# Patient Record
Sex: FEMALE | Race: WHITE | NOT HISPANIC OR LATINO | Employment: OTHER | URBAN - METROPOLITAN AREA
[De-identification: names, ages, dates, MRNs, and addresses within clinical notes are randomized per-mention and may not be internally consistent; named-entity substitution may affect disease eponyms.]

---

## 2017-01-10 ENCOUNTER — TELEPHONE (OUTPATIENT)
Dept: FAMILY MEDICINE | Facility: CLINIC | Age: 72
End: 2017-01-10

## 2017-01-10 NOTE — TELEPHONE ENCOUNTER
Reason for Call:  Other call back and prescription    Detailed comments: She was on the medical marijuana program but this was not helping her.  She is wondering if she can get a Rx for Vicodin?  She uses [facility].  Please advise.     Phone Number Patient can be reached at: Home number on file [phone] (home)    Best Time: any    Can we leave a detailed message on this number? YES    Call taken on 1/10/2017 at 3:32 PM by Rosemary Villarreal

## 2017-01-10 NOTE — TELEPHONE ENCOUNTER
She found her prescription for December so she will fill that. She will see Dr. Solis in 2 weeks to discuss plan going forward

## 2017-01-23 ENCOUNTER — OFFICE VISIT (OUTPATIENT)
Dept: FAMILY MEDICINE | Facility: CLINIC | Age: 72
End: 2017-01-23
Payer: COMMERCIAL

## 2017-01-23 VITALS
HEIGHT: 61 IN | TEMPERATURE: 97 F | DIASTOLIC BLOOD PRESSURE: 60 MMHG | BODY MASS INDEX: 24.02 KG/M2 | HEART RATE: 56 BPM | WEIGHT: 127.2 LBS | SYSTOLIC BLOOD PRESSURE: 136 MMHG

## 2017-01-23 DIAGNOSIS — F41.1 ANXIETY STATE: ICD-10-CM

## 2017-01-23 DIAGNOSIS — F11.20 NARCOTIC DEPENDENCE (H): Chronic | ICD-10-CM

## 2017-01-23 DIAGNOSIS — G89.4 CHRONIC PAIN SYNDROME: Primary | ICD-10-CM

## 2017-01-23 PROBLEM — Z53.20 MAMMOGRAM DECLINED: Status: ACTIVE | Noted: 2017-01-23

## 2017-01-23 PROCEDURE — 99214 OFFICE O/P EST MOD 30 MIN: CPT | Performed by: FAMILY MEDICINE

## 2017-01-23 RX ORDER — CITALOPRAM HYDROBROMIDE 40 MG/1
40 TABLET ORAL DAILY
Qty: 90 TABLET | Refills: 3 | Status: SHIPPED | OUTPATIENT
Start: 2017-01-23 | End: 2018-04-28

## 2017-01-23 RX ORDER — HYDROCODONE BITARTRATE AND ACETAMINOPHEN 5; 325 MG/1; MG/1
1 TABLET ORAL EVERY 6 HOURS PRN
Qty: 60 TABLET | Refills: 0 | Status: SHIPPED | OUTPATIENT
Start: 2017-02-23 | End: 2017-01-23

## 2017-01-23 RX ORDER — BACLOFEN 10 MG/1
10 TABLET ORAL 2 TIMES DAILY
Qty: 90 TABLET | Refills: 3 | COMMUNITY
Start: 2017-01-23 | End: 2017-08-22

## 2017-01-23 RX ORDER — HYDROCODONE BITARTRATE AND ACETAMINOPHEN 5; 325 MG/1; MG/1
1 TABLET ORAL EVERY 6 HOURS PRN
Qty: 60 TABLET | Refills: 0 | Status: SHIPPED | OUTPATIENT
Start: 2017-01-23 | End: 2017-01-23

## 2017-01-23 RX ORDER — HYDROCODONE BITARTRATE AND ACETAMINOPHEN 5; 325 MG/1; MG/1
1 TABLET ORAL EVERY 6 HOURS PRN
Qty: 60 TABLET | Refills: 0 | Status: SHIPPED | OUTPATIENT
Start: 2017-03-23 | End: 2017-04-17 | Stop reason: DRUGHIGH

## 2017-01-23 NOTE — PROGRESS NOTES
SUBJECTIVE:                                                    Tracy Galloway is a 71 year old female who presents to clinic today for the following health issues:    Chronic Pain Follow-Up       Type/Location of Pain: Right Jaw  Analgesia/pain control:       Recent changes: None. Tracy takes 1 tablet of Vicodin when she wakes up and 1 at noon.       Overall control: For most of the day, Tracy's pain is tolerable. By evening, she feels the   Vicodin has worn off.   Activity level/function:      Daily activities: Tracy is able to do all daily activities. In fact, working out improves her pain.     Work: N/A  Adverse effects: She needs daily stool softeners.   Adherance    Taking medication as directed? Yes    Participating in other treatments: No, Tracy stopped participating in the medical marijuana program 2 weeks ago because of the prohibitive cost for minimal pain control. She also noted some confusion as they increased her dose of medical cannibis. Tracy resumed taking her Vicodin a week later. Tracy takes Baclofen nightly. She uses Advil for breakthrough pain.    Risk Factors:    Sleep: Good    Mood/anxiety: Controlled    Recent family or social stressors: None    Other aggravating factors: none  PHQ-9 SCORE 5/16/2014 7/7/2015 6/17/2016   Total Score 8 2 -   Total Score - - 2     ROMAIN-7 SCORE 5/16/2014 7/7/2015 6/17/2016   Total Score 3 0 -   Total Score - - 1     Encounter-Level CSA - 7/7/15:               Controlled Substance Agreement - Scan on 7/10/2015  1:04 PM : CONTROLLED SUBSTANCE AGREEMENT (below)                 Amount of exercise or physical activity: Tracy exercises 4-5 days/week for an average of 45-60 minutes.    Problems taking medications regularly: No    Medication side effects: None    Diet: Regular     HTN  Patient presents for evaluation of hypertension.  She indicates that she is feeling well and denies any symptoms referable to her elevated blood pressure. Specifically denies chest pain,  "palpitations, dyspnea, orthopnea, PND or peripheral edema. Current medication regimen is as listed below. Patient denies any side effects of medication.      Problem List, Medications, Allergies, and Medical/Social/Surgical histories reviewed in EPIC and updated as appropriate.    ROS:  Constitutional, HEENT, Cardiovascular, Pulmonary, GI and  systems are negative, except as otherwise noted.    OBJECTIVE:                                                    /60 mmHg  Pulse 56  Temp(Src) 97  F (36.1  C) (Tympanic)  Ht 5' 0.5\" (1.537 m)  Wt 127 lb 3.2 oz (57.698 kg)  BMI 24.42 kg/m2  Body mass index is 24.42 kg/(m^2).  GENERAL: Healthy, alert, and no distress.  RESP: CTAB  CV: RRR without murmur, gallop, or rub.   ABDOMEN: Soft and non-tender without hepatosplenomegaly or masses. Bowel sounds normal.  PSYCH: Affect and mentation appear normal.     ASSESSMENT/PLAN:                                                    (G89.4) Chronic pain syndrome (primary encounter diagnosis).  Comment: Moderate control.   Plan: Refilled Baclofen and Vicodin.    (F41.1) Anxiety   Plan: Refilled Citalopram.    Hypertension  Plan: Ordered Lipid Panel.          (F11.20) Narcotic dependence (H)  Comment:   Plan: see above              FUTURE APPOINTMENTS:       - Follow-up visit in a month for chronic pain management. Patient is to journal pain between now and then. May consider starting a Statin at this appointment pending the results of the Lipid Panel.     Radha Lamar, MS3      I have seen this patient and examined this patient with Medical student and have been involved in all of the medical decision making  Gwendolyn Solis MD       [facility]    "

## 2017-01-23 NOTE — NURSING NOTE
"Chief Complaint   Patient presents with     Pain       Initial /60 mmHg  Pulse 56  Temp(Src) 97  F (36.1  C) (Tympanic)  Ht 5' 0.5\" (1.537 m)  Wt 127 lb 3.2 oz (57.698 kg)  BMI 24.42 kg/m2 Estimated body mass index is 24.42 kg/(m^2) as calculated from the following:    Height as of this encounter: 5' 0.5\" (1.537 m).    Weight as of this encounter: 127 lb 3.2 oz (57.698 kg).  BP completed using cuff size: regular    "

## 2017-01-23 NOTE — MR AVS SNAPSHOT
After Visit Summary   1/23/2017    Tracy Galloway    MRN: [id number]           Patient Information     Date Of Birth          1945        Visit Information        Provider Department      1/23/2017 11:00 AM Gwendolyn Solis MD [facility]        Today's Diagnoses     Chronic pain syndrome    -  1     Anxiety state           Care Instructions    Follow up with Dr. Solis in 1 month. Keep a pain journal between now and then.    Make a fasting lab appointment to check your cholesterol.    Dispose of your medications.             Follow-ups after your visit        Who to contact     If you have questions or need follow up information about today's clinic visit or your schedule please contact [facility] directly at [phone].  Normal or non-critical lab and imaging results will be communicated to you by [employer], letter or phone within 4 business days after the clinic has received the results. If you do not hear from us within 7 days, please contact the clinic through [employer] or phone. If you have a critical or abnormal lab result, we will notify you by phone as soon as possible.  Submit refill requests through [employer] or call your pharmacy and they will forward the refill request to us. Please allow 3 business days for your refill to be completed.          Additional Information About Your Visit        MyChart Information     [employer] gives you secure access to your electronic health record. If you see a primary care provider, you can also send messages to your care team and make appointments. If you have questions, please call your primary care clinic.  If you do not have a primary care provider, please call [phone] and they will assist you.        Care EveryWhere ID     This is your Care EveryWhere ID. This could be used by other organizations to access your [place] medical records  [id number]        Your Vitals Were     Pulse Temperature Height  "BMI (Body Mass Index)          56 97  F (36.1  C) (Tympanic) 5' 0.5\" (1.537 m) 24.42 kg/m2         Blood Pressure from Last 3 Encounters:   01/23/17 136/60   11/17/16 134/66   11/14/16 128/62    Weight from Last 3 Encounters:   01/23/17 127 lb 3.2 oz (57.698 kg)   11/17/16 124 lb 6.4 oz (56.427 kg)   11/14/16 124 lb 3.2 oz (56.337 kg)              Today, you had the following     No orders found for display         Today's Medication Changes          These changes are accurate as of: 1/23/17 11:38 AM.  If you have any questions, ask your nurse or doctor.               Start taking these medicines.        Dose/Directions    HYDROcodone-acetaminophen 5-325 MG per tablet   Commonly known as:  NORCO   Used for:  Chronic pain syndrome   Started by:  Gwendolyn Solis MD        Dose:  1 tablet   Start taking on:  3/23/2017   Take 1 tablet by mouth every 6 hours as needed   Quantity:  60 tablet   Refills:  0            Where to get your medicines      These medications were sent to [id number] [address] [facility]  [address] [address]    Hours:  Closed 10-16-08 business to [facility] Phone:  [phone]    - citalopram 40 MG tablet      Some of these will need a paper prescription and others can be bought over the counter.  Ask your nurse if you have questions.     Bring a paper prescription for each of these medications    - HYDROcodone-acetaminophen 5-325 MG per tablet             Primary Care Provider Office Phone # Fax #    Gwendolyn Solis [phone]       [facility] [address] [facility] [address]        Thank you!     Thank you for choosing [facility]  for your care. Our goal is always to provide you with excellent care. Hearing back from our patients is one way we can continue to improve our services. Please take a few minutes to complete the written survey that you may receive in the mail after " your visit with us. Thank you!             Your Updated Medication List - Protect others around you: Learn how to safely use, store and throw away your medicines at www.disposemymeds.org.          This list is accurate as of: 1/23/17 11:38 AM.  Always use your most recent med list.                   Brand Name Dispense Instructions for use    aspirin 81 MG EC tablet     90 tablet    Take 1 tablet (81 mg) by mouth daily       baclofen 10 MG tablet    LIORESAL    90 tablet    Take 1 tablet (10 mg) by mouth 2 times daily       citalopram 40 MG tablet    celeXA    90 tablet    Take 1 tablet (40 mg) by mouth daily       diltiazem 180 MG 24 hr capsule    DILT-XR    90 capsule    Take 1 capsule (180 mg) by mouth daily       DOCUSATE SODIUM PO      Take 100 mg by mouth daily as needed       HYDROcodone-acetaminophen 5-325 MG per tablet   Start taking on:  3/23/2017    NORCO    60 tablet    Take 1 tablet by mouth every 6 hours as needed       ibuprofen 600 MG tablet    ADVIL/MOTRIN    90 tablet    Take 1 tablet (600 mg) by mouth every 6 hours as needed for moderate pain       lisinopril 20 MG tablet    PRINIVIL/ZESTRIL    90 tablet    Take 1 tablet (20 mg) by mouth daily       loratadine 10 MG tablet    CLARITIN     Take 10 mg by mouth daily as needed for allergies       ranitidine 150 MG tablet    ZANTAC    180 tablet    Take 1 tablet (150 mg) by mouth 2 times daily       valACYclovir 1000 mg tablet    VALTREX    21 tablet    As needed fo rbreak outs       zolpidem 10 MG tablet    AMBIEN    30 tablet    Take  by mouth nightly as needed for sleep. 1/2 -1 TABLET

## 2017-01-23 NOTE — PATIENT INSTRUCTIONS
Follow up with Dr. Solis in 1 month. Keep a pain journal between now and then.    Make a fasting lab appointment to check your cholesterol.    Dispose of your medications.

## 2017-01-24 DIAGNOSIS — I10 ESSENTIAL HYPERTENSION WITH GOAL BLOOD PRESSURE LESS THAN 140/90: ICD-10-CM

## 2017-01-24 LAB
ALBUMIN SERPL-MCNC: 3.3 G/DL (ref 3.4–5)
ALP SERPL-CCNC: 64 U/L (ref 40–150)
ALT SERPL W P-5'-P-CCNC: 64 U/L (ref 0–50)
ANION GAP SERPL CALCULATED.3IONS-SCNC: 7 MMOL/L (ref 3–14)
AST SERPL W P-5'-P-CCNC: 32 U/L (ref 0–45)
BASOPHILS # BLD AUTO: 0 10E9/L (ref 0–0.2)
BASOPHILS NFR BLD AUTO: 0.6 %
BILIRUB DIRECT SERPL-MCNC: <0.1 MG/DL (ref 0–0.2)
BILIRUB SERPL-MCNC: 0.2 MG/DL (ref 0.2–1.3)
BUN SERPL-MCNC: 22 MG/DL (ref 7–30)
CALCIUM SERPL-MCNC: 8.5 MG/DL (ref 8.5–10.1)
CHLORIDE SERPL-SCNC: 104 MMOL/L (ref 94–109)
CHOLEST SERPL-MCNC: 192 MG/DL
CO2 SERPL-SCNC: 27 MMOL/L (ref 20–32)
CREAT SERPL-MCNC: 0.86 MG/DL (ref 0.52–1.04)
DIFFERENTIAL METHOD BLD: NORMAL
EOSINOPHIL # BLD AUTO: 0.6 10E9/L (ref 0–0.7)
EOSINOPHIL NFR BLD AUTO: 10.9 %
ERYTHROCYTE [DISTWIDTH] IN BLOOD BY AUTOMATED COUNT: 13 % (ref 10–15)
GFR SERPL CREATININE-BSD FRML MDRD: 65 ML/MIN/1.7M2
GLUCOSE SERPL-MCNC: 71 MG/DL (ref 70–99)
HCT VFR BLD AUTO: 36.4 % (ref 35–47)
HDLC SERPL-MCNC: 60 MG/DL
HGB BLD-MCNC: 11.7 G/DL (ref 11.7–15.7)
LDLC SERPL CALC-MCNC: 116 MG/DL
LYMPHOCYTES # BLD AUTO: 1.7 10E9/L (ref 0.8–5.3)
LYMPHOCYTES NFR BLD AUTO: 32.6 %
MCH RBC QN AUTO: 30 PG (ref 26.5–33)
MCHC RBC AUTO-ENTMCNC: 32.1 G/DL (ref 31.5–36.5)
MCV RBC AUTO: 93 FL (ref 78–100)
MONOCYTES # BLD AUTO: 0.4 10E9/L (ref 0–1.3)
MONOCYTES NFR BLD AUTO: 6.8 %
NEUTROPHILS # BLD AUTO: 2.5 10E9/L (ref 1.6–8.3)
NEUTROPHILS NFR BLD AUTO: 49.1 %
NONHDLC SERPL-MCNC: 132 MG/DL
PLATELET # BLD AUTO: 322 10E9/L (ref 150–450)
POTASSIUM SERPL-SCNC: 4.5 MMOL/L (ref 3.4–5.3)
PROT SERPL-MCNC: 7.2 G/DL (ref 6.8–8.8)
RBC # BLD AUTO: 3.9 10E12/L (ref 3.8–5.2)
SODIUM SERPL-SCNC: 138 MMOL/L (ref 133–144)
TRIGL SERPL-MCNC: 80 MG/DL
TSH SERPL DL<=0.005 MIU/L-ACNC: 0.89 MU/L (ref 0.4–4)
WBC # BLD AUTO: 5.2 10E9/L (ref 4–11)

## 2017-01-24 PROCEDURE — 85025 COMPLETE CBC W/AUTO DIFF WBC: CPT | Performed by: FAMILY MEDICINE

## 2017-01-24 PROCEDURE — 84443 ASSAY THYROID STIM HORMONE: CPT | Performed by: FAMILY MEDICINE

## 2017-01-24 PROCEDURE — 80061 LIPID PANEL: CPT | Performed by: FAMILY MEDICINE

## 2017-01-24 PROCEDURE — 80048 BASIC METABOLIC PNL TOTAL CA: CPT | Performed by: FAMILY MEDICINE

## 2017-01-24 PROCEDURE — 80076 HEPATIC FUNCTION PANEL: CPT | Performed by: FAMILY MEDICINE

## 2017-01-24 PROCEDURE — 36415 COLL VENOUS BLD VENIPUNCTURE: CPT | Performed by: FAMILY MEDICINE

## 2017-02-21 ENCOUNTER — OFFICE VISIT (OUTPATIENT)
Dept: FAMILY MEDICINE | Facility: CLINIC | Age: 72
End: 2017-02-21
Payer: COMMERCIAL

## 2017-02-21 VITALS
TEMPERATURE: 96.6 F | BODY MASS INDEX: 23.82 KG/M2 | SYSTOLIC BLOOD PRESSURE: 134 MMHG | WEIGHT: 124 LBS | HEART RATE: 72 BPM | DIASTOLIC BLOOD PRESSURE: 60 MMHG

## 2017-02-21 DIAGNOSIS — G89.4 CHRONIC PAIN SYNDROME: Primary | Chronic | ICD-10-CM

## 2017-02-21 PROCEDURE — 99214 OFFICE O/P EST MOD 30 MIN: CPT | Performed by: FAMILY MEDICINE

## 2017-02-21 RX ORDER — HYDROCODONE BITARTRATE AND ACETAMINOPHEN 7.5; 325 MG/1; MG/1
1 TABLET ORAL 2 TIMES DAILY
Qty: 60 TABLET | Refills: 0 | Status: SHIPPED | OUTPATIENT
Start: 2017-02-21 | End: 2017-03-27

## 2017-02-21 NOTE — MR AVS SNAPSHOT
After Visit Summary   2/21/2017    Tracy Galloway    MRN: [id number]           Patient Information     Date Of Birth          1945        Visit Information        Provider Department      2/21/2017 2:00 PM Gwendolyn Solis MD [facility]        Today's Diagnoses     Chronic pain syndrome    -  1      Care Instructions    Change dose of narcotic     See me back in on month         Follow-ups after your visit        Who to contact     If you have questions or need follow up information about today's clinic visit or your schedule please contact [facility] directly at [phone].  Normal or non-critical lab and imaging results will be communicated to you by [employer], letter or phone within 4 business days after the clinic has received the results. If you do not hear from us within 7 days, please contact the clinic through [employer] or phone. If you have a critical or abnormal lab result, we will notify you by phone as soon as possible.  Submit refill requests through [employer] or call your pharmacy and they will forward the refill request to us. Please allow 3 business days for your refill to be completed.          Additional Information About Your Visit        MyChart Information     [employer] gives you secure access to your electronic health record. If you see a primary care provider, you can also send messages to your care team and make appointments. If you have questions, please call your primary care clinic.  If you do not have a primary care provider, please call [phone] and they will assist you.        Care EveryWhere ID     This is your Care EveryWhere ID. This could be used by other organizations to access your [place] medical records  [id number]        Your Vitals Were     Pulse Temperature Breastfeeding? BMI (Body Mass Index)          72 96.6  F (35.9  C) (Tympanic) No 23.82 kg/m2         Blood Pressure from Last 3 Encounters:   02/21/17 134/60    01/23/17 136/60   11/17/16 134/66    Weight from Last 3 Encounters:   02/21/17 124 lb (56.2 kg)   01/23/17 127 lb 3.2 oz (57.7 kg)   11/17/16 124 lb 6.4 oz (56.4 kg)              Today, you had the following     No orders found for display         Today's Medication Changes          These changes are accurate as of: 2/21/17  2:41 PM.  If you have any questions, ask your nurse or doctor.               These medicines have changed or have updated prescriptions.        Dose/Directions    * HYDROcodone-acetaminophen 7.5-325 MG per tablet   Commonly known as:  NORCO   This may have changed:  You were already taking a medication with the same name, and this prescription was added. Make sure you understand how and when to take each.   Used for:  Chronic pain syndrome   Changed by:  Gwendolyn Solis MD        Dose:  1 tablet   Take 1 tablet by mouth 2 times daily maximum 2 tablet(s) per day   Quantity:  60 tablet   Refills:  0       * HYDROcodone-acetaminophen 5-325 MG per tablet   Commonly known as:  NORCO   This may have changed:  Another medication with the same name was added. Make sure you understand how and when to take each.   Used for:  Chronic pain syndrome   Changed by:  Gwendolyn Solis MD        Dose:  1 tablet   Start taking on:  3/23/2017   Take 1 tablet by mouth every 6 hours as needed   Quantity:  60 tablet   Refills:  0       * Notice:  This list has 2 medication(s) that are the same as other medications prescribed for you. Read the directions carefully, and ask your doctor or other care provider to review them with you.         Where to get your medicines      Some of these will need a paper prescription and others can be bought over the counter.  Ask your nurse if you have questions.     Bring a paper prescription for each of these medications     HYDROcodone-acetaminophen 7.5-325 MG per tablet                Primary Care Provider Office Phone # Fax #    Gwendolyn Solis [phone]  [phone]       [facility] [place] [address] [facility] [address]        Thank you!     Thank you for choosing [facility]  for your care. Our goal is always to provide you with excellent care. Hearing back from our patients is one way we can continue to improve our services. Please take a few minutes to complete the written survey that you may receive in the mail after your visit with us. Thank you!             Your Updated Medication List - Protect others around you: Learn how to safely use, store and throw away your medicines at www.disposemymeds.org.          This list is accurate as of: 2/21/17  2:41 PM.  Always use your most recent med list.                   Brand Name Dispense Instructions for use    aspirin 81 MG EC tablet     90 tablet    Take 1 tablet (81 mg) by mouth daily       baclofen 10 MG tablet    LIORESAL    90 tablet    Take 1 tablet (10 mg) by mouth 2 times daily       citalopram 40 MG tablet    celeXA    90 tablet    Take 1 tablet (40 mg) by mouth daily       diltiazem 180 MG 24 hr capsule    DILT-XR    90 capsule    Take 1 capsule (180 mg) by mouth daily       DOCUSATE SODIUM PO      Take 100 mg by mouth daily as needed       * HYDROcodone-acetaminophen 7.5-325 MG per tablet    NORCO    60 tablet    Take 1 tablet by mouth 2 times daily maximum 2 tablet(s) per day       * HYDROcodone-acetaminophen 5-325 MG per tablet   Start taking on:  3/23/2017    NORCO    60 tablet    Take 1 tablet by mouth every 6 hours as needed       ibuprofen 600 MG tablet    ADVIL/MOTRIN    90 tablet    Take 1 tablet (600 mg) by mouth every 6 hours as needed for moderate pain       lisinopril 20 MG tablet    PRINIVIL/ZESTRIL    90 tablet    Take 1 tablet (20 mg) by mouth daily       loratadine 10 MG tablet    CLARITIN     Take 10 mg by mouth daily as needed for allergies       ranitidine 150 MG tablet    ZANTAC    180 tablet    Take 1 tablet (150 mg) by mouth 2 times daily        valACYclovir 1000 mg tablet    VALTREX    21 tablet    As needed fo rbreak outs       zolpidem 10 MG tablet    AMBIEN    30 tablet    Take  by mouth nightly as needed for sleep. 1/2 -1 TABLET       * Notice:  This list has 2 medication(s) that are the same as other medications prescribed for you. Read the directions carefully, and ask your doctor or other care provider to review them with you.

## 2017-02-21 NOTE — PROGRESS NOTES
SUBJECTIVE:                                                    Tracy Galloway is a 71 year old female who presents to clinic today for the following health issues:        Chronic Pain Follow-Up       Type / Location of Pain: right jaw  Analgesia/pain control:       Recent changes:  same      Overall control: Tolerable with discomfort  Activity level/function:      Daily activities:  Can do most things most days, with some rest    Work:  not applicable  Adverse effects:  No  Adherance    Taking medication as directed?  Yes    Participating in other treatments: no -   Risk Factors:    Sleep:  Good    Mood/anxiety:  controlled    Recent family or social stressors:  none noted    Other aggravating factors: none  PHQ-9 SCORE 5/16/2014 7/7/2015 6/17/2016   Total Score 8 2 -   Total Score - - 2     ROMAIN-7 SCORE 5/16/2014 7/7/2015 6/17/2016   Total Score 3 0 -   Total Score - - 1     Encounter-Level CSA - 07/07/2015:                 Controlled Substance Agreement - Scan on 7/10/2015  1:04 PM : CONTROLLED SUBSTANCE AGREEMENT (below)                 Amount of exercise or physical activity: 6-7 days/week for an average of 45-60 minutes    Problems taking medications regularly: No    Medication side effects: none  Diet: regular (no restrictions)        Problem list and histories reviewed & adjusted, as indicated.  Additional history: as documented    Problem list, Medication list, Allergies, and Medical/Social/Surgical histories reviewed in EPIC and updated as appropriate.    ROS:  Constitutional, HEENT, cardiovascular, pulmonary, gi and gu systems are negative, except as otherwise noted.    OBJECTIVE:                                                    /60 (BP Location: Right arm, Patient Position: Chair, Cuff Size: Adult Regular)  Pulse 72  Temp 96.6  F (35.9  C) (Tympanic)  Wt 124 lb (56.2 kg)  Breastfeeding? No  BMI 23.82 kg/m2  Body mass index is 23.82 kg/(m^2).  GENERAL APPEARANCE: healthy, alert and no  distress  RESP: lungs clear to auscultation - no rales, rhonchi or wheezes  CV: regular rates and rhythm, normal S1 S2, no S3 or S4 and no murmur, click or rub  PSYCH: mentation appears normal and affect normal/bright         ASSESSMENT/PLAN:                                                    1. Chronic pain syndrome  Not well controlled - dose of norco adjusted today   - HYDROcodone-acetaminophen (NORCO) 7.5-325 MG per tablet; Take 1 tablet by mouth 2 times daily maximum 2 tablet(s) per day  Dispense: 60 tablet; Refill: 0      Patient Instructions   Change dose of narcotic     See me back in on month     Risks, benefits, side effects and rationale for treatment plan fully discussed with the patient and understanding expressed.       Gwendolyn Solis MD  [facility]

## 2017-02-21 NOTE — NURSING NOTE
"Chief Complaint   Patient presents with     Pain       Initial /60 (BP Location: Right arm, Patient Position: Chair, Cuff Size: Adult Regular)  Pulse 72  Temp 96.6  F (35.9  C) (Tympanic)  Wt 124 lb (56.2 kg)  Breastfeeding? No  BMI 23.82 kg/m2 Estimated body mass index is 23.82 kg/(m^2) as calculated from the following:    Height as of 1/23/17: 5' 0.5\" (1.537 m).    Weight as of this encounter: 124 lb (56.2 kg).  Medication Reconciliation: complete    "

## 2017-03-11 ENCOUNTER — MYC MEDICAL ADVICE (OUTPATIENT)
Dept: FAMILY MEDICINE | Facility: CLINIC | Age: 72
End: 2017-03-11

## 2017-03-27 ENCOUNTER — OFFICE VISIT (OUTPATIENT)
Dept: FAMILY MEDICINE | Facility: CLINIC | Age: 72
End: 2017-03-27
Payer: COMMERCIAL

## 2017-03-27 VITALS
HEART RATE: 80 BPM | DIASTOLIC BLOOD PRESSURE: 68 MMHG | TEMPERATURE: 97.6 F | WEIGHT: 128 LBS | SYSTOLIC BLOOD PRESSURE: 142 MMHG | BODY MASS INDEX: 24.17 KG/M2 | HEIGHT: 61 IN

## 2017-03-27 DIAGNOSIS — G89.4 CHRONIC PAIN SYNDROME: Chronic | ICD-10-CM

## 2017-03-27 DIAGNOSIS — M54.50 ACUTE LEFT-SIDED LOW BACK PAIN WITHOUT SCIATICA: Primary | ICD-10-CM

## 2017-03-27 PROCEDURE — 99213 OFFICE O/P EST LOW 20 MIN: CPT | Performed by: NURSE PRACTITIONER

## 2017-03-27 RX ORDER — PREDNISONE 20 MG/1
20 TABLET ORAL 2 TIMES DAILY
Qty: 10 TABLET | Refills: 0 | Status: SHIPPED | OUTPATIENT
Start: 2017-03-27 | End: 2017-04-17

## 2017-03-27 RX ORDER — HYDROCODONE BITARTRATE AND ACETAMINOPHEN 7.5; 325 MG/1; MG/1
1 TABLET ORAL 2 TIMES DAILY
Qty: 60 TABLET | Refills: 0 | Status: SHIPPED | OUTPATIENT
Start: 2017-03-27 | End: 2017-05-26 | Stop reason: ALTCHOICE

## 2017-03-27 NOTE — PROGRESS NOTES
SUBJECTIVE:                                                    Tracy Galloway is a 71 year old female who presents to clinic today for the following health issues:    Back Pain      Duration: 4 months - getting worse         Specific cause: lifting    Description:   Location of pain: low back left  Character of pain: sharp - burning   Pain radiation:radiates into the left buttocks - worse with walking up stairs   New numbness or weakness in legs, not attributed to pain:  no     Intensity: Currently 8/10, moderate    History:   Pain interferes with job: Not applicable  History of back problems: no prior back problems  Any previous MRI or X-rays: None  Sees a specialist for back pain:  No  Therapies tried without relief: chiropractor    Alleviating factors:   Improved by: heat/cold rub helps a little     Precipitating factors:  Worsened by: walking up stairs     Functional and Psychosocial Screen (Christiano STarT Back):      Not performed today       Accompanying Signs & Symptoms:  Risk of Fracture:  None  Risk of Cauda Equina:  None  Risk of Infection:  None  Risk of Cancer:  None  Risk of Ankylosing Spondylitis:  Onset at age <35, male, AND morning back stiffness. no        Was lifting a heavy tote 4 months ago when symptoms started.  Went to the chiropractor 3 times but then left on vacation for 3 weeks and unable to follow up.  Came home following vacation and then left shortly after for a 2 week cruise.  Has been unable to take care of her back since busy traveling.    Problem list and histories reviewed & adjusted, as indicated.  Additional history: as documented    Patient Active Problem List   Diagnosis     Anxiety state     Insomnia     Plantar fasciitis     Shingles     CARDIOVASCULAR SCREENING; LDL GOAL LESS THAN 130     Atrophic vaginitis     Advanced directives, counseling/discussion     Chronic pain     Health Care Home     Essential hypertension with goal blood pressure less than 140/90     Narcotic  dependence (H)     Prinzmetal angina (H)     Visual impairment     TIA (transient ischemic attack)     Confusion     Mammogram declined     Past Surgical History:   Procedure Laterality Date     CHOLECYSTECTOMY, LAPOROSCOPIC  1990's    s/p Cholecystectomy, Laparoscopic     COLONOSCOPY  12/6/2012    Procedure: COLONOSCOPY;  Colonoscopy;  Surgeon: Tyrell Brown MD;  Location: [facility]     EYE SURGERY       HC COLONOSCOPY THRU STOMA, DIAGNOSTIC  3-    at outside clinic.  Normal.     HYSTERECTOMY, SUNDAR  1970's    severe endometriosis     SURGICAL HISTORY OF -   1998    breast reduction     SURGICAL HISTORY OF -   5/30/03    coronary angiogram     SURGICAL HISTORY OF -       Refractive surgery right     TONSILLECTOMY         Social History   Substance Use Topics     Smoking status: Former Smoker     Types: Cigarettes     Quit date: 10/30/1962     Smokeless tobacco: Never Used     Alcohol use 2.4 oz/week     4 Standard drinks or equivalent per week      Comment: occasional     Family History   Problem Relation Age of Onset     Hypertension Mother      HEART DISEASE Mother      Hypertension Father      Circulatory Father      PAD     Alzheimer Disease Maternal Grandmother      dementia     CEREBROVASCULAR DISEASE Maternal Grandmother      HEART DISEASE Maternal Grandfather      DIABETES Maternal Grandfather          Current Outpatient Prescriptions   Medication Sig Dispense Refill     HYDROcodone-acetaminophen (NORCO) 7.5-325 MG per tablet Take 1 tablet by mouth 2 times daily maximum 2 tablet(s) per day 60 tablet 0     predniSONE (DELTASONE) 20 MG tablet Take 1 tablet (20 mg) by mouth 2 times daily 10 tablet 0     baclofen (LIORESAL) 10 MG tablet Take 1 tablet (10 mg) by mouth 2 times daily 90 tablet 3     citalopram (CELEXA) 40 MG tablet Take 1 tablet (40 mg) by mouth daily 90 tablet 3     HYDROcodone-acetaminophen (NORCO) 5-325 MG per tablet Take 1 tablet by mouth every 6 hours as needed 60 tablet 0     zolpidem  "(AMBIEN) 10 MG tablet Take  by mouth nightly as needed for sleep. 1/2 -1 TABLET 30 tablet 5     ranitidine (ZANTAC) 150 MG tablet Take 1 tablet (150 mg) by mouth 2 times daily 180 tablet 1     aspirin 81 MG EC tablet Take 1 tablet (81 mg) by mouth daily 90 tablet 3     ibuprofen (ADVIL,MOTRIN) 600 MG tablet Take 1 tablet (600 mg) by mouth every 6 hours as needed for moderate pain 90 tablet 1     diltiazem (DILT-XR) 180 MG 24 hr ER capsule Take 1 capsule (180 mg) by mouth daily 90 capsule 3     lisinopril (PRINIVIL,ZESTRIL) 20 MG tablet Take 1 tablet (20 mg) by mouth daily 90 tablet 3     valACYclovir (VALTREX) 1000 mg tablet As needed fo rbreak outs 21 tablet 3     loratadine (CLARITIN) 10 MG tablet Take 10 mg by mouth daily as needed for allergies        DOCUSATE SODIUM PO Take 100 mg by mouth daily as needed        No Known Allergies  Labs reviewed in EPIC    Reviewed and updated as needed this visit by clinical staff       Reviewed and updated as needed this visit by Provider         ROS:  Constitutional, HEENT, cardiovascular, pulmonary, gi and gu systems are negative, except as otherwise noted.    OBJECTIVE:                                                    /68  Pulse 80  Temp 97.6  F (36.4  C) (Tympanic)  Ht 5' 0.5\" (1.537 m)  Wt 128 lb (58.1 kg)  BMI 24.59 kg/m2  Body mass index is 24.59 kg/(m^2).  GENERAL: healthy, alert and no distress  MS: left para lumbar spinal and left SI with tenderness to palpation, range of motion limited by discomfort  NEURO: Normal strength and tone, mentation intact and speech normal  PSYCH: mentation appears normal, affect normal/bright    Diagnostic Test Results:  none      ASSESSMENT/PLAN:                                                      1. Acute left-sided low back pain without sciatica  With ongoing symptoms recommend physical therapy.  Already on Norco and baclofen for chronic pain will try prednisone for current symptoms.  - PHYSICAL THERAPY REFERRAL  - " predniSONE (DELTASONE) 20 MG tablet; Take 1 tablet (20 mg) by mouth 2 times daily  Dispense: 10 tablet; Refill: 0    Home care instructions were reviewed with the patient. The risks, benefits and treatment options of prescribed medications or other treatments have been discussed with the patient. The patient verbalized their understanding and should call or follow up if no improvement or if they develop further problems.      Patient Instructions     Physical therapy referral made  Prednisone sent to the pharmacy  Follow up if symptoms do not improve or worsen.    Relieving Back Pain  Back pain is a common problem. You can strain back muscles by lifting too much weight or just by moving the wrong way. Back strain can be uncomfortable, even painful. And it can take weeks or months to improve. To help yourself feel better and prevent future back strains, try these tips.  Important Note: Do not give aspirin to children or teens without first discussing it with your healthcare provider.     Ice    Ice reduces muscle pain and swelling. It helps most during the first 24 to 48 hours after an injury.    Wrap an ice pack or a bag of frozen peas in a thin towel. (Never place ice directly on your skin.)    Place the ice where your back hurts the most.    Don t ice for more than 20 minutes at a time.    You can use ice several times a day.     Medicines  Over-the-counter pain relievers can include acetaminophen and anti-inflammatory medicines, which includes aspirin or ibuprofen. They can help ease discomfort. Some also reduce swelling.    Tell your healthcare provider about any medicines you are already taking.    Take medicines only as directed.    Heat  After the first 48 hours, heat can relax sore muscles and improve blood flow.    Try a warm bath or shower. Or use a heating pad set on low. To prevent a burn, keep a cloth between you and the heating pad.    Don t use a heating pad for more than 15 minutes at a time. Never  sleep on a heating pad.    8154-9246 [employer]. [address] [place] [address], [place] [address]. All rights reserved. This information is not intended as a substitute for professional medical care. Always follow your healthcare professional's instructions.            YANY De Dios [facility]

## 2017-03-27 NOTE — TELEPHONE ENCOUNTER
Orly      Last Written Prescription Date:  2/21/2017  Last Fill Quantity: 60,   # refills: 0  Last Office Visit with [facility], [facility] or [facility] prescribing provider: 2/21/2017  Future Office visit:    Next 5 appointments (look out 90 days)     Mar 27, 2017  4:00 PM CDT   [facility] Fox CNP   [facility]    [address] [place] [address]   [phone]            Apr 11, 2017 10:40 AM CDT   SHORT with Gwendolyn Solis MD   [facility]    [address] [place] [address]   [phone]            Apr 28, 2017  1:00 PM CDT   SHORT with Gwendolyn Solis MD   [facility]    [address] [place] [address]   [phone]                   Routing refill request to provider for review/approval because:  Drug not on the [facility], [facility] or [facility] refill protocol or controlled substance

## 2017-03-27 NOTE — PATIENT INSTRUCTIONS
Physical therapy referral made  Prednisone sent to the pharmacy  Follow up if symptoms do not improve or worsen.    Relieving Back Pain  Back pain is a common problem. You can strain back muscles by lifting too much weight or just by moving the wrong way. Back strain can be uncomfortable, even painful. And it can take weeks or months to improve. To help yourself feel better and prevent future back strains, try these tips.  Important Note: Do not give aspirin to children or teens without first discussing it with your healthcare provider.     Ice    Ice reduces muscle pain and swelling. It helps most during the first 24 to 48 hours after an injury.    Wrap an ice pack or a bag of frozen peas in a thin towel. (Never place ice directly on your skin.)    Place the ice where your back hurts the most.    Don t ice for more than 20 minutes at a time.    You can use ice several times a day.     Medicines  Over-the-counter pain relievers can include acetaminophen and anti-inflammatory medicines, which includes aspirin or ibuprofen. They can help ease discomfort. Some also reduce swelling.    Tell your healthcare provider about any medicines you are already taking.    Take medicines only as directed.    Heat  After the first 48 hours, heat can relax sore muscles and improve blood flow.    Try a warm bath or shower. Or use a heating pad set on low. To prevent a burn, keep a cloth between you and the heating pad.    Don t use a heating pad for more than 15 minutes at a time. Never sleep on a heating pad.    4819-2694 [employer] [address] [place] [address], [place] [address]. All rights reserved. This information is not intended as a substitute for professional medical care. Always follow your healthcare professional's instructions.

## 2017-03-28 ENCOUNTER — HOSPITAL ENCOUNTER (OUTPATIENT)
Dept: PHYSICAL THERAPY | Facility: CLINIC | Age: 72
Setting detail: THERAPIES SERIES
End: 2017-03-28
Attending: NURSE PRACTITIONER
Payer: COMMERCIAL

## 2017-03-28 PROCEDURE — 40000718 ZZHC STATISTIC PT DEPARTMENT ORTHO VISIT: Performed by: PHYSICAL THERAPIST

## 2017-03-28 PROCEDURE — 97161 PT EVAL LOW COMPLEX 20 MIN: CPT | Mod: GP | Performed by: PHYSICAL THERAPIST

## 2017-03-28 PROCEDURE — 97110 THERAPEUTIC EXERCISES: CPT | Mod: GP | Performed by: PHYSICAL THERAPIST

## 2017-03-28 NOTE — PROGRESS NOTES
03/28/17 1300   General Information   Type of Visit Initial OP Ortho PT Evaluation   Start of Care Date 03/28/17   Referring Physician YANY De Dios CNP   Patient/Family Goals Statement To get back to pain free yoga and working out at [employer];stair climbing   Orders Evaluate and Treat   Date of Order 03/27/17   Insurance Type [id number]   Insurance Comments/Visits Authorized Auth required   Medical Diagnosis Acute left-sided low back pain without sciatica   Surgical/Medical history reviewed Yes       Present Yes   Body Part(s)   Body Part(s) Lumbar Spine/SI   Presentation and Etiology   Pertinent history of current problem (include personal factors and/or comorbidities that impact the POC) Patient reports: at Benigno time I was helping put away [employer] things and was lifting a heavy box behind her back.  Ignored the low back pain, figured it would go away.  Low back pain continued to worsen with stair climbing.  Having difficulty sleeping because I sleep on the left side.  Denies radiating symptoms or bowel and bladder changes.  Has been doing Yoga for 35 years.  Does take Vicadin for chronic pain and recently recieved 5 day dose pack of Prednisone.     Impairments A. Pain;B. Decreased WB tolerance;E. Decreased flexibility;J. Burning   Functional Limitations perform activities of daily living;perform required work activities;perform desired leisure / sports activities   Symptom Location Left L4-S1; now starting to feel it on right   How/Where did it occur While lifting   Onset date of current episode/exacerbation 01/11/17  (Mid Jan)   Chronicity Chronic   Pain rating (0-10 point scale) Best (/10);Worst (/10)   Best (/10) 5   Worst (/10) 9   Pain quality A. Sharp;C. Aching;D. Burning   Frequency of pain/symptoms B. Intermittent   Pain/symptoms are: The same all the time   Pain/symptoms exacerbated by A. Sitting;M. Other   Pain exacerbation comment Steps   Pain/symptoms eased by D. Nothing  "  Progression of symptoms since onset: Worsened   Current / Previous Interventions   Diagnostic Tests: CT scan   CT Results Results   CT results 10/30/16 CT head angio: IMPRESSION: 1. Mild to moderate atherosclerotic disease involving the carotid bifurcations and the left subclavian artery without stenosis. 2. No evidence for significant stenosis, dissection, thromboembolism, or aneurysm.   Prior Level of Function   Prior Level of Function-Mobility Independent   Prior Level of Function-ADLs Independent   Current Level of Function   Current Community Support Family/friend caregiver   Patient role/employment history F. Retired   Living environment [facility]   Fall Risk Screen   Fall screen completed by PT   Per patient - Fall 2 or more times in past year? No   Per patient - Fall with injury in past year? No   Is patient a fall risk? No   System Outcome Measures   Outcome Measures Low Back Pain (see Oswestry and Christiano)   Lumbar Spine/SI Objective Findings   Posture With forward head; increased thoracic kyphosis; dowager's hump; hyperlordosis   Flexion ROM 75% fingertips mid patella - increased L5-S1 left pain   Extension ROM 50% \"That's good\"   Right Side Bending ROM Froylan=superior patella   Hip Flexion (L2) Strength Froylan=5/5   Hip Abduction Strength Froylan=4/5   Knee Flexion Strength Froylan=5/5   Knee Extension (L3) Strength Froylan=5/5   Ankle Dorsiflexion (L4) Strength Froylan=5/5   Ankle Plantar Flexion (S1) Strength Froylan=5/5   SLR Froylan=90 deg   Palpation Pain with palpation of glut med origin   Hip Extension Strength Froylan=4/5   Great Toe Extension (L5) Strength Froylan=5/5   Planned Therapy Interventions   Planned Therapy Interventions ADL retraining;balance training;gait training;joint mobilization;manual therapy;motor coordination training;neuromuscular re-education;ROM;strengthening;stretching   Planned Modality Interventions   Planned Modality Interventions Cryotherapy   Clinical Impression   Criteria for Skilled Therapeutic " Interventions Met yes, treatment indicated   PT Diagnosis Lumbopelvic hypermobility and glute weakness   Influenced by the following impairments Pain; weakness; impaired ROM; impaired gait   Functional limitations due to impairments Pain and difficulty sitting, walking community distances, standing for >10 min   Clinical Presentation Stable/Uncomplicated   Clinical Presentation Rationale (+) motivation; overall health  (-) chronicity   Clinical Decision Making (Complexity) Low complexity   Therapy Frequency other (see comments)  (1x every other week)   Predicted Duration of Therapy Intervention (days/wks) 8 weeks   Risk & Benefits of therapy have been explained Yes   Patient, Family & other staff in agreement with plan of care Yes   Clinical Impression Comments Tracy is a pleasant 70 yo female who presents today with chronic-recurrent low back pain.   She will benefit from skilled physical therapy to address impairments and functional limitations.   Education Assessment   Preferred Learning Style Listening;Demonstration;Pictures/video   Barriers to Learning No barriers   ORTHO GOALS   PT Ortho Eval Goals 1;2;3   Ortho Goal 1   Goal Description Following PT interventions, patient will be able to climb 12 stairs with <3/10 low back pain.   Target Date 05/23/17   Ortho Goal 2   Goal Description Following PT interventions, patient will have >=4+/5 hip ABD strength for improved lumbar frontal plane stabillity with ADL's.   Target Date 05/23/17   Ortho Goal 3   Goal Description Following PT interventions, patient will be independent with her HEP to reduce future ocurrence of pain and disability.   Target Date 04/11/17   Total Evaluation Time   Total Evaluation Time 15 min       Christina Green, PT, DPT  Doctor of Physical Therapy #[id number]  [facility]  [phone]  [email or web address]

## 2017-04-11 ENCOUNTER — HOSPITAL ENCOUNTER (OUTPATIENT)
Dept: PHYSICAL THERAPY | Facility: CLINIC | Age: 72
Setting detail: THERAPIES SERIES
End: 2017-04-11
Attending: NURSE PRACTITIONER
Payer: COMMERCIAL

## 2017-04-11 PROCEDURE — 97140 MANUAL THERAPY 1/> REGIONS: CPT | Mod: GP | Performed by: PHYSICAL THERAPIST

## 2017-04-11 PROCEDURE — 97110 THERAPEUTIC EXERCISES: CPT | Mod: GP | Performed by: PHYSICAL THERAPIST

## 2017-04-11 PROCEDURE — 40000718 ZZHC STATISTIC PT DEPARTMENT ORTHO VISIT: Performed by: PHYSICAL THERAPIST

## 2017-04-12 ENCOUNTER — OFFICE VISIT (OUTPATIENT)
Dept: FAMILY MEDICINE | Facility: CLINIC | Age: 72
End: 2017-04-12
Payer: COMMERCIAL

## 2017-04-12 VITALS
OXYGEN SATURATION: 97 % | DIASTOLIC BLOOD PRESSURE: 53 MMHG | SYSTOLIC BLOOD PRESSURE: 114 MMHG | HEART RATE: 72 BPM | RESPIRATION RATE: 16 BRPM | TEMPERATURE: 97.7 F

## 2017-04-12 DIAGNOSIS — Z23 NEED FOR VACCINATION: ICD-10-CM

## 2017-04-12 DIAGNOSIS — Z71.84 TRAVEL ADVICE ENCOUNTER: Primary | ICD-10-CM

## 2017-04-12 PROCEDURE — 90632 HEPA VACCINE ADULT IM: CPT | Performed by: NURSE PRACTITIONER

## 2017-04-12 PROCEDURE — 90715 TDAP VACCINE 7 YRS/> IM: CPT | Performed by: NURSE PRACTITIONER

## 2017-04-12 PROCEDURE — 90471 IMMUNIZATION ADMIN: CPT | Performed by: NURSE PRACTITIONER

## 2017-04-12 PROCEDURE — 90472 IMMUNIZATION ADMIN EACH ADD: CPT | Performed by: NURSE PRACTITIONER

## 2017-04-12 PROCEDURE — 90717 YELLOW FEVER VACCINE SUBQ: CPT | Performed by: NURSE PRACTITIONER

## 2017-04-12 PROCEDURE — 99402 PREV MED CNSL INDIV APPRX 30: CPT | Mod: 25 | Performed by: NURSE PRACTITIONER

## 2017-04-12 RX ORDER — ACETAZOLAMIDE 125 MG/1
TABLET ORAL
Qty: 16 TABLET | Refills: 0 | Status: SHIPPED | OUTPATIENT
Start: 2017-04-12 | End: 2017-05-26

## 2017-04-12 RX ORDER — ATOVAQUONE AND PROGUANIL HYDROCHLORIDE 250; 100 MG/1; MG/1
1 TABLET, FILM COATED ORAL DAILY
Qty: 17 TABLET | Refills: 0 | Status: SHIPPED | OUTPATIENT
Start: 2017-04-12 | End: 2017-05-26

## 2017-04-12 NOTE — PATIENT INSTRUCTIONS
Today April 12, 2017 you received the    Hepatitis A Vaccine - Please return on 10/9/17 or later for your 2nd and final dose.    Yellow Fever (YF)    Tetanus (Tdap) Vaccine    Typhoid - Oral. This prescription has been faxed to your pharmacy, please take as directed.   .    These appointments can be made as a NURSE ONLY visit.    **It is very important for the vaccinations to be given on the scheduled day(s), this helps ensure you receive the full effectiveness of the vaccine.**    Please call [facility] with any questions [phone]    Thank you for visiting [facility]

## 2017-04-12 NOTE — NURSING NOTE
"Chief Complaint   Patient presents with     [facility]     [place]      /53  Pulse 72  Temp 97.7  F (36.5  C) (Oral)  Resp 16  SpO2 97% Estimated body mass index is 24.59 kg/(m^2) as calculated from the following:    Height as of 3/27/17: 5' 0.5\" (1.537 m).    Weight as of 3/27/17: 128 lb (58.1 kg).  bp completed using cuff size: regular       Health Maintenance addressed:  NONE    n/a    Kaylah Clarke MA     "

## 2017-04-12 NOTE — MR AVS SNAPSHOT
After Visit Summary   4/12/2017    Tracy Galloway    MRN: [id number]           Patient Information     Date Of Birth          1945        Visit Information        Provider Department      4/12/2017 10:45 AM Kassie Alvarez APRN CNP [facility]        Today's Diagnoses     Travel advice encounter    -  1    Need for vaccination          Care Instructions    Today April 12, 2017 you received the    Hepatitis A Vaccine - Please return on 10/9/17 or later for your 2nd and final dose.    Yellow Fever (YF)    Tetanus (Tdap) Vaccine    Typhoid - Oral. This prescription has been faxed to your pharmacy, please take as directed.   .    These appointments can be made as a NURSE ONLY visit.    **It is very important for the vaccinations to be given on the scheduled day(s), this helps ensure you receive the full effectiveness of the vaccine.**    Please call [facility] with any questions [phone]    Thank you for visiting [facility]            Follow-ups after your visit        Your next 10 appointments already scheduled     Apr 17, 2017 11:20 AM CDT   SHORT with Gwendolyn Solis MD   [facility] ([facility])    [address] [place] [id number] [phone]              Who to contact     If you have questions or need follow up information about today's clinic visit or your schedule please contact [facility] directly at [phone].  Normal or non-critical lab and imaging results will be communicated to you by MyChart, letter or phone within 4 business days after the clinic has received the results. If you do not hear from us within 7 days, please contact the clinic through MyChart or phone. If you have a critical or abnormal lab result, we will notify you by phone as soon as possible.  Submit refill requests through [email or web address] or call your pharmacy and they will forward the refill  request to us. Please allow 3 business days for your refill to be completed.          Additional Information About Your Visit        [employer] Information     "[employer]" gives you secure access to your electronic health record. If you see a primary care provider, you can also send messages to your care team and make appointments. If you have questions, please call your primary care clinic.  If you do not have a primary care provider, please call [phone] and they will assist you.        Care EveryWhere ID     This is your Care EveryWhere ID. This could be used by other organizations to access your [place] medical records  [id number]        Your Vitals Were     Pulse Temperature Respirations Pulse Oximetry          72 97.7  F (36.5  C) (Oral) 16 97%         Blood Pressure from Last 3 Encounters:   04/12/17 114/53   03/27/17 142/68   02/21/17 134/60    Weight from Last 3 Encounters:   03/27/17 128 lb (58.1 kg)   02/21/17 124 lb (56.2 kg)   01/23/17 127 lb 3.2 oz (57.7 kg)              We Performed the Following     HEPA VACCINE ADULT IM     TDAP VACCINE (ADACEL)     YELLOW FEVER IMMUNIZATN,LIVE,SUBCUT          Today's Medication Changes          These changes are accurate as of: 4/12/17 11:58 AM.  If you have any questions, ask your nurse or doctor.               Start taking these medicines.        Dose/Directions    acetaZOLAMIDE 125 MG tablet   Commonly known as:  DIAMOX   Used for:  Travel advice encounter   Started by:  Kassie Alvarez APRN CNP        To prevent altitude illness: Take one tab every 12 hours starting 24 hours prior to ascent and continue for 48 hours while at the highest   Quantity:  16 tablet   Refills:  0       atovaquone-proguanil 250-100 MG per tablet   Commonly known as:  MALARONE   Used for:  Travel advice encounter, Need for vaccination   Started by:  Kassie Alvarez APRN CNP        Dose:  1 tablet   Take 1 tablet by mouth daily Start 2 days before exposure to Malaria and continue  daily till  7 days after exposure.   Quantity:  17 tablet   Refills:  0       typhoid CR capsule   Commonly known as:  VIVOTIF   Used for:  Need for vaccination, Travel advice encounter   Started by:  Kassie Alvarez APRN CNP        Dose:  1 capsule   Take 1 capsule by mouth every other day   Quantity:  4 capsule   Refills:  0            Where to get your medicines      These medications were sent to [id number] - [facility] [address]  [address] [facility] [address]    Hours:  Closed 10-16-08 business to [facility] Phone:  [phone]     acetaZOLAMIDE 125 MG tablet    atovaquone-proguanil 250-100 MG per tablet    typhoid CR capsule                Primary Care Provider Office Phone # Fax #    Gwendolyn Solis [phone]       [facility] [id number] [facility] [address]        Thank you!     Thank you for choosing [facility]  for your care. Our goal is always to provide you with excellent care. Hearing back from our patients is one way we can continue to improve our services. Please take a few minutes to complete the written survey that you may receive in the mail after your visit with us. Thank you!             Your Updated Medication List - Protect others around you: Learn how to safely use, store and throw away your medicines at www.disposemymeds.org.          This list is accurate as of: 4/12/17 11:58 AM.  Always use your most recent med list.                   Brand Name Dispense Instructions for use    acetaZOLAMIDE 125 MG tablet    DIAMOX    16 tablet    To prevent altitude illness: Take one tab every 12 hours starting 24 hours prior to ascent and continue for 48 hours while at the highest       aspirin 81 MG EC tablet     90 tablet    Take 1 tablet (81 mg) by mouth daily       atovaquone-proguanil 250-100 MG per tablet    MALARONE    17 tablet    Take 1 tablet by mouth daily Start 2 days before exposure to  Malaria and continue daily till  7 days after exposure.       baclofen 10 MG tablet    LIORESAL    90 tablet    Take 1 tablet (10 mg) by mouth 2 times daily       citalopram 40 MG tablet    celeXA    90 tablet    Take 1 tablet (40 mg) by mouth daily       diltiazem 180 MG 24 hr capsule    DILT-XR    90 capsule    Take 1 capsule (180 mg) by mouth daily       DOCUSATE SODIUM PO      Take 100 mg by mouth daily as needed       * HYDROcodone-acetaminophen 5-325 MG per tablet    NORCO    60 tablet    Take 1 tablet by mouth every 6 hours as needed       * HYDROcodone-acetaminophen 7.5-325 MG per tablet    NORCO    60 tablet    Take 1 tablet by mouth 2 times daily maximum 2 tablet(s) per day       ibuprofen 600 MG tablet    ADVIL/MOTRIN    90 tablet    Take 1 tablet (600 mg) by mouth every 6 hours as needed for moderate pain       lisinopril 20 MG tablet    PRINIVIL/ZESTRIL    90 tablet    Take 1 tablet (20 mg) by mouth daily       loratadine 10 MG tablet    CLARITIN     Take 10 mg by mouth daily as needed for allergies       predniSONE 20 MG tablet    DELTASONE    10 tablet    Take 1 tablet (20 mg) by mouth 2 times daily       ranitidine 150 MG tablet    ZANTAC    180 tablet    Take 1 tablet (150 mg) by mouth 2 times daily       typhoid CR capsule    VIVOTIF    4 capsule    Take 1 capsule by mouth every other day       valACYclovir 1000 mg tablet    VALTREX    21 tablet    As needed fo rbreak outs       zolpidem 10 MG tablet    AMBIEN    30 tablet    Take  by mouth nightly as needed for sleep. 1/2 -1 TABLET       * Notice:  This list has 2 medication(s) that are the same as other medications prescribed for you. Read the directions carefully, and ask your doctor or other care provider to review them with you.

## 2017-04-12 NOTE — PROGRESS NOTES
Nurse Note      Itinerary:  [place]       Departure Date: 05/08/2017      Return Date: 05/20/2017      Length of Trip 12 days       Reason for Travel: Tourism           Urban or rural: both      Accommodations: Hotel        IMMUNIZATION HISTORY  Have you received any immunizations within the past 4 weeks?  No  Have you ever fainted from having your blood drawn or from an injection?  No  Have you ever had a fever reaction to vaccination?  No  Have you ever had any bad reaction or side effect from any vaccination?  No  Have you ever had hepatitis A or B vaccine?  Yes  Do you live (or work closely) with anyone who has AIDS, an AIDS-like condition, any other immune disorder or who is on chemotherapy for cancer?  No  Do you have a family history of immunodeficiency?  No  Have you received any injection of immune globulin or any blood products during the past 12 months?  No    Patient roomed by FACUNDO Harrison  Tracy Galloway is a 72 year old female seen today alone for counsultation for international travel to [place] for Tourism.  Patient will be departing in  1 month(s) and staying for   2 week(s) and  traveling with organized tour group.      Patient itinerary :  will be starting in [place] then bus to [facility] > [facility] > [facility] [place] which presents risk for Malaria, Yellow Fever, Dengue Fever, Chikungungya, Zika, Schistosomiasis, Rabies, Ebola, food borne illnesses, motor vehicle accidents, Typhoid and Chagas disease. exposure.      Patient's activities will include sightseeing, jungle and high altitude exposure.    Patient's country of birth is [place]    Special medical concerns: chronic pain   Pre-travel questionnaire was completed by patient and reviewed by provider.     Vitals: /53  Pulse 72  Temp 97.7  F (36.5  C) (Oral)  Resp 16  SpO2 97%  BMI= There is no height or weight on file to calculate BMI.    EXAM:  General:  Well-nourished, well-developed in no acute  distress.  Appears to be stated age, interacts appropriately and expresses understanding of information given to patient.    Current Outpatient Prescriptions   Medication Sig Dispense Refill     typhoid (VIVOTIF) CR capsule Take 1 capsule by mouth every other day 4 capsule 0     atovaquone-proguanil (MALARONE) 250-100 MG per tablet Take 1 tablet by mouth daily Start 2 days before exposure to Malaria and continue daily till  7 days after exposure. 17 tablet 0     acetaZOLAMIDE (DIAMOX) 125 MG tablet To prevent altitude illness: Take one tab every 12 hours starting 24 hours prior to ascent and continue for 48 hours while at the highest 16 tablet 0     HYDROcodone-acetaminophen (NORCO) 7.5-325 MG per tablet Take 1 tablet by mouth 2 times daily maximum 2 tablet(s) per day 60 tablet 0     predniSONE (DELTASONE) 20 MG tablet Take 1 tablet (20 mg) by mouth 2 times daily 10 tablet 0     baclofen (LIORESAL) 10 MG tablet Take 1 tablet (10 mg) by mouth 2 times daily 90 tablet 3     citalopram (CELEXA) 40 MG tablet Take 1 tablet (40 mg) by mouth daily 90 tablet 3     HYDROcodone-acetaminophen (NORCO) 5-325 MG per tablet Take 1 tablet by mouth every 6 hours as needed 60 tablet 0     zolpidem (AMBIEN) 10 MG tablet Take  by mouth nightly as needed for sleep. 1/2 -1 TABLET 30 tablet 5     ranitidine (ZANTAC) 150 MG tablet Take 1 tablet (150 mg) by mouth 2 times daily 180 tablet 1     aspirin 81 MG EC tablet Take 1 tablet (81 mg) by mouth daily 90 tablet 3     ibuprofen (ADVIL,MOTRIN) 600 MG tablet Take 1 tablet (600 mg) by mouth every 6 hours as needed for moderate pain 90 tablet 1     diltiazem (DILT-XR) 180 MG 24 hr ER capsule Take 1 capsule (180 mg) by mouth daily 90 capsule 3     lisinopril (PRINIVIL,ZESTRIL) 20 MG tablet Take 1 tablet (20 mg) by mouth daily 90 tablet 3     valACYclovir (VALTREX) 1000 mg tablet As needed fo rbreak outs 21 tablet 3     loratadine (CLARITIN) 10 MG tablet Take 10 mg by mouth daily as needed for  allergies        DOCUSATE SODIUM PO Take 100 mg by mouth daily as needed        Patient Active Problem List   Diagnosis     Anxiety state     Insomnia     Plantar fasciitis     Shingles     CARDIOVASCULAR SCREENING; LDL GOAL LESS THAN 130     Atrophic vaginitis     Advanced directives, counseling/discussion     Chronic pain     Health Care Home     Essential hypertension with goal blood pressure less than 140/90     Narcotic dependence (H)     Prinzmetal angina (H)     Visual impairment     TIA (transient ischemic attack)     Confusion     Mammogram declined     No Known Allergies      Immunizations discussed include:   Hepatitis A:  Ordered/given today, risks, benefits and side effects reviewed  Hepatitis B: Declined  Not concerned about risk of disease  Influenza: Up to date  Typhoid: Oral Rx given  Rabies: Not indicated  Yellow Fever: Ordered/given today - side effects, precautions, allergies, risks discussed. Patient expressed understanding.  Thai Encephalitis: Not indicated  Meningococcus: Not indicated  Tetanus/Diphtheria: Ordered/given today, risks, benefits and side effects reviewed  Measles/Mumps/Rubella: Immune by disease history per patient report  Cholera: Not needed  Polio: Up to date  Pneumococcal: Up to date  Varicella: Immune by disease history per patient report  Zostavax:  Up to date  HPV:  Not indicated  TB:  Low risk     Altitude Exposure on this trip: YES    ASSESSMENT/PLAN:    ICD-10-CM    1. Travel advice encounter Z71.89 HEPA VACCINE ADULT IM     TDAP VACCINE (ADACEL)     YELLOW FEVER IMMUNIZATN,LIVE,SUBCUT     typhoid (VIVOTIF) CR capsule     atovaquone-proguanil (MALARONE) 250-100 MG per tablet     acetaZOLAMIDE (DIAMOX) 125 MG tablet   2. Need for vaccination Z23 HEPA VACCINE ADULT IM     TDAP VACCINE (ADACEL)     YELLOW FEVER IMMUNIZATN,LIVE,SUBCUT     typhoid (VIVOTIF) CR capsule     atovaquone-proguanil (MALARONE) 250-100 MG per tablet     I have reviewed general recommendations for  safe travel   including: food/water precautions, insect precautions, safer sex   practices given high prevalence of Zika, HIV and other STDs,   roadway safety. Educational materials and [employer] report provided.    Malaraia prophylaxis recommended: Malarone  Symptomatic treatment for traveler's diarrhea: loperamide/diphenoxylate  Altitude illness prevention and treatment: diamox with instructions      Evacuation insurance advised and resources were provided to patient.    Total visit time 30 minutes  with over 50% of time spent counseling patient as detailed above.    Kassie Alvarez CNP

## 2017-04-13 ENCOUNTER — TELEPHONE (OUTPATIENT)
Dept: FAMILY MEDICINE | Facility: CLINIC | Age: 72
End: 2017-04-13

## 2017-04-17 ENCOUNTER — OFFICE VISIT (OUTPATIENT)
Dept: FAMILY MEDICINE | Facility: CLINIC | Age: 72
End: 2017-04-17
Payer: COMMERCIAL

## 2017-04-17 VITALS
HEIGHT: 61 IN | TEMPERATURE: 97.6 F | WEIGHT: 125 LBS | HEART RATE: 68 BPM | SYSTOLIC BLOOD PRESSURE: 110 MMHG | DIASTOLIC BLOOD PRESSURE: 58 MMHG | BODY MASS INDEX: 23.6 KG/M2

## 2017-04-17 DIAGNOSIS — G89.4 CHRONIC PAIN SYNDROME: Primary | Chronic | ICD-10-CM

## 2017-04-17 DIAGNOSIS — J06.9 VIRAL UPPER RESPIRATORY TRACT INFECTION: ICD-10-CM

## 2017-04-17 DIAGNOSIS — F11.20 NARCOTIC DEPENDENCE (H): Chronic | ICD-10-CM

## 2017-04-17 PROCEDURE — 99214 OFFICE O/P EST MOD 30 MIN: CPT | Performed by: FAMILY MEDICINE

## 2017-04-17 RX ORDER — FLUTICASONE PROPIONATE 50 MCG
1-2 SPRAY, SUSPENSION (ML) NASAL DAILY
Qty: 1 BOTTLE | Refills: 11 | Status: SHIPPED | OUTPATIENT
Start: 2017-04-17 | End: 2021-02-08

## 2017-04-17 RX ORDER — HYDROCODONE BITARTRATE AND ACETAMINOPHEN 10; 325 MG/1; MG/1
1 TABLET ORAL 2 TIMES DAILY
Qty: 60 TABLET | Refills: 0 | Status: SHIPPED | OUTPATIENT
Start: 2017-04-17 | End: 2017-05-26

## 2017-04-17 NOTE — NURSING NOTE
"Chief Complaint   Patient presents with     Pain       Initial /58  Pulse 68  Temp 97.6  F (36.4  C) (Tympanic)  Ht 5' 0.5\" (1.537 m)  Wt 125 lb (56.7 kg)  BMI 24.01 kg/m2 Estimated body mass index is 24.01 kg/(m^2) as calculated from the following:    Height as of this encounter: 5' 0.5\" (1.537 m).    Weight as of this encounter: 125 lb (56.7 kg).  Medication Reconciliation: complete    Health Maintenance that is potentially due pending provider review:  Mammogram    Patient is aware.    "

## 2017-04-17 NOTE — PROGRESS NOTES
SUBJECTIVE:                                                    Tracy Galloway is a 72 year old female who presents to clinic today for the following health issues:        Chronic Pain Follow-Up       Type / Location of Pain: rt jaw  Analgesia/pain control:       Recent changes:  worse      Overall control: Inadequate pain control  Activity level/function:      Daily activities:  Able to do moderate activities    Work:  not applicable  Adverse effects:  No  Adherance    Taking medication as directed?  Yes    Participating in other treatments: no -   Risk Factors:    Sleep:  Good    Mood/anxiety:  controlled    Recent family or social stressors:  none noted    Other aggravating factors: clenching of jaw  PHQ-9 SCORE 5/16/2014 7/7/2015 6/17/2016   Total Score 8 2 -   Total Score - - 2     ROMAIN-7 SCORE 5/16/2014 7/7/2015 6/17/2016   Total Score 3 0 -   Total Score - - 1     Encounter-Level CSA - 07/07/2015:                 Controlled Substance Agreement - Scan on 7/10/2015  1:04 PM : CONTROLLED SUBSTANCE AGREEMENT (below)                 Amount of exercise or physical activity: not at this time due to injury is seeing PT    Problems taking medications regularly: No    Medication side effects: none          RESPIRATORY SYMPTOMS      Duration: 1 week     Description  nasal congestion, rhinorrhea and sore throat    Severity: mild    Accompanying signs and symptoms: None    History (predisposing factors):  none    Precipitating or alleviating factors: None    Therapies tried and outcome:  none       Problem list and histories reviewed & adjusted, as indicated.  Additional history: as documented    Labs reviewed in EPIC    Reviewed and updated as needed this visit by clinical staff  Allergies  Meds  Problems       Reviewed and updated as needed this visit by Provider  Allergies  Meds  Problems         ROS:  Constitutional, HEENT, cardiovascular, pulmonary, gi and gu systems are negative, except as otherwise  "noted.    OBJECTIVE:                                                    /58  Pulse 68  Temp 97.6  F (36.4  C) (Tympanic)  Ht 5' 0.5\" (1.537 m)  Wt 125 lb (56.7 kg)  BMI 24.01 kg/m2  Body mass index is 24.01 kg/(m^2).  GENERAL APPEARANCE: healthy, alert and no distress  EYES: Eyes grossly normal to inspection, PERRL and conjunctivae and sclerae normal  HENT: ear canals and TM's normal and nose and mouth without ulcers or lesions  NECK: no adenopathy, no asymmetry, masses, or scars and thyroid normal to palpation  PSYCH: mentation appears normal and affect normal/bright         ASSESSMENT/PLAN:                                                    1. Chronic pain syndrome  Not well controlled will make a med switch   - HYDROcodone-acetaminophen (NORCO)  MG per tablet; Take 1 tablet by mouth 2 times daily maximum 2 tablet(s) per day  Dispense: 60 tablet; Refill: 0    2. Narcotic dependence (H)    - HYDROcodone-acetaminophen (NORCO)  MG per tablet; Take 1 tablet by mouth 2 times daily maximum 2 tablet(s) per day  Dispense: 60 tablet; Refill: 0    3. Viral upper respiratory tract infection    - fluticasone (FLONASE) 50 MCG/ACT spray; Spray 1-2 sprays into both nostrils daily  Dispense: 1 Bottle; Refill: 11      Patient Instructions   Try this increased dose 10 mg twice daily     See me back in one month     Risks, benefits, side effects and rationale for treatment plan fully discussed with the patient and understanding expressed.   Gwendolyn Solis MD  [facility]  "

## 2017-04-17 NOTE — MR AVS SNAPSHOT
After Visit Summary   4/17/2017    Tracy Galloway    MRN: [id number]           Patient Information     Date Of Birth          1945        Visit Information        Provider Department      4/17/2017 11:20 AM Gwendolyn Solis MD [facility]        Today's Diagnoses     Viral upper respiratory tract infection    -  1    Narcotic dependence (H)        Chronic pain syndrome          Care Instructions    Try this increased dose 10 mg twice daily     See me back in one month         Follow-ups after your visit        Who to contact     If you have questions or need follow up information about today's clinic visit or your schedule please contact [facility] directly at [phone].  Normal or non-critical lab and imaging results will be communicated to you by MyChart, letter or phone within 4 business days after the clinic has received the results. If you do not hear from us within 7 days, please contact the clinic through [employer] or phone. If you have a critical or abnormal lab result, we will notify you by phone as soon as possible.  Submit refill requests through [employer] or call your pharmacy and they will forward the refill request to us. Please allow 3 business days for your refill to be completed.          Additional Information About Your Visit        MyChart Information     [employer] gives you secure access to your electronic health record. If you see a primary care provider, you can also send messages to your care team and make appointments. If you have questions, please call your primary care clinic.  If you do not have a primary care provider, please call [phone] and they will assist you.        Care EveryWhere ID     This is your Care EveryWhere ID. This could be used by other organizations to access your [place] medical records  [id number]        Your Vitals Were     Pulse Temperature Height BMI (Body Mass Index)          68 97.6  F (36.4  C)  "(Tympanic) 5' 0.5\" (1.537 m) 24.01 kg/m2         Blood Pressure from Last 3 Encounters:   04/17/17 110/58   04/12/17 114/53   03/27/17 142/68    Weight from Last 3 Encounters:   04/17/17 125 lb (56.7 kg)   03/27/17 128 lb (58.1 kg)   02/21/17 124 lb (56.2 kg)              Today, you had the following     No orders found for display         Today's Medication Changes          These changes are accurate as of: 4/17/17 11:58 AM.  If you have any questions, ask your nurse or doctor.               Start taking these medicines.        Dose/Directions    fluticasone 50 MCG/ACT spray   Commonly known as:  FLONASE   Used for:  Viral upper respiratory tract infection        Dose:  1-2 spray   Spray 1-2 sprays into both nostrils daily   Quantity:  1 Bottle   Refills:  11         These medicines have changed or have updated prescriptions.        Dose/Directions    * HYDROcodone-acetaminophen 7.5-325 MG per tablet   Commonly known as:  NORCO   This may have changed:    - Another medication with the same name was added. Make sure you understand how and when to take each.  - Another medication with the same name was removed. Continue taking this medication, and follow the directions you see here.   Used for:  Chronic pain syndrome        Dose:  1 tablet   Take 1 tablet by mouth 2 times daily maximum 2 tablet(s) per day   Quantity:  60 tablet   Refills:  0       * HYDROcodone-acetaminophen  MG per tablet   Commonly known as:  NORCO   This may have changed:  You were already taking a medication with the same name, and this prescription was added. Make sure you understand how and when to take each.   Used for:  Narcotic dependence (H), Chronic pain syndrome   Replaces:  HYDROcodone-acetaminophen 5-325 MG per tablet        Dose:  1 tablet   Take 1 tablet by mouth 2 times daily maximum 2 tablet(s) per day   Quantity:  60 tablet   Refills:  0       * Notice:  This list has 2 medication(s) that are the same as other medications " prescribed for you. Read the directions carefully, and ask your doctor or other care provider to review them with you.      Stop taking these medicines if you haven't already. Please contact your care team if you have questions.     HYDROcodone-acetaminophen 5-325 MG per tablet   Commonly known as:  NORCO   Replaced by:  HYDROcodone-acetaminophen  MG per tablet   You also have another medication with the same name that you need to continue taking as instructed.                Where to get your medicines      These medications were sent to [id number] - [place], [place] - [address]  [address] [address]    Hours:  Closed 10-16-08 business to [facility] Phone:  [phone]     fluticasone 50 MCG/ACT spray         Some of these will need a paper prescription and others can be bought over the counter.  Ask your nurse if you have questions.     Bring a paper prescription for each of these medications     HYDROcodone-acetaminophen  MG per tablet                Primary Care Provider Office Phone # Fax #    Gwendolyn Solis [phone]       [facility] [id number] [facility] [address]        Thank you!     Thank you for choosing [facility]  for your care. Our goal is always to provide you with excellent care. Hearing back from our patients is one way we can continue to improve our services. Please take a few minutes to complete the written survey that you may receive in the mail after your visit with us. Thank you!             Your Updated Medication List - Protect others around you: Learn how to safely use, store and throw away your medicines at www.disposemymeds.org.          This list is accurate as of: 4/17/17 11:58 AM.  Always use your most recent med list.                   Brand Name Dispense Instructions for use    acetaZOLAMIDE 125 MG tablet    DIAMOX    16 tablet    To prevent altitude illness: Take  one tab every 12 hours starting 24 hours prior to ascent and continue for 48 hours while at the highest       aspirin 81 MG EC tablet     90 tablet    Take 1 tablet (81 mg) by mouth daily       atovaquone-proguanil 250-100 MG per tablet    MALARONE    17 tablet    Take 1 tablet by mouth daily Start 2 days before exposure to Malaria and continue daily till  7 days after exposure.       baclofen 10 MG tablet    LIORESAL    90 tablet    Take 1 tablet (10 mg) by mouth 2 times daily       citalopram 40 MG tablet    celeXA    90 tablet    Take 1 tablet (40 mg) by mouth daily       diltiazem 180 MG 24 hr capsule    DILT-XR    90 capsule    Take 1 capsule (180 mg) by mouth daily       DOCUSATE SODIUM PO      Take 100 mg by mouth daily as needed       fluticasone 50 MCG/ACT spray    FLONASE    1 Bottle    Spray 1-2 sprays into both nostrils daily       * HYDROcodone-acetaminophen 7.5-325 MG per tablet    NORCO    60 tablet    Take 1 tablet by mouth 2 times daily maximum 2 tablet(s) per day       * HYDROcodone-acetaminophen  MG per tablet    NORCO    60 tablet    Take 1 tablet by mouth 2 times daily maximum 2 tablet(s) per day       ibuprofen 600 MG tablet    ADVIL/MOTRIN    90 tablet    Take 1 tablet (600 mg) by mouth every 6 hours as needed for moderate pain       lisinopril 20 MG tablet    PRINIVIL/ZESTRIL    90 tablet    Take 1 tablet (20 mg) by mouth daily       loratadine 10 MG tablet    CLARITIN     Take 10 mg by mouth daily as needed for allergies       ranitidine 150 MG tablet    ZANTAC    180 tablet    Take 1 tablet (150 mg) by mouth 2 times daily       typhoid CR capsule    VIVOTIF    4 capsule    Take 1 capsule by mouth every other day       valACYclovir 1000 mg tablet    VALTREX    21 tablet    As needed fo rbreak outs       zolpidem 10 MG tablet    AMBIEN    30 tablet    Take  by mouth nightly as needed for sleep. 1/2 -1 TABLET       * Notice:  This list has 2 medication(s) that are the same as other  medications prescribed for you. Read the directions carefully, and ask your doctor or other care provider to review them with you.

## 2017-04-21 ENCOUNTER — MYC MEDICAL ADVICE (OUTPATIENT)
Dept: FAMILY MEDICINE | Facility: CLINIC | Age: 72
End: 2017-04-21

## 2017-04-21 DIAGNOSIS — J01.01 ACUTE RECURRENT MAXILLARY SINUSITIS: Primary | ICD-10-CM

## 2017-04-21 RX ORDER — AMOXICILLIN 500 MG/1
500 CAPSULE ORAL 3 TIMES DAILY
Qty: 30 CAPSULE | Refills: 0 | Status: SHIPPED | OUTPATIENT
Start: 2017-04-21 | End: 2017-05-26

## 2017-04-21 RX ORDER — AMOXICILLIN 875 MG
875 TABLET ORAL 2 TIMES DAILY
Qty: 20 TABLET | Refills: 0 | Status: SHIPPED | OUTPATIENT
Start: 2017-04-21 | End: 2017-05-26

## 2017-05-26 ENCOUNTER — OFFICE VISIT (OUTPATIENT)
Dept: FAMILY MEDICINE | Facility: CLINIC | Age: 72
End: 2017-05-26
Payer: COMMERCIAL

## 2017-05-26 VITALS
HEIGHT: 61 IN | RESPIRATION RATE: 14 BRPM | WEIGHT: 120 LBS | DIASTOLIC BLOOD PRESSURE: 60 MMHG | TEMPERATURE: 97.6 F | SYSTOLIC BLOOD PRESSURE: 130 MMHG | BODY MASS INDEX: 22.66 KG/M2 | HEART RATE: 62 BPM

## 2017-05-26 DIAGNOSIS — F11.20 NARCOTIC DEPENDENCE (H): Chronic | ICD-10-CM

## 2017-05-26 DIAGNOSIS — G89.4 CHRONIC PAIN SYNDROME: Chronic | ICD-10-CM

## 2017-05-26 PROCEDURE — 99213 OFFICE O/P EST LOW 20 MIN: CPT | Performed by: FAMILY MEDICINE

## 2017-05-26 RX ORDER — HYDROCODONE BITARTRATE AND ACETAMINOPHEN 10; 325 MG/1; MG/1
1 TABLET ORAL 2 TIMES DAILY
Qty: 60 TABLET | Refills: 0 | Status: SHIPPED | OUTPATIENT
Start: 2017-06-26 | End: 2017-05-26

## 2017-05-26 RX ORDER — HYDROCODONE BITARTRATE AND ACETAMINOPHEN 10; 325 MG/1; MG/1
1 TABLET ORAL 2 TIMES DAILY
Qty: 60 TABLET | Refills: 0 | Status: SHIPPED | OUTPATIENT
Start: 2017-05-26 | End: 2017-05-26

## 2017-05-26 RX ORDER — HYDROCODONE BITARTRATE AND ACETAMINOPHEN 10; 325 MG/1; MG/1
1 TABLET ORAL 2 TIMES DAILY
Qty: 60 TABLET | Refills: 0 | Status: SHIPPED | OUTPATIENT
Start: 2017-07-26 | End: 2017-08-22

## 2017-05-26 ASSESSMENT — ANXIETY QUESTIONNAIRES
6. BECOMING EASILY ANNOYED OR IRRITABLE: NOT AT ALL
5. BEING SO RESTLESS THAT IT IS HARD TO SIT STILL: NOT AT ALL
2. NOT BEING ABLE TO STOP OR CONTROL WORRYING: NOT AT ALL
GAD7 TOTAL SCORE: 0
7. FEELING AFRAID AS IF SOMETHING AWFUL MIGHT HAPPEN: NOT AT ALL
1. FEELING NERVOUS, ANXIOUS, OR ON EDGE: NOT AT ALL
3. WORRYING TOO MUCH ABOUT DIFFERENT THINGS: NOT AT ALL

## 2017-05-26 ASSESSMENT — PATIENT HEALTH QUESTIONNAIRE - PHQ9: 5. POOR APPETITE OR OVEREATING: NOT AT ALL

## 2017-05-26 NOTE — NURSING NOTE
"Chief Complaint   Patient presents with     Pain     Medication Reconciliation     Decreased Baclofen to at HS only.       Initial /60  Pulse 62  Temp 97.6  F (36.4  C) (Tympanic)  Resp 14  Ht 5' 0.5\" (1.537 m)  Wt 120 lb (54.4 kg)  BMI 23.05 kg/m2 Estimated body mass index is 23.05 kg/(m^2) as calculated from the following:    Height as of this encounter: 5' 0.5\" (1.537 m).    Weight as of this encounter: 120 lb (54.4 kg).  Medication Reconciliation: complete    Health Maintenance that is potentially due pending provider review:  Mammogram    Pt declines to have Mammo.    "

## 2017-05-26 NOTE — MR AVS SNAPSHOT
"              After Visit Summary   5/26/2017    Tracy Galloway    MRN: [id number]           Patient Information     Date Of Birth          1945        Visit Information        Provider Department      5/26/2017 1:00 PM Gwendolyn Solis MD [facility]        Today's Diagnoses     Narcotic dependence (H)        Chronic pain syndrome          Care Instructions    Stay on this current dose of pain meds    Recheck in 3 months           Follow-ups after your visit        Who to contact     If you have questions or need follow up information about today's clinic visit or your schedule please contact [facility] directly at [phone].  Normal or non-critical lab and imaging results will be communicated to you by [employer], letter or phone within 4 business days after the clinic has received the results. If you do not hear from us within 7 days, please contact the clinic through [employer] or phone. If you have a critical or abnormal lab result, we will notify you by phone as soon as possible.  Submit refill requests through [employer] or call your pharmacy and they will forward the refill request to us. Please allow 3 business days for your refill to be completed.          Additional Information About Your Visit        MyChart Information     [employer] gives you secure access to your electronic health record. If you see a primary care provider, you can also send messages to your care team and make appointments. If you have questions, please call your primary care clinic.  If you do not have a primary care provider, please call [phone] and they will assist you.        Care EveryWhere ID     This is your Care EveryWhere ID. This could be used by other organizations to access your [place] medical records  [id number]        Your Vitals Were     Pulse Temperature Respirations Height BMI (Body Mass Index)       62 97.6  F (36.4  C) (Tympanic) 14 5' 0.5\" (1.537 m) 23.05 kg/m2        " Blood Pressure from Last 3 Encounters:   05/26/17 130/60   04/17/17 110/58   04/12/17 114/53    Weight from Last 3 Encounters:   05/26/17 120 lb (54.4 kg)   04/17/17 125 lb (56.7 kg)   03/27/17 128 lb (58.1 kg)              Today, you had the following     No orders found for display         Today's Medication Changes          These changes are accurate as of: 5/26/17  1:28 PM.  If you have any questions, ask your nurse or doctor.               Start taking these medicines.        Dose/Directions    HYDROcodone-acetaminophen  MG per tablet   Commonly known as:  NORCO   Used for:  Narcotic dependence (H), Chronic pain syndrome   Replaces:  HYDROcodone-acetaminophen 7.5-325 MG per tablet   Started by:  Gwendolyn Solis MD        Dose:  1 tablet   Start taking on:  7/26/2017   Take 1 tablet by mouth 2 times daily maximum 2 tablet(s) per day   Quantity:  60 tablet   Refills:  0         Stop taking these medicines if you haven't already. Please contact your care team if you have questions.     HYDROcodone-acetaminophen 7.5-325 MG per tablet   Commonly known as:  NORCO   Replaced by:  HYDROcodone-acetaminophen  MG per tablet   Stopped by:  Gwendolyn Solis MD                Where to get your medicines      Some of these will need a paper prescription and others can be bought over the counter.  Ask your nurse if you have questions.     Bring a paper prescription for each of these medications     HYDROcodone-acetaminophen  MG per tablet                Primary Care Provider Office Phone # Fax #    Gwendolyn Solis [phone]       [facility] [address] [facility] [address]        Thank you!     Thank you for choosing [facility]  for your care. Our goal is always to provide you with excellent care. Hearing back from our patients is one way we can continue to improve our services. Please take a few minutes to complete the written survey  that you may receive in the mail after your visit with us. Thank you!             Your Updated Medication List - Protect others around you: Learn how to safely use, store and throw away your medicines at www.disposemymeds.org.          This list is accurate as of: 5/26/17  1:28 PM.  Always use your most recent med list.                   Brand Name Dispense Instructions for use    aspirin 81 MG EC tablet     90 tablet    Take 1 tablet (81 mg) by mouth daily       baclofen 10 MG tablet    LIORESAL    90 tablet    Take 1 tablet (10 mg) by mouth 2 times daily       citalopram 40 MG tablet    celeXA    90 tablet    Take 1 tablet (40 mg) by mouth daily       diltiazem 180 MG 24 hr capsule    DILT-XR    90 capsule    Take 1 capsule (180 mg) by mouth daily       DOCUSATE SODIUM PO      Take 100 mg by mouth daily as needed       fluticasone 50 MCG/ACT spray    FLONASE    1 Bottle    Spray 1-2 sprays into both nostrils daily       HYDROcodone-acetaminophen  MG per tablet   Start taking on:  7/26/2017    NORCO    60 tablet    Take 1 tablet by mouth 2 times daily maximum 2 tablet(s) per day       ibuprofen 600 MG tablet    ADVIL/MOTRIN    90 tablet    Take 1 tablet (600 mg) by mouth every 6 hours as needed for moderate pain       lisinopril 20 MG tablet    PRINIVIL/ZESTRIL    90 tablet    Take 1 tablet (20 mg) by mouth daily       loratadine 10 MG tablet    CLARITIN     Take 10 mg by mouth daily as needed for allergies       ranitidine 150 MG tablet    ZANTAC    180 tablet    Take 1 tablet (150 mg) by mouth 2 times daily       valACYclovir 1000 mg tablet    VALTREX    21 tablet    As needed fo rbreak outs       zolpidem 10 MG tablet    AMBIEN    30 tablet    Take  by mouth nightly as needed for sleep. 1/2 -1 TABLET

## 2017-05-26 NOTE — PROGRESS NOTES
"  SUBJECTIVE:                                                    Tracy Galloway is a 72 year old female who presents to clinic today for the following health issues:        Chronic Pain Follow-Up       Type / Location of Pain: Jaw  Analgesia/pain control:       Recent changes:  improved      Overall control: Comfortably manageable  Activity level/function:      Daily activities:  Can do most things most days, with some rest    Work:  not applicable  Adverse effects:  No  Adherance    Taking medication as directed?  Yes    Participating in other treatments: no -   Risk Factors:    Sleep:  Good    Mood/anxiety:  controlled    Recent family or social stressors:  none noted    Other aggravating factors: stress  PHQ-9 SCORE 5/16/2014 7/7/2015 6/17/2016   Total Score 8 2 -   Total Score - - 2     ROMAIN-7 SCORE 5/16/2014 7/7/2015 6/17/2016   Total Score 3 0 -   Total Score - - 1     Encounter-Level CSA - 07/07/2015:                 Controlled Substance Agreement - Scan on 7/10/2015  1:04 PM : CONTROLLED SUBSTANCE AGREEMENT (below)                     Problems taking medications regularly: No    Medication side effects: none              Problem list and histories reviewed & adjusted, as indicated.  Additional history: as documented    Labs reviewed in EPIC    Reviewed and updated as needed this visit by clinical staff  Tobacco  Allergies  Meds  Problems       Reviewed and updated as needed this visit by Provider  Allergies  Meds  Problems         ROS:  Constitutional, HEENT, cardiovascular, pulmonary, gi and gu systems are negative, except as otherwise noted.    OBJECTIVE:                                                    /60  Pulse 62  Temp 97.6  F (36.4  C) (Tympanic)  Resp 14  Ht 5' 0.5\" (1.537 m)  Wt 120 lb (54.4 kg)  BMI 23.05 kg/m2  Body mass index is 23.05 kg/(m^2).  GENERAL APPEARANCE: healthy, alert and no distress  PSYCH: mentation appears normal and affect normal/bright         ASSESSMENT/PLAN: "                                                    1. Narcotic dependence (H)    - HYDROcodone-acetaminophen (NORCO)  MG per tablet; Take 1 tablet by mouth 2 times daily maximum 2 tablet(s) per day  Dispense: 60 tablet; Refill: 0    2. Chronic pain syndrome  Much better pain control   - HYDROcodone-acetaminophen (NORCO)  MG per tablet; Take 1 tablet by mouth 2 times daily maximum 2 tablet(s) per day  Dispense: 60 tablet; Refill: 0      Patient Instructions   Stay on this current dose of pain meds    Recheck in 3 months     Risks, benefits, side effects and rationale for treatment plan fully discussed with the patient and understanding expressed.     Gwendolyn Solis MD  [facility]

## 2017-05-27 ASSESSMENT — ANXIETY QUESTIONNAIRES: GAD7 TOTAL SCORE: 0

## 2017-07-15 ENCOUNTER — HOSPITAL ENCOUNTER (EMERGENCY)
Facility: CLINIC | Age: 72
Discharge: HOME | End: 2017-07-15
Attending: EMERGENCY MEDICINE | Admitting: EMERGENCY MEDICINE
Payer: COMMERCIAL

## 2017-07-15 VITALS
DIASTOLIC BLOOD PRESSURE: 70 MMHG | SYSTOLIC BLOOD PRESSURE: 159 MMHG | RESPIRATION RATE: 16 BRPM | TEMPERATURE: 97.9 F | OXYGEN SATURATION: 98 %

## 2017-07-15 DIAGNOSIS — N30.00 ACUTE CYSTITIS WITHOUT HEMATURIA: ICD-10-CM

## 2017-07-15 LAB
ALBUMIN UR-MCNC: 30 MG/DL
APPEARANCE UR: ABNORMAL
BILIRUB UR QL STRIP: NEGATIVE
COLOR UR AUTO: YELLOW
GLUCOSE UR STRIP-MCNC: NEGATIVE MG/DL
HGB UR QL STRIP: ABNORMAL
KETONES UR STRIP-MCNC: NEGATIVE MG/DL
LEUKOCYTE ESTERASE UR QL STRIP: ABNORMAL
MUCOUS THREADS #/AREA URNS LPF: PRESENT /LPF
NITRATE UR QL: NEGATIVE
PH UR STRIP: 6 PH (ref 5–7)
RBC #/AREA URNS AUTO: 21 /HPF (ref 0–2)
SP GR UR STRIP: 1.01 (ref 1–1.03)
URN SPEC COLLECT METH UR: ABNORMAL
UROBILINOGEN UR STRIP-MCNC: NORMAL MG/DL (ref 0–2)
WBC #/AREA URNS AUTO: >182 /HPF (ref 0–2)

## 2017-07-15 PROCEDURE — 99283 EMERGENCY DEPT VISIT LOW MDM: CPT

## 2017-07-15 PROCEDURE — 25000132 ZZH RX MED GY IP 250 OP 250 PS 637: Performed by: EMERGENCY MEDICINE

## 2017-07-15 PROCEDURE — 81001 URINALYSIS AUTO W/SCOPE: CPT | Performed by: EMERGENCY MEDICINE

## 2017-07-15 PROCEDURE — 87086 URINE CULTURE/COLONY COUNT: CPT | Performed by: EMERGENCY MEDICINE

## 2017-07-15 PROCEDURE — 87186 SC STD MICRODIL/AGAR DIL: CPT | Performed by: EMERGENCY MEDICINE

## 2017-07-15 PROCEDURE — 99284 EMERGENCY DEPT VISIT MOD MDM: CPT | Performed by: EMERGENCY MEDICINE

## 2017-07-15 RX ORDER — CEPHALEXIN 500 MG/1
500 CAPSULE ORAL ONCE
Status: COMPLETED | OUTPATIENT
Start: 2017-07-15 | End: 2017-07-15

## 2017-07-15 RX ORDER — CEPHALEXIN 500 MG/1
500 CAPSULE ORAL 3 TIMES DAILY
Qty: 14 CAPSULE | Refills: 0 | Status: SHIPPED | OUTPATIENT
Start: 2017-07-15 | End: 2017-07-18 | Stop reason: ALTCHOICE

## 2017-07-15 RX ADMIN — CEPHALEXIN 500 MG: 500 CAPSULE ORAL at 05:38

## 2017-07-15 NOTE — ED PROVIDER NOTES
History     Chief Complaint   Patient presents with     Rule out Urinary Tract Infection     HPI  Tracy Galloway is a 72 year old female who presents for painful urination.  Symptoms started about 7 hours prior to arrival.  She reports being up all night with painful urinary frequency.  She denies fever, body aches, chills, nausea, vomiting, diarrhea, abdominal pain.  No vaginal bleeding or discharge.  No rash.    Past medical history includes depression  Daily medications include hydrocodone, baclofen, citalopram, zolpidem, ranitidine, aspirin, diltiazem, lisinopril, valacyclovir, loratadine  No known drug allergies  Former smoker, quit 1962; drink alcohol occasionally, denies illicit drug use    I have reviewed the Medications, Allergies, Past Medical and Surgical History, and Social History in the Epic system.         Review of Systems  A 4 point review of systems was performed. All pertinent positives and negatives were listed in the HPI and rest of ROS were otherwise negative.    Physical Exam   BP: 159/70  Heart Rate: 72  Temp: 97.9  F (36.6  C)  Resp: 16  SpO2: 98 %  Physical Exam   Constitutional: She is oriented to person, place, and time. She appears well-developed and well-nourished. No distress.   HENT:   Head: Normocephalic and atraumatic.   Eyes: No scleral icterus.   Neck: Normal range of motion. Neck supple.   Abdominal: Soft. She exhibits no distension. There is no tenderness. There is no rigidity, no rebound, no guarding and no CVA tenderness.   Neurological: She is alert and oriented to person, place, and time.   Skin: Skin is warm and dry. No rash noted. She is not diaphoretic. No erythema. No pallor.       ED Course     ED Course     Procedures             Critical Care time:  none               Labs Ordered and Resulted from Time of ED Arrival Up to the Time of Departure from the ED   UA MACROSCOPIC WITH REFLEX TO MICRO AND CULTURE       Assessments & Plan (with Medical Decision Making)    72-year-old female presents for dysuria.  Differential includes pyelonephritis, cystitis, vaginal candidiasis.  Given history, likely cystitis.  Pressure 159/70, heart rate 72, temperature 97.9 F, SPO2 98% on room air.  No signs of pyelonephritis on examination.  No indication for IV antibiotics at this time.  She is given dose of oral cephalexin here and is discharged with a course of cephalexin and instructions to return if worse, otherwise follow up in primary care for a recheck.  The patient is in agreement with this plan.    I have reviewed the nursing notes.    I have reviewed the findings, diagnosis, plan and need for follow up with the patient.       New Prescriptions    CEPHALEXIN (KEFLEX) 500 MG CAPSULE    Take 1 capsule (500 mg) by mouth 3 times daily for 5 days       Final diagnoses:   Acute cystitis without hematuria       7/15/2017   [facility] EMERGENCY DEPARTMENT     Raymundo Cummins MD  07/15/17 [id number]

## 2017-07-15 NOTE — ED AVS SNAPSHOT
[facility] Emergency Department    [address] [address]    Phone:  [phone]    Fax:  [phone]                                       Tracy Galloway   MRN: [id number]    Department:  [facility] Emergency Department   Date of Visit:  7/15/2017           Patient Information     Date Of Birth          1945        Your diagnoses for this visit were:     Acute cystitis without hematuria        You were seen by Raymundo Cummins MD.        Discharge Instructions       Return to the emergency department if you have worsening symptoms, repeated vomiting, fever, or other concerns.  Otherwise follow up in primary care for recheck in 5-7 days.    Future Appointments        Provider Department Dept Phone Center    7/18/2017 3:00 PM Gwendolyn Solis MD [facility] [phone] [facility]    8/25/2017 1:00 PM Gwendolyn Solis MD [facility] [phone] [facility]      24 Hour Appointment Hotline       To make an appointment at any [facility], call [phone] ([phone]). If you don't have a family doctor or clinic, we will help you find one. [facility] are conveniently located to serve the needs of you and your family.             Review of your medicines      START taking        Dose / Directions Last dose taken    cephALEXin 500 MG capsule   Commonly known as:  KEFLEX   Dose:  500 mg   Quantity:  14 capsule        Take 1 capsule (500 mg) by mouth 3 times daily for 5 days   Refills:  0          Our records show that you are taking the medicines listed below. If these are incorrect, please call your family doctor or clinic.        Dose / Directions Last dose taken    aspirin 81 MG EC tablet   Dose:  81 mg   Quantity:  90 tablet        Take 1 tablet (81 mg) by mouth daily   Refills:  3        baclofen 10 MG tablet   Commonly known as:  LIORESAL   Dose:  10 mg   Quantity:  90 tablet        Take 1 tablet (10 mg) by mouth 2 times  daily   Refills:  3        citalopram 40 MG tablet   Commonly known as:  celeXA   Dose:  40 mg   Quantity:  90 tablet        Take 1 tablet (40 mg) by mouth daily   Refills:  3        diltiazem 180 MG 24 hr capsule   Commonly known as:  DILT-XR   Dose:  180 mg   Quantity:  90 capsule        Take 1 capsule (180 mg) by mouth daily   Refills:  3        DOCUSATE SODIUM PO   Dose:  100 mg        Take 100 mg by mouth daily as needed   Refills:  0        fluticasone 50 MCG/ACT spray   Commonly known as:  FLONASE   Dose:  1-2 spray   Quantity:  1 Bottle        Spray 1-2 sprays into both nostrils daily   Refills:  11        HYDROcodone-acetaminophen  MG per tablet   Commonly known as:  NORCO   Dose:  1 tablet   Quantity:  60 tablet   Start taking on:  7/26/2017        Take 1 tablet by mouth 2 times daily maximum 2 tablet(s) per day   Refills:  0        ibuprofen 600 MG tablet   Commonly known as:  ADVIL/MOTRIN   Dose:  600 mg   Quantity:  90 tablet        Take 1 tablet (600 mg) by mouth every 6 hours as needed for moderate pain   Refills:  1        lisinopril 20 MG tablet   Commonly known as:  PRINIVIL/ZESTRIL   Dose:  20 mg   Quantity:  90 tablet        Take 1 tablet (20 mg) by mouth daily   Refills:  3        loratadine 10 MG tablet   Commonly known as:  CLARITIN   Dose:  10 mg        Take 10 mg by mouth daily as needed for allergies   Refills:  0        ranitidine 150 MG tablet   Commonly known as:  ZANTAC   Dose:  150 mg   Quantity:  180 tablet        Take 1 tablet (150 mg) by mouth 2 times daily   Refills:  1        valACYclovir 1000 mg tablet   Commonly known as:  VALTREX   Quantity:  21 tablet        As needed fo rbreak outs   Refills:  3        zolpidem 10 MG tablet   Commonly known as:  AMBIEN   Quantity:  30 tablet        Take  by mouth nightly as needed for sleep. 1/2 -1 TABLET   Refills:  5                Prescriptions were sent or printed at these locations (1 Prescription)                   [facility]  [address] [place], [place] - [address]   [address] [facility] [address]    Telephone:  [phone]   Fax:  [phone]   Hours:  Closed 10-16-08 business to [facility]                E-Prescribed (1 of 1)         cephALEXin (KEFLEX) 500 MG capsule                Procedures and tests performed during your visit     UA reflex to Microscopic and Culture      Orders Needing Specimen Collection     None      Pending Results     Date and Time Order Name Status Description    7/15/2017 0508 UA reflex to Microscopic and Culture In process             Pending Culture Results     Date and Time Order Name Status Description    7/15/2017 0508 UA reflex to Microscopic and Culture In process             Pending Results Instructions     If you had any lab results that were not finalized at the time of your Discharge, you can call the ED Lab Result RN at [phone]. You will be contacted by this team for any positive Lab results or changes in treatment. The nurses are available 7 days a week from 10A to 6:30P.  You can leave a message 24 hours per day and they will return your call.        Test Results From Your Hospital Stay        7/15/2017  5:11 AM                Thank you for choosing [facility]       Thank you for choosing [facility] for your care. Our goal is always to provide you with excellent care. Hearing back from our patients is one way we can continue to improve our services. Please take a few minutes to complete the written survey that you may receive in the mail after you visit with us. Thank you!        [employer] Information     [email or web address] gives you secure access to your electronic health record. If you see a primary care provider, you can also send messages to your care team and make appointments. If you have questions, please call your primary care clinic.  If you do not have a primary care provider, please call [phone] and they will assist you.        Care EveryWhere ID     This is your Care  EveryWhere ID. This could be used by other organizations to access your [place] medical records  [id number]        Equal Access to Services     MARY SUNG : Germaine De, tim verma, sergei matthews, elian cote. So [facility] [phone].    ATENCIÓN: Si habla español, tiene a garcia disposición servicios gratuitos de asistencia lingüística. Llame al [phone].    We comply with applicable federal civil rights laws and [place] laws. We do not discriminate on the basis of race, color, national origin, age, disability sex, sexual orientation or gender identity.            After Visit Summary       This is your record. Keep this with you and show to your community pharmacist(s) and doctor(s) at your next visit.

## 2017-07-15 NOTE — DISCHARGE INSTRUCTIONS
Return to the emergency department if you have worsening symptoms, repeated vomiting, fever, or other concerns.  Otherwise follow up in primary care for recheck in 5-7 days.

## 2017-07-15 NOTE — ED AVS SNAPSHOT
[facility] Emergency Department    [address] [address]    Phone:  [phone]    Fax:  [phone]                                       Tracy Galloway   MRN: [id number]    Department:  [facility] Emergency Department   Date of Visit:  7/15/2017           After Visit Summary Signature Page     I have received my discharge instructions, and my questions have been answered. I have discussed any challenges I see with this plan with the nurse or doctor.    ..........................................................................................................................................  Patient/Patient Representative Signature      ..........................................................................................................................................  Patient Representative Print Name and Relationship to Patient    ..................................................               ................................................  Date                                            Time    ..........................................................................................................................................  Reviewed by Signature/Title    ...................................................              ..............................................  Date                                                            Time

## 2017-07-18 ENCOUNTER — RADIANT APPOINTMENT (OUTPATIENT)
Dept: GENERAL RADIOLOGY | Facility: CLINIC | Age: 72
End: 2017-07-18
Attending: FAMILY MEDICINE
Payer: COMMERCIAL

## 2017-07-18 ENCOUNTER — TELEPHONE (OUTPATIENT)
Dept: EMERGENCY MEDICINE | Facility: CLINIC | Age: 72
End: 2017-07-18

## 2017-07-18 ENCOUNTER — OFFICE VISIT (OUTPATIENT)
Dept: FAMILY MEDICINE | Facility: CLINIC | Age: 72
End: 2017-07-18
Payer: COMMERCIAL

## 2017-07-18 VITALS
HEART RATE: 72 BPM | TEMPERATURE: 97 F | BODY MASS INDEX: 22.9 KG/M2 | SYSTOLIC BLOOD PRESSURE: 126 MMHG | WEIGHT: 119.2 LBS | DIASTOLIC BLOOD PRESSURE: 58 MMHG

## 2017-07-18 DIAGNOSIS — M54.42 CHRONIC LEFT-SIDED LOW BACK PAIN WITH LEFT-SIDED SCIATICA: Primary | ICD-10-CM

## 2017-07-18 DIAGNOSIS — G89.29 CHRONIC LEFT-SIDED LOW BACK PAIN WITH LEFT-SIDED SCIATICA: Primary | ICD-10-CM

## 2017-07-18 DIAGNOSIS — M54.42 CHRONIC LEFT-SIDED LOW BACK PAIN WITH LEFT-SIDED SCIATICA: ICD-10-CM

## 2017-07-18 DIAGNOSIS — G89.29 CHRONIC LEFT-SIDED LOW BACK PAIN WITH LEFT-SIDED SCIATICA: ICD-10-CM

## 2017-07-18 PROBLEM — Z71.89 ADVANCED DIRECTIVES, COUNSELING/DISCUSSION: Status: ACTIVE | Noted: 2017-07-18

## 2017-07-18 LAB
BACTERIA SPEC CULT: ABNORMAL
MICRO REPORT STATUS: ABNORMAL
MICROORGANISM SPEC CULT: ABNORMAL
SPECIMEN SOURCE: ABNORMAL

## 2017-07-18 PROCEDURE — 99214 OFFICE O/P EST MOD 30 MIN: CPT | Performed by: FAMILY MEDICINE

## 2017-07-18 PROCEDURE — 72200 X-RAY EXAM SI JOINTS: CPT

## 2017-07-18 RX ORDER — CIPROFLOXACIN 250 MG/1
250 TABLET, FILM COATED ORAL 2 TIMES DAILY
Qty: 6 TABLET | Refills: 0 | Status: SHIPPED | OUTPATIENT
Start: 2017-07-18 | End: 2017-07-21

## 2017-07-18 RX ORDER — METHYLPREDNISOLONE 4 MG
TABLET, DOSE PACK ORAL
Qty: 21 TABLET | Refills: 0 | Status: SHIPPED | OUTPATIENT
Start: 2017-07-18 | End: 2017-08-22

## 2017-07-18 ASSESSMENT — ANXIETY QUESTIONNAIRES
2. NOT BEING ABLE TO STOP OR CONTROL WORRYING: NOT AT ALL
6. BECOMING EASILY ANNOYED OR IRRITABLE: SEVERAL DAYS
5. BEING SO RESTLESS THAT IT IS HARD TO SIT STILL: NOT AT ALL
1. FEELING NERVOUS, ANXIOUS, OR ON EDGE: NOT AT ALL
7. FEELING AFRAID AS IF SOMETHING AWFUL MIGHT HAPPEN: NOT AT ALL
3. WORRYING TOO MUCH ABOUT DIFFERENT THINGS: NOT AT ALL
GAD7 TOTAL SCORE: 1

## 2017-07-18 ASSESSMENT — PATIENT HEALTH QUESTIONNAIRE - PHQ9: 5. POOR APPETITE OR OVEREATING: NOT AT ALL

## 2017-07-18 NOTE — PATIENT INSTRUCTIONS
Start new antibiotic for UTI     When that is completed wait a few days and then start the medrol dos nikolai for the back pain    Ice alternating with heat for muscle spasm      20 minutes ice, 20 minutes heat, 20 minutes ice 3 times daily     Let me know in two weeks how you are doing

## 2017-07-18 NOTE — MR AVS SNAPSHOT
After Visit Summary   7/18/2017    Tracy Galloway    MRN: [id number]           Patient Information     Date Of Birth          1945        Visit Information        Provider Department      7/18/2017 3:00 PM Gwendolyn Solis MD [facility]        Today's Diagnoses     Chronic left-sided low back pain with left-sided sciatica    -  1      Care Instructions      Start new antibiotic for UTI     When that is completed wait a few days and then start the medrol dos nikolai for the back pain    Ice alternating with heat for muscle spasm      20 minutes ice, 20 minutes heat, 20 minutes ice 3 times daily     Let me know in two weeks how you are doing            Follow-ups after your visit        Your next 10 appointments already scheduled     Aug 25, 2017  1:00 PM CDT   SHORT with Gwendolyn Solis MD   [facility]    [address] [place] [address] [phone]              Who to contact     If you have questions or need follow up information about today's clinic visit or your schedule please contact [facility] directly at [phone].  Normal or non-critical lab and imaging results will be communicated to you by "[employer]hart, letter or phone within 4 business days after the clinic has received the results. If you do not hear from us within 7 days, please contact the clinic through "[employer]hart or phone. If you have a critical or abnormal lab result, we will notify you by phone as soon as possible.  Submit refill requests through [employer] or call your pharmacy and they will forward the refill request to us. Please allow 3 business days for your refill to be completed.          Additional Information About Your Visit        MyChart Information     [employer] gives you secure access to your electronic health record. If you see a primary care provider, you can also send messages to your care team and make  appointments. If you have questions, please call your primary care clinic.  If you do not have a primary care provider, please call [phone] and they will assist you.        Care EveryWhere ID     This is your Care EveryWhere ID. This could be used by other organizations to access your [place] medical records  [id number]        Your Vitals Were     Pulse Temperature BMI (Body Mass Index)             72 97  F (36.1  C) (Tympanic) 22.9 kg/m2          Blood Pressure from Last 3 Encounters:   07/18/17 126/58   07/15/17 159/70   05/26/17 130/60    Weight from Last 3 Encounters:   07/18/17 119 lb 3.2 oz (54.1 kg)   05/26/17 120 lb (54.4 kg)   04/17/17 125 lb (56.7 kg)                 Today's Medication Changes          These changes are accurate as of: 7/18/17  3:36 PM.  If you have any questions, ask your nurse or doctor.               Start taking these medicines.        Dose/Directions    ciprofloxacin 250 MG tablet   Commonly known as:  CIPRO   Started by:  Es Young RN        Dose:  250 mg   Take 1 tablet (250 mg) by mouth 2 times daily for 3 days   Quantity:  6 tablet   Refills:  0       methylPREDNISolone 4 MG tablet   Commonly known as:  MEDROL DOSEPAK   Used for:  Chronic left-sided low back pain with left-sided sciatica   Started by:  Gwendolyn Solis MD        Follow package instructions   Quantity:  21 tablet   Refills:  0         Stop taking these medicines if you haven't already. Please contact your care team if you have questions.     cephALEXin 500 MG capsule   Commonly known as:  KEFLEX   Stopped by:  Es Young RN                Where to get your medicines      These medications were sent to [facility] [id number] [facility] [address] [facility]  [address] [address]    Hours:  Closed 10-16-08 business to [facility] Phone:  [phone]     ciprofloxacin 250 MG tablet    methylPREDNISolone 4 MG tablet                Primary Care Provider Office  Phone # Fax #    Gwendolyn Solis [phone]       [facility] [address] [facility] [address]        Equal Access to Services     MARY SUNG : Haderik zay hebert erendirao Sorochelleali, waaxda luqadaha, qaybta kaalmada adedick, elian nava laJoannarmand cote. So [facility] [place] [phone].    ATENCIÓN: Si habla español, tiene a garcia disposición servicios gratuitos de asistencia lingüística. Llame al [phone].    We comply with applicable federal civil rights laws and [place] laws. We do not discriminate on the basis of race, color, national origin, age, disability sex, sexual orientation or gender identity.            Thank you!     Thank you for choosing [facility]  for your care. Our goal is always to provide you with excellent care. Hearing back from our patients is one way we can continue to improve our services. Please take a few minutes to complete the written survey that you may receive in the mail after your visit with us. Thank you!             Your Updated Medication List - Protect others around you: Learn how to safely use, store and throw away your medicines at www.disposemymeds.org.          This list is accurate as of: 7/18/17  3:36 PM.  Always use your most recent med list.                   Brand Name Dispense Instructions for use Diagnosis    aspirin 81 MG EC tablet     90 tablet    Take 1 tablet (81 mg) by mouth daily        baclofen 10 MG tablet    LIORESAL    90 tablet    Take 1 tablet (10 mg) by mouth 2 times daily    Chronic pain syndrome       ciprofloxacin 250 MG tablet    CIPRO    6 tablet    Take 1 tablet (250 mg) by mouth 2 times daily for 3 days        citalopram 40 MG tablet    celeXA    90 tablet    Take 1 tablet (40 mg) by mouth daily    Anxiety state       diltiazem 180 MG 24 hr capsule    DILT-XR    90 capsule    Take 1 capsule (180 mg) by mouth daily    Essential hypertension with goal blood pressure less than 140/90        DOCUSATE SODIUM PO      Take 100 mg by mouth daily as needed        fluticasone 50 MCG/ACT spray    FLONASE    1 Bottle    Spray 1-2 sprays into both nostrils daily    Viral upper respiratory tract infection       HYDROcodone-acetaminophen  MG per tablet   Start taking on:  7/26/2017    NORCO    60 tablet    Take 1 tablet by mouth 2 times daily maximum 2 tablet(s) per day    Narcotic dependence (H), Chronic pain syndrome       ibuprofen 600 MG tablet    ADVIL/MOTRIN    90 tablet    Take 1 tablet (600 mg) by mouth every 6 hours as needed for moderate pain    Facial pain       lisinopril 20 MG tablet    PRINIVIL/ZESTRIL    90 tablet    Take 1 tablet (20 mg) by mouth daily    Essential hypertension with goal blood pressure less than 140/90       loratadine 10 MG tablet    CLARITIN     Take 10 mg by mouth daily as needed for allergies        methylPREDNISolone 4 MG tablet    MEDROL DOSEPAK    21 tablet    Follow package instructions    Chronic left-sided low back pain with left-sided sciatica       ranitidine 150 MG tablet    ZANTAC    180 tablet    Take 1 tablet (150 mg) by mouth 2 times daily    Gastroesophageal reflux disease without esophagitis       valACYclovir 1000 mg tablet    VALTREX    21 tablet    As needed fo rbreak outs    Herpes zoster without complication       zolpidem 10 MG tablet    AMBIEN    30 tablet    Take  by mouth nightly as needed for sleep. 1/2 -1 TABLET    Insomnia, unspecified

## 2017-07-18 NOTE — NURSING NOTE
"Chief Complaint   Patient presents with     Er F/u     Back Pain       Initial /58 (BP Location: Right arm, Patient Position: Chair, Cuff Size: Adult Regular)  Pulse 72  Temp 97  F (36.1  C) (Tympanic)  Wt 119 lb 3.2 oz (54.1 kg)  BMI 22.9 kg/m2 Estimated body mass index is 22.9 kg/(m^2) as calculated from the following:    Height as of 5/26/17: 5' 0.5\" (1.537 m).    Weight as of this encounter: 119 lb 3.2 oz (54.1 kg).  Medication Reconciliation: complete    Health Maintenance that is potentially due pending provider review:  Mammogram    Pt declines to have mammogram.    "

## 2017-07-18 NOTE — TELEPHONE ENCOUNTER
[facility] Emergency Department Lab result notification [Adult-Female]    [facility] ED lab result protocol used  Urine Culture Protcol    Reason for call  Notify of lab results, assess symptoms,  review ED providers recommendations/discharge instructions (if necessary) and advise per ED lab result f/u protocol    Lab Result (including Rx patient on, if applicable)  Final Urine Culture Report on 07/18/2017  [place] ED discharge antibiotic: Cephalexin (Keflex) 500 mg capsule, Take 1 capsule (500 mg) by mouth 3 times daily for 5 days  #1. Bacteria, >100,000 colonies/mL Escherichia coli ESBL,  is RESISTANT to ED discharge antibiotic.   Change in treatment as per [facility] ED Lab result protocol.  Information table from ED Provider visit on 07/15/2017  ED diagnosis Acute cystitis without hematuria   ED provider Raymundo Cummins MD   Symptoms reported at ED visit (Chief complaint, HPI)  a 72 year old female who presents for painful urination.  Symptoms started about 7 hours prior to arrival.  She reports being up all night with painful urinary frequency.  She denies fever, body aches, chills, nausea, vomiting, diarrhea, abdominal pain.  No vaginal bleeding or discharge.  No rash.   ED providers Impression and Plan (applicable information) 72-year-old female presents for dysuria.  Differential includes pyelonephritis, cystitis, vaginal candidiasis.  Given history, likely cystitis.  Pressure 159/70, heart rate 72, temperature 97.9 F, SPO2 98% on room air.  No signs of pyelonephritis on examination.  No indication for IV antibiotics at this time.  She is given dose of oral cephalexin here and is discharged with a course of cephalexin and instructions to return if worse, otherwise follow up in primary care for a recheck.  The patient is in agreement with this plan   Significant Medical hx, if applicable Reviewed   Coumadin/Warfarin [Yes /No] no   Creatinine Level (mg/dl) unknown   Creatinine clearance (ml/min), if  applicable unknown   Pregnant (Yes/No/NA) no   Breastfeeding (Yes/No/NA) no   Allergies NKA   Weight, if applicable 54.4 kg      RN Assessment (Patient s current Symptoms), include time called.  [Insert Left message here if message left]  At 1147 pt states she has still painful urination and frequency.  She is afebrile denies vomiting and denies any worsening of symptoms.     RN Recommendations/Instructions per [facility] ED lab result protocol  Patient notified of lab result and treatment recommendations.  Rx for Cipro sent to [Pharmacy [facility]].  RN reviewed information about Stopping the Keflex and starting the Cipro.         Please Contact your PCP clinic or return to the Emergency department if your:    Symptoms return.    Symptoms do not improve after 3 days on antibiotic.    Symptoms do not resolve after completing antibiotic.    Symptoms worsen or other concerning symptom's.    PCP follow-up Questions asked: YES       Es Young RN  [facility] RN  Lung Nodule and ED Lab Result F/u RN  Epic pool (ED late result f/u RN): [id number]  FV INCIDENTAL RADIOLOGY F/U NURSES: P [id number]  Ph# [phone]    Copy of Lab result   Exam Information   Exam Date Exam Time Accession # Results    7/15/17  5:05 AM [id number]    Component Results   Component Collected Lab   Specimen Description 07/15/2017  5:05 AM 59   Midstream Urine   Culture Micro (Abnormal) 07/15/2017  5:05    >100,000 colonies/mL Escherichia coli ESBL ESBL (extended beta lactamase)    producing organisms require contact precautions.   Referred to [facility] for suceptibility testing.      Micro Report Status 07/15/2017  5:05    FINAL 07/18/2017   Organism: 07/15/2017  5:05    >100,000 colonies/mL Escherichia coli ESBL ESBL (extended beta lactamase)    producing organisms require contact precautions.      Culture & Susceptibility   >100,000 COLONIES/ML ESCHERICHIA COLI ESBL ESBL  (EXTENDED BETA LACTAMASE)  PRODUCING ORGANISMS REQUIRE CONTACT PRECAUTIONS. (RUDOLPH)   Antibiotic Sensitivity Unit Status   AMIKACIN <=2.0 Susceptible ug/mL Final   AMPICILLIN >32.0 Resistant ug/mL Final   AMPICILLIN/SULBACTAM 16.0 Intermediate ug/mL Final   CEFAZOLIN >64.0 Resistant  Cefazolin RUDOLPH breakpoints are for the treatment of uncomplicated urinary tract   infections.  For the treatment of systemic infections, please contact the   laboratory for additional testing. ug/mL Final   CEFEPIME >64.0 Resistant ug/mL Final   CEFOXITIN <=4.0 Susceptible ug/mL Final   CEFTAZIDIME >64.0 Resistant ug/mL Final   CEFTRIAXONE >64.0 Resistant ug/mL Final   CIPROFLOXACIN <=0.25 Susceptible ug/mL Final   ERTAPENEM 0.004 Susceptible ug/mL Final   GENTAMICIN <=1.0 Susceptible ug/mL Final   LEVOFLOXACIN 1.0 Susceptible ug/mL Final   MEROPENEM <=0.25 Susceptible ug/mL Final   NITROFURANTOIN <=16.0 Susceptible ug/mL Final   Piperacillin/Tazo <=4.0 Susceptible ug/mL Final   TOBRAMYCIN <=1.0 Susceptible ug/mL Final   Trimethoprim/Sulfa 16.0/304.0 Resistant ug/mL Final

## 2017-07-18 NOTE — PROGRESS NOTES
SUBJECTIVE:                                                    Tracy Galloway is a 72 year old female who presents to clinic today for the following health issues:          Back Pain -       Duration: over 6 months        Specific cause: lifting    Description:   Location of pain: low back left  Character of pain: sharp  Pain radiation:radiates into the left buttocks and radiates into the left leg  New numbness or weakness in legs, not attributed to pain:  no     Intensity: Currently 7/10, At its worst 8/10    History:   Pain interferes with job: Not applicable  History of back problems: recurrent self limited episodes of low back pain in the past  Any previous MRI or X-rays: None  Sees a specialist for back pain:  No  Therapies tried without relief: Physical Therapy    Alleviating factors:   Improved by: nothing      Precipitating factors:  Worsened by: going up and down stairs, crossing legs    Functional and Psychosocial Screen (Christiano STarT Back):      Not performed today          Accompanying Signs & Symptoms:  Risk of Fracture:  None  Risk of Cauda Equina:  None  Risk of Infection:  None  Risk of Cancer:  None  Risk of Ankylosing Spondylitis:  Onset at age <35, male, AND morning back stiffness. no                 Problem list and histories reviewed & adjusted, as indicated.  Additional history: as documented    Labs reviewed in EPIC    Reviewed and updated as needed this visit by clinical staff  Tobacco  Allergies  Med Hx  Surg Hx  Fam Hx  Soc Hx      Reviewed and updated as needed this visit by Provider         ROS:  Constitutional, HEENT, cardiovascular, pulmonary, gi and gu systems are negative, except as otherwise noted.      OBJECTIVE:                                                    /58 (BP Location: Right arm, Patient Position: Chair, Cuff Size: Adult Regular)  Pulse 72  Temp 97  F (36.1  C) (Tympanic)  Wt 119 lb 3.2 oz (54.1 kg)  BMI 22.9 kg/m2  Body mass index is 22.9  kg/(m^2).  GENERAL APPEARANCE: healthy, alert and no distress  ORTHO: Lumber/Thoracic Spine Exam: Tender:  left para lumbar muscles, left SI joint, left sciatic notch  Non-tender:    Range of Motion:  Intact   Strength:  able to heel walk, able to toe walk  Special tests:  negative straight leg raises    Hip Exam: Hip ROM full      PSYCH: mentation appears normal and affect normal/bright    Xray is reviewed independently by Gwendolyn Solis MD and negative.        ASSESSMENT/PLAN:                                                    1. Chronic left-sided low back pain with left-sided sciatica  Not better with PT   - XR Sacroiliac Joint 1/2 Views; Future  - methylPREDNISolone (MEDROL DOSEPAK) 4 MG tablet; Follow package instructions  Dispense: 21 tablet; Refill: 0      Patient Instructions     Start new antibiotic for UTI     When that is completed wait a few days and then start the medrol dos nikolai for the back pain    Ice alternating with heat for muscle spasm      20 minutes ice, 20 minutes heat, 20 minutes ice 3 times daily     Let me know in two weeks how you are doing      Risks, benefits, side effects and rationale for treatment plan fully discussed with the patient and understanding expressed.   Gwendolyn Solis MD  [facility]

## 2017-07-19 DIAGNOSIS — I10 ESSENTIAL HYPERTENSION WITH GOAL BLOOD PRESSURE LESS THAN 140/90: ICD-10-CM

## 2017-07-19 RX ORDER — DILTIAZEM HYDROCHLORIDE 180 MG/1
CAPSULE, EXTENDED RELEASE ORAL
Qty: 90 CAPSULE | Refills: 0 | Status: SHIPPED | OUTPATIENT
Start: 2017-07-19 | End: 2018-01-11

## 2017-07-19 ASSESSMENT — ANXIETY QUESTIONNAIRES: GAD7 TOTAL SCORE: 1

## 2017-07-19 ASSESSMENT — PATIENT HEALTH QUESTIONNAIRE - PHQ9: SUM OF ALL RESPONSES TO PHQ QUESTIONS 1-9: 2

## 2017-07-19 NOTE — TELEPHONE ENCOUNTER
diltiazem (DILT-XR) 180 MG 24 hr ER capsule         Last Written Prescription Date: 08/22/2016  Last Fill Quantity: 90 capsule, # refills: 3    Last Office Visit with [facility] or [facility] prescribing provider:  07/18/2017   Future Office Visit:    Next 5 appointments (look out 90 days)     Aug 22, 2017  2:20 PM CDT   SHORT with Gwendolyn Solis MD   [facility]    [address] [place] [id number] [phone]                  BP Readings from Last 3 Encounters:   07/18/17 126/58   07/15/17 159/70   05/26/17 130/60     Lab Results   Component Value Date    ALT 64 01/24/2017     Lab Results   Component Value Date    CHOL 192 01/24/2017     Lab Results   Component Value Date    HDL 60 01/24/2017     Lab Results   Component Value Date     01/24/2017     Lab Results   Component Value Date    TRIG 80 01/24/2017     Lab Results   Component Value Date    CHOLHDLRATIO 2.0 01/19/2012

## 2017-07-22 ENCOUNTER — OFFICE VISIT (OUTPATIENT)
Dept: URGENT CARE | Facility: URGENT CARE | Age: 72
End: 2017-07-22
Payer: COMMERCIAL

## 2017-07-22 VITALS
HEART RATE: 59 BPM | BODY MASS INDEX: 22.86 KG/M2 | SYSTOLIC BLOOD PRESSURE: 128 MMHG | WEIGHT: 119 LBS | DIASTOLIC BLOOD PRESSURE: 53 MMHG | TEMPERATURE: 96.4 F | RESPIRATION RATE: 18 BRPM

## 2017-07-22 DIAGNOSIS — N39.0 UTI DUE TO EXTENDED-SPECTRUM BETA LACTAMASE (ESBL) PRODUCING ESCHERICHIA COLI: Primary | ICD-10-CM

## 2017-07-22 DIAGNOSIS — Z16.12 UTI DUE TO EXTENDED-SPECTRUM BETA LACTAMASE (ESBL) PRODUCING ESCHERICHIA COLI: Primary | ICD-10-CM

## 2017-07-22 DIAGNOSIS — R39.9 UTI SYMPTOMS: ICD-10-CM

## 2017-07-22 DIAGNOSIS — B96.29 UTI DUE TO EXTENDED-SPECTRUM BETA LACTAMASE (ESBL) PRODUCING ESCHERICHIA COLI: Primary | ICD-10-CM

## 2017-07-22 LAB
ALBUMIN UR-MCNC: NEGATIVE MG/DL
APPEARANCE UR: CLEAR
BACTERIA #/AREA URNS HPF: ABNORMAL /HPF
BILIRUB UR QL STRIP: NEGATIVE
COLOR UR AUTO: YELLOW
GLUCOSE UR STRIP-MCNC: NEGATIVE MG/DL
HGB UR QL STRIP: ABNORMAL
KETONES UR STRIP-MCNC: NEGATIVE MG/DL
LEUKOCYTE ESTERASE UR QL STRIP: ABNORMAL
NITRATE UR QL: NEGATIVE
NON-SQ EPI CELLS #/AREA URNS LPF: ABNORMAL /LPF
PH UR STRIP: 5.5 PH (ref 5–7)
RBC #/AREA URNS AUTO: ABNORMAL /HPF (ref 0–2)
SP GR UR STRIP: 1.02 (ref 1–1.03)
URN SPEC COLLECT METH UR: ABNORMAL
UROBILINOGEN UR STRIP-ACNC: 0.2 EU/DL (ref 0.2–1)
WBC #/AREA URNS AUTO: ABNORMAL /HPF (ref 0–2)

## 2017-07-22 PROCEDURE — 81001 URINALYSIS AUTO W/SCOPE: CPT | Performed by: FAMILY MEDICINE

## 2017-07-22 PROCEDURE — 99213 OFFICE O/P EST LOW 20 MIN: CPT | Performed by: FAMILY MEDICINE

## 2017-07-22 RX ORDER — CIPROFLOXACIN 500 MG/1
500 TABLET, FILM COATED ORAL 2 TIMES DAILY
Qty: 14 TABLET | Refills: 0 | Status: SHIPPED | OUTPATIENT
Start: 2017-07-22 | End: 2017-08-22

## 2017-07-22 NOTE — PATIENT INSTRUCTIONS
"   * BLADDER INFECTION,Female (Adult)    A bladder infection (\"cystitis\" or \"UTI\") usually causes a constant urge to urinate and a burning when passing urine. Urine may be cloudy, smelly or dark. There may be pain in the lower abdomen. A bladder infection occurs when bacteria from the vaginal area enter the bladder opening (urethra). This can occur from sexual intercourse, wearing tight clothing, dehydration and other factors.  HOME CARE:  1. Drink lots of fluids (at least 6-8 glasses a day, unless you must restrict fluids for other medical reasons). This will force the medicine into your urinary system and flush the bacteria out of your body. Cranberry juice has been shown to help clear out the bacteria.  2. Avoid sexual intercourse until your symptoms are gone.  3. A bladder infection is treated with antibiotics. You may also be given Pyridium (generic = phenazopyridine) to reduce the burning sensation. This medicine will cause your urine to become a bright orange color. The orange urine may stain clothing. You may wear a pad or panty-liner to protect clothing.  PREVENTING FUTURE INFECTIONS:  1. Always wipe from front to back after a bowel movement.  2. Keep the genital area clean and dry.  3. Drink plenty of fluids each day to avoid dehydration.  4. Urinate right after intercourse to flush out the bladder.  5. Wear cotton underwear and cotton-lined panty hose; avoid tight-fitting pants.  6. If you are on birth control pills and are having frequent bladder infections, discuss with your doctor.  FOLLOW UP: Return to this facility or see your doctor if ALL symptoms are not gone after three days of treatment.  GET PROMPT MEDICAL ATTENTION if any of the following occur:    Fever over 101 F (38.3 C)    No improvement by the third day of treatment    Increasing back or abdominal pain    Repeated vomiting; unable to keep medicine down    Weakness, dizziness or fainting    Vaginal discharge    Pain, redness or swelling in " the labia (outer vaginal area)    0769-2060 [employer], [address] [place] [address], [place] [address]. All rights reserved. This information is not intended as a substitute for professional medical care. Always follow your healthcare professional's instructions.

## 2017-07-22 NOTE — NURSING NOTE
"Chief Complaint   Patient presents with     UTI       Initial /53 (BP Location: Right arm, Cuff Size: Adult Regular)  Pulse 59  Temp 96.4  F (35.8  C) (Tympanic)  Resp 18  Wt 119 lb (54 kg)  BMI 22.86 kg/m2 Estimated body mass index is 22.86 kg/(m^2) as calculated from the following:    Height as of 5/26/17: 5' 0.5\" (1.537 m).    Weight as of this encounter: 119 lb (54 kg).  Medication Reconciliation: complete    Health Maintenance that is potentially due pending provider review:  NONE    n/a    Is there anyone who you would like to be able to receive your results? No  If yes have patient fill out JENA  Francia Dinero M.A.        "

## 2017-07-22 NOTE — PROGRESS NOTES
SUBJECTIVE:                                                    Tracy Galloway is a 72 year old female who presents to clinic today for the following health issues:    URINARY TRACT SYMPTOMS  Onset: Saturday     Description:   Painful urination (Dysuria): YES  Blood in urine (Hematuria): no   Delay in urine (Hesitency): YES    Intensity: mild    Progression of Symptoms:  same    Accompanying Signs & Symptoms:  Fever/chills: no   Flank pain YES  Nausea and vomiting: no   Any vaginal symptoms: none  Abdominal/Pelvic Pain: YES- pressure     History:   History of frequent UTI's: no   History of kidney stones: no   Sexually Active: no   Possibility of pregnancy: No    Precipitating factors:   Was treated 7 days ago at ER for UTI with Cipro and symptoms are still persistant.     Therapies Tried and outcome: Increase fluid intake      Patient comes in with urinary symptoms. She was diagnosed with a UTI last week Saturday and started on antibiotics, her culture grew ESBL and this was resistant to the first antibiotic that she was using at first. She then was asked to take ciprofloxacin , she reports that she is still having burning and frequency. She denies any fevers or chills . No nausea or vomiting. No flank pain. No blood in the urine . No abdominal pain. She reports that she does not get UTIs a lot and this is only the second UTI she has had in her lifetime.     Problem list and histories reviewed & adjusted, as indicated.  Additional history: as documented    Patient Active Problem List   Diagnosis     Anxiety state     Insomnia     Plantar fasciitis     Shingles     CARDIOVASCULAR SCREENING; LDL GOAL LESS THAN 130     Atrophic vaginitis     Chronic pain     Health Care Home     Essential hypertension with goal blood pressure less than 140/90     Narcotic dependence (H)     Prinzmetal angina (H)     Visual impairment     TIA (transient ischemic attack)     Confusion     Mammogram declined     Advanced directives,  counseling/discussion     Past Surgical History:   Procedure Laterality Date     CHOLECYSTECTOMY, LAPOROSCOPIC  1990's    s/p Cholecystectomy, Laparoscopic     COLONOSCOPY  12/6/2012    Procedure: COLONOSCOPY;  Colonoscopy;  Surgeon: Tyrell Brown MD;  Location: [facility]     EYE SURGERY       HC COLONOSCOPY THRU STOMA, DIAGNOSTIC  3-    at outside clinic.  Normal.     HYSTERECTOMY, SUNDAR  1970's    severe endometriosis     SURGICAL HISTORY OF -   1998    breast reduction     SURGICAL HISTORY OF -   5/30/03    coronary angiogram     SURGICAL HISTORY OF -       Refractive surgery right     TONSILLECTOMY         Social History   Substance Use Topics     Smoking status: Former Smoker     Types: Cigarettes     Quit date: 10/30/1962     Smokeless tobacco: Never Used     Alcohol use 2.4 oz/week     4 Standard drinks or equivalent per week      Comment: occasional     Family History   Problem Relation Age of Onset     Hypertension Mother      HEART DISEASE Mother      Hypertension Father      Circulatory Father      PAD     Alzheimer Disease Maternal Grandmother      dementia     CEREBROVASCULAR DISEASE Maternal Grandmother      HEART DISEASE Maternal Grandfather      DIABETES Maternal Grandfather          Current Outpatient Prescriptions   Medication Sig Dispense Refill     ciprofloxacin (CIPRO) 500 MG tablet Take 1 tablet (500 mg) by mouth 2 times daily 14 tablet 0     DILT- MG 24 hr capsule TAKE ONE CAPSULE BY MOUTH ONE TIME DAILY 90 capsule 0     methylPREDNISolone (MEDROL DOSEPAK) 4 MG tablet Follow package instructions 21 tablet 0     [START ON 7/26/2017] HYDROcodone-acetaminophen (NORCO)  MG per tablet Take 1 tablet by mouth 2 times daily maximum 2 tablet(s) per day 60 tablet 0     fluticasone (FLONASE) 50 MCG/ACT spray Spray 1-2 sprays into both nostrils daily 1 Bottle 11     baclofen (LIORESAL) 10 MG tablet Take 1 tablet (10 mg) by mouth 2 times daily 90 tablet 3     citalopram (CELEXA) 40 MG tablet  Take 1 tablet (40 mg) by mouth daily 90 tablet 3     zolpidem (AMBIEN) 10 MG tablet Take  by mouth nightly as needed for sleep. 1/2 -1 TABLET 30 tablet 5     ranitidine (ZANTAC) 150 MG tablet Take 1 tablet (150 mg) by mouth 2 times daily 180 tablet 1     aspirin 81 MG EC tablet Take 1 tablet (81 mg) by mouth daily 90 tablet 3     ibuprofen (ADVIL,MOTRIN) 600 MG tablet Take 1 tablet (600 mg) by mouth every 6 hours as needed for moderate pain 90 tablet 1     lisinopril (PRINIVIL,ZESTRIL) 20 MG tablet Take 1 tablet (20 mg) by mouth daily 90 tablet 3     valACYclovir (VALTREX) 1000 mg tablet As needed fo rbreak outs 21 tablet 3     loratadine (CLARITIN) 10 MG tablet Take 10 mg by mouth daily as needed for allergies        DOCUSATE SODIUM PO Take 100 mg by mouth daily as needed        No Known Allergies      Reviewed and updated as needed this visit by clinical staffTobacco  Allergies  Med Hx  Surg Hx  Fam Hx  Soc Hx      Reviewed and updated as needed this visit by Provider         ROS:  Constitutional, HEENT, cardiovascular, pulmonary, gi and gu systems are negative, except as otherwise noted.      OBJECTIVE:   /53 (BP Location: Right arm, Cuff Size: Adult Regular)  Pulse 59  Temp 96.4  F (35.8  C) (Tympanic)  Resp 18  Wt 119 lb (54 kg)  BMI 22.86 kg/m2  Body mass index is 22.86 kg/(m^2).  GENERAL: healthy, alert and no distress  NECK: no adenopathy, no asymmetry, masses, or scars and thyroid normal to palpation  RESP: lungs clear to auscultation - no rales, rhonchi or wheezes  CV: regular rate and rhythm, normal S1 S2, no S3 or S4, no murmur, click or rub, no peripheral edema and peripheral pulses strong  ABDOMEN: soft, nontender, no hepatosplenomegaly, no masses and bowel sounds normal  MS: no gross musculoskeletal defects noted, no edema  BACK: no CVA tenderness, no paralumbar tenderness    Diagnostic Test Results:  Results for orders placed or performed in visit on 07/22/17 (from the past 24  hour(s))   *UA reflex to Microscopic and Culture ([facility] and [facility] (except [place] and [place])   Result Value Ref Range    Color Urine Yellow     Appearance Urine Clear     Glucose Urine Negative NEG mg/dL    Bilirubin Urine Negative NEG    Ketones Urine Negative NEG mg/dL    Specific Gravity Urine 1.020 1.003 - 1.035    Blood Urine Trace (A) NEG    pH Urine 5.5 5.0 - 7.0 pH    Protein Albumin Urine Negative NEG mg/dL    Urobilinogen Urine 0.2 0.2 - 1.0 EU/dL    Nitrite Urine Negative NEG    Leukocyte Esterase Urine Trace (A) NEG    Source Midstream Urine    Urine Microscopic   Result Value Ref Range    WBC Urine 2-5 (A) 0 - 2 /HPF    RBC Urine 2-5 (A) 0 - 2 /HPF    Squamous Epithelial /LPF Urine Many (A) FEW /LPF    Bacteria Urine Few (A) NEG /HPF       ASSESSMENT/PLAN:         (N39.0,  A49.8,  Z16.12) UTI due to extended-spectrum beta lactamase (ESBL) producing Escherichia coli  (primary encounter diagnosis)  Comment: reviewed culture report with her. Since she is still having symptoms I extended the number of days that she has had the cipro   Plan: Prescribed more cipro, encouraged increase fluids.     (R39.9) UTI symptoms  Comment: UA still suggestive of infection   Plan: *UA reflex to Microscopic and Culture ([facility]         and [facility] (except [place] and         [place]), Urine Microscopic, ciprofloxacin         (CIPRO) 500 MG tablet              FUTURE APPOINTMENTS:       - Follow-up visit as needed    Anoop Olvera MD  [facility]

## 2017-08-02 ENCOUNTER — MYC MEDICAL ADVICE (OUTPATIENT)
Dept: FAMILY MEDICINE | Facility: CLINIC | Age: 72
End: 2017-08-02

## 2017-08-06 DIAGNOSIS — G47.00 INSOMNIA, UNSPECIFIED: ICD-10-CM

## 2017-08-06 NOTE — TELEPHONE ENCOUNTER
zolpidem (AMBIEN) 10 MG tablet      Last Written Prescription Date:  1/6/17  Last Fill Quantity: 30,   # refills: 5  Last Office Visit with [facility] provider: 7/18/17  Future Office visit:    Next 5 appointments (look out 90 days)     Aug 22, 2017  3:20 PM CDT   [facility]is MD   [facility]    [address] [place] [id number]   [phone]                   Routing refill request to provider for review/approval because:  Drug not on the [facility] refill protocol or controlled substance

## 2017-08-07 RX ORDER — ZOLPIDEM TARTRATE 10 MG/1
TABLET ORAL
Qty: 30 TABLET | Refills: 5 | Status: SHIPPED | OUTPATIENT
Start: 2017-08-07 | End: 2018-02-23

## 2017-08-22 ENCOUNTER — OFFICE VISIT (OUTPATIENT)
Dept: FAMILY MEDICINE | Facility: CLINIC | Age: 72
End: 2017-08-22
Payer: COMMERCIAL

## 2017-08-22 VITALS
BODY MASS INDEX: 23.82 KG/M2 | SYSTOLIC BLOOD PRESSURE: 100 MMHG | DIASTOLIC BLOOD PRESSURE: 50 MMHG | WEIGHT: 124 LBS | HEART RATE: 64 BPM | TEMPERATURE: 97 F

## 2017-08-22 DIAGNOSIS — H10.31 ACUTE CONJUNCTIVITIS OF RIGHT EYE, UNSPECIFIED ACUTE CONJUNCTIVITIS TYPE: Primary | ICD-10-CM

## 2017-08-22 DIAGNOSIS — F11.20 NARCOTIC DEPENDENCE (H): Chronic | ICD-10-CM

## 2017-08-22 DIAGNOSIS — I10 ESSENTIAL HYPERTENSION WITH GOAL BLOOD PRESSURE LESS THAN 140/90: ICD-10-CM

## 2017-08-22 DIAGNOSIS — G89.4 CHRONIC PAIN SYNDROME: ICD-10-CM

## 2017-08-22 PROCEDURE — 99214 OFFICE O/P EST MOD 30 MIN: CPT | Performed by: FAMILY MEDICINE

## 2017-08-22 RX ORDER — LISINOPRIL 20 MG/1
TABLET ORAL
Qty: 90 TABLET | Refills: 2 | Status: SHIPPED | OUTPATIENT
Start: 2017-08-22 | End: 2018-05-17

## 2017-08-22 RX ORDER — BACLOFEN 10 MG/1
10 TABLET ORAL DAILY
Qty: 90 TABLET | Refills: 3 | COMMUNITY
Start: 2017-08-22 | End: 2018-03-08

## 2017-08-22 RX ORDER — HYDROCODONE BITARTRATE AND ACETAMINOPHEN 10; 325 MG/1; MG/1
1 TABLET ORAL 2 TIMES DAILY
Qty: 60 TABLET | Refills: 0 | Status: SHIPPED | OUTPATIENT
Start: 2017-08-22 | End: 2017-08-22

## 2017-08-22 RX ORDER — HYDROCODONE BITARTRATE AND ACETAMINOPHEN 10; 325 MG/1; MG/1
1 TABLET ORAL 2 TIMES DAILY
Qty: 60 TABLET | Refills: 0 | Status: SHIPPED | OUTPATIENT
Start: 2017-09-22 | End: 2017-08-22

## 2017-08-22 RX ORDER — HYDROCODONE BITARTRATE AND ACETAMINOPHEN 10; 325 MG/1; MG/1
1 TABLET ORAL 2 TIMES DAILY
Qty: 60 TABLET | Refills: 0 | Status: SHIPPED | OUTPATIENT
Start: 2017-10-22 | End: 2017-11-20

## 2017-08-22 NOTE — PROGRESS NOTES
SUBJECTIVE:   Tracy Galloway is a 72 year old female who presents to clinic today for the following health issues:      Chronic Pain Follow-Up       Type / Location of Pain: jaw  Analgesia/pain control:       Recent changes:  same      Overall control: Tolerable with discomfort  Activity level/function:      Daily activities:  Able to do all daily activities    Work:  not applicable  Adverse effects:  No  Adherance    Taking medication as directed?  Yes    Participating in other treatments: no -   Risk Factors:    Sleep:  Fair    Mood/anxiety:  controlled    Recent family or social stressors:  none noted    Other aggravating factors: eye discomfort  PHQ-9 SCORE 7/7/2015 6/17/2016 7/18/2017   Total Score 2 - -   Total Score - 2 2     ROMAIN-7 SCORE 6/17/2016 5/26/2017 7/18/2017   Total Score - - -   Total Score 1 0 1     Encounter-Level CSA - 07/07/2015:          Controlled Substance Agreement - Scan on 7/10/2015  1:04 PM : CONTROLLED SUBSTANCE AGREEMENT (below)                  Amount of exercise or physical activity: None    Problems taking medications regularly: No    Medication side effects: none  Diet: regular (no restrictions)      Eye(s) Problem      Duration: 2 weeks    Description:  Location: right  Pain: YES  Redness: no   Discharge: YES    Accompanying signs and symptoms: none    History (Trauma, foreign body exposure,): None    Precipitating or alleviating factors (contact use): None    Therapies tried and outcome: Optometrist feels it's dry eye, multiple eye drops tried.        Problem list and histories reviewed & adjusted, as indicated.  Additional history: as documented    Labs reviewed in EPIC    Reviewed and updated as needed this visit by clinical staffTobacco  Allergies  Meds  Problems  Med Hx  Surg Hx  Fam Hx  Soc Hx        Reviewed and updated as needed this visit by Provider  Allergies  Meds  Problems         ROS:  Constitutional, HEENT, cardiovascular, pulmonary, gi and gu systems are  negative, except as otherwise noted.      OBJECTIVE:                                                    /50 (BP Location: Right arm, Patient Position: Chair, Cuff Size: Adult Regular)  Pulse 64  Temp 97  F (36.1  C) (Tympanic)  Wt 124 lb (56.2 kg)  BMI 23.82 kg/m2  Body mass index is 23.82 kg/(m^2).  GENERAL APPEARANCE: healthy, alert and no distress  EYES: PERRL, lids and lashes normal and conjunctiva/corneas- conjunctival injection right clear d/c   HENT: ear canals and TM's normal and nose and mouth without ulcers or lesions  PSYCH: mentation appears normal and affect normal/bright         ASSESSMENT/PLAN:                                                    1. Chronic pain syndrome  Stable no change in treatment plan.   - baclofen (LIORESAL) 10 MG tablet; Take 1 tablet (10 mg) by mouth daily  Dispense: 90 tablet; Refill: 3  - HYDROcodone-acetaminophen (NORCO)  MG per tablet; Take 1 tablet by mouth 2 times daily maximum 2 tablet(s) per day  Dispense: 60 tablet; Refill: 0    2. Narcotic dependence (H)  Stable no change in treatment plan.   - HYDROcodone-acetaminophen (NORCO)  MG per tablet; Take 1 tablet by mouth 2 times daily maximum 2 tablet(s) per day  Dispense: 60 tablet; Refill: 0    3. RED eye ?dry vs iritis - needs optho      Patient Instructions   Pain meds refilled    Dr Sheehan 2:15    See me back in 3 months       Gwendolyn Solis MD  [facility]

## 2017-08-22 NOTE — NURSING NOTE
"Chief Complaint   Patient presents with     Pain     Eye Problem       Initial /50 (BP Location: Right arm, Patient Position: Chair, Cuff Size: Adult Regular)  Pulse 64  Temp 97  F (36.1  C) (Tympanic)  Wt 124 lb (56.2 kg)  BMI 23.82 kg/m2 Estimated body mass index is 23.82 kg/(m^2) as calculated from the following:    Height as of 5/26/17: 5' 0.5\" (1.537 m).    Weight as of this encounter: 124 lb (56.2 kg).  Medication Reconciliation: complete    Health Maintenance that is potentially due pending provider review:  NONE    n/a    Is there anyone who you would like to be able to receive your results? No  If yes have patient fill out JENA    "

## 2017-08-22 NOTE — MR AVS SNAPSHOT
After Visit Summary   8/22/2017    Tracy Galloway    MRN: [id number]           Patient Information     Date Of Birth          1945        Visit Information        Provider Department      8/22/2017 3:20 PM Gwendolyn Solis MD [facility]        Today's Diagnoses     Chronic pain syndrome        Narcotic dependence (H)          Care Instructions    Pain meds refilled    Dr Sheehan 2:15    See me back in 3 months           Follow-ups after your visit        Who to contact     If you have questions or need follow up information about today's clinic visit or your schedule please contact [facility] directly at [phone].  Normal or non-critical lab and imaging results will be communicated to you by MyChart, letter or phone within 4 business days after the clinic has received the results. If you do not hear from us within 7 days, please contact the clinic through [email or web address] or phone. If you have a critical or abnormal lab result, we will notify you by phone as soon as possible.  Submit refill requests through [employer] or call your pharmacy and they will forward the refill request to us. Please allow 3 business days for your refill to be completed.          Additional Information About Your Visit        MyChart Information     [employer] gives you secure access to your electronic health record. If you see a primary care provider, you can also send messages to your care team and make appointments. If you have questions, please call your primary care clinic.  If you do not have a primary care provider, please call [phone] and they will assist you.        Care EveryWhere ID     This is your Care EveryWhere ID. This could be used by other organizations to access your [place] medical records  [id number]        Your Vitals Were     Pulse Temperature BMI (Body Mass Index)             64 97  F (36.1  C) (Tympanic) 23.82 kg/m2          Blood Pressure from Last 3  Encounters:   08/22/17 100/50   07/22/17 128/53   07/18/17 126/58    Weight from Last 3 Encounters:   08/22/17 124 lb (56.2 kg)   07/22/17 119 lb (54 kg)   07/18/17 119 lb 3.2 oz (54.1 kg)              We Performed the Following     PAF COMPLETED          Today's Medication Changes          These changes are accurate as of: 8/22/17  3:40 PM.  If you have any questions, ask your nurse or doctor.               Start taking these medicines.        Dose/Directions    HYDROcodone-acetaminophen  MG per tablet   Commonly known as:  NORCO   Used for:  Narcotic dependence (H), Chronic pain syndrome   Started by:  Gwendolyn Solis MD        Dose:  1 tablet   Start taking on:  10/22/2017   Take 1 tablet by mouth 2 times daily maximum 2 tablet(s) per day   Quantity:  60 tablet   Refills:  0         These medicines have changed or have updated prescriptions.        Dose/Directions    baclofen 10 MG tablet   Commonly known as:  LIORESAL   This may have changed:  when to take this   Used for:  Chronic pain syndrome   Changed by:  Gwendolyn Solis MD        Dose:  10 mg   Take 1 tablet (10 mg) by mouth daily   Quantity:  90 tablet   Refills:  3       lisinopril 20 MG tablet   Commonly known as:  PRINIVIL/ZESTRIL   This may have changed:  See the new instructions.   Used for:  Essential hypertension with goal blood pressure less than 140/90   Changed by:  Gwendolyn Solis MD        TAKE ONE TABLET BY MOUTH ONE TIME DAILY   Quantity:  90 tablet   Refills:  2            Where to get your medicines      These medications were sent to [id number] - [facility] [address]  [address] [address]    Hours:  Closed 10-16-08 business to [facility] Phone:  [phone]     lisinopril 20 MG tablet         Some of these will need a paper prescription and others can be bought over the counter.  Ask your nurse if you have questions.     Bring a paper prescription for each of  these medications     HYDROcodone-acetaminophen  MG per tablet                Primary Care Provider Office Phone # Fax #    Gwendolyn Solis -[phone] [address] [place] [address]        Equal Access to Services     MARY SUNG : Germaine zay hebert erendirao Sorochelleali, waaxda luqadaha, qaybta kaalmada adedick, elian nava laJoannarmand cote. So [facility] [phone].    ATENCIÓN: Si habla español, tiene a garcia disposición servicios gratuitos de asistencia lingüística. Llame al [phone].    We comply with applicable federal civil rights laws and [place] laws. We do not discriminate on the basis of race, color, national origin, age, disability sex, sexual orientation or gender identity.            Thank you!     Thank you for choosing [facility]  for your care. Our goal is always to provide you with excellent care. Hearing back from our patients is one way we can continue to improve our services. Please take a few minutes to complete the written survey that you may receive in the mail after your visit with us. Thank you!             Your Updated Medication List - Protect others around you: Learn how to safely use, store and throw away your medicines at www.disposemymeds.org.          This list is accurate as of: 8/22/17  3:40 PM.  Always use your most recent med list.                   Brand Name Dispense Instructions for use Diagnosis    aspirin 81 MG EC tablet     90 tablet    Take 1 tablet (81 mg) by mouth daily        baclofen 10 MG tablet    LIORESAL    90 tablet    Take 1 tablet (10 mg) by mouth daily    Chronic pain syndrome       citalopram 40 MG tablet    celeXA    90 tablet    Take 1 tablet (40 mg) by mouth daily    Anxiety state       DILT- MG 24 hr capsule   Generic drug:  diltiazem     90 capsule    TAKE ONE CAPSULE BY MOUTH ONE TIME DAILY    Essential hypertension with goal blood pressure less than 140/90       DOCUSATE SODIUM PO       Take 100 mg by mouth daily as needed        fluticasone 50 MCG/ACT spray    FLONASE    1 Bottle    Spray 1-2 sprays into both nostrils daily    Viral upper respiratory tract infection       HYDROcodone-acetaminophen  MG per tablet   Start taking on:  10/22/2017    NORCO    60 tablet    Take 1 tablet by mouth 2 times daily maximum 2 tablet(s) per day    Narcotic dependence (H), Chronic pain syndrome       ibuprofen 600 MG tablet    ADVIL/MOTRIN    90 tablet    Take 1 tablet (600 mg) by mouth every 6 hours as needed for moderate pain    Facial pain       lisinopril 20 MG tablet    PRINIVIL/ZESTRIL    90 tablet    TAKE ONE TABLET BY MOUTH ONE TIME DAILY    Essential hypertension with goal blood pressure less than 140/90       loratadine 10 MG tablet    CLARITIN     Take 10 mg by mouth daily as needed for allergies        ranitidine 150 MG tablet    ZANTAC    180 tablet    Take 1 tablet (150 mg) by mouth 2 times daily    Gastroesophageal reflux disease without esophagitis       valACYclovir 1000 mg tablet    VALTREX    21 tablet    As needed fo rbreak outs    Herpes zoster without complication       zolpidem 10 MG tablet    AMBIEN    30 tablet    Take  by mouth nightly as needed for sleep. 1/2 -1 TABLET    Insomnia, unspecified

## 2017-08-22 NOTE — TELEPHONE ENCOUNTER
lisinopril (PRINIVIL,ZESTRIL) 20 MG tablet     Last Written Prescription Date: 8/22/16  Last Fill Quantity: 90, # refills: 3  Last Office Visit with [facility] primary care provider:  7/18/17   Next 5 appointments (look out 90 days)     Aug 22, 2017  3:20 PM CDT   SHORT with Gwendolyn [facility]is MD   [facility]    [address] [place] [address] [phone]                   Last PHQ-9 score on record=   PHQ-9 SCORE 7/18/2017   Total Score -   Total Score 2

## 2017-08-29 DIAGNOSIS — K21.9 GASTROESOPHAGEAL REFLUX DISEASE WITHOUT ESOPHAGITIS: ICD-10-CM

## 2017-08-29 NOTE — TELEPHONE ENCOUNTER
ranitidine (ZANTAC) 150 MG tablet      Last Written Prescription Date: 12/8/16  Last Fill Quantity: 180,  # refills: 1   Last Office Visit with [facility], [facility] or [facility] prescribing provider: 8/22/16                                         Next 5 appointments (look out 90 days)     Nov 20, 2017 10:00 AM CST   Office Visit with Gwendolyn Solis MD   [facility]    [address] [place] [address] [phone]

## 2017-10-02 ENCOUNTER — ALLIED HEALTH/NURSE VISIT (OUTPATIENT)
Dept: FAMILY MEDICINE | Facility: CLINIC | Age: 72
End: 2017-10-02
Payer: COMMERCIAL

## 2017-10-02 DIAGNOSIS — Z23 NEED FOR PROPHYLACTIC VACCINATION AND INOCULATION AGAINST INFLUENZA: Primary | ICD-10-CM

## 2017-10-02 PROCEDURE — 90662 IIV NO PRSV INCREASED AG IM: CPT

## 2017-10-02 PROCEDURE — 99207 ZZC NO CHARGE NURSE ONLY: CPT

## 2017-10-02 PROCEDURE — G0008 ADMIN INFLUENZA VIRUS VAC: HCPCS

## 2017-10-02 NOTE — MR AVS SNAPSHOT
After Visit Summary   10/2/2017    Tracy Galloway    MRN: [id number]           Patient Information     Date Of Birth          1945        Visit Information        Provider Department      10/2/2017 11:15 AM [facility]        Today's Diagnoses     Need for prophylactic vaccination and inoculation against influenza    -  1       Follow-ups after your visit        Your next 10 appointments already scheduled     Nov 20, 2017 10:00 AM CST   Office Visit with Gwendolyn Solis MD   [facility]    [address] [place] [id number] [phone]           Bring a current list of meds and any records pertaining to this visit. For Physicals, please bring immunization records and any forms needing to be filled out. Please arrive 10 minutes early to complete paperwork.              Who to contact     If you have questions or need follow up information about today's clinic visit or your schedule please contact [facility] directly at [phone].  Normal or non-critical lab and imaging results will be communicated to you by Wysthart, letter or phone within 4 business days after the clinic has received the results. If you do not hear from us within 7 days, please contact the clinic through [employer] or phone. If you have a critical or abnormal lab result, we will notify you by phone as soon as possible.  Submit refill requests through [employer] or call your pharmacy and they will forward the refill request to us. Please allow 3 business days for your refill to be completed.          Additional Information About Your Visit        Wysthart Information     [employer] gives you secure access to your electronic health record. If you see a primary care provider, you can also send messages to your care team and make appointments. If you have questions, please call your primary care clinic.  If you do not have  a primary care provider, please call [phone] and they will assist you.        Care EveryWhere ID     This is your Care EveryWhere ID. This could be used by other organizations to access your [place] medical records  [id number]         Blood Pressure from Last 3 Encounters:   08/22/17 100/50   07/22/17 128/53   07/18/17 126/58    Weight from Last 3 Encounters:   08/22/17 124 lb (56.2 kg)   07/22/17 119 lb (54 kg)   07/18/17 119 lb 3.2 oz (54.1 kg)              We Performed the Following     ADMIN INFLUENZA (For MEDICARE Patients ONLY) []     FLU VACCINE, INCREASED ANTIGEN, PRESV FREE, AGE 65+ [01576]        Primary Care Provider Office Phone # Fax #    Gwendolyn Solis [phone] [address] [address]        Equal Access to Services     MARY SUNG : Hadii aad ku hadasho Soomaali, waaxda luqadaha, qaybta kaalmada adeegyada, waxay emilianain hayrasheedn cody chowdhury . So [facility] [phone].    ATENCIÓN: Si habla español, tiene a garcia disposición servicios gratuitos de asistencia lingüística. Nydia al [phone].    We comply with applicable federal civil rights laws and [place] laws. We do not discriminate on the basis of race, color, national origin, age, disability, sex, sexual orientation, or gender identity.            Thank you!     Thank you for choosing [facility]  for your care. Our goal is always to provide you with excellent care. Hearing back from our patients is one way we can continue to improve our services. Please take a few minutes to complete the written survey that you may receive in the mail after your visit with us. Thank you!             Your Updated Medication List - Protect others around you: Learn how to safely use, store and throw away your medicines at www.disposemymeds.org.          This list is accurate as of: 10/2/17 12:19 PM.  Always use your most recent med list.                   Brand Name Dispense  Instructions for use Diagnosis    aspirin 81 MG EC tablet     90 tablet    Take 1 tablet (81 mg) by mouth daily        baclofen 10 MG tablet    LIORESAL    90 tablet    Take 1 tablet (10 mg) by mouth daily    Chronic pain syndrome       citalopram 40 MG tablet    celeXA    90 tablet    Take 1 tablet (40 mg) by mouth daily    Anxiety state       DILT- MG 24 hr capsule   Generic drug:  diltiazem     90 capsule    TAKE ONE CAPSULE BY MOUTH ONE TIME DAILY    Essential hypertension with goal blood pressure less than 140/90       DOCUSATE SODIUM PO      Take 100 mg by mouth daily as needed        fluticasone 50 MCG/ACT spray    FLONASE    1 Bottle    Spray 1-2 sprays into both nostrils daily    Viral upper respiratory tract infection       HYDROcodone-acetaminophen  MG per tablet   Start taking on:  10/22/2017    NORCO    60 tablet    Take 1 tablet by mouth 2 times daily maximum 2 tablet(s) per day    Narcotic dependence (H), Chronic pain syndrome       ibuprofen 600 MG tablet    ADVIL/MOTRIN    90 tablet    Take 1 tablet (600 mg) by mouth every 6 hours as needed for moderate pain    Facial pain       lisinopril 20 MG tablet    PRINIVIL/ZESTRIL    90 tablet    TAKE ONE TABLET BY MOUTH ONE TIME DAILY    Essential hypertension with goal blood pressure less than 140/90       loratadine 10 MG tablet    CLARITIN     Take 10 mg by mouth daily as needed for allergies        ranitidine 150 MG tablet    ZANTAC    180 tablet    TAKE ONE TABLET BY MOUTH TWICE DAILY    Gastroesophageal reflux disease without esophagitis       valACYclovir 1000 mg tablet    VALTREX    21 tablet    As needed fo rbreak outs    Herpes zoster without complication       zolpidem 10 MG tablet    AMBIEN    30 tablet    Take  by mouth nightly as needed for sleep. 1/2 -1 TABLET    Insomnia, unspecified

## 2017-10-02 NOTE — PROGRESS NOTES
Injectable Influenza Immunization Documentation    1.  Is the person to be vaccinated sick today?   No    2. Does the person to be vaccinated have an allergy to a component   of the vaccine?   No    3. Has the person to be vaccinated ever had a serious reaction   to influenza vaccine in the past?   No    4. Has the person to be vaccinated ever had Guillain-Barré syndrome?   No    Form completed by Delia Garvey CMA  Prior to injection verified patient identity using patient's name and date of birth.  Per orders of  , injection of flu given by Delia Garvey. Patient instructed to remain in clinic for 15 minutes afterwards, and to report any adverse reaction to me immediately.

## 2017-10-11 NOTE — PROGRESS NOTES
Outpatient Physical Therapy Discharge Note     Patient: Tracy Galloway  : 1945    Beginning/End Dates of Reporting Period:  3/28/17 to 10/11/2017    Referring Provider: YANY Hodge CNP    Therapy Diagnosis: Lumbopelvic hypermobility and glute weakness     Client Self Report: No longer feels sharp pain in left hip.  After HEP, able to climb stairs without pain for 4-5 hours.  Then the pain returns      Goals:  Goal Identifier     Goal Description Following PT interventions, patient will be able to climb 12 stairs with <3/10 low back pain.   Target Date 17   Date Met      Progress:     Goal Identifier     Goal Description Following PT interventions, patient will have >=4+/5 hip ABD strength for improved lumbar frontal plane stabillity with ADL's.   Target Date 17   Date Met      Progress:     Goal Identifier     Goal Description Following PT interventions, patient will be independent with her HEP to reduce future ocurrence of pain and disability.   Target Date 17   Date Met  17   Progress:       Progress Toward Goals:   Progress limited due to patient attending only two PT visits.  Unable to assess progress.        Plan:  Discharge from therapy.    Discharge:    Reason for Discharge: Patient has failed to schedule further appointments.    Equipment Issued: Theraband    Discharge Plan: Patient to continue home program.    Christina Green, PT, DPT  Doctor of Physical Therapy #[id number]  [facility]  [phone]  [email or web address]

## 2017-11-02 ENCOUNTER — TELEPHONE (OUTPATIENT)
Dept: PHARMACY | Facility: CLINIC | Age: 72
End: 2017-11-02

## 2017-11-20 ENCOUNTER — OFFICE VISIT (OUTPATIENT)
Dept: FAMILY MEDICINE | Facility: CLINIC | Age: 72
End: 2017-11-20
Payer: COMMERCIAL

## 2017-11-20 VITALS
HEART RATE: 76 BPM | TEMPERATURE: 96.5 F | WEIGHT: 124 LBS | DIASTOLIC BLOOD PRESSURE: 60 MMHG | SYSTOLIC BLOOD PRESSURE: 100 MMHG | RESPIRATION RATE: 18 BRPM | BODY MASS INDEX: 23.82 KG/M2

## 2017-11-20 DIAGNOSIS — F11.20 NARCOTIC DEPENDENCE (H): Chronic | ICD-10-CM

## 2017-11-20 DIAGNOSIS — G89.4 CHRONIC PAIN SYNDROME: ICD-10-CM

## 2017-11-20 PROCEDURE — 99213 OFFICE O/P EST LOW 20 MIN: CPT | Performed by: FAMILY MEDICINE

## 2017-11-20 RX ORDER — HYDROCODONE BITARTRATE AND ACETAMINOPHEN 10; 325 MG/1; MG/1
1 TABLET ORAL 2 TIMES DAILY
Qty: 60 TABLET | Refills: 0 | Status: SHIPPED | OUTPATIENT
Start: 2017-11-20 | End: 2017-11-20

## 2017-11-20 RX ORDER — HYDROCODONE BITARTRATE AND ACETAMINOPHEN 10; 325 MG/1; MG/1
1 TABLET ORAL 2 TIMES DAILY
Qty: 60 TABLET | Refills: 0 | Status: SHIPPED | OUTPATIENT
Start: 2018-01-20 | End: 2018-02-20

## 2017-11-20 RX ORDER — HYDROCODONE BITARTRATE AND ACETAMINOPHEN 10; 325 MG/1; MG/1
1 TABLET ORAL 2 TIMES DAILY
Qty: 60 TABLET | Refills: 0 | Status: SHIPPED | OUTPATIENT
Start: 2017-12-20 | End: 2017-11-20

## 2017-11-20 ASSESSMENT — PAIN SCALES - GENERAL: PAINLEVEL: MODERATE PAIN (5)

## 2017-11-20 NOTE — MR AVS SNAPSHOT
After Visit Summary   11/20/2017    Tracy Galloway    MRN: [id number]           Patient Information     Date Of Birth          1945        Visit Information        Provider Department      11/20/2017 10:00 AM Gwendolyn Solis MD [facility]        Today's Diagnoses     Narcotic dependence (H)        Chronic pain syndrome          Care Instructions    See me back in 3 months           Follow-ups after your visit        Who to contact     If you have questions or need follow up information about today's clinic visit or your schedule please contact [facility] directly at [phone].  Normal or non-critical lab and imaging results will be communicated to you by BitRockhart, letter or phone within 4 business days after the clinic has received the results. If you do not hear from us within 7 days, please contact the clinic through [employer]: online travellers clubt or phone. If you have a critical or abnormal lab result, we will notify you by phone as soon as possible.  Submit refill requests through [employer] or call your pharmacy and they will forward the refill request to us. Please allow 3 business days for your refill to be completed.          Additional Information About Your Visit        MyChart Information     [employer] gives you secure access to your electronic health record. If you see a primary care provider, you can also send messages to your care team and make appointments. If you have questions, please call your primary care clinic.  If you do not have a primary care provider, please call [phone] and they will assist you.        Care EveryWhere ID     This is your Care EveryWhere ID. This could be used by other organizations to access your [place] medical records  [id number]        Your Vitals Were     Pulse Temperature Respirations Breastfeeding? BMI (Body Mass Index)       76 96.5  F (35.8  C) (Tympanic) 18 No 23.82 kg/m2        Blood Pressure from Last 3 Encounters:    11/20/17 100/60   08/22/17 100/50   07/22/17 128/53    Weight from Last 3 Encounters:   11/20/17 124 lb (56.2 kg)   08/22/17 124 lb (56.2 kg)   07/22/17 119 lb (54 kg)              Today, you had the following     No orders found for display         Today's Medication Changes          These changes are accurate as of: 11/20/17 10:24 AM.  If you have any questions, ask your nurse or doctor.               Start taking these medicines.        Dose/Directions    HYDROcodone-acetaminophen  MG per tablet   Commonly known as:  NORCO   Used for:  Narcotic dependence (H), Chronic pain syndrome   Started by:  Gwendolyn Solis MD        Dose:  1 tablet   Start taking on:  1/20/2018   Take 1 tablet by mouth 2 times daily maximum 2 tablet(s) per day   Quantity:  60 tablet   Refills:  0            Where to get your medicines      Some of these will need a paper prescription and others can be bought over the counter.  Ask your nurse if you have questions.     Bring a paper prescription for each of these medications     HYDROcodone-acetaminophen  MG per tablet                Primary Care Provider Office Phone # Fax #    Gwendolyn Solis -[phone] [address] [place], [place] [address]        Equal Access to Services     MARY SUNG AH: Germaine krishnao Sorochelleali, waaxda luqadaha, qaybta kaalmada adeegyada, elian cote. So [facility] [phone].    ATENCIÓN: Si habla español, tiene a garcia disposición servicios gratuitos de asistencia lingüística. Llame al [phone].    We comply with applicable federal civil rights laws and [place] laws. We do not discriminate on the basis of race, color, national origin, age, disability, sex, sexual orientation, or gender identity.            Thank you!     Thank you for choosing [facility]  for your care. Our goal is always to provide you with excellent care. Hearing back from our patients is one way  we can continue to improve our services. Please take a few minutes to complete the written survey that you may receive in the mail after your visit with us. Thank you!             Your Updated Medication List - Protect others around you: Learn how to safely use, store and throw away your medicines at www.disposemymeds.org.          This list is accurate as of: 11/20/17 10:24 AM.  Always use your most recent med list.                   Brand Name Dispense Instructions for use Diagnosis    aspirin 81 MG EC tablet     90 tablet    Take 1 tablet (81 mg) by mouth daily        baclofen 10 MG tablet    LIORESAL    90 tablet    Take 1 tablet (10 mg) by mouth daily    Chronic pain syndrome       citalopram 40 MG tablet    celeXA    90 tablet    Take 1 tablet (40 mg) by mouth daily    Anxiety state       DILT- MG 24 hr capsule   Generic drug:  diltiazem     90 capsule    TAKE ONE CAPSULE BY MOUTH ONE TIME DAILY    Essential hypertension with goal blood pressure less than 140/90       DOCUSATE SODIUM PO      Take 100 mg by mouth daily as needed        fluticasone 50 MCG/ACT spray    FLONASE    1 Bottle    Spray 1-2 sprays into both nostrils daily    Viral upper respiratory tract infection       HYDROcodone-acetaminophen  MG per tablet   Start taking on:  1/20/2018    NORCO    60 tablet    Take 1 tablet by mouth 2 times daily maximum 2 tablet(s) per day    Narcotic dependence (H), Chronic pain syndrome       ibuprofen 600 MG tablet    ADVIL/MOTRIN    90 tablet    Take 1 tablet (600 mg) by mouth every 6 hours as needed for moderate pain    Facial pain       lisinopril 20 MG tablet    PRINIVIL/ZESTRIL    90 tablet    TAKE ONE TABLET BY MOUTH ONE TIME DAILY    Essential hypertension with goal blood pressure less than 140/90       loratadine 10 MG tablet    CLARITIN     Take 10 mg by mouth daily as needed for allergies        ranitidine 150 MG tablet    ZANTAC    180 tablet    TAKE ONE TABLET BY MOUTH TWICE DAILY     Gastroesophageal reflux disease without esophagitis       valACYclovir 1000 mg tablet    VALTREX    21 tablet    As needed fo rbreak outs    Herpes zoster without complication       zolpidem 10 MG tablet    AMBIEN    30 tablet    Take  by mouth nightly as needed for sleep. 1/2 -1 TABLET    Insomnia, unspecified

## 2017-11-20 NOTE — NURSING NOTE
"No chief complaint on file.      Initial /60 (BP Location: Right arm, Patient Position: Chair, Cuff Size: Adult Regular)  Pulse 76  Temp 96.5  F (35.8  C) (Tympanic)  Resp 18  Wt 124 lb (56.2 kg)  Breastfeeding? No  BMI 23.82 kg/m2 Estimated body mass index is 23.82 kg/(m^2) as calculated from the following:    Height as of 5/26/17: 5' 0.5\" (1.537 m).    Weight as of this encounter: 124 lb (56.2 kg).  Medication Reconciliation: complete    Health Maintenance that is potentially due pending provider review:  NONE    n/a    Is there anyone who you would like to be able to receive your results? No  If yes have patient fill out JENA    Delia Garvey CMA  "

## 2017-11-20 NOTE — PROGRESS NOTES
SUBJECTIVE:   Tracy Galloway is a 72 year old female who presents to clinic today for the following health issues:      Chronic Pain Follow-Up       Type / Location of Pain: r side of mouth  Analgesia/pain control:       Recent changes:  same      Overall control: Tolerable with discomfort  Activity level/function:      Daily activities:  Able to do all daily activities    Work:  not applicable  Adverse effects:  Yes constipation  Adherance    Taking medication as directed?  Yes    Participating in other treatments: no -   Risk Factors:    Sleep:  Good- Ambien    Mood/anxiety:  controlled    Recent family or social stressors:  none noted    Other aggravating factors: none  PHQ-9 SCORE 7/7/2015 6/17/2016 7/18/2017   Total Score 2 - -   Total Score - 2 2     ROMAIN-7 SCORE 6/17/2016 5/26/2017 7/18/2017   Total Score - - -   Total Score 1 0 1     Encounter-Level CSA - 07/07/2015:          Controlled Substance Agreement - Scan on 7/10/2015  1:04 PM : CONTROLLED SUBSTANCE AGREEMENT (below)                  Amount of exercise or physical activity: 4-5 days/week for an average of 30-45 minutes- yoga    Problems taking medications regularly: No    Medication side effects: constipation     Diet: regular (no restrictions)      Problem list and histories reviewed & adjusted, as indicated.  Additional history: as documented      Reviewed and updated as needed this visit by clinical staffTobacco  Allergies  Meds  Problems  Med Hx  Surg Hx  Fam Hx  Soc Hx        Reviewed and updated as needed this visit by Provider  Allergies  Meds  Problems         ROS:  Constitutional, HEENT, cardiovascular, pulmonary, gi and gu systems are negative, except as otherwise noted.      OBJECTIVE:                                                    /60 (BP Location: Right arm, Patient Position: Chair, Cuff Size: Adult Regular)  Pulse 76  Temp 96.5  F (35.8  C) (Tympanic)  Resp 18  Wt 124 lb (56.2 kg)  Breastfeeding? No  BMI 23.82  kg/m2  Body mass index is 23.82 kg/(m^2).  GENERAL APPEARANCE: healthy, alert and no distress  PSYCH: mentation appears normal and affect normal/bright         ASSESSMENT/PLAN:                                                    1. Narcotic dependence (H)  Stable no change in treatment plan.   - HYDROcodone-acetaminophen (NORCO)  MG per tablet; Take 1 tablet by mouth 2 times daily maximum 2 tablet(s) per day  Dispense: 60 tablet; Refill: 0    2. Chronic pain syndrome  Stable no change in treatment plan.   - HYDROcodone-acetaminophen (NORCO)  MG per tablet; Take 1 tablet by mouth 2 times daily maximum 2 tablet(s) per day  Dispense: 60 tablet; Refill: 0      Patient Instructions   See me back in 3 months     Risks, benefits, side effects and rationale for treatment plan fully discussed with the patient and understanding expressed.   Gwendolyn Solis MD  [facility]

## 2018-01-11 DIAGNOSIS — I10 ESSENTIAL HYPERTENSION WITH GOAL BLOOD PRESSURE LESS THAN 140/90: ICD-10-CM

## 2018-01-11 NOTE — TELEPHONE ENCOUNTER
"Requested Prescriptions   Pending Prescriptions Disp Refills     DILT- MG 24 hr capsule [Pharmacy Med Name: DILT-XR  MG   CAP APOT] 90 capsule 0     Sig: TAKE ONE CAPSULE BY MOUTH ONE TIME DAILY    Calcium Channel Blockers Protocol  Failed    1/11/2018 12:23 PM       Failed - Normal ALT in past 12 months    Recent Labs   Lab Test  01/24/17   0811   ALT  64*            Passed - Blood pressure under 140/90    BP Readings from Last 3 Encounters:   11/20/17 100/60   08/22/17 100/50   07/22/17 128/53                Passed - Recent or future visit with authorizing provider    Patient had office visit in the last year or has a visit in the next 30 days with authorizing provider.  See \"Patient Info\" tab in inbasket, or \"Choose Columns\" in Meds & Orders section of the refill encounter.       Last Written Prescription Date:  7/19/17  Last Fill Quantity: 90,  # refills: 0   Last Office Visit with [facility], [facility] or [facility] prescribing provider:  11/20/17   Future Office Visit:    Next 5 appointments (look out 90 days)     Feb 20, 2018  2:00 PM CST   [facility]    [address] [place] [address] [phone]                            Passed - Patient is age 18 or older       Passed - No active pregnancy on record       Passed - Normal serum creatinine on file in past 12 months    Recent Labs   Lab Test  01/24/17   0811   CR  0.86            Passed - No positive pregnancy test in past 12 months          "

## 2018-01-12 RX ORDER — DILTIAZEM HYDROCHLORIDE 180 MG/1
CAPSULE, EXTENDED RELEASE ORAL
Qty: 90 CAPSULE | Refills: 0 | Status: SHIPPED | OUTPATIENT
Start: 2018-01-12 | End: 2018-04-24

## 2018-02-06 ENCOUNTER — TELEPHONE (OUTPATIENT)
Dept: FAMILY MEDICINE | Facility: CLINIC | Age: 73
End: 2018-02-06

## 2018-02-06 NOTE — TELEPHONE ENCOUNTER
Prior Authorization Retail Medication Request  Medication/Dose: zolpidem  Diagnosis and ICD code: INSOMNIA NEC [G47.00]  New/Renewal/Insurance Change PA: new  Previously Tried and Failed Therapies: Insomnia: Currently taking zolpidem 5 to 10 mg daily at bedtime (takes second half tablet after 12 AM if she has not fallen asleep). Mainly issue with falling asleep. Pt has tried over the counter medications - including Unisom and melatonin - did not find to be effective.  Not interested in trying alternative sleep medication today.        Insurance ID (if provided): [employer]--  Insurance Phone (if provided): n/a--CoverMyMeds Key: [id number]    Any additional info from fax request:     If you received a fax notification from an outside Pharmacy:  Pharmacy Name:[facility]  Pharmacy #:[phone]  Pharmacy Fax:[phone]

## 2018-02-20 ENCOUNTER — OFFICE VISIT (OUTPATIENT)
Dept: FAMILY MEDICINE | Facility: CLINIC | Age: 73
End: 2018-02-20
Payer: COMMERCIAL

## 2018-02-20 VITALS
HEIGHT: 61 IN | TEMPERATURE: 97 F | DIASTOLIC BLOOD PRESSURE: 66 MMHG | HEART RATE: 72 BPM | SYSTOLIC BLOOD PRESSURE: 138 MMHG

## 2018-02-20 DIAGNOSIS — G89.4 CHRONIC PAIN SYNDROME: ICD-10-CM

## 2018-02-20 DIAGNOSIS — F11.20 NARCOTIC DEPENDENCE (H): Chronic | ICD-10-CM

## 2018-02-20 PROCEDURE — 99213 OFFICE O/P EST LOW 20 MIN: CPT | Performed by: FAMILY MEDICINE

## 2018-02-20 RX ORDER — HYDROCODONE BITARTRATE AND ACETAMINOPHEN 10; 325 MG/1; MG/1
1 TABLET ORAL 2 TIMES DAILY
Qty: 60 TABLET | Refills: 0 | Status: SHIPPED | OUTPATIENT
Start: 2018-03-20 | End: 2018-02-20

## 2018-02-20 RX ORDER — HYDROCODONE BITARTRATE AND ACETAMINOPHEN 10; 325 MG/1; MG/1
1 TABLET ORAL 2 TIMES DAILY
Qty: 60 TABLET | Refills: 0 | Status: SHIPPED | OUTPATIENT
Start: 2018-02-20 | End: 2018-02-20

## 2018-02-20 RX ORDER — HYDROCODONE BITARTRATE AND ACETAMINOPHEN 10; 325 MG/1; MG/1
1 TABLET ORAL 2 TIMES DAILY
Qty: 60 TABLET | Refills: 0 | Status: SHIPPED | OUTPATIENT
Start: 2018-04-20 | End: 2018-05-22

## 2018-02-20 NOTE — MR AVS SNAPSHOT
"              After Visit Summary   2/20/2018    Tracy Galloway    MRN: [id number]           Patient Information     Date Of Birth          1945        Visit Information        Provider Department      2/20/2018 2:00 PM Gewndolyn Solis MD [facility]        Today's Diagnoses     Narcotic dependence (H)        Chronic pain syndrome          Care Instructions    Recheck with me in 6 months           Follow-ups after your visit        Who to contact     If you have questions or need follow up information about today's clinic visit or your schedule please contact [facility] directly at [phone].  Normal or non-critical lab and imaging results will be communicated to you by EdSurgehart, letter or phone within 4 business days after the clinic has received the results. If you do not hear from us within 7 days, please contact the clinic through [email or web address] or phone. If you have a critical or abnormal lab result, we will notify you by phone as soon as possible.  Submit refill requests through XYZE or call your pharmacy and they will forward the refill request to us. Please allow 3 business days for your refill to be completed.          Additional Information About Your Visit        MyChart Information     XYZE gives you secure access to your electronic health record. If you see a primary care provider, you can also send messages to your care team and make appointments. If you have questions, please call your primary care clinic.  If you do not have a primary care provider, please call [phone] and they will assist you.        Care EveryWhere ID     This is your Care EveryWhere ID. This could be used by other organizations to access your [place] medical records  [id number]        Your Vitals Were     Pulse Temperature Height             72 97  F (36.1  C) (Tympanic) 5' 0.5\" (1.537 m)          Blood Pressure from Last 3 Encounters:   02/20/18 138/66   11/20/17 100/60 "   08/22/17 100/50    Weight from Last 3 Encounters:   11/20/17 124 lb (56.2 kg)   08/22/17 124 lb (56.2 kg)   07/22/17 119 lb (54 kg)              Today, you had the following     No orders found for display         Today's Medication Changes          These changes are accurate as of 2/20/18  2:23 PM.  If you have any questions, ask your nurse or doctor.               Start taking these medicines.        Dose/Directions    HYDROcodone-acetaminophen  MG per tablet   Commonly known as:  NORCO   Used for:  Narcotic dependence (H), Chronic pain syndrome   Started by:  Gwendolyn Solis MD        Dose:  1 tablet   Start taking on:  4/20/2018   Take 1 tablet by mouth 2 times daily maximum 2 tablet(s) per day   Quantity:  60 tablet   Refills:  0            Where to get your medicines      Some of these will need a paper prescription and others can be bought over the counter.  Ask your nurse if you have questions.     Bring a paper prescription for each of these medications     HYDROcodone-acetaminophen  MG per tablet                Primary Care Provider Office Phone # Fax #    Gwendolyn Solis [phone] [address] [place], [place] [address]        Equal Access to Services     SHASHI SUNG AH: Hadii zay ruth Soidalmis, waaxda luqadaha, qaybta kaalmada adefredyada, elian cote. So [facility] [phone].    ATENCIÓN: Si habla español, tiene a garcia disposición servicios gratuitos de asistencia lingüística. Ll[facility] [phone].    We comply with applicable federal civil rights laws and [place] laws. We do not discriminate on the basis of race, color, national origin, age, disability, sex, sexual orientation, or gender identity.            Thank you!     Thank you for choosing [facility]  for your care. Our goal is always to provide you with excellent care. Hearing back from our patients is one way we can continue to improve our  services. Please take a few minutes to complete the written survey that you may receive in the mail after your visit with us. Thank you!             Your Updated Medication List - Protect others around you: Learn how to safely use, store and throw away your medicines at www.disposemymeds.org.          This list is accurate as of 2/20/18  2:23 PM.  Always use your most recent med list.                   Brand Name Dispense Instructions for use Diagnosis    aspirin 81 MG EC tablet     90 tablet    Take 1 tablet (81 mg) by mouth daily        baclofen 10 MG tablet    LIORESAL    90 tablet    Take 1 tablet (10 mg) by mouth daily    Chronic pain syndrome       citalopram 40 MG tablet    celeXA    90 tablet    Take 1 tablet (40 mg) by mouth daily    Anxiety state       DILT- MG 24 hr capsule   Generic drug:  diltiazem     90 capsule    TAKE ONE CAPSULE BY MOUTH ONE TIME DAILY    Essential hypertension with goal blood pressure less than 140/90       DOCUSATE SODIUM PO      Take 100 mg by mouth daily as needed        fluticasone 50 MCG/ACT spray    FLONASE    1 Bottle    Spray 1-2 sprays into both nostrils daily    Viral upper respiratory tract infection       HYDROcodone-acetaminophen  MG per tablet   Start taking on:  4/20/2018    NORCO    60 tablet    Take 1 tablet by mouth 2 times daily maximum 2 tablet(s) per day    Narcotic dependence (H), Chronic pain syndrome       ibuprofen 600 MG tablet    ADVIL/MOTRIN    90 tablet    Take 1 tablet (600 mg) by mouth every 6 hours as needed for moderate pain    Facial pain       lisinopril 20 MG tablet    PRINIVIL/ZESTRIL    90 tablet    TAKE ONE TABLET BY MOUTH ONE TIME DAILY    Essential hypertension with goal blood pressure less than 140/90       loratadine 10 MG tablet    CLARITIN     Take 10 mg by mouth daily as needed for allergies        ranitidine 150 MG tablet    ZANTAC    180 tablet    TAKE ONE TABLET BY MOUTH TWICE DAILY    Gastroesophageal reflux disease  without esophagitis       valACYclovir 1000 mg tablet    VALTREX    21 tablet    As needed fo rbreak outs    Herpes zoster without complication       zolpidem 10 MG tablet    AMBIEN    30 tablet    Take  by mouth nightly as needed for sleep. 1/2 -1 TABLET    Insomnia, unspecified

## 2018-02-20 NOTE — NURSING NOTE
"Chief Complaint   Patient presents with     Pain       Initial /66 (BP Location: Right arm, Patient Position: Chair, Cuff Size: Adult Regular)  Pulse 72  Temp 97  F (36.1  C) (Tympanic)  Ht 5' 0.5\" (1.537 m) Estimated body mass index is 23.82 kg/(m^2) as calculated from the following:    Height as of 5/26/17: 5' 0.5\" (1.537 m).    Weight as of 11/20/17: 124 lb (56.2 kg).      Health Maintenance that is potentially due pending provider review:  NONE    n/a    Is there anyone who you would like to be able to receive your results? No  If yes have patient fill out JENA    "

## 2018-02-20 NOTE — PROGRESS NOTES
"  SUBJECTIVE:   Tracy Galloway is a 72 year old female who presents to clinic today for the following health issues:      Chronic Pain Follow-Up       Type / Location of Pain: right side of face  Analgesia/pain control:       Recent changes:  same      Overall control: Tolerable with discomfort  Activity level/function:      Daily activities:  Able to do all daily activities    Work:  not applicable  Adverse effects:  No  Adherance    Taking medication as directed?  Yes    Participating in other treatments: no -   Risk Factors:    Sleep:  Good    Mood/anxiety:  worsened    Recent family or social stressors:  death in family:      Other aggravating factors: none  PHQ-9 SCORE 7/7/2015 6/17/2016 7/18/2017   Total Score 2 - -   Total Score - 2 2     ROMAIN-7 SCORE 6/17/2016 5/26/2017 7/18/2017   Total Score - - -   Total Score 1 0 1     Encounter-Level CSA - 07/07/2015:          Controlled Substance Agreement - Scan on 7/10/2015  1:04 PM : CONTROLLED SUBSTANCE AGREEMENT (below)                Amount of exercise or physical activity: 4-5 days/week for an average of 45-60 minutes    Problems taking medications regularly: No    Medication side effects: none    Diet: regular (no restrictions)            Problem list and histories reviewed & adjusted, as indicated.  Additional history: as documented    Labs reviewed in EPIC    Reviewed and updated as needed this visit by clinical staff  Tobacco  Allergies  Meds  Problems  Med Hx  Surg Hx  Fam Hx  Soc Hx        Reviewed and updated as needed this visit by Provider  Allergies  Meds  Problems         ROS:  Constitutional, HEENT, cardiovascular, pulmonary, gi and gu systems are negative, except as otherwise noted.    OBJECTIVE:                                                    /66 (BP Location: Right arm, Patient Position: Chair, Cuff Size: Adult Regular)  Pulse 72  Temp 97  F (36.1  C) (Tympanic)  Ht 5' 0.5\" (1.537 m)  There is no height or weight on file to " calculate BMI.  GENERAL APPEARANCE: healthy, alert and no distress  PSYCH: mentation appears normal and affect normal/bright         ASSESSMENT/PLAN:                                                    1. Narcotic dependence (H)  Stable no change in treatment plan.   - HYDROcodone-acetaminophen (NORCO)  MG per tablet; Take 1 tablet by mouth 2 times daily maximum 2 tablet(s) per day  Dispense: 60 tablet; Refill: 0    2. Chronic pain syndrome  Stable no change in treatment plan.   - HYDROcodone-acetaminophen (NORCO)  MG per tablet; Take 1 tablet by mouth 2 times daily maximum 2 tablet(s) per day  Dispense: 60 tablet; Refill: 0      Patient Instructions   Recheck with me in 6 months       Risks, benefits, side effects and rationale for treatment plan fully discussed with the patient and understanding expressed.     Gwendolyn Solis MD  [facility]

## 2018-02-23 DIAGNOSIS — F51.01 PRIMARY INSOMNIA: Primary | Chronic | ICD-10-CM

## 2018-02-23 RX ORDER — ZOLPIDEM TARTRATE 10 MG/1
TABLET ORAL
Qty: 30 TABLET | Refills: 4 | Status: SHIPPED | OUTPATIENT
Start: 2018-02-23 | End: 2018-09-04

## 2018-02-23 NOTE — TELEPHONE ENCOUNTER
Requested Prescriptions   Pending Prescriptions Disp Refills     zolpidem (AMBIEN) 10 MG tablet [Pharmacy Med Name: ZOLPIDEM 10 MG      TAB NORT] 30 tablet 4     Sig: TAKE 1/2 TO 1 TABLET BY MOUTH ONCE NIGHTLY AS NEEDED FOR SLEEP    There is no refill protocol information for this order        Controlled Substance Refill Request for 8/7/2017  Problem List Complete:  Yes   checked in past 6 months?  No, route to RN

## 2018-02-26 ENCOUNTER — TELEPHONE (OUTPATIENT)
Dept: PHARMACY | Facility: CLINIC | Age: 73
End: 2018-02-26

## 2018-02-26 NOTE — TELEPHONE ENCOUNTER
We have been unable to reach this patient for MTM follow-up after several attempts. We will stop reaching out to the patient at this time. Please let us know if we can assist in this patient's care in the future.    Routing to PCP as MADY.    Karl Galo, SandyD, BCACP  Medication Therapy Management Pharmacist  Pager: [phone]

## 2018-03-08 DIAGNOSIS — G89.4 CHRONIC PAIN SYNDROME: ICD-10-CM

## 2018-03-08 RX ORDER — BACLOFEN 10 MG/1
TABLET ORAL
Qty: 90 TABLET | Refills: 2 | Status: SHIPPED | OUTPATIENT
Start: 2018-03-08 | End: 2018-12-04

## 2018-03-08 NOTE — TELEPHONE ENCOUNTER
Requested Prescriptions   Pending Prescriptions Disp Refills     baclofen (LIORESAL) 10 MG tablet [Pharmacy Med Name: BACLOFEN 10 MG      TAB NORT] 90 tablet 2     Sig: TAKE ONE TABLET BY MOUTH THREE TIMES DAILY    There is no refill protocol information for this order        Last Written Prescription Date:  8/22/2017  Last Fill Quantity: 90,  # refills: 3   Last office visit: 2/20/2018 with prescribing provider:  Will   Future Office Visit:   Next 5 appointments (look out 90 days)     May 22, 2018 11:20 AM CDT   SHORT with Gwendolyn Solis MD   [facility] ([facility])    [address] [place] [address] [phone]

## 2018-03-14 ENCOUNTER — OFFICE VISIT (OUTPATIENT)
Dept: FAMILY MEDICINE | Facility: CLINIC | Age: 73
End: 2018-03-14
Payer: COMMERCIAL

## 2018-03-14 VITALS
RESPIRATION RATE: 12 BRPM | HEART RATE: 52 BPM | DIASTOLIC BLOOD PRESSURE: 60 MMHG | OXYGEN SATURATION: 98 % | SYSTOLIC BLOOD PRESSURE: 109 MMHG | TEMPERATURE: 97 F

## 2018-03-14 DIAGNOSIS — R53.83 FATIGUE, UNSPECIFIED TYPE: Primary | ICD-10-CM

## 2018-03-14 PROCEDURE — 99213 OFFICE O/P EST LOW 20 MIN: CPT | Performed by: FAMILY MEDICINE

## 2018-03-14 PROCEDURE — 80053 COMPREHEN METABOLIC PANEL: CPT | Performed by: FAMILY MEDICINE

## 2018-03-14 PROCEDURE — 36415 COLL VENOUS BLD VENIPUNCTURE: CPT | Performed by: FAMILY MEDICINE

## 2018-03-14 ASSESSMENT — PAIN SCALES - GENERAL: PAINLEVEL: NO PAIN (0)

## 2018-03-14 NOTE — PATIENT INSTRUCTIONS
1. This looks like after effects from the gastroenteritis    2 lets check lab    3. I will call.  No treatment for now.

## 2018-03-14 NOTE — MR AVS SNAPSHOT
After Visit Summary   3/14/2018    Tracy Galloway    MRN: [id number]           Patient Information     Date Of Birth          1945        Visit Information        Provider Department      3/14/2018 1:20 PM Angel Grurola MD [facility]        Today's Diagnoses     Fatigue, unspecified type    -  1      Care Instructions    1. This looks like after effects from the gastroenteritis    2 lets check lab    3. I will call.  No treatment for now.          Follow-ups after your visit        Your next 10 appointments already scheduled     May 22, 2018 11:20 AM CDT   SHORT with Gwendolyn Solis MD   [facility] ([facility])    [address] [place] [address]   [phone]              Who to contact     If you have questions or need follow up information about today's clinic visit or your schedule please contact [facility] directly at [phone].  Normal or non-critical lab and imaging results will be communicated to you by MyChart, letter or phone within 4 business days after the clinic has received the results. If you do not hear from us within 7 days, please contact the clinic through Storelli Sportshart or phone. If you have a critical or abnormal lab result, we will notify you by phone as soon as possible.  Submit refill requests through BiOM or call your pharmacy and they will forward the refill request to us. Please allow 3 business days for your refill to be completed.          Additional Information About Your Visit        Storelli Sportshart Information     BiOM gives you secure access to your electronic health record. If you see a primary care provider, you can also send messages to your care team and make appointments. If you have questions, please call your primary care clinic.  If you do not have a primary care provider, please call [phone] and they will assist you.        Care EveryWhere ID      This is your Care EveryWhere ID. This could be used by other organizations to access your [place] medical records  [id number]        Your Vitals Were     Pulse Temperature Respirations Pulse Oximetry          52 97  F (36.1  C) (Tympanic) 12 98%         Blood Pressure from Last 3 Encounters:   03/14/18 109/60   02/20/18 138/66   11/20/17 100/60    Weight from Last 3 Encounters:   11/20/17 124 lb (56.2 kg)   08/22/17 124 lb (56.2 kg)   07/22/17 119 lb (54 kg)              We Performed the Following     Comprehensive metabolic panel        Primary Care Provider Office Phone # Fax #    Gwendolyn Solis [phone] [address] [place] [address]        Equal Access to Services     MARY SUNG : Hadii aad ku hadasho Soomaali, waaxda luqadaha, qaybta kaalmada adeegyada, elian chowdhury . So [facility] [phone].    ATENCIÓN: Si habla español, tiene a garcia disposición servicios gratuitos de asistencia lingüística. Llame al [phone].    We comply with applicable federal civil rights laws and [place] laws. We do not discriminate on the basis of race, color, national origin, age, disability, sex, sexual orientation, or gender identity.            Thank you!     Thank you for choosing [facility]  for your care. Our goal is always to provide you with excellent care. Hearing back from our patients is one way we can continue to improve our services. Please take a few minutes to complete the written survey that you may receive in the mail after your visit with us. Thank you!             Your Updated Medication List - Protect others around you: Learn how to safely use, store and throw away your medicines at www.disposemymeds.org.          This list is accurate as of 3/14/18  1:38 PM.  Always use your most recent med list.                   Brand Name Dispense Instructions for use Diagnosis    aspirin 81 MG EC tablet     90 tablet    Take 1 tablet (81  mg) by mouth daily        baclofen 10 MG tablet    LIORESAL    90 tablet    TAKE ONE TABLET BY MOUTH THREE TIMES DAILY    Chronic pain syndrome       citalopram 40 MG tablet    celeXA    90 tablet    Take 1 tablet (40 mg) by mouth daily    Anxiety state       DILT- MG 24 hr capsule   Generic drug:  diltiazem     90 capsule    TAKE ONE CAPSULE BY MOUTH ONE TIME DAILY    Essential hypertension with goal blood pressure less than 140/90       DOCUSATE SODIUM PO      Take 100 mg by mouth daily as needed        fluticasone 50 MCG/ACT spray    FLONASE    1 Bottle    Spray 1-2 sprays into both nostrils daily    Viral upper respiratory tract infection       HYDROcodone-acetaminophen  MG per tablet   Start taking on:  4/20/2018    NORCO    60 tablet    Take 1 tablet by mouth 2 times daily maximum 2 tablet(s) per day    Narcotic dependence (H), Chronic pain syndrome       ibuprofen 600 MG tablet    ADVIL/MOTRIN    90 tablet    Take 1 tablet (600 mg) by mouth every 6 hours as needed for moderate pain    Facial pain       lisinopril 20 MG tablet    PRINIVIL/ZESTRIL    90 tablet    TAKE ONE TABLET BY MOUTH ONE TIME DAILY    Essential hypertension with goal blood pressure less than 140/90       loratadine 10 MG tablet    CLARITIN     Take 10 mg by mouth daily as needed for allergies        ranitidine 150 MG tablet    ZANTAC    180 tablet    TAKE ONE TABLET BY MOUTH TWICE DAILY    Gastroesophageal reflux disease without esophagitis       valACYclovir 1000 mg tablet    VALTREX    21 tablet    As needed fo rbreak outs    Herpes zoster without complication       zolpidem 10 MG tablet    AMBIEN    30 tablet    TAKE 1/2 TO 1 TABLET BY MOUTH ONCE NIGHTLY AS NEEDED FOR SLEEP    Primary insomnia

## 2018-03-14 NOTE — PROGRESS NOTES
SUBJECTIVE:   Tracy Galloway is a 72 year old female who presents to clinic today for the following health issues:      Fatigue  Very tired after a bout of gastroenteritis       Duration: ongoing    Description (location/character/radiation): sleeps all the time    Intensity:  severe    Accompanying signs and symptoms: 8hr of sleep a night, 2 hour naps during the day    History (similar episodes/previous evaluation): None    Precipitating or alleviating factors: None    Therapies tried and outcome: None           Problem list and histories reviewed & adjusted, as indicated.  Additional history: as documented    Patient Active Problem List   Diagnosis     Anxiety state     Plantar fasciitis     Shingles     CARDIOVASCULAR SCREENING; LDL GOAL LESS THAN 130     Atrophic vaginitis     Chronic pain     Health Care Home     Essential hypertension with goal blood pressure less than 140/90     Narcotic dependence (H)     Prinzmetal angina (H)     Visual impairment     TIA (transient ischemic attack)     Confusion     Mammogram declined     Advanced directives, counseling/discussion     Primary insomnia     Past Surgical History:   Procedure Laterality Date     CHOLECYSTECTOMY, LAPOROSCOPIC  1990's    s/p Cholecystectomy, Laparoscopic     COLONOSCOPY  12/6/2012    Procedure: COLONOSCOPY;  Colonoscopy;  Surgeon: Tyrell Brown MD;  Location: [facility]     EYE SURGERY        COLONOSCOPY THRU STOMA, DIAGNOSTIC  3-    at outside clinic.  Normal.     HYSTERECTOMY, SUNDAR  1970's    severe endometriosis     SURGICAL HISTORY OF -   1998    breast reduction     SURGICAL HISTORY OF -   5/30/03    coronary angiogram     SURGICAL HISTORY OF -       Refractive surgery right     TONSILLECTOMY         Social History   Substance Use Topics     Smoking status: Former Smoker     Types: Cigarettes     Quit date: 10/30/1962     Smokeless tobacco: Never Used     Alcohol use 2.4 oz/week     4 Standard drinks or equivalent per week       Comment: occasional     Family History   Problem Relation Age of Onset     Hypertension Mother      HEART DISEASE Mother      Hypertension Father      Circulatory Father      PAD     Alzheimer Disease Maternal Grandmother      dementia     CEREBROVASCULAR DISEASE Maternal Grandmother      HEART DISEASE Maternal Grandfather      DIABETES Maternal Grandfather          Current Outpatient Prescriptions   Medication Sig Dispense Refill     baclofen (LIORESAL) 10 MG tablet TAKE ONE TABLET BY MOUTH THREE TIMES DAILY 90 tablet 2     zolpidem (AMBIEN) 10 MG tablet TAKE 1/2 TO 1 TABLET BY MOUTH ONCE NIGHTLY AS NEEDED FOR SLEEP 30 tablet 4     [START ON 4/20/2018] HYDROcodone-acetaminophen (NORCO)  MG per tablet Take 1 tablet by mouth 2 times daily maximum 2 tablet(s) per day 60 tablet 0     DILT- MG 24 hr capsule TAKE ONE CAPSULE BY MOUTH ONE TIME DAILY 90 capsule 0     ranitidine (ZANTAC) 150 MG tablet TAKE ONE TABLET BY MOUTH TWICE DAILY 180 tablet 1     lisinopril (PRINIVIL/ZESTRIL) 20 MG tablet TAKE ONE TABLET BY MOUTH ONE TIME DAILY 90 tablet 2     fluticasone (FLONASE) 50 MCG/ACT spray Spray 1-2 sprays into both nostrils daily 1 Bottle 11     citalopram (CELEXA) 40 MG tablet Take 1 tablet (40 mg) by mouth daily 90 tablet 3     aspirin 81 MG EC tablet Take 1 tablet (81 mg) by mouth daily 90 tablet 3     ibuprofen (ADVIL,MOTRIN) 600 MG tablet Take 1 tablet (600 mg) by mouth every 6 hours as needed for moderate pain 90 tablet 1     valACYclovir (VALTREX) 1000 mg tablet As needed fo rbreak outs 21 tablet 3     loratadine (CLARITIN) 10 MG tablet Take 10 mg by mouth daily as needed for allergies        DOCUSATE SODIUM PO Take 100 mg by mouth daily as needed        No Known Allergies    Reviewed and updated as needed this visit by clinical staff  Tobacco  Allergies  Med Hx  Surg Hx  Fam Hx  Soc Hx      Reviewed and updated as needed this visit by Provider         ROS:  CONSTITUTIONAL: NEGATIVE for fever,  chills, change in weight  ENT/MOUTH: NEGATIVE for ear, mouth and throat problems  RESP: NEGATIVE for significant cough or SOB  CV: NEGATIVE for chest pain, palpitations or peripheral edema    OBJECTIVE:     /60  Pulse 52  Temp 97  F (36.1  C) (Tympanic)  Resp 12  SpO2 98%  There is no height or weight on file to calculate BMI.  GENERAL: healthy, alert and no distress  NECK: no adenopathy, no asymmetry, masses, or scars and thyroid normal to palpation  RESP: lungs clear to auscultation - no rales, rhonchi or wheezes  CV: regular rate and rhythm, normal S1 S2, no S3 or S4, no murmur, click or rub, no peripheral edema and peripheral pulses strong  ABDOMEN: soft, nontender, no hepatosplenomegaly, no masses and bowel sounds normal  MS: no gross musculoskeletal defects noted, no edema        ASSESSMENT/PLAN:             1. Fatigue, unspecified type    - Comprehensive metabolic panel    ASSESSMENT/PLAN:      ICD-10-CM    1. Fatigue, unspecified type R53.83 Comprehensive metabolic panel       Patient Instructions   1. This looks like after effects from the gastroenteritis    2 lets check lab    3. I will call.  No treatment for now.          Angel Gurrola MD  [facility]

## 2018-03-14 NOTE — NURSING NOTE
"Chief Complaint   Patient presents with     Fatigue     all the time        Initial /60  Pulse 52  Temp 97  F (36.1  C) (Tympanic)  Resp 12  SpO2 98% Estimated body mass index is 23.82 kg/(m^2) as calculated from the following:    Height as of 5/26/17: 5' 0.5\" (1.537 m).    Weight as of 11/20/17: 124 lb (56.2 kg).      Health Maintenance that is potentially due pending provider review:  Drug screen    Possibly completing today per provider review.    Is there anyone who you would like to be able to receive your results? No  If yes have patient fill out JENA      "

## 2018-03-15 LAB
ALBUMIN SERPL-MCNC: 3.1 G/DL (ref 3.4–5)
ALP SERPL-CCNC: 50 U/L (ref 40–150)
ALT SERPL W P-5'-P-CCNC: 15 U/L (ref 0–50)
ANION GAP SERPL CALCULATED.3IONS-SCNC: 6 MMOL/L (ref 3–14)
AST SERPL W P-5'-P-CCNC: 16 U/L (ref 0–45)
BILIRUB SERPL-MCNC: 0.5 MG/DL (ref 0.2–1.3)
BUN SERPL-MCNC: 22 MG/DL (ref 7–30)
CALCIUM SERPL-MCNC: 8.3 MG/DL (ref 8.5–10.1)
CHLORIDE SERPL-SCNC: 105 MMOL/L (ref 94–109)
CO2 SERPL-SCNC: 27 MMOL/L (ref 20–32)
CREAT SERPL-MCNC: 0.85 MG/DL (ref 0.52–1.04)
GFR SERPL CREATININE-BSD FRML MDRD: 65 ML/MIN/1.7M2
GLUCOSE SERPL-MCNC: 153 MG/DL (ref 70–99)
POTASSIUM SERPL-SCNC: 5 MMOL/L (ref 3.4–5.3)
PROT SERPL-MCNC: 6.4 G/DL (ref 6.8–8.8)
SODIUM SERPL-SCNC: 138 MMOL/L (ref 133–144)

## 2018-03-17 DIAGNOSIS — K21.9 GASTROESOPHAGEAL REFLUX DISEASE WITHOUT ESOPHAGITIS: ICD-10-CM

## 2018-03-19 ENCOUNTER — ALLIED HEALTH/NURSE VISIT (OUTPATIENT)
Dept: FAMILY MEDICINE | Facility: CLINIC | Age: 73
End: 2018-03-19
Payer: COMMERCIAL

## 2018-03-19 DIAGNOSIS — R53.83 FATIGUE, UNSPECIFIED TYPE: Primary | ICD-10-CM

## 2018-03-19 PROCEDURE — 99207 ZZC NO CHARGE NURSE ONLY: CPT

## 2018-03-19 NOTE — PROGRESS NOTES
Pt was a walk in to clinic today, wanting to discuss CMP lab result from 3/14/18. Reassurance provided labs are not alarming.    States she is still fatigued. Taking 1/2 tab of Ambien and 1/2 -1 tab Baclofen every HS (10-10:30pm). She wakes self up in the morning (8am) to watch news with , then is tired an hour later.  Discussed trying to go to bed 1/2 hour earlier and not making self get up in the AM, see if this helps the fatigue.  Discussed she is still likely rebounding from recent illness.   In regards to lab states she eats enough protein. Does not have too much dairy because of her acid reflux and cannot do Calcium tablets due to the constipation s/e. Her BG was 153 (lab was drawn after she had lunch).       Dr. Gurrola,    Pt is requesting you correspond your recommendations, etc, via [employer].    Thank you,    Katarina BARRETT RN

## 2018-03-19 NOTE — MR AVS SNAPSHOT
After Visit Summary   3/19/2018    Tracy Galloway    MRN: [id number]           Patient Information     Date Of Birth          1945        Visit Information        Provider Department      3/19/2018 11:30 AM FL NB RN [facility]        Today's Diagnoses     Fatigue, unspecified type    -  1       Follow-ups after your visit        Your next 10 appointments already scheduled     May 22, 2018 11:20 AM CDT   SHORT with Gwendolyn Solis MD   [facility]    [address] [place] [address] [phone]              Who to contact     If you have questions or need follow up information about today's clinic visit or your schedule please contact [facility] directly at [phone].  Normal or non-critical lab and imaging results will be communicated to you by MyChart, letter or phone within 4 business days after the clinic has received the results. If you do not hear from us within 7 days, please contact the clinic through MyChart or phone. If you have a critical or abnormal lab result, we will notify you by phone as soon as possible.  Submit refill requests through [employer] or call your pharmacy and they will forward the refill request to us. Please allow 3 business days for your refill to be completed.          Additional Information About Your Visit        MyChart Information     [employer] gives you secure access to your electronic health record. If you see a primary care provider, you can also send messages to your care team and make appointments. If you have questions, please call your primary care clinic.  If you do not have a primary care provider, please call [phone] and they will assist you.        Care EveryWhere ID     This is your Care EveryWhere ID. This could be used by other organizations to access your [facility] medical records  [id number]         Blood Pressure from Last 3  Encounters:   03/14/18 109/60   02/20/18 138/66   11/20/17 100/60    Weight from Last 3 Encounters:   11/20/17 124 lb (56.2 kg)   08/22/17 124 lb (56.2 kg)   07/22/17 119 lb (54 kg)              Today, you had the following     No orders found for display       Primary Care Provider Office Phone # Fax #    Gwendolyn Solis [phone] [address] [place] [id number]        Equal Access to Services     [facility] : Hadii aad ku hadasho Soomaali, waaxda luqadaha, qaybta kaalmada adeegyada, elian chowdhury . So [facility] [phone].    ATENCIÓN: Si habla español, tiene a garcia disposición servicios gratuitos de asistencia lingüística. Llame al [phone].    We comply with applicable federal civil rights laws and [place] laws. We do not discriminate on the basis of race, color, national origin, age, disability, sex, sexual orientation, or gender identity.            Thank you!     Thank you for choosing [facility]  for your care. Our goal is always to provide you with excellent care. Hearing back from our patients is one way we can continue to improve our services. Please take a few minutes to complete the written survey that you may receive in the mail after your visit with us. Thank you!             Your Updated Medication List - Protect others around you: Learn how to safely use, store and throw away your medicines at www.disposemymeds.org.          This list is accurate as of 3/19/18 11:41 AM.  Always use your most recent med list.                   Brand Name Dispense Instructions for use Diagnosis    aspirin 81 MG EC tablet     90 tablet    Take 1 tablet (81 mg) by mouth daily        baclofen 10 MG tablet    LIORESAL    90 tablet    TAKE ONE TABLET BY MOUTH THREE TIMES DAILY    Chronic pain syndrome       citalopram 40 MG tablet    celeXA    90 tablet    Take 1 tablet (40 mg) by mouth daily    Anxiety state       DILT- MG 24 hr  capsule   Generic drug:  diltiazem     90 capsule    TAKE ONE CAPSULE BY MOUTH ONE TIME DAILY    Essential hypertension with goal blood pressure less than 140/90       DOCUSATE SODIUM PO      Take 100 mg by mouth daily as needed        fluticasone 50 MCG/ACT spray    FLONASE    1 Bottle    Spray 1-2 sprays into both nostrils daily    Viral upper respiratory tract infection       HYDROcodone-acetaminophen  MG per tablet   Start taking on:  4/20/2018    NORCO    60 tablet    Take 1 tablet by mouth 2 times daily maximum 2 tablet(s) per day    Narcotic dependence (H), Chronic pain syndrome       ibuprofen 600 MG tablet    ADVIL/MOTRIN    90 tablet    Take 1 tablet (600 mg) by mouth every 6 hours as needed for moderate pain    Facial pain       lisinopril 20 MG tablet    PRINIVIL/ZESTRIL    90 tablet    TAKE ONE TABLET BY MOUTH ONE TIME DAILY    Essential hypertension with goal blood pressure less than 140/90       loratadine 10 MG tablet    CLARITIN     Take 10 mg by mouth daily as needed for allergies        ranitidine 150 MG tablet    ZANTAC    180 tablet    TAKE ONE TABLET BY MOUTH TWICE DAILY    Gastroesophageal reflux disease without esophagitis       valACYclovir 1000 mg tablet    VALTREX    21 tablet    As needed fo rbreak outs    Herpes zoster without complication       zolpidem 10 MG tablet    AMBIEN    30 tablet    TAKE 1/2 TO 1 TABLET BY MOUTH ONCE NIGHTLY AS NEEDED FOR SLEEP    Primary insomnia

## 2018-04-24 DIAGNOSIS — I10 ESSENTIAL HYPERTENSION WITH GOAL BLOOD PRESSURE LESS THAN 140/90: ICD-10-CM

## 2018-04-24 RX ORDER — DILTIAZEM HYDROCHLORIDE 180 MG/1
CAPSULE, EXTENDED RELEASE ORAL
Qty: 90 CAPSULE | Refills: 1 | Status: SHIPPED | OUTPATIENT
Start: 2018-04-24 | End: 2018-10-17

## 2018-04-24 NOTE — TELEPHONE ENCOUNTER
"Requested Prescriptions   Pending Prescriptions Disp Refills     DILT- MG 24 hr capsule [Pharmacy Med Name: DILT-XR  MG   CAP APOT] 90 capsule 0     Sig: TAKE ONE CAPSULE BY MOUTH DAILY    Calcium Channel Blockers Protocol  Passed    4/24/2018  2:29 PM       Passed - Blood pressure under 140/90 in past 12 months    BP Readings from Last 3 Encounters:   03/14/18 109/60   02/20/18 138/66   11/20/17 100/60                Passed - Normal ALT in past 12 months    Recent Labs   Lab Test  03/14/18   1344   ALT  15            Passed - Recent (12 mo) or future (30 days) visit within the authorizing provider's specialty    Patient had office visit in the last 12 months or has a visit in the next 30 days with authorizing provider or within the authorizing provider's specialty.  See \"Patient Info\" tab in inbasket, or \"Choose Columns\" in Meds & Orders section of the refill encounter.           Passed - Patient is age 18 or older       Passed - No active pregnancy on record       Passed - Normal serum creatinine on file in past 12 months    Recent Labs   Lab Test  03/14/18   1344   CR  0.85            Passed - No positive pregnancy test in past 12 months        Last Written Prescription Date:  1/12/18  Last Fill Quantity: 90,  # refills: 0   Last office visit: 3/19/2018 with prescribing provider:     Future Office Visit:   Next 5 appointments (look out 90 days)     May 22, 2018 11:20 AM CDT   [facility]    [address] [place] [address] [phone]                   "

## 2018-04-26 ENCOUNTER — DOCUMENTATION ONLY (OUTPATIENT)
Dept: LAB | Facility: CLINIC | Age: 73
End: 2018-04-26

## 2018-04-26 DIAGNOSIS — R73.09 OTHER ABNORMAL GLUCOSE: Primary | ICD-10-CM

## 2018-04-27 DIAGNOSIS — R73.09 OTHER ABNORMAL GLUCOSE: ICD-10-CM

## 2018-04-27 LAB
GLUCOSE SERPL-MCNC: 83 MG/DL (ref 70–99)
HBA1C MFR BLD: 5.5 % (ref 0–5.6)

## 2018-04-27 PROCEDURE — 82947 ASSAY GLUCOSE BLOOD QUANT: CPT | Performed by: FAMILY MEDICINE

## 2018-04-27 PROCEDURE — 83036 HEMOGLOBIN GLYCOSYLATED A1C: CPT | Performed by: FAMILY MEDICINE

## 2018-04-27 PROCEDURE — 36415 COLL VENOUS BLD VENIPUNCTURE: CPT | Performed by: FAMILY MEDICINE

## 2018-04-28 DIAGNOSIS — F41.1 ANXIETY STATE: ICD-10-CM

## 2018-04-30 RX ORDER — CITALOPRAM HYDROBROMIDE 40 MG/1
TABLET ORAL
Qty: 90 TABLET | Refills: 2 | Status: SHIPPED | OUTPATIENT
Start: 2018-04-30 | End: 2018-11-16 | Stop reason: ALTCHOICE

## 2018-05-22 ENCOUNTER — OFFICE VISIT (OUTPATIENT)
Dept: FAMILY MEDICINE | Facility: CLINIC | Age: 73
End: 2018-05-22
Payer: COMMERCIAL

## 2018-05-22 VITALS
TEMPERATURE: 97.2 F | DIASTOLIC BLOOD PRESSURE: 50 MMHG | BODY MASS INDEX: 24.05 KG/M2 | WEIGHT: 125.2 LBS | SYSTOLIC BLOOD PRESSURE: 104 MMHG | HEART RATE: 80 BPM

## 2018-05-22 DIAGNOSIS — G89.4 CHRONIC PAIN SYNDROME: ICD-10-CM

## 2018-05-22 DIAGNOSIS — F11.20 NARCOTIC DEPENDENCE (H): Chronic | ICD-10-CM

## 2018-05-22 PROCEDURE — 99213 OFFICE O/P EST LOW 20 MIN: CPT | Performed by: FAMILY MEDICINE

## 2018-05-22 RX ORDER — HYDROCODONE BITARTRATE AND ACETAMINOPHEN 10; 325 MG/1; MG/1
1 TABLET ORAL 2 TIMES DAILY
Qty: 60 TABLET | Refills: 0 | Status: SHIPPED | OUTPATIENT
Start: 2018-06-22 | End: 2018-05-22

## 2018-05-22 RX ORDER — HYDROCODONE BITARTRATE AND ACETAMINOPHEN 10; 325 MG/1; MG/1
1 TABLET ORAL 2 TIMES DAILY
Qty: 60 TABLET | Refills: 0 | Status: SHIPPED | OUTPATIENT
Start: 2018-07-22 | End: 2018-08-20

## 2018-05-22 RX ORDER — HYDROCODONE BITARTRATE AND ACETAMINOPHEN 10; 325 MG/1; MG/1
1 TABLET ORAL 2 TIMES DAILY
Qty: 60 TABLET | Refills: 0 | Status: SHIPPED | OUTPATIENT
Start: 2018-05-22 | End: 2018-05-22

## 2018-05-22 NOTE — MR AVS SNAPSHOT
After Visit Summary   5/22/2018    Tracy Galloway    MRN: [id number]           Patient Information     Date Of Birth          1945        Visit Information        Provider Department      5/22/2018 11:20 AM Gwendolyn Solis MD [facility]        Today's Diagnoses     Chronic pain syndrome        Narcotic dependence (H)          Care Instructions    See me back in 6 months     Call in 3 months or email for refills           Follow-ups after your visit        Who to contact     If you have questions or need follow up information about today's clinic visit or your schedule please contact [facility] directly at [phone].  Normal or non-critical lab and imaging results will be communicated to you by travaylhart, letter or phone within 4 business days after the clinic has received the results. If you do not hear from us within 7 days, please contact the clinic through travaylhart or phone. If you have a critical or abnormal lab result, we will notify you by phone as soon as possible.  Submit refill requests through [employer] or call your pharmacy and they will forward the refill request to us. Please allow 3 business days for your refill to be completed.          Additional Information About Your Visit        MyChart Information     [employer] gives you secure access to your electronic health record. If you see a primary care provider, you can also send messages to your care team and make appointments. If you have questions, please call your primary care clinic.  If you do not have a primary care provider, please call [phone] and they will assist you.        Care EveryWhere ID     This is your Care EveryWhere ID. This could be used by other organizations to access your [facility] medical records  [id number]        Your Vitals Were     Pulse Temperature BMI (Body Mass Index)             80 97.2  F (36.2  C) (Tympanic) 24.05 kg/m2          Blood Pressure from Last 3  Encounters:   05/22/18 104/50   03/14/18 109/60   02/20/18 138/66    Weight from Last 3 Encounters:   05/22/18 125 lb 3.2 oz (56.8 kg)   11/20/17 124 lb (56.2 kg)   08/22/17 124 lb (56.2 kg)              Today, you had the following     No orders found for display         Today's Medication Changes          These changes are accurate as of 5/22/18 11:38 AM.  If you have any questions, ask your nurse or doctor.               Start taking these medicines.        Dose/Directions    HYDROcodone-acetaminophen  MG per tablet   Commonly known as:  NORCO   Used for:  Narcotic dependence (H), Chronic pain syndrome   Started by:  Gwendolyn Solis MD        Dose:  1 tablet   Start taking on:  7/22/2018   Take 1 tablet by mouth 2 times daily maximum 2 tablet(s) per day   Quantity:  60 tablet   Refills:  0            Where to get your medicines      Some of these will need a paper prescription and others can be bought over the counter.  Ask your nurse if you have questions.     Bring a paper prescription for each of these medications     HYDROcodone-acetaminophen  MG per tablet               Information about OPIOIDS     PRESCRIPTION OPIOIDS: WHAT YOU NEED TO KNOW   You have a prescription for an opioid (narcotic) pain medicine. Opioids can cause addiction. If you have a history of chemical dependency of any type, you are at a higher risk of becoming addicted to opioids. Only take this medicine after all other options have been tried. Take it for as short a time and as few doses as possible.     Do not:    Drive. If you drive while taking these medicines, you could be arrested for driving under the influence (DUI).    Operate heavy machinery    Do any other dangerous activities while taking these medicines.     Drink any alcohol while taking these medicines.      Take with any other medicines that contain acetaminophen. Read all labels carefully. Look for the word  acetaminophen  or  Tylenol.  Ask your  pharmacist if you have questions or are unsure.    Store your pills in a secure place, locked if possible. We will not replace any lost or stolen medicine. If you don t finish your medicine, please throw away (dispose) as directed by your pharmacist. The [place] [employer]ution [employer] has more information about safe disposal: https://www.pca.[facility].[place].us/living-green/managing-unwanted-medications    All opioids tend to cause constipation. Drink plenty of water and eat foods that have a lot of fiber, such as fruits, vegetables, prune juice, apple juice and high-fiber cereal. Take a laxative (Miralax, milk of magnesia, Colace, Senna) if you don t move your bowels at least every other day.          Primary Care Provider Office Phone # Fax #    Gwendolyn Solis [phone] [address] [address]        Equal Access to Services     MARY SUNG : Hadii aad ku hadasho Sorochelleali, waaxda luqadaha, qaybta kaalmada adeegyada, elian chowdhury . So [facility] [phone].    ATENCIÓN: Si habla español, tiene a garcia disposición servicios gratuitos de asistencia lingüística. Guyame al [phone].    We comply with applicable federal civil rights laws and [place] laws. We do not discriminate on the basis of race, color, national origin, age, disability, sex, sexual orientation, or gender identity.            Thank you!     Thank you for choosing [facility]  for your care. Our goal is always to provide you with excellent care. Hearing back from our patients is one way we can continue to improve our services. Please take a few minutes to complete the written survey that you may receive in the mail after your visit with us. Thank you!             Your Updated Medication List - Protect others around you: Learn how to safely use, store and throw away your medicines at www.disposemymeds.org.          This list is accurate as of 5/22/18 11:38 AM.  Always  use your most recent med list.                   Brand Name Dispense Instructions for use Diagnosis    aspirin 81 MG EC tablet     90 tablet    Take 1 tablet (81 mg) by mouth daily        baclofen 10 MG tablet    LIORESAL    90 tablet    TAKE ONE TABLET BY MOUTH THREE TIMES DAILY    Chronic pain syndrome       citalopram 40 MG tablet    celeXA    90 tablet    TAKE ONE TABLET BY MOUTH ONE TIME DAILY    Anxiety state       DILT- MG 24 hr capsule   Generic drug:  diltiazem     90 capsule    TAKE ONE CAPSULE BY MOUTH DAILY    Essential hypertension with goal blood pressure less than 140/90       DOCUSATE SODIUM PO      Take 100 mg by mouth daily as needed        fluticasone 50 MCG/ACT spray    FLONASE    1 Bottle    Spray 1-2 sprays into both nostrils daily    Viral upper respiratory tract infection       HYDROcodone-acetaminophen  MG per tablet   Start taking on:  7/22/2018    NORCO    60 tablet    Take 1 tablet by mouth 2 times daily maximum 2 tablet(s) per day    Narcotic dependence (H), Chronic pain syndrome       ibuprofen 600 MG tablet    ADVIL/MOTRIN    90 tablet    Take 1 tablet (600 mg) by mouth every 6 hours as needed for moderate pain    Facial pain       lisinopril 20 MG tablet    PRINIVIL/ZESTRIL    90 tablet    TAKE ONE TABLET BY MOUTH ONE TIME DAILY    Essential hypertension with goal blood pressure less than 140/90       loratadine 10 MG tablet    CLARITIN     Take 10 mg by mouth daily as needed for allergies        ranitidine 150 MG tablet    ZANTAC    180 tablet    TAKE ONE TABLET BY MOUTH TWICE DAILY    Gastroesophageal reflux disease without esophagitis       valACYclovir 1000 mg tablet    VALTREX    21 tablet    As needed fo rbreak outs    Herpes zoster without complication       zolpidem 10 MG tablet    AMBIEN    30 tablet    TAKE 1/2 TO 1 TABLET BY MOUTH ONCE NIGHTLY AS NEEDED FOR SLEEP    Primary insomnia

## 2018-05-22 NOTE — PROGRESS NOTES
SUBJECTIVE:   Tracy Galloway is a 73 year old female who presents to clinic today for the following health issues:      Chronic Pain Follow-Up       Type / Location of Pain: right side of face  Analgesia/pain control:       Recent changes:  same      Overall control: Comfortably manageable  Activity level/function:      Daily activities:  Able to do all daily activities    Work:  not applicable  Adverse effects:  No  Adherance    Taking medication as directed?  Yes    Participating in other treatments: no -   Risk Factors:    Sleep:  Good    Mood/anxiety:  controlled    Recent family or social stressors:  none noted    Other aggravating factors: none  PHQ-9 SCORE 7/7/2015 6/17/2016 7/18/2017   Total Score 2 - -   Total Score - 2 2     ROMAIN-7 SCORE 6/17/2016 5/26/2017 7/18/2017   Total Score - - -   Total Score 1 0 1     Encounter-Level CSA - 07/07/2015:          Controlled Substance Agreement - Scan on 7/10/2015  1:04 PM : CONTROLLED SUBSTANCE AGREEMENT (below)                Amount of exercise or physical activity: 4-5 days/week for an average of 45-60 minutes    Problems taking medications regularly: No    Medication side effects: none    Diet: regular (no restrictions)            Problem list and histories reviewed & adjusted, as indicated.  Additional history: as documented    Labs reviewed in EPIC    Reviewed and updated as needed this visit by clinical staff  Tobacco  Allergies  Meds  Problems  Med Hx  Surg Hx  Fam Hx  Soc Hx        Reviewed and updated as needed this visit by Provider  Allergies  Meds  Problems         ROS:  Constitutional, HEENT, cardiovascular, pulmonary, gi and gu systems are negative, except as otherwise noted.    OBJECTIVE:                                                    /50 (BP Location: Right arm, Patient Position: Chair, Cuff Size: Adult Large)  Pulse 80  Temp 97.2  F (36.2  C) (Tympanic)  Wt 125 lb 3.2 oz (56.8 kg)  BMI 24.05 kg/m2  Body mass index is 24.05  kg/(m^2).  GENERAL APPEARANCE: healthy, alert and no distress  PSYCH: mentation appears normal and affect normal/bright         ASSESSMENT/PLAN:                                                    1. Chronic pain syndrome  Stable no change in treatment plan.   - HYDROcodone-acetaminophen (NORCO)  MG per tablet; Take 1 tablet by mouth 2 times daily maximum 2 tablet(s) per day  Dispense: 60 tablet; Refill: 0    2. Narcotic dependence (H)  Stable no change in treatment plan.   - HYDROcodone-acetaminophen (NORCO)  MG per tablet; Take 1 tablet by mouth 2 times daily maximum 2 tablet(s) per day  Dispense: 60 tablet; Refill: 0      Patient Instructions   See me back in 6 months     Call in 3 months or email for refills     Risks, benefits, side effects and rationale for treatment plan fully discussed with the patient and understanding expressed.   Gwendolyn Solis MD  [facility]

## 2018-05-22 NOTE — NURSING NOTE
"Chief Complaint   Patient presents with     Pain       Initial /50 (BP Location: Right arm, Patient Position: Chair, Cuff Size: Adult Large)  Pulse 80  Temp 97.2  F (36.2  C) (Tympanic)  Wt 125 lb 3.2 oz (56.8 kg)  BMI 24.05 kg/m2 Estimated body mass index is 24.05 kg/(m^2) as calculated from the following:    Height as of 2/20/18: 5' 0.5\" (1.537 m).    Weight as of this encounter: 125 lb 3.2 oz (56.8 kg).      Health Maintenance that is potentially due pending provider review:  NONE    n/a    Is there anyone who you would like to be able to receive your results? No  If yes have patient fill out JENA    "

## 2018-08-20 ENCOUNTER — OFFICE VISIT (OUTPATIENT)
Dept: FAMILY MEDICINE | Facility: CLINIC | Age: 73
End: 2018-08-20
Payer: COMMERCIAL

## 2018-08-20 VITALS
BODY MASS INDEX: 23.68 KG/M2 | DIASTOLIC BLOOD PRESSURE: 76 MMHG | HEIGHT: 61 IN | SYSTOLIC BLOOD PRESSURE: 138 MMHG | OXYGEN SATURATION: 99 % | HEART RATE: 72 BPM | TEMPERATURE: 97.7 F | WEIGHT: 125.4 LBS

## 2018-08-20 DIAGNOSIS — F32.0 MILD SINGLE CURRENT EPISODE OF MAJOR DEPRESSIVE DISORDER (H): ICD-10-CM

## 2018-08-20 DIAGNOSIS — G89.4 CHRONIC PAIN SYNDROME: Primary | ICD-10-CM

## 2018-08-20 DIAGNOSIS — F11.20 NARCOTIC DEPENDENCE (H): Chronic | ICD-10-CM

## 2018-08-20 LAB

## 2018-08-20 PROCEDURE — 99214 OFFICE O/P EST MOD 30 MIN: CPT | Performed by: FAMILY MEDICINE

## 2018-08-20 PROCEDURE — 80306 DRUG TEST PRSMV INSTRMNT: CPT | Performed by: FAMILY MEDICINE

## 2018-08-20 RX ORDER — HYDROCODONE BITARTRATE AND ACETAMINOPHEN 10; 325 MG/1; MG/1
1 TABLET ORAL 2 TIMES DAILY
Qty: 60 TABLET | Refills: 0 | Status: SHIPPED | OUTPATIENT
Start: 2018-08-20 | End: 2018-08-20

## 2018-08-20 RX ORDER — BUPROPION HYDROCHLORIDE 150 MG/1
150 TABLET ORAL EVERY MORNING
Qty: 90 TABLET | Refills: 3 | Status: SHIPPED | OUTPATIENT
Start: 2018-08-20 | End: 2018-11-16 | Stop reason: ALTCHOICE

## 2018-08-20 RX ORDER — HYDROCODONE BITARTRATE AND ACETAMINOPHEN 10; 325 MG/1; MG/1
1 TABLET ORAL 2 TIMES DAILY
Qty: 60 TABLET | Refills: 0 | Status: SHIPPED | OUTPATIENT
Start: 2018-09-20 | End: 2018-08-20

## 2018-08-20 RX ORDER — HYDROCODONE BITARTRATE AND ACETAMINOPHEN 10; 325 MG/1; MG/1
1 TABLET ORAL 2 TIMES DAILY
Qty: 60 TABLET | Refills: 0 | Status: SHIPPED | OUTPATIENT
Start: 2018-10-20 | End: 2018-11-16

## 2018-08-20 NOTE — PATIENT INSTRUCTIONS
Medication refilled     I will send you information on a pain psychologist to see     wellbutrin is a new medication to start one in the am     Follow up with me in 1 month via [employer] or in the office is fine

## 2018-08-20 NOTE — NURSING NOTE
"Chief Complaint   Patient presents with     Pain       Initial /76 (BP Location: Right arm, Patient Position: Chair, Cuff Size: Adult Regular)  Pulse 72  Temp 97.7  F (36.5  C) (Tympanic)  Ht 5' 0.5\" (1.537 m)  Wt 125 lb 6.4 oz (56.9 kg)  SpO2 99%  Breastfeeding? No  BMI 24.09 kg/m2 Estimated body mass index is 24.09 kg/(m^2) as calculated from the following:    Height as of this encounter: 5' 0.5\" (1.537 m).    Weight as of this encounter: 125 lb 6.4 oz (56.9 kg).      Health Maintenance that is potentially due pending provider review:  NONE    n/a    Is there anyone who you would like to be able to receive your results? No  If yes have patient fill out JENA    "

## 2018-08-20 NOTE — MR AVS SNAPSHOT
After Visit Summary   8/20/2018    Tracy Galloway    MRN: [id number]           Patient Information     Date Of Birth          1945        Visit Information        Provider Department      8/20/2018 11:00 AM Gwendolyn Solis MD [facility]        Today's Diagnoses     Chronic pain syndrome    -  1    Narcotic dependence (H)        Mild single current episode of major depressive disorder (H)          Care Instructions    Medication refilled     I will send you information on a pain psychologist to see     wellbutrin is a new medication to start one in the am     Follow up with me in 1 month via [employer] or in the office is fine           Follow-ups after your visit        Who to contact     If you have questions or need follow up information about today's clinic visit or your schedule please contact [facility] directly at [phone].  Normal or non-critical lab and imaging results will be communicated to you by [employer], letter or phone within 4 business days after the clinic has received the results. If you do not hear from us within 7 days, please contact the clinic through [employer] or phone. If you have a critical or abnormal lab result, we will notify you by phone as soon as possible.  Submit refill requests through [employer] or call your pharmacy and they will forward the refill request to us. Please allow 3 business days for your refill to be completed.          Additional Information About Your Visit        Localist[employer]RoomReveal Information     [employer] gives you secure access to your electronic health record. If you see a primary care provider, you can also send messages to your care team and make appointments. If you have questions, please call your primary care clinic.  If you do not have a primary care provider, please call [phone] and they will assist you.        Care EveryWhere ID     This is your Care EveryWhere ID. This could be used by other  "organizations to access your [place] medical records  [id number]        Your Vitals Were     Pulse Temperature Height Pulse Oximetry Breastfeeding? BMI (Body Mass Index)    72 97.7  F (36.5  C) (Tympanic) 5' 0.5\" (1.537 m) 99% No 24.09 kg/m2       Blood Pressure from Last 3 Encounters:   08/20/18 138/76   05/22/18 104/50   03/14/18 109/60    Weight from Last 3 Encounters:   08/20/18 125 lb 6.4 oz (56.9 kg)   05/22/18 125 lb 3.2 oz (56.8 kg)   11/20/17 124 lb (56.2 kg)              We Performed the Following     Drug Abuse Screen Panel 13, Urine (Pain Care Package)          Today's Medication Changes          These changes are accurate as of 8/20/18 11:30 AM.  If you have any questions, ask your nurse or doctor.               Start taking these medicines.        Dose/Directions    buPROPion 150 MG 24 hr tablet   Commonly known as:  WELLBUTRIN XL   Used for:  Mild single current episode of major depressive disorder (H)   Started by:  Gwendolyn Solis MD        Dose:  150 mg   Take 1 tablet (150 mg) by mouth every morning   Quantity:  90 tablet   Refills:  3       HYDROcodone-acetaminophen  MG per tablet   Commonly known as:  NORCO   Used for:  Narcotic dependence (H), Chronic pain syndrome   Started by:  Gwendolyn Solis MD        Dose:  1 tablet   Start taking on:  10/20/2018   Take 1 tablet by mouth 2 times daily maximum 2 tablet(s) per day   Quantity:  60 tablet   Refills:  0            Where to get your medicines      These medications were sent to [id number] [address]  [address] [address]    Hours:  Closed 10-16-08 business to [facility] Phone:  [phone]     buPROPion 150 MG 24 hr tablet         Some of these will need a paper prescription and others can be bought over the counter.  Ask your nurse if you have questions.     Bring a paper prescription for each of these medications     HYDROcodone-acetaminophen  MG per " tablet               Information about OPIOIDS     PRESCRIPTION OPIOIDS: WHAT YOU NEED TO KNOW   We gave you an opioid (narcotic) pain medicine. It is important to manage your pain, but opioids are not always the best choice. You should first try all the other options your care team gave you. Take this medicine for as short a time (and as few doses) as possible.    Some activities can increase your pain, such as bandage changes or therapy sessions. It may help to take your pain medicine 30 to 60 minutes before these activities. Reduce your stress by getting enough sleep, working on hobbies you enjoy and practicing relaxation or meditation. Talk to your care team about ways to manage your pain beyond prescription opioids.    These medicines have risks:    DO NOT drive when on new or higher doses of pain medicine. These medicines can affect your alertness and reaction times, and you could be arrested for driving under the influence (DUI). If you need to use opioids long-term, talk to your care team about driving.    DO NOT operate heavy machinery    DO NOT do any other dangerous activities while taking these medicines.    DO NOT drink any alcohol while taking these medicines.     If the opioid prescribed includes acetaminophen, DO NOT take with any other medicines that contain acetaminophen. Read all labels carefully. Look for the word  acetaminophen  or  Tylenol.  Ask your pharmacist if you have questions or are unsure.    You can get addicted to pain medicines, especially if you have a history of addiction (chemical, alcohol or substance dependence). Talk to your care team about ways to reduce this risk.    All opioids tend to cause constipation. Drink plenty of water and eat foods that have a lot of fiber, such as fruits, vegetables, prune juice, apple juice and high-fiber cereal. Take a laxative (Miralax, milk of magnesia, Colace, Senna) if you don t move your bowels at least every other day. Other side effects  include upset stomach, sleepiness, dizziness, throwing up, tolerance (needing more of the medicine to have the same effect), physical dependence and slowed breathing.    Store your pills in a secure place, locked if possible. We will not replace any lost or stolen medicine. If you don t finish your medicine, please throw away (dispose) as directed by your pharmacist. The [place] Pollution Control Agency has more information about safe disposal: [email or web address]://[email or web address]/[email or web address]-medications         Primary Care Provider Office Phone # Fax #    Gwendolyn Solis [phone] [address] [address]        Equal Access to Services     MARY SUNG : Germaine De, tim verma, sergei matthews, elian cote. So [facility] [phone].    ATENCIÓN: Si habla español, tiene a garcia disposición servicios gratuitos de asistencia lingüística. Llame al [phone].    We comply with applicable federal civil rights laws and [place] laws. We do not discriminate on the basis of race, color, national origin, age, disability, sex, sexual orientation, or gender identity.            Thank you!     Thank you for choosing [facility]  for your care. Our goal is always to provide you with excellent care. Hearing back from our patients is one way we can continue to improve our services. Please take a few minutes to complete the written survey that you may receive in the mail after your visit with us. Thank you!             Your Updated Medication List - Protect others around you: Learn how to safely use, store and throw away your medicines at www.disposemymeds.org.          This list is accurate as of 8/20/18 11:30 AM.  Always use your most recent med list.                   Brand Name Dispense Instructions for use Diagnosis    aspirin 81 MG EC tablet     90 tablet    Take 1 tablet (81 mg) by mouth  daily        baclofen 10 MG tablet    LIORESAL    90 tablet    TAKE ONE TABLET BY MOUTH THREE TIMES DAILY    Chronic pain syndrome       buPROPion 150 MG 24 hr tablet    WELLBUTRIN XL    90 tablet    Take 1 tablet (150 mg) by mouth every morning    Mild single current episode of major depressive disorder (H)       citalopram 40 MG tablet    celeXA    90 tablet    TAKE ONE TABLET BY MOUTH ONE TIME DAILY    Anxiety state       DILT- MG 24 hr capsule   Generic drug:  diltiazem     90 capsule    TAKE ONE CAPSULE BY MOUTH DAILY    Essential hypertension with goal blood pressure less than 140/90       DOCUSATE SODIUM PO      Take 100 mg by mouth daily as needed        fluticasone 50 MCG/ACT spray    FLONASE    1 Bottle    Spray 1-2 sprays into both nostrils daily    Viral upper respiratory tract infection       HYDROcodone-acetaminophen  MG per tablet   Start taking on:  10/20/2018    NORCO    60 tablet    Take 1 tablet by mouth 2 times daily maximum 2 tablet(s) per day    Narcotic dependence (H), Chronic pain syndrome       ibuprofen 600 MG tablet    ADVIL/MOTRIN    90 tablet    Take 1 tablet (600 mg) by mouth every 6 hours as needed for moderate pain    Facial pain       lisinopril 20 MG tablet    PRINIVIL/ZESTRIL    90 tablet    TAKE ONE TABLET BY MOUTH ONE TIME DAILY    Essential hypertension with goal blood pressure less than 140/90       loratadine 10 MG tablet    CLARITIN     Take 10 mg by mouth daily as needed for allergies        ranitidine 150 MG tablet    ZANTAC    180 tablet    TAKE ONE TABLET BY MOUTH TWICE DAILY    Gastroesophageal reflux disease without esophagitis       valACYclovir 1000 mg tablet    VALTREX    21 tablet    As needed fo rbreak outs    Herpes zoster without complication       zolpidem 10 MG tablet    AMBIEN    30 tablet    TAKE 1/2 TO 1 TABLET BY MOUTH ONCE NIGHTLY AS NEEDED FOR SLEEP    Primary insomnia

## 2018-08-20 NOTE — LETTER
[facility]    08/20/18    Patient: Tracy Galloway  YOB: 1945  Medical Record Number: [id number]                                                                  Controlled Substance Agreement  I understand that my care provider has prescribed controlled substances (narcotics, tranquilizers, and/or stimulants) to help manage my condition(s).  I am taking this medicine to help me function or work.  I know that this is strong medicine.  It could have serious side effects and even cause a dependency on the drug.  If I stop these medicines suddenly, I could have severe withdrawal symptoms.    The risks, benefits, and side effects of these medication(s) were explained to me.  I agree that:  1. I will take part in other treatments as advised by my provider.  This may be psychiatry or counseling, physical therapy, behavioral therapy, group treatment, or a referral to a pain clinic.  I will reduce or stop my medicine when my provider tells me to do so.   2. I will take my medicines as prescribed.  I will not change the dose or schedule unless my provider tells me to.  There will be no refills if I  run out early.   I may be contacted at any time without warning and asked to complete a drug test or pill count.   3. I will keep all my appointments at the clinic.  If I miss appointments or fail to follow instructions, my provider may stop my medicine.  4. I will not ask other providers to prescribe controlled substances. And I will not accept controlled substances from other people. If I need another prescribed controlled substance for a new reason, I will notify my provider within one business day.  5. If I enroll in the [place] Medical Marijuana program, I will tell my provider.  I will also sign an agreement to share my medical records with my provider.  6. I will use one pharmacy to fill all of my controlled substance prescriptions.  If my prescription is mailed to my pharmacy, it may take  5 to 7 days for my medicine to be ready.  7. I understand that my provider, clinic care team, and pharmacy can track controlled substance prescriptions from other providers through a central database (prescription monitoring program).  8. I will bring in my list of medications (or my medicine bottles) each time I come to the clinic.  [id number] REV-  07/2018                                                                                                                                   Page 1 of 2      [facility]    08/20/18    Patient: Tracy Galloway  YOB: 1945  Medical Record Number: [id number]    9. Refills of controlled substances will be made only during office hours.  It is up to me to make sure that I do not run out of my medicines on weekends or holidays.    10. I am responsible for my prescriptions.  If the medicine/prescription is lost or stolen, it will not be replaced.   I also agree not to share these medicines with anyone.  11. I agree to not use ANY illegal or recreational drugs.  This includes marijuana, cocaine, bath salts or other drugs.  I agree not to use alcohol unless my provider says I may.  I agree to give urine samples whenever asked.  If I fail to give a urine sample, the provider may stop my medicine.     12. I will tell my nurse or provider right away if I become pregnant or have a new medical problem treated outside of [facility].  13. I understand that this medicine can affect my thinking and judgment.  It may be unsafe for me to drive, use machinery and do dangerous tasks.  I will not do any of these things until I know how the medicine affects me.  If my dose changes, I will wait to see how it affects me.  I will contact my provider if I have concerns about medicine side effects.  I understand that if I do not follow any of the conditions above, my prescriptions or treatment may be stopped.    I agree that my provider, clinic care team, and pharmacy  may work with any city, state or federal law enforcement agency that investigates the misuse, sale, or other diversion of my controlled medicine. I will allow my provider to discuss my care with or share a copy of this agreement with any other treating provider, pharmacy or emergency room where I receive care.  I agree to give up (waive) any right of privacy or confidentiality with respect to these authorizations.   I have read this agreement and have asked questions about anything I did not understand.   ___________________________________    ___________________________  Patient Signature                                                           Date and Time  ___________________________________     ____________________________  Witness                                                                            Date and Time  ___________________________________  Gwendolyn Solis MD  [id number] REV-  07/2018                                                                                                                                                   Page 2 of 2

## 2018-08-20 NOTE — PROGRESS NOTES
SUBJECTIVE:   Tracy Galloway is a 73 year old female who presents to clinic today for the following health issues:      Chronic Pain Follow-Up       Type / Location of Pain: facial  Analgesia/pain control:       Recent changes:  worse      Overall control: Inadequate pain control  Activity level/function:      Daily activities:  Able to do all daily activities    Work:  not applicable  Adverse effects:  No  Adherance    Taking medication as directed?  Yes    Participating in other treatments: no -   Risk Factors:    Sleep:  Good    Mood/anxiety:  worsened    Recent family or social stressors:  none noted    Other aggravating factors: none  PHQ-9 SCORE 6/17/2016 7/18/2017 8/20/2018   Total Score - - -   Total Score 2 2 5     ROMAIN-7 SCORE 6/17/2016 5/26/2017 7/18/2017   Total Score - - -   Total Score 1 0 1     Encounter-Level CSA - 07/07/2015:          Controlled Substance Agreement - Scan on 7/10/2015  1:04 PM : CONTROLLED SUBSTANCE AGREEMENT (below)                Amount of exercise or physical activity: 4-5 days/week for an average of 45-60 minutes    Problems taking medications regularly: No    Medication side effects: none    Diet: regular (no restrictions)        Depression Followup    Status since last visit: Worsened seems to be more depressed lately     No motivation     No energy     Not wanting to get out of bed     She is tearful all the time    She puts up a front and does not talk to her  about this        She suffers with chronic pain and is so frustrated that she never feels good     She has tried everything and nothing has helped      See PHQ-9 for current symptoms.  Other associated symptoms: None    Complicating factors:   Significant life event:  No   Current substance abuse:  None  Anxiety or Panic symptoms:  No    PHQ-9 5/26/2017 7/18/2017 8/20/2018   Total Score - 2 5   Q9: Suicide Ideation Not at all Not at all Not at all     In the past two weeks have you had thoughts of suicide or  "self-harm?  No.    Do you have concerns about your personal safety or the safety of others?   No  PHQ-9  English  PHQ-9   Any Language  Suicide Assessment Five-step Evaluation and Treatment (SAFE-T)    Problem list and histories reviewed & adjusted, as indicated.  Additional history: as documented    Labs reviewed in EPIC    Reviewed and updated as needed this visit by clinical staff  Tobacco  Allergies  Meds  Problems  Med Hx  Surg Hx  Fam Hx  Soc Hx        Reviewed and updated as needed this visit by Provider  Allergies  Meds  Problems         ROS:  Constitutional, HEENT, cardiovascular, pulmonary, gi and gu systems are negative, except as otherwise noted.    OBJECTIVE:                                                    /76 (BP Location: Right arm, Patient Position: Chair, Cuff Size: Adult Regular)  Pulse 72  Temp 97.7  F (36.5  C) (Tympanic)  Ht 5' 0.5\" (1.537 m)  Wt 125 lb 6.4 oz (56.9 kg)  SpO2 99%  Breastfeeding? No  BMI 24.09 kg/m2  Body mass index is 24.09 kg/(m^2).  GENERAL APPEARANCE: healthy, alert and no distress  RESP: lungs clear to auscultation - no rales, rhonchi or wheezes  CV: regular rates and rhythm, normal S1 S2, no S3 or S4 and no murmur, click or rub  PSYCH: mentation appears normal and crying         ASSESSMENT/PLAN:                                                    1. Chronic pain syndrome  Fair control   - Drug Abuse Screen Panel 13, Urine (Pain Care Package)  - HYDROcodone-acetaminophen (NORCO)  MG per tablet; Take 1 tablet by mouth 2 times daily maximum 2 tablet(s) per day  Dispense: 60 tablet; Refill: 0  - MENTAL HEALTH REFERRAL  - Adult; Outpatient Treatment; Individual/Couples/Family/Group Therapy/Health Psychology[place] [facility] [place] [facility] [phone]; We will contact you to schedule the appointment or please call with any questions    2. Narcotic dependence (H)    - HYDROcodone-acetaminophen (NORCO)  MG per tablet; Take 1 tablet by mouth " 2 times daily maximum 2 tablet(s) per day  Dispense: 60 tablet; Refill: 0  - MENTAL HEALTH REFERRAL  - Adult; Outpatient Treatment; Individual/Couples/Family/Group Therapy/Health Psychology; [facility] [place]: [facility] [phone]; We will contact you to schedule the appointment or please call with any questions    3. Mild single current episode of major depressive disorder (H)  Not well controlled at all   - buPROPion (WELLBUTRIN XL) 150 MG 24 hr tablet; Take 1 tablet (150 mg) by mouth every morning  Dispense: 90 tablet; Refill: 3  - MENTAL HEALTH REFERRAL  - Adult; Outpatient Treatment; Individual/Couples/Family/Group Therapy/Health Psychology; [facility]: [facility] [phone]; We will contact you to schedule the appointment or please call with any questions      Patient Instructions   Medication refilled     I will send you information on a pain psychologist to see     wellbutrin is a new medication to start one in the am     [facility] or in the office is fine     Risks, benefits, side effects and rationale for treatment plan fully discussed with the patient and understanding expressed.   Gwendolyn Solis MD  [facility]

## 2018-08-21 ASSESSMENT — PATIENT HEALTH QUESTIONNAIRE - PHQ9: SUM OF ALL RESPONSES TO PHQ QUESTIONS 1-9: 5

## 2018-09-04 DIAGNOSIS — F51.01 PRIMARY INSOMNIA: Chronic | ICD-10-CM

## 2018-09-04 NOTE — TELEPHONE ENCOUNTER
zolpidem (AMBIEN) 10 MG tablet      Last Written Prescription Date:  2/23/18  Last Fill Quantity: 30,   # refills: 4  Last Office Visit: 8/20/18  Future Office visit:    Next 5 appointments (look out 90 days)     Nov 16, 2018 12:40 PM CST   SHORT with Gwendolyn Solis MD   [facility] ([facility])    [address] [place] [id number]   [phone]                   Routing refill request to provider for review/approval because:  Drug not on the [facility], [facility] or [facility] refill protocol or controlled substance

## 2018-09-05 RX ORDER — ZOLPIDEM TARTRATE 10 MG/1
TABLET ORAL
Qty: 30 TABLET | Refills: 3 | Status: SHIPPED | OUTPATIENT
Start: 2018-09-05 | End: 2019-01-17

## 2018-10-03 ENCOUNTER — ALLIED HEALTH/NURSE VISIT (OUTPATIENT)
Dept: FAMILY MEDICINE | Facility: CLINIC | Age: 73
End: 2018-10-03
Payer: COMMERCIAL

## 2018-10-03 DIAGNOSIS — Z23 NEED FOR PROPHYLACTIC VACCINATION AND INOCULATION AGAINST INFLUENZA: Primary | ICD-10-CM

## 2018-10-03 PROCEDURE — 90662 IIV NO PRSV INCREASED AG IM: CPT

## 2018-10-03 PROCEDURE — G0008 ADMIN INFLUENZA VIRUS VAC: HCPCS

## 2018-10-03 NOTE — PROGRESS NOTES
Injectable Influenza Immunization Documentation    1.  Is the person to be vaccinated sick today?   No    2. Does the person to be vaccinated have an allergy to a component   of the vaccine?   No  Egg Allergy Algorithm Link    3. Has the person to be vaccinated ever had a serious reaction   to influenza vaccine in the past?   No    4. Has the person to be vaccinated ever had Guillain-Barré syndrome?   No    Form completed by Delia Garvey CMA  Prior to injection verified patient identity using patient's name and date of birth.  Due to injection administration, patient instructed to remain in clinic for 15 minutes  afterwards, and to report any adverse reaction to me immediately.

## 2018-10-03 NOTE — MR AVS SNAPSHOT
After Visit Summary   10/3/2018    Tracy Galloway    MRN: [id number]           Patient Information     Date Of Birth          1945        Visit Information        Provider Department      10/3/2018 11:30 AM FL [facility]        Today's Diagnoses     Need for prophylactic vaccination and inoculation against influenza    -  1       Follow-ups after your visit        Your next 10 appointments already scheduled     Nov 16, 2018 12:40 PM CST   SHORT with Gwendolyn Solis MD   [facility]    [address] [place] [address] [phone]              Who to contact     If you have questions or need follow up information about today's clinic visit or your schedule please contact [facility] directly at [phone].  Normal or non-critical lab and imaging results will be communicated to you by MyChart, letter or phone within 4 business days after the clinic has received the results. If you do not hear from us within 7 days, please contact the clinic through MyChart or phone. If you have a critical or abnormal lab result, we will notify you by phone as soon as possible.  Submit refill requests through [employer] or call your pharmacy and they will forward the refill request to us. Please allow 3 business days for your refill to be completed.          Additional Information About Your Visit        MyChart Information     [employer] gives you secure access to your electronic health record. If you see a primary care provider, you can also send messages to your care team and make appointments. If you have questions, please call your primary care clinic.  If you do not have a primary care provider, please call [phone] and they will assist you.        Care EveryWhere ID     This is your Care EveryWhere ID. This could be used by other organizations to access your [facility]  records  [id number]         Blood Pressure from Last 3 Encounters:   08/20/18 138/76   05/22/18 104/50   03/14/18 109/60    Weight from Last 3 Encounters:   08/20/18 125 lb 6.4 oz (56.9 kg)   05/22/18 125 lb 3.2 oz (56.8 kg)   11/20/17 124 lb (56.2 kg)              We Performed the Following     ADMIN INFLUENZA (For MEDICARE Patients ONLY) []     FLU VACCINE, INCREASED ANTIGEN, PRESV FREE, AGE 65+ [80851]        Primary Care Provider Office Phone # Fax #    Gwendolyn Solis [phone] [address] [place] [address]        Equal Access to Services     MARY SUNG : Hadii zay krishnao Soidalmis, waaxda luqadaha, qaybta kaalmada adeegyabrianne, elian cote. So [facility] [phone].    ATENCIÓN: Si habla español, tiene a garcia disposición servicios gratuitos de asistencia lingüística. [facility] [phone].    We comply with applicable federal civil rights laws and [place] laws. We do not discriminate on the basis of race, color, national origin, age, disability, sex, sexual orientation, or gender identity.            Thank you!     Thank you for choosing [facility]  for your care. Our goal is always to provide you with excellent care. Hearing back from our patients is one way we can continue to improve our services. Please take a few minutes to complete the written survey that you may receive in the mail after your visit with us. Thank you!             Your Updated Medication List - Protect others around you: Learn how to safely use, store and throw away your medicines at www.disposemymeds.org.          This list is accurate as of 10/3/18 12:10 PM.  Always use your most recent med list.                   Brand Name Dispense Instructions for use Diagnosis    aspirin 81 MG EC tablet     90 tablet    Take 1 tablet (81 mg) by mouth daily        baclofen 10 MG tablet    LIORESAL    90 tablet    TAKE ONE TABLET BY MOUTH THREE TIMES DAILY     Chronic pain syndrome       buPROPion 150 MG 24 hr tablet    WELLBUTRIN XL    90 tablet    Take 1 tablet (150 mg) by mouth every morning    Mild single current episode of major depressive disorder (H)       citalopram 40 MG tablet    celeXA    90 tablet    TAKE ONE TABLET BY MOUTH ONE TIME DAILY    Anxiety state       DILT- MG 24 hr capsule   Generic drug:  diltiazem     90 capsule    TAKE ONE CAPSULE BY MOUTH DAILY    Essential hypertension with goal blood pressure less than 140/90       DOCUSATE SODIUM PO      Take 100 mg by mouth daily as needed        fluticasone 50 MCG/ACT spray    FLONASE    1 Bottle    Spray 1-2 sprays into both nostrils daily    Viral upper respiratory tract infection       HYDROcodone-acetaminophen  MG per tablet   Start taking on:  10/20/2018    NORCO    60 tablet    Take 1 tablet by mouth 2 times daily maximum 2 tablet(s) per day    Narcotic dependence (H), Chronic pain syndrome       ibuprofen 600 MG tablet    ADVIL/MOTRIN    90 tablet    Take 1 tablet (600 mg) by mouth every 6 hours as needed for moderate pain    Facial pain       lisinopril 20 MG tablet    PRINIVIL/ZESTRIL    90 tablet    TAKE ONE TABLET BY MOUTH ONE TIME DAILY    Essential hypertension with goal blood pressure less than 140/90       loratadine 10 MG tablet    CLARITIN     Take 10 mg by mouth daily as needed for allergies        ranitidine 150 MG tablet    ZANTAC    180 tablet    TAKE ONE TABLET BY MOUTH TWICE DAILY    Gastroesophageal reflux disease without esophagitis       valACYclovir 1000 mg tablet    VALTREX    21 tablet    As needed fo rbreak outs    Herpes zoster without complication       zolpidem 10 MG tablet    AMBIEN    30 tablet    TAKE 1/2 TO 1 TABLET BY MOUTH ONCE NIGHTLY AS NEEDED FOR SLEEP    Primary insomnia

## 2018-10-05 DIAGNOSIS — K21.9 GASTROESOPHAGEAL REFLUX DISEASE WITHOUT ESOPHAGITIS: ICD-10-CM

## 2018-10-17 DIAGNOSIS — I10 ESSENTIAL HYPERTENSION WITH GOAL BLOOD PRESSURE LESS THAN 140/90: ICD-10-CM

## 2018-10-17 RX ORDER — DILTIAZEM HYDROCHLORIDE 180 MG/1
CAPSULE, EXTENDED RELEASE ORAL
Qty: 90 CAPSULE | Refills: 1 | Status: SHIPPED | OUTPATIENT
Start: 2018-10-17 | End: 2019-05-12

## 2018-10-17 NOTE — TELEPHONE ENCOUNTER
"Requested Prescriptions   Pending Prescriptions Disp Refills     DILT- MG 24 hr capsule [Pharmacy Med Name: DILT-XR  MG   CAP APOT] 90 capsule 0     Sig: TAKE ONE CAPSULE BY MOUTH ONE TIME DAILY    Calcium Channel Blockers Protocol  Passed    10/17/2018  1:15 PM       Passed - Blood pressure under 140/90 in past 12 months    BP Readings from Last 3 Encounters:   08/20/18 138/76   05/22/18 104/50   03/14/18 109/60                Passed - Normal ALT in past 12 months    Recent Labs   Lab Test  03/14/18   1344   ALT  15            Passed - Recent (12 mo) or future (30 days) visit within the authorizing provider's specialty    Patient had office visit in the last 12 months or has a visit in the next 30 days with authorizing provider or within the authorizing provider's specialty.  See \"Patient Info\" tab in inbasket, or \"Choose Columns\" in Meds & Orders section of the refill encounter.             Passed - Patient is age 18 or older       Passed - No active pregnancy on record       Passed - Normal serum creatinine on file in past 12 months    Recent Labs   Lab Test  03/14/18   1344   CR  0.85            Passed - No positive pregnancy test in past 12 months        Last Written Prescription Date:  4/24/18  Last Fill Quantity: 90,  # refills: 1   Last office visit: 10/3/2018 with prescribing provider:  LORETA   Future Office Visit:   Next 5 appointments (look out 90 days)     Nov 16, 2018 12:40 PM CST   SHORT with Gwendolyn Solis MD   [facility]    [address] [place] [address] [phone]                   "

## 2018-11-16 ENCOUNTER — OFFICE VISIT (OUTPATIENT)
Dept: FAMILY MEDICINE | Facility: CLINIC | Age: 73
End: 2018-11-16
Payer: COMMERCIAL

## 2018-11-16 VITALS
TEMPERATURE: 97 F | OXYGEN SATURATION: 99 % | SYSTOLIC BLOOD PRESSURE: 148 MMHG | HEART RATE: 58 BPM | DIASTOLIC BLOOD PRESSURE: 60 MMHG

## 2018-11-16 DIAGNOSIS — F11.20 NARCOTIC DEPENDENCE (H): Chronic | ICD-10-CM

## 2018-11-16 DIAGNOSIS — F41.1 ANXIETY STATE: Chronic | ICD-10-CM

## 2018-11-16 DIAGNOSIS — M26.609 TMJ (TEMPOROMANDIBULAR JOINT SYNDROME): Primary | ICD-10-CM

## 2018-11-16 DIAGNOSIS — G89.4 CHRONIC PAIN SYNDROME: Chronic | ICD-10-CM

## 2018-11-16 PROCEDURE — 99214 OFFICE O/P EST MOD 30 MIN: CPT | Performed by: FAMILY MEDICINE

## 2018-11-16 RX ORDER — PAROXETINE 20 MG/1
20 TABLET, FILM COATED ORAL AT BEDTIME
Qty: 90 TABLET | Refills: 1 | Status: SHIPPED | OUTPATIENT
Start: 2018-11-16 | End: 2019-02-15

## 2018-11-16 RX ORDER — HYDROCODONE BITARTRATE AND ACETAMINOPHEN 10; 325 MG/1; MG/1
1 TABLET ORAL 2 TIMES DAILY
Qty: 60 TABLET | Refills: 0 | Status: SHIPPED | OUTPATIENT
Start: 2018-12-16 | End: 2018-11-16

## 2018-11-16 RX ORDER — HYDROCODONE BITARTRATE AND ACETAMINOPHEN 10; 325 MG/1; MG/1
1 TABLET ORAL 2 TIMES DAILY
Qty: 60 TABLET | Refills: 0 | Status: SHIPPED | OUTPATIENT
Start: 2019-01-16 | End: 2019-02-15

## 2018-11-16 RX ORDER — HYDROCODONE BITARTRATE AND ACETAMINOPHEN 10; 325 MG/1; MG/1
1 TABLET ORAL 2 TIMES DAILY
Qty: 60 TABLET | Refills: 0 | Status: SHIPPED | OUTPATIENT
Start: 2018-11-16 | End: 2018-11-16

## 2018-11-16 ASSESSMENT — ANXIETY QUESTIONNAIRES
7. FEELING AFRAID AS IF SOMETHING AWFUL MIGHT HAPPEN: NOT AT ALL
1. FEELING NERVOUS, ANXIOUS, OR ON EDGE: NEARLY EVERY DAY
6. BECOMING EASILY ANNOYED OR IRRITABLE: NEARLY EVERY DAY
2. NOT BEING ABLE TO STOP OR CONTROL WORRYING: NEARLY EVERY DAY
3. WORRYING TOO MUCH ABOUT DIFFERENT THINGS: NEARLY EVERY DAY
5. BEING SO RESTLESS THAT IT IS HARD TO SIT STILL: NOT AT ALL
GAD7 TOTAL SCORE: 13

## 2018-11-16 ASSESSMENT — PATIENT HEALTH QUESTIONNAIRE - PHQ9
5. POOR APPETITE OR OVEREATING: SEVERAL DAYS
SUM OF ALL RESPONSES TO PHQ QUESTIONS 1-9: 24

## 2018-11-16 NOTE — NURSING NOTE
"Chief Complaint   Patient presents with     Pain       Initial /60 (BP Location: Right arm, Patient Position: Chair, Cuff Size: Adult Large)  Pulse 58  Temp 97  F (36.1  C) (Tympanic)  SpO2 99% Estimated body mass index is 24.09 kg/(m^2) as calculated from the following:    Height as of 8/20/18: 5' 0.5\" (1.537 m).    Weight as of 8/20/18: 125 lb 6.4 oz (56.9 kg).    Patient presents to the clinic using No DME    Health Maintenance that is potentially due pending provider review:  Mammogram    Pt declines to have mammogram.    Is there anyone who you would like to be able to receive your results? No  If yes have patient fill out JENA    "

## 2018-11-16 NOTE — PATIENT INSTRUCTIONS
Increase baclofen to 1 in the am and 1 at bedtime     Stop celexa     Start paxil at bedtime 20 mg, new Rx sent to the pharmacy for you     Set up TMJ appt     Take 800 mg of ibuprofen three times a day for 14 days     Continue on narcotics for now at the same dosing     Re look at the medical marijauna option   [email or web address]

## 2018-11-16 NOTE — PROGRESS NOTES
SUBJECTIVE:   Tracy Galloway is a 73 year old female who presents to clinic today for the following health issues:      Chronic Pain Follow-Up       Type / Location of Pain: facial  Analgesia/pain control:       Recent changes:  worse      Overall control: Inadequate pain control  Activity level/function:      Daily activities:  Unable to perform most daily activities - chores, hobbies, social activities, driving    Work:  not applicable  Adverse effects:  No  Adherance    Taking medication as directed?  Yes    Participating in other treatments: no -   Risk Factors:    Sleep:  Poor    Mood/anxiety:  Worsened anxiety    Recent family or social stressors:  none noted    Other aggravating factors: none  PHQ-9 SCORE 7/18/2017 8/20/2018 11/16/2018   Total Score - - -   Total Score 2 5 24     ROMAIN-7 SCORE 5/26/2017 7/18/2017 11/16/2018   Total Score - - -   Total Score 0 1 13     Encounter-Level CSA - 08/20/2018:          Controlled Substance Agreement - Scan on 8/29/2018  3:15 PM : CONTROLLED SUBSTANCE AGREEMENT (below)            Encounter-Level CSA - 08/20/2018:          Controlled Substance Agreement - Scan on 7/10/2015  1:04 PM : CONTROLLED SUBSTANCE AGREEMENT (below)                Amount of exercise or physical activity: None    Problems taking medications regularly: No    Medication side effects: none    Diet: regular (no restrictions)    Pt very anxious today regarding pain   She is having a sig flare  She has had more anxiety the last several months and has been grinding her teeth at night.  She has ground through her plate  She is miserable.   She is weepy and sad   She has had thoughts of not living but has not plan and would not do anything she is just tired of living with this pain     She says the norco does not really work any more   Wants to use [facility] but could not afford this         Problem list and histories reviewed & adjusted, as indicated.  Additional history: as documented    Labs reviewed in  EPIC    Reviewed and updated as needed this visit by clinical staff  Tobacco  Allergies  Meds  Problems  Med Hx  Surg Hx  Fam Hx  Soc Hx        Reviewed and updated as needed this visit by Provider  Allergies  Meds  Problems         ROS:  Constitutional, HEENT, cardiovascular, pulmonary, gi and gu systems are negative, except as otherwise noted.    OBJECTIVE:                                                    /60 (BP Location: Right arm, Patient Position: Chair, Cuff Size: Adult Large)  Pulse 58  Temp 97  F (36.1  C) (Tympanic)  SpO2 99%  There is no height or weight on file to calculate BMI.  GENERAL APPEARANCE: healthy, alert and no distress  HENT: ear canals and TM's normal, nose and mouth without ulcers or lesions and TMJ tender   NECK: no adenopathy, no asymmetry, masses, or scars and thyroid normal to palpation  RESP: lungs clear to auscultation - no rales, rhonchi or wheezes  CV: regular rates and rhythm, normal S1 S2, no S3 or S4 and no murmur, click or rub  PSYCH: mentation appears normal and affect anxious and crying          ASSESSMENT/PLAN:                                                    1. TMJ (temporomandibular joint syndrome)    - OTOLARYNGOLOGY REFERRAL    2. Anxiety state  Worse   - PARoxetine (PAXIL) 20 MG tablet; Take 1 tablet (20 mg) by mouth At Bedtime  Dispense: 90 tablet; Refill: 1    3. Chronic pain syndrome  Not well controlled   - HYDROcodone-acetaminophen (NORCO)  MG per tablet; Take 1 tablet by mouth 2 times daily maximum 2 tablet(s) per day  Dispense: 60 tablet; Refill: 0    4. Narcotic dependence (H)    - HYDROcodone-acetaminophen (NORCO)  MG per tablet; Take 1 tablet by mouth 2 times daily maximum 2 tablet(s) per day  Dispense: 60 tablet; Refill: 0      Patient Instructions   Increase baclofen to 1 in the am and 1 at bedtime     Stop celexa     Start paxil at bedtime 20 mg, new Rx sent to the pharmacy for you     Set up TMJ appt     Take 800 mg of  ibuprofen three times a day for 14 days     Continue on narcotics for now at the same dosing     Re look at the medical marijauna option   [email or web address]          Risks, benefits, side effects and rationale for treatment plan fully discussed with the patient and understanding expressed.   Gwendolyn Solis MD  [facility]

## 2018-11-16 NOTE — MR AVS SNAPSHOT
After Visit Summary   11/16/2018    Tracy Galloway    MRN: [id number]           Patient Information     Date Of Birth          1945        Visit Information        Provider Department      11/16/2018 12:40 PM Gwendolyn Solis MD [facility]        Today's Diagnoses     TMJ (temporomandibular joint syndrome)    -  1    Anxiety state        Chronic pain syndrome        Narcotic dependence (H)          Care Instructions    Increase baclofen to 1 in the am and 1 at bedtime     Stop celexa     Start paxil at bedtime 20 mg, new Rx sent to the pharmacy for you     Set up TMJ appt     Take 800 mg of ibuprofen three times a day for 14 days     Continue on narcotics for now at the same dosing     Re look at the medical marijaPokitDok option   [email or web address]                Follow-ups after your visit        Additional Services     OTOLARYNGOLOGY REFERRAL       Your provider has referred you to: [facility]rystle [facility], [facility] [place] [phone] - [email or web address]    Please be aware that coverage of these services is subject to the terms and limitations of your health insurance plan.  Call member services at your health plan with any benefit or coverage questions.      Please bring the following with you to your appointment:    (1) Any X-Rays, CTs or MRIs which have been performed.  Contact the facility where they were done to arrange for  prior to your scheduled appointment.   (2) List of current medications  (3) This referral request   (4) Any documents/labs given to you for this referral                  Who to contact     If you have questions or need follow up information about today's clinic visit or your schedule please contact [facility] directly at [phone].  Normal or non-critical lab and imaging results will be communicated to you by Andreia  letter or phone within 4 business days after the clinic has received the results. If you do not hear from us within 7 days, please contact the clinic through Speakto[employer] or phone. If you have a critical or abnormal lab result, we will notify you by phone as soon as possible.  Submit refill requests through [employer] or call your pharmacy and they will forward the refill request to us. Please allow 3 business days for your refill to be completed.          Additional Information About Your Visit        [employer] Information     [employer] gives you secure access to your electronic health record. If you see a primary care provider, you can also send messages to your care team and make appointments. If you have questions, please call your primary care clinic.  If you do not have a primary care provider, please call [phone] and they will assist you.        Care EveryWhere ID     This is your Care EveryWhere ID. This could be used by other organizations to access your [facility] medical records  [id number]        Your Vitals Were     Pulse Temperature Pulse Oximetry             58 97  F (36.1  C) (Tympanic) 99%          Blood Pressure from Last 3 Encounters:   11/16/18 148/60   08/20/18 138/76   05/22/18 104/50    Weight from Last 3 Encounters:   08/20/18 125 lb 6.4 oz (56.9 kg)   05/22/18 125 lb 3.2 oz (56.8 kg)   11/20/17 124 lb (56.2 kg)              We Performed the Following     OTOLARYNGOLOGY REFERRAL          Today's Medication Changes          These changes are accurate as of 11/16/18  1:18 PM.  If you have any questions, ask your nurse or doctor.               Start taking these medicines.        Dose/Directions    HYDROcodone-acetaminophen  MG per tablet   Commonly known as:  NORCO   Used for:  Narcotic dependence (H), Chronic pain syndrome   Started by:  Gwendolyn Solis MD        Dose:  1 tablet   Start taking on:  1/16/2019   Take 1 tablet by mouth 2 times daily maximum 2 tablet(s) per day   Quantity:   60 tablet   Refills:  0       PARoxetine 20 MG tablet   Commonly known as:  PAXIL   Used for:  Anxiety state   Started by:  Gwendolyn Solis MD        Dose:  20 mg   Take 1 tablet (20 mg) by mouth At Bedtime   Quantity:  90 tablet   Refills:  1         Stop taking these medicines if you haven't already. Please contact your care team if you have questions.     buPROPion 150 MG 24 hr tablet   Commonly known as:  WELLBUTRIN XL   Stopped by:  Gwendolyn Solis MD           citalopram 40 MG tablet   Commonly known as:  celeXA   Stopped by:  Gwendolyn Solis MD                Where to get your medicines      These medications were sent to [id number] - [place], [place] - [address]  [address] ([place], [place]) [address]    Hours:  Closed 10-16-08 business to [facility] Phone:  [phone]     PARoxetine 20 MG tablet         Some of these will need a paper prescription and others can be bought over the counter.  Ask your nurse if you have questions.     Bring a paper prescription for each of these medications     HYDROcodone-acetaminophen  MG per tablet               Information about OPIOIDS     PRESCRIPTION OPIOIDS: WHAT YOU NEED TO KNOW   We gave you an opioid (narcotic) pain medicine. It is important to manage your pain, but opioids are not always the best choice. You should first try all the other options your care team gave you. Take this medicine for as short a time (and as few doses) as possible.    Some activities can increase your pain, such as bandage changes or therapy sessions. It may help to take your pain medicine 30 to 60 minutes before these activities. Reduce your stress by getting enough sleep, working on hobbies you enjoy and practicing relaxation or meditation. Talk to your care team about ways to manage your pain beyond prescription opioids.    These medicines have risks:    DO NOT drive when on new or higher doses of pain medicine. These medicines can  affect your alertness and reaction times, and you could be arrested for driving under the influence (DUI). If you need to use opioids long-term, talk to your care team about driving.    DO NOT operate heavy machinery    DO NOT do any other dangerous activities while taking these medicines.    DO NOT drink any alcohol while taking these medicines.     If the opioid prescribed includes acetaminophen, DO NOT take with any other medicines that contain acetaminophen. Read all labels carefully. Look for the word  acetaminophen  or  Tylenol.  Ask your pharmacist if you have questions or are unsure.    You can get addicted to pain medicines, especially if you have a history of addiction (chemical, alcohol or substance dependence). Talk to your care team about ways to reduce this risk.    All opioids tend to cause constipation. Drink plenty of water and eat foods that have a lot of fiber, such as fruits, vegetables, prune juice, apple juice and high-fiber cereal. Take a laxative (Miralax, milk of magnesia, Colace, Senna) if you don t move your bowels at least every other day. Other side effects include upset stomach, sleepiness, dizziness, throwing up, tolerance (needing more of the medicine to have the same effect), physical dependence and slowed breathing.    Store your pills in a secure place, locked if possible. We will not replace any lost or stolen medicine. If you don t finish your medicine, please throw away (dispose) as directed by your pharmacist. The [place] Pollution Control Agency has more information about safe disposal: [email or web address]-medications         Primary Care Provider Office Phone # Fax #    Gwendolyn Solis -[phone] [address] [place] [address]        Equal Access to Services     MARY SUNG AH: tim Brooks, elian cueto. So [facility]  [phone].    ATENCIÓN: Si ashkan juarez, tiene a garcia disposición servicios gratuitos de asistencia lingüística. Nydia matias [phone].    We comply with applicable federal civil rights laws and [place] laws. We do not discriminate on the basis of race, color, national origin, age, disability, sex, sexual orientation, or gender identity.            Thank you!     Thank you for choosing [facility]  for your care. Our goal is always to provide you with excellent care. Hearing back from our patients is one way we can continue to improve our services. Please take a few minutes to complete the written survey that you may receive in the mail after your visit with us. Thank you!             Your Updated Medication List - Protect others around you: Learn how to safely use, store and throw away your medicines at www.disposemymeds.org.          This list is accurate as of 11/16/18  1:18 PM.  Always use your most recent med list.                   Brand Name Dispense Instructions for use Diagnosis    aspirin 81 MG EC tablet     90 tablet    Take 1 tablet (81 mg) by mouth daily        baclofen 10 MG tablet    LIORESAL    90 tablet    TAKE ONE TABLET BY MOUTH THREE TIMES DAILY    Chronic pain syndrome       DILT- MG 24 hr capsule   Generic drug:  diltiazem     90 capsule    TAKE ONE CAPSULE BY MOUTH ONE TIME DAILY    Essential hypertension with goal blood pressure less than 140/90       DOCUSATE SODIUM PO      Take 100 mg by mouth daily as needed        fluticasone 50 MCG/ACT spray    FLONASE    1 Bottle    Spray 1-2 sprays into both nostrils daily    Viral upper respiratory tract infection       HYDROcodone-acetaminophen  MG per tablet   Start taking on:  1/16/2019    NORCO    60 tablet    Take 1 tablet by mouth 2 times daily maximum 2 tablet(s) per day    Narcotic dependence (H), Chronic pain syndrome       ibuprofen 600 MG tablet    ADVIL/MOTRIN    90 tablet    Take 1 tablet (600 mg) by  mouth every 6 hours as needed for moderate pain    Facial pain       lisinopril 20 MG tablet    PRINIVIL/ZESTRIL    90 tablet    TAKE ONE TABLET BY MOUTH ONE TIME DAILY    Essential hypertension with goal blood pressure less than 140/90       loratadine 10 MG tablet    CLARITIN     Take 10 mg by mouth daily as needed for allergies        PARoxetine 20 MG tablet    PAXIL    90 tablet    Take 1 tablet (20 mg) by mouth At Bedtime    Anxiety state       ranitidine 150 MG tablet    ZANTAC    180 tablet    TAKE ONE TABLET BY MOUTH TWICE DAILY    Gastroesophageal reflux disease without esophagitis       valACYclovir 1000 mg tablet    VALTREX    21 tablet    As needed fo rbreak outs    Herpes zoster without complication       zolpidem 10 MG tablet    AMBIEN    30 tablet    TAKE 1/2 TO 1 TABLET BY MOUTH ONCE NIGHTLY AS NEEDED FOR SLEEP    Primary insomnia

## 2018-11-17 ASSESSMENT — ANXIETY QUESTIONNAIRES: GAD7 TOTAL SCORE: 13

## 2018-12-03 ENCOUNTER — MYC MEDICAL ADVICE (OUTPATIENT)
Dept: FAMILY MEDICINE | Facility: CLINIC | Age: 73
End: 2018-12-03

## 2018-12-04 DIAGNOSIS — G89.4 CHRONIC PAIN SYNDROME: ICD-10-CM

## 2018-12-05 RX ORDER — BACLOFEN 10 MG/1
TABLET ORAL
Qty: 90 TABLET | Refills: 1 | Status: SHIPPED | OUTPATIENT
Start: 2018-12-05 | End: 2019-05-10

## 2018-12-05 NOTE — TELEPHONE ENCOUNTER
baclofen (LIORESAL) 10 MG tablet      Last Written Prescription Date:  3/8/18  Last Fill Quantity: 90,   # refills: 2  Last Office Visit: 11/16/18  Future Office visit:       Routing refill request to provider for review/approval because:  Drug not on the [facility], [facility] or [facility] refill protocol or controlled substance

## 2018-12-18 DIAGNOSIS — I10 ESSENTIAL HYPERTENSION WITH GOAL BLOOD PRESSURE LESS THAN 140/90: ICD-10-CM

## 2018-12-18 RX ORDER — LISINOPRIL 20 MG/1
TABLET ORAL
Qty: 90 TABLET | Refills: 0 | Status: SHIPPED | OUTPATIENT
Start: 2018-12-18 | End: 2019-02-04

## 2018-12-18 NOTE — TELEPHONE ENCOUNTER
"Requested Prescriptions   Pending Prescriptions Disp Refills     lisinopril (PRINIVIL/ZESTRIL) 20 MG tablet [Pharmacy Med Name: LISINOPRIL 20 MG    TAB SOL] 90 tablet 0     Sig: TAKE ONE TABLET BY MOUTH ONE TIME DAILY    ACE Inhibitors (Including Combos) Protocol Failed - 12/18/2018 11:54 AM       Failed - Blood pressure under 140/90 in past 12 months    BP Readings from Last 3 Encounters:   11/16/18 148/60   08/20/18 138/76   05/22/18 104/50                Passed - Recent (12 mo) or future (30 days) visit within the authorizing provider's specialty    Patient had office visit in the last 12 months or has a visit in the next 30 days with authorizing provider or within the authorizing provider's specialty.  See \"Patient Info\" tab in inbasket, or \"Choose Columns\" in Meds & Orders section of the refill encounter.             Passed - Patient is age 18 or older       Passed - No active pregnancy on record       Passed - Normal serum creatinine on file in past 12 months    Recent Labs   Lab Test 03/14/18  1344   CR 0.85            Passed - Normal serum potassium on file in past 12 months    Recent Labs   Lab Test 03/14/18  1344   POTASSIUM 5.0            Passed - No positive pregnancy test in past 12 months          "

## 2019-01-17 DIAGNOSIS — F51.01 PRIMARY INSOMNIA: Chronic | ICD-10-CM

## 2019-01-17 RX ORDER — ZOLPIDEM TARTRATE 10 MG/1
TABLET ORAL
Qty: 30 TABLET | Refills: 2 | Status: SHIPPED | OUTPATIENT
Start: 2019-01-17 | End: 2019-05-03

## 2019-01-17 NOTE — TELEPHONE ENCOUNTER
zolpidem (AMBIEN) 10 MG tablet      Last Written Prescription Date:  9/5/18  Last Fill Quantity: 30,   # refills: 3  Last Office Visit: 11/16/18  Future Office visit:    Next 5 appointments (look out 90 days)    Feb 15, 2019 11:20 AM CST  Office Visit with Gwendolyn Solis MD  [facility] ([facility]) [address] [place] [id number]  [phone]           Routing refill request to provider for review/approval because:  Drug not on the [facility], [facility] or [facility] refill protocol or controlled substance

## 2019-02-04 DIAGNOSIS — I10 ESSENTIAL HYPERTENSION WITH GOAL BLOOD PRESSURE LESS THAN 140/90: ICD-10-CM

## 2019-02-05 RX ORDER — LISINOPRIL 20 MG/1
20 TABLET ORAL DAILY
Qty: 90 TABLET | Refills: 0 | Status: SHIPPED | OUTPATIENT
Start: 2019-02-05 | End: 2019-04-22

## 2019-02-05 NOTE — TELEPHONE ENCOUNTER
"Requested Prescriptions   Pending Prescriptions Disp Refills     lisinopril (PRINIVIL/ZESTRIL) 20 MG tablet 90 tablet 0     Sig: Take 1 tablet (20 mg) by mouth daily    ACE Inhibitors (Including Combos) Protocol Failed - 2/4/2019  8:51 PM       Failed - Blood pressure under 140/90 in past 12 months    BP Readings from Last 3 Encounters:   11/16/18 148/60   08/20/18 138/76   05/22/18 104/50                Passed - Recent (12 mo) or future (30 days) visit within the authorizing provider's specialty    Patient had office visit in the last 12 months or has a visit in the next 30 days with authorizing provider or within the authorizing provider's specialty.  See \"Patient Info\" tab in inbasket, or \"Choose Columns\" in Meds & Orders section of the refill encounter.      Last Written Prescription Date:  12/18/18  Last Fill Quantity: 90,  # refills: 0   Last office visit: 11/16/2018 with prescribing provider:     Future Office Visit:   Next 5 appointments (look out 90 days)    Feb 15, 2019 11:20 AM CST  Office Visit with Gwendolyn Solis MD  [facility] ([facility]) [address] [place] [id number] [phone]                    Passed - Medication is active on med list       Passed - Patient is age 18 or older       Passed - No active pregnancy on record       Passed - Normal serum creatinine on file in past 12 months    Recent Labs   Lab Test 03/14/18  1344   CR 0.85            Passed - Normal serum potassium on file in past 12 months    Recent Labs   Lab Test 03/14/18  1344   POTASSIUM 5.0            Passed - No positive pregnancy test within past 12 months          "

## 2019-02-05 NOTE — TELEPHONE ENCOUNTER
Prescription approved per [facility] Refill Protocol.  11/16/18 OV note with Dr. Solis: 'Return in about 3 months (around 2/16/19) for Routine Visit. Appt is scheduled for 2/15/19.    Katarina BARRETT RN

## 2019-02-15 ENCOUNTER — TELEPHONE (OUTPATIENT)
Dept: FAMILY MEDICINE | Facility: CLINIC | Age: 74
End: 2019-02-15

## 2019-02-15 ENCOUNTER — OFFICE VISIT (OUTPATIENT)
Dept: FAMILY MEDICINE | Facility: CLINIC | Age: 74
End: 2019-02-15
Payer: COMMERCIAL

## 2019-02-15 VITALS
HEIGHT: 61 IN | SYSTOLIC BLOOD PRESSURE: 136 MMHG | HEART RATE: 65 BPM | OXYGEN SATURATION: 98 % | TEMPERATURE: 97.4 F | BODY MASS INDEX: 24.09 KG/M2 | DIASTOLIC BLOOD PRESSURE: 60 MMHG

## 2019-02-15 DIAGNOSIS — I10 ESSENTIAL HYPERTENSION WITH GOAL BLOOD PRESSURE LESS THAN 140/90: Chronic | ICD-10-CM

## 2019-02-15 DIAGNOSIS — B02.9 HERPES ZOSTER WITHOUT COMPLICATION: ICD-10-CM

## 2019-02-15 DIAGNOSIS — F11.20 NARCOTIC DEPENDENCE (H): Chronic | ICD-10-CM

## 2019-02-15 DIAGNOSIS — G89.4 CHRONIC PAIN SYNDROME: Primary | Chronic | ICD-10-CM

## 2019-02-15 DIAGNOSIS — F32.0 MILD SINGLE CURRENT EPISODE OF MAJOR DEPRESSIVE DISORDER (H): ICD-10-CM

## 2019-02-15 DIAGNOSIS — F41.1 ANXIETY STATE: Chronic | ICD-10-CM

## 2019-02-15 LAB
ALBUMIN SERPL-MCNC: 3.6 G/DL (ref 3.4–5)
ALP SERPL-CCNC: 60 U/L (ref 40–150)
ALT SERPL W P-5'-P-CCNC: 21 U/L (ref 0–50)
ANION GAP SERPL CALCULATED.3IONS-SCNC: 4 MMOL/L (ref 3–14)
AST SERPL W P-5'-P-CCNC: 17 U/L (ref 0–45)
BASOPHILS # BLD AUTO: 0 10E9/L (ref 0–0.2)
BASOPHILS NFR BLD AUTO: 0.5 %
BILIRUB DIRECT SERPL-MCNC: <0.1 MG/DL (ref 0–0.2)
BILIRUB SERPL-MCNC: 0.2 MG/DL (ref 0.2–1.3)
BUN SERPL-MCNC: 25 MG/DL (ref 7–30)
CALCIUM SERPL-MCNC: 8.8 MG/DL (ref 8.5–10.1)
CHLORIDE SERPL-SCNC: 105 MMOL/L (ref 94–109)
CHOLEST SERPL-MCNC: 231 MG/DL
CO2 SERPL-SCNC: 28 MMOL/L (ref 20–32)
CREAT SERPL-MCNC: 1 MG/DL (ref 0.52–1.04)
DIFFERENTIAL METHOD BLD: NORMAL
EOSINOPHIL # BLD AUTO: 0.3 10E9/L (ref 0–0.7)
EOSINOPHIL NFR BLD AUTO: 4.4 %
ERYTHROCYTE [DISTWIDTH] IN BLOOD BY AUTOMATED COUNT: 13.6 % (ref 10–15)
GFR SERPL CREATININE-BSD FRML MDRD: 55 ML/MIN/{1.73_M2}
GLUCOSE SERPL-MCNC: 90 MG/DL (ref 70–99)
HCT VFR BLD AUTO: 38 % (ref 35–47)
HDLC SERPL-MCNC: 80 MG/DL
HGB BLD-MCNC: 12.2 G/DL (ref 11.7–15.7)
LDLC SERPL CALC-MCNC: 132 MG/DL
LYMPHOCYTES # BLD AUTO: 0.9 10E9/L (ref 0.8–5.3)
LYMPHOCYTES NFR BLD AUTO: 14 %
MCH RBC QN AUTO: 30.1 PG (ref 26.5–33)
MCHC RBC AUTO-ENTMCNC: 32.1 G/DL (ref 31.5–36.5)
MCV RBC AUTO: 94 FL (ref 78–100)
MONOCYTES # BLD AUTO: 0.5 10E9/L (ref 0–1.3)
MONOCYTES NFR BLD AUTO: 7.6 %
NEUTROPHILS # BLD AUTO: 4.5 10E9/L (ref 1.6–8.3)
NEUTROPHILS NFR BLD AUTO: 73.5 %
NONHDLC SERPL-MCNC: 151 MG/DL
PLATELET # BLD AUTO: 321 10E9/L (ref 150–450)
POTASSIUM SERPL-SCNC: 4.3 MMOL/L (ref 3.4–5.3)
PROT SERPL-MCNC: 7.4 G/DL (ref 6.8–8.8)
RBC # BLD AUTO: 4.05 10E12/L (ref 3.8–5.2)
SODIUM SERPL-SCNC: 137 MMOL/L (ref 133–144)
TRIGL SERPL-MCNC: 97 MG/DL
TSH SERPL DL<=0.005 MIU/L-ACNC: 0.94 MU/L (ref 0.4–4)
WBC # BLD AUTO: 6.2 10E9/L (ref 4–11)

## 2019-02-15 PROCEDURE — 80061 LIPID PANEL: CPT | Performed by: FAMILY MEDICINE

## 2019-02-15 PROCEDURE — 36415 COLL VENOUS BLD VENIPUNCTURE: CPT | Performed by: FAMILY MEDICINE

## 2019-02-15 PROCEDURE — 80076 HEPATIC FUNCTION PANEL: CPT | Performed by: FAMILY MEDICINE

## 2019-02-15 PROCEDURE — 80048 BASIC METABOLIC PNL TOTAL CA: CPT | Performed by: FAMILY MEDICINE

## 2019-02-15 PROCEDURE — 84443 ASSAY THYROID STIM HORMONE: CPT | Performed by: FAMILY MEDICINE

## 2019-02-15 PROCEDURE — 85025 COMPLETE CBC W/AUTO DIFF WBC: CPT | Performed by: FAMILY MEDICINE

## 2019-02-15 PROCEDURE — 99214 OFFICE O/P EST MOD 30 MIN: CPT | Performed by: FAMILY MEDICINE

## 2019-02-15 RX ORDER — VALACYCLOVIR HYDROCHLORIDE 1 G/1
TABLET, FILM COATED ORAL
Qty: 21 TABLET | Refills: 3 | Status: SHIPPED | OUTPATIENT
Start: 2019-02-15 | End: 2019-02-15

## 2019-02-15 RX ORDER — HYDROCODONE BITARTRATE AND ACETAMINOPHEN 10; 325 MG/1; MG/1
1 TABLET ORAL 2 TIMES DAILY
Qty: 75 TABLET | Refills: 0 | Status: SHIPPED | OUTPATIENT
Start: 2019-03-15 | End: 2019-04-12

## 2019-02-15 RX ORDER — PAROXETINE 20 MG/1
40 TABLET, FILM COATED ORAL AT BEDTIME
Qty: 180 TABLET | Refills: 1 | Status: ON HOLD | OUTPATIENT
Start: 2019-02-15 | End: 2019-05-23

## 2019-02-15 RX ORDER — VALACYCLOVIR HYDROCHLORIDE 1 G/1
1000 TABLET, FILM COATED ORAL 2 TIMES DAILY
Qty: 4 TABLET | Refills: 11 | Status: SHIPPED | OUTPATIENT
Start: 2019-02-15 | End: 2020-07-30

## 2019-02-15 RX ORDER — HYDROCODONE BITARTRATE AND ACETAMINOPHEN 10; 325 MG/1; MG/1
1 TABLET ORAL 2 TIMES DAILY
Qty: 75 TABLET | Refills: 0 | Status: SHIPPED | OUTPATIENT
Start: 2019-02-15 | End: 2019-02-15

## 2019-02-15 NOTE — PROGRESS NOTES
SUBJECTIVE:   Tracy Galloway is a 73 year old female who presents to clinic today for the following health issues:      Chronic Pain Follow-Up       Type / Location of Pain: facial  Analgesia/pain control:       Recent changes:  worse      Overall control: Inadequate pain control  Activity level/function:      Daily activities:  Able to do all daily activities    Work:  not applicable  Adverse effects:  No  Adherance    Taking medication as directed?  Yes    Participating in other treatments: yes, medicinal marihuana  Risk Factors:    Sleep:  Good    Mood/anxiety:  worsened    Recent family or social stressors:  none noted    Other aggravating factors: none  PHQ-9 SCORE 7/18/2017 8/20/2018 11/16/2018   PHQ-9 Total Score - - -   PHQ-9 Total Score 2 5 24     ROMAIN-7 SCORE 5/26/2017 7/18/2017 11/16/2018   Total Score - - -   Total Score 0 1 13     Encounter-Level CSA - 08/20/2018:    Controlled Substance Agreement - Scan on 8/29/2018  3:15 PM: CONTROLLED SUBSTANCE AGREEMENT (below)       Encounter-Level CSA - 07/07/2015:    Controlled Substance Agreement - Scan on 7/10/2015  1:04 PM: CONTROLLED SUBSTANCE AGREEMENT (below)       Patient-Level CSA:    There are no patient-level csa.         Amount of exercise or physical activity: 4-5 days/week for an average of 45-60 minutes    Problems taking medications regularly: No    Medication side effects: none    Diet: regular (no restrictions)        Hypertension Follow-up      Outpatient blood pressures are not being checked.    Low Salt Diet: no added salt    Depression and Anxiety Follow-Up    Status since last visit: Worsened  Depression due to pain     Other associated symptoms:None    Complicating factors:     Significant life event: No     Current substance abuse: None    PHQ 7/18/2017 8/20/2018 11/16/2018   PHQ-9 Total Score 2 5 24   Q9: Suicide Ideation Not at all Not at all Nearly every day     ROMAIN-7 SCORE 5/26/2017 7/18/2017 11/16/2018   Total Score - - -   Total  "Score 0 1 13     In the past two weeks have you had thoughts of suicide or self-harm?  Yes she feels like life would be easier if she was not here sometimes due to pain   She would never do anything and has no plan just feels low   In the past two weeks have you thought of a plan or intent to harm yourself? No.  Do you have concerns about your personal safety or the safety of others?   No  PHQ-9  English  PHQ-9   Any Language  ROMAIN-7  Suicide Assessment Five-step Evaluation and Treatment (SAFE-T)    Problem list and histories reviewed & adjusted, as indicated.  Additional history: as documented    Labs reviewed in EPIC    Reviewed and updated as needed this visit by clinical staff  Tobacco  Allergies  Meds  Problems  Med Hx  Surg Hx  Fam Hx  Soc Hx        Reviewed and updated as needed this visit by Provider  Tobacco  Allergies  Meds  Problems  Med Hx  Surg Hx  Fam Hx         ROS:  Constitutional, HEENT, cardiovascular, pulmonary, gi and gu systems are negative, except as otherwise noted.    OBJECTIVE:                                                    /60 (BP Location: Right arm, Patient Position: Chair, Cuff Size: Adult Regular)   Pulse 65   Temp 97.4  F (36.3  C) (Tympanic)   Ht 1.537 m (5' 0.5\")   SpO2 98%   BMI 24.09 kg/m     Body mass index is 24.09 kg/m .  GENERAL APPEARANCE: healthy, alert and no distress  NECK: no adenopathy, no asymmetry, masses, or scars and thyroid normal to palpation  RESP: lungs clear to auscultation - no rales, rhonchi or wheezes  CV: regular rates and rhythm, normal S1 S2, no S3 or S4 and no murmur, click or rub  MS: extremities normal- no gross deformities noted  PSYCH: mentation appears normal and affect normal/bright         ASSESSMENT/PLAN:                                                    1. Chronic pain syndrome  Not well controlled   - HYDROcodone-acetaminophen (NORCO)  MG per tablet; Take 1 tablet by mouth 2 times daily maximum 2 tablet(s) per " day  Dispense: 75 tablet; Refill: 0    2. Narcotic dependence (H)    - HYDROcodone-acetaminophen (NORCO)  MG per tablet; Take 1 tablet by mouth 2 times daily maximum 2 tablet(s) per day  Dispense: 75 tablet; Refill: 0    3. Essential hypertension with goal blood pressure less than 140/90  Stable no change in treatment plan.   - Hepatic panel  - Basic metabolic panel  - CBC with platelets differential  - TSH with free T4 reflex  - Lipid panel reflex to direct LDL Non-fasting    4. Anxiety state    - PARoxetine (PAXIL) 20 MG tablet; Take 2 tablets (40 mg) by mouth At Bedtime  Dispense: 180 tablet; Refill: 1    5. Herpes zoster without complication    - valACYclovir (VALTREX) 1000 mg tablet; As needed fo rbreak outs  Dispense: 21 tablet; Refill: 3    6. Mild single current episode of major depressive disorder (H)  Worsened  Increase paxil       Patient Instructions   Recheck in 2 months     Labs today     Increase dose of norco to 1 at bedtime 15 days of the month for the next tow months and see how that goes    Increase paxil to 40 mg at beditme (2 pills)     Risks, benefits, side effects and rationale for treatment plan fully discussed with the patient and understanding expressed.     Gwendolyn Solis MD  [facility]

## 2019-02-15 NOTE — NURSING NOTE
"Chief Complaint   Patient presents with     Pain       Initial /60 (BP Location: Right arm, Patient Position: Chair, Cuff Size: Adult Regular)   Pulse 65   Temp 97.4  F (36.3  C) (Tympanic)   Ht 1.537 m (5' 0.5\")   SpO2 98%   BMI 24.09 kg/m   Estimated body mass index is 24.09 kg/m  as calculated from the following:    Height as of this encounter: 1.537 m (5' 0.5\").    Weight as of 8/20/18: 56.9 kg (125 lb 6.4 oz).    Patient presents to the clinic using No DME    Health Maintenance that is potentially due pending provider review:  NONE    n/a    Is there anyone who you would like to be able to receive your results? No  If yes have patient fill out JENA    "

## 2019-02-15 NOTE — TELEPHONE ENCOUNTER
"pharmacy can not process sig on the Valtrex \"as directed for outbreak\".  Discussed this with Dr Park and sent new RX to the pharmacy  Delia Roldan RN      "

## 2019-02-15 NOTE — PATIENT INSTRUCTIONS
Recheck in 2 months     Labs today     Increase dose of norco to 1 at bedtime 15 days of the month for the next tow months and see how that goes    Increase paxil to 40 mg at beditme (2 pills)

## 2019-02-18 DIAGNOSIS — Z13.6 CARDIOVASCULAR SCREENING; LDL GOAL LESS THAN 130: Primary | ICD-10-CM

## 2019-02-25 ENCOUNTER — TELEPHONE (OUTPATIENT)
Dept: FAMILY MEDICINE | Facility: CLINIC | Age: 74
End: 2019-02-25

## 2019-02-25 NOTE — TELEPHONE ENCOUNTER
Patient dropped off form she received from [facility]. It says they have given her a temporary supply of her Zolpidem Tartrate 5 mg tablets. This drug is  not included on their list of covered drugs.   The letter said to contact her doctor to consider changing to another drug or requesting approval for the drug by demonstrating that you meet the criteria for coverage or requesting an exception from the criteria for coverage.

## 2019-02-25 NOTE — TELEPHONE ENCOUNTER
Prior Authorization Retail Medication Request    Medication/Dose: Zolpidem 5 mg tablet  ICD code (if different than what is on RX): Primary insomnia [F51.01]     Previously Tried and Failed:  Amitriptyline, Vistaril, Nortriptyline  Rationale:  Insomnia.  Zolpidem has been working for her.     Insurance Name:  UCARE - UCARE MEDICARE (Managed Care)   Insurance ID:  [id number]      Pharmacy Information (if different than what is on RX)  Name:  [facility]  Phone:  [phone]    She was previously getting this from [facility] in [place] but this pharmacy is now closed. She will use the [facility] for future refills.

## 2019-03-02 NOTE — TELEPHONE ENCOUNTER
PA Initiation    Medication: Zolpidem 5 mg tablet  Insurance Company: [employer] - Phone [phone] Fax [phone]  Pharmacy Filling the Rx:    Filling Pharmacy Phone: [phone]  Filling Pharmacy Fax:    Start Date: 3/2/2019    Manually faxed request to insurance.

## 2019-03-04 NOTE — TELEPHONE ENCOUNTER
Prior Authorization Not Needed per Insurance    Medication: Zolpidem 5 mg tablet  Insurance Company: [employer] - Phone [phone] Fax [phone]  Pharmacy Filling the Rx:  [facility]  Pharmacy Notified: Yes  Patient Notified: Yes

## 2019-03-23 ENCOUNTER — NURSE TRIAGE (OUTPATIENT)
Dept: NURSING | Facility: CLINIC | Age: 74
End: 2019-03-23

## 2019-03-23 ENCOUNTER — TELEPHONE (OUTPATIENT)
Dept: FAMILY MEDICINE | Facility: CLINIC | Age: 74
End: 2019-03-23

## 2019-03-23 NOTE — TELEPHONE ENCOUNTER
Tracy came to the pharmacy on Saturday very upset that we had not filled her norco that was dated 3/15/19, we had filled #75 on 2/16 for a 38 day supply.  The directions were max of 2/day. She had been instructed that she could increase to 3/day while on vacation but the prescription did not reflect that.  [facility]'s policy is to fill C2's only 2 days early which would have been 3/24.  We did fill the med today for her since we are closed 3/24.    I am not sure what was communicated to her  on 3/22, but she believes we should have contacted your office when he was told we could not fill it on 3/22.     She will be following up with Dr Solis on Monday.    Bakari Rachel, Pharm D  [facility]  [phone]

## 2019-03-23 NOTE — TELEPHONE ENCOUNTER
"Clinic Action Needed:  No.  FYI only  FNA Triage Call  Presenting Problem:    Patient would like to leave a message for Dr. Will Solis wrote a prescription for Vicodin patinet on 3-15-19 and it \"should have been available to me at that time.\"  Patient was out of town, returned on 3-19-19, went to get the prescription, but the pharmacy told her that she couldn't pick it up until 3-23-19, as it was too early. They told her she should have plenty left over.    Patient was able to  the prescription today, but she states she has been in pain for the past 2 days.    She states that \"A pharmacy should not be making judgments like this.\"  She thinks Dr. oSlis should speak with the pharmacy staff so this doesn't happen again.    She states she does not require a response from Dr. Solis.        Routed to:  Dr. Solis / Nurse carole Flowers RN / [facility]              "

## 2019-03-23 NOTE — TELEPHONE ENCOUNTER
"Patient would like to leave a message for Dr. Will Solis wrote a prescription for Vicodin patinet on 3-15-19 and it \"should have been available to me at that time.\"  Patient was out of town, returned on 3-19-19, went to get the prescription, but the pharmacy told her that she couldn't pick it up until 3-23-19, as it was too early. They told her she should have plenty left over.    Patient was able to  the prescription today, but she states she has been in pain for the past 2 days.    She states that \"A pharmacy should not be making judgments like this.\"  She thinks Dr. Solis should speak with the pharmacy staff so this doesn't happen again.    She states she does not require a response from Dr. Solis.    Will route this message.    Mary Flowers, RN / [facility]    Additional Information    [1] Other NON-URGENT information for PCP AND [2] does not require PCP response    Protocols used: PCP CALL - NO TRIAGE-ADULT-      "

## 2019-03-29 DIAGNOSIS — Z13.6 CARDIOVASCULAR SCREENING; LDL GOAL LESS THAN 130: ICD-10-CM

## 2019-03-29 LAB
CHOLEST SERPL-MCNC: 220 MG/DL
HDLC SERPL-MCNC: 80 MG/DL
LDLC SERPL CALC-MCNC: 123 MG/DL
NONHDLC SERPL-MCNC: 140 MG/DL
TRIGL SERPL-MCNC: 83 MG/DL

## 2019-03-29 PROCEDURE — 36415 COLL VENOUS BLD VENIPUNCTURE: CPT | Performed by: FAMILY MEDICINE

## 2019-03-29 PROCEDURE — 80061 LIPID PANEL: CPT | Performed by: FAMILY MEDICINE

## 2019-04-05 ENCOUNTER — ALLIED HEALTH/NURSE VISIT (OUTPATIENT)
Dept: FAMILY MEDICINE | Facility: CLINIC | Age: 74
End: 2019-04-05
Payer: COMMERCIAL

## 2019-04-05 VITALS — HEART RATE: 64 BPM | DIASTOLIC BLOOD PRESSURE: 60 MMHG | SYSTOLIC BLOOD PRESSURE: 132 MMHG

## 2019-04-05 DIAGNOSIS — I10 ESSENTIAL HYPERTENSION WITH GOAL BLOOD PRESSURE LESS THAN 140/90: Primary | ICD-10-CM

## 2019-04-05 PROCEDURE — 99207 ZZC NO CHARGE NURSE ONLY: CPT

## 2019-04-12 ENCOUNTER — OFFICE VISIT (OUTPATIENT)
Dept: FAMILY MEDICINE | Facility: CLINIC | Age: 74
End: 2019-04-12
Payer: COMMERCIAL

## 2019-04-12 VITALS
BODY MASS INDEX: 24.09 KG/M2 | RESPIRATION RATE: 20 BRPM | SYSTOLIC BLOOD PRESSURE: 138 MMHG | HEART RATE: 80 BPM | HEIGHT: 61 IN | DIASTOLIC BLOOD PRESSURE: 70 MMHG | TEMPERATURE: 96.8 F

## 2019-04-12 DIAGNOSIS — F11.20 NARCOTIC DEPENDENCE (H): Chronic | ICD-10-CM

## 2019-04-12 DIAGNOSIS — G89.4 CHRONIC PAIN SYNDROME: Chronic | ICD-10-CM

## 2019-04-12 PROCEDURE — 99214 OFFICE O/P EST MOD 30 MIN: CPT | Performed by: FAMILY MEDICINE

## 2019-04-12 RX ORDER — HYDROCODONE BITARTRATE AND ACETAMINOPHEN 10; 325 MG/1; MG/1
1 TABLET ORAL 2 TIMES DAILY
Qty: 75 TABLET | Refills: 0 | Status: SHIPPED | OUTPATIENT
Start: 2019-04-12 | End: 2019-04-12

## 2019-04-12 RX ORDER — HYDROCODONE BITARTRATE AND ACETAMINOPHEN 10; 325 MG/1; MG/1
1 TABLET ORAL 2 TIMES DAILY
Qty: 75 TABLET | Refills: 0 | Status: SHIPPED | OUTPATIENT
Start: 2019-06-12 | End: 2019-07-12

## 2019-04-12 RX ORDER — HYDROCODONE BITARTRATE AND ACETAMINOPHEN 10; 325 MG/1; MG/1
1 TABLET ORAL 2 TIMES DAILY
Qty: 75 TABLET | Refills: 0 | Status: SHIPPED | OUTPATIENT
Start: 2019-05-12 | End: 2019-04-12

## 2019-04-12 ASSESSMENT — PAIN SCALES - GENERAL: PAINLEVEL: EXTREME PAIN (8)

## 2019-04-12 NOTE — PATIENT INSTRUCTIONS
Recheck in 3 months     Try decreasing paxil to 1 tab at bedtime to see if this changes anything

## 2019-04-12 NOTE — PROGRESS NOTES
"  SUBJECTIVE:   Tracy Galloway is a 74 year old female who presents to clinic today for the following   health issues:             Chronic Pain Follow-Up       Type / Location of Pain: facial  Analgesia/pain control:       Recent changes:  worse      Overall control: Inadequate pain control  Activity level/function:      Daily activities:  Able to do all daily activities    Work:  not applicable  Adverse effects:  No  Adherance    Taking medication as directed?  Yes    Participating in other treatments: yes, medicinal marihuana  Risk Factors:    Sleep:  Good    Mood/anxiety:  worsened    Recent family or social stressors:  none noted    Other aggravating factors: none    PHQ-9 SCORE 7/18/2017 8/20/2018 11/16/2018   PHQ-9 Total Score - - -   PHQ-9 Total Score 2 5 24     ROMAIN-7 SCORE 5/26/2017 7/18/2017 11/16/2018   Total Score - - -   Total Score 0 1 13     Encounter-Level CSA - 08/20/2018:    Controlled Substance Agreement - Scan on 8/29/2018  3:15 PM: CONTROLLED SUBSTANCE AGREEMENT (below)       Encounter-Level CSA - 07/07/2015:    Controlled Substance Agreement - Scan on 7/10/2015  1:04 PM: CONTROLLED SUBSTANCE AGREEMENT (below)       Patient-Level CSA:    There are no patient-level csa.                     Additional history: as documented    Reviewed  and updated as needed this visit by clinical staff         Reviewed and updated as needed this visit by Provider  Tobacco  Allergies  Meds  Problems  Med Hx  Surg Hx  Fam Hx         Labs reviewed in EPIC    ROS:  Constitutional, HEENT, cardiovascular, pulmonary, gi and gu systems are negative, except as otherwise noted.    OBJECTIVE:                                                    /70   Pulse 80   Temp 96.8  F (36  C) (Tympanic)   Resp 20   Ht 1.537 m (5' 0.5\")   BMI 24.09 kg/m    Body mass index is 24.09 kg/m .  GENERAL APPEARANCE: healthy, alert and no distress  RESP: lungs clear to auscultation - no rales, rhonchi or wheezes  CV: regular " rates and rhythm, normal S1 S2, no S3 or S4 and no murmur, click or rub  PSYCH: mentation appears normal and affect normal/bright         ASSESSMENT/PLAN:                                                    1. Narcotic dependence (H)    - HYDROcodone-acetaminophen (NORCO)  MG per tablet; Take 1 tablet by mouth 2 times daily And 10 days of the month may take 3 daily if needed (30 day supply)  Dispense: 75 tablet; Refill: 0    2. Chronic pain syndrome  Well controlled if she has the ability with a flare to take 3 daily   So we did increase quantity to #75 per month   - HYDROcodone-acetaminophen (NORCO)  MG per tablet; Take 1 tablet by mouth 2 times daily And 10 days of the month may take 3 daily if needed (30 day supply)  Dispense: 75 tablet; Refill: 0      Patient Instructions   Recheck in 3 months     Try decreasing paxil to 1 tab at bedtime to see if this changes anything         Risks, benefits, side effects and rationale for treatment plan fully discussed with the patient and understanding expressed.   Gwendolyn Solis MD  [facility]

## 2019-04-12 NOTE — NURSING NOTE
"Chief Complaint   Patient presents with     Pain       Initial /70 (BP Location: Right arm, Patient Position: Sitting, Cuff Size: Adult Regular)   Pulse 80   Temp 96.8  F (36  C) (Tympanic)   Resp 20   Ht 1.537 m (5' 0.5\")   BMI 24.09 kg/m   Estimated body mass index is 24.09 kg/m  as calculated from the following:    Height as of this encounter: 1.537 m (5' 0.5\").    Weight as of 8/20/18: 56.9 kg (125 lb 6.4 oz).    Patient presents to the clinic using No DME    Health Maintenance that is potentially due pending provider review:  Mammogram    Pt declines to have mammogram.    Is there anyone who you would like to be able to receive your results? No  If yes have patient fill out JENA    Jahaira Mosher CMA    "

## 2019-04-19 DIAGNOSIS — I10 ESSENTIAL HYPERTENSION WITH GOAL BLOOD PRESSURE LESS THAN 140/90: ICD-10-CM

## 2019-04-20 NOTE — TELEPHONE ENCOUNTER
"Requested Prescriptions   Pending Prescriptions Disp Refills     lisinopril (PRINIVIL/ZESTRIL) 20 MG tablet  Last Written Prescription Date:  2/5/19  Last Fill Quantity: 90,  # refills: 0   Last office visit: 4/12/2019 with prescribing provider:  SULMA Solis   Future Office Visit:   Next 5 appointments (look out 90 days)    Jul 12, 2019  1:00 PM CDT  [facility] with [facility] [address] [place] [id number] [phone]          90 tablet 0     Sig: Take 1 tablet (20 mg) by mouth daily       ACE Inhibitors (Including Combos) Protocol Passed - 4/19/2019  5:04 PM        Passed - Blood pressure under 140/90 in past 12 months     BP Readings from Last 3 Encounters:   04/12/19 138/70   04/05/19 132/60   02/15/19 136/60                 Passed - Recent (12 mo) or future (30 days) visit within the authorizing provider's specialty     Patient had office visit in the last 12 months or has a visit in the next 30 days with authorizing provider or within the authorizing provider's specialty.  See \"Patient Info\" tab in inbasket, or \"Choose Columns\" in Meds & Orders section of the refill encounter.              Passed - Medication is active on med list        Passed - Patient is age 18 or older        Passed - No active pregnancy on record        Passed - Normal serum creatinine on file in past 12 months     Recent Labs   Lab Test 02/15/19  1154   CR 1.00             Passed - Normal serum potassium on file in past 12 months     Recent Labs   Lab Test 02/15/19  1154   POTASSIUM 4.3             Passed - No positive pregnancy test within past 12 months          "

## 2019-04-22 RX ORDER — LISINOPRIL 20 MG/1
20 TABLET ORAL DAILY
Qty: 90 TABLET | Refills: 1 | Status: SHIPPED | OUTPATIENT
Start: 2019-04-22 | End: 2020-01-13

## 2019-05-03 DIAGNOSIS — F51.01 PRIMARY INSOMNIA: Chronic | ICD-10-CM

## 2019-05-03 NOTE — TELEPHONE ENCOUNTER
Requested Prescriptions   Pending Prescriptions Disp Refills     zolpidem (AMBIEN) 10 MG tablet 30 tablet 2       There is no refill protocol information for this order        zolpidem (AMBIEN) 10 MG tablet  Last Written Prescription Date:  01/17/2019  Last Fill Quantity: 30 tablet,  # refills: 2  Last office visit: 4/12/2019 with prescribing provider:  BOYD Solis   Future Office Visit:   Next 5 appointments (look out 90 days)    Jul 12, 2019  1:00 PM CDT  SHORT with Gwendolyn [facility] MD  [facility] [address] [place] [id number]  [phone]         Irina RAMIREZ (R) (M)

## 2019-05-06 RX ORDER — ZOLPIDEM TARTRATE 10 MG/1
TABLET ORAL
Qty: 30 TABLET | Refills: 2 | Status: SHIPPED | OUTPATIENT
Start: 2019-05-06 | End: 2019-07-12 | Stop reason: DRUGHIGH

## 2019-05-06 NOTE — TELEPHONE ENCOUNTER
Prescription filled, pt called, and walked to pharmacy, NB.    Melyssa Garcia  [facility]

## 2019-05-09 DIAGNOSIS — G89.4 CHRONIC PAIN SYNDROME: ICD-10-CM

## 2019-05-09 NOTE — TELEPHONE ENCOUNTER
Thank you  Laura You CPht    [facility]  Phone: [phone]  Fax: [phone]

## 2019-05-10 RX ORDER — BACLOFEN 10 MG/1
10 TABLET ORAL 3 TIMES DAILY
Qty: 90 TABLET | Refills: 1 | Status: SHIPPED | OUTPATIENT
Start: 2019-05-10 | End: 2019-11-09

## 2019-05-12 DIAGNOSIS — I10 ESSENTIAL HYPERTENSION WITH GOAL BLOOD PRESSURE LESS THAN 140/90: ICD-10-CM

## 2019-05-13 RX ORDER — DILTIAZEM HYDROCHLORIDE 180 MG/1
180 CAPSULE, EXTENDED RELEASE ORAL DAILY
Qty: 90 CAPSULE | Refills: 3 | Status: SHIPPED | OUTPATIENT
Start: 2019-05-13 | End: 2020-06-04

## 2019-05-17 ENCOUNTER — OFFICE VISIT (OUTPATIENT)
Dept: FAMILY MEDICINE | Facility: CLINIC | Age: 74
End: 2019-05-17
Payer: COMMERCIAL

## 2019-05-17 VITALS
SYSTOLIC BLOOD PRESSURE: 118 MMHG | TEMPERATURE: 97.9 F | WEIGHT: 125 LBS | BODY MASS INDEX: 23.6 KG/M2 | HEIGHT: 61 IN | HEART RATE: 60 BPM | DIASTOLIC BLOOD PRESSURE: 70 MMHG | RESPIRATION RATE: 18 BRPM

## 2019-05-17 DIAGNOSIS — L30.9 ECZEMA, UNSPECIFIED TYPE: ICD-10-CM

## 2019-05-17 DIAGNOSIS — L98.9 SKIN LESION: Primary | ICD-10-CM

## 2019-05-17 PROCEDURE — 99213 OFFICE O/P EST LOW 20 MIN: CPT | Performed by: NURSE PRACTITIONER

## 2019-05-17 PROCEDURE — 88305 TISSUE EXAM BY PATHOLOGIST: CPT | Performed by: NURSE PRACTITIONER

## 2019-05-17 RX ORDER — TRIAMCINOLONE ACETONIDE 1 MG/G
CREAM TOPICAL 2 TIMES DAILY
Qty: 15 G | Refills: 0 | Status: SHIPPED | OUTPATIENT
Start: 2019-05-17 | End: 2020-09-08

## 2019-05-17 ASSESSMENT — MIFFLIN-ST. JEOR: SCORE: 991.44

## 2019-05-17 NOTE — PROGRESS NOTES
SUBJECTIVE:   Tracy Galloway is a 74 year old female who presents to clinic today for the following   health issues:    Patient has a mole on the back of her neck she would like checked. She first noticed it about one month ago. It has been itching and burning.        Additional history: as documented    Reviewed  and updated as needed this visit by clinical staff         Reviewed and updated as needed this visit by Provider         Patient Active Problem List   Diagnosis     Anxiety state     Plantar fasciitis     Shingles     CARDIOVASCULAR SCREENING; LDL GOAL LESS THAN 130     Atrophic vaginitis     Chronic pain     Health Care Home     Essential hypertension with goal blood pressure less than 140/90     Narcotic dependence (H)     Prinzmetal angina (H)     Visual impairment     TIA (transient ischemic attack)     Confusion     Mammogram declined     Advanced directives, counseling/discussion     Primary insomnia     Mild single current episode of major depressive disorder (H)     Past Surgical History:   Procedure Laterality Date     CHOLECYSTECTOMY, LAPOROSCOPIC      s/p Cholecystectomy, Laparoscopic     COLONOSCOPY  2012    Procedure: COLONOSCOPY;  Colonoscopy;  Surgeon: Tyrell Brown MD;  Location: [facility]     EYE SURGERY       HC COLONOSCOPY THRU STOMA, DIAGNOSTIC  3-    at outside clinic.  Normal.     HYSTERECTOMY, SUNDAR      severe endometriosis     SURGICAL HISTORY OF -       breast reduction     SURGICAL HISTORY OF -   03    coronary angiogram     SURGICAL HISTORY OF -       Refractive surgery right     TONSILLECTOMY         Social History     Tobacco Use     Smoking status: Former Smoker     Types: Cigarettes     Last attempt to quit: 10/30/1962     Years since quittin.5     Smokeless tobacco: Never Used   Substance Use Topics     Alcohol use: Yes     Alcohol/week: 2.4 oz     Types: 4 Standard drinks or equivalent per week     Comment: occasional     Family History    Problem Relation Age of Onset     Hypertension Mother      Heart Disease Mother      Hypertension Father      Circulatory Father         PAD     Alzheimer Disease Maternal Grandmother         dementia     Cerebrovascular Disease Maternal Grandmother      Heart Disease Maternal Grandfather      Diabetes Maternal Grandfather          Current Outpatient Medications   Medication Sig Dispense Refill     aspirin 81 MG EC tablet Take 1 tablet (81 mg) by mouth daily 90 tablet 3     baclofen (LIORESAL) 10 MG tablet Take 1 tablet (10 mg) by mouth 3 times daily 90 tablet 1     diltiazem ER (DILT-XR) 180 MG 24 hr capsule Take 1 capsule (180 mg) by mouth daily 90 capsule 3     DOCUSATE SODIUM PO Take 100 mg by mouth daily as needed        fluticasone (FLONASE) 50 MCG/ACT spray Spray 1-2 sprays into both nostrils daily 1 Bottle 11     [START ON 6/12/2019] HYDROcodone-acetaminophen (NORCO)  MG per tablet Take 1 tablet by mouth 2 times daily And 10 days of the month may take 3 daily if needed (30 day supply) 75 tablet 0     ibuprofen (ADVIL,MOTRIN) 600 MG tablet Take 1 tablet (600 mg) by mouth every 6 hours as needed for moderate pain 90 tablet 1     lisinopril (PRINIVIL/ZESTRIL) 20 MG tablet Take 1 tablet (20 mg) by mouth daily 90 tablet 1     loratadine (CLARITIN) 10 MG tablet Take 10 mg by mouth daily as needed for allergies        PARoxetine (PAXIL) 20 MG tablet Take 2 tablets (40 mg) by mouth At Bedtime 180 tablet 1     ranitidine (ZANTAC) 150 MG tablet TAKE ONE TABLET BY MOUTH TWICE DAILY 180 tablet 3     zolpidem (AMBIEN) 10 MG tablet TAKE 1/2-1 TABLET BY MOUTH NIGHTLY AS NEEDED FOR SLEEP 30 tablet 2     valACYclovir (VALTREX) 1000 mg tablet Take 1 tablet (1,000 mg) by mouth 2 times daily for 1 day As needed fo rbreak outs 4 tablet 11     No Known Allergies  Recent Labs   Lab Test 03/29/19  1007 02/15/19  1154 04/27/18  0812 03/14/18  1344 01/24/17  0811   A1C  --   --  5.5  --   --    * 132*  --   --  116*  "  HDL 80 80  --   --  60   TRIG 83 97  --   --  80   ALT  --  21  --  15 64*   CR  --  1.00  --  0.85 0.86   GFRESTIMATED  --  55*  --  65 65   GFRESTBLACK  --  64  --  79 78   POTASSIUM  --  4.3  --  5.0 4.5   TSH  --  0.94  --   --  0.89      BP Readings from Last 3 Encounters:   05/17/19 118/70   04/12/19 138/70   04/05/19 132/60    Wt Readings from Last 3 Encounters:   05/17/19 56.7 kg (125 lb)   08/20/18 56.9 kg (125 lb 6.4 oz)   05/22/18 56.8 kg (125 lb 3.2 oz)                    ROS:  Constitutional, HEENT, cardiovascular, pulmonary, gi and gu systems are negative, except as otherwise noted.    OBJECTIVE:     /70 (BP Location: Right arm, Cuff Size: Adult Regular)   Pulse 60   Temp 97.9  F (36.6  C) (Tympanic)   Resp 18   Ht 1.537 m (5' 0.5\")   Wt 56.7 kg (125 lb)   BMI 24.01 kg/m    Body mass index is 24.01 kg/m .  GENERAL: healthy, alert and no distress  EYES: Eyes grossly normal to inspection, PERRL and conjunctivae and sclerae normal  HENT: ear canals and TM's normal, nose and mouth without ulcers or lesions  NECK: no adenopathy, no asymmetry, masses, or scars and thyroid normal to palpation  RESP: lungs clear to auscultation - no rales, rhonchi or wheezes  CV: regular rate and rhythm, normal S1 S2, no S3 or S4, no murmur, click or rub, no peripheral edema and peripheral pulses strong  MS: no gross musculoskeletal defects noted, no edema  SKIN: 1 mm irregular mole to the back of the neck.Examination of the rash to the hands  reveals: Eczema: dry, slightly raised, red patches with mild flaking.  NEURO: Normal strength and tone, mentation intact and speech normal  PSYCH: mentation appears normal, affect normal/bright        ASSESSMENT/PLAN:   (L98.9) Skin lesion  (primary encounter diagnosis)  Comment: Multiple back of the neck did do biopsy  Plan: Surgical pathology exam      (L30.9) Eczema, unspecified type  Comment: Eczema noted to hands  Plan: triamcinolone (KENALOG) 0.1 % external cream      "     YANY Villaseñor CNP  [facility]

## 2019-05-17 NOTE — PATIENT INSTRUCTIONS
Patient Education     Atopic Dermatitis (Adult)  Atopic dermatitis is a dry, itchy, red rash. It s also called eczema. The rash is chronic, or ongoing. It can come and go over time. The disease is often passed down in families. It causes a problem with the skin barrier that makes the skin more sensitive to the environment and other factors. The increased skin sensitivity causes an itch, which causes scratching. Scratching can worsen the itching or also break the skin. This can put the skin at risk of infection.  The condition is most common in people with asthma, hay fever, hives, or dry or sensitive skin. The rash may be caused by extreme heat or heavy sweating. Skin irritants can cause the rash to flare up. These can include wool or silk clothing, grease, oils, some medicines, and harsh soaps and detergents. Emotional stress can also be a trigger.  Treatment is done to relieve the itching and inflammation of the skin.  Home care  Follow these tips to care for your condition:    Keep the areas of rash clean by bathing at least every other day. Use lukewarm water to bathe. Don t use hot water, which can dry out the skin.    Don t use soaps with strong detergents. Use mild soaps made for sensitive skin.    Apply a cream or ointment to damp skin right after bathing.    Avoid things that irritate your skin. Wear absorbent, soft fabrics next to the skin rather than rough or scratchy materials.    Use mild laundry soap free of scents and perfumes. Make sure to rinse all the soap out of your clothes.    Treat any skin infection as directed.    Use oral diphenhydramine to help reduce itching. This is an antihistamine you can buy at drug and grocery stores. It can make you sleepy, so use lower doses during the daytime. Or you can use loratadine. This is an antihistamine that will not make you sleepy. Do not use diphenhydramine if you have glaucoma or have trouble urinating due to an enlarged prostate.  Follow-up care  See  your healthcare provider, or as advised. If your symptoms don t get better or if they get worse in the next 7 days, make an appointment with your healthcare provider.  When to seek medical advice  Call your healthcare provider right away  if any of these occur:    Increasing area of redness or pain in the skin    Yellow crusts or wet drainage from the rash    Fever of 100.4 F (38 C) or higher, or as directed by your healthcare provider  Date Last Reviewed: 9/1/2016 2000-2018 [employer]. [address] [place], [place] [id number]. All rights reserved. This information is not intended as a substitute for professional medical care. Always follow your healthcare professional's instructions.

## 2019-05-22 ENCOUNTER — APPOINTMENT (OUTPATIENT)
Dept: CT IMAGING | Facility: CLINIC | Age: 74
End: 2019-05-22
Attending: EMERGENCY MEDICINE
Payer: COMMERCIAL

## 2019-05-22 ENCOUNTER — HOSPITAL ENCOUNTER (EMERGENCY)
Facility: CLINIC | Age: 74
Discharge: OTHER INSTITUTION | End: 2019-05-23
Attending: EMERGENCY MEDICINE | Admitting: EMERGENCY MEDICINE
Payer: COMMERCIAL

## 2019-05-22 DIAGNOSIS — Z87.891 PERSONAL HISTORY OF TOBACCO USE, PRESENTING HAZARDS TO HEALTH: ICD-10-CM

## 2019-05-22 DIAGNOSIS — R79.89 ELEVATED LFTS: ICD-10-CM

## 2019-05-22 DIAGNOSIS — R11.2 NON-INTRACTABLE VOMITING WITH NAUSEA, UNSPECIFIED VOMITING TYPE: ICD-10-CM

## 2019-05-22 DIAGNOSIS — K83.8 COMMON BILE DUCT DILATATION: ICD-10-CM

## 2019-05-22 LAB
ALBUMIN SERPL-MCNC: 4.5 G/DL (ref 3.4–5)
ALP SERPL-CCNC: 87 U/L (ref 40–150)
ALT SERPL W P-5'-P-CCNC: 241 U/L (ref 0–50)
ANION GAP SERPL CALCULATED.3IONS-SCNC: 8 MMOL/L (ref 3–14)
AST SERPL W P-5'-P-CCNC: 135 U/L (ref 0–45)
BASOPHILS # BLD AUTO: 0 10E9/L (ref 0–0.2)
BASOPHILS NFR BLD AUTO: 0.5 %
BILIRUB SERPL-MCNC: 0.6 MG/DL (ref 0.2–1.3)
BUN SERPL-MCNC: 17 MG/DL (ref 7–30)
CALCIUM SERPL-MCNC: 10 MG/DL (ref 8.5–10.1)
CHLORIDE SERPL-SCNC: 103 MMOL/L (ref 94–109)
CO2 SERPL-SCNC: 26 MMOL/L (ref 20–32)
COPATH REPORT: NORMAL
CREAT SERPL-MCNC: 0.76 MG/DL (ref 0.52–1.04)
DIFFERENTIAL METHOD BLD: NORMAL
EOSINOPHIL # BLD AUTO: 0.1 10E9/L (ref 0–0.7)
EOSINOPHIL NFR BLD AUTO: 1.4 %
ERYTHROCYTE [DISTWIDTH] IN BLOOD BY AUTOMATED COUNT: 12.8 % (ref 10–15)
GFR SERPL CREATININE-BSD FRML MDRD: 76 ML/MIN/{1.73_M2}
GLUCOSE SERPL-MCNC: 108 MG/DL (ref 70–99)
HCT VFR BLD AUTO: 45.1 % (ref 35–47)
HGB BLD-MCNC: 14.5 G/DL (ref 11.7–15.7)
IMM GRANULOCYTES # BLD: 0 10E9/L (ref 0–0.4)
IMM GRANULOCYTES NFR BLD: 0.4 %
LACTATE BLD-SCNC: 2.1 MMOL/L (ref 0.7–2)
LIPASE SERPL-CCNC: 198 U/L (ref 73–393)
LYMPHOCYTES # BLD AUTO: 2.4 10E9/L (ref 0.8–5.3)
LYMPHOCYTES NFR BLD AUTO: 28.2 %
MCH RBC QN AUTO: 29.2 PG (ref 26.5–33)
MCHC RBC AUTO-ENTMCNC: 32.2 G/DL (ref 31.5–36.5)
MCV RBC AUTO: 91 FL (ref 78–100)
MONOCYTES # BLD AUTO: 0.6 10E9/L (ref 0–1.3)
MONOCYTES NFR BLD AUTO: 6.8 %
NEUTROPHILS # BLD AUTO: 5.2 10E9/L (ref 1.6–8.3)
NEUTROPHILS NFR BLD AUTO: 62.7 %
NRBC # BLD AUTO: 0 10*3/UL
NRBC BLD AUTO-RTO: 0 /100
PLATELET # BLD AUTO: 346 10E9/L (ref 150–450)
POTASSIUM SERPL-SCNC: 4.1 MMOL/L (ref 3.4–5.3)
PROT SERPL-MCNC: 8.6 G/DL (ref 6.8–8.8)
RBC # BLD AUTO: 4.96 10E12/L (ref 3.8–5.2)
SODIUM SERPL-SCNC: 137 MMOL/L (ref 133–144)
TROPONIN I SERPL-MCNC: <0.015 UG/L (ref 0–0.04)
WBC # BLD AUTO: 8.3 10E9/L (ref 4–11)

## 2019-05-22 PROCEDURE — 96375 TX/PRO/DX INJ NEW DRUG ADDON: CPT

## 2019-05-22 PROCEDURE — 25000125 ZZHC RX 250: Performed by: EMERGENCY MEDICINE

## 2019-05-22 PROCEDURE — 80053 COMPREHEN METABOLIC PANEL: CPT | Performed by: EMERGENCY MEDICINE

## 2019-05-22 PROCEDURE — 93005 ELECTROCARDIOGRAM TRACING: CPT

## 2019-05-22 PROCEDURE — 93010 ELECTROCARDIOGRAM REPORT: CPT | Mod: Z6 | Performed by: EMERGENCY MEDICINE

## 2019-05-22 PROCEDURE — 84484 ASSAY OF TROPONIN QUANT: CPT | Performed by: EMERGENCY MEDICINE

## 2019-05-22 PROCEDURE — 83690 ASSAY OF LIPASE: CPT | Performed by: EMERGENCY MEDICINE

## 2019-05-22 PROCEDURE — 99285 EMERGENCY DEPT VISIT HI MDM: CPT | Mod: 25

## 2019-05-22 PROCEDURE — 25000128 H RX IP 250 OP 636: Performed by: EMERGENCY MEDICINE

## 2019-05-22 PROCEDURE — 96374 THER/PROPH/DIAG INJ IV PUSH: CPT | Mod: 59

## 2019-05-22 PROCEDURE — 83605 ASSAY OF LACTIC ACID: CPT | Performed by: EMERGENCY MEDICINE

## 2019-05-22 PROCEDURE — 85025 COMPLETE CBC W/AUTO DIFF WBC: CPT | Performed by: EMERGENCY MEDICINE

## 2019-05-22 PROCEDURE — 99285 EMERGENCY DEPT VISIT HI MDM: CPT | Mod: 25 | Performed by: EMERGENCY MEDICINE

## 2019-05-22 PROCEDURE — 25800030 ZZH RX IP 258 OP 636: Performed by: EMERGENCY MEDICINE

## 2019-05-22 PROCEDURE — 96361 HYDRATE IV INFUSION ADD-ON: CPT

## 2019-05-22 PROCEDURE — 74177 CT ABD & PELVIS W/CONTRAST: CPT

## 2019-05-22 RX ORDER — IOPAMIDOL 755 MG/ML
61 INJECTION, SOLUTION INTRAVASCULAR ONCE
Status: COMPLETED | OUTPATIENT
Start: 2019-05-22 | End: 2019-05-22

## 2019-05-22 RX ORDER — MORPHINE SULFATE 2 MG/ML
4 INJECTION, SOLUTION INTRAMUSCULAR; INTRAVENOUS EVERY 30 MIN PRN
Status: COMPLETED | OUTPATIENT
Start: 2019-05-22 | End: 2019-05-23

## 2019-05-22 RX ORDER — ONDANSETRON 2 MG/ML
4 INJECTION INTRAMUSCULAR; INTRAVENOUS ONCE
Status: COMPLETED | OUTPATIENT
Start: 2019-05-22 | End: 2019-05-22

## 2019-05-22 RX ADMIN — IOPAMIDOL 61 ML: 755 INJECTION, SOLUTION INTRAVENOUS at 23:28

## 2019-05-22 RX ADMIN — SODIUM CHLORIDE 55 ML: 9 INJECTION, SOLUTION INTRAVENOUS at 23:28

## 2019-05-22 RX ADMIN — SODIUM CHLORIDE, POTASSIUM CHLORIDE, SODIUM LACTATE AND CALCIUM CHLORIDE 1000 ML: 600; 310; 30; 20 INJECTION, SOLUTION INTRAVENOUS at 22:50

## 2019-05-22 RX ADMIN — MORPHINE SULFATE 4 MG: 2 INJECTION, SOLUTION INTRAMUSCULAR; INTRAVENOUS at 23:11

## 2019-05-22 RX ADMIN — ONDANSETRON 4 MG: 2 INJECTION INTRAMUSCULAR; INTRAVENOUS at 22:50

## 2019-05-23 ENCOUNTER — HOSPITAL ENCOUNTER (INPATIENT)
Facility: CLINIC | Age: 74
LOS: 2 days | Discharge: HOME | DRG: 446 | End: 2019-05-25
Attending: STUDENT IN AN ORGANIZED HEALTH CARE EDUCATION/TRAINING PROGRAM | Admitting: HOSPITALIST
Payer: COMMERCIAL

## 2019-05-23 ENCOUNTER — APPOINTMENT (OUTPATIENT)
Dept: MRI IMAGING | Facility: CLINIC | Age: 74
DRG: 446 | End: 2019-05-23
Attending: PHYSICIAN ASSISTANT
Payer: COMMERCIAL

## 2019-05-23 VITALS
SYSTOLIC BLOOD PRESSURE: 174 MMHG | TEMPERATURE: 98.4 F | DIASTOLIC BLOOD PRESSURE: 84 MMHG | HEART RATE: 103 BPM | RESPIRATION RATE: 9 BRPM | OXYGEN SATURATION: 95 %

## 2019-05-23 DIAGNOSIS — R11.2 NAUSEA AND VOMITING, INTRACTABILITY OF VOMITING NOT SPECIFIED, UNSPECIFIED VOMITING TYPE: ICD-10-CM

## 2019-05-23 DIAGNOSIS — G89.4 CHRONIC PAIN SYNDROME: Primary | Chronic | ICD-10-CM

## 2019-05-23 PROBLEM — R79.89 ELEVATED LFTS: Status: ACTIVE | Noted: 2019-05-23

## 2019-05-23 PROBLEM — Q44.5 BILE DUCT, COMMON, CYSTIC DILATATION: Status: ACTIVE | Noted: 2019-05-23

## 2019-05-23 PROCEDURE — 25000128 H RX IP 250 OP 636: Performed by: HOSPITALIST

## 2019-05-23 PROCEDURE — 25000128 H RX IP 250 OP 636: Performed by: EMERGENCY MEDICINE

## 2019-05-23 PROCEDURE — 12000000 ZZH R&B MED SURG/OB

## 2019-05-23 PROCEDURE — 25800025 ZZH RX 258: Performed by: HOSPITALIST

## 2019-05-23 PROCEDURE — 25800030 ZZH RX IP 258 OP 636: Performed by: HOSPITALIST

## 2019-05-23 PROCEDURE — 96376 TX/PRO/DX INJ SAME DRUG ADON: CPT

## 2019-05-23 PROCEDURE — 25500064 ZZH RX 255 OP 636: Performed by: HOSPITALIST

## 2019-05-23 PROCEDURE — 74183 MRI ABD W/O CNTR FLWD CNTR: CPT

## 2019-05-23 PROCEDURE — A9585 GADOBUTROL INJECTION: HCPCS | Performed by: HOSPITALIST

## 2019-05-23 PROCEDURE — 99223 1ST HOSP IP/OBS HIGH 75: CPT | Mod: AI | Performed by: HOSPITALIST

## 2019-05-23 PROCEDURE — 25000132 ZZH RX MED GY IP 250 OP 250 PS 637: Performed by: HOSPITALIST

## 2019-05-23 RX ORDER — FLUTICASONE PROPIONATE 50 MCG
1-2 SPRAY, SUSPENSION (ML) NASAL DAILY PRN
Status: DISCONTINUED | OUTPATIENT
Start: 2019-05-23 | End: 2019-05-25 | Stop reason: HOSPADM

## 2019-05-23 RX ORDER — TRIAMCINOLONE ACETONIDE 1 MG/G
CREAM TOPICAL 2 TIMES DAILY PRN
Status: DISCONTINUED | OUTPATIENT
Start: 2019-05-23 | End: 2019-05-25 | Stop reason: HOSPADM

## 2019-05-23 RX ORDER — PAROXETINE 20 MG/1
20 TABLET, FILM COATED ORAL AT BEDTIME
COMMUNITY
End: 2020-01-09

## 2019-05-23 RX ORDER — ACYCLOVIR 200 MG/1
30 CAPSULE ORAL ONCE
Status: COMPLETED | OUTPATIENT
Start: 2019-05-23 | End: 2019-05-23

## 2019-05-23 RX ORDER — BACLOFEN 10 MG/1
10 TABLET ORAL 3 TIMES DAILY PRN
Status: DISCONTINUED | OUTPATIENT
Start: 2019-05-23 | End: 2019-05-25 | Stop reason: HOSPADM

## 2019-05-23 RX ORDER — POTASSIUM CHLORIDE 1.5 G/1.58G
20-40 POWDER, FOR SOLUTION ORAL
Status: DISCONTINUED | OUTPATIENT
Start: 2019-05-23 | End: 2019-05-25 | Stop reason: HOSPADM

## 2019-05-23 RX ORDER — ONDANSETRON 4 MG/1
4 TABLET, ORALLY DISINTEGRATING ORAL EVERY 6 HOURS PRN
Status: DISCONTINUED | OUTPATIENT
Start: 2019-05-23 | End: 2019-05-25 | Stop reason: HOSPADM

## 2019-05-23 RX ORDER — PAROXETINE 20 MG/1
20 TABLET, FILM COATED ORAL AT BEDTIME
Status: DISCONTINUED | OUTPATIENT
Start: 2019-05-23 | End: 2019-05-25 | Stop reason: HOSPADM

## 2019-05-23 RX ORDER — DILTIAZEM HYDROCHLORIDE 180 MG/1
180 CAPSULE, EXTENDED RELEASE ORAL DAILY
Status: DISCONTINUED | OUTPATIENT
Start: 2019-05-23 | End: 2019-05-25 | Stop reason: HOSPADM

## 2019-05-23 RX ORDER — PAROXETINE 40 MG/1
20 TABLET, FILM COATED ORAL AT BEDTIME
Status: ON HOLD | COMMUNITY
End: 2019-05-23

## 2019-05-23 RX ORDER — GADOBUTROL 604.72 MG/ML
6 INJECTION INTRAVENOUS ONCE
Status: COMPLETED | OUTPATIENT
Start: 2019-05-23 | End: 2019-05-23

## 2019-05-23 RX ORDER — ACETAMINOPHEN 650 MG/1
650 SUPPOSITORY RECTAL EVERY 4 HOURS PRN
Status: DISCONTINUED | OUTPATIENT
Start: 2019-05-23 | End: 2019-05-25 | Stop reason: HOSPADM

## 2019-05-23 RX ORDER — POTASSIUM CHLORIDE 29.8 MG/ML
20 INJECTION INTRAVENOUS
Status: DISCONTINUED | OUTPATIENT
Start: 2019-05-23 | End: 2019-05-25 | Stop reason: HOSPADM

## 2019-05-23 RX ORDER — IBUPROFEN 400 MG/1
400 TABLET, FILM COATED ORAL EVERY 6 HOURS PRN
Status: DISCONTINUED | OUTPATIENT
Start: 2019-05-23 | End: 2019-05-25 | Stop reason: HOSPADM

## 2019-05-23 RX ORDER — LISINOPRIL 20 MG/1
20 TABLET ORAL DAILY
Status: DISCONTINUED | OUTPATIENT
Start: 2019-05-23 | End: 2019-05-25 | Stop reason: HOSPADM

## 2019-05-23 RX ORDER — HYDROCODONE BITARTRATE AND ACETAMINOPHEN 5; 325 MG/1; MG/1
1-2 TABLET ORAL EVERY 4 HOURS PRN
Status: DISCONTINUED | OUTPATIENT
Start: 2019-05-23 | End: 2019-05-25 | Stop reason: HOSPADM

## 2019-05-23 RX ORDER — LORATADINE 10 MG/1
10 TABLET ORAL DAILY PRN
Status: DISCONTINUED | OUTPATIENT
Start: 2019-05-23 | End: 2019-05-25 | Stop reason: HOSPADM

## 2019-05-23 RX ORDER — POLYETHYLENE GLYCOL 3350 17 G/17G
17 POWDER, FOR SOLUTION ORAL DAILY PRN
Status: DISCONTINUED | OUTPATIENT
Start: 2019-05-23 | End: 2019-05-25 | Stop reason: HOSPADM

## 2019-05-23 RX ORDER — ONDANSETRON 2 MG/ML
4 INJECTION INTRAMUSCULAR; INTRAVENOUS EVERY 6 HOURS PRN
Status: DISCONTINUED | OUTPATIENT
Start: 2019-05-23 | End: 2019-05-25 | Stop reason: HOSPADM

## 2019-05-23 RX ORDER — NALOXONE HYDROCHLORIDE 0.4 MG/ML
.1-.4 INJECTION, SOLUTION INTRAMUSCULAR; INTRAVENOUS; SUBCUTANEOUS
Status: DISCONTINUED | OUTPATIENT
Start: 2019-05-23 | End: 2019-05-25 | Stop reason: HOSPADM

## 2019-05-23 RX ORDER — IBUPROFEN 200 MG
400-600 TABLET ORAL EVERY 8 HOURS PRN
COMMUNITY

## 2019-05-23 RX ORDER — MORPHINE SULFATE 2 MG/ML
2-4 INJECTION, SOLUTION INTRAMUSCULAR; INTRAVENOUS
Status: DISCONTINUED | OUTPATIENT
Start: 2019-05-23 | End: 2019-05-25 | Stop reason: HOSPADM

## 2019-05-23 RX ORDER — ACETAMINOPHEN 325 MG/1
650 TABLET ORAL EVERY 4 HOURS PRN
Status: DISCONTINUED | OUTPATIENT
Start: 2019-05-23 | End: 2019-05-25 | Stop reason: HOSPADM

## 2019-05-23 RX ORDER — POTASSIUM CHLORIDE 7.45 MG/ML
10 INJECTION INTRAVENOUS
Status: DISCONTINUED | OUTPATIENT
Start: 2019-05-23 | End: 2019-05-25 | Stop reason: HOSPADM

## 2019-05-23 RX ORDER — MORPHINE SULFATE 4 MG/ML
4 INJECTION, SOLUTION INTRAMUSCULAR; INTRAVENOUS
Status: DISCONTINUED | OUTPATIENT
Start: 2019-05-23 | End: 2019-05-23 | Stop reason: HOSPADM

## 2019-05-23 RX ORDER — SODIUM CHLORIDE 9 MG/ML
INJECTION, SOLUTION INTRAVENOUS CONTINUOUS
Status: DISCONTINUED | OUTPATIENT
Start: 2019-05-23 | End: 2019-05-25

## 2019-05-23 RX ORDER — ZOLPIDEM TARTRATE 5 MG/1
5 TABLET ORAL
Status: DISCONTINUED | OUTPATIENT
Start: 2019-05-23 | End: 2019-05-24 | Stop reason: DRUGHIGH

## 2019-05-23 RX ORDER — POTASSIUM CL/LIDO/0.9 % NACL 10MEQ/0.1L
10 INTRAVENOUS SOLUTION, PIGGYBACK (ML) INTRAVENOUS
Status: DISCONTINUED | OUTPATIENT
Start: 2019-05-23 | End: 2019-05-25 | Stop reason: HOSPADM

## 2019-05-23 RX ORDER — POTASSIUM CHLORIDE 1500 MG/1
20-40 TABLET, EXTENDED RELEASE ORAL
Status: DISCONTINUED | OUTPATIENT
Start: 2019-05-23 | End: 2019-05-25 | Stop reason: HOSPADM

## 2019-05-23 RX ADMIN — LISINOPRIL 20 MG: 20 TABLET ORAL at 11:13

## 2019-05-23 RX ADMIN — SODIUM CHLORIDE: 9 INJECTION, SOLUTION INTRAVENOUS at 22:12

## 2019-05-23 RX ADMIN — POLYETHYLENE GLYCOL 3350 17 G: 17 POWDER, FOR SOLUTION ORAL at 22:21

## 2019-05-23 RX ADMIN — MORPHINE SULFATE 4 MG: 2 INJECTION, SOLUTION INTRAMUSCULAR; INTRAVENOUS at 02:03

## 2019-05-23 RX ADMIN — ONDANSETRON 4 MG: 2 INJECTION INTRAMUSCULAR; INTRAVENOUS at 11:16

## 2019-05-23 RX ADMIN — GADOBUTROL 6 ML: 604.72 INJECTION INTRAVENOUS at 13:29

## 2019-05-23 RX ADMIN — HYDROCODONE BITARTRATE AND ACETAMINOPHEN 1 TABLET: 5; 325 TABLET ORAL at 20:45

## 2019-05-23 RX ADMIN — DILTIAZEM HYDROCHLORIDE 180 MG: 180 CAPSULE, EXTENDED RELEASE ORAL at 11:13

## 2019-05-23 RX ADMIN — RANITIDINE 150 MG: 150 TABLET ORAL at 11:13

## 2019-05-23 RX ADMIN — MORPHINE SULFATE 4 MG: 2 INJECTION, SOLUTION INTRAMUSCULAR; INTRAVENOUS at 02:54

## 2019-05-23 RX ADMIN — MORPHINE SULFATE 4 MG: 2 INJECTION, SOLUTION INTRAMUSCULAR; INTRAVENOUS at 00:41

## 2019-05-23 RX ADMIN — MORPHINE SULFATE 4 MG: 4 INJECTION INTRAVENOUS at 07:42

## 2019-05-23 RX ADMIN — SODIUM CHLORIDE 30 ML: 9 INJECTION, SOLUTION INTRAMUSCULAR; INTRAVENOUS; SUBCUTANEOUS at 13:30

## 2019-05-23 RX ADMIN — SODIUM CHLORIDE: 9 INJECTION, SOLUTION INTRAVENOUS at 12:10

## 2019-05-23 RX ADMIN — RANITIDINE 150 MG: 150 TABLET ORAL at 22:11

## 2019-05-23 RX ADMIN — BACLOFEN 10 MG: 10 TABLET ORAL at 12:08

## 2019-05-23 RX ADMIN — MORPHINE SULFATE 2 MG: 2 INJECTION, SOLUTION INTRAMUSCULAR; INTRAVENOUS at 11:16

## 2019-05-23 RX ADMIN — BACLOFEN 10 MG: 10 TABLET ORAL at 22:11

## 2019-05-23 RX ADMIN — PAROXETINE HYDROCHLORIDE 20 MG: 20 TABLET, FILM COATED ORAL at 22:11

## 2019-05-23 ASSESSMENT — ACTIVITIES OF DAILY LIVING (ADL)
ADLS_ACUITY_SCORE: 10
ADLS_ACUITY_SCORE: 10
ADLS_ACUITY_SCORE: 15

## 2019-05-23 ASSESSMENT — ENCOUNTER SYMPTOMS
FLANK PAIN: 0
ABDOMINAL DISTENTION: 1
NAUSEA: 1
DIAPHORESIS: 1
LIGHT-HEADEDNESS: 0
COUGH: 0
ABDOMINAL PAIN: 0
VOMITING: 1
CONSTIPATION: 1
WEAKNESS: 0
ACTIVITY CHANGE: 1
BACK PAIN: 0
FEVER: 1
FATIGUE: 1
DIARRHEA: 0
DYSURIA: 0
CHEST TIGHTNESS: 0
SHORTNESS OF BREATH: 0
APPETITE CHANGE: 1
CHILLS: 1
HEADACHES: 0
PALPITATIONS: 0
NECK PAIN: 0

## 2019-05-23 NOTE — ED NOTES
Back from CT states feeling 90% better than on arrival, c/o mild aches/pain, much better, no nausea. Lights dimmed for comfort, spouse at bedside, IVF cont.

## 2019-05-23 NOTE — CONSULTS
[facility]  Gastroenterology Consultation    Tracy Galloway  [id number]   [place] [address]  74 year old female    Admission Date/Time: 5/23/2019  Primary Care Provider: Gwendolyn Solis    We were asked to see the patient in consultation by Dr. Martinez for evaluation of possible obstructed biliary tract.        HPI:  Tracy Galloway is a 74 year old female who has a past medical history of hypertension, narcotic dependence, depression, and TIA who presents with nausea and vomiting x 24 hours.  The symptoms began 2 nights ago approximately one hour after eating salad.  She developed severe nausea with persistent vomiting.  She denies abdominal pain.  She had a single normal bowel movement and denies diarrhea and constipation.  No fever but significant diaphoresis and chills.  Her nausea is now improved, she has had no further vomiting.    History of previous cholecystectomy for gallstones.  CT A/P with contrast showed moderate dilatation of the common bile duct, mild intrahepatic dilatation, and mild dilatation of the main pancreatic duct.  No stones or stenosis visualized.  A moderate to large amount of stool noted within the colon.    Liver function tests showed total bilirubin of 0.6, , , AP 87.  WBC 8.3.    ROS: A comprehensive ten point review of systems was negative aside from those in mentioned in the HPI.      MEDICATIONS:   Current Facility-Administered Medications on File Prior to Encounter:  [COMPLETED] iopamidol (ISOVUE-370) solution 61 mL   [COMPLETED] lactated ringers BOLUS 1,000 mL   [COMPLETED] morphine (PF) injection 4 mg   [COMPLETED] ondansetron (ZOFRAN) injection 4 mg   [COMPLETED] Saline Flush     Current Outpatient Medications on File Prior to Encounter:  baclofen (LIORESAL) 10 MG tablet Take 1 tablet (10 mg) by mouth 3 times daily (Patient taking differently: Take 10 mg by mouth At Bedtime )   diltiazem ER (DILT-XR) 180 MG 24 hr capsule  Take 1 capsule (180 mg) by mouth daily   DOCUSATE SODIUM PO Take 100 mg by mouth daily as needed for constipation    fluticasone (FLONASE) 50 MCG/ACT spray Spray 1-2 sprays into both nostrils daily (Patient taking differently: Spray 1-2 sprays into both nostrils daily as needed for rhinitis )   [START ON 6/12/2019] HYDROcodone-acetaminophen (NORCO)  MG per tablet Take 1 tablet by mouth 2 times daily And 10 days of the month may take 3 daily if needed (30 day supply)   ibuprofen (ADVIL/MOTRIN) 200 MG tablet Take 400-600 mg by mouth every 8 hours as needed for mild pain   lisinopril (PRINIVIL/ZESTRIL) 20 MG tablet Take 1 tablet (20 mg) by mouth daily   loratadine (CLARITIN) 10 MG tablet Take 10 mg by mouth daily as needed for allergies    medical cannabis (Patient's own supply) Take 1 Dose by mouth every evening (The purpose of this order is to document that the patient reports taking medical cannabis.  This is not a prescription, and is not used to certify that the patient has a qualifying medical condition.)1 ml oral qpm   PARoxetine (PAXIL) 20 MG tablet Take 20 mg by mouth At Bedtime Prescribed as 40mg (20mg x2 tabs), but Tracy is only taking 20mg (1 tablet)   polyethylene glycol-propylene glycol (SYSTANE ULTRA) 0.4-0.3 % SOLN ophthalmic solution Place 1 drop into both eyes 4 times daily as needed for dry eyes   ranitidine (ZANTAC) 150 MG tablet TAKE ONE TABLET BY MOUTH TWICE DAILY   triamcinolone (KENALOG) 0.1 % external cream Apply topically 2 times daily (Patient taking differently: Apply topically 2 times daily as needed (irritation on face) )   valACYclovir (VALTREX) 1000 mg tablet Take 1 tablet (1,000 mg) by mouth 2 times daily for 1 day As needed fo rbreak outs   zolpidem (AMBIEN) 10 MG tablet TAKE 1/2-1 TABLET BY MOUTH NIGHTLY AS NEEDED FOR SLEEP       ALLERGIES: No Known Allergies    Past Medical History:   Diagnosis Date     Anxiety w/panic attacks      Chest pain     6/2016 Nuclear study - mild  ischemia of apex and anteroapical wall     Chronic LBP      Depressive disorder, not elsewhere classified      Facial pain 2009     HTN      Insomnia, unspecified      Migraine, unspecified, without mention of intractable migraine without mention of status migrainosus      Osteopenia      Plantar fasciitis      Prinzmetal angina (H) 2007     Vision loss     monovision in right eye       Past Surgical History:   Procedure Laterality Date     CHOLECYSTECTOMY, LAPOROSCOPIC      s/p Cholecystectomy, Laparoscopic     COLONOSCOPY  2012    Procedure: COLONOSCOPY;  Colonoscopy;  Surgeon: Tyrell Brown MD;  Location: [facility]     EYE SURGERY       HC COLONOSCOPY THRU STOMA, DIAGNOSTIC  3-    at outside clinic.  Normal.     HYSTERECTOMY, SUNDAR      severe endometriosis     SURGICAL HISTORY OF -       breast reduction     SURGICAL HISTORY OF -   03    coronary angiogram     SURGICAL HISTORY OF -       Refractive surgery right     TONSILLECTOMY           SOCIAL HISTORY:  Social History     Tobacco Use     Smoking status: Former Smoker     Types: Cigarettes     Last attempt to quit: 10/30/1962     Years since quittin.6     Smokeless tobacco: Never Used   Substance Use Topics     Alcohol use: Yes     Alcohol/week: 2.4 oz     Types: 4 Standard drinks or equivalent per week     Comment: occasional     Drug use: No     Comment: medical marijuana in the past       FAMILY HISTORY:  Family History   Problem Relation Age of Onset     Hypertension Mother      Heart Disease Mother      Hypertension Father      Circulatory Father         PAD     Alzheimer Disease Maternal Grandmother         dementia     Cerebrovascular Disease Maternal Grandmother      Heart Disease Maternal Grandfather      Diabetes Maternal Grandfather        PHYSICAL EXAM:   /73   Temp 99  F (37.2  C) (Oral)   Resp 16   Wt 59.2 kg (130 lb 9.6 oz)   SpO2 96%   BMI 25.09 kg/m      Constitutional: NAD,  comfortable  Cardiovascular: RRR, normal S1 and S2, no r/c/g/m  Respiratory: CTAB  Psychiatric: mentation appears normal and affect normal  Head: Normocephalic. Atraumatic.    Neck: Neck supple. No adenopathy. Thyroid symmetric, normal size, trachea midline  Eyes:  PERRL, no icterus  ENT: Hearing adequate, pharynx normal without erythema or exudate  Abdomen:   Auscultation: + BS  Appearance: non-distended  Palpation: non-tender  NEURO: grossly negative  SKIN: no suspicious lesions or rashes  LYMPH:   anterior cervical: no adenopathy  posterior cervical: no adenopathy  supraclavicular: no adenopathy          ADDITIONAL COMMENTS:   I reviewed the patient's new clinical lab test results.   Recent Labs   Lab Test 05/22/19  2240 02/15/19  1154 01/24/17  0811   WBC 8.3 6.2 5.2   HGB 14.5 12.2 11.7   MCV 91 94 93    321 322     Recent Labs   Lab Test 05/22/19  2240 02/15/19  1154 04/27/18  0812 03/14/18  1344    137  --  138   POTASSIUM 4.1 4.3  --  5.0   CHLORIDE 103 105  --  105   CO2 26 28  --  27   BUN 17 25  --  22   CR 0.76 1.00  --  0.85   ANIONGAP 8 4  --  6   SUE 10.0 8.8  --  8.3*   * 90 83 153*     Recent Labs   Lab Test 05/22/19 2240 02/15/19  1154 03/14/18  1344 07/22/17  0913 07/15/17  0505 01/24/17  0811 10/30/16  2105  02/17/16  1405 01/07/16  1119  12/15/15  1144 12/01/15  1340   ALBUMIN 4.5 3.6 3.1*  --   --  3.3*  --    < > 3.5 3.2*  --  3.2* 3.4   BILITOTAL 0.6 0.2 0.5  --   --  0.2  --    < > 0.1* 0.3  --  0.2 0.2   DBIL  --  <0.1  --   --   --  <0.1  --   --   --  <0.1  --   --  <0.1   * 21 15  --   --  64*  --    < > 26 22  --  64* 14   * 17 16  --   --  32  --    < > 17 17  --  41 16   ALKPHOS 87 60 50  --   --  64  --    < > 66 61  --  75 59   PROTEIN  --   --   --  Negative 30*  --  Negative  --   --   --    < >  --   --    LIPASE 198  --   --   --   --   --   --   --  187  --   --   --  167   AMYLASE  --   --   --   --   --   --   --   --  37  --   --  36  --      < > = values in this interval not displayed.             .    CONSULTATION ASSESSMENT AND PLAN:      75 yo female with history of cholecystectomy who presents with sudden onset nausea/vomiting after eating a salad 2 nights ago.  CT A/P shows dilated bile ducts but no stone or definite stenosis noted.  Transaminases elevated but otherwise normal total bilirubin, AP.    -  Trend liver function tests.  -  Due to unusual presentation, recommend MRCP.  If needed, EUS and/or ERCP tomorrow.      I discussed the patient's findings and plan with Dr. Brown.          Nahomy Corral, PAC  [facility]  Office:  [phone] call if needed after 5PM  Cell:  [phone], not available after 5PM at this number

## 2019-05-23 NOTE — H&P
Admitted:     05/23/2019      PRIMARY CARE PROVIDER:  Gwendolyn Solis MD      CHIEF COMPLAINT:  Nausea and vomiting.      HISTORY OF PRESENT ILLNESS:  The patient is a 74-year-old female with a history of hypertension, narcotic dependence for chronic pain, depression and apparently prior TIAs, who presented to an outside emergency department with nausea and vomiting for 1-3 days.  This has been persistent, unable to keep p.o. down.  She reports hourly vomiting.  No hematemesis.  Cramping, but no abdominal pain.  No diarrhea.  Denies fevers but has had sweats and chills.  No known sick contacts.  She does have a history of cholecystectomy but no other abdominal surgeries.  In the Emergency Department, a CT of the abdomen demonstrated dilatation of the common bile duct and biliary system including the pancreatic duct without additional a visualized cause so transfer was recommended to higher level of care.  [facility] did not have bed availability so transferred to [facility] for further evaluation and care.      PAST MEDICAL HISTORY:   1.  Depression.   2.  Insomnia.   3.  TIA.   4.  Narcotic dependence.   5.  Chronic pain medical on medical THC.   6.  Hypertension.      PAST SURGICAL HISTORY:   1.  Laparoscopic cholecystectomy in the 1990s.   2.  Colonoscopy.   3.  Hysterectomy.   4.  Breast reduction.   5.  Tonsillectomy.      PRIOR TO ADMIT MEDICATIONS:   1.  Baclofen 10 mg t.i.d. p.r.n. with her mostly taking it at night prior to the most recent acute illness.   2.  Diltiazem  mg daily.   3.  Docusate 100 mg daily p.r.n. constipation.   4.  Flonase.   5.  Norco 1 tablet b.i.d.   6.  Ibuprofen 400-600 mg q.8. hours.   7.  Lisinopril 20 mg daily.   8.  Loratadine 10 mg daily p.r.n. allergies.   9.  Medical cannabis with her own supply.  The patient follows with Dr. Gwendolyn Solis for this.   10.  Paxil 20 mg at bedtime.   11.  Zantac 150 mg b.i.d.   12.  Valacyclovir p.r.n.   13.   Ambien 5 mg at bedtime p.r.n. insomnia.      ALLERGIES:  NO KNOWN DRUG ALLERGIES.      FAMILY HISTORY:  Positive for coronary artery disease and hypertension.      SOCIAL HISTORY:  The patient is a former smoker, having quit in 1962.  She has about 4 alcohol drinks per week.  Uses medical marijuana as noted above.      REVIEW OF SYSTEMS:  Ten-point review of systems with pertinent positives and negatives per HPI, otherwise negative.      PHYSICAL EXAMINATION:   VITAL SIGNS:  Blood pressure 179/73, heart rate of 106, temperature 99.   CONSTITUTIONAL:  Patient is alert and oriented and well-appearing, talkative.   PULMONARY:  Clear to auscultation bilaterally.  No wheeze, rhonchi or rales.   CARDIAC:  Normal S1, S2, regular rate and rhythm.   ABDOMEN:  Soft, mostly nontender unless really deep palpation.  There is no guarding or rebound.   MUSCULOSKELETAL:  Range of motion intact in all extremities.  No edema.   NEUROLOGIC:  Cranial nerves II-XII grossly intact.  She is alert and oriented x3.   SKIN:  No lesions appreciated.   PSYCHIATRIC:  Appropriate affect.      LABORATORY DATA:  EKG shows sinus tachycardia at 106 at the outside hospital.  White count 8.3, hemoglobin 14.5, platelets 346.  Sodium 137, potassium 4.1, creatinine 0.76.  Lipase 198, alkaline phosphatase 87, , .  Lactic acid 2.1.  Troponin is negative.      IMAGING:  CT of the abdomen and pelvis shows moderate dilatation of the common bile duct, mild intrahepatic biliary dilatation and mild dilatation of the main pancreatic duct without visualized cause.  Stenosis or obstructing lesion at the ampulla of Vater is a consideration.  Recommend clinical correlation.      ASSESSMENT AND PLAN:  The patient is a 74-year-old female with a past medical history of hypertension, TIA, chronic pain on chronic pain contract, does use medical marijuana, who is transferred from an outside facility with concern of biliary dilatation of unknown etiology or  significance.   1.  Biliary dilatation:  Unclear etiology.  CT with notation as above.  Elevated liver function tests but, interestingly, alkaline phosphatase and bilirubin are within normal limits.  Soft abdomen.  Unclear if the next step will be MRCP versus ERCP versus other.  Given this, we will go ahead in that it is early in the day and consult [facility] for their recommendations.  This is greatly appreciated.  In the meantime, we will keep her n.p.o. and on IV fluids.   2.  Nausea, vomiting:  Could be due to #1 versus gastroenteritis.  Again, will await records from [facility].   3.  Hypertension:  Hypertensive at this point.  No other end-organ findings, suspect secondary to her presentation.  We will get her back on her prior to admit diltiazem and lisinopril and will have PRNs available.   4.  Chronic pain:  She will have Norco and a minimal amount of IV breakthrough pain.  Also, will restart her medical cannabis; if she has this, she use for her chronic pain in the meantime, again unless otherwise directed by [facility].   5.  Depression and anxiety:  Continue prior to admit Paxil.   6.  Fluids, electrolytes and nutrition:  Surgically stable.  N.p.o., IV fluids at 100 an hour.  Recheck a CMP and CBC in the morning.   7.  Deep venous thrombosis prophylaxis:  Patient is usually ambulatory.   8.  Gastrointestinal prophylaxis:  Continue prior to admit Zantac.      CODE STATUS:  Full.      DISPOSITION:  Inpatient.         JOSE ORELLANA MD             D: 2019   T: 2019   MT: PK      Name:     SAMUEL PENG   MRN:      [id number]        Account:      [id number]   :      1945        Admitted:     2019                   Document: [id number]       cc: Gwendolyn Solis MD

## 2019-05-23 NOTE — ED NOTES
Bed assignment given by Ruben on Unit [address] Bed 628. Patient report can be called after 0700. Report # [id number].

## 2019-05-23 NOTE — ED PROVIDER NOTES
History     Chief Complaint   Patient presents with     Vomiting     for 24 hours, thinks food poisoning     HPI  Tracy Galloway is a 74 year old female with a history of hypertension, narcotic dependence, depression, and previous TIA presenting for evaluation of nausea and vomiting for the past roughly 24 hours.  Patient reports symptoms began yesterday evening around 5 PM about an hour after eating a salad.  No others ate the same food.  Patient reports abrupt onset of severe nausea with near hourly vomiting.  Reports some mild associated cramping before throat but denies pain outside of this.  Reports she did have a single normal bowel movement today without diarrhea.  Denies fever but overnight had multiple episodes of intense sweating intermixed with episodes of chills.  No known sick contacts.  Does report some abdominal bloating recently.  Has a history of previous cholecystectomy but no other abdominal surgeries.  Is on chronic opiates (10 mg of Vicodin twice daily) for pain but has been unable to take them due to the nausea and vomiting.  Denies any blood in the emesis.    Allergies:  No Known Allergies    Problem List:    Patient Active Problem List    Diagnosis Date Noted     Mild single current episode of major depressive disorder (H) 02/15/2019     Priority: Medium     Primary insomnia 02/23/2018     Priority: Medium     Advanced directives, counseling/discussion 07/18/2017     Priority: Medium     Advance Care Planning 8/1/2015: ACP Review of Chart / Resources Provided:  Reviewed chart for advance care plan.  Tracy Galloway has an up to date advance care plan on file.  Added by Nazia Jang       Mammogram declined 01/23/2017     Priority: Medium     Confusion 10/31/2016     Priority: Medium     TIA (transient ischemic attack) 10/30/2016     Priority: Medium     Narcotic dependence (H) 10/09/2016     Priority: Medium     Essential hypertension with goal blood pressure less than 140/90 08/22/2016      Priority: Medium     Health Care Home 12/27/2012     Priority: Medium     Kate GLENDY Braun-PHN  FPAFTAB / LIZ[facility]   [phone]    DX V65.8 REPLACED WITH [id number] [facility]ALTH CARE HOME (04/08/2013)       Chronic pain 11/02/2012     Priority: Medium     Patient is followed by Gwendolyn Solis MD for ongoing prescription of pain medication.  All refills should be approved by this provider only at face-to-face appointments - not by phone request.    Medication(s): norco .   Maximum quantity per month: 60  Clinic visit frequency required: Q 3 months     Controlled substance agreement:  Encounter-Level CSA - 7/7/15:               Controlled Substance Agreement - Scan on 7/10/2015  1:04 PM : CONTROLLED SUBSTANCE AGREEMENT (below)            Pain Clinic evaluation in the past: Yes       Date/Location:   2009 multiple facial pain evaluations neurology and pain clinic     DIRE Total Score(s):  No flowsheet data found.    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 Provider to review       Plantar fasciitis 05/03/2005     Priority: Medium        Past Medical History:    Past Medical History:   Diagnosis Date     Anxiety w/panic attacks      Chest pain      Chronic LBP      Depressive disorder, not elsewhere classified      Facial pain 12/17/2009     HTN      Insomnia, unspecified      Migraine, unspecified, without mention of intractable migraine without mention of status migrainosus      Osteopenia      Plantar fasciitis       Prinzmetal angina (H) 2007     Vision loss        Past Surgical History:    Past Surgical History:   Procedure Laterality Date     CHOLECYSTECTOMY, LAPOROSCOPIC      s/p Cholecystectomy, Laparoscopic     COLONOSCOPY  2012    Procedure: COLONOSCOPY;  Colonoscopy;  Surgeon: Tyrell Brown MD;  Location: [facility]     EYE SURGERY       HC COLONOSCOPY THRU STOMA, DIAGNOSTIC  3-    at outside clinic.  Normal.     HYSTERECTOMY, SUNDAR      severe endometriosis     SURGICAL HISTORY OF -       breast reduction     SURGICAL HISTORY OF -   03    coronary angiogram     SURGICAL HISTORY OF -       Refractive surgery right     TONSILLECTOMY         Family History:    Family History   Problem Relation Age of Onset     Hypertension Mother      Heart Disease Mother      Hypertension Father      Circulatory Father         PAD     Alzheimer Disease Maternal Grandmother         dementia     Cerebrovascular Disease Maternal Grandmother      Heart Disease Maternal Grandfather      Diabetes Maternal Grandfather        Social History:  Marital Status:   [2]  Social History     Tobacco Use     Smoking status: Former Smoker     Types: Cigarettes     Last attempt to quit: 10/30/1962     Years since quittin.6     Smokeless tobacco: Never Used   Substance Use Topics     Alcohol use: Yes     Alcohol/week: 2.4 oz     Types: 4 Standard drinks or equivalent per week     Comment: occasional     Drug use: No     Comment: medical marijuana in the past        Medications:      aspirin 81 MG EC tablet   baclofen (LIORESAL) 10 MG tablet   diltiazem ER (DILT-XR) 180 MG 24 hr capsule   DOCUSATE SODIUM PO   fluticasone (FLONASE) 50 MCG/ACT spray   [START ON 2019] HYDROcodone-acetaminophen (NORCO)  MG per tablet   ibuprofen (ADVIL,MOTRIN) 600 MG tablet   lisinopril (PRINIVIL/ZESTRIL) 20 MG tablet   loratadine (CLARITIN) 10 MG tablet   PARoxetine (PAXIL) 20 MG tablet   ranitidine (ZANTAC) 150 MG tablet    triamcinolone (KENALOG) 0.1 % external cream   valACYclovir (VALTREX) 1000 mg tablet   zolpidem (AMBIEN) 10 MG tablet         Review of Systems   Constitutional: Positive for activity change (decreased), appetite change (decreased), chills, diaphoresis (overnight last night), fatigue and fever (subjective).   HENT: Negative for congestion.    Respiratory: Negative for cough, chest tightness and shortness of breath.    Cardiovascular: Negative for chest pain, palpitations and leg swelling.   Gastrointestinal: Positive for abdominal distention, constipation (chronic ), nausea and vomiting. Negative for abdominal pain and diarrhea.   Genitourinary: Positive for decreased urine volume. Negative for dysuria and flank pain.   Musculoskeletal: Negative for back pain and neck pain.   Skin: Negative for rash.   Neurological: Negative for weakness, light-headedness and headaches.   All other systems reviewed and are negative.      Physical Exam   BP: (!) 194/91  Pulse: 110  Heart Rate: 128  Temp: 98.4  F (36.9  C)  Resp: 20  SpO2: 99 %      Physical Exam   Constitutional: She is oriented to person, place, and time. She appears well-developed and well-nourished.   Laying on her left side in the bed with eyes closed, appears uncomfortable.   Cardiovascular: Regular rhythm. Tachycardia present.   Tachycardic   Pulmonary/Chest: Effort normal.   Abdominal: Soft. Bowel sounds are normal. She exhibits distension. There is no tenderness. There is no guarding.   Musculoskeletal: Normal range of motion. She exhibits no edema.   Neurological: She is alert and oriented to person, place, and time.   Skin: Skin is warm. Capillary refill takes less than 2 seconds. She is diaphoretic (slight).   Psychiatric: She has a normal mood and affect.   Nursing note and vitals reviewed.      ED Course        Procedures               EKG Interpretation:      Interpreted by Ronni Sánchez  Time reviewed: 2222  Symptoms at time of EKG: nausea    Rhythm: sinus tachycardia  Rate: 106  Axis: Normal  Ectopy: none  Conduction: normal  ST Segments/ T Waves: No acute ischemic changes  Q Waves: none  Comparison to prior: New sinus tachycardia, likely otherwise similar to previous EKG dated 10/30/2016    Clinical Impression: Sinus tachycardia without acute abnormality            The Lactic acid level is elevated due to persistent vomiting, at this time there is no sign of severe sepsis or septic shock.         Results for orders placed or performed during the hospital encounter of 05/22/19 (from the past 24 hour(s))   CBC with platelets differential   Result Value Ref Range    WBC 8.3 4.0 - 11.0 10e9/L    RBC Count 4.96 3.8 - 5.2 10e12/L    Hemoglobin 14.5 11.7 - 15.7 g/dL    Hematocrit 45.1 35.0 - 47.0 %    MCV 91 78 - 100 fl    MCH 29.2 26.5 - 33.0 pg    MCHC 32.2 31.5 - 36.5 g/dL    RDW 12.8 10.0 - 15.0 %    Platelet Count 346 150 - 450 10e9/L    Diff Method Automated Method     % Neutrophils 62.7 %    % Lymphocytes 28.2 %    % Monocytes 6.8 %    % Eosinophils 1.4 %    % Basophils 0.5 %    % Immature Granulocytes 0.4 %    Nucleated RBCs 0 0 /100    Absolute Neutrophil 5.2 1.6 - 8.3 10e9/L    Absolute Lymphocytes 2.4 0.8 - 5.3 10e9/L    Absolute Monocytes 0.6 0.0 - 1.3 10e9/L    Absolute Eosinophils 0.1 0.0 - 0.7 10e9/L    Absolute Basophils 0.0 0.0 - 0.2 10e9/L    Abs Immature Granulocytes 0.0 0 - 0.4 10e9/L    Absolute Nucleated RBC 0.0    Comprehensive metabolic panel   Result Value Ref Range    Sodium 137 133 - 144 mmol/L    Potassium 4.1 3.4 - 5.3 mmol/L    Chloride 103 94 - 109 mmol/L    Carbon Dioxide 26 20 - 32 mmol/L    Anion Gap 8 3 - 14 mmol/L    Glucose 108 (H) 70 - 99 mg/dL    Urea Nitrogen 17 7 - 30 mg/dL    Creatinine 0.76 0.52 - 1.04 mg/dL    GFR Estimate 76 >60 mL/min/[1.73_m2]    GFR Estimate If Black 88 >60 mL/min/[1.73_m2]    Calcium 10.0 8.5 - 10.1 mg/dL    Bilirubin Total 0.6 0.2 - 1.3 mg/dL    Albumin 4.5 3.4 - 5.0 g/dL    Protein Total  8.6 6.8 - 8.8 g/dL    Alkaline Phosphatase 87 40 - 150 U/L     (H) 0 - 50 U/L     (H) 0 - 45 U/L   Lipase   Result Value Ref Range    Lipase 198 73 - 393 U/L   Lactic acid whole blood   Result Value Ref Range    Lactic Acid 2.1 (H) 0.7 - 2.0 mmol/L   Troponin I   Result Value Ref Range    Troponin I ES <0.015 0.000 - 0.045 ug/L   CT Abdomen Pelvis w Contrast    Narrative    CT ABDOMEN AND PELVIS WITH CONTRAST  5/22/2019 11:37 PM     HISTORY: Severe vomiting.    COMPARISON: None.    TECHNIQUE: Following the uneventful administration of 61 mL Isovue-370  intravenous contrast, helical sections were acquired from the top of  the diaphragm through the pubic symphysis. Coronal reconstructions  were generated. Radiation dose for this scan was reduced using  automated exposure control, adjustment of the mA and/or kV according  to the patient's size, or iterative reconstruction technique.    FINDINGS:  Abdomen: Prior cholecystectomy. Moderate dilatation of the entire  common bile duct, which measures up to 2.1 cm in diameter. Mild  intrahepatic biliary dilatation. Mild dilatation of the main  pancreatic duct. The liver, spleen, pancreas and adrenal glands are  otherwise unremarkable to the limits of a noncontrast CT scan. 0.4 cm  nonobstructing calculus in the inferior pole of the left kidney. A few  small cysts within both kidneys. No enlarged lymph nodes or free fluid  in the upper abdomen. Atherosclerotic calcification in the abdominal  aorta.    Scan through the lower chest is unremarkable.    Pelvis: The small and large bowel are normal in caliber. A moderate to  large amount of stool is present within the colon. The appendix is  unremarkable. No bowel wall thickening, pneumatosis or free  intraperitoneal gas. The uterus is not visualized. No enlarged lymph  nodes or free fluid in the pelvis.      Impression    IMPRESSION:  1. Moderate dilatation of the common bile duct, mild intrahepatic  biliary  dilatation, and mild dilatation of the main pancreatic duct,  without visualized cause. A stenosis or obstructing lesion at the  ampulla of Vater is a consideration. Recommend clinical correlation.  If clinically relevant, endoscopic imaging could be considered for  further evaluation.  2. No other cause of acute pain identified in the abdomen or pelvis.  3. A moderate to large amount of stool within the colon. No evidence  of bowel obstruction.       Medications   morphine (PF) injection 4 mg (4 mg Intravenous Given 5/23/19 0203)   lactated ringers BOLUS 1,000 mL (0 mLs Intravenous Stopped 5/23/19 0003)   ondansetron (ZOFRAN) injection 4 mg (4 mg Intravenous Given 5/22/19 2250)   iopamidol (ISOVUE-370) solution 61 mL (61 mLs Intravenous Given 5/22/19 2328)   Saline Flush (55 mLs Intravenous Given 5/22/19 2328)     Patient Vitals for the past 24 hrs:   BP Temp Temp src Pulse Heart Rate Resp SpO2   05/23/19 0215 183/82 -- -- -- -- -- 96 %   05/23/19 0200 195/82 -- -- 117 -- -- 95 %   05/23/19 0145 197/84 -- -- -- -- -- 94 %   05/23/19 0100 -- -- -- -- -- -- 94 %   05/23/19 0045 -- -- -- -- -- -- 98 %   05/23/19 0020 -- -- -- 103 -- -- 96 %   05/23/19 0000 184/84 -- -- 105 -- -- 93 %   05/22/19 2320 174/79 -- -- 99 98 9 --   05/22/19 2300 (!) 176/124 -- -- 110 112 27 --   05/22/19 2227 (!) 194/91 98.4  F (36.9  C) Oral -- 128 20 99 %       1:30 AM: Patient reassessed.  Overall feeling much better.  Nausea controlled.  No abdominal pain currently.  Abdominal exam soft and nontender.  Discussed lab and CT findings concerning for biliary obstruction.  Paging GI at the .  Patient currently complaining of her sciatica pain and chronic facial pain.    2:06 AM: Discussed with [facility] at [facility] - wrong provider contacted. Other GI provider being called.     2:10 AM:  Discussed with Dr Karlos Ruiz, GI. He reviewed the imaging and labs. Recommends MRCP and probable ERCP. Recommends transfer.     2:31 AM: Discussed with hospitalist  at the . No beds available currently.    2:38 AM: Discussed with patient.  She has no specific preference for alternative hospitals.  Paging [facility] for bed availability.    2:50 AM: Discussed with Dr Laird, hospitalist at [facility]. Accepts for transfer.     Assessments & Plan (with Medical Decision Making)  74-year-old female with history of previous cholecystectomy presenting for evaluation of persistent nausea and vomiting over the past roughly 30 hours.  Patient initially thought she may have food poisoning but so when symptoms did not improve, she came in for evaluation.  No associated diarrhea.  Mildly tachycardic and hypertensive upon arrival with no significant abdominal tenderness.  Blood work and CT imaging obtained while treating with IV fluids, Zofran, and morphine.  She does have a history of opiate dependence so opiates were given due to concern for contributing opiate withdrawal symptoms.  Overall patient's symptoms did dramatically improve however patient is requiring repeating doses of medications about every hour to maintain symptom control.  Blood work showed elevation of the LFTs including ALT and AST but normal lipase, bilirubin, and alk phos.  CT imaging showed evidence of dilation of the common bile duct and biliary system including the pancreatic duct without a visualized cause.  Discussed case with gastroenterology at [facility] who recommended transfer for further work-up and treatment.  No beds available at the  but she was able to be admitted at hospital where this treatment plan can proceed.     I have reviewed the nursing notes.    I have reviewed the findings, diagnosis, plan and need for follow up with the patient.          Medication List      There are no discharge medications for this visit.         Final diagnoses:   Non-intractable vomiting with nausea, unspecified vomiting type   Elevated LFTs   Common bile duct dilatation       5/22/2019   [facility]  EMERGENCY DEPARTMENT     Sánchez, Ronni Santillan MD  05/23/19 [id number]

## 2019-05-23 NOTE — PLAN OF CARE
Oriented x4.  VSS but hypertensive.  Tachycardic.  IV fluids infusing.  CO muscle spasms, baclofen given.  Nausea x1, relieved with zofran.  CO pain in abdomen and face, morphine given.  NPO for MRCP today.  Ambulating independently within room.  Nursing will continue to monitor.

## 2019-05-23 NOTE — PHARMACY-ADMISSION MEDICATION HISTORY
Admission medication history interview status for the 5/23/2019  admission is complete. See EPIC admission navigator for prior to admission medications     Medication history source reliability:Good, pt interview. fvw pharmacy outpatient pharmacy records    Actions taken by pharmacist (provider contacted, etc):removed asa (has not been on for yrs per pt), changed paxil to 20mg (only takes 1 tab not 2). Added marijuana, systane     Additional medication history information not noted on PTA med list :has not had meds for 2 days due to n/v    Medication reconciliation/reorder completed by provider prior to medication history? No    Time spent in this activity: 30 minutes    Prior to Admission medications    Medication Sig Last Dose Taking? Auth Provider   baclofen (LIORESAL) 10 MG tablet Take 1 tablet (10 mg) by mouth 3 times daily  Patient taking differently: Take 10 mg by mouth At Bedtime  Past Week at Unknown time Yes Gwendolyn Solis MD   diltiazem ER (DILT-XR) 180 MG 24 hr capsule Take 1 capsule (180 mg) by mouth daily Past Week at Unknown time Yes Gwendolyn Solis MD   DOCUSATE SODIUM PO Take 100 mg by mouth daily as needed for constipation  prn Yes Reported, Patient   fluticasone (FLONASE) 50 MCG/ACT spray Spray 1-2 sprays into both nostrils daily  Patient taking differently: Spray 1-2 sprays into both nostrils daily as needed for rhinitis  prn Yes Gwendolyn Solis MD   HYDROcodone-acetaminophen (NORCO)  MG per tablet Take 1 tablet by mouth 2 times daily And 10 days of the month may take 3 daily if needed (30 day supply) Past Week at Unknown time Yes Gwendolyn Solis MD   ibuprofen (ADVIL/MOTRIN) 200 MG tablet Take 400-600 mg by mouth every 8 hours as needed for mild pain prn Yes Unknown, Entered By History   lisinopril (PRINIVIL/ZESTRIL) 20 MG tablet Take 1 tablet (20 mg) by mouth daily Past Week at Unknown time Yes Gwendolyn Solis MD   loratadine (CLARITIN) 10 MG tablet  Take 10 mg by mouth daily as needed for allergies  prn Yes Reported, Patient   medical cannabis (Patient's own supply) Take 1 Dose by mouth every evening (The purpose of this order is to document that the patient reports taking medical cannabis.  This is not a prescription, and is not used to certify that the patient has a qualifying medical condition.)  1 ml oral qpm Past Week at Unknown time Yes Unknown, Entered By History   PARoxetine (PAXIL) 20 MG tablet Take 20 mg by mouth At Bedtime Prescribed as 40mg (20mg x2 tabs), but Tracy is only taking 20mg (1 tablet) Past Week at Unknown time Yes Unknown, Entered By History   polyethylene glycol-propylene glycol (SYSTANE ULTRA) 0.4-0.3 % SOLN ophthalmic solution Place 1 drop into both eyes 4 times daily as needed for dry eyes prn Yes Unknown, Entered By History   ranitidine (ZANTAC) 150 MG tablet TAKE ONE TABLET BY MOUTH TWICE DAILY Past Week at Unknown time Yes Gwendolyn Solis MD   triamcinolone (KENALOG) 0.1 % external cream Apply topically 2 times daily  Patient taking differently: Apply topically 2 times daily as needed (irritation on face)  prn Yes Shara Marcos APRN CNP   valACYclovir (VALTREX) 1000 mg tablet Take 1 tablet (1,000 mg) by mouth 2 times daily for 1 day As needed fo rbreak outs prn Yes Gwendolyn Solis MD   zolpidem (AMBIEN) 10 MG tablet TAKE 1/2-1 TABLET BY MOUTH NIGHTLY AS NEEDED FOR SLEEP Past Week at Unknown time Yes Gwendolyn Solis MD

## 2019-05-23 NOTE — PLAN OF CARE
A&Ox4. VSS on room air. IVF infusing. Regular diet ordered following procedure - tolerating well. MRCP today, see results. Denies pain this shift. Up independently in room. Contact precautions maintained for ESBL. Will continue to monitor.

## 2019-05-24 ENCOUNTER — APPOINTMENT (OUTPATIENT)
Dept: GENERAL RADIOLOGY | Facility: CLINIC | Age: 74
DRG: 446 | End: 2019-05-24
Attending: PHYSICIAN ASSISTANT
Payer: COMMERCIAL

## 2019-05-24 ENCOUNTER — ANESTHESIA EVENT (OUTPATIENT)
Dept: SURGERY | Facility: CLINIC | Age: 74
DRG: 446 | End: 2019-05-24
Payer: COMMERCIAL

## 2019-05-24 ENCOUNTER — ANESTHESIA (OUTPATIENT)
Dept: SURGERY | Facility: CLINIC | Age: 74
DRG: 446 | End: 2019-05-24
Payer: COMMERCIAL

## 2019-05-24 LAB
ALBUMIN SERPL-MCNC: 3.1 G/DL (ref 3.4–5)
ALP SERPL-CCNC: 93 U/L (ref 40–150)
ALT SERPL W P-5'-P-CCNC: 470 U/L (ref 0–50)
ANION GAP SERPL CALCULATED.3IONS-SCNC: 1 MMOL/L (ref 3–14)
AST SERPL W P-5'-P-CCNC: 278 U/L (ref 0–45)
BASOPHILS # BLD AUTO: 0 10E9/L (ref 0–0.2)
BASOPHILS NFR BLD AUTO: 1 %
BILIRUB DIRECT SERPL-MCNC: 0.1 MG/DL (ref 0–0.2)
BILIRUB SERPL-MCNC: 0.5 MG/DL (ref 0.2–1.3)
BUN SERPL-MCNC: 12 MG/DL (ref 7–30)
CALCIUM SERPL-MCNC: 8.5 MG/DL (ref 8.5–10.1)
CHLORIDE SERPL-SCNC: 112 MMOL/L (ref 94–109)
CO2 SERPL-SCNC: 30 MMOL/L (ref 20–32)
CREAT SERPL-MCNC: 0.72 MG/DL (ref 0.52–1.04)
DIFFERENTIAL METHOD BLD: ABNORMAL
EOSINOPHIL # BLD AUTO: 0.2 10E9/L (ref 0–0.7)
EOSINOPHIL NFR BLD AUTO: 5.6 %
ERYTHROCYTE [DISTWIDTH] IN BLOOD BY AUTOMATED COUNT: 13.5 % (ref 10–15)
GFR SERPL CREATININE-BSD FRML MDRD: 83 ML/MIN/{1.73_M2}
GLUCOSE SERPL-MCNC: 84 MG/DL (ref 70–99)
HCT VFR BLD AUTO: 34.2 % (ref 35–47)
HGB BLD-MCNC: 11.3 G/DL (ref 11.7–15.7)
IMM GRANULOCYTES # BLD: 0 10E9/L (ref 0–0.4)
IMM GRANULOCYTES NFR BLD: 0.2 %
LYMPHOCYTES # BLD AUTO: 1.3 10E9/L (ref 0.8–5.3)
LYMPHOCYTES NFR BLD AUTO: 31.7 %
MCH RBC QN AUTO: 30 PG (ref 26.5–33)
MCHC RBC AUTO-ENTMCNC: 33 G/DL (ref 31.5–36.5)
MCV RBC AUTO: 91 FL (ref 78–100)
MONOCYTES # BLD AUTO: 0.5 10E9/L (ref 0–1.3)
MONOCYTES NFR BLD AUTO: 12.6 %
NEUTROPHILS # BLD AUTO: 2 10E9/L (ref 1.6–8.3)
NEUTROPHILS NFR BLD AUTO: 48.9 %
NRBC # BLD AUTO: 0 10*3/UL
NRBC BLD AUTO-RTO: 0 /100
PLATELET # BLD AUTO: 224 10E9/L (ref 150–450)
POTASSIUM SERPL-SCNC: 4.3 MMOL/L (ref 3.4–5.3)
PROT SERPL-MCNC: 6.1 G/DL (ref 6.8–8.8)
RBC # BLD AUTO: 3.77 10E12/L (ref 3.8–5.2)
SODIUM SERPL-SCNC: 143 MMOL/L (ref 133–144)
WBC # BLD AUTO: 4.1 10E9/L (ref 4–11)

## 2019-05-24 PROCEDURE — 0FBC8ZX EXCISION OF AMPULLA OF VATER, VIA NATURAL OR ARTIFICIAL OPENING ENDOSCOPIC, DIAGNOSTIC: ICD-10-PCS | Performed by: INTERNAL MEDICINE

## 2019-05-24 PROCEDURE — 36415 COLL VENOUS BLD VENIPUNCTURE: CPT | Performed by: HOSPITALIST

## 2019-05-24 PROCEDURE — 80053 COMPREHEN METABOLIC PANEL: CPT | Performed by: HOSPITALIST

## 2019-05-24 PROCEDURE — 27210286 ZZH BALLOON ADDITIONAL: Performed by: INTERNAL MEDICINE

## 2019-05-24 PROCEDURE — 25000566 ZZH SEVOFLURANE, EA 15 MIN: Performed by: INTERNAL MEDICINE

## 2019-05-24 PROCEDURE — 25000125 ZZHC RX 250: Performed by: NURSE ANESTHETIST, CERTIFIED REGISTERED

## 2019-05-24 PROCEDURE — 36000058 ZZH SURGERY LEVEL 3 EA 15 ADDTL MIN: Performed by: INTERNAL MEDICINE

## 2019-05-24 PROCEDURE — 12000000 ZZH R&B MED SURG/OB

## 2019-05-24 PROCEDURE — 37000009 ZZH ANESTHESIA TECHNICAL FEE, EACH ADDTL 15 MIN: Performed by: INTERNAL MEDICINE

## 2019-05-24 PROCEDURE — 82248 BILIRUBIN DIRECT: CPT | Performed by: HOSPITALIST

## 2019-05-24 PROCEDURE — 99232 SBSQ HOSP IP/OBS MODERATE 35: CPT | Performed by: INTERNAL MEDICINE

## 2019-05-24 PROCEDURE — 25000128 H RX IP 250 OP 636: Performed by: NURSE ANESTHETIST, CERTIFIED REGISTERED

## 2019-05-24 PROCEDURE — 27210291 ZZH BASKET ADDITIONAL: Performed by: INTERNAL MEDICINE

## 2019-05-24 PROCEDURE — 88305 TISSUE EXAM BY PATHOLOGIST: CPT | Mod: 26 | Performed by: INTERNAL MEDICINE

## 2019-05-24 PROCEDURE — 85025 COMPLETE CBC W/AUTO DIFF WBC: CPT | Performed by: HOSPITALIST

## 2019-05-24 PROCEDURE — 25000132 ZZH RX MED GY IP 250 OP 250 PS 637: Performed by: HOSPITALIST

## 2019-05-24 PROCEDURE — C1887 CATHETER, GUIDING: HCPCS | Performed by: INTERNAL MEDICINE

## 2019-05-24 PROCEDURE — 71000012 ZZH RECOVERY PHASE 1 LEVEL 1 FIRST HR: Performed by: INTERNAL MEDICINE

## 2019-05-24 PROCEDURE — 25800030 ZZH RX IP 258 OP 636: Performed by: NURSE ANESTHETIST, CERTIFIED REGISTERED

## 2019-05-24 PROCEDURE — 0F798ZZ DILATION OF COMMON BILE DUCT, VIA NATURAL OR ARTIFICIAL OPENING ENDOSCOPIC: ICD-10-PCS | Performed by: INTERNAL MEDICINE

## 2019-05-24 PROCEDURE — 88305 TISSUE EXAM BY PATHOLOGIST: CPT | Performed by: INTERNAL MEDICINE

## 2019-05-24 PROCEDURE — 37000008 ZZH ANESTHESIA TECHNICAL FEE, 1ST 30 MIN: Performed by: INTERNAL MEDICINE

## 2019-05-24 PROCEDURE — 40000170 ZZH STATISTIC PRE-PROCEDURE ASSESSMENT II: Performed by: INTERNAL MEDICINE

## 2019-05-24 PROCEDURE — 74330 X-RAY BILE/PANC ENDOSCOPY: CPT

## 2019-05-24 PROCEDURE — 36000056 ZZH SURGERY LEVEL 3 1ST 30 MIN: Performed by: INTERNAL MEDICINE

## 2019-05-24 PROCEDURE — C1769 GUIDE WIRE: HCPCS | Performed by: INTERNAL MEDICINE

## 2019-05-24 PROCEDURE — 25800030 ZZH RX IP 258 OP 636: Performed by: HOSPITALIST

## 2019-05-24 RX ORDER — HYDROMORPHONE HYDROCHLORIDE 1 MG/ML
.3-.5 INJECTION, SOLUTION INTRAMUSCULAR; INTRAVENOUS; SUBCUTANEOUS EVERY 5 MIN PRN
Status: DISCONTINUED | OUTPATIENT
Start: 2019-05-24 | End: 2019-05-24 | Stop reason: HOSPADM

## 2019-05-24 RX ORDER — PROPOFOL 10 MG/ML
INJECTION, EMULSION INTRAVENOUS PRN
Status: DISCONTINUED | OUTPATIENT
Start: 2019-05-24 | End: 2019-05-24

## 2019-05-24 RX ORDER — FENTANYL CITRATE 50 UG/ML
25-50 INJECTION, SOLUTION INTRAMUSCULAR; INTRAVENOUS
Status: DISCONTINUED | OUTPATIENT
Start: 2019-05-24 | End: 2019-05-24 | Stop reason: HOSPADM

## 2019-05-24 RX ORDER — EPHEDRINE SULFATE 50 MG/ML
INJECTION, SOLUTION INTRAMUSCULAR; INTRAVENOUS; SUBCUTANEOUS PRN
Status: DISCONTINUED | OUTPATIENT
Start: 2019-05-24 | End: 2019-05-24

## 2019-05-24 RX ORDER — NALOXONE HYDROCHLORIDE 0.4 MG/ML
.1-.4 INJECTION, SOLUTION INTRAMUSCULAR; INTRAVENOUS; SUBCUTANEOUS
Status: DISCONTINUED | OUTPATIENT
Start: 2019-05-24 | End: 2019-05-25 | Stop reason: HOSPADM

## 2019-05-24 RX ORDER — LABETALOL HYDROCHLORIDE 5 MG/ML
10 INJECTION, SOLUTION INTRAVENOUS
Status: DISCONTINUED | OUTPATIENT
Start: 2019-05-24 | End: 2019-05-24 | Stop reason: HOSPADM

## 2019-05-24 RX ORDER — LIDOCAINE HYDROCHLORIDE 20 MG/ML
INJECTION, SOLUTION INFILTRATION; PERINEURAL PRN
Status: DISCONTINUED | OUTPATIENT
Start: 2019-05-24 | End: 2019-05-24

## 2019-05-24 RX ORDER — NALOXONE HYDROCHLORIDE 0.4 MG/ML
.1-.4 INJECTION, SOLUTION INTRAMUSCULAR; INTRAVENOUS; SUBCUTANEOUS
Status: DISCONTINUED | OUTPATIENT
Start: 2019-05-24 | End: 2019-05-24

## 2019-05-24 RX ORDER — ONDANSETRON 4 MG/1
4 TABLET, ORALLY DISINTEGRATING ORAL EVERY 30 MIN PRN
Status: DISCONTINUED | OUTPATIENT
Start: 2019-05-24 | End: 2019-05-24 | Stop reason: HOSPADM

## 2019-05-24 RX ORDER — SODIUM CHLORIDE, SODIUM LACTATE, POTASSIUM CHLORIDE, CALCIUM CHLORIDE 600; 310; 30; 20 MG/100ML; MG/100ML; MG/100ML; MG/100ML
INJECTION, SOLUTION INTRAVENOUS CONTINUOUS
Status: DISCONTINUED | OUTPATIENT
Start: 2019-05-24 | End: 2019-05-24 | Stop reason: HOSPADM

## 2019-05-24 RX ORDER — FENTANYL CITRATE 50 UG/ML
25-100 INJECTION, SOLUTION INTRAMUSCULAR; INTRAVENOUS
Status: DISCONTINUED | OUTPATIENT
Start: 2019-05-24 | End: 2019-05-24 | Stop reason: HOSPADM

## 2019-05-24 RX ORDER — ONDANSETRON 2 MG/ML
4 INJECTION INTRAMUSCULAR; INTRAVENOUS EVERY 30 MIN PRN
Status: DISCONTINUED | OUTPATIENT
Start: 2019-05-24 | End: 2019-05-24 | Stop reason: HOSPADM

## 2019-05-24 RX ORDER — INDOMETHACIN 50 MG/1
100 SUPPOSITORY RECTAL
Status: DISCONTINUED | OUTPATIENT
Start: 2019-05-24 | End: 2019-05-24 | Stop reason: HOSPADM

## 2019-05-24 RX ORDER — HYDRALAZINE HYDROCHLORIDE 20 MG/ML
2.5-5 INJECTION INTRAMUSCULAR; INTRAVENOUS EVERY 10 MIN PRN
Status: DISCONTINUED | OUTPATIENT
Start: 2019-05-24 | End: 2019-05-24 | Stop reason: HOSPADM

## 2019-05-24 RX ORDER — DEXAMETHASONE SODIUM PHOSPHATE 4 MG/ML
4 INJECTION, SOLUTION INTRA-ARTICULAR; INTRALESIONAL; INTRAMUSCULAR; INTRAVENOUS; SOFT TISSUE
Status: DISCONTINUED | OUTPATIENT
Start: 2019-05-24 | End: 2019-05-24 | Stop reason: HOSPADM

## 2019-05-24 RX ORDER — FENTANYL CITRATE 50 UG/ML
INJECTION, SOLUTION INTRAMUSCULAR; INTRAVENOUS PRN
Status: DISCONTINUED | OUTPATIENT
Start: 2019-05-24 | End: 2019-05-24

## 2019-05-24 RX ORDER — SODIUM CHLORIDE, SODIUM LACTATE, POTASSIUM CHLORIDE, CALCIUM CHLORIDE 600; 310; 30; 20 MG/100ML; MG/100ML; MG/100ML; MG/100ML
INJECTION, SOLUTION INTRAVENOUS CONTINUOUS PRN
Status: DISCONTINUED | OUTPATIENT
Start: 2019-05-24 | End: 2019-05-24

## 2019-05-24 RX ORDER — DEXAMETHASONE SODIUM PHOSPHATE 4 MG/ML
INJECTION, SOLUTION INTRA-ARTICULAR; INTRALESIONAL; INTRAMUSCULAR; INTRAVENOUS; SOFT TISSUE PRN
Status: DISCONTINUED | OUTPATIENT
Start: 2019-05-24 | End: 2019-05-24

## 2019-05-24 RX ORDER — LIDOCAINE 40 MG/G
CREAM TOPICAL
Status: DISCONTINUED | OUTPATIENT
Start: 2019-05-24 | End: 2019-05-24 | Stop reason: HOSPADM

## 2019-05-24 RX ORDER — ONDANSETRON 2 MG/ML
INJECTION INTRAMUSCULAR; INTRAVENOUS PRN
Status: DISCONTINUED | OUTPATIENT
Start: 2019-05-24 | End: 2019-05-24

## 2019-05-24 RX ADMIN — BACLOFEN 10 MG: 10 TABLET ORAL at 08:32

## 2019-05-24 RX ADMIN — ONDANSETRON 4 MG: 2 INJECTION INTRAMUSCULAR; INTRAVENOUS at 13:29

## 2019-05-24 RX ADMIN — PAROXETINE HYDROCHLORIDE 20 MG: 20 TABLET, FILM COATED ORAL at 21:59

## 2019-05-24 RX ADMIN — LIDOCAINE HYDROCHLORIDE 100 MG: 20 INJECTION, SOLUTION INFILTRATION; PERINEURAL at 13:09

## 2019-05-24 RX ADMIN — MIDAZOLAM 1 MG: 1 INJECTION INTRAMUSCULAR; INTRAVENOUS at 13:29

## 2019-05-24 RX ADMIN — BACLOFEN 10 MG: 10 TABLET ORAL at 22:02

## 2019-05-24 RX ADMIN — MIDAZOLAM 1 MG: 1 INJECTION INTRAMUSCULAR; INTRAVENOUS at 13:09

## 2019-05-24 RX ADMIN — DILTIAZEM HYDROCHLORIDE 180 MG: 180 CAPSULE, EXTENDED RELEASE ORAL at 08:32

## 2019-05-24 RX ADMIN — RANITIDINE 150 MG: 150 TABLET ORAL at 21:59

## 2019-05-24 RX ADMIN — Medication 1 MG: at 21:59

## 2019-05-24 RX ADMIN — DEXAMETHASONE SODIUM PHOSPHATE 4 MG: 4 INJECTION, SOLUTION INTRA-ARTICULAR; INTRALESIONAL; INTRAMUSCULAR; INTRAVENOUS; SOFT TISSUE at 13:29

## 2019-05-24 RX ADMIN — SODIUM CHLORIDE: 9 INJECTION, SOLUTION INTRAVENOUS at 08:32

## 2019-05-24 RX ADMIN — Medication 10 MG: at 13:37

## 2019-05-24 RX ADMIN — LISINOPRIL 20 MG: 20 TABLET ORAL at 08:32

## 2019-05-24 RX ADMIN — SODIUM CHLORIDE: 9 INJECTION, SOLUTION INTRAVENOUS at 21:11

## 2019-05-24 RX ADMIN — ZOLPIDEM TARTRATE 2.5 MG: 5 TABLET, FILM COATED ORAL at 23:59

## 2019-05-24 RX ADMIN — ROCURONIUM BROMIDE 30 MG: 10 INJECTION INTRAVENOUS at 13:09

## 2019-05-24 RX ADMIN — PROPOFOL 150 MG: 10 INJECTION, EMULSION INTRAVENOUS at 13:09

## 2019-05-24 RX ADMIN — FENTANYL CITRATE 100 MCG: 50 INJECTION, SOLUTION INTRAMUSCULAR; INTRAVENOUS at 13:09

## 2019-05-24 RX ADMIN — SODIUM CHLORIDE, POTASSIUM CHLORIDE, SODIUM LACTATE AND CALCIUM CHLORIDE: 600; 310; 30; 20 INJECTION, SOLUTION INTRAVENOUS at 13:06

## 2019-05-24 RX ADMIN — RANITIDINE 150 MG: 150 TABLET ORAL at 08:32

## 2019-05-24 ASSESSMENT — ACTIVITIES OF DAILY LIVING (ADL)
ADLS_ACUITY_SCORE: 10
ADLS_ACUITY_SCORE: 10
ADLS_ACUITY_SCORE: 12
ADLS_ACUITY_SCORE: 10
ADLS_ACUITY_SCORE: 12
ADLS_ACUITY_SCORE: 13

## 2019-05-24 NOTE — PROGRESS NOTES
"[facility]    Hospitalist Progress Note    Assessment & Plan   74-year-old female with a history of hypertension, narcotic dependence for chronic pain, depression and apparently prior TIAs, who presented to an outside emergency department with nausea and vomiting for 1-3 days, S/P cholectomy many years ago, and now with dilated CBD down to sphincter of Oddi, and elevated LFT's:      Impression:   Principal Problem:    Bile duct, common, cystic dilatation   -- MRCP suggests narrowing at the Sphincter of Oddi   -- [facility] to do EUS with possible ERCP this afternoon      Nausea with vomiting -- better, suspect related to CBD obstruction      Elevated LFTs -- still rising, but no associated RUQ pain    Active Problems:    Chronic pain      Essential hypertension with goal blood pressure less than 140/90      Plan:  EUS with possible ERCP today.     DVT Prophylaxis: Pneumatic Compression Devices  Code Status: Full Code    Disposition: Expected discharge home tomorrow.     Piotr Medina MD  Pager [phone]  Cell Phone [phone]  Text Page (7am to 6pm)    Interval History   Frustrated by being here -- \"I just want to go home\".  Explained MRCP findings and she is on board with EUS w possible ERCP today.      Physical Exam   Temp: 97.5  F (36.4  C) Temp src: Oral BP: 186/74 Pulse: 82 Heart Rate: 89 Resp: 18 SpO2: 98 % O2 Device: None (Room air)    Vitals:    05/23/19 0915 05/24/19 0644   Weight: 59.2 kg (130 lb 9.6 oz) 57.7 kg (127 lb 3.2 oz)     Vital Signs with Ranges  Temp:  [97.5  F (36.4  C)-99.4  F (37.4  C)] 97.5  F (36.4  C)  Pulse:  [82] 82  Heart Rate:  [87-89] 89  Resp:  [16-18] 18  BP: (155-186)/(55-74) 186/74  SpO2:  [96 %-98 %] 98 %  No intake/output data recorded.    # Pain Assessment:  Current Pain Score 5/24/2019   Patient currently in pain? yes   Pain score (0-10) 6   Pain location -   - Tracy is experiencing pain due to chronic pain. Pain management was discussed and the plan " was created in a collaborative fashion.  Tracy's response to the current recommendations: engaged  - see orders    Constitutional: Awake, alert, cooperative, no apparent distress  Respiratory: Clear to auscultation bilaterally, no crackles or wheezing  Cardiovascular: Regular rate and rhythm, normal S1 and S2, and no murmur noted  GI: Normal bowel sounds, no epigastric or RUQ tenderness, negative Payton's sign  Extrem: No calf tenderness, no ankle edema  Neuro: Ox3, no focal motor or sensory deficits    Medications     sodium chloride 100 mL/hr at 05/24/19 0832       diltiazem ER  180 mg Oral Daily     lisinopril  20 mg Oral Daily     medical cannabis  1 Dose Other See Admin Instructions     PARoxetine  20 mg Oral At Bedtime     ranitidine  150 mg Oral BID       Data   Recent Labs   Lab 05/24/19  0715 05/22/19  2240   WBC 4.1 8.3   HGB 11.3* 14.5   MCV 91 91    346    137   POTASSIUM 4.3 4.1   CHLORIDE 112* 103   CO2 30 26   BUN 12 17   CR 0.72 0.76   ANIONGAP 1* 8   SUE 8.5 10.0   GLC 84 108*   ALBUMIN 3.1* 4.5   PROTTOTAL 6.1* 8.6   BILITOTAL 0.5 0.6   ALKPHOS 93 87   * 241*   * 135*   LIPASE  --  198   TROPI  --  <0.015       Imaging:   Recent Results (from the past 24 hour(s))   MR Abdomen MRCP w/o & w Contrast    Narrative    MAGNETIC RESONANCE CHOLANGIOPANCREATOGRAPHY  5/23/2019 2:30 PM     HISTORY: Abnormal liver function tests (LFTs). Jaundice, abdominal  pain, fever or biliary surgery or gallstones.    COMPARISON: None.    TECHNIQUE: Multiplanar, multisequence images of the abdomen acquired  before and after administration of 6 mL Gadavist intravenous contrast.  MRCP images and coronal 3-D thin section T2-weighted MRCP images  through the biliary tree. 3D image reformatting was performed on the  acquisition scanner.    FINDINGS: The gallbladder is absent. There is extensive extra hepatic  biliary dilatation throughout measuring up to 1.9 cm. There is  moderate to severe  intrahepatic biliary dilatation particularly in the  left lobe with more milder changes in the right lobe. There is no  choledocholithiasis. No biliary strictures. The pancreatic duct is not  dilated. No pancreatic duct strictures. No significant sidebranch  visualization. No cystic lesions within the pancreas. The signal  intensity of the liver is within normal limits without evidence for  hepatic steatosis. The signal intensity of the pancreas within normal  limits without evidence for pancreatitis. Visualized kidneys  demonstrate unremarkable signal intensity without hydronephrosis.  Adrenal glands and spleen are unremarkable. Visualized osseous  structures unremarkable. After administration of intravenous contrast,  solid organs demonstrate no enhancing focal lesions.      Impression    IMPRESSION:   1. Extensive biliary dilatation extending to the sphincter of Oddi.  This is etiology indeterminate. Consider ERCP for further evaluation.  2. No evidence for choledocholithiasis.    HOLLAND VELÁSQUEZ MD

## 2019-05-24 NOTE — PROCEDURES
"PROCEDURE:    The patient and  had the risks, benefits and alternatives carefully explained and questions were answered.  The patient gave informed consent.    ANESTHESIA:  General anesthesia -- see anesthesia notes    Procedure:  After induction and positioning, a standard time-out was performed.  Thereafter the side-view therapeutic scope was passed into the upper esophagus without difficulty.  The upper tract was normal as seen on passing to the second portion of the duodenum.  Initial cannulation was accomplished with a [place] with wire.  The PD was not cannulated or injected.  The common duct was cannulated and initial cholangiographic films showed a dramatically dilated duct.  A 7 mm sphincterotomy was cut w/o heme production.  Thereafter the duct was swept repeatedly with a balloon cannula and soft 4-wire basket.  Only a small amount of \"sludge\" was removed.  No significant stone or other obstruction was seen.  The intra-hepatic ducts were normal as seen and only mildly dilated.  We elected to extend the sphincterotomy to an estimated total of 10 mm to assure good drainage.  Again this additional 3 to 4 mm extension was not met with any bleeding.  We elected to withdraw and follow conservatively after ampullary biopsies were obtained.  The ampulla post cut appeared normal as seen.    The patient tolerated the procedure without apparent complication.    Plan:  Follow LFTs and clinical course    Mio Blunt MD  [phone]  "

## 2019-05-24 NOTE — ANESTHESIA POSTPROCEDURE EVALUATION
Patient: Tracy Galloway    Procedure(s):  ENDOSCOPIC RETROGRADE CHOLANGIOPANCREATOGRAPHY    Diagnosis:DILATED COMMON BILE DUCT.  Diagnosis Additional Information: No value filed.    Anesthesia Type:  General, ETT    Note:  Anesthesia Post Evaluation    Patient location during evaluation: PACU  Patient participation: Able to fully participate in evaluation  Level of consciousness: awake  Pain management: adequate  Airway patency: patent  Cardiovascular status: acceptable  Respiratory status: acceptable  Hydration status: acceptable  PONV: controlled     Anesthetic complications: None          Last vitals:  Vitals:    05/24/19 0027 05/24/19 0032 05/24/19 0835   BP: 156/55 161/66 186/74   Pulse:   82   Resp: 16  18   Temp: 36.4  C (97.5  F)     SpO2: 98%  98%         Electronically Signed By: Darion Veliz MD  May 24, 2019  2:07 PM

## 2019-05-24 NOTE — PLAN OF CARE
[id number]:    Reports pain in her face (points to left jaw); received Norco. Denies nausea. Miralax given for chronic constipation. Requested ambien and baclofen at bedtime; this writer was hesitant to give both and encouraged pt to take them with a break between them. She received baclofen and may request ambien later. NPO overnight for possible to EUS/ERCP.

## 2019-05-24 NOTE — PLAN OF CARE
Pt A/Ox4. VSS on RA. Denies nausea. PIV infusing NS @ 100 ml/hr. Up independently. NPO since midnight for possible EUS/ERCP 5/24.

## 2019-05-24 NOTE — PLAN OF CARE
Oriented x4.   VSS but hypertensive.  IV fluids infusing.  Ambulated to bathroom independently.  Showered today.  Co muscle spasms, baclofen given x1.  ERCP today.  Now on clear liquid diet.  Plan to discharge to home tomorrow.  Nursing will continue to monitor.

## 2019-05-24 NOTE — ANESTHESIA CARE TRANSFER NOTE
Patient: Tracy Galloway    Procedure(s):  ENDOSCOPIC RETROGRADE CHOLANGIOPANCREATOGRAPHY    Diagnosis: DILATED COMMON BILE DUCT.  Diagnosis Additional Information: No value filed.    Anesthesia Type:   General, ETT     Note:    Patient transferred to:PACU  Handoff Report: Identifed the Patient, Identified the Reponsible Provider, Reviewed the pertinent medical history, Discussed the surgical course, Reviewed Intra-OP anesthesia mangement and issues during anesthesia, Set expectations for post-procedure period and Allowed opportunity for questions and acknowledgement of understanding      Vitals: (Last set prior to Anesthesia Care Transfer)    CRNA VITALS  5/24/2019 1332 - 5/24/2019 1408      5/24/2019             Pulse:  68    SpO2:  94 %    Resp Rate (observed):  2  (Abnormal)     Resp Rate (set):  10                Electronically Signed By: YANY Irwin CRNA  May 24, 2019  2:08 PM

## 2019-05-24 NOTE — PROGRESS NOTES
[facility]  [facility]  Gastroenterology Progress note    Interval History:    Denies nausea.  MRCP shows extensive biliary dilatation.    Vital Signs:      /74 (BP Location: Left arm)   Pulse 82   Temp 97.5  F (36.4  C) (Oral)   Resp 18   Wt 57.7 kg (127 lb 3.2 oz)   SpO2 98%   BMI 24.43 kg/m    Temp (24hrs), Av.6  F (37  C), Min:97.5  F (36.4  C), Max:99.4  F (37.4  C)    Patient Vitals for the past 72 hrs:   Weight   19 0644 57.7 kg (127 lb 3.2 oz)   19 0915 59.2 kg (130 lb 9.6 oz)     No intake or output data in the 24 hours ending 19 09      Constitutional: NAD, comfortable  Cardiovascular: RRR, normal S1, S2   Respiratory: CTAB  Abdomen: soft, non-tender, nondistended.    Additional Comments:  ROS, FH, SH: See initial GI consult for details.    Laboratory Data:  Recent Labs   Lab Test 19  0715 19  2240 02/15/19  1154   WBC 4.1 8.3 6.2   HGB 11.3* 14.5 12.2   MCV 91 91 94    346 321     Recent Labs   Lab Test 19  0715 19  2240 02/15/19  1154    137 137   POTASSIUM 4.3 4.1 4.3   CHLORIDE 112* 103 105   CO2 30 26 28   BUN 12 17 25   CR 0.72 0.76 1.00   ANIONGAP 1* 8 4   SUE 8.5 10.0 8.8     Recent Labs   Lab Test 19  0715 19  2240 02/15/19  1154  17  0913 07/15/17  0505 17  0811 10/30/16  2105  16  1405  12/15/15  1144 12/01/15  1340   ALBUMIN 3.1* 4.5 3.6   < >  --   --  3.3*  --    < > 3.5   < > 3.2* 3.4   BILITOTAL 0.5 0.6 0.2   < >  --   --  0.2  --    < > 0.1*   < > 0.2 0.2   DBIL 0.1  --  <0.1  --   --   --  <0.1  --   --   --    < >  --  <0.1   * 241* 21   < >  --   --  64*  --    < > 26   < > 64* 14   * 135* 17   < >  --   --  32  --    < > 17   < > 41 16   ALKPHOS 93 87 60   < >  --   --  64  --    < > 66   < > 75 59   PROTEIN  --   --   --   --  Negative 30*  --  Negative  --   --    < >  --   --    LIPASE  --  198  --   --   --   --   --   --   --   187  --   --  167   AMYLASE  --   --   --   --   --   --   --   --   --  37  --  36  --     < > = values in this interval not displayed.         Assessment:  75 yo female with history of cholecystectomy who presents with sudden onset nausea/vomiting after eating a salad 2 nights ago.  CT A/P shows dilated bile ducts.  Transaminases elevated but otherwise normal total bilirubin, AP.  MRCP completed yesterday shows extensive biliary dilatation extending to the sphincter of Oddi.  Etiology indeterminate.  No evidence for choledocholithiasis.  Plan:  -  ERCP this afternoon.  -  NPO.        LEVAR Ruiz  [facility]  Office:  [phone] call if needed after 5PM  Cell:  [phone], not available after 5PM at this number

## 2019-05-25 VITALS
OXYGEN SATURATION: 98 % | TEMPERATURE: 98.5 F | SYSTOLIC BLOOD PRESSURE: 151 MMHG | WEIGHT: 133.6 LBS | BODY MASS INDEX: 25.66 KG/M2 | HEART RATE: 89 BPM | RESPIRATION RATE: 16 BRPM | DIASTOLIC BLOOD PRESSURE: 49 MMHG

## 2019-05-25 LAB
ALBUMIN SERPL-MCNC: 3.2 G/DL (ref 3.4–5)
ALP SERPL-CCNC: 82 U/L (ref 40–150)
ALT SERPL W P-5'-P-CCNC: 318 U/L (ref 0–50)
AST SERPL W P-5'-P-CCNC: 110 U/L (ref 0–45)
BILIRUB DIRECT SERPL-MCNC: 0.1 MG/DL (ref 0–0.2)
BILIRUB SERPL-MCNC: 0.5 MG/DL (ref 0.2–1.3)
PROT SERPL-MCNC: 6.2 G/DL (ref 6.8–8.8)

## 2019-05-25 PROCEDURE — 36415 COLL VENOUS BLD VENIPUNCTURE: CPT | Performed by: INTERNAL MEDICINE

## 2019-05-25 PROCEDURE — 80076 HEPATIC FUNCTION PANEL: CPT | Performed by: INTERNAL MEDICINE

## 2019-05-25 PROCEDURE — 99207 ZZC CDG-CODE INCORRECT PER BILLING BASED ON TIME: CPT | Performed by: INTERNAL MEDICINE

## 2019-05-25 PROCEDURE — 99239 HOSP IP/OBS DSCHRG MGMT >30: CPT | Performed by: INTERNAL MEDICINE

## 2019-05-25 PROCEDURE — 25000132 ZZH RX MED GY IP 250 OP 250 PS 637: Performed by: HOSPITALIST

## 2019-05-25 RX ORDER — ACETAMINOPHEN 650 MG/1
650 SUPPOSITORY RECTAL EVERY 4 HOURS PRN
Start: 2019-05-25 | End: 2020-01-28

## 2019-05-25 RX ORDER — ONDANSETRON 4 MG/1
4 TABLET, ORALLY DISINTEGRATING ORAL EVERY 6 HOURS PRN
Qty: 10 TABLET | Refills: 1 | Status: SHIPPED | OUTPATIENT
Start: 2019-05-25 | End: 2021-11-11

## 2019-05-25 RX ADMIN — RANITIDINE 150 MG: 150 TABLET ORAL at 08:57

## 2019-05-25 RX ADMIN — DILTIAZEM HYDROCHLORIDE 180 MG: 180 CAPSULE, EXTENDED RELEASE ORAL at 08:57

## 2019-05-25 RX ADMIN — POLYETHYLENE GLYCOL 3350 17 G: 17 POWDER, FOR SOLUTION ORAL at 07:03

## 2019-05-25 RX ADMIN — LISINOPRIL 20 MG: 20 TABLET ORAL at 08:57

## 2019-05-25 RX ADMIN — BACLOFEN 10 MG: 10 TABLET ORAL at 08:59

## 2019-05-25 ASSESSMENT — ACTIVITIES OF DAILY LIVING (ADL)
ADLS_ACUITY_SCORE: 12

## 2019-05-25 NOTE — PLAN OF CARE
Patient is A/O x4. HTN (160s/80s) otherwise VSS on RA. Pt c/o mild pain, declines intervention. PRN melatonin and ambien at HS, pt slept well overnight. BS are audible active in all quadrants. Denies nausea, passing gas. Voiding in BR.  Up independently. Full liquid diet, tolerating well. Plan on continuing to monitor and possible discharge home today.

## 2019-05-25 NOTE — PLAN OF CARE
A/O x4. Up independently in room. Tolerating clears. Denies pain. NS at 100 into PIV. Voiding adequately. Likely discharge home tomorrow w/ .

## 2019-05-25 NOTE — DISCHARGE SUMMARY
"[facility]    Discharge Summary  Hospitalist    Date of Admission:  5/23/2019  Date of Discharge:  5/25/2019  Discharging Provider: Piotr Medina MD    Discharge Diagnoses   Principal Problem:    Bile duct, common, cystic dilatation  Active Problems:    Chronic pain    Essential hypertension with goal blood pressure less than 140/90    Nausea with vomiting    Elevated LFTs      History of Present Illness   74-year-old female with a history of hypertension, narcotic dependence for chronic pain, depression and apparently prior TIAs, who presented to an outside emergency department with nausea and vomiting for 3 days, currently reports hourly vomiting.  No hematemesis.  Cramping, but no abdominal pain.  No diarrhea.  Denies fevers but has had sweats and chills.  No known sick contacts.  She does have a history of cholecystectomy but no other abdominal surgeries.  In the Emergency Department, a CT of the abdomen demonstrated dilatation of the common bile duct and biliary system including the pancreatic duct without additional a visualized cause so transfer was recommended to higher level of care.      , , with normal Alk Phos and bilirubin. WBC 8.3 and afebrile.  Lactate 2.1.      Hospital Course   Admitted to medicine floor, seen in consult by Dr. Reyna, GI, and lft's up to  and  the next day, so patient underwent ERCP which showed the common duct showed a dramatically dilated duct.  A 7 mm sphincterotomy was cut w/o heme production.  Thereafter the duct was swept repeatedly with a balloon cannula and soft 4-wire basket.  Only a small amount of \"sludge\" was removed.  No significant stone or other obstruction was seen.  The intra-hepatic ducts were normal as seen and only mildly dilated.  We elected to extend the sphincterotomy to an estimated total of 10 mm to assure good drainage.  Again this additional 3 to 4 mm extension was not met with any bleeding.  We " elected to withdraw and follow conservatively after ampullary biopsies were obtained.  The ampulla post cut appeared normal as seen.    No further vomiting while here, and tolerating regular diet at discharge. LFT's improving at discharge with ALT down to 318 and , and no abdominal pain. She will follow-up with Gwendolyn Solis in 1 week for repeat LFT's, and follow-up with GI in 2-3 weeks.      Piotr Medina MD  Pager: [phone]  Cell Phone:  [phone]       Significant Results and Procedures   As above    Pending Results   These results will be followed up by Dr. Medina  Unresulted Labs Ordered in the Past 30 Days of this Admission     Date and Time Order Name Status Description    5/24/2019 1345 Surgical pathology exam In process           Code Status   Full Code       Primary Care Physician   Gwendolyn Solis    Physical Exam   Temp: 98.5  F (36.9  C) Temp src: Oral BP: 151/49 Pulse: 89 Heart Rate: 75 Resp: 16 SpO2: 98 % O2 Device: None (Room air) Oxygen Delivery: 6 LPM  Vitals:    05/23/19 0915 05/24/19 0644 05/25/19 0500   Weight: 59.2 kg (130 lb 9.6 oz) 57.7 kg (127 lb 3.2 oz) 60.6 kg (133 lb 9.6 oz)     Vital Signs with Ranges  Temp:  [96.4  F (35.8  C)-98.5  F (36.9  C)] 98.5  F (36.9  C)  Pulse:  [] 89  Heart Rate:  [] 75  Resp:  [12-20] 16  BP: (135-168)/(49-72) 151/49  SpO2:  [94 %-100 %] 98 %  I/O last 3 completed shifts:  In: 5442 [P.O.:360; I.V.:5082]  Out: -     Exam on discharge:   Abdomin soft and non-tender.     # Discharge Pain Plan:   - Patient currently has NO PAIN and is not being prescribed pain medications on discharge.      Discharge Disposition   Discharged to home  Condition at discharge: Good    Consultations This Hospital Stay   GASTROENTEROLOGY IP CONSULT    Time Spent on this Encounter   I spent a total of 35 minutes discharging this patient.     Discharge Orders      Reason for your hospital stay    Nausea and vomiting, and elevated liver  tests     Follow-up and recommended labs and tests     Follow up with primary care provider, Gwendolyn Solis, in 7 to 10 days, and check LFT's then, and follow up with [facility] in 2-3 weeks.     Activity    Your activity upon discharge: activity as tolerated     Discharge Instructions    Call Dr. Zhao at Pager [phone] if questions, or Cell Phone [phone].     Full Code     Diet    Follow this diet upon discharge: Orders Placed This Encounter      Regular Diet Adult     Discharge Medications   Current Discharge Medication List      START taking these medications    Details   acetaminophen (TYLENOL) 650 MG suppository Place 1 suppository (650 mg) rectally every 4 hours as needed for mild pain    Associated Diagnoses: Chronic pain syndrome      ondansetron (ZOFRAN-ODT) 4 MG ODT tab Take 1 tablet (4 mg) by mouth every 6 hours as needed for nausea or vomiting  Qty: 10 tablet, Refills: 1    Comments: Future refills by PCP Dr. Gwendolyn Solis with phone number [phone].  Associated Diagnoses: Nausea and vomiting, intractability of vomiting not specified, unspecified vomiting type         CONTINUE these medications which have NOT CHANGED    Details   baclofen (LIORESAL) 10 MG tablet Take 1 tablet (10 mg) by mouth 3 times daily  Qty: 90 tablet, Refills: 1    Associated Diagnoses: Chronic pain syndrome      diltiazem ER (DILT-XR) 180 MG 24 hr capsule Take 1 capsule (180 mg) by mouth daily  Qty: 90 capsule, Refills: 3    Associated Diagnoses: Essential hypertension with goal blood pressure less than 140/90      DOCUSATE SODIUM PO Take 100 mg by mouth daily as needed for constipation       fluticasone (FLONASE) 50 MCG/ACT spray Spray 1-2 sprays into both nostrils daily  Qty: 1 Bottle, Refills: 11    Associated Diagnoses: Viral upper respiratory tract infection      HYDROcodone-acetaminophen (NORCO)  MG per tablet Take 1 tablet by mouth 2 times daily And 10 days of the month may  take 3 daily if needed (30 day supply)  Qty: 75 tablet, Refills: 0    Associated Diagnoses: Narcotic dependence (H); Chronic pain syndrome      ibuprofen (ADVIL/MOTRIN) 200 MG tablet Take 400-600 mg by mouth every 8 hours as needed for mild pain      lisinopril (PRINIVIL/ZESTRIL) 20 MG tablet Take 1 tablet (20 mg) by mouth daily  Qty: 90 tablet, Refills: 1    Associated Diagnoses: Essential hypertension with goal blood pressure less than 140/90      loratadine (CLARITIN) 10 MG tablet Take 10 mg by mouth daily as needed for allergies       medical cannabis (Patient's own supply) Take 1 Dose by mouth every evening (The purpose of this order is to document that the patient reports taking medical cannabis.  This is not a prescription, and is not used to certify that the patient has a qualifying medical condition.)  1 ml oral qpm      PARoxetine (PAXIL) 20 MG tablet Take 20 mg by mouth At Bedtime Prescribed as 40mg (20mg x2 tabs), but Tracy is only taking 20mg (1 tablet)      polyethylene glycol-propylene glycol (SYSTANE ULTRA) 0.4-0.3 % SOLN ophthalmic solution Place 1 drop into both eyes 4 times daily as needed for dry eyes      ranitidine (ZANTAC) 150 MG tablet TAKE ONE TABLET BY MOUTH TWICE DAILY  Qty: 180 tablet, Refills: 3    Associated Diagnoses: Gastroesophageal reflux disease without esophagitis      triamcinolone (KENALOG) 0.1 % external cream Apply topically 2 times daily  Qty: 15 g, Refills: 0    Associated Diagnoses: Eczema, unspecified type      valACYclovir (VALTREX) 1000 mg tablet Take 1 tablet (1,000 mg) by mouth 2 times daily for 1 day As needed fo rbreak outs  Qty: 4 tablet, Refills: 11    Associated Diagnoses: Herpes zoster without complication      zolpidem (AMBIEN) 10 MG tablet TAKE 1/2-1 TABLET BY MOUTH NIGHTLY AS NEEDED FOR SLEEP  Qty: 30 tablet, Refills: 2    Associated Diagnoses: Primary insomnia           Allergies   No Known Allergies  Data   Most Recent 3 CBC's:  Recent Labs   Lab Test  05/24/19  0715 05/22/19  2240 02/15/19  1154   WBC 4.1 8.3 6.2   HGB 11.3* 14.5 12.2   MCV 91 91 94    346 321      Most Recent 3 BMP's:  Recent Labs   Lab Test 05/24/19  0715 05/22/19  2240 02/15/19  1154    137 137   POTASSIUM 4.3 4.1 4.3   CHLORIDE 112* 103 105   CO2 30 26 28   BUN 12 17 25   CR 0.72 0.76 1.00   ANIONGAP 1* 8 4   SUE 8.5 10.0 8.8   GLC 84 108* 90     Most Recent 2 LFT's:  Recent Labs   Lab Test 05/25/19  0737 05/24/19  0715   * 278*   * 470*   ALKPHOS 82 93   BILITOTAL 0.5 0.5     Most Recent INR's and Anticoagulation Dosing History:  Anticoagulation Dose History     There is no flowsheet data to display.        Most Recent 3 Troponin's:  Recent Labs   Lab Test 05/22/19  2240 12/01/15  1605 12/01/15  1340   TROPI <0.015 <0.015  The 99th percentile for upper reference range is 0.045 ug/L.  Troponin values in   the range of 0.045 - 0.120 ug/L may be associated with risks of adverse   clinical events.   <0.015  The 99th percentile for upper reference range is 0.045 ug/L.  Troponin values in   the range of 0.045 - 0.120 ug/L may be associated with risks of adverse   clinical events.       Most Recent Cholesterol Panel:  Recent Labs   Lab Test 03/29/19  1007   CHOL 220*   *   HDL 80   TRIG 83     Most Recent 6 Bacteria Isolates From Any Culture (See EPIC Reports for Culture Details):  Recent Labs   Lab Test 07/15/17  0505 09/02/14  1852 05/30/12  1138 05/07/12  0917 04/24/12  1345 04/12/12  1439   CULT >100,000 colonies/mL Escherichia coli ESBL ESBL (extended beta lactamase)   producing organisms require contact precautions.  Referred to [facility] for suceptibility testing.  * No Beta Streptococcus isolated >100,000 colonies/mL Mixed gram positive leti >100,000 colonies/mL Mixed gram positive leti >100,000 colonies/mL Coagulase negative Staphylococcus 10 to 50,000 colonies/mL Mixed gram positive ltei >100,000 colonies/mL Escherichia coli      Most Recent TSH, T4 and A1c Labs:  Recent Labs   Lab Test 02/15/19  1154 04/27/18  0812   TSH 0.94  --    A1C  --  5.5

## 2019-05-25 NOTE — CONSULTS
GI    Delightful 73y/o F we were asked to see when she presented with n/v and was found to have biliary dilation and elevated LFTs.  MRCP failed to demonstrate mass or stone, but again biliary dilation was noted.  Thus ERCP was performed, duct swept, sludge return, ampulla biopsied.     Is now feeling very well, eating breakfast without complaints.     Abd is soft, NTTP.     Imp/Recs:  Biliary sludge/dilation  -LFTs improving, symptoms resolved.   -Okay to plan dispo today.    -Path (ampullary biopsy) will be reviewed by Dr Blunt when resulted next week  -Recommend repeat LFTs with PCP in 2-3 wks.      Please call with questions.   Will sign off.     [facility]  Cell [phone]

## 2019-05-25 NOTE — PLAN OF CARE
Discharge to home.Will follow up with PCP as instructed. Discharge education/medications complete. Transport arranged with .

## 2019-05-25 NOTE — PLAN OF CARE
3546-0949: A&Ox4. VSS on RA. Denies pain/N/V. Up in room independently. Clear liquid diet, tolerating. IVF running.  at bedside. Contact precautions maintained. Voiding without difficulty. GI following. Nursing will continue to monitor.

## 2019-05-27 ENCOUNTER — TELEPHONE (OUTPATIENT)
Dept: FAMILY MEDICINE | Facility: CLINIC | Age: 74
End: 2019-05-27

## 2019-05-27 NOTE — TELEPHONE ENCOUNTER
Patient calling to get sooner appointment with PCP for hospital discharge follow up.[phone] ok to leave detailed voicemail.

## 2019-05-28 LAB — COPATH REPORT: NORMAL

## 2019-06-10 ENCOUNTER — OFFICE VISIT (OUTPATIENT)
Dept: FAMILY MEDICINE | Facility: CLINIC | Age: 74
End: 2019-06-10
Payer: COMMERCIAL

## 2019-06-10 VITALS
WEIGHT: 121.8 LBS | HEIGHT: 61 IN | BODY MASS INDEX: 23 KG/M2 | DIASTOLIC BLOOD PRESSURE: 62 MMHG | RESPIRATION RATE: 16 BRPM | SYSTOLIC BLOOD PRESSURE: 128 MMHG | HEART RATE: 60 BPM

## 2019-06-10 DIAGNOSIS — R79.89 ELEVATED LFTS: ICD-10-CM

## 2019-06-10 DIAGNOSIS — K83.8 DILATION OF BILIARY TRACT: Primary | ICD-10-CM

## 2019-06-10 PROBLEM — R11.2 NAUSEA WITH VOMITING: Status: RESOLVED | Noted: 2019-05-23 | Resolved: 2019-06-10

## 2019-06-10 PROBLEM — Q44.5 BILE DUCT, COMMON, CYSTIC DILATATION: Status: RESOLVED | Noted: 2019-05-23 | Resolved: 2019-06-10

## 2019-06-10 LAB
ALBUMIN SERPL-MCNC: 3.6 G/DL (ref 3.4–5)
ALP SERPL-CCNC: 66 U/L (ref 40–150)
ALT SERPL W P-5'-P-CCNC: 24 U/L (ref 0–50)
ANION GAP SERPL CALCULATED.3IONS-SCNC: 1 MMOL/L (ref 3–14)
AST SERPL W P-5'-P-CCNC: 16 U/L (ref 0–45)
BILIRUB SERPL-MCNC: 0.3 MG/DL (ref 0.2–1.3)
BUN SERPL-MCNC: 18 MG/DL (ref 7–30)
CALCIUM SERPL-MCNC: 9.1 MG/DL (ref 8.5–10.1)
CHLORIDE SERPL-SCNC: 107 MMOL/L (ref 94–109)
CO2 SERPL-SCNC: 30 MMOL/L (ref 20–32)
CREAT SERPL-MCNC: 0.79 MG/DL (ref 0.52–1.04)
GFR SERPL CREATININE-BSD FRML MDRD: 73 ML/MIN/{1.73_M2}
GLUCOSE SERPL-MCNC: 89 MG/DL (ref 70–99)
POTASSIUM SERPL-SCNC: 4.3 MMOL/L (ref 3.4–5.3)
PROT SERPL-MCNC: 7 G/DL (ref 6.8–8.8)
SODIUM SERPL-SCNC: 138 MMOL/L (ref 133–144)

## 2019-06-10 PROCEDURE — 80053 COMPREHEN METABOLIC PANEL: CPT | Performed by: FAMILY MEDICINE

## 2019-06-10 PROCEDURE — 36415 COLL VENOUS BLD VENIPUNCTURE: CPT | Performed by: FAMILY MEDICINE

## 2019-06-10 PROCEDURE — 99214 OFFICE O/P EST MOD 30 MIN: CPT | Performed by: FAMILY MEDICINE

## 2019-06-10 ASSESSMENT — MIFFLIN-ST. JEOR: SCORE: 976.92

## 2019-06-10 NOTE — NURSING NOTE
"Chief Complaint   Patient presents with     Hospital F/U       Initial /62 (BP Location: Right arm, Patient Position: Chair, Cuff Size: Adult Regular)   Pulse 60   Resp 16   Ht 1.537 m (5' 0.5\")   Wt 55.2 kg (121 lb 12.8 oz)   BMI 23.40 kg/m   Estimated body mass index is 23.4 kg/m  as calculated from the following:    Height as of this encounter: 1.537 m (5' 0.5\").    Weight as of this encounter: 55.2 kg (121 lb 12.8 oz).    Patient presents to the clinic using No DME    Health Maintenance that is potentially due pending provider review:  Mammogram - declined    Mariam Parekh MA  2:13 PM 6/10/2019  .        "

## 2019-06-10 NOTE — PROGRESS NOTES
"Subjective     Tracy Galloway is a 74 year old female who presents to clinic today for the following health issues:    HPI       Hospital Follow-up Visit:    Hospital/Nursing Home/IP Rehab Facility: [facility]  Date of Admission: 5/23/2019  Date of Discharge: 5/25/2019  Reason(s) for Admission:   Principal Problem:    Bile duct, common, cystic dilatation  Active Problems:    Chronic pain    Essential hypertension with goal blood pressure less than 140/90    Nausea with vomiting    Elevated LFTs                Problems taking medications regularly:  None       Medication changes since discharge: START: Tylenol PRN and Zofran       Problems adhering to non-medication therapy:  None    Summary of hospitalization:  [facility] discharge summary reviewed  Diagnostic Tests/Treatments reviewed.  Follow up needed: none  Other Healthcare Providers Involved in Patient s Care:         None  Update since discharge: improved. More below    Post Discharge Medication Reconciliation: discharge medications reconciled, continue medications without change.  Plan of care communicated with patient     Coding guidelines for this visit:  Type of Medical   Decision Making Face-to-Face Visit       within 7 Days of discharge Face-to-Face Visit        within 14 days of discharge   Moderate Complexity [id number]   High Complexity [id number]            s :Tracy Galloway is a 74 year old female with nausea, pain.  CT showed dilated bile ducts.  MRCP didn't show anything else other than dilated ducts.  ERCP removed sludge and sphincterotomy done.    Of note, no gall bladder anymore    Feels better., wants to recheck labs    Problem list, med list, additional histories reviewed and updated, as indicated.      O:/62 (BP Location: Right arm, Patient Position: Chair, Cuff Size: Adult Regular)   Pulse 60   Resp 16   Ht 1.537 m (5' 0.5\")   Wt 55.2 kg (121 lb 12.8 oz)   BMI 23.40 kg/m    GEN: Alert and oriented, in no " acute distress  ABD: nontender.  No distention or mass appreciated.    A: dilated biliary tree          Nausea, resolved    P: see how this goes.  Seems to be fixed.  She's happy.  Check LFTs.

## 2019-06-10 NOTE — LETTER
June 12, 2019      Tracy L Laverne  [address]   [place] [address]        Dear ,    We are writing to inform you of your test results.    Your Liver tests are back to normal.  That's good news.       I hope things are going well.     Resulted Orders   Comprehensive metabolic panel (BMP + Alb, Alk Phos, ALT, AST, Total. Bili, TP)   Result Value Ref Range    Sodium 138 133 - 144 mmol/L    Potassium 4.3 3.4 - 5.3 mmol/L    Chloride 107 94 - 109 mmol/L    Carbon Dioxide 30 20 - 32 mmol/L    Anion Gap 1 (L) 3 - 14 mmol/L    Glucose 89 70 - 99 mg/dL    Urea Nitrogen 18 7 - 30 mg/dL    Creatinine 0.79 0.52 - 1.04 mg/dL    GFR Estimate 73 >60 mL/min/[1.73_m2]      Comment:      Non  GFR Calc  Starting 12/18/2018, serum creatinine based estimated GFR (eGFR) will be   calculated using the Chronic Kidney Disease Epidemiology Collaboration   (CKD-EPI) equation.      GFR Estimate If Black 85 >60 mL/min/[1.73_m2]      Comment:       GFR Calc  Starting 12/18/2018, serum creatinine based estimated GFR (eGFR) will be   calculated using the Chronic Kidney Disease Epidemiology Collaboration   (CKD-EPI) equation.      Calcium 9.1 8.5 - 10.1 mg/dL    Bilirubin Total 0.3 0.2 - 1.3 mg/dL    Albumin 3.6 3.4 - 5.0 g/dL    Protein Total 7.0 6.8 - 8.8 g/dL    Alkaline Phosphatase 66 40 - 150 U/L    ALT 24 0 - 50 U/L    AST 16 0 - 45 U/L       If you have any questions or concerns, please call the clinic at the number listed above.       Sincerely,        Eric Wyman MD

## 2019-07-12 ENCOUNTER — TELEPHONE (OUTPATIENT)
Dept: FAMILY MEDICINE | Facility: CLINIC | Age: 74
End: 2019-07-12

## 2019-07-12 ENCOUNTER — OFFICE VISIT (OUTPATIENT)
Dept: FAMILY MEDICINE | Facility: CLINIC | Age: 74
End: 2019-07-12
Payer: COMMERCIAL

## 2019-07-12 VITALS
HEART RATE: 72 BPM | WEIGHT: 122.2 LBS | TEMPERATURE: 97.6 F | BODY MASS INDEX: 23.47 KG/M2 | SYSTOLIC BLOOD PRESSURE: 126 MMHG | RESPIRATION RATE: 16 BRPM | DIASTOLIC BLOOD PRESSURE: 62 MMHG

## 2019-07-12 DIAGNOSIS — F32.0 MILD SINGLE CURRENT EPISODE OF MAJOR DEPRESSIVE DISORDER (H): Primary | ICD-10-CM

## 2019-07-12 DIAGNOSIS — G89.4 CHRONIC PAIN SYNDROME: Chronic | ICD-10-CM

## 2019-07-12 DIAGNOSIS — K83.8 DILATION OF BILIARY TRACT: ICD-10-CM

## 2019-07-12 DIAGNOSIS — F11.20 NARCOTIC DEPENDENCE (H): Chronic | ICD-10-CM

## 2019-07-12 DIAGNOSIS — F51.01 PRIMARY INSOMNIA: Chronic | ICD-10-CM

## 2019-07-12 PROCEDURE — 99214 OFFICE O/P EST MOD 30 MIN: CPT | Mod: 25 | Performed by: FAMILY MEDICINE

## 2019-07-12 PROCEDURE — G0439 PPPS, SUBSEQ VISIT: HCPCS | Performed by: FAMILY MEDICINE

## 2019-07-12 RX ORDER — HYDROCODONE BITARTRATE AND ACETAMINOPHEN 10; 325 MG/1; MG/1
1 TABLET ORAL 2 TIMES DAILY
Qty: 75 TABLET | Refills: 0 | Status: SHIPPED | OUTPATIENT
Start: 2019-08-07 | End: 2019-08-23

## 2019-07-12 RX ORDER — ZOLPIDEM TARTRATE 5 MG/1
TABLET ORAL
Qty: 15 TABLET | Refills: 5 | Status: SHIPPED | OUTPATIENT
Start: 2019-07-12 | End: 2020-01-28

## 2019-07-12 RX ORDER — HYDROCODONE BITARTRATE AND ACETAMINOPHEN 10; 325 MG/1; MG/1
1 TABLET ORAL 2 TIMES DAILY
Qty: 70 TABLET | Refills: 0 | Status: SHIPPED | OUTPATIENT
Start: 2019-07-12 | End: 2019-07-12

## 2019-07-12 ASSESSMENT — ANXIETY QUESTIONNAIRES
7. FEELING AFRAID AS IF SOMETHING AWFUL MIGHT HAPPEN: NOT AT ALL
1. FEELING NERVOUS, ANXIOUS, OR ON EDGE: NOT AT ALL
4. TROUBLE RELAXING: NOT AT ALL
6. BECOMING EASILY ANNOYED OR IRRITABLE: SEVERAL DAYS
3. WORRYING TOO MUCH ABOUT DIFFERENT THINGS: NOT AT ALL
5. BEING SO RESTLESS THAT IT IS HARD TO SIT STILL: NOT AT ALL
GAD7 TOTAL SCORE: 1
7. FEELING AFRAID AS IF SOMETHING AWFUL MIGHT HAPPEN: NOT AT ALL
GAD7 TOTAL SCORE: 1
2. NOT BEING ABLE TO STOP OR CONTROL WORRYING: NOT AT ALL
GAD7 TOTAL SCORE: 1

## 2019-07-12 ASSESSMENT — PATIENT HEALTH QUESTIONNAIRE - PHQ9
10. IF YOU CHECKED OFF ANY PROBLEMS, HOW DIFFICULT HAVE THESE PROBLEMS MADE IT FOR YOU TO DO YOUR WORK, TAKE CARE OF THINGS AT HOME, OR GET ALONG WITH OTHER PEOPLE: NOT DIFFICULT AT ALL
SUM OF ALL RESPONSES TO PHQ QUESTIONS 1-9: 5
SUM OF ALL RESPONSES TO PHQ QUESTIONS 1-9: 5

## 2019-07-12 NOTE — LETTER
My Depression Action Plan  Name: Tracy Galloway   Date of Birth 1945  Date: 7/12/2019    My doctor: Gwendolyn Solis   My clinic: [address] [address] [phone]          GREEN    ZONE   Good Control    What it looks like:     Things are going generally well. You have normal up s and down s. You may even feel depressed from time to time, but bad moods usually last less than a day.   What you need to do:  1. Continue to care for yourself (see self care plan)  2. Check your depression survival kit and update it as needed  3. Follow your physician s recommendations including any medication.  4. Do not stop taking medication unless you consult with your physician first.           YELLOW         ZONE Getting Worse    What it looks like:     Depression is starting to interfere with your life.     It may be hard to get out of bed; you may be starting to isolate yourself from others.    Symptoms of depression are starting to last most all day and this has happened for several days.     You may have suicidal thoughts but they are not constant.   What you need to do:     1. Call your care team, your response to treatment will improve if you keep your care team informed of your progress. Yellow periods are signs an adjustment may need to be made.     2. Continue your self-care, even if you have to fake it!    3. Talk to someone in your support network    4. Open up your depression survival kit           RED    ZONE Medical Alert - Get Help    What it looks like:     Depression is seriously interfering with your life.     You may experience these or other symptoms: You can t get out of bed most days, can t work or engage in other necessary activities, you have trouble taking care of basic hygiene, or basic responsibilities, thoughts of suicide or death that will not go away, self-injurious behavior.     What you need to do:  1. Call your care team  and request a same-day appointment. If they are not available (weekends or after hours) call your local crisis line, emergency room or 911.            Depression Self Care Plan / Survival Kit    Self-Care for Depression  Here s the deal. Your body and mind are really not as separate as most people think.  What you do and think affects how you feel and how you feel influences what you do and think. This means if you do things that people who feel good do, it will help you feel better.  Sometimes this is all it takes.  There is also a place for medication and therapy depending on how severe your depression is, so be sure to consult with your medical provider and/ or Behavioral Health Consultant if your symptoms are worsening or not improving.     In order to better manage my stress, I will:    Exercise  Get some form of exercise, every day. This will help reduce pain and release endorphins, the  feel good  chemicals in your brain. This is almost as good as taking antidepressants!  This is not the same as joining a gym and then never going! (they count on that by the way ) It can be as simple as just going for a walk or doing some gardening, anything that will get you moving.      Hygiene   Maintain good hygiene (Get out of bed in the morning, Make your bed, Brush your teeth, Take a shower, and Get dressed like you were going to work, even if you are unemployed).  If your clothes don't fit try to get ones that do.    Diet  I will strive to eat foods that are good for me, drink plenty of water, and avoid excessive sugar, caffeine, alcohol, and other mood-altering substances.  Some foods that are helpful in depression are: complex carbohydrates, B vitamins, flaxseed, fish or fish oil, fresh fruits and vegetables.    Psychotherapy  I agree to participate in Individual Therapy (if recommended).    Medication  If prescribed medications, I agree to take them.  Missing doses can result in serious side effects.  I understand  that drinking alcohol, or other illicit drug use, may cause potential side effects.  I will not stop my medication abruptly without first discussing it with my provider.    Staying Connected With Others  I will stay in touch with my friends, family members, and my primary care provider/team.    Use your imagination  Be creative.  We all have a creative side; it doesn t matter if it s oil painting, sand castles, or mud pies! This will also kick up the endorphins.    Witness Beauty  (AKA stop and smell the roses) Take a look outside, even in mid-winter. Notice colors, textures. Watch the squirrels and birds.     Service to others  Be of service to others.  There is always someone else in need.  By helping others we can  get out of ourselves  and remember the really important things.  This also provides opportunities for practicing all the other parts of the program.    Humor  Laugh and be silly!  Adjust your TV habits for less news and crime-drama and more comedy.    Control your stress  Try breathing deep, massage therapy, biofeedback, and meditation. Find time to relax each day.     My support system    Clinic Contact:  Phone number:    Contact 1:  Phone number:    Contact 2:  Phone number:    [employer]:  Phone number:    Therapist:  Phone number:    Local crisis center:    Phone number:    Other community support:  Phone number:

## 2019-07-12 NOTE — TELEPHONE ENCOUNTER
Panel Management Review      Patient has the following on her problem list:   Depression / Dysthymia review    Measure:  Needs PHQ-9 score of 4 or less during index window.  Administer PHQ-9 and if score is 5 or more, send encounter to provider for next steps.    5 - 7 month window range: yes    PHQ-9 SCORE 7/18/2017 8/20/2018 11/16/2018   PHQ-9 Total Score - - -   PHQ-9 Total Score 2 5 24       If PHQ-9 recheck is 5 or more, route to provider for next steps.    Patient is due for:  PHQ9      Composite cancer screening  Chart review shows that this patient is due/due soon for the following None  Summary:    Patient is due/failing the following:   PHQ9    Action needed:   Patient needs to do PHQ9.    Type of outreach:    Has appointment today in clinic    Questions for provider review:    None                                                                                                                                    Mariama Major RN     Chart routed to Care Team .

## 2019-07-12 NOTE — PATIENT INSTRUCTIONS
Preventive Health Recommendations    See your health care provider every year to    Review health changes.     Discuss preventive care.      Review your medicines if your doctor has prescribed any.    You no longer need a yearly Pap test unless you've had an abnormal Pap test in the past 10 years. If you have vaginal symptoms, such as bleeding or discharge, be sure to talk with your provider about a Pap test.    Every 1 to 2 years, have a mammogram.  If you are over 69, talk with your health care provider about whether or not you want to continue having screening mammograms.    Every 10 years, have a colonoscopy. Or, have a yearly FIT test (stool test). These exams will check for colon cancer.     Have a cholesterol test every 5 years, or more often if your doctor advises it.     Have a diabetes test (fasting glucose) every three years. If you are at risk for diabetes, you should have this test more often.     At age 65, have a bone density scan (DEXA) to check for osteoporosis (brittle bone disease).    Shots:    Get a flu shot each year.    Get a tetanus shot every 10 years.    Talk to your doctor about your pneumonia vaccines. There are now two you should receive - Pneumovax (PPSV 23) and Prevnar (PCV 13).    Talk to your pharmacist about the shingles vaccine.    Talk to your doctor about the hepatitis B vaccine.    Nutrition:     Eat at least 5 servings of fruits and vegetables each day.    Eat whole-grain bread, whole-wheat pasta and brown rice instead of white grains and rice.    Get adequate Calcium and Vitamin D.     Lifestyle    Exercise at least 150 minutes a week (30 minutes a day, 5 days a week). This will help you control your weight and prevent disease.    Limit alcohol to one drink per day.    No smoking.     Wear sunscreen to prevent skin cancer.     See your dentist twice a year for an exam and cleaning.    See your eye doctor every 1 to 2 years to screen for conditions such as glaucoma, macular  degeneration and cataracts.    Personalized Prevention Plan  You are due for the preventive services outlined below.  Your care team is available to assist you in scheduling these services.  If you have already completed any of these items, please share that information with your care team to update in your medical record.  Health Maintenance Due       Stop paxil     Cut dose of ambien in half and try melatonin with this   Try the above for 2 weeks   mychart message in 2 weeks with an update     Stay on current doses of all other meds

## 2019-07-12 NOTE — NURSING NOTE
"Chief Complaint   Patient presents with     Pain       Initial /62 (BP Location: Right arm, Patient Position: Chair, Cuff Size: Adult Regular)   Pulse 72   Temp 97.6  F (36.4  C) (Tympanic)   Resp 16   Wt 55.4 kg (122 lb 3.2 oz)   BMI 23.47 kg/m   Estimated body mass index is 23.47 kg/m  as calculated from the following:    Height as of 6/10/19: 1.537 m (5' 0.5\").    Weight as of this encounter: 55.4 kg (122 lb 3.2 oz).    Patient presents to the clinic using No DME    Health Maintenance that is potentially due pending provider review:  Mammogram    Pt declines to have mammograms and colonoscopies.    Is there anyone who you would like to be able to receive your results? No  If yes have patient fill out JENA    "

## 2019-07-12 NOTE — PROGRESS NOTES
Subjective     Tracy Galloway is a 74 year old female who presents to clinic today for the following health issues:  PHQ-9 ([employer]) 7/12/2019   1.  Little interest or pleasure in doing things 1   2.  Feeling down, depressed, or hopeless 1   3.  Trouble falling or staying asleep, or sleeping too much 0   4.  Feeling tired or having little energy 1   5.  Poor appetite or overeating 1   6.  Feeling bad about yourself 0   7.  Trouble concentrating 0   8.  Moving slowly or restless 0   9.  Suicidal or self-harm thoughts 0   PHQ-9 Total Score 4     ROMAIN-7   [employer], 2002; Used with Permission) 7/12/2019   1. Feeling nervous, anxious, or on edge 0   2. Not being able to stop or control worrying 0   3. Worrying too much about different things 0   4. Trouble relaxing 0   5. Being so restless that it is hard to sit still 0   6. Becoming easily annoyed or irritable 1   7. Feeling afraid, as if something awful might happen 0   ROMAIN-7 Total Score 1     HPI   Chronic Pain Follow-Up       Type / Location of Pain: facial  Analgesia/pain control:       Recent changes:  same      Overall control: Tolerable with discomfort  Activity level/function:      Daily activities:  Able to do all daily activities    Work:  not applicable  Adverse effects:  No  Adherance    Taking medication as directed?  Yes    Participating in other treatments: yes, cannibus treatment  Risk Factors:    Sleep:  Fair, night sweats    Mood/anxiety:  controlled    Recent family or social stressors:  none noted    Other aggravating factors: none  PHQ-9 SCORE 11/16/2018 7/12/2019 7/12/2019   PHQ-9 Total Score - - -   PHQ-9 Total Score MyChart - - 5 (Mild depression)   PHQ-9 Total Score 24 5 4     ROMAIN-7 SCORE 7/18/2017 11/16/2018 7/12/2019   Total Score - - -   Total Score - - 1 (minimal anxiety)   Total Score 1 13 1     Encounter-Level CSA - 08/20/2018:    Controlled Substance Agreement - Scan on 8/29/2018  3:15 PM: CONTROLLED SUBSTANCE AGREEMENT (below)      "  Encounter-Level CSA - 2015:    Controlled Substance Agreement - Scan on 7/10/2015  1:04 PM: CONTROLLED SUBSTANCE AGREEMENT (below)       Patient-Level CSA:    There are no patient-level csa.         Amount of exercise or physical activity: None    Problems taking medications regularly: No    Medication side effects: none    Diet: regular (no restrictions)      Annual Wellness Visit    Are you in the first 12 months of your Medicare Part B coverage?  No    Physical Health:    In general, how would you rate your overall physical health? good  If you drink alcohol do you typically have >3 drinks per day or >7 drinks per week? Yes - AUDIT SCORE:   occasional  No flowsheet data found.    Do you usually eat at least 4 servings of fruit and vegetables a day, include whole grains & fiber and avoid regularly eating high fat or \"junk\" foods? NO    Needs assistance for the following daily activities: no assistance needed    Which of the following safety concerns are present in your home?  lack of grab bars in the bathroom     Hearing impairment: No    In the past 6 months, have you been bothered by leaking of urine? no    Mental Health:    In general, how would you rate your overall mental or emotional health? fair  PHQ-2 Score:      Do you feel safe in your environment? Yes    Do you have a Health Care Directive? Yes: Advance Directive has been received and scanned.    Fall risk:  Fallen 2 or more times in the past year?: No  Any fall with injury in the past year?: No    Cognitive Screenin) Repeat 3 items (Leader, Season, Table)    2) Clock draw: NORMAL  3) 3 item recall: Recalls 1 object   Results: NORMAL clock, 1-2 items recalled: COGNITIVE IMPAIRMENT LESS LIKELY    Mini-CogTM Copyright BRIDGETTE Mclaughlin. Licensed by the author for use in [facility]; reprinted with permission (molly@[facility]). All rights reserved.      Current providers sharing in care for this patient include:   Patient Care Team:  Will " Gwendolyn Jang MD as PCP - General  Rehder, Gwendolyn Jang MD as Assigned PCP  João Thomas MD as MD (Gastroenterology)        Do you have sleep apnea, excessive snoring or daytime drowsiness?: yes, snoring, daytime drowsiness        Depression Followup    How are you doing with your depression since your last visit? No change    Are you having other symptoms that might be associated with depression? No    Have you had a significant life event?  No     Are you feeling anxious or having panic attacks?   No    Do you have any concerns with your use of alcohol or other drugs? No    Social History     Tobacco Use     Smoking status: Former Smoker     Types: Cigarettes     Last attempt to quit: 10/30/1962     Years since quittin.7     Smokeless tobacco: Never Used   Substance Use Topics     Alcohol use: Yes     Alcohol/week: 2.4 oz     Types: 4 Standard drinks or equivalent per week     Comment: occasional     Drug use: No     Comment: medical marijuana in the past     PHQ 2018   PHQ-9 Total Score 24 5 4   Q9: Thoughts of better off dead/self-harm past 2 weeks Nearly every day Several days Not at all   F/U: Thoughts of suicide or self-harm - No -   F/U: Safety concerns - No -     ROMAIN-7 SCORE 2018   Total Score - - -   Total Score - - 1 (minimal anxiety)   Total Score 1 13 1       In the past two weeks have you had thoughts of suicide or self-harm?  No.    Do you have concerns about your personal safety or the safety of others?   No    Suicide Assessment Five-step Evaluation and Treatment (SAFE-T)  Reviewed and updated as needed this visit by Provider  Tobacco  Allergies  Meds  Problems  Med Hx  Surg Hx  Fam Hx         Review of Systems   ROS COMP: Constitutional, HEENT, cardiovascular, pulmonary, gi and gu systems are negative, except as otherwise noted.      Objective    /62 (BP Location: Right arm, Patient Position: Chair, Cuff Size: Adult  Regular)   Pulse 72   Temp 97.6  F (36.4  C) (Tympanic)   Resp 16   Wt 55.4 kg (122 lb 3.2 oz)   BMI 23.47 kg/m    Body mass index is 23.47 kg/m .  Physical Exam   GENERAL APPEARANCE: healthy, alert and no distress  RESP: lungs clear to auscultation - no rales, rhonchi or wheezes  CV: regular rates and rhythm, normal S1 S2, no S3 or S4 and no murmur, click or rub  PSYCH: mentation appears normal and affect normal/bright    Diagnostic Test Results:  Labs reviewed in Epic        Assessment & Plan     1. Mild single current episode of major depressive disorder (H)  Stable no change in treatment plan.   - DEPRESSION ACTION PLAN (DAP)    2. Narcotic dependence (H)  Fair pain control but feel really groggy   Over medicated at this point and taking cannabis irregularly   - HYDROcodone-acetaminophen (NORCO)  MG per tablet; Take 1 tablet by mouth 2 times daily And 10 days of the month may take 3 daily if needed (30 day supply)  Dispense: 75 tablet; Refill: 0    3. Chronic pain syndrome  See above   - HYDROcodone-acetaminophen (NORCO)  MG per tablet; Take 1 tablet by mouth 2 times daily And 10 days of the month may take 3 daily if needed (30 day supply)  Dispense: 75 tablet; Refill: 0    4. Dilation of biliary tract    - GASTROENTEROLOGY ADULT REF CONSULT ONLY    5. Primary insomnia  Cut down on meds   - zolpidem (AMBIEN) 5 MG tablet; 1/2 tab at bedtime  Dispense: 15 tablet; Refill: 5       Patient Instructions        Preventive Health Recommendations    See your health care provider every year to    Review health changes.     Discuss preventive care.      Review your medicines if your doctor has prescribed any.    You no longer need a yearly Pap test unless you've had an abnormal Pap test in the past 10 years. If you have vaginal symptoms, such as bleeding or discharge, be sure to talk with your provider about a Pap test.    Every 1 to 2 years, have a mammogram.  If you are over 69, talk with your health  care provider about whether or not you want to continue having screening mammograms.    Every 10 years, have a colonoscopy. Or, have a yearly FIT test (stool test). These exams will check for colon cancer.     Have a cholesterol test every 5 years, or more often if your doctor advises it.     Have a diabetes test (fasting glucose) every three years. If you are at risk for diabetes, you should have this test more often.     At age 65, have a bone density scan (DEXA) to check for osteoporosis (brittle bone disease).    Shots:    Get a flu shot each year.    Get a tetanus shot every 10 years.    Talk to your doctor about your pneumonia vaccines. There are now two you should receive - Pneumovax (PPSV 23) and Prevnar (PCV 13).    Talk to your pharmacist about the shingles vaccine.    Talk to your doctor about the hepatitis B vaccine.    Nutrition:     Eat at least 5 servings of fruits and vegetables each day.    Eat whole-grain bread, whole-wheat pasta and brown rice instead of white grains and rice.    Get adequate Calcium and Vitamin D.     Lifestyle    Exercise at least 150 minutes a week (30 minutes a day, 5 days a week). This will help you control your weight and prevent disease.    Limit alcohol to one drink per day.    No smoking.     Wear sunscreen to prevent skin cancer.     See your dentist twice a year for an exam and cleaning.    See your eye doctor every 1 to 2 years to screen for conditions such as glaucoma, macular degeneration and cataracts.    Personalized Prevention Plan  You are due for the preventive services outlined below.  Your care team is available to assist you in scheduling these services.  If you have already completed any of these items, please share that information with your care team to update in your medical record.  Health Maintenance Due       Stop paxil     Cut dose of ambien in half and try melatonin with this   Try the above for 2 weeks   mychart message in 2 weeks with an update      Stay on current doses of all other meds        Return in about 2 weeks (around 7/26/2019) for may chart message .      Risks, benefits, side effects and rationale for treatment plan fully discussed with the patient and understanding expressed.   Gwendolyn Solis MD  [facility]

## 2019-07-13 ASSESSMENT — ANXIETY QUESTIONNAIRES: GAD7 TOTAL SCORE: 1

## 2019-07-15 ENCOUNTER — TELEPHONE (OUTPATIENT)
Dept: FAMILY MEDICINE | Facility: CLINIC | Age: 74
End: 2019-07-15

## 2019-07-15 NOTE — TELEPHONE ENCOUNTER
Pt called. States she is not sure if she needs her liver rechecked after surgery in May. Advised her CMP was normal in June. Would you want further testing. Mariama PIKE

## 2019-07-15 NOTE — TELEPHONE ENCOUNTER
Reason for Call:  Other     Detailed comments: Patient was tld that she should have some liver test done to see if her liver is now function right - please call pt    Phone Number Patient can be reached at: Home number on file [phone] (home)    Best Time:     Can we leave a detailed message on this number? YES    Call taken on 7/15/2019 at 2:35 PM by Ximena Quiroz

## 2019-08-23 DIAGNOSIS — G89.4 CHRONIC PAIN SYNDROME: Chronic | ICD-10-CM

## 2019-08-23 DIAGNOSIS — F11.20 NARCOTIC DEPENDENCE (H): Chronic | ICD-10-CM

## 2019-08-23 RX ORDER — HYDROCODONE BITARTRATE AND ACETAMINOPHEN 10; 325 MG/1; MG/1
1 TABLET ORAL 2 TIMES DAILY
Qty: 75 TABLET | Refills: 0 | Status: SHIPPED | OUTPATIENT
Start: 2019-08-23 | End: 2019-09-20

## 2019-08-23 RX ORDER — HYDROCODONE BITARTRATE AND ACETAMINOPHEN 10; 325 MG/1; MG/1
TABLET ORAL
Qty: 70 TABLET | Refills: 0 | OUTPATIENT
Start: 2019-08-23

## 2019-08-23 NOTE — TELEPHONE ENCOUNTER
Pt called. Advised she should have another script available. She will check with pharmacy. Mariama Major RN

## 2019-08-23 NOTE — TELEPHONE ENCOUNTER
The script we have on file was written 7/12 and is no longer able to be filled- last picked up 7/24  70 tablets for 28 days    Thank You!  Jeny Wan  [facility]  P: [phone] F:[phone]

## 2019-08-23 NOTE — TELEPHONE ENCOUNTER
[place] reviewed:  reviewed- no concerns    [place]  [email or web address]

## 2019-08-23 NOTE — TELEPHONE ENCOUNTER
[place] reviewed:  reviewed- no concerns    [place]:  [email or web address]

## 2019-09-20 ENCOUNTER — VIRTUAL VISIT (OUTPATIENT)
Dept: FAMILY MEDICINE | Facility: CLINIC | Age: 74
End: 2019-09-20
Payer: COMMERCIAL

## 2019-09-20 DIAGNOSIS — G89.4 CHRONIC PAIN SYNDROME: Chronic | ICD-10-CM

## 2019-09-20 DIAGNOSIS — F11.20 NARCOTIC DEPENDENCE (H): Chronic | ICD-10-CM

## 2019-09-20 PROCEDURE — 99442 ZZC PHYSICIAN TELEPHONE EVALUATION 11-20 MIN: CPT | Performed by: FAMILY MEDICINE

## 2019-09-20 RX ORDER — HYDROCODONE BITARTRATE AND ACETAMINOPHEN 10; 325 MG/1; MG/1
1 TABLET ORAL 2 TIMES DAILY
Qty: 75 TABLET | Refills: 0 | Status: SHIPPED | OUTPATIENT
Start: 2019-09-22 | End: 2019-10-21

## 2019-09-20 NOTE — PROGRESS NOTES
"  Tracy Galloway is a 74 year old female who is being evaluated via a billable telephone visit.      The patient has been notified of following:     \"This telephone visit will be conducted via a call between you and your physician/provider. We have found that certain health care needs can be provided without the need for a physical exam.  This service lets us provide the care you need with a short phone conversation.  If a prescription is necessary we can send it directly to your pharmacy.  If lab work is needed we can place an order for that and you can then stop by our lab to have the test done at a later time.    If during the course of the call the physician/provider feels a telephone visit is not appropriate, you will not be charged for this service.\"     Consent has been obtained for this service by 1 care team member: yes. See the scanned image in the medical record.    Tracy Galloway complains of    Chief Complaint   Patient presents with     Pain       I have reviewed and updated the patient's Past Medical History, Social History, Family History and Medication List.    ALLERGIES  Patient has no known allergies.    Nazia Jang CMA   (MA signature)    Additional provider notes:   Chronic Pain Follow-Up       Type / Location of Pain: facial  Analgesia/pain control:       Recent changes:  same      Overall control: Tolerable with discomfort  Activity level/function:      Daily activities:  Able to do all daily activities    Work:  not applicable  Adverse effects:  No  Adherance    Taking medication as directed?  Yes    Participating in other treatments: no -   Risk Factors:    Sleep:  Good    Mood/anxiety:  controlled    Recent family or social stressors:  none noted    Other aggravating factors: none  PHQ-9 SCORE 11/16/2018 7/12/2019 7/12/2019   PHQ-9 Total Score - - -   PHQ-9 Total Score MyChart - - 5 (Mild depression)   PHQ-9 Total Score 24 5 4     ROMAIN-7 SCORE 7/18/2017 11/16/2018 7/12/2019   Total Score - " - -   Total Score - - 1 (minimal anxiety)   Total Score 1 13 1     Encounter-Level CSA - 08/20/2018:    Controlled Substance Agreement - Scan on 8/29/2018  3:15 PM: CONTROLLED SUBSTANCE AGREEMENT (below)       Encounter-Level CSA - 07/07/2015:    Controlled Substance Agreement - Scan on 7/10/2015  1:04 PM: CONTROLLED SUBSTANCE AGREEMENT (below)       Patient-Level CSA:    There are no patient-level csa.         Assessment/Plan:  (F11.20) Narcotic dependence (H)  Comment:   Plan: HYDROcodone-acetaminophen (NORCO)  MG per        tablet        Stable no change in treatment plan.     (G89.4) Chronic pain syndrome  Comment:   Plan: HYDROcodone-acetaminophen (NORCO)  MG per        tablet        Stable no change in treatment plan.       I have reviewed the note as documented above.  This accurately captures the substance of my conversation with the patient.      Total time of call between patient and provider was 15 minutes     Gwendolyn Solis MD

## 2019-09-25 ENCOUNTER — OFFICE VISIT (OUTPATIENT)
Dept: DERMATOLOGY | Facility: CLINIC | Age: 74
End: 2019-09-25
Payer: COMMERCIAL

## 2019-09-25 VITALS — OXYGEN SATURATION: 98 % | DIASTOLIC BLOOD PRESSURE: 59 MMHG | SYSTOLIC BLOOD PRESSURE: 124 MMHG | HEART RATE: 69 BPM

## 2019-09-25 DIAGNOSIS — D18.01 ANGIOMA OF SKIN: ICD-10-CM

## 2019-09-25 DIAGNOSIS — L81.4 LENTIGO: ICD-10-CM

## 2019-09-25 DIAGNOSIS — L82.1 SEBORRHEIC KERATOSIS: Primary | ICD-10-CM

## 2019-09-25 DIAGNOSIS — L82.0 INFLAMED SEBORRHEIC KERATOSIS: ICD-10-CM

## 2019-09-25 PROCEDURE — 17110 DESTRUCTION B9 LES UP TO 14: CPT | Performed by: DERMATOLOGY

## 2019-09-25 PROCEDURE — 99203 OFFICE O/P NEW LOW 30 MIN: CPT | Mod: 25 | Performed by: DERMATOLOGY

## 2019-09-25 NOTE — LETTER
9/25/2019         RE: Tracy Galloway  [address]  [place] [address]        Dear Colleague,    Thank you for referring your patient, Tracy Galloway, to the [facility]. Please see a copy of my visit note below.    Tracy Galloway is a 74 year old year old female patient here today for itching spot on right cheek.   .  Patient states this has been present for a while.  Patient reports the following symptoms:  itching.  Patient reports the following previous treatments none.  Patient reports the following modifying factors none.  Associated symptoms: none.  Patient has no other skin complaints today.  Remainder of the HPI, Meds, PMH, Allergies, FH, and SH was reviewed in chart.      Past Medical History:   Diagnosis Date     Anxiety w/panic attacks      Chest pain     6/2016 Nuclear study - mild ischemia of apex and anteroapical wall     Chronic LBP      Depressive disorder, not elsewhere classified      Facial pain 12/17/2009     HTN      Insomnia, unspecified      Migraine, unspecified, without mention of intractable migraine without mention of status migrainosus      Osteopenia      Plantar fasciitis      Prinzmetal angina (H) 9/17/2007     Vision loss     monovision in right eye       Past Surgical History:   Procedure Laterality Date     CHOLECYSTECTOMY, LAPOROSCOPIC  1990's    s/p Cholecystectomy, Laparoscopic     COLONOSCOPY  12/6/2012    Procedure: COLONOSCOPY;  Colonoscopy;  Surgeon: Tyrell Brown MD;  Location: [facility]     ENDOSCOPIC RETROGRADE CHOLANGIOPANCREATOGRAM N/A 5/24/2019    Procedure: ENDOSCOPIC RETROGRADE CHOLANGIOPANCREATOGRAPHY;  Surgeon: Mio Blunt MD;  Location:  OR     EYE SURGERY       HC COLONOSCOPY THRU STOMA, DIAGNOSTIC  3-    at outside clinic.  Normal.     HYSTERECTOMY, SUNDAR  1970's    severe endometriosis     SURGICAL HISTORY OF -   1998    breast reduction     SURGICAL HISTORY OF -   5/30/03    coronary angiogram     SURGICAL HISTORY OF  -       Refractive surgery right     TONSILLECTOMY          Family History   Problem Relation Age of Onset     Hypertension Mother      Heart Disease Mother      Hypertension Father      Circulatory Father         PAD     Alzheimer Disease Maternal Grandmother         dementia     Cerebrovascular Disease Maternal Grandmother      Heart Disease Maternal Grandfather      Diabetes Maternal Grandfather        Social History     Socioeconomic History     Marital status:      Spouse name: Not on file     Number of children: Not on file     Years of education: Not on file     Highest education level: Not on file   Occupational History     Not on file   Social Needs     Financial resource strain: Not on file     Food insecurity:     Worry: Not on file     Inability: Not on file     Transportation needs:     Medical: Not on file     Non-medical: Not on file   Tobacco Use     Smoking status: Former Smoker     Types: Cigarettes     Last attempt to quit: 10/30/1962     Years since quittin.9     Smokeless tobacco: Never Used   Substance and Sexual Activity     Alcohol use: Yes     Alcohol/week: 4.0 standard drinks     Types: 4 Standard drinks or equivalent per week     Comment: occasional     Drug use: No     Comment: medical marijuana in the past     Sexual activity: Never     Partners: Male     Birth control/protection: Surgical   Lifestyle     Physical activity:     Days per week: Not on file     Minutes per session: Not on file     Stress: Not on file   Relationships     Social connections:     Talks on phone: Not on file     Gets together: Not on file     Attends Faith service: Not on file     Active member of club or organization: Not on file     Attends meetings of clubs or organizations: Not on file     Relationship status: Not on file     Intimate partner violence:     Fear of current or ex partner: Not on file     Emotionally abused: Not on file     Physically abused: Not on file     Forced sexual  activity: Not on file   Other Topics Concern     Parent/sibling w/ CABG, MI or angioplasty before 65F 55M? No   Social History Narrative     Not on file       Outpatient Encounter Medications as of 9/25/2019   Medication Sig Dispense Refill     acetaminophen (TYLENOL) 650 MG suppository Place 1 suppository (650 mg) rectally every 4 hours as needed for mild pain       baclofen (LIORESAL) 10 MG tablet Take 1 tablet (10 mg) by mouth 3 times daily (Patient taking differently: Take 10 mg by mouth At Bedtime ) 90 tablet 1     diltiazem ER (DILT-XR) 180 MG 24 hr capsule Take 1 capsule (180 mg) by mouth daily 90 capsule 3     DOCUSATE SODIUM PO Take 100 mg by mouth daily as needed for constipation        fluticasone (FLONASE) 50 MCG/ACT spray Spray 1-2 sprays into both nostrils daily (Patient taking differently: Spray 1-2 sprays into both nostrils daily as needed for rhinitis ) 1 Bottle 11     HYDROcodone-acetaminophen (NORCO)  MG per tablet Take 1 tablet by mouth 2 times daily And 10 days of the month may take 3 daily if needed (30 day supply) 75 tablet 0     ibuprofen (ADVIL/MOTRIN) 200 MG tablet Take 400-600 mg by mouth every 8 hours as needed for mild pain       lisinopril (PRINIVIL/ZESTRIL) 20 MG tablet Take 1 tablet (20 mg) by mouth daily 90 tablet 1     loratadine (CLARITIN) 10 MG tablet Take 10 mg by mouth daily as needed for allergies        medical cannabis (Patient's own supply) Take 1 Dose by mouth every evening (The purpose of this order is to document that the patient reports taking medical cannabis.  This is not a prescription, and is not used to certify that the patient has a qualifying medical condition.)  1 ml oral qpm       ondansetron (ZOFRAN-ODT) 4 MG ODT tab Take 1 tablet (4 mg) by mouth every 6 hours as needed for nausea or vomiting 10 tablet 1     PARoxetine (PAXIL) 20 MG tablet Take 20 mg by mouth At Bedtime Prescribed as 40mg (20mg x2 tabs), but Tracy is only taking 20mg (1 tablet)        polyethylene glycol-propylene glycol (SYSTANE ULTRA) 0.4-0.3 % SOLN ophthalmic solution Place 1 drop into both eyes 4 times daily as needed for dry eyes       ranitidine (ZANTAC) 150 MG tablet TAKE ONE TABLET BY MOUTH TWICE DAILY 180 tablet 3     triamcinolone (KENALOG) 0.1 % external cream Apply topically 2 times daily (Patient taking differently: Apply topically 2 times daily as needed (irritation on face) ) 15 g 0     zolpidem (AMBIEN) 5 MG tablet 1/2 tab at bedtime 15 tablet 5     valACYclovir (VALTREX) 1000 mg tablet Take 1 tablet (1,000 mg) by mouth 2 times daily for 1 day As needed fo rbreak outs 4 tablet 11     No facility-administered encounter medications on file as of 9/25/2019.              Review Of Systems  Skin: As above  Eyes: negative  Ears/Nose/Throat: negative  Respiratory: No shortness of breath, dyspnea on exertion, cough, or hemoptysis  Cardiovascular: negative  Gastrointestinal: negative  Genitourinary: negative  Musculoskeletal: negative  Neurologic: negative  Psychiatric: negative  Hematologic/Lymphatic/Immunologic: negative  Endocrine: negative      O:   NAD, WDWN, Alert & Oriented, Mood & Affect wnl, Vitals stable   Here today alone   /59   Pulse 69   SpO2 98%    General appearance normal   Vitals stable   Alert, oriented and in no acute distress      Following lymph nodes palpated: Occipital, Cervical, Supraclavicular no lad   R cheek inflamed seborrheic keratosis      r eyelid inflamed seborrheic keratosis   Stuck on papules and brown macules on trunk and ext   Red papules on trunk     The remainder of expanded problem focused exam was unremarkable; the following areas were examined:  scalp/hair, conjunctiva/lids, face, neck, lips, chest, digits/nails, RUE, LUE.      Eyes: Conjunctivae/lids:Normal     ENT: Lips, buccal mucosa, tongue: normal    MSK:Normal    Cardiovascular: peripheral edema none    Pulm: Breathing Normal    Lymph Nodes: No Head and Neck Lymphadenopathy      Neuro/Psych: Orientation:Normal; Mood/Affect:Normal      A/P:  1. Seborrheic keratosis, lentigo, angioma,   2. R cheek inflamed seborrheic keratosis  And r lid inflamed seborrheic keratosis   LN2:  Treated with LN2 for 5s for 1-2 cycles. Warned risks of blistering, pain, pigment change, scarring, and incomplete resolution.  Advised patient to return if lesions do not completely resolve.  Wound care sheet given.    BENIGN LESIONS DISCUSSED WITH PATIENT:  I discussed the specifics of tumor, prognosis, and genetics of benign lesions.  I explained that treatment of these lesions would be purely cosmetic and not medically neccessary.  I discussed with patient different removal options including excision, cautery and /or laser.      Nature and genetics of benign skin lesions dicussed with patient.  Signs and Symptoms of skin cancer discussed with patient.  Patient encouraged to perform monthly skin exams.  UV precautions reviewed with patient.  Skin care regimen reviewed with patient: Eliminate harsh soaps, i.e. Dial, zest, irsih spring; Mild soaps such as Cetaphil or Dove sensitive skin, avoid hot or cold showers, aggressive use of emollients including vanicream, cetaphil or cerave discussed with patient.    Risks of non-melanoma skin cancer discussed with patient   Return to clinic 12 months      Again, thank you for allowing me to participate in the care of your patient.        Sincerely,        Leonard Stafford MD

## 2019-09-25 NOTE — PROGRESS NOTES
Tracy Galloway is a 74 year old year old female patient here today for itching spot on right cheek.   .  Patient states this has been present for a while.  Patient reports the following symptoms:  itching.  Patient reports the following previous treatments none.  Patient reports the following modifying factors none.  Associated symptoms: none.  Patient has no other skin complaints today.  Remainder of the HPI, Meds, PMH, Allergies, FH, and SH was reviewed in chart.      Past Medical History:   Diagnosis Date     Anxiety w/panic attacks      Chest pain     6/2016 Nuclear study - mild ischemia of apex and anteroapical wall     Chronic LBP      Depressive disorder, not elsewhere classified      Facial pain 12/17/2009     HTN      Insomnia, unspecified      Migraine, unspecified, without mention of intractable migraine without mention of status migrainosus      Osteopenia      Plantar fasciitis      Prinzmetal angina (H) 9/17/2007     Vision loss     monovision in right eye       Past Surgical History:   Procedure Laterality Date     CHOLECYSTECTOMY, LAPOROSCOPIC  1990's    s/p Cholecystectomy, Laparoscopic     COLONOSCOPY  12/6/2012    Procedure: COLONOSCOPY;  Colonoscopy;  Surgeon: Tyrell Brown MD;  Location: [facility]     ENDOSCOPIC RETROGRADE CHOLANGIOPANCREATOGRAM N/A 5/24/2019    Procedure: ENDOSCOPIC RETROGRADE CHOLANGIOPANCREATOGRAPHY;  Surgeon: Mio Blunt MD;  Location:  OR     EYE SURGERY       HC COLONOSCOPY THRU STOMA, DIAGNOSTIC  3-    at outside clinic.  Normal.     HYSTERECTOMY, SUNDAR  1970's    severe endometriosis     SURGICAL HISTORY OF -   1998    breast reduction     SURGICAL HISTORY OF -   5/30/03    coronary angiogram     SURGICAL HISTORY OF -       Refractive surgery right     TONSILLECTOMY          Family History   Problem Relation Age of Onset     Hypertension Mother      Heart Disease Mother      Hypertension Father      Circulatory Father         PAD     Alzheimer Disease  Maternal Grandmother         dementia     Cerebrovascular Disease Maternal Grandmother      Heart Disease Maternal Grandfather      Diabetes Maternal Grandfather        Social History     Socioeconomic History     Marital status:      Spouse name: Not on file     Number of children: Not on file     Years of education: Not on file     Highest education level: Not on file   Occupational History     Not on file   Social Needs     Financial resource strain: Not on file     Food insecurity:     Worry: Not on file     Inability: Not on file     Transportation needs:     Medical: Not on file     Non-medical: Not on file   Tobacco Use     Smoking status: Former Smoker     Types: Cigarettes     Last attempt to quit: 10/30/1962     Years since quittin.9     Smokeless tobacco: Never Used   Substance and Sexual Activity     Alcohol use: Yes     Alcohol/week: 4.0 standard drinks     Types: 4 Standard drinks or equivalent per week     Comment: occasional     Drug use: No     Comment: medical marijuana in the past     Sexual activity: Never     Partners: Male     Birth control/protection: Surgical   Lifestyle     Physical activity:     Days per week: Not on file     Minutes per session: Not on file     Stress: Not on file   Relationships     Social connections:     Talks on phone: Not on file     Gets together: Not on file     Attends Roman [employer] service: Not on file     Active member of club or organization: Not on file     Attends meetings of clubs or organizations: Not on file     Relationship status: Not on file     Intimate partner violence:     Fear of current or ex partner: Not on file     Emotionally abused: Not on file     Physically abused: Not on file     Forced sexual activity: Not on file   Other Topics Concern     Parent/sibling w/ CABG, MI or angioplasty before 65F 55M? No   Social History Narrative     Not on file       Outpatient Encounter Medications as of 2019   Medication Sig Dispense Refill      acetaminophen (TYLENOL) 650 MG suppository Place 1 suppository (650 mg) rectally every 4 hours as needed for mild pain       baclofen (LIORESAL) 10 MG tablet Take 1 tablet (10 mg) by mouth 3 times daily (Patient taking differently: Take 10 mg by mouth At Bedtime ) 90 tablet 1     diltiazem ER (DILT-XR) 180 MG 24 hr capsule Take 1 capsule (180 mg) by mouth daily 90 capsule 3     DOCUSATE SODIUM PO Take 100 mg by mouth daily as needed for constipation        fluticasone (FLONASE) 50 MCG/ACT spray Spray 1-2 sprays into both nostrils daily (Patient taking differently: Spray 1-2 sprays into both nostrils daily as needed for rhinitis ) 1 Bottle 11     HYDROcodone-acetaminophen (NORCO)  MG per tablet Take 1 tablet by mouth 2 times daily And 10 days of the month may take 3 daily if needed (30 day supply) 75 tablet 0     ibuprofen (ADVIL/MOTRIN) 200 MG tablet Take 400-600 mg by mouth every 8 hours as needed for mild pain       lisinopril (PRINIVIL/ZESTRIL) 20 MG tablet Take 1 tablet (20 mg) by mouth daily 90 tablet 1     loratadine (CLARITIN) 10 MG tablet Take 10 mg by mouth daily as needed for allergies        medical cannabis (Patient's own supply) Take 1 Dose by mouth every evening (The purpose of this order is to document that the patient reports taking medical cannabis.  This is not a prescription, and is not used to certify that the patient has a qualifying medical condition.)  1 ml oral qpm       ondansetron (ZOFRAN-ODT) 4 MG ODT tab Take 1 tablet (4 mg) by mouth every 6 hours as needed for nausea or vomiting 10 tablet 1     PARoxetine (PAXIL) 20 MG tablet Take 20 mg by mouth At Bedtime Prescribed as 40mg (20mg x2 tabs), but Tracy is only taking 20mg (1 tablet)       polyethylene glycol-propylene glycol (SYSTANE ULTRA) 0.4-0.3 % SOLN ophthalmic solution Place 1 drop into both eyes 4 times daily as needed for dry eyes       ranitidine (ZANTAC) 150 MG tablet TAKE ONE TABLET BY MOUTH TWICE DAILY 180 tablet 3      triamcinolone (KENALOG) 0.1 % external cream Apply topically 2 times daily (Patient taking differently: Apply topically 2 times daily as needed (irritation on face) ) 15 g 0     zolpidem (AMBIEN) 5 MG tablet 1/2 tab at bedtime 15 tablet 5     valACYclovir (VALTREX) 1000 mg tablet Take 1 tablet (1,000 mg) by mouth 2 times daily for 1 day As needed fo rbreak outs 4 tablet 11     No facility-administered encounter medications on file as of 9/25/2019.              Review Of Systems  Skin: As above  Eyes: negative  Ears/Nose/Throat: negative  Respiratory: No shortness of breath, dyspnea on exertion, cough, or hemoptysis  Cardiovascular: negative  Gastrointestinal: negative  Genitourinary: negative  Musculoskeletal: negative  Neurologic: negative  Psychiatric: negative  Hematologic/Lymphatic/Immunologic: negative  Endocrine: negative      O:   NAD, WDWN, Alert & Oriented, Mood & Affect wnl, Vitals stable   Here today alone   /59   Pulse 69   SpO2 98%    General appearance normal   Vitals stable   Alert, oriented and in no acute distress      Following lymph nodes palpated: Occipital, Cervical, Supraclavicular no lad   R cheek inflamed seborrheic keratosis      r eyelid inflamed seborrheic keratosis   Stuck on papules and brown macules on trunk and ext   Red papules on trunk     The remainder of expanded problem focused exam was unremarkable; the following areas were examined:  scalp/hair, conjunctiva/lids, face, neck, lips, chest, digits/nails, RUE, LUE.      Eyes: Conjunctivae/lids:Normal     ENT: Lips, buccal mucosa, tongue: normal    MSK:Normal    Cardiovascular: peripheral edema none    Pulm: Breathing Normal    Lymph Nodes: No Head and Neck Lymphadenopathy     Neuro/Psych: Orientation:Normal; Mood/Affect:Normal      A/P:  1. Seborrheic keratosis, lentigo, angioma,   2. R cheek inflamed seborrheic keratosis  And r lid inflamed seborrheic keratosis   LN2:  Treated with LN2 for 5s for 1-2 cycles. Warned risks of  blistering, pain, pigment change, scarring, and incomplete resolution.  Advised patient to return if lesions do not completely resolve.  Wound care sheet given.    BENIGN LESIONS DISCUSSED WITH PATIENT:  I discussed the specifics of tumor, prognosis, and genetics of benign lesions.  I explained that treatment of these lesions would be purely cosmetic and not medically neccessary.  I discussed with patient different removal options including excision, cautery and /or laser.      Nature and genetics of benign skin lesions dicussed with patient.  Signs and Symptoms of skin cancer discussed with patient.  Patient encouraged to perform monthly skin exams.  UV precautions reviewed with patient.  Skin care regimen reviewed with patient: Eliminate harsh soaps, i.e. Dial, zest, irsih spring; Mild soaps such as Cetaphil or Dove sensitive skin, avoid hot or cold showers, aggressive use of emollients including vanicream, cetaphil or cerave discussed with patient.    Risks of non-melanoma skin cancer discussed with patient   Return to clinic 12 months

## 2019-10-08 ENCOUNTER — MYC MEDICAL ADVICE (OUTPATIENT)
Dept: FAMILY MEDICINE | Facility: CLINIC | Age: 74
End: 2019-10-08

## 2019-10-08 ENCOUNTER — IMMUNIZATION (OUTPATIENT)
Dept: FAMILY MEDICINE | Facility: CLINIC | Age: 74
End: 2019-10-08
Payer: COMMERCIAL

## 2019-10-08 DIAGNOSIS — K21.9 GASTROESOPHAGEAL REFLUX DISEASE WITHOUT ESOPHAGITIS: ICD-10-CM

## 2019-10-08 DIAGNOSIS — Z23 NEED FOR PROPHYLACTIC VACCINATION AND INOCULATION AGAINST INFLUENZA: Primary | ICD-10-CM

## 2019-10-08 PROCEDURE — G0008 ADMIN INFLUENZA VIRUS VAC: HCPCS

## 2019-10-08 PROCEDURE — 90662 IIV NO PRSV INCREASED AG IM: CPT

## 2019-10-08 PROCEDURE — 99207 ZZC NO CHARGE NURSE ONLY: CPT

## 2019-10-14 ENCOUNTER — HOSPITAL ENCOUNTER (OUTPATIENT)
Dept: PHYSICAL THERAPY | Facility: CLINIC | Age: 74
Setting detail: THERAPIES SERIES
End: 2019-10-14
Attending: FAMILY MEDICINE
Payer: COMMERCIAL

## 2019-10-14 DIAGNOSIS — M54.30 SCIATICA, UNSPECIFIED LATERALITY: ICD-10-CM

## 2019-10-14 PROCEDURE — 97161 PT EVAL LOW COMPLEX 20 MIN: CPT | Mod: GP | Performed by: PHYSICAL THERAPIST

## 2019-10-14 PROCEDURE — 97110 THERAPEUTIC EXERCISES: CPT | Mod: GP | Performed by: PHYSICAL THERAPIST

## 2019-10-14 PROCEDURE — 97012 MECHANICAL TRACTION THERAPY: CPT | Mod: GP | Performed by: PHYSICAL THERAPIST

## 2019-10-14 NOTE — PROGRESS NOTES
10/14/19 0900   General Information   Type of Visit Initial OP Ortho PT Evaluation   Start of Care Date 10/14/19   Referring Physician Gwendolyn Solis MD    Patient/Family Goals Statement reduce pain    Orders Evaluate and Treat   Date of Order 10/11/19   Certification Required? Yes   Medical Diagnosis Sciatica, unspecified laterality M54.30  - Primary   Surgical/Medical history reviewed Yes   Precautions/Limitations no known precautions/limitations   Body Part(s)   Body Part(s) Lumbar Spine/SI   Presentation and Etiology   Pertinent history of current problem (include personal factors and/or comorbidities that impact the POC) Pt relates she has had sciatica for 3 weeks. She has tried chiropractic, massage and doing yoga w/o relief. Pt relates goes to R foot and is constant. Pt relates no change in strength. PMH depression, high BP, stomach surgery    Impairments A. Pain;B. Decreased WB tolerance;E. Decreased flexibility;F. Decreased strength and endurance;H. Impaired gait;L. Tingling;J. Burning;K. Numbness   Functional Limitations perform activities of daily living   Symptom Location R leg   How/Where did it occur From insidious onset   Onset date of current episode/exacerbation 09/23/19   Chronicity Recurrent   Pain rating (0-10 point scale) Best (/10);Worst (/10)   Best (/10) 6   Worst (/10) 10   Pain quality A. Sharp;D. Burning;F. Stabbing;E. Shooting   Frequency of pain/symptoms A. Constant   Pain/symptoms exacerbated by A. Sitting;B. Walking   Pain/symptoms eased by D. Nothing;K. Other   Pain eased by comment vicodin   Progression of symptoms since onset: Unchanged   Current Level of Function   Current Community Support Family/friend caregiver   Patient role/employment history F. Retired   Living environment [place]   Fall Risk Screen   Fall screen completed by PT   Have you fallen 2 or more times in the past year? No   Have you fallen and had an injury in the past year? No   Is patient a fall  risk? No   Abuse Screen (yes response referral indicated)   Feels Unsafe at Home or Work/School no   Feels Threatened by Someone no   Does Anyone Try to Keep You From Having Contact with Others or Doing Things Outside Your Home? no   Physical Signs of Abuse Present no   System Outcome Measures   Outcome Measures Low Back Pain (see Oswestry and Christiano)   Lumbar Spine/SI Objective Findings   Observation L lateral lean   Gait/Locomotion slight limp, w L lateral lean    Flexion ROM to knees   Extension ROM neutral   Right Side Bending % w pain   Left Side Bending %    Pelvic Screen R posterior innominate rotation    Hip Flexion (L2) Strength R: 4/5 L: 5/5    Knee Flexion Strength R: 3/5 L: 4/5    Knee Extension (L3) Strength R: 4/5 L: 5/5    Ankle Dorsiflexion (L4) Strength 5/5 B   Great Toe Extension (L5) Strength 5/5 B    SLR R: 70 L: 90    Slump Test - B   Patellar Tendon Reflexes  normal   Achilles Tendon Reflexes absent B    Palpation TTP B greateer trochanter, SI/pirfomris- pt relate pain down back leg    Planned Therapy Interventions   Planned Therapy Interventions gait training;balance training;joint mobilization;manual therapy;neuromuscular re-education;ROM;stretching;strengthening   Planned Modality Interventions   Planned Modality Interventions Cryotherapy;Electrical stimulation;Hot packs;TENS;Ultrasound;Traction   Clinical Impression   Criteria for Skilled Therapeutic Interventions Met yes, treatment indicated   PT Diagnosis LBP w R radicular symptoms   Influenced by the following impairments limited ROM, weakness, pain   Functional limitations due to impairments walking, standing, sitting    Clinical Presentation Stable/Uncomplicated   Clinical Presentation Rationale Pt is pleasant 74 yr old female who presents w LBP and R radicualr symptoms. Pt is very active and motived to get better but has had chronic LBP   Clinical Decision Making (Complexity) Low complexity   Therapy Frequency 2  times/Week   Predicted Duration of Therapy Intervention (days/wks) 6 weeks   Risk & Benefits of therapy have been explained Yes   Patient, Family & other staff in agreement with plan of care Yes   Education Assessment   Preferred Learning Style Listening;Reading;Demonstration;Pictures/video   Barriers to Learning No barriers   ORTHO GOALS   PT Ortho Eval Goals 1;2;3;4   Ortho Goal 1   Goal Identifier sitting   Goal Description Pt will be able to sit for 60 min to be able to watch favorite tv show   Target Date 11/04/19   Ortho Goal 2   Goal Identifier walking   Goal Description Pt will be able to walk 3 miles w/o radiucalr symptoms to be able to return to PLOF    Target Date 11/04/19   Ortho Goal 3   Goal Identifier yoga   Goal Description Pt will be able to return to yoga and complete w less than 2/10 pain   Target Date 11/25/19   Ortho Goal 4   Goal Identifier FROY   Goal Description Pt will report <10% disability on FROY to demonstrate improved ability to complete daily activities and demonstrate significant clinical improvement.    Target Date 11/25/19   Total Evaluation Time   PT Eval, Low Complexity Minutes (39139) 20   Therapy Certification   Certification date from 10/14/19   Certification date to 11/25/19   Medical Diagnosis Sciatica, unspecified laterality M54.30  - Primary     Allie Uriarte  Physical Therapist  [address] [address]  [email or web address]   [email or web address]   Office: [phone] Fax: [phone]

## 2019-10-14 NOTE — PROGRESS NOTES
[facility]          OUTPATIENT PHYSICAL THERAPY ORTHOPEDIC EVALUATION  PLAN OF TREATMENT FOR OUTPATIENT REHABILITATION  (COMPLETE FOR INITIAL CLAIMS ONLY)  Patient's Last Name, First Name, M.I.  YOB: 1945  Tracy Galloway    Provider s Name:  [facility]   Medical Record No.  [id number]   Start of Care Date:  10/14/19   Onset Date:  09/23/19   Type:     _X__PT   ___OT   ___SLP Medical Diagnosis:  Sciatica, unspecified laterality M54.30  - Primary     PT Diagnosis:  LBP w R radicular symptoms   Visits from SOC:  1      _________________________________________________________________________________  Plan of Treatment/Functional Goals:  gait training, balance training, joint mobilization, manual therapy, neuromuscular re-education, ROM, stretching, strengthening     Cryotherapy, Electrical stimulation, Hot packs, TENS, Ultrasound, Traction     Goals  Goal Identifier: sitting  Goal Description: Pt will be able to sit for 60 min to be able to watch favorite tv show  Target Date: 11/04/19    Goal Identifier: walking  Goal Description: Pt will be able to walk 3 miles w/o radiucalr symptoms to be able to return to PLOF   Target Date: 11/04/19    Goal Identifier: yoga  Goal Description: Pt will be able to return to yoga and complete w less than 2/10 pain  Target Date: 11/25/19    Goal Identifier: FROY  Goal Description: Pt will report <10% disability on FROY to demonstrate improved ability to complete daily activities and demonstrate significant clinical improvement.   Target Date: 11/25/19           Therapy Frequency:  2 times/Week  Predicted Duration of Therapy Intervention:  6 weeks    Allie Uriarte, PT                 I CERTIFY THE NEED FOR THESE SERVICES FURNISHED UNDER        THIS PLAN OF TREATMENT AND WHILE UNDER MY CARE     (Physician co-signature of this document indicates review and certification of the therapy plan).                       Certification  Date From:  10/14/19   Certification Date To:  11/25/19    Referring Provider:  Gwendolyn Solis MD     Initial Assessment        See Epic Evaluation Start of Care Date: 10/14/19

## 2019-10-16 ENCOUNTER — HOSPITAL ENCOUNTER (OUTPATIENT)
Dept: PHYSICAL THERAPY | Facility: CLINIC | Age: 74
Setting detail: THERAPIES SERIES
End: 2019-10-16
Attending: FAMILY MEDICINE
Payer: COMMERCIAL

## 2019-10-16 PROCEDURE — 97012 MECHANICAL TRACTION THERAPY: CPT | Mod: GP | Performed by: PHYSICAL THERAPIST

## 2019-10-21 ENCOUNTER — VIRTUAL VISIT (OUTPATIENT)
Dept: FAMILY MEDICINE | Facility: CLINIC | Age: 74
End: 2019-10-21
Payer: COMMERCIAL

## 2019-10-21 DIAGNOSIS — F11.20 NARCOTIC DEPENDENCE (H): Chronic | ICD-10-CM

## 2019-10-21 DIAGNOSIS — G89.4 CHRONIC PAIN SYNDROME: Chronic | ICD-10-CM

## 2019-10-21 PROCEDURE — 99442 ZZC PHYSICIAN TELEPHONE EVALUATION 11-20 MIN: CPT | Performed by: FAMILY MEDICINE

## 2019-10-21 RX ORDER — HYDROCODONE BITARTRATE AND ACETAMINOPHEN 10; 325 MG/1; MG/1
1 TABLET ORAL 2 TIMES DAILY
Qty: 75 TABLET | Refills: 0 | Status: SHIPPED | OUTPATIENT
Start: 2019-10-21 | End: 2019-11-15

## 2019-10-21 NOTE — PROGRESS NOTES
"Tracy Galloway is a 74 year old female who is being evaluated via a billable telephone visit.      The patient has been notified of following:     \"This telephone visit will be conducted via a call between you and your physician/provider. We have found that certain health care needs can be provided without the need for a physical exam.  This service lets us provide the care you need with a short phone conversation.  If a prescription is necessary we can send it directly to your pharmacy.  If lab work is needed we can place an order for that and you can then stop by our lab to have the test done at a later time.    If during the course of the call the physician/provider feels a telephone visit is not appropriate, you will not be charged for this service.\"     Consent has been obtained for this service by 1 care team member: yes. See the scanned image in the medical record.    Tracy Galloway complains of    Chief Complaint   Patient presents with     Pain       I have reviewed and updated the patient's Past Medical History, Social History, Family History and Medication List.    ALLERGIES  Patient has no known allergies.    Nazia Jang CMA   (MA signature)      Chronic Pain Follow-Up       Type / Location of Pain: facial  Analgesia/pain control:       Recent changes:  same      Overall control: Comfortably manageable  Activity level/function:      Daily activities:  Able to do all daily activities    Work:  not applicable  Adverse effects:  No  Adherance    Taking medication as directed?  Yes    Participating in other treatments: no -   Risk Factors:    Sleep:  Good    Mood/anxiety:  worsened    Recent family or social stressors:  conflict between family members    Other aggravating factors: had an episode of extreme abdominal pain over the weekend,requesting a referral to gastro  PHQ-9 SCORE 11/16/2018 7/12/2019 7/12/2019   PHQ-9 Total Score - - -   PHQ-9 Total Score MyChart - - 5 (Mild depression)   PHQ-9 Total " Score 24 5 4     ROMAIN-7 SCORE 7/18/2017 11/16/2018 7/12/2019   Total Score - - -   Total Score - - 1 (minimal anxiety)   Total Score 1 13 1     Encounter-Level CSA - 08/20/2018:    Controlled Substance Agreement - Scan on 8/29/2018  3:15 PM: CONTROLLED SUBSTANCE AGREEMENT     Encounter-Level CSA - 07/07/2015:    Controlled Substance Agreement - Scan on 7/10/2015  1:04 PM: CONTROLLED SUBSTANCE AGREEMENT     Patient-Level CSA:    There are no patient-level csa.         Assessment/Plan:  (F11.20) Narcotic dependence (H)  Comment:   Plan: HYDROcodone-acetaminophen (NORCO)  MG per        tablet            (G89.4) Chronic pain syndrome  Comment:   Plan: HYDROcodone-acetaminophen (NORCO)  MG per        tablet          Recheck with me in the office in one month     I have reviewed the note as documented above.  This accurately captures the substance of my conversation with the patient.      Total time of call between patient and provider was 12 minutes     Gwendolyn Solis MD

## 2019-10-23 ENCOUNTER — HOSPITAL ENCOUNTER (OUTPATIENT)
Dept: PHYSICAL THERAPY | Facility: CLINIC | Age: 74
Setting detail: THERAPIES SERIES
End: 2019-10-23
Attending: FAMILY MEDICINE
Payer: COMMERCIAL

## 2019-10-23 PROCEDURE — 97140 MANUAL THERAPY 1/> REGIONS: CPT | Mod: GP | Performed by: PHYSICAL THERAPIST

## 2019-10-23 PROCEDURE — 97012 MECHANICAL TRACTION THERAPY: CPT | Mod: GP | Performed by: PHYSICAL THERAPIST

## 2019-11-04 ENCOUNTER — HEALTH MAINTENANCE LETTER (OUTPATIENT)
Age: 74
End: 2019-11-04

## 2019-11-04 ENCOUNTER — HOSPITAL ENCOUNTER (OUTPATIENT)
Dept: PHYSICAL THERAPY | Facility: CLINIC | Age: 74
Setting detail: THERAPIES SERIES
End: 2019-11-04
Attending: FAMILY MEDICINE
Payer: COMMERCIAL

## 2019-11-04 PROCEDURE — 97110 THERAPEUTIC EXERCISES: CPT | Mod: GP | Performed by: PHYSICAL THERAPIST

## 2019-11-04 PROCEDURE — 97012 MECHANICAL TRACTION THERAPY: CPT | Mod: GP | Performed by: PHYSICAL THERAPIST

## 2019-11-06 ENCOUNTER — TRANSFERRED RECORDS (OUTPATIENT)
Dept: HEALTH INFORMATION MANAGEMENT | Facility: CLINIC | Age: 74
End: 2019-11-06

## 2019-11-09 DIAGNOSIS — K21.9 GASTROESOPHAGEAL REFLUX DISEASE WITHOUT ESOPHAGITIS: ICD-10-CM

## 2019-11-09 DIAGNOSIS — G89.4 CHRONIC PAIN SYNDROME: ICD-10-CM

## 2019-11-11 RX ORDER — ZOLPIDEM TARTRATE 10 MG/1
TABLET ORAL
Qty: 30 TABLET | Refills: 2 | OUTPATIENT
Start: 2019-11-11

## 2019-11-11 RX ORDER — BACLOFEN 10 MG/1
10 TABLET ORAL AT BEDTIME
Qty: 90 TABLET | Refills: 3 | Status: SHIPPED | OUTPATIENT
Start: 2019-11-11 | End: 2020-01-09

## 2019-11-11 NOTE — TELEPHONE ENCOUNTER
10/24/2019  1   05/06/2019  Zolpidem Tartrate 10 Mg Tablet  30.00 30 Pa Lakia  [id number]  Rashad ([id number])  2/2 0.50 LME Worker's Comp  [place]     Pt called states she is out of the Ambien-Advised it was filled on 10/24 and she should not be out. States she will look through her medications and she will call back if she doesn't find it. Mariama Major RN

## 2019-11-13 ENCOUNTER — HOSPITAL ENCOUNTER (OUTPATIENT)
Dept: PHYSICAL THERAPY | Facility: CLINIC | Age: 74
Setting detail: THERAPIES SERIES
End: 2019-11-13
Attending: FAMILY MEDICINE
Payer: COMMERCIAL

## 2019-11-13 PROCEDURE — 97110 THERAPEUTIC EXERCISES: CPT | Mod: GP | Performed by: PHYSICAL THERAPIST

## 2019-11-13 PROCEDURE — 97012 MECHANICAL TRACTION THERAPY: CPT | Mod: GP | Performed by: PHYSICAL THERAPIST

## 2019-11-15 ENCOUNTER — OFFICE VISIT (OUTPATIENT)
Dept: FAMILY MEDICINE | Facility: CLINIC | Age: 74
End: 2019-11-15
Payer: COMMERCIAL

## 2019-11-15 VITALS
SYSTOLIC BLOOD PRESSURE: 126 MMHG | HEART RATE: 72 BPM | DIASTOLIC BLOOD PRESSURE: 50 MMHG | BODY MASS INDEX: 23.93 KG/M2 | WEIGHT: 124.6 LBS | RESPIRATION RATE: 15 BRPM | TEMPERATURE: 97.1 F

## 2019-11-15 DIAGNOSIS — F11.20 NARCOTIC DEPENDENCE (H): Chronic | ICD-10-CM

## 2019-11-15 DIAGNOSIS — G89.4 CHRONIC PAIN SYNDROME: Chronic | ICD-10-CM

## 2019-11-15 PROCEDURE — 80306 DRUG TEST PRSMV INSTRMNT: CPT | Performed by: FAMILY MEDICINE

## 2019-11-15 PROCEDURE — 99214 OFFICE O/P EST MOD 30 MIN: CPT | Performed by: FAMILY MEDICINE

## 2019-11-15 RX ORDER — HYDROCODONE BITARTRATE AND ACETAMINOPHEN 10; 325 MG/1; MG/1
1 TABLET ORAL 2 TIMES DAILY
Qty: 75 TABLET | Refills: 0 | Status: SHIPPED | OUTPATIENT
Start: 2019-11-20 | End: 2019-12-31

## 2019-11-15 NOTE — NURSING NOTE
"Chief Complaint   Patient presents with     Pain       Initial /50 (BP Location: Right arm, Patient Position: Chair, Cuff Size: Adult Regular)   Pulse 72   Temp 97.1  F (36.2  C) (Tympanic)   Resp 15   Wt 56.5 kg (124 lb 9.6 oz)   BMI 23.93 kg/m   Estimated body mass index is 23.93 kg/m  as calculated from the following:    Height as of 6/10/19: 1.537 m (5' 0.5\").    Weight as of this encounter: 56.5 kg (124 lb 9.6 oz).    Patient presents to the clinic using No DME    Health Maintenance that is potentially due pending provider review:  Mammogram: declines    n/a    Is there anyone who you would like to be able to receive your results? No  If yes have patient fill out JENA    "

## 2019-11-15 NOTE — PROGRESS NOTES
Subjective     Tracy Galloway is a 74 year old female who presents to clinic today for the following health issues:    HPI   Chronic Pain Follow-Up       Type / Location of Pain: facial, left side   Analgesia/pain control:       Recent changes:  same      Overall control: Tolerable with discomfort  Activity level/function:      Daily activities:  Able to do light housework, cooking    Work:  not applicable  Adverse effects:  No  Adherance    Taking medication as directed?  Yes    Participating in other treatments: no -   Risk Factors:    Sleep:  Good    Mood/anxiety:  worsened    Recent family or social stressors:  conflict between family members    Other aggravating factors: none  PHQ-9 SCORE 11/16/2018 7/12/2019 7/12/2019   PHQ-9 Total Score - - -   PHQ-9 Total Score MyChart - - 5 (Mild depression)   PHQ-9 Total Score 24 5 4     ROMAIN-7 SCORE 7/18/2017 11/16/2018 7/12/2019   Total Score - - -   Total Score - - 1 (minimal anxiety)   Total Score 1 13 1     Encounter-Level CSA - 08/20/2018:    Controlled Substance Agreement - Scan on 8/29/2018  3:15 PM: CONTROLLED SUBSTANCE AGREEMENT     Encounter-Level CSA - 07/07/2015:    Controlled Substance Agreement - Scan on 7/10/2015  1:04 PM: CONTROLLED SUBSTANCE AGREEMENT     Patient-Level CSA:    There are no patient-level csa.        checked by Gwendolyn Solis MD November 27, 2019 no concerns     Pt is also in medical cannabis program   She really does not feel like her pain is well controlled and her quality of life is not great she is             Reviewed and updated as needed this visit by Provider  Tobacco  Allergies  Meds  Problems  Med Hx  Surg Hx  Fam Hx         Review of Systems   ROS COMP: Constitutional, HEENT, cardiovascular, pulmonary, gi and gu systems are negative, except as otherwise noted.      Objective    /50 (BP Location: Right arm, Patient Position: Chair, Cuff Size: Adult Regular)   Pulse 72   Temp 97.1  F (36.2  C) (Tympanic)   Resp 15    Wt 56.5 kg (124 lb 9.6 oz)   BMI 23.93 kg/m    Body mass index is 23.93 kg/m .  Physical Exam   GENERAL APPEARANCE: healthy, alert and no distress  HENT: ear canals and TM's normal and nose and mouth without ulcers or lesions  PSYCH: mentation appears normal and affect flat    Diagnostic Test Results:  Labs reviewed in [facility]  UA reviewed and expected results         Assessment & Plan     1. Narcotic dependence (H)    - Drug Abuse Screen Panel 13, Urine (Pain Care Package)  - HYDROcodone-acetaminophen (NORCO)  MG per tablet; Take 1 tablet by mouth 2 times daily And 10 days of the month may take 3 daily if needed (30 day supply)  Dispense: 75 tablet; Refill: 0    2. Chronic pain syndrome    - Drug Abuse Screen Panel 13, Urine (Pain Care Package)  - HYDROcodone-acetaminophen (NORCO)  MG per tablet; Take 1 tablet by mouth 2 times daily And 10 days of the month may take 3 daily if needed (30 day supply)  Dispense: 75 tablet; Refill: 0       Patient Instructions   Try senna with the docusate    Stay on current pain meds     Phone visit with me in one month       Return in about 1 month (around 12/15/2019) for phone visit.  Risks, benefits, side effects and rationale for treatment plan fully discussed with the patient and understanding expressed.   Gwendolyn Solis MD  [facility]

## 2019-11-18 LAB
AMPHETAMINES UR QL: NOT DETECTED NG/ML
BARBITURATES UR QL SCN: NOT DETECTED NG/ML
BENZODIAZ UR QL SCN: NOT DETECTED NG/ML
BUPRENORPHINE UR QL: NOT DETECTED NG/ML
CANNABINOIDS UR QL: ABNORMAL NG/ML
COCAINE UR QL SCN: NOT DETECTED NG/ML
D-METHAMPHET UR QL: NOT DETECTED NG/ML
METHADONE UR QL SCN: NOT DETECTED NG/ML
OPIATES UR QL SCN: ABNORMAL NG/ML
OXYCODONE UR QL SCN: NOT DETECTED NG/ML
PCP UR QL SCN: NOT DETECTED NG/ML
PROPOXYPH UR QL: NOT DETECTED NG/ML
TRICYCLICS UR QL SCN: NOT DETECTED NG/ML

## 2019-11-30 ENCOUNTER — MYC MEDICAL ADVICE (OUTPATIENT)
Dept: FAMILY MEDICINE | Facility: CLINIC | Age: 74
End: 2019-11-30

## 2019-12-04 DIAGNOSIS — F51.01 PRIMARY INSOMNIA: Primary | Chronic | ICD-10-CM

## 2019-12-04 NOTE — TELEPHONE ENCOUNTER
Requested Prescriptions   Pending Prescriptions Disp Refills     zolpidem (AMBIEN) 10 MG tablet [Pharmacy Med Name: ZOLPIDEM TARTRATE 10MG TABS] 30 tablet 2     Sig: TAKE 1/2 TO 1 TABLET BY MOUTH NIGHTLY AS NEEDED FOR SLEEP       There is no refill protocol information for this order        Last Written Prescription Date:  7/12/19  Last Fill Quantity: 15,  # refills: 5   Last office visit: 11/15/2019 with prescribing provider:     Future Office Visit:   Next 5 appointments (look out 90 days)    Dec 16, 2019 11:00 AM CST  Telephone Visit with Gwendolyn Solis MD  [facility] ([facility]) [address] [place] [id number] [phone]         Routing refill request to provider for review/approval because:  Drug not on the [facility], [facility] or  Health refill protocol or controlled substance

## 2019-12-05 RX ORDER — ZOLPIDEM TARTRATE 10 MG/1
TABLET ORAL
Qty: 30 TABLET | Refills: 2 | Status: SHIPPED | OUTPATIENT
Start: 2019-12-05 | End: 2020-03-12

## 2019-12-30 ENCOUNTER — MYC MEDICAL ADVICE (OUTPATIENT)
Dept: FAMILY MEDICINE | Facility: CLINIC | Age: 74
End: 2019-12-30

## 2019-12-30 DIAGNOSIS — F11.20 NARCOTIC DEPENDENCE (H): Chronic | ICD-10-CM

## 2019-12-30 DIAGNOSIS — G89.4 CHRONIC PAIN SYNDROME: Chronic | ICD-10-CM

## 2019-12-31 DIAGNOSIS — G89.4 CHRONIC PAIN SYNDROME: Chronic | ICD-10-CM

## 2019-12-31 DIAGNOSIS — F11.20 NARCOTIC DEPENDENCE (H): Chronic | ICD-10-CM

## 2019-12-31 RX ORDER — HYDROCODONE BITARTRATE AND ACETAMINOPHEN 10; 325 MG/1; MG/1
1 TABLET ORAL 2 TIMES DAILY
Qty: 75 TABLET | Refills: 0 | Status: SHIPPED | OUTPATIENT
Start: 2019-12-31 | End: 2020-01-28

## 2019-12-31 NOTE — TELEPHONE ENCOUNTER
Reason for Call:  Medication or medication refill:    Do you use a [facility]?  Name of the pharmacy and phone number for the current request:  [facility] - [phone]    Name of the medication requested:    HYDROcodone-acetaminophen (NORCO)  MG per tablet  Last Written Prescription Date:  11/15/19  Last Fill Quantity: 75,  # refills: 0   Last office visit: 11/15/2019 with prescribing provider:  Dr. Solis   Future Office Visit:        Can we leave a detailed message on this number? YES    Phone number patient can be reached at: Home number on file [phone] (home)    Best Time: Any    Call taken on 12/31/2019 at 9:16 AM by Mis Morse

## 2020-01-07 ENCOUNTER — TELEPHONE (OUTPATIENT)
Dept: FAMILY MEDICINE | Facility: CLINIC | Age: 75
End: 2020-01-07

## 2020-01-07 NOTE — TELEPHONE ENCOUNTER
Reason for Call:  Other appointment    Detailed comments: Pt is calling and wanting to set up a phone visit with Dr. Solis re: her medications  Please advise    Phone Number Patient can be reached at: Home number on file [phone] (home)    Best Time: any    Can we leave a detailed message on this number? YES    Call taken on 1/7/2020 at 2:45 PM by Melyssa Garcia

## 2020-01-09 DIAGNOSIS — F32.0 MILD SINGLE CURRENT EPISODE OF MAJOR DEPRESSIVE DISORDER (H): Primary | ICD-10-CM

## 2020-01-09 DIAGNOSIS — G89.4 CHRONIC PAIN SYNDROME: ICD-10-CM

## 2020-01-09 RX ORDER — PAROXETINE 20 MG/1
TABLET, FILM COATED ORAL
Qty: 180 TABLET | Refills: 1 | Status: SHIPPED | OUTPATIENT
Start: 2020-01-09 | End: 2020-02-26 | Stop reason: ALTCHOICE

## 2020-01-09 RX ORDER — BACLOFEN 10 MG/1
TABLET ORAL
Qty: 90 TABLET | Refills: 1 | Status: SHIPPED | OUTPATIENT
Start: 2020-01-09 | End: 2020-12-02

## 2020-01-09 NOTE — TELEPHONE ENCOUNTER
"Requested Prescriptions   Pending Prescriptions Disp Refills     baclofen (LIORESAL) 10 MG tablet [Pharmacy Med Name: BACLOFEN 10MG TABS] 90 tablet 1     Sig: TAKE ONE TABLET BY MOUTH THREE TIMES A DAY       There is no refill protocol information for this order        PARoxetine (PAXIL) 20 MG tablet [Pharmacy Med Name: PAROXETINE HCL 20MG TABS] 180 tablet 1     Sig: TAKE TWO TABLETS BY MOUTH EVERY NIGHT AT BEDTIME       SSRIs Protocol Passed - 1/9/2020 10:41 AM        Passed - Recent (12 mo) or future (30 days) visit within the authorizing provider's specialty     Patient has had an office visit with the authorizing provider or a provider within the authorizing providers department within the previous 12 mos or has a future within next 30 days. See \"Patient Info\" tab in inbasket, or \"Choose Columns\" in Meds & Orders section of the refill encounter.              Passed - Medication is active on med list        Passed - Patient is age 18 or older        Passed - No active pregnancy on record        Passed - No positive pregnancy test in last 12 months        Last Written Prescription Date:  11/11/19 baclofen  Last Fill Quantity: 90,  # refills: 3   Last office visit: 11/15/2019 with prescribing provider:      Future Office Visit:   Next 5 appointments (look out 90 days)    Jan 28, 2020  9:00 AM CST  Telephone Visit with Gwendolyn Solis MD  [facility] ([facility]) [address] [place] [id number] [phone]       Last Written Prescription Date:  paxil  Last Fill Quantity: n/a,  # refills: n/a   Last office visit: 11/15/2019 with prescribing provider:      Future Office Visit:   Next 5 appointments (look out 90 days)    Jan 28, 2020  9:00 AM CST  Telephone Visit with Gwendolyn Solis MD  [facility] ([facility]) [address] [place] [id number] [phone]             "

## 2020-01-12 DIAGNOSIS — I10 ESSENTIAL HYPERTENSION WITH GOAL BLOOD PRESSURE LESS THAN 140/90: ICD-10-CM

## 2020-01-13 RX ORDER — LISINOPRIL 20 MG/1
TABLET ORAL
Qty: 90 TABLET | Refills: 0 | Status: SHIPPED | OUTPATIENT
Start: 2020-01-13 | End: 2020-04-13

## 2020-01-13 NOTE — TELEPHONE ENCOUNTER
"Requested Prescriptions   Pending Prescriptions Disp Refills     lisinopril (PRINIVIL/ZESTRIL) 20 MG tablet [Pharmacy Med Name: LISINOPRIL 20MG TABS] 90 tablet 1     Sig: TAKE ONE TABLET BY MOUTH ONCE DAILY       ACE Inhibitors (Including Combos) Protocol Passed - 1/12/2020 10:28 PM        Passed - Blood pressure under 140/90 in past 12 months     BP Readings from Last 3 Encounters:   11/15/19 126/50   09/25/19 124/59   07/12/19 126/62                 Passed - Recent (12 mo) or future (30 days) visit within the authorizing provider's specialty     Patient has had an office visit with the authorizing provider or a provider within the authorizing providers department within the previous 12 mos or has a future within next 30 days. See \"Patient Info\" tab in inbasket, or \"Choose Columns\" in Meds & Orders section of the refill encounter.              Passed - Medication is active on med list        Passed - Patient is age 18 or older        Passed - No active pregnancy on record        Passed - Normal serum creatinine on file in past 12 months     Recent Labs   Lab Test 06/10/19  1430   CR 0.79             Passed - Normal serum potassium on file in past 12 months     Recent Labs   Lab Test 06/10/19  1430   POTASSIUM 4.3             Passed - No positive pregnancy test within past 12 months        Last Written Prescription Date:  4/22/19  Last Fill Quantity: 90,  # refills: 1   Last office visit: 11/15/2019 with prescribing provider:     Future Office Visit:   Next 5 appointments (look out 90 days)    Jan 28, 2020  9:00 AM CST  Telephone Visit with Gwendolyn Solis MD  [facility] ([facility]) [address] [place] [id number] [phone]           "

## 2020-01-13 NOTE — TELEPHONE ENCOUNTER
Prescription approved per [facility] Refill Protocol.    Katarina BARRETT RN

## 2020-01-15 NOTE — PROGRESS NOTES
Outpatient Physical Therapy Discharge Note     Patient: Tracy Galloway  : 1945    Beginning/End Dates of Reporting Period:  10/14/19 to 1/15/2020    Referring Provider: Gwendolyn Solis MD     Therapy Diagnosis: LBP     Client Self Report: Pt relates doing better but not yet ready for yoga class.     Objective Measurements:                    Objective Measure: TTP  Details: B priformis              Goals:  Goal Identifier sitting   Goal Description Pt will be able to sit for 60 min to be able to watch favorite tv show   Target Date 19   Date Met      Progress:goal met -      Goal Identifier walking   Goal Description Pt will be able to walk 3 miles w/o radiucalr symptoms to be able to return to PLOF    Target Date 19   Date Met      Progress:not assessed     Goal Identifier yoga   Goal Description Pt will be able to return to yoga and complete w less than 2/10 pain   Target Date 19   Date Met      Progress:just started yoga     Goal Identifier FROY   Goal Description Pt will report <10% disability on FROY to demonstrate improved ability to complete daily activities and demonstrate significant clinical improvement.    Target Date 19   Date Met      Progress:not assessed at last visit        Progress Toward Goals:   Progress this reporting period: Pt was seen for 5 visits in physical therapy over this POC. Objective measures are all taken from last visit. At time of last visit Pt continues to improve but relates traction really helps thus progressed strengthening and continues traction. Plan to complete progress note next week. Pt has failed to schedule f/u visits within 30 days from last visit as instructed thus is being d/c from therapy at this time. Current status is unknown at this time.            Plan:  Discharge from therapy.    Discharge:    Reason for Discharge: Patient has failed to schedule further appointments.    Equipment Issued: NA    Discharge Plan: Patient to  continue home program.

## 2020-01-28 ENCOUNTER — VIRTUAL VISIT (OUTPATIENT)
Dept: FAMILY MEDICINE | Facility: CLINIC | Age: 75
End: 2020-01-28
Payer: COMMERCIAL

## 2020-01-28 DIAGNOSIS — G89.4 CHRONIC PAIN SYNDROME: Chronic | ICD-10-CM

## 2020-01-28 DIAGNOSIS — F11.20 NARCOTIC DEPENDENCE (H): Chronic | ICD-10-CM

## 2020-01-28 PROCEDURE — 99442 ZZC PHYSICIAN TELEPHONE EVALUATION 11-20 MIN: CPT | Performed by: FAMILY MEDICINE

## 2020-01-28 RX ORDER — HYDROCODONE BITARTRATE AND ACETAMINOPHEN 10; 325 MG/1; MG/1
1 TABLET ORAL 2 TIMES DAILY
Qty: 75 TABLET | Refills: 0 | Status: CANCELLED | OUTPATIENT
Start: 2020-01-28

## 2020-01-28 RX ORDER — HYDROCODONE BITARTRATE AND ACETAMINOPHEN 10; 325 MG/1; MG/1
1 TABLET ORAL 2 TIMES DAILY
Qty: 75 TABLET | Refills: 0 | Status: SHIPPED | OUTPATIENT
Start: 2020-01-28 | End: 2020-02-25

## 2020-01-28 NOTE — PROGRESS NOTES
Subjective     Tracy Galloway is a 74 year old female who presents to clinic today for the following health issues:    HPI   Chronic Pain Follow-Up       Type / Location of Pain: facial  Analgesia/pain control:       Recent changes:  same      Overall control: Tolerable with discomfort  Activity level/function:      Daily activities:  Able to do all daily activities    Work:  not applicable  Adverse effects:  No  Adherance    Taking medication as directed?  Yes    Participating in other treatments: no -   Risk Factors:    Sleep:  Fair    Mood/anxiety:  controlled    Recent family or social stressors:  none noted    Other aggravating factors: none  PHQ-9 SCORE 11/16/2018 7/12/2019 7/12/2019   PHQ-9 Total Score - - -   PHQ-9 Total Score MyChart - - 5 (Mild depression)   PHQ-9 Total Score 24 5 4     ROMAIN-7 SCORE 7/18/2017 11/16/2018 7/12/2019   Total Score - - -   Total Score - - 1 (minimal anxiety)   Total Score 1 13 1     Encounter-Level CSA - 08/20/2018:    Controlled Substance Agreement - Scan on 8/29/2018  3:15 PM: CONTROLLED SUBSTANCE AGREEMENT     Encounter-Level CSA - 07/07/2015:    Controlled Substance Agreement - Scan on 7/10/2015  1:04 PM: CONTROLLED SUBSTANCE AGREEMENT     Patient-Level CSA:    There are no patient-level csa.                  check January 28, 2020 by Gwendolyn Solis MD no concerns       Reviewed and updated as needed this visit by Provider  Tobacco  Allergies  Meds  Problems  Med Hx  Surg Hx  Fam Hx         Review of Systems         Objective    There were no vitals taken for this visit.  There is no height or weight on file to calculate BMI.  Physical Exam       Diagnostic Test Results:  Labs reviewed in Epic        Assessment & Plan     1. Narcotic dependence (H)  Stable no change in treatment plan.   - HYDROcodone-acetaminophen (NORCO)  MG per tablet; Take 1 tablet by mouth 2 times daily And 10 days of the month may take 3 daily if needed (30 day supply)  Dispense: 75  tablet; Refill: 0    2. Chronic pain syndrome  Stable no change in treatment plan.   - HYDROcodone-acetaminophen (NORCO)  MG per tablet; Take 1 tablet by mouth 2 times daily And 10 days of the month may take 3 daily if needed (30 day supply)  Dispense: 75 tablet; Refill: 0       Phone visit 12 minutes     Return in about 1 month (around 2/28/2020).    Gwendolyn Solis MD  [facility]

## 2020-01-29 ENCOUNTER — TELEPHONE (OUTPATIENT)
Dept: FAMILY MEDICINE | Facility: CLINIC | Age: 75
End: 2020-01-29

## 2020-01-29 ENCOUNTER — OFFICE VISIT (OUTPATIENT)
Dept: FAMILY MEDICINE | Facility: CLINIC | Age: 75
End: 2020-01-29
Payer: COMMERCIAL

## 2020-01-29 VITALS
OXYGEN SATURATION: 98 % | TEMPERATURE: 98.5 F | RESPIRATION RATE: 16 BRPM | DIASTOLIC BLOOD PRESSURE: 70 MMHG | WEIGHT: 122 LBS | BODY MASS INDEX: 23.03 KG/M2 | SYSTOLIC BLOOD PRESSURE: 126 MMHG | HEART RATE: 75 BPM | HEIGHT: 61 IN

## 2020-01-29 DIAGNOSIS — L30.9 DERMATITIS: ICD-10-CM

## 2020-01-29 DIAGNOSIS — R10.2 VAGINAL PAIN: Primary | ICD-10-CM

## 2020-01-29 PROCEDURE — 99213 OFFICE O/P EST LOW 20 MIN: CPT | Performed by: NURSE PRACTITIONER

## 2020-01-29 RX ORDER — CLOBETASOL PROPIONATE 0.5 MG/G
OINTMENT TOPICAL 2 TIMES DAILY
Qty: 45 G | Refills: 1 | Status: SHIPPED | OUTPATIENT
Start: 2020-01-29 | End: 2020-02-03

## 2020-01-29 ASSESSMENT — MIFFLIN-ST. JEOR: SCORE: 985.77

## 2020-01-29 NOTE — NURSING NOTE
"Chief Complaint   Patient presents with     Vaginal Problem     x1 week        Initial /70   Pulse 75   Temp 98.5  F (36.9  C) (Tympanic)   Resp 16   Ht 1.549 m (5' 1\")   Wt 55.3 kg (122 lb)   SpO2 98%   BMI 23.05 kg/m   Estimated body mass index is 23.05 kg/m  as calculated from the following:    Height as of this encounter: 1.549 m (5' 1\").    Weight as of this encounter: 55.3 kg (122 lb).    Patient presents to the clinic using No DME    Health Maintenance that is potentially due pending provider review:  Mammogram    Pt is not going to do mammograms anymore    Is there anyone who you would like to be able to receive your results? No  If yes have patient fill out JENA    "

## 2020-01-29 NOTE — PATIENT INSTRUCTIONS
No specific rash noted    There is some dryness/plaque noted    Would recommend trialing 10-14 days of steroid cream applied two times daily - if symptoms not improving or worsening return to clinic

## 2020-01-29 NOTE — PROGRESS NOTES
SUBJECTIVE   Tracy Galloway is a  female who presents to clinic today for the following health issue(s):       Vaginal Symptoms  Onset: 1 week     Description:  Vaginal Discharge: none   Itching (Pruritis): no   Burning sensation:  YES, very red and inflammed - notices more irritation   Odor: no     Accompanying Signs & Symptoms:  Pain with Urination: no   Abdominal Pain: no   Fever: no     History:   Sexually active: no   New Partner: no   Possibility of Pregnancy:  No    Precipitating factors:   Recent Antibiotic Use: no     Alleviating factors:  None     Therapies Tried and outcome: none     PCP   Gwendolyn Solis [phone]    Health Maintenance        Health Maintenance Due   Topic Date Due     MAMMO SCREENING  06/18/2015     ZOSTER IMMUNIZATION (3 of 3) 01/11/2020       HPI        Patient Active Problem List   Diagnosis     Anxiety state     Plantar fasciitis     Shingles     CARDIOVASCULAR SCREENING; LDL GOAL LESS THAN 130     Atrophic vaginitis     Chronic pain     Health Care Home     Essential hypertension with goal blood pressure less than 140/90     Narcotic dependence (H)     Prinzmetal angina (H)     Visual impairment     TIA (transient ischemic attack)     Mammogram declined     Advanced directives, counseling/discussion     Primary insomnia     Mild single current episode of major depressive disorder (H)     Dilation of biliary tract     Current Outpatient Medications   Medication     baclofen (LIORESAL) 10 MG tablet     clobetasol (TEMOVATE) 0.05 % external ointment     diltiazem ER (DILT-XR) 180 MG 24 hr capsule     DOCUSATE SODIUM PO     fluticasone (FLONASE) 50 MCG/ACT spray     HYDROcodone-acetaminophen (NORCO)  MG per tablet     lisinopril (PRINIVIL/ZESTRIL) 20 MG tablet     ondansetron (ZOFRAN-ODT) 4 MG ODT tab     PARoxetine (PAXIL) 20 MG tablet     polyethylene glycol-propylene glycol (SYSTANE ULTRA) 0.4-0.3 % SOLN ophthalmic solution     ranitidine (ZANTAC) 150 MG tablet  "    triamcinolone (KENALOG) 0.1 % external cream     zolpidem (AMBIEN) 10 MG tablet     ibuprofen (ADVIL/MOTRIN) 200 MG tablet     loratadine (CLARITIN) 10 MG tablet     valACYclovir (VALTREX) 1000 mg tablet     No current facility-administered medications for this visit.        Reviewed and updated as needed this visit by Provider:  Tobacco  Allergies  Meds  Med Hx  Surg Hx  Fam Hx  Soc Hx     ROS:  Constitutional, HEENT, cardiovascular, pulmonary, gi and gu systems are negative, except as otherwise noted.    PHYSICAL EXAM   /70   Pulse 75   Temp 98.5  F (36.9  C) (Tympanic)   Resp 16   Ht 1.549 m (5' 1\")   Wt 55.3 kg (122 lb)   SpO2 98%   BMI 23.05 kg/m    Body mass index is 23.05 kg/m .  GENERAL APPEARANCE: healthy, alert and no distress  RESP: lungs clear to auscultation - no rales, rhonchi or wheezes  CV: regular rates and rhythm, normal S1 S2, no S3 or S4 and no murmur, click or rub  ABDOMEN: soft, nontender, without hepatosplenomegaly or masses and bowel sounds normal   (female): normal external vaginal area without very scant amount of dryness along the left internal labia majora without erythema, swelling and mild tenderness.       Diagnostic Test Results:  none     ASSESSMENT & PLAN   Risks, benefits, side effects and rationale for treatment plan fully discussed with the patient and understanding expressed.    Assessment & Plan     1. Vaginal pain  X 1 week, acute. Noticed redness and inflammation. Denies any itching or discharge. Did notice getting irritated around the times she was in [place] and bathing suit was rubbing. Mild amount of dryness noted without erythema or swelling. No discharge. Will treat for dermatitis as below.     2. Dermatitis  Acute. See above. Treat with steroid and follow up if not improving or worsening.   - clobetasol (TEMOVATE) 0.05 % external ointment; Apply topically 2 times daily  Dispense: 45 g; Refill: 1       Patient Instructions   No specific rash " noted    There is some dryness/plaque noted    Would recommend trialing 10-14 days of steroid cream applied two times daily - if symptoms not improving or worsening return to clinic       Return in about 2 weeks (around 2/12/2020), or if symptoms worsen or fail to improve.    YANY Stout [facility]

## 2020-01-29 NOTE — TELEPHONE ENCOUNTER
Reason for Call:  Other     Detailed comments: Patient would like to speak to Dr. Keith RN about a rash - redness iritation and pain    Phone Number Patient can be reached at: Cell number on file:    Telephone Information:   Mobile [phone]       Best Time:     Can we leave a detailed message on this number? YES    Call taken on 1/29/2020 at 11:41 AM by Ximena Quiroz

## 2020-01-29 NOTE — TELEPHONE ENCOUNTER
Pt states she has a burning rash on left side of vulva and is red. States she has had this about a week and it is progressively getting worse. Appointment made for today.  Mariama Major RN

## 2020-01-31 ENCOUNTER — TELEPHONE (OUTPATIENT)
Dept: FAMILY MEDICINE | Facility: CLINIC | Age: 75
End: 2020-01-31

## 2020-01-31 DIAGNOSIS — R30.0 DYSURIA: ICD-10-CM

## 2020-01-31 DIAGNOSIS — R30.0 DYSURIA: Primary | ICD-10-CM

## 2020-01-31 LAB
ALBUMIN UR-MCNC: NEGATIVE MG/DL
APPEARANCE UR: CLEAR
BACTERIA #/AREA URNS HPF: ABNORMAL /HPF
BILIRUB UR QL STRIP: NEGATIVE
COLOR UR AUTO: YELLOW
GLUCOSE UR STRIP-MCNC: NEGATIVE MG/DL
HGB UR QL STRIP: ABNORMAL
KETONES UR STRIP-MCNC: NEGATIVE MG/DL
LEUKOCYTE ESTERASE UR QL STRIP: ABNORMAL
MUCOUS THREADS #/AREA URNS LPF: PRESENT /LPF
NITRATE UR QL: NEGATIVE
NON-SQ EPI CELLS #/AREA URNS LPF: ABNORMAL /LPF
PH UR STRIP: 7 PH (ref 5–7)
RBC #/AREA URNS AUTO: ABNORMAL /HPF
SOURCE: ABNORMAL
SP GR UR STRIP: 1.02 (ref 1–1.03)
UROBILINOGEN UR STRIP-ACNC: 0.2 EU/DL (ref 0.2–1)
WBC #/AREA URNS AUTO: ABNORMAL /HPF

## 2020-01-31 PROCEDURE — 81001 URINALYSIS AUTO W/SCOPE: CPT | Performed by: FAMILY MEDICINE

## 2020-01-31 PROCEDURE — 87086 URINE CULTURE/COLONY COUNT: CPT | Performed by: FAMILY MEDICINE

## 2020-01-31 NOTE — RESULT ENCOUNTER NOTE
Adam Molina,  We talked by phone today.    Urine mildly abnormal.  Possible urinary tract infection but could be from irritation and not infection.    A urine culture will give us the answer but takes 2 days.   PLAN: I recommend continuing the cream for now and wait for urine culture report before any antibiotics.  If it shows infection, we can send antibiotic prescription.  [place]

## 2020-02-01 LAB
BACTERIA SPEC CULT: NO GROWTH
Lab: NORMAL
SPECIMEN SOURCE: NORMAL

## 2020-02-03 ENCOUNTER — OFFICE VISIT (OUTPATIENT)
Dept: FAMILY MEDICINE | Facility: CLINIC | Age: 75
End: 2020-02-03
Payer: COMMERCIAL

## 2020-02-03 ENCOUNTER — TELEPHONE (OUTPATIENT)
Dept: FAMILY MEDICINE | Facility: CLINIC | Age: 75
End: 2020-02-03

## 2020-02-03 VITALS
TEMPERATURE: 97.2 F | HEART RATE: 64 BPM | DIASTOLIC BLOOD PRESSURE: 62 MMHG | RESPIRATION RATE: 18 BRPM | SYSTOLIC BLOOD PRESSURE: 128 MMHG | HEIGHT: 61 IN | WEIGHT: 123 LBS | BODY MASS INDEX: 23.22 KG/M2

## 2020-02-03 DIAGNOSIS — H54.7 VISUAL IMPAIRMENT: ICD-10-CM

## 2020-02-03 DIAGNOSIS — I10 ESSENTIAL HYPERTENSION WITH GOAL BLOOD PRESSURE LESS THAN 140/90: Primary | Chronic | ICD-10-CM

## 2020-02-03 DIAGNOSIS — N94.89 VAGINAL BURNING: ICD-10-CM

## 2020-02-03 DIAGNOSIS — R79.89 ELEVATED LFTS: ICD-10-CM

## 2020-02-03 DIAGNOSIS — E78.5 HYPERLIPIDEMIA LDL GOAL <130: ICD-10-CM

## 2020-02-03 DIAGNOSIS — B37.31 VAGINAL YEAST INFECTION: Primary | ICD-10-CM

## 2020-02-03 LAB
SPECIMEN SOURCE: ABNORMAL
WET PREP SPEC: ABNORMAL

## 2020-02-03 PROCEDURE — 99213 OFFICE O/P EST LOW 20 MIN: CPT | Performed by: FAMILY MEDICINE

## 2020-02-03 PROCEDURE — 87210 SMEAR WET MOUNT SALINE/INK: CPT | Performed by: FAMILY MEDICINE

## 2020-02-03 RX ORDER — FLUCONAZOLE 150 MG/1
150 TABLET ORAL DAILY
Qty: 3 TABLET | Refills: 0 | Status: SHIPPED | OUTPATIENT
Start: 2020-02-03 | End: 2020-02-09

## 2020-02-03 ASSESSMENT — MIFFLIN-ST. JEOR: SCORE: 990.3

## 2020-02-03 NOTE — NURSING NOTE
"Chief Complaint   Patient presents with     Vaginal Problem     /62 (Cuff Size: Adult Regular)   Pulse 64   Temp 97.2  F (36.2  C) (Tympanic)   Resp 18   Ht 1.549 m (5' 1\")   Wt 55.8 kg (123 lb)   BMI 23.24 kg/m   Estimated body mass index is 23.24 kg/m  as calculated from the following:    Height as of this encounter: 1.549 m (5' 1\").    Weight as of this encounter: 55.8 kg (123 lb).  Patient presents to the clinic using No DME      Health Maintenance that is potentially due pending provider review:    Health Maintenance Due   Topic Date Due     MAMMO SCREENING  06/18/2015     ZOSTER IMMUNIZATION (3 of 3) 01/11/2020                "

## 2020-02-03 NOTE — PATIENT INSTRUCTIONS
Patient Education     Vaginal Infection: Yeast (Candidiasis)  Yeast infection occurs when yeast in the vagina increase and attacks the vaginal tissues. Yeast is a type of fungus. These infections are often caused by a type of yeast called Candida albicans. Other species of yeast can also cause infections. Factors that may make infection more likely include recent antibiotic use, douching, or increased sex. Yeast infections are more common in women who have diabetes, or are obese or pregnant, or have a weak immune system.  Symptoms of yeast infection    Clumpy or thin, white discharge, which may look like cottage cheese    No odor or minimal odor    Severe vaginal itching or burning    Burning with urination    Swelling, redness of vulva    Pain during sex    Treating yeast infection  Yeast infection is treated with a vaginal antifungal cream. In some cases, antifungal pills are prescribed instead. During treatment:    Finish all of your medicine, even if your symptoms go away.    Apply the cream before going to bed. Lie flat after applying so that it doesn't drip out.    Do not douche or use tampons.    Don't rely on a diaphragm or condoms, since the cream may weaken them.    Avoid intercourse if advised by your healthcare provider.  Should I treat a yeast infection myself?  Discuss with your healthcare provider whether you should use over-the-counter medicines to treat a yeast infection. Self-treatment may depend on whether:    You've had a yeast infection in the past.    You're at risk for STDs.  Call your healthcare provider if symptoms do not go away or come back after treatment.  Date Last Reviewed: 3/1/2017    5345-5558 [employer]. [address] [place] [address], [place] [address]. All rights reserved. This information is not intended as a substitute for professional medical care. Always follow your healthcare professional's instructions.

## 2020-02-03 NOTE — TELEPHONE ENCOUNTER
Panel Management Review      Patient has the following on her problem list:       IVD   ASA: FAILED    Last LDL:    Lab Results   Component Value Date    CHOL 220 03/29/2019     Lab Results   Component Value Date    HDL 80 03/29/2019     Lab Results   Component Value Date     03/29/2019     Lab Results   Component Value Date    TRIG 83 03/29/2019        Lab Results   Component Value Date    CHOLHDLRATIO 2.0 01/19/2012        Is the patient on a Statin? NO   Is the patient on Aspirin? NO                    Last three blood pressure readings:  BP Readings from Last 3 Encounters:   01/29/20 126/70   11/15/19 126/50   09/25/19 124/59        Tobacco History:     History   Smoking Status     Former Smoker     Types: Cigarettes     Quit date: 10/30/1962   Smokeless Tobacco     Never Used         Composite cancer screening  Chart review shows that this patient is due/due soon for the following Mammogram  Summary:    Patient is due/failing the following:   ASA and MAMMOGRAM    Action needed:   ASA, statin  Patient has declined mammogram in the past  This pt came on the CV suite weekly scorecard as needing to be on an aspirin for IVD.  Please review,  Thank You, Terra MADRID CMA  Type of outreach:    None, routed to provider for review.  Routing to pcp for review   Questions for provider review:    ASA, Statin                                                                                                                                    Terra MADRID CMA       Chart routed to Care Team .

## 2020-02-03 NOTE — PROGRESS NOTES
weSUBJECTIVE   Tracy Galloway is a 74 year old female who presents with     Vaginal Symptoms      Duration: 10 days     Description  Burning sensation     Intensity:  moderate    Accompanying signs and symptoms (fever/dysuria/abdominal or back pain): None    History  Sexually active: not at present  Possibility of pregnancy: No  Recent antibiotic use: no     Precipitating or alleviating factors: None    Therapies tried and outcome: Clobetasol ointment    Outcome: Not helping       PCP   Gwendolyn Solis [phone]    Health Maintenance        Health Maintenance Due   Topic Date Due     MAMMO SCREENING  06/18/2015     ZOSTER IMMUNIZATION (3 of 3) 01/11/2020       HPI        Patient Active Problem List   Diagnosis     Anxiety state     Plantar fasciitis     Shingles     CARDIOVASCULAR SCREENING; LDL GOAL LESS THAN 130     Atrophic vaginitis     Chronic pain     Health Care Home     Essential hypertension with goal blood pressure less than 140/90     Narcotic dependence (H)     Prinzmetal angina (H)     Visual impairment     TIA (transient ischemic attack)     Mammogram declined     Advanced directives, counseling/discussion     Primary insomnia     Mild single current episode of major depressive disorder (H)     Dilation of biliary tract     Current Outpatient Medications   Medication     baclofen (LIORESAL) 10 MG tablet     clobetasol (TEMOVATE) 0.05 % external ointment     diltiazem ER (DILT-XR) 180 MG 24 hr capsule     DOCUSATE SODIUM PO     fluticasone (FLONASE) 50 MCG/ACT spray     HYDROcodone-acetaminophen (NORCO)  MG per tablet     ibuprofen (ADVIL/MOTRIN) 200 MG tablet     lisinopril (PRINIVIL/ZESTRIL) 20 MG tablet     loratadine (CLARITIN) 10 MG tablet     ondansetron (ZOFRAN-ODT) 4 MG ODT tab     PARoxetine (PAXIL) 20 MG tablet     polyethylene glycol-propylene glycol (SYSTANE ULTRA) 0.4-0.3 % SOLN ophthalmic solution     ranitidine (ZANTAC) 150 MG tablet     triamcinolone (KENALOG) 0.1 %  external cream     zolpidem (AMBIEN) 10 MG tablet     valACYclovir (VALTREX) 1000 mg tablet     No current facility-administered medications for this visit.        Patient Active Problem List   Diagnosis     Anxiety state     Plantar fasciitis     Shingles     CARDIOVASCULAR SCREENING; LDL GOAL LESS THAN 130     Atrophic vaginitis     Chronic pain     Health Care Home     Essential hypertension with goal blood pressure less than 140/90     Narcotic dependence (H)     Prinzmetal angina (H)     Visual impairment     TIA (transient ischemic attack)     Mammogram declined     Advanced directives, counseling/discussion     Primary insomnia     Mild single current episode of major depressive disorder (H)     Dilation of biliary tract     Past Surgical History:   Procedure Laterality Date     CHOLECYSTECTOMY, LAPOROSCOPIC      s/p Cholecystectomy, Laparoscopic     COLONOSCOPY  2012    Procedure: COLONOSCOPY;  Colonoscopy;  Surgeon: Tyrell Brown MD;  Location: [facility]     ENDOSCOPIC RETROGRADE CHOLANGIOPANCREATOGRAM N/A 2019    Procedure: ENDOSCOPIC RETROGRADE CHOLANGIOPANCREATOGRAPHY;  Surgeon: Mio Blunt MD;  Location:  OR     EYE SURGERY       HC COLONOSCOPY THRU STOMA, DIAGNOSTIC  3-    at outside clinic.  Normal.     HYSTERECTOMY, SUNDAR  's    severe endometriosis     SURGICAL HISTORY OF -       breast reduction     SURGICAL HISTORY OF -   03    coronary angiogram     SURGICAL HISTORY OF -       Refractive surgery right     TONSILLECTOMY         Social History     Tobacco Use     Smoking status: Former Smoker     Types: Cigarettes     Last attempt to quit: 10/30/1962     Years since quittin.3     Smokeless tobacco: Never Used   Substance Use Topics     Alcohol use: Yes     Alcohol/week: 4.0 standard drinks     Types: 4 Standard drinks or equivalent per week     Comment: occasional     Family History   Problem Relation Age of Onset     Hypertension Mother      Heart  Disease Mother      Hypertension Father      Circulatory Father         PAD     Alzheimer Disease Maternal Grandmother         dementia     Cerebrovascular Disease Maternal Grandmother      Heart Disease Maternal Grandfather      Diabetes Maternal Grandfather          Current Outpatient Medications   Medication Sig Dispense Refill     baclofen (LIORESAL) 10 MG tablet TAKE ONE TABLET BY MOUTH THREE TIMES A DAY 90 tablet 1     clobetasol (TEMOVATE) 0.05 % external ointment Apply topically 2 times daily 45 g 1     diltiazem ER (DILT-XR) 180 MG 24 hr capsule Take 1 capsule (180 mg) by mouth daily 90 capsule 3     DOCUSATE SODIUM PO Take 100 mg by mouth daily as needed for constipation        fluticasone (FLONASE) 50 MCG/ACT spray Spray 1-2 sprays into both nostrils daily (Patient taking differently: Spray 1-2 sprays into both nostrils daily as needed for rhinitis ) 1 Bottle 11     HYDROcodone-acetaminophen (NORCO)  MG per tablet Take 1 tablet by mouth 2 times daily And 10 days of the month may take 3 daily if needed (30 day supply) 75 tablet 0     ibuprofen (ADVIL/MOTRIN) 200 MG tablet Take 400-600 mg by mouth every 8 hours as needed for mild pain       lisinopril (PRINIVIL/ZESTRIL) 20 MG tablet TAKE ONE TABLET BY MOUTH ONCE DAILY 90 tablet 0     loratadine (CLARITIN) 10 MG tablet Take 10 mg by mouth daily as needed for allergies        ondansetron (ZOFRAN-ODT) 4 MG ODT tab Take 1 tablet (4 mg) by mouth every 6 hours as needed for nausea or vomiting 10 tablet 1     PARoxetine (PAXIL) 20 MG tablet TAKE TWO TABLETS BY MOUTH EVERY NIGHT AT BEDTIME 180 tablet 1     polyethylene glycol-propylene glycol (SYSTANE ULTRA) 0.4-0.3 % SOLN ophthalmic solution Place 1 drop into both eyes 4 times daily as needed for dry eyes       ranitidine (ZANTAC) 150 MG tablet TAKE ONE TABLET BY MOUTH TWICE A  tablet 1     triamcinolone (KENALOG) 0.1 % external cream Apply topically 2 times daily (Patient taking differently: Apply  "topically 2 times daily as needed (irritation on face) ) 15 g 0     zolpidem (AMBIEN) 10 MG tablet TAKE 1/2 TO 1 TABLET BY MOUTH NIGHTLY AS NEEDED FOR SLEEP 30 tablet 2     valACYclovir (VALTREX) 1000 mg tablet Take 1 tablet (1,000 mg) by mouth 2 times daily for 1 day As needed fo rbreak outs 4 tablet 11     No Known Allergies  Recent Labs   Lab Test 06/10/19  1430 05/25/19  0737 05/24/19  0715  03/29/19  1007 02/15/19  1154 04/27/18  0812  01/24/17  0811   A1C  --   --   --   --   --   --  5.5  --   --    LDL  --   --   --   --  123* 132*  --   --  116*   HDL  --   --   --   --  80 80  --   --  60   TRIG  --   --   --   --  83 97  --   --  80   ALT 24 318* 470*   < >  --  21  --    < > 64*   CR 0.79  --  0.72   < >  --  1.00  --    < > 0.86   GFRESTIMATED 73  --  83   < >  --  55*  --    < > 65   GFRESTBLACK 85  --  >90   < >  --  64  --    < > 78   POTASSIUM 4.3  --  4.3   < >  --  4.3  --    < > 4.5   TSH  --   --   --   --   --  0.94  --   --  0.89    < > = values in this interval not displayed.      BP Readings from Last 3 Encounters:   02/03/20 128/62   01/29/20 126/70   11/15/19 126/50    Wt Readings from Last 3 Encounters:   02/03/20 55.8 kg (123 lb)   01/29/20 55.3 kg (122 lb)   11/15/19 56.5 kg (124 lb 9.6 oz)                    Reviewed and updated:  Tobacco  Allergies  Meds  Med Hx  Surg Hx  Fam Hx  Soc Hx     ROS:  Constitutional, HEENT, cardiovascular, pulmonary, gi and gu systems are negative, except as otherwise noted.    PHYSICAL EXAM   /62 (Cuff Size: Adult Regular)   Pulse 64   Temp 97.2  F (36.2  C) (Tympanic)   Resp 18   Ht 1.549 m (5' 1\")   Wt 55.8 kg (123 lb)   BMI 23.24 kg/m    Body mass index is 23.24 kg/m .  GENERAL: alert and no distress  NECK: no adenopathy, no asymmetry, masses, or scars and thyroid normal to palpation  RESP: lungs clear to auscultation - no rales, rhonchi or wheezes  CV: regular rates and rhythm, normal S1 S2, no S3 or S4 and no murmur, click or " rub  ABDOMEN: soft, nontender  MS: extremities normal- no gross deformities noted  SKIN: no suspicious lesions or rashes    Results for orders placed or performed in visit on 02/03/20   Wet prep     Status: Abnormal   Result Value Ref Range    Specimen Description Vagina     Wet Prep No Trichomonas seen     Wet Prep No clue cells seen     Wet Prep Many  Yeast seen   (A)     Wet Prep Few  WBC'S seen          Assessment & Plan     (B37.3) Vaginal yeast infection  (primary encounter diagnosis)  Comment: Symptoms are likely secondary to vaginal candidiasis.  Diflucan prescribed, common side effectS discussed.  Suggested to stop using clobetasol ointment.  Recommended to keep the area dry and clean.  Follow-up if symptoms persist or worsen.  All questions answered.  Plan: fluconazole (DIFLUCAN) 150 MG tablet         (N94.9) Vaginal burning  Comment:   Plan: Wet prep              Patient Instructions     Patient Education     Vaginal Infection: Yeast (Candidiasis)  Yeast infection occurs when yeast in the vagina increase and attacks the vaginal tissues. Yeast is a type of fungus. These infections are often caused by a type of yeast called Candida albicans. Other species of yeast can also cause infections. Factors that may make infection more likely include recent antibiotic use, douching, or increased sex. Yeast infections are more common in women who have diabetes, or are obese or pregnant, or have a weak immune system.  Symptoms of yeast infection    Clumpy or thin, white discharge, which may look like cottage cheese    No odor or minimal odor    Severe vaginal itching or burning    Burning with urination    Swelling, redness of vulva    Pain during sex    Treating yeast infection  Yeast infection is treated with a vaginal antifungal cream. In some cases, antifungal pills are prescribed instead. During treatment:    Finish all of your medicine, even if your symptoms go away.    Apply the cream before going to bed. Lie  flat after applying so that it doesn't drip out.    Do not douche or use tampons.    Don't rely on a diaphragm or condoms, since the cream may weaken them.    Avoid intercourse if advised by your healthcare provider.  Should I treat a yeast infection myself?  Discuss with your healthcare provider whether you should use over-the-counter medicines to treat a yeast infection. Self-treatment may depend on whether:    You've had a yeast infection in the past.    You're at risk for STDs.  Call your healthcare provider if symptoms do not go away or come back after treatment.  Date Last Reviewed: 3/1/2017    [id number] [employer]. [address] [place] [address], [place] [address]. All rights reserved. This information is not intended as a substitute for professional medical care. Always follow your healthcare professional's instructions.               Loco Johnson MD  [facility]

## 2020-02-05 ENCOUNTER — MYC MEDICAL ADVICE (OUTPATIENT)
Dept: FAMILY MEDICINE | Facility: CLINIC | Age: 75
End: 2020-02-05

## 2020-02-05 ENCOUNTER — TELEPHONE (OUTPATIENT)
Dept: FAMILY MEDICINE | Facility: CLINIC | Age: 75
End: 2020-02-05

## 2020-02-05 RX ORDER — SIMVASTATIN 10 MG
10 TABLET ORAL AT BEDTIME
Qty: 90 TABLET | Refills: 1 | Status: SHIPPED | OUTPATIENT
Start: 2020-02-05 | End: 2020-08-03

## 2020-02-05 NOTE — TELEPHONE ENCOUNTER
Please call Tracy she should be on ASA 81 mg daily unless she has tried this and can not tolerate and she should be on a statin zocor 10 mg daily.   Given her history of BP and heart issues these will both reduce risk for her in the future

## 2020-02-06 ENCOUNTER — MYC MEDICAL ADVICE (OUTPATIENT)
Dept: FAMILY MEDICINE | Facility: CLINIC | Age: 75
End: 2020-02-06

## 2020-02-07 ENCOUNTER — MYC MEDICAL ADVICE (OUTPATIENT)
Dept: FAMILY MEDICINE | Facility: CLINIC | Age: 75
End: 2020-02-07

## 2020-02-07 NOTE — TELEPHONE ENCOUNTER
Spoke with Tracy, she says she has some burning and tenderness of her vaginal tissue. Per Dr Solis, lets just not apply anything externally for the next few days. Her yeast infection should be fully treated after 3 days of Diflucan. Tracy will call back next week if her symptoms are not starting to improve.

## 2020-02-09 ENCOUNTER — OFFICE VISIT (OUTPATIENT)
Dept: URGENT CARE | Facility: URGENT CARE | Age: 75
End: 2020-02-09
Payer: COMMERCIAL

## 2020-02-09 VITALS
OXYGEN SATURATION: 100 % | RESPIRATION RATE: 16 BRPM | HEART RATE: 72 BPM | SYSTOLIC BLOOD PRESSURE: 120 MMHG | WEIGHT: 123 LBS | BODY MASS INDEX: 23.22 KG/M2 | DIASTOLIC BLOOD PRESSURE: 58 MMHG | HEIGHT: 61 IN | TEMPERATURE: 95.7 F

## 2020-02-09 DIAGNOSIS — B37.31 VAGINAL YEAST INFECTION: ICD-10-CM

## 2020-02-09 PROCEDURE — 99213 OFFICE O/P EST LOW 20 MIN: CPT | Performed by: FAMILY MEDICINE

## 2020-02-09 RX ORDER — FLUCONAZOLE 150 MG/1
150 TABLET ORAL DAILY
Qty: 3 TABLET | Refills: 0 | Status: SHIPPED | OUTPATIENT
Start: 2020-02-09 | End: 2020-04-07

## 2020-02-09 ASSESSMENT — MIFFLIN-ST. JEOR: SCORE: 990.3

## 2020-02-09 NOTE — PROGRESS NOTES
"Chief Complaint   Patient presents with     Vaginal Problem     10 days having burning and itching.       10 days of vaginal burning and itching  Was on a vacation cruise with swimsuit wear for 5 days before had  No new soaps, and no new sexual activity    Already got Tx with cream and diflucan    Last antibiotic reported as: none    Current Outpatient Medications   Medication     aspirin (ASA) 81 MG tablet     baclofen (LIORESAL) 10 MG tablet     diltiazem ER (DILT-XR) 180 MG 24 hr capsule     DOCUSATE SODIUM PO     fluconazole (DIFLUCAN) 150 MG tablet     HYDROcodone-acetaminophen (NORCO)  MG per tablet     lisinopril (PRINIVIL/ZESTRIL) 20 MG tablet     PARoxetine (PAXIL) 20 MG tablet     polyethylene glycol-propylene glycol (SYSTANE ULTRA) 0.4-0.3 % SOLN ophthalmic solution     ranitidine (ZANTAC) 150 MG tablet     simvastatin (ZOCOR) 10 MG tablet     zolpidem (AMBIEN) 10 MG tablet     fluticasone (FLONASE) 50 MCG/ACT spray     ibuprofen (ADVIL/MOTRIN) 200 MG tablet     loratadine (CLARITIN) 10 MG tablet     ondansetron (ZOFRAN-ODT) 4 MG ODT tab     triamcinolone (KENALOG) 0.1 % external cream     valACYclovir (VALTREX) 1000 mg tablet     No current facility-administered medications for this visit.      I have reviewed the patient's medical history in detail; there are no changes to the history as noted in EpicCare.    ROS: As per HPI.  Constitutional: no fevers/sweats/chills, no weight changes  GI: no nausea/vomiting/diarrhea, normal stooling  GYN: no rash    EXAM  /58 (BP Location: Right arm, Patient Position: Sitting, Cuff Size: Adult Regular)   Pulse 72   Temp 95.7  F (35.4  C) (Tympanic)   Resp 16   Ht 1.549 m (5' 1\")   Wt 55.8 kg (123 lb)   SpO2 100%   BMI 23.24 kg/m    Gen: Healthy appearing female in no apparent distress  : vaginal exam no sig discharge, mild inflammation and friability    No results found for any visits on 02/09/20.    ASSESSMENT/PLAN:  1. Vaginal yeast " infection    Yeast vaginitis recently , but I think main issue here is friable tissue, simple vaginitis and her recent vacation with lots of swimsuit wear  Baths also not helping  rec vaginal moisturization with Replens daily  '  No baths, no soap, avoid swimming for now    - fluconazole (DIFLUCAN) 150 MG tablet; Take 1 tablet (150 mg) by mouth daily  Dispense: 3 tablet; Refill: 0

## 2020-02-25 ENCOUNTER — OFFICE VISIT (OUTPATIENT)
Dept: FAMILY MEDICINE | Facility: CLINIC | Age: 75
End: 2020-02-25
Payer: COMMERCIAL

## 2020-02-25 VITALS
OXYGEN SATURATION: 99 % | HEART RATE: 67 BPM | TEMPERATURE: 96 F | RESPIRATION RATE: 15 BRPM | DIASTOLIC BLOOD PRESSURE: 80 MMHG | SYSTOLIC BLOOD PRESSURE: 136 MMHG | WEIGHT: 126 LBS | BODY MASS INDEX: 23.81 KG/M2

## 2020-02-25 DIAGNOSIS — F32.0 MILD SINGLE CURRENT EPISODE OF MAJOR DEPRESSIVE DISORDER (H): ICD-10-CM

## 2020-02-25 DIAGNOSIS — F11.20 NARCOTIC DEPENDENCE (H): Chronic | ICD-10-CM

## 2020-02-25 DIAGNOSIS — G50.0 TRIGEMINAL NEURALGIA: ICD-10-CM

## 2020-02-25 DIAGNOSIS — G89.4 CHRONIC PAIN SYNDROME: Primary | Chronic | ICD-10-CM

## 2020-02-25 DIAGNOSIS — F41.1 ANXIETY STATE: Chronic | ICD-10-CM

## 2020-02-25 DIAGNOSIS — N95.2 ATROPHIC VAGINITIS: ICD-10-CM

## 2020-02-25 LAB
SPECIMEN SOURCE: NORMAL
WET PREP SPEC: NORMAL

## 2020-02-25 PROCEDURE — 99214 OFFICE O/P EST MOD 30 MIN: CPT | Performed by: FAMILY MEDICINE

## 2020-02-25 PROCEDURE — 87210 SMEAR WET MOUNT SALINE/INK: CPT | Performed by: FAMILY MEDICINE

## 2020-02-25 RX ORDER — ESCITALOPRAM OXALATE 10 MG/1
10 TABLET ORAL DAILY
Qty: 90 TABLET | Refills: 3 | Status: SHIPPED | OUTPATIENT
Start: 2020-02-25 | End: 2020-04-20 | Stop reason: ALTCHOICE

## 2020-02-25 RX ORDER — ESTRADIOL 0.1 MG/G
2 CREAM VAGINAL
Qty: 42.5 G | Refills: 0 | Status: SHIPPED | OUTPATIENT
Start: 2020-02-27 | End: 2020-04-27

## 2020-02-25 RX ORDER — HYDROCODONE BITARTRATE AND ACETAMINOPHEN 10; 325 MG/1; MG/1
1 TABLET ORAL 2 TIMES DAILY
Qty: 75 TABLET | Refills: 0 | Status: SHIPPED | OUTPATIENT
Start: 2020-02-27 | End: 2020-03-19

## 2020-02-25 RX ORDER — HYDROCODONE BITARTRATE AND ACETAMINOPHEN 10; 325 MG/1; MG/1
1 TABLET ORAL 2 TIMES DAILY
Qty: 75 TABLET | Refills: 0 | Status: CANCELLED | OUTPATIENT
Start: 2020-02-25

## 2020-02-25 NOTE — PATIENT INSTRUCTIONS
Pain clinic will call you     Estrogen for your bottom 3 times per week     Phone visit in 4 weeks

## 2020-02-25 NOTE — NURSING NOTE
"Chief Complaint   Patient presents with     Pain       Initial /80 (BP Location: Right arm, Patient Position: Chair, Cuff Size: Adult Regular)   Pulse 67   Temp 96  F (35.6  C) (Tympanic)   Resp 15   Wt 57.2 kg (126 lb)   SpO2 99%   BMI 23.81 kg/m   Estimated body mass index is 23.81 kg/m  as calculated from the following:    Height as of 2/9/20: 1.549 m (5' 1\").    Weight as of this encounter: 57.2 kg (126 lb).    Patient presents to the clinic using No DME    Health Maintenance that is potentially due pending provider review:  Mammogram    Gave pt phone number/pended order to schedule mammo and/or colonoscopy(or FIT)    Is there anyone who you would like to be able to receive your results? No  If yes have patient fill out JENA    "

## 2020-02-25 NOTE — PROGRESS NOTES
Subjective     Tracy Galloway is a 74 year old female who presents to clinic today for the following health issues:    HPI   Chronic Pain Follow-Up    Where in your body do you have pain? facial  How has your pain affected your ability to work? Not applicable  Which of these pain treatments have you tried since your last clinic visit? Other: none     Pt feels pain is better managed with 3 norco daily   She does however feel like she is more groggy if she takes more norco and she never really has no pain     She has had extensive work up in the past for her pain and has tried many meds with no significant relief   The norco 2 tabs had been good for a couple of years but in the last year things just are not going well     How well are you sleeping? Good  How has your mood been since your last visit? Better  Have you had a significant life event? No  Other aggravating factors: none  Taking medication as directed? Yes    PHQ-9 SCORE 11/16/2018 7/12/2019 7/12/2019   PHQ-9 Total Score - - -   PHQ-9 Total Score MyChart - - 5 (Mild depression)   PHQ-9 Total Score 24 5 4     ROMAIN-7 SCORE 7/18/2017 11/16/2018 7/12/2019   Total Score - - -   Total Score - - 1 (minimal anxiety)   Total Score 1 13 1     No flowsheet data found.  Encounter-Level CSA - 08/20/2018:    Controlled Substance Agreement - Scan on 8/29/2018  3:15 PM: CONTROLLED SUBSTANCE AGREEMENT     Encounter-Level CSA - 07/07/2015:    Controlled Substance Agreement - Scan on 7/10/2015  1:04 PM: CONTROLLED SUBSTANCE AGREEMENT     Patient-Level CSA:    There are no patient-level csa.       Signed new agreement today    checked  February 26, 2020 by Gwendolyn Solis MD no concerns     Vaginal Symptoms      Duration: 6 weeks     Description  itching, burning and has been treated for yeast infection x3 and still has itching and burning sxs     Intensity:  moderate    Accompanying signs and symptoms (fever/dysuria/abdominal or back pain): None    History  Sexually active: not  at present  Possibility of pregnancy: No  Recent antibiotic use: no     Precipitating or alleviating factors: None    Therapies tried and outcome: Diflucan and Monistat   Outcome: not better         Depression and Anxiety Follow-Up    How are you doing with your depression since your last visit? No change    How are you doing with your anxiety since your last visit?  No change    Are you having other symptoms that might be associated with depression or anxiety? No    Have you had a significant life event? No     Do you have any concerns with your use of alcohol or other drugs? No    Social History     Tobacco Use     Smoking status: Former Smoker     Types: Cigarettes     Last attempt to quit: 10/30/1962     Years since quittin.3     Smokeless tobacco: Never Used   Substance Use Topics     Alcohol use: Yes     Alcohol/week: 4.0 standard drinks     Types: 4 Standard drinks or equivalent per week     Comment: occasional     Drug use: No     Comment: medical marijuana in the past     PHQ 2018   PHQ-9 Total Score 24 5 4   Q9: Thoughts of better off dead/self-harm past 2 weeks Nearly every day Several days Not at all   F/U: Thoughts of suicide or self-harm - No -   F/U: Safety concerns - No -     ROMAIN-7 SCORE 2018   Total Score - - -   Total Score - - 1 (minimal anxiety)   Total Score 1 13 1       In the past two weeks have you had thoughts of suicide or self-harm?  No.    Do you have concerns about your personal safety or the safety of others?   No    Suicide Assessment Five-step Evaluation and Treatment (SAFE-T)    Reviewed and updated as needed this visit by Provider  Tobacco  Allergies  Meds  Problems  Med Hx  Surg Hx  Fam Hx         Review of Systems   ROS COMP: Constitutional, HEENT, cardiovascular, pulmonary, gi and gu systems are negative, except as otherwise noted.      Objective    /80 (BP Location: Right arm, Patient Position: Chair, Cuff  Size: Adult Regular)   Pulse 67   Temp 96  F (35.6  C) (Tympanic)   Resp 15   Wt 57.2 kg (126 lb)   SpO2 99%   BMI 23.81 kg/m    Body mass index is 23.81 kg/m .  Physical Exam   GENERAL APPEARANCE: healthy, alert and no distress  RESP: lungs clear to auscultation - no rales, rhonchi or wheezes  CV: regular rates and rhythm, normal S1 S2, no S3 or S4 and no murmur, click or rub   (female): atrophy of vaginal mucosa   No inflammation no discharge   PSYCH: mentation appears normal and affect normal/bright    Diagnostic Test Results:  Labs reviewed in Epic        Assessment & Plan     1. Chronic pain syndrome    - HYDROcodone-acetaminophen (NORCO)  MG per tablet; Take 1 tablet by mouth 2 times daily And 10 days of the month may take 3 daily if needed (30 day supply)  Dispense: 75 tablet; Refill: 0  - PAIN MANAGEMENT REFERRAL    2. Narcotic dependence (H)    - HYDROcodone-acetaminophen (NORCO)  MG per tablet; Take 1 tablet by mouth 2 times daily And 10 days of the month may take 3 daily if needed (30 day supply)  Dispense: 75 tablet; Refill: 0  - PAIN MANAGEMENT REFERRAL    3. Trigeminal neuralgia  Would like an opinion on alternative treatment options for her   - PAIN MANAGEMENT REFERRAL    4. Mild single current episode of major depressive disorder (H)  Stable no change in treatment plan.     5. Atrophic vaginitis  Wet prep neg for yeast   - estradiol (ESTRACE) 0.1 MG/GM vaginal cream; Place 2 g vaginally twice a week  Dispense: 42.5 g; Refill: 0    6. Anxiety state  Stable no change in treatment plan.   - escitalopram (LEXAPRO) 10 MG tablet; Take 1 tablet (10 mg) by mouth daily  Dispense: 90 tablet; Refill: 3       Patient Instructions   Pain clinic will call you     Estrogen for your bottom 3 times per week     Phone visit in 4 weeks       Return in about 1 month (around 3/25/2020).      Risks, benefits, side effects and rationale for treatment plan fully discussed with the patient and  understanding expressed.   Gwendolyn Solis MD  [facility]

## 2020-02-26 ENCOUNTER — MYC MEDICAL ADVICE (OUTPATIENT)
Dept: FAMILY MEDICINE | Facility: CLINIC | Age: 75
End: 2020-02-26

## 2020-03-05 ENCOUNTER — MYC MEDICAL ADVICE (OUTPATIENT)
Dept: FAMILY MEDICINE | Facility: CLINIC | Age: 75
End: 2020-03-05

## 2020-03-06 NOTE — TELEPHONE ENCOUNTER
Please change phone appt to the 340 spot on 3/24 and put in notes pt available after 4   Hold her original appt for same day and let pt know please

## 2020-03-10 DIAGNOSIS — F51.01 PRIMARY INSOMNIA: Chronic | ICD-10-CM

## 2020-03-11 NOTE — TELEPHONE ENCOUNTER
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 Is she out of Ambien, or can she wait until Gwendolyn returns.     Luis Orta PA-C

## 2020-03-11 NOTE — TELEPHONE ENCOUNTER
Requested Prescriptions   Pending Prescriptions Disp Refills     zolpidem (AMBIEN) 10 MG tablet [Pharmacy Med Name: ZOLPIDEM TARTRATE 10MG TABS] 30 tablet 2     Sig: TAKE ONE-HALF TO ONE  TABLET BY MOUTH EVERY NIGHT AT BEDTIME AS NEEDED FOR SLEEP       There is no refill protocol information for this order        Last Written Prescription Date:  12/5/19  Last Fill Quantity: 30,  # refills: 2   Last office visit: 2/25/2020 with prescribing provider:     Future Office Visit:   Next 5 appointments (look out 90 days)    Mar 24, 2020  3:40 PM CDT  Telephone Visit with Gwendolyn Solis MD  [facility] ([facility]) [address] [place] [id number] [phone]         Routing refill request to provider for review/approval because:  Drug not on the [facility], [facility] or [facility] refill protocol or controlled substance

## 2020-03-12 RX ORDER — ZOLPIDEM TARTRATE 10 MG/1
TABLET ORAL
Qty: 30 TABLET | Refills: 0 | Status: SHIPPED | OUTPATIENT
Start: 2020-03-12 | End: 2020-04-13

## 2020-03-12 NOTE — TELEPHONE ENCOUNTER
Luis,  Patient was called, she says she has only 3 left and can not wait till next week, please advise refill.    GLENDY Marroquin

## 2020-03-12 NOTE — TELEPHONE ENCOUNTER
Rx for Ambien for one month in Dr. Keith absence.   Need to contact Dr. Solis for further refills.     Luis Orta PA-C

## 2020-03-19 ENCOUNTER — TELEPHONE (OUTPATIENT)
Dept: FAMILY MEDICINE | Facility: CLINIC | Age: 75
End: 2020-03-19

## 2020-03-19 DIAGNOSIS — G89.4 CHRONIC PAIN SYNDROME: Chronic | ICD-10-CM

## 2020-03-19 DIAGNOSIS — F11.20 NARCOTIC DEPENDENCE (H): Chronic | ICD-10-CM

## 2020-03-19 RX ORDER — HYDROCODONE BITARTRATE AND ACETAMINOPHEN 10; 325 MG/1; MG/1
TABLET ORAL
Qty: 75 TABLET | Refills: 0 | Status: SHIPPED | OUTPATIENT
Start: 2020-03-25 | End: 2020-04-20

## 2020-03-19 NOTE — TELEPHONE ENCOUNTER
Requested Prescriptions   Pending Prescriptions Disp Refills     HYDROcodone-acetaminophen (NORCO)  MG per tablet [Pharmacy Med Name: HYDROCODONE-ACETAMINOPH  TABS] 75 tablet 0     Sig: TAKE 1 TABLET BY MOUTH TWICE DAILY (10 DAYS OUT OF THE MONTH MAY TAKE 3 TABLETS DAILY IF NEEDED) 30 DAY SUPPLY       There is no refill protocol information for this order        Last Written Prescription Date:  2/27/20  Last Fill Quantity: 75,  # refills: 0   Last office visit: 2/25/2020 with prescribing provider:     Future Office Visit:   Next 5 appointments (look out 90 days)    Apr 20, 2020  2:00 PM CDT  Telephone Visit with Gwendolyn Solis MD  [facility] ([facility]) [address] [place] [id number] [phone]         Routing refill request to provider for review/approval because:  Drug not on the [facility], [facility] or  Health refill protocol or controlled substance

## 2020-04-06 ENCOUNTER — MYC MEDICAL ADVICE (OUTPATIENT)
Dept: FAMILY MEDICINE | Facility: CLINIC | Age: 75
End: 2020-04-06

## 2020-04-06 NOTE — TELEPHONE ENCOUNTER
Please call pt and have her see provider regarding this issue. Have been dealing with this over the phone and alext for over one month need to look at this. Unless she wants to do a video visit???

## 2020-04-07 ENCOUNTER — TELEPHONE (OUTPATIENT)
Dept: FAMILY MEDICINE | Facility: CLINIC | Age: 75
End: 2020-04-07

## 2020-04-07 ENCOUNTER — OFFICE VISIT (OUTPATIENT)
Dept: FAMILY MEDICINE | Facility: CLINIC | Age: 75
End: 2020-04-07
Payer: COMMERCIAL

## 2020-04-07 VITALS
BODY MASS INDEX: 23.41 KG/M2 | HEIGHT: 61 IN | HEART RATE: 64 BPM | RESPIRATION RATE: 18 BRPM | WEIGHT: 124 LBS | SYSTOLIC BLOOD PRESSURE: 138 MMHG | TEMPERATURE: 97.2 F | DIASTOLIC BLOOD PRESSURE: 62 MMHG

## 2020-04-07 DIAGNOSIS — N89.8 VAGINAL ITCHING: ICD-10-CM

## 2020-04-07 DIAGNOSIS — K21.9 GASTROESOPHAGEAL REFLUX DISEASE, ESOPHAGITIS PRESENCE NOT SPECIFIED: Primary | ICD-10-CM

## 2020-04-07 DIAGNOSIS — N95.2 VAGINAL ATROPHY: Primary | ICD-10-CM

## 2020-04-07 LAB
SPECIMEN SOURCE: NORMAL
WET PREP SPEC: NORMAL

## 2020-04-07 PROCEDURE — 87210 SMEAR WET MOUNT SALINE/INK: CPT | Performed by: FAMILY MEDICINE

## 2020-04-07 PROCEDURE — 99213 OFFICE O/P EST LOW 20 MIN: CPT | Performed by: FAMILY MEDICINE

## 2020-04-07 ASSESSMENT — MIFFLIN-ST. JEOR: SCORE: 989.84

## 2020-04-07 NOTE — PATIENT INSTRUCTIONS
Patient Education     Atrophic Vaginitis    Atrophic vaginitis means the walls of your vagina have become thin. This happens when your body makes too little of the hormone estrogen. Menopause or surgical removal of the ovaries are the most common causes for a drop in estrogen. Breastfeeding can also cause the hormone level to drop.  Symptoms of atrophic vaginitis include:    Dryness, soreness, burning, or itching in the vagina    Vaginal discharge  Sex can be uncomfortable, even painful. After sex, you may have bleeding from your vagina. You may also have burning or pain when you urinate.  Home care  Your healthcare provider may recommend 1 or more of these as treatment:    Vaginal creams, lotions, and lubricants. These products help relieve vaginal dryness. They don t need a prescription. They can be found in the personal care section of most pharmacies. Creams and lotions are used daily to help keep the vagina moist. Lubricants help reduce dryness and pain during sex. Choose water-based lubricants. Don t use petroleum jelly, mineral oil, or other oils. These increase the chance of infection.     Hormone therapy (HT). HT increases the amount of estrogen in your body. This can help manage or relieve symptoms. HT can be given in pill form. It may be given as a lotion, cream, ring put into the vagina, or a patch on the skin. The risks and benefits of HT vary for each woman. For instance, your risk may be higher if you have had breast cancer. Discuss this treatment with your healthcare provider. Not every woman can use HT.  You don t need to give up (abstain from) sex. In fact, regular sex can help keep vaginal tissues healthy. Take steps to make sex more comfortable by using water-based lubricants.  Preventing infections  Atrophic vaginitis makes an infection of the vagina or the urinary tract more likely. To help reduce your risk:    Keep your genitals clean. When you bathe, wash the outside of your vagina with  mild soap and water. Clean gently between the folds of your vagina.    Wipe from front to back after a bowel movement.    Don t douche unless your healthcare provider tells you to.    Avoid scented toilet paper, scented vaginal sprays, and scented tampons.    Avoid wearing clothes that are tight in the crotch. These include pantyhose, jeans, and leggings. Wear cotton underwear. Change it every day.  Follow-up care  Follow up with your healthcare provider, or as advised.  When to seek medical advice  Call your health care provider right away if any of these occur:    Fever of 100.4 F (38 C) or higher, or as directed by your healthcare provider    Symptoms don t go away or get worse even with treatment    Vaginal area swells or becomes painful    Vaginal area bleeds, but not because of your period    Bad-smelling discharge from the vagina    Pain or burning when you urinate or you have trouble passing urine    Open sores develop around vagina   Date Last Reviewed: 12/1/2016 2000-2019 [employer]. [address] [place] [address], [place] [address]. All rights reserved. This information is not intended as a substitute for professional medical care. Always follow your healthcare professional's instructions.

## 2020-04-07 NOTE — PROGRESS NOTES
SUBJECTIVE   Tracy Galloway is a 75 year old female who presents with     Vaginal Symptoms      Duration: off and on    Description  itching and burning- hurts to sit, hurts to wear her jeans     Intensity:  moderate    Accompanying signs and symptoms (fever/dysuria/abdominal or back pain): None    History  Sexually active: not at present  Possibility of pregnancy: No  Recent antibiotic use: no     Precipitating or alleviating factors: none of the creams have helped.     Therapies tried and outcome: Diflucan   Outcome: helped- but thinks it needed to be longer dose         PCP   Gwendolyn Solis -[phone]    Health Maintenance      There are no preventive care reminders to display for this patient.    HPI        Patient Active Problem List   Diagnosis     Anxiety state     Plantar fasciitis     Shingles     CARDIOVASCULAR SCREENING; LDL GOAL LESS THAN 130     Atrophic vaginitis     Chronic pain     Health Care Home     Essential hypertension with goal blood pressure less than 140/90     Narcotic dependence (H)     Prinzmetal angina (H)     Visual impairment     TIA (transient ischemic attack)     Mammogram declined     Advanced directives, counseling/discussion     Primary insomnia     Mild single current episode of major depressive disorder (H)     Dilation of biliary tract     Trigeminal neuralgia     Current Outpatient Medications   Medication     aspirin (ASA) 81 MG tablet     baclofen (LIORESAL) 10 MG tablet     diltiazem ER (DILT-XR) 180 MG 24 hr capsule     DOCUSATE SODIUM PO     escitalopram (LEXAPRO) 10 MG tablet     estradiol (ESTRACE) 0.1 MG/GM vaginal cream     fluticasone (FLONASE) 50 MCG/ACT spray     HYDROcodone-acetaminophen (NORCO)  MG per tablet     ibuprofen (ADVIL/MOTRIN) 200 MG tablet     lisinopril (PRINIVIL/ZESTRIL) 20 MG tablet     loratadine (CLARITIN) 10 MG tablet     ondansetron (ZOFRAN-ODT) 4 MG ODT tab     polyethylene glycol-propylene glycol (SYSTANE ULTRA) 0.4-0.3 %  SOLN ophthalmic solution     ranitidine (ZANTAC) 150 MG tablet     simvastatin (ZOCOR) 10 MG tablet     zolpidem (AMBIEN) 10 MG tablet     triamcinolone (KENALOG) 0.1 % external cream     valACYclovir (VALTREX) 1000 mg tablet     No current facility-administered medications for this visit.        Patient Active Problem List   Diagnosis     Anxiety state     Plantar fasciitis     Shingles     CARDIOVASCULAR SCREENING; LDL GOAL LESS THAN 130     Atrophic vaginitis     Chronic pain     Health Care Home     Essential hypertension with goal blood pressure less than 140/90     Narcotic dependence (H)     Prinzmetal angina (H)     Visual impairment     TIA (transient ischemic attack)     Mammogram declined     Advanced directives, counseling/discussion     Primary insomnia     Mild single current episode of major depressive disorder (H)     Dilation of biliary tract     Trigeminal neuralgia     Past Surgical History:   Procedure Laterality Date     CHOLECYSTECTOMY, LAPOROSCOPIC      s/p Cholecystectomy, Laparoscopic     COLONOSCOPY  2012    Procedure: COLONOSCOPY;  Colonoscopy;  Surgeon: Tyrell Brown MD;  Location: [facility]     ENDOSCOPIC RETROGRADE CHOLANGIOPANCREATOGRAM N/A 2019    Procedure: ENDOSCOPIC RETROGRADE CHOLANGIOPANCREATOGRAPHY;  Surgeon: Mio Blunt MD;  Location:  OR     EYE SURGERY        COLONOSCOPY THRU STOMA, DIAGNOSTIC  3-    at outside clinic.  Normal.     HYSTERECTOMY, SUNDAR  's    severe endometriosis     SURGICAL HISTORY OF -       breast reduction     SURGICAL HISTORY OF -   03    coronary angiogram     SURGICAL HISTORY OF -       Refractive surgery right     TONSILLECTOMY         Social History     Tobacco Use     Smoking status: Former Smoker     Types: Cigarettes     Last attempt to quit: 10/30/1962     Years since quittin.4     Smokeless tobacco: Never Used   Substance Use Topics     Alcohol use: Yes     Alcohol/week: 4.0 standard drinks      Types: 4 Standard drinks or equivalent per week     Comment: occasional     Family History   Problem Relation Age of Onset     Hypertension Mother      Heart Disease Mother      Hypertension Father      Circulatory Father         PAD     Alzheimer Disease Maternal Grandmother         dementia     Cerebrovascular Disease Maternal Grandmother      Heart Disease Maternal Grandfather      Diabetes Maternal Grandfather          Current Outpatient Medications   Medication Sig Dispense Refill     aspirin (ASA) 81 MG tablet Take 1 tablet (81 mg) by mouth daily 90 tablet 1     baclofen (LIORESAL) 10 MG tablet TAKE ONE TABLET BY MOUTH THREE TIMES A DAY 90 tablet 1     diltiazem ER (DILT-XR) 180 MG 24 hr capsule Take 1 capsule (180 mg) by mouth daily 90 capsule 3     DOCUSATE SODIUM PO Take 100 mg by mouth daily as needed for constipation        escitalopram (LEXAPRO) 10 MG tablet Take 1 tablet (10 mg) by mouth daily 90 tablet 3     estradiol (ESTRACE) 0.1 MG/GM vaginal cream Place 2 g vaginally twice a week 42.5 g 0     fluticasone (FLONASE) 50 MCG/ACT spray Spray 1-2 sprays into both nostrils daily 1 Bottle 11     HYDROcodone-acetaminophen (NORCO)  MG per tablet TAKE 1 TABLET BY MOUTH TWICE DAILY (10 DAYS OUT OF THE MONTH MAY TAKE 3 TABLETS DAILY IF NEEDED) 30 DAY SUPPLY 75 tablet 0     ibuprofen (ADVIL/MOTRIN) 200 MG tablet Take 400-600 mg by mouth every 8 hours as needed for mild pain       lisinopril (PRINIVIL/ZESTRIL) 20 MG tablet TAKE ONE TABLET BY MOUTH ONCE DAILY 90 tablet 0     loratadine (CLARITIN) 10 MG tablet Take 10 mg by mouth daily as needed for allergies        ondansetron (ZOFRAN-ODT) 4 MG ODT tab Take 1 tablet (4 mg) by mouth every 6 hours as needed for nausea or vomiting 10 tablet 1     polyethylene glycol-propylene glycol (SYSTANE ULTRA) 0.4-0.3 % SOLN ophthalmic solution Place 1 drop into both eyes 4 times daily as needed for dry eyes       ranitidine (ZANTAC) 150 MG tablet TAKE ONE TABLET BY MOUTH  "TWICE A  tablet 1     simvastatin (ZOCOR) 10 MG tablet Take 1 tablet (10 mg) by mouth At Bedtime 90 tablet 1     zolpidem (AMBIEN) 10 MG tablet TAKE ONE-HALF TO ONE  TABLET BY MOUTH EVERY NIGHT AT BEDTIME AS NEEDED FOR SLEEP 30 tablet 0     triamcinolone (KENALOG) 0.1 % external cream Apply topically 2 times daily (Patient not taking: Reported on 4/7/2020) 15 g 0     valACYclovir (VALTREX) 1000 mg tablet Take 1 tablet (1,000 mg) by mouth 2 times daily for 1 day As needed fo rbreak outs 4 tablet 11     No Known Allergies  Recent Labs   Lab Test 06/10/19  1430 05/25/19  0737 05/24/19  0715  03/29/19  1007 02/15/19  1154 04/27/18  0812  01/24/17  0811   A1C  --   --   --   --   --   --  5.5  --   --    LDL  --   --   --   --  123* 132*  --   --  116*   HDL  --   --   --   --  80 80  --   --  60   TRIG  --   --   --   --  83 97  --   --  80   ALT 24 318* 470*   < >  --  21  --    < > 64*   CR 0.79  --  0.72   < >  --  1.00  --    < > 0.86   GFRESTIMATED 73  --  83   < >  --  55*  --    < > 65   GFRESTBLACK 85  --  >90   < >  --  64  --    < > 78   POTASSIUM 4.3  --  4.3   < >  --  4.3  --    < > 4.5   TSH  --   --   --   --   --  0.94  --   --  0.89    < > = values in this interval not displayed.      BP Readings from Last 3 Encounters:   04/07/20 138/62   02/25/20 136/80   02/09/20 120/58    Wt Readings from Last 3 Encounters:   04/07/20 56.2 kg (124 lb)   02/25/20 57.2 kg (126 lb)   02/09/20 55.8 kg (123 lb)                    Reviewed and updated:  Tobacco  Allergies  Meds  Med Hx  Surg Hx  Fam Hx  Soc Hx     ROS:  Constitutional, HEENT, cardiovascular, pulmonary, gi and gu systems are negative, except as otherwise noted.    PHYSICAL EXAM   /62 (Cuff Size: Adult Regular)   Pulse 64   Temp 97.2  F (36.2  C) (Tympanic)   Resp 18   Ht 1.549 m (5' 1\")   Wt 56.2 kg (124 lb)   Breastfeeding No   BMI 23.43 kg/m    Body mass index is 23.43 kg/m .  GENERAL: healthy, alert and no distress  NECK: no " adenopathy, no asymmetry, masses, or scars and thyroid normal to palpation  RESP: lungs clear to auscultation - no rales, rhonchi or wheezes  CV: regular rates and rhythm, normal S1 S2, no S3 or S4 and no murmur, click or rub   (female): vaginal introitus quite narrow, vaginal mucosal atrophy, no bleeding, discharge or mass lesion noted   MS: no gross musculoskeletal defects noted, no edema      Results for orders placed or performed in visit on 04/07/20   Wet prep     Status: None    Specimen: Vagina   Result Value Ref Range    Specimen Description Vagina     Wet Prep No yeast seen     Wet Prep No clue cells seen     Wet Prep No Trichomonas seen     Wet Prep Moderate  WBC'S seen          Assessment & Plan     (N95.2) Vaginal atrophy  (primary encounter diagnosis)  Comment: Physical examination remarkable for significant vaginal atrophy.  Wet prep negative.  Suggested to continue Estrace twice weekly.  Gynecology referral placed for further review and recommendations.  All questions answered.  Plan: OB/GYN REFERRAL         (N89.8) Vaginal itching  Comment:   Plan: Wet prep               Patient Instructions     Patient Education     Atrophic Vaginitis    Atrophic vaginitis means the walls of your vagina have become thin. This happens when your body makes too little of the hormone estrogen. Menopause or surgical removal of the ovaries are the most common causes for a drop in estrogen. Breastfeeding can also cause the hormone level to drop.  Symptoms of atrophic vaginitis include:    Dryness, soreness, burning, or itching in the vagina    Vaginal discharge  Sex can be uncomfortable, even painful. After sex, you may have bleeding from your vagina. You may also have burning or pain when you urinate.  Home care  Your healthcare provider may recommend 1 or more of these as treatment:    Vaginal creams, lotions, and lubricants. These products help relieve vaginal dryness. They don t need a prescription. They can be found  in the personal care section of most pharmacies. Creams and lotions are used daily to help keep the vagina moist. Lubricants help reduce dryness and pain during sex. Choose water-based lubricants. Don t use petroleum jelly, mineral oil, or other oils. These increase the chance of infection.     Hormone therapy (HT). HT increases the amount of estrogen in your body. This can help manage or relieve symptoms. HT can be given in pill form. It may be given as a lotion, cream, ring put into the vagina, or a patch on the skin. The risks and benefits of HT vary for each woman. For instance, your risk may be higher if you have had breast cancer. Discuss this treatment with your healthcare provider. Not every woman can use HT.  You don t need to give up (abstain from) sex. In fact, regular sex can help keep vaginal tissues healthy. Take steps to make sex more comfortable by using water-based lubricants.  Preventing infections  Atrophic vaginitis makes an infection of the vagina or the urinary tract more likely. To help reduce your risk:    Keep your genitals clean. When you bathe, wash the outside of your vagina with mild soap and water. Clean gently between the folds of your vagina.    Wipe from front to back after a bowel movement.    Don t douche unless your healthcare provider tells you to.    Avoid scented toilet paper, scented vaginal sprays, and scented tampons.    Avoid wearing clothes that are tight in the crotch. These include pantyhose, jeans, and leggings. Wear cotton underwear. Change it every day.  Follow-up care  Follow up with your healthcare provider, or as advised.  When to seek medical advice  Call your health care provider right away if any of these occur:    Fever of 100.4 F (38 C) or higher, or as directed by your healthcare provider    Symptoms don t go away or get worse even with treatment    Vaginal area swells or becomes painful    Vaginal area bleeds, but not because of your period    Bad-smelling  discharge from the vagina    Pain or burning when you urinate or you have trouble passing urine    Open sores develop around vagina   Date Last Reviewed: 12/1/2016 2000-2019 [employer]. [address] [place] [address], [place] [address]. All rights reserved. This information is not intended as a substitute for professional medical care. Always follow your healthcare professional's instructions.             Loco Johnson MD  [facility]

## 2020-04-07 NOTE — NURSING NOTE
"Chief Complaint   Patient presents with     Vaginal Problem     /62 (Cuff Size: Adult Regular)   Pulse 64   Temp 97.2  F (36.2  C) (Tympanic)   Resp 18   Ht 1.549 m (5' 1\")   Wt 56.2 kg (124 lb)   Breastfeeding No   BMI 23.43 kg/m   Estimated body mass index is 23.43 kg/m  as calculated from the following:    Height as of this encounter: 1.549 m (5' 1\").    Weight as of this encounter: 56.2 kg (124 lb).  Patient presents to the clinic using No DME      Health Maintenance that is potentially due pending provider review:    There are no preventive care reminders to display for this patient.               "

## 2020-04-11 DIAGNOSIS — I10 ESSENTIAL HYPERTENSION WITH GOAL BLOOD PRESSURE LESS THAN 140/90: ICD-10-CM

## 2020-04-11 DIAGNOSIS — F51.01 PRIMARY INSOMNIA: Chronic | ICD-10-CM

## 2020-04-11 NOTE — TELEPHONE ENCOUNTER
Requested Prescriptions   Pending Prescriptions Disp Refills     zolpidem (AMBIEN) 10 MG tablet [Pharmacy Med Name: ZOLPIDEM TARTRATE 10MG TABS] 30 tablet 0     Sig: TAKE ONE-HALF TO ONE TABLET BY MOUTH EVERY NIGHT AT BEDTIME AS NEEDED FOR SLEEP       There is no refill protocol information for this order        Last Written Prescription Date:  3/12/20  Last Fill Quantity: 30,  # refills: 0   Last office visit: 4/7/2020 with prescribing provider:     Future Office Visit:   Next 5 appointments (look out 90 days)    Apr 20, 2020  2:00 PM CDT  Telephone Visit with Gwendolyn Solis MD  [facility] ([facility]) [address] [place] [id number] [phone]         Routing refill request to provider for review/approval because:  Drug not on the [facility], [facility] or [facility] refill protocol or controlled substance

## 2020-04-11 NOTE — TELEPHONE ENCOUNTER
"Requested Prescriptions   Pending Prescriptions Disp Refills     lisinopril (ZESTRIL) 20 MG tablet [Pharmacy Med Name: LISINOPRIL 20MG TABS] 90 tablet 0     Sig: TAKE ONE TABLET BY MOUTH ONCE DAILY       ACE Inhibitors (Including Combos) Protocol Passed - 4/11/2020  8:57 AM        Passed - Blood pressure under 140/90 in past 12 months     BP Readings from Last 3 Encounters:   04/07/20 138/62   02/25/20 136/80   02/09/20 120/58                 Passed - Recent (12 mo) or future (30 days) visit within the authorizing provider's specialty     Patient has had an office visit with the authorizing provider or a provider within the authorizing providers department within the previous 12 mos or has a future within next 30 days. See \"Patient Info\" tab in inbasket, or \"Choose Columns\" in Meds & Orders section of the refill encounter.              Passed - Medication is active on med list        Passed - Patient is age 18 or older        Passed - No active pregnancy on record        Passed - Normal serum creatinine on file in past 12 months     Recent Labs   Lab Test 06/10/19  1430   CR 0.79       Ok to refill medication if creatinine is low          Passed - Normal serum potassium on file in past 12 months     Recent Labs   Lab Test 06/10/19  1430   POTASSIUM 4.3             Passed - No positive pregnancy test within past 12 months         Last Written Prescription Date:  1/13/2020  Last Fill Quantity: 90,  # refills: 0   Last office visit: 4/7/2020 with prescribing provider:     Future Office Visit:   Next 5 appointments (look out 90 days)    Apr 20, 2020  2:00 PM CDT  Telephone Visit with Gwendolyn Solis MD  [facility] [address] [place] [id number] [phone]         "

## 2020-04-13 RX ORDER — ZOLPIDEM TARTRATE 10 MG/1
TABLET ORAL
Qty: 30 TABLET | Refills: 5 | Status: SHIPPED | OUTPATIENT
Start: 2020-04-13 | End: 2020-06-12

## 2020-04-13 RX ORDER — LISINOPRIL 20 MG/1
TABLET ORAL
Qty: 90 TABLET | Refills: 1 | Status: SHIPPED | OUTPATIENT
Start: 2020-04-13 | End: 2020-10-19

## 2020-04-20 ENCOUNTER — VIRTUAL VISIT (OUTPATIENT)
Dept: FAMILY MEDICINE | Facility: CLINIC | Age: 75
End: 2020-04-20
Payer: COMMERCIAL

## 2020-04-20 DIAGNOSIS — F41.1 ANXIETY STATE: Primary | Chronic | ICD-10-CM

## 2020-04-20 DIAGNOSIS — N95.2 ATROPHIC VAGINITIS: ICD-10-CM

## 2020-04-20 DIAGNOSIS — F11.20 NARCOTIC DEPENDENCE (H): Chronic | ICD-10-CM

## 2020-04-20 DIAGNOSIS — G89.4 CHRONIC PAIN SYNDROME: Chronic | ICD-10-CM

## 2020-04-20 PROCEDURE — 99214 OFFICE O/P EST MOD 30 MIN: CPT | Mod: 95 | Performed by: FAMILY MEDICINE

## 2020-04-20 RX ORDER — HYDROCODONE BITARTRATE AND ACETAMINOPHEN 10; 325 MG/1; MG/1
TABLET ORAL
Qty: 75 TABLET | Refills: 0 | Status: SHIPPED | OUTPATIENT
Start: 2020-04-20 | End: 2020-04-27

## 2020-04-20 NOTE — PROGRESS NOTES
"Tracy Galloway is a 75 year old female who is being evaluated via a billable telephone visit.      The patient has been notified of following:     \"This telephone visit will be conducted via a call between you and your physician/provider. We have found that certain health care needs can be provided without the need for a physical exam.  This service lets us provide the care you need with a short phone conversation.  If a prescription is necessary we can send it directly to your pharmacy.  If lab work is needed we can place an order for that and you can then stop by our lab to have the test done at a later time.    Telephone visits are billed at different rates depending on your insurance coverage. During this emergency period, for some insurers they may be billed the same as an in-person visit.  Please reach out to your insurance provider with any questions.    If during the course of the call the physician/provider feels a telephone visit is not appropriate, you will not be charged for this service.\"    Patient has given verbal consent for Telephone visit?  Yes    How would you like to obtain your AVS? Mail a copy    Subjective     Tracy Galloway is a 75 year old female who presents to clinic today for the following health issues:    Chronic Pain Follow-Up    Where in your body do you have pain? facial  How has your pain affected your ability to work? Not applicable  Which of these pain treatments have you tried since your last clinic visit? Other: none  How well are you sleeping? Good  How has your mood been since your last visit? About the same  Have you had a significant life event? No  Other aggravating factors: none  Taking medication as directed? Yes    PHQ-9 SCORE 11/16/2018 7/12/2019 7/12/2019   PHQ-9 Total Score - - -   PHQ-9 Total Score MyChart - - 5 (Mild depression)   PHQ-9 Total Score 24 5 4     ROMAIN-7 SCORE 7/18/2017 11/16/2018 7/12/2019   Total Score - - -   Total Score - - 1 (minimal anxiety)   Total " Score 1 13 1     No flowsheet data found.  Encounter-Level CSA - 2018:    Controlled Substance Agreement - Scan on 2018  3:15 PM: CONTROLLED SUBSTANCE AGREEMENT     Encounter-Level CSA - 2015:    Controlled Substance Agreement - Scan on 7/10/2015  1:04 PM: CONTROLLED SUBSTANCE AGREEMENT     Patient-Level CSA:    Controlled Substance Agreement - Opioid - Scan on 3/4/2020  1:30 PM             Anxiety Follow-Up    How are you doing with your anxiety since your last visit? No change    Are you having other symptoms that might be associated with anxiety? No    Have you had a significant life event? No     Are you feeling depressed? No    Do you have any concerns with your use of alcohol or other drugs? No    Social History     Tobacco Use     Smoking status: Former Smoker     Types: Cigarettes     Last attempt to quit: 10/30/1962     Years since quittin.5     Smokeless tobacco: Never Used   Substance Use Topics     Alcohol use: Yes     Alcohol/week: 4.0 standard drinks     Types: 4 Standard drinks or equivalent per week     Comment: occasional     Drug use: No     Comment: medical marijuana in the past     ROMAIN-7 SCORE 2018   Total Score - - -   Total Score - - 1 (minimal anxiety)   Total Score 1 13 1     PHQ 2018   PHQ-9 Total Score 24 5 4   Q9: Thoughts of better off dead/self-harm past 2 weeks Nearly every day Several days Not at all   F/U: Thoughts of suicide or self-harm - No -   F/U: Safety concerns - No -           Perineal burning   New for the last 8 weeks has been seen for this and the estrogen is not helping   She has no itching   She is using this 2x per week    Reviewed and updated as needed this visit by Provider         Review of Systems   ROS COMP: Constitutional, HEENT, cardiovascular, pulmonary, gi and gu systems are negative, except as otherwise noted.       Objective   Reported vitals:  There were no vitals taken for this visit.    healthy, alert and no distress  PSYCH: Alert and oriented times 3; coherent speech, normal   rate and volume, able to articulate logical thoughts, able   to abstract reason, no tangential thoughts, no hallucinations   or delusions  Her affect is normal  RESP: No cough, no audible wheezing, able to talk in full sentences  Remainder of exam unable to be completed due to telephone visits    Diagnostic Test Results:  Labs reviewed in Epic        Assessment/Plan:  1. Narcotic dependence (H)    - HYDROcodone-acetaminophen (NORCO)  MG per tablet; TAKE 1 TABLET BY MOUTH TWICE DAILY (10 DAYS OUT OF THE MONTH MAY TAKE 3 TABLETS DAILY IF NEEDED) 30 DAY SUPPLY  Dispense: 75 tablet; Refill: 0    2. Chronic pain syndrome  Stable no change in treatment plan.   - HYDROcodone-acetaminophen (NORCO)  MG per tablet; TAKE 1 TABLET BY MOUTH TWICE DAILY (10 DAYS OUT OF THE MONTH MAY TAKE 3 TABLETS DAILY IF NEEDED) 30 DAY SUPPLY  Dispense: 75 tablet; Refill: 0    3. Anxiety state  She will stop the lexapro and restart the paxil this works better for her     4. Atrophic vaginitis  Estrogen cream daily for one week      Return in about 1 week (around 4/27/2020).    Patient Instructions   Use the estrogen cream every day for one week as discussed     Medication refilled     Stop lexapro and stay on the paroxetine 20mg at bedtime     See me on Monday to recheck           Phone call duration:  25 minutes    Gwendolyn Solis MD

## 2020-04-20 NOTE — PATIENT INSTRUCTIONS
Use the estrogen cream every day for one week as discussed     Medication refilled     Stop lexapro and stay on the paroxetine 20mg at bedtime     See me on Monday to recheck

## 2020-04-27 ENCOUNTER — OFFICE VISIT (OUTPATIENT)
Dept: FAMILY MEDICINE | Facility: CLINIC | Age: 75
End: 2020-04-27
Payer: COMMERCIAL

## 2020-04-27 VITALS
BODY MASS INDEX: 24.15 KG/M2 | RESPIRATION RATE: 16 BRPM | HEART RATE: 80 BPM | TEMPERATURE: 97.6 F | DIASTOLIC BLOOD PRESSURE: 50 MMHG | WEIGHT: 127.8 LBS | SYSTOLIC BLOOD PRESSURE: 130 MMHG

## 2020-04-27 DIAGNOSIS — G89.4 CHRONIC PAIN SYNDROME: Chronic | ICD-10-CM

## 2020-04-27 DIAGNOSIS — N95.2 ATROPHIC VAGINITIS: ICD-10-CM

## 2020-04-27 DIAGNOSIS — F41.1 ANXIETY STATE: Primary | Chronic | ICD-10-CM

## 2020-04-27 DIAGNOSIS — F11.20 NARCOTIC DEPENDENCE (H): Chronic | ICD-10-CM

## 2020-04-27 PROCEDURE — 99214 OFFICE O/P EST MOD 30 MIN: CPT | Performed by: FAMILY MEDICINE

## 2020-04-27 RX ORDER — HYDROCODONE BITARTRATE AND ACETAMINOPHEN 10; 325 MG/1; MG/1
TABLET ORAL
Qty: 75 TABLET | Refills: 0 | Status: SHIPPED | OUTPATIENT
Start: 2020-05-20 | End: 2020-06-16

## 2020-04-27 RX ORDER — ESTRADIOL 0.1 MG/G
2 CREAM VAGINAL
Qty: 42.5 G | Refills: 3 | Status: SHIPPED | OUTPATIENT
Start: 2020-04-27 | End: 2020-09-08 | Stop reason: ALTCHOICE

## 2020-04-27 NOTE — PROGRESS NOTES
Subjective     Tracy Galloway is a 75 year old female who presents to clinic today for the following health issues:    HPI   Anxiety Follow-Up    How are you doing with your anxiety since your last visit? Improved     Are you having other symptoms that might be associated with anxiety? No    Have you had a significant life event? No     Are you feeling depressed? No    Do you have any concerns with your use of alcohol or other drugs? No    Social History     Tobacco Use     Smoking status: Former Smoker     Types: Cigarettes     Last attempt to quit: 10/30/1962     Years since quittin.5     Smokeless tobacco: Never Used   Substance Use Topics     Alcohol use: Yes     Alcohol/week: 4.0 standard drinks     Types: 4 Standard drinks or equivalent per week     Comment: occasional     Drug use: No     Comment: medical marijuana in the past     ROMAIN-7 SCORE 2018   Total Score - - -   Total Score - - 1 (minimal anxiety)   Total Score 1 13 1     PHQ 2018   PHQ-9 Total Score 24 5 4   Q9: Thoughts of better off dead/self-harm past 2 weeks Nearly every day Several days Not at all   F/U: Thoughts of suicide or self-harm - No -   F/U: Safety concerns - No -         Chronic Pain Follow-Up    Where in your body do you have pain? facial  How has your pain affected your ability to work? Not applicable  Which of these pain treatments have you tried since your last clinic visit? none  How well are you sleeping? Good  How has your mood been since your last visit? About the same  Have you had a significant life event? No  Other aggravating factors: none  Taking medication as directed? Yes    PHQ-9 SCORE 2018   PHQ-9 Total Score - - -   PHQ-9 Total Score MyChart - - 5 (Mild depression)   PHQ-9 Total Score 24 5 4     ROMAIN-7 SCORE 2018   Total Score - - -   Total Score - - 1 (minimal anxiety)   Total Score 1 13 1     No flowsheet data  found.  Encounter-Level CSA - 08/20/2018:    Controlled Substance Agreement - Scan on 8/29/2018  3:15 PM: CONTROLLED SUBSTANCE AGREEMENT     Encounter-Level CSA - 07/07/2015:    Controlled Substance Agreement - Scan on 7/10/2015  1:04 PM: CONTROLLED SUBSTANCE AGREEMENT     Patient-Level CSA:    Controlled Substance Agreement - Opioid - Scan on 3/4/2020  1:30 PM             Vaginal symptoms   Better with use of estrogen daily   She has no burning and no pain   She feels like it is normal now           Reviewed and updated as needed this visit by Provider  Tobacco  Allergies  Meds  Problems  Med Hx  Surg Hx  Fam Hx         Review of Systems   ROS COMP: Constitutional, HEENT, cardiovascular, pulmonary, gi and gu systems are negative, except as otherwise noted.      Objective    /50 (BP Location: Right arm, Patient Position: Chair, Cuff Size: Adult Regular)   Pulse 80   Temp 97.6  F (36.4  C) (Tympanic)   Resp 16   Wt 58 kg (127 lb 12.8 oz)   BMI 24.15 kg/m    Body mass index is 24.15 kg/m .  Physical Exam   GENERAL APPEARANCE: healthy, alert and no distress  RESP: lungs clear to auscultation - no rales, rhonchi or wheezes  CV: regular rates and rhythm, normal S1 S2, no S3 or S4 and no murmur, click or rub   (female): vaginal atrophy   PSYCH: mentation appears normal and affect normal/bright    Diagnostic Test Results:  Labs reviewed in Epic        Assessment & Plan     1. Narcotic dependence (H)  Stable no change in treatment plan.   - HYDROcodone-acetaminophen (NORCO)  MG per tablet; TAKE 1 TABLET BY MOUTH TWICE DAILY (10 DAYS OUT OF THE MONTH MAY TAKE 3 TABLETS DAILY IF NEEDED) 30 DAY SUPPLY  Dispense: 75 tablet; Refill: 0    2. Chronic pain syndrome  Stable no change in treatment plan.   - HYDROcodone-acetaminophen (NORCO)  MG per tablet; TAKE 1 TABLET BY MOUTH TWICE DAILY (10 DAYS OUT OF THE MONTH MAY TAKE 3 TABLETS DAILY IF NEEDED) 30 DAY SUPPLY  Dispense: 75 tablet; Refill: 0    3.  Atrophic vaginitis  Improved   - estradiol (ESTRACE) 0.1 MG/GM vaginal cream; Place 2 g vaginally twice a week  Dispense: 42.5 g; Refill: 3    4. Anxiety state  Stable no change in treatment with paxil   She did stop the lexapro as she is better on paxil          Patient Instructions   Estrogen cream daily for one month and then every other day     Pain meds refilled for May     Jackie 19 at 1pm next visit       Return in about 2 months (around 6/27/2020).      Risks, benefits, side effects and rationale for treatment plan fully discussed with the patient and understanding expressed.   Gwendolyn Solis MD  [facility]

## 2020-04-27 NOTE — PATIENT INSTRUCTIONS
Estrogen cream daily for one month and then every other day     Pain meds refilled for May     Jackie 19 at 1pm next visit

## 2020-06-02 ENCOUNTER — NURSE TRIAGE (OUTPATIENT)
Dept: NURSING | Facility: CLINIC | Age: 75
End: 2020-06-02

## 2020-06-04 DIAGNOSIS — I10 ESSENTIAL HYPERTENSION WITH GOAL BLOOD PRESSURE LESS THAN 140/90: ICD-10-CM

## 2020-06-04 RX ORDER — DILTIAZEM HYDROCHLORIDE 180 MG/1
CAPSULE, EXTENDED RELEASE ORAL
Qty: 90 CAPSULE | Refills: 3 | Status: SHIPPED | OUTPATIENT
Start: 2020-06-04 | End: 2021-06-28

## 2020-06-12 DIAGNOSIS — F51.01 PRIMARY INSOMNIA: Chronic | ICD-10-CM

## 2020-06-12 RX ORDER — ZOLPIDEM TARTRATE 10 MG/1
TABLET ORAL
Qty: 30 TABLET | Refills: 0 | Status: SHIPPED | OUTPATIENT
Start: 2020-06-12 | End: 2020-10-20

## 2020-06-12 NOTE — TELEPHONE ENCOUNTER
[place] reviewed:  reviewed- recommend provider review    [place]:  [email or web address]    05/21/2020  1   04/27/2020  Hydrocodone-Acetamin  Mg  75.00 30 Pa Lakia  [id number]  Rashad ([id number])  0/[place] 25.[place]   05/13/2020  1   04/13/2020  Zolpidem Tartrate 10 Mg Tablet  30.00 30 Pa Lakia  [id number]  Rashad ([id number])  1/5 0.50 [place]   04/23/2020  1   04/20/2020  Hydrocodone-Acetamin  Mg  75.00 30 Pa Lakia  [id number]  Rashad ([id number])  0/[place] 25.[place]   04/13/2020  1   04/13/2020  Zolpidem Tartrate 10 Mg Tablet  30.00 30 Pa Lakia  [id number]  Rashad ([id number])  0/5 0.50 [place]   03/26/2020  1   03/19/2020  Hydrocodone-Acetamin  Mg  75.00 30 Pa Lakia  [id number]  Rashad ([id number])  0/[place] 25.[place]   03/12/2020  1   03/12/2020  Zolpidem Tartrate 10 Mg Tablet  30.00 30 Ma Nitin  [id number]  Rashad ([id number])  0/[place] 0.50 [place]   02/27/2020  1   02/25/2020  Hydrocodone-Acetamin  Mg  75.00 30 Pa Lakia  [id number]  Rashad ([id number])  0/[place] 25.[place]   02/10/2020  1   12/05/2019  Zolpidem Tartrate 10 Mg Tablet  30.00 30 Pa Lakia  [id number]  Rashad ([id number])  2/2 0.50 [place]   01/30/2020  1   01/28/2020  Hydrocodone-Acetamin  Mg  75.00 30 Pa Lakia  [id number]  Rashad ([id number])  0/[place] 25.[place]   01/09/2020  1   12/05/2019  Zolpidem Tartrate 10 Mg Tablet  30.00 30 Pa Lakia  [id number]  Rashad ([id number])  1/2 0.50 [place]   12/31/2019  1   12/31/2019  Hydrocodone-Acetamin  Mg  75.00 30 Pa Lakia  [id number]  Rashad ([id number])  0/[place] 25.[place]   12/05/2019  1   12/05/2019  Zolpidem Tartrate 10 Mg Tablet  30.00 30 Pa Lakia  [id number]  Rashad ([id number])  0/2 0.[place]   12/02/2019  1   11/15/2019  Hydrocodone-Acetamin  Mg  75.00 30 Pa Lakia  [id number]  Rashad ([id number])  0/[place] 25.[place]   10/24/2019  1   05/06/2019  Zolpidem Tartrate 10 Mg Tablet  30.00 30 Pa Lakia  [id number]  Rashad ([id number])  2/2 0.50  [place]   10/21/2019  1   10/21/2019  Hydrocodone-Acetamin  Mg  75.00 30 Pa Lakia  [id number]  Rashad ([id number])  0/0 25.[place]

## 2020-06-16 ENCOUNTER — MYC MEDICAL ADVICE (OUTPATIENT)
Dept: FAMILY MEDICINE | Facility: CLINIC | Age: 75
End: 2020-06-16

## 2020-06-16 ENCOUNTER — VIRTUAL VISIT (OUTPATIENT)
Dept: FAMILY MEDICINE | Facility: CLINIC | Age: 75
End: 2020-06-16
Payer: COMMERCIAL

## 2020-06-16 DIAGNOSIS — F11.20 NARCOTIC DEPENDENCE (H): Chronic | ICD-10-CM

## 2020-06-16 DIAGNOSIS — G89.4 CHRONIC PAIN SYNDROME: Chronic | ICD-10-CM

## 2020-06-16 PROCEDURE — 99214 OFFICE O/P EST MOD 30 MIN: CPT | Mod: 95 | Performed by: FAMILY MEDICINE

## 2020-06-16 RX ORDER — HYDROCODONE BITARTRATE AND ACETAMINOPHEN 10; 325 MG/1; MG/1
TABLET ORAL
Qty: 75 TABLET | Refills: 0 | Status: SHIPPED | OUTPATIENT
Start: 2020-06-16 | End: 2020-07-20

## 2020-06-16 NOTE — PROGRESS NOTES
"Tracy Galloway is a 75 year old female who is being evaluated via a billable video visit.      The patient has been notified of following:     \"This video visit will be conducted via a call between you and your physician/provider. We have found that certain health care needs can be provided without the need for an in-person physical exam.  This service lets us provide the care you need with a video conversation.  If a prescription is necessary we can send it directly to your pharmacy.  If lab work is needed we can place an order for that and you can then stop by our lab to have the test done at a later time.    Video visits are billed at different rates depending on your insurance coverage.  Please reach out to your insurance provider with any questions.    If during the course of the call the physician/provider feels a video visit is not appropriate, you will not be charged for this service.\"    Patient has given verbal consent for Video visit? Yes    Will anyone else be joining your video visit? No    Subjective     Tracy Galloway is a 75 year old female who presents today via video visit for the following health issues:    HPI  Chronic Pain Follow-Up    Where in your body do you have pain? Mouth pain   How has your pain affected your ability to work? Not applicable  Which of these pain treatments have you tried since your last clinic visit? Other: none  How well are you sleeping? Good  How has your mood been since your last visit? Better  Have you had a significant life event? No  Other aggravating factors: none  Taking medication as directed? Yes    PHQ-9 SCORE 11/16/2018 7/12/2019 7/12/2019   PHQ-9 Total Score - - -   PHQ-9 Total Score MyChart - - 5 (Mild depression)   PHQ-9 Total Score 24 5 4     ROMAIN-7 SCORE 7/18/2017 11/16/2018 7/12/2019   Total Score - - -   Total Score - - 1 (minimal anxiety)   Total Score 1 13 1     No flowsheet data found.  Encounter-Level CSA - 08/20/2018:    Controlled Substance " "Agreement - Scan on 8/29/2018  3:15 PM: CONTROLLED SUBSTANCE AGREEMENT     Encounter-Level CSA - 07/07/2015:    Controlled Substance Agreement - Scan on 7/10/2015  1:04 PM: CONTROLLED SUBSTANCE AGREEMENT     Patient-Level CSA:    Controlled Substance Agreement - Opioid - Scan on 3/4/2020  1:30 PM         How many servings of fruits and vegetables do you eat daily?  0-1    On average, how many sweetened beverages do you drink each day (Examples: soda, juice, sweet tea, etc.  Do NOT count diet or artificially sweetened beverages)?   0    How many days per week do you exercise enough to make your heart beat faster? 3 or less    How many minutes a day do you exercise enough to make your heart beat faster? 30 - 60    How many days per week do you miss taking your medication? 0       Telephone visit not able to connect via [employer] or [employer]             Reviewed and updated as needed this visit by Provider  Tobacco  Allergies  Meds  Problems  Med Hx  Surg Hx  Fam Hx         Review of Systems   Constitutional, HEENT, cardiovascular, pulmonary, gi and gu systems are negative, except as otherwise noted.      Objective    There were no vitals taken for this visit.  Estimated body mass index is 24.15 kg/m  as calculated from the following:    Height as of 4/7/20: 1.549 m (5' 1\").    Weight as of 4/27/20: 58 kg (127 lb 12.8 oz).  Physical Exam     GENERAL: Healthy, alert and no distress    PSYCH: Mentation appears normal, affect normal/bright, judgement and insight intact, normal speech and appearance well-groomed.      Diagnostic Test Results:  Labs reviewed in Epic        Assessment & Plan     1. Narcotic dependence (H)  Stable no change in treatment plan.   - HYDROcodone-acetaminophen (NORCO)  MG per tablet; TAKE 1 TABLET BY MOUTH TWICE DAILY (10 DAYS OUT OF THE MONTH MAY TAKE 3 TABLETS DAILY IF NEEDED) 30 DAY SUPPLY  Dispense: 75 tablet; Refill: 0    2. Chronic pain syndrome  Stable no change in treatment plan. "   - HYDROcodone-acetaminophen (NORCO)  MG per tablet; TAKE 1 TABLET BY MOUTH TWICE DAILY (10 DAYS OUT OF THE MONTH MAY TAKE 3 TABLETS DAILY IF NEEDED) 30 DAY SUPPLY  Dispense: 75 tablet; Refill: 0       Patient Instructions   Message me in one month for refill of norco     Let me know if issues sooner        Return in about 1 month (around 7/16/2020).    Gwendolyn Solis MD  [facility]      Telephone Visit Details    Type of service:  telephone 23 min         Originating Location (pt. Location): Home    Distant Location (provider location):  [facility]     Platform used for Video Visit: Unable to complete video visit    Return in about 1 month (around 7/16/2020).       Gwendolyn Solis MD

## 2020-07-04 ENCOUNTER — MYC MEDICAL ADVICE (OUTPATIENT)
Dept: FAMILY MEDICINE | Facility: CLINIC | Age: 75
End: 2020-07-04

## 2020-07-06 ENCOUNTER — OFFICE VISIT (OUTPATIENT)
Dept: OBGYN | Facility: CLINIC | Age: 75
End: 2020-07-06
Payer: COMMERCIAL

## 2020-07-06 VITALS
WEIGHT: 124 LBS | SYSTOLIC BLOOD PRESSURE: 110 MMHG | TEMPERATURE: 96.8 F | HEIGHT: 64 IN | HEART RATE: 71 BPM | DIASTOLIC BLOOD PRESSURE: 48 MMHG | RESPIRATION RATE: 18 BRPM | BODY MASS INDEX: 21.17 KG/M2

## 2020-07-06 DIAGNOSIS — N94.819 VULVODYNIA: Primary | ICD-10-CM

## 2020-07-06 PROCEDURE — 99203 OFFICE O/P NEW LOW 30 MIN: CPT | Performed by: OBSTETRICS & GYNECOLOGY

## 2020-07-06 RX ORDER — AMITRIPTYLINE HYDROCHLORIDE 10 MG/1
10-50 TABLET ORAL AT BEDTIME
Qty: 100 TABLET | Refills: 3 | Status: SHIPPED | OUTPATIENT
Start: 2020-07-06 | End: 2020-11-30

## 2020-07-06 ASSESSMENT — MIFFLIN-ST. JEOR: SCORE: 1037.46

## 2020-07-06 NOTE — PATIENT INSTRUCTIONS
Start with Amitriptyline 10mg (1 tab) at bedtime, and take for 3-5 days; if no relief of burning, then increase to 20mg for 3-5 days; you may increase incrementally to a max of 50mg (5 tabs) at bedtime.  If you experience too much daytime sedation or if the medication is not helping, please let me know.    Otherwise, you should continue on the dose that works best for you for 6 months.

## 2020-07-06 NOTE — NURSING NOTE
"Initial /48 (BP Location: Right arm, Patient Position: Chair, Cuff Size: Adult Regular)   Pulse 71   Temp 96.8  F (36  C) (Tympanic)   Resp 18   Ht 1.626 m (5' 4\")   Wt 56.2 kg (124 lb)   BMI 21.28 kg/m   Estimated body mass index is 21.28 kg/m  as calculated from the following:    Height as of this encounter: 1.626 m (5' 4\").    Weight as of this encounter: 56.2 kg (124 lb). .      "

## 2020-07-06 NOTE — PROGRESS NOTES
Tracy is a 75 year old   female who presents for advice regarding ongoing vulvar/introital burning; she states it started around February, is not really itching, no discharge, no bleeding; she is not sexually active; does not burn with urine contact  Just burns a lot.  She was given Estradiol 2gm vaginal cream 2-3 times per week for 3-4 months, and reports no improvement in symptoms..    Patient Active Problem List    Diagnosis Date Noted     Trigeminal neuralgia 2020     Priority: Medium     Dilation of biliary tract 06/10/2019     Priority: Medium     ERCP with removal of sludge and sphincterotomy       Mild single current episode of major depressive disorder (H) 02/15/2019     Priority: Medium     Primary insomnia 2018     Priority: Medium     Advanced directives, counseling/discussion 2017     Priority: Medium     Advance Care Planning 2015: ACP Review of Chart / Resources Provided:  Reviewed chart for advance care plan.  Tracy Galloway has an up to date advance care plan on file.  Added by Nazia Jang       Mammogram declined 2017     Priority: Medium     TIA (transient ischemic attack) 10/30/2016     Priority: Medium     Narcotic dependence (H) 10/09/2016     Priority: Medium     Essential hypertension with goal blood pressure less than 140/90 2016     Priority: Medium     Health Care Home 2012     Priority: Medium     Kate Braun RN-PHN  FPA / [facility]   [phone]    DX V65.8 REPLACED WITH 13783 [facility]ALTH CARE HOME (2013)       Chronic pain 2012     Priority: Medium     Patient is followed by Gwendolyn Solis MD for ongoing prescription of pain medication.  All refills should be approved by this provider only at face-to-face appointments - not by phone request.    Medication(s): norco .   Maximum quantity per month: 60  Clinic visit frequency required: Q 3 months     Controlled substance agreement:  Encounter-Level  CSA - 7/7/15:               Controlled Substance Agreement - Scan on 7/10/2015  1:04 PM : CONTROLLED SUBSTANCE AGREEMENT (below)            Pain Clinic evaluation in the past: Yes       Date/Location:   2009 multiple facial pain evaluations neurology and pain clinic     DIRE Total Score(s):  No flowsheet data found.    Last [facility] website verification:  done   [email or web address]               Atrophic vaginitis 02/27/2011     Priority: Medium     CARDIOVASCULAR SCREENING; LDL GOAL LESS THAN 130 10/31/2010     Priority: Medium     Shingles 02/11/2010     Priority: Medium     Prinzmetal angina (H) 09/17/2007     Priority: Medium     Visual impairment 09/17/2007     Priority: Medium     Overview:   monovision in right eye       Anxiety state 05/03/2005     Priority: Medium     Problem list name updated by automated process. Provider to review       Plantar fasciitis 05/03/2005     Priority: Medium       All systems were reviewed and pertinent information in noted in subjective/HPI.    Past Medical History:   Diagnosis Date     Anxiety w/panic attacks      Chest pain     6/2016 Nuclear study - mild ischemia of apex and anteroapical wall     Chronic LBP      Depressive disorder, not elsewhere classified      Facial pain 12/17/2009     HTN      Insomnia, unspecified      Migraine, unspecified, without mention of intractable migraine without mention of status migrainosus      Osteopenia      Plantar fasciitis      Prinzmetal angina (H) 9/17/2007     Vision loss     monovision in right eye       Past Surgical History:   Procedure Laterality Date     CHOLECYSTECTOMY, LAPOROSCOPIC  1990's    s/p Cholecystectomy, Laparoscopic     COLONOSCOPY  12/6/2012    Procedure: COLONOSCOPY;  Colonoscopy;  Surgeon: Tyrell Brown MD;  Location: [facility]     ENDOSCOPIC RETROGRADE CHOLANGIOPANCREATOGRAM N/A 5/24/2019    Procedure: ENDOSCOPIC RETROGRADE CHOLANGIOPANCREATOGRAPHY;  Surgeon: Mio Blunt MD;  Location: [facility]      EYE SURGERY       HC COLONOSCOPY THRU STOMA, DIAGNOSTIC  3-    at outside clinic.  Normal.     HYSTERECTOMY, SUNDAR  1970's    severe endometriosis     SURGICAL HISTORY OF -   1998    breast reduction     SURGICAL HISTORY OF -   5/30/03    coronary angiogram     SURGICAL HISTORY OF -       Refractive surgery right     TONSILLECTOMY           Current Outpatient Medications:      aspirin (ASA) 81 MG tablet, Take 1 tablet (81 mg) by mouth daily, Disp: 90 tablet, Rfl: 1     baclofen (LIORESAL) 10 MG tablet, TAKE ONE TABLET BY MOUTH THREE TIMES A DAY, Disp: 90 tablet, Rfl: 1     DILT- MG 24 hr capsule, TAKE ONE CAPSULE BY MOUTH ONCE DAILY, Disp: 90 capsule, Rfl: 3     DOCUSATE SODIUM PO, Take 100 mg by mouth daily as needed for constipation , Disp: , Rfl:      estradiol (ESTRACE) 0.1 MG/GM vaginal cream, Place 2 g vaginally twice a week, Disp: 42.5 g, Rfl: 3     fluticasone (FLONASE) 50 MCG/ACT spray, Spray 1-2 sprays into both nostrils daily, Disp: 1 Bottle, Rfl: 11     HYDROcodone-acetaminophen (NORCO)  MG per tablet, TAKE 1 TABLET BY MOUTH TWICE DAILY (10 DAYS OUT OF THE MONTH MAY TAKE 3 TABLETS DAILY IF NEEDED) 30 DAY SUPPLY, Disp: 75 tablet, Rfl: 0     ibuprofen (ADVIL/MOTRIN) 200 MG tablet, Take 400-600 mg by mouth every 8 hours as needed for mild pain, Disp: , Rfl:      lisinopril (ZESTRIL) 20 MG tablet, TAKE ONE TABLET BY MOUTH ONCE DAILY, Disp: 90 tablet, Rfl: 1     omeprazole (PRILOSEC) 20 MG DR capsule, Take 1 capsule (20 mg) by mouth daily, Disp: 90 capsule, Rfl: 3     ondansetron (ZOFRAN-ODT) 4 MG ODT tab, Take 1 tablet (4 mg) by mouth every 6 hours as needed for nausea or vomiting, Disp: 10 tablet, Rfl: 1     polyethylene glycol-propylene glycol (SYSTANE ULTRA) 0.4-0.3 % SOLN ophthalmic solution, Place 1 drop into both eyes 4 times daily as needed for dry eyes, Disp: , Rfl:      simvastatin (ZOCOR) 10 MG tablet, Take 1 tablet (10 mg) by mouth At Bedtime, Disp: 90 tablet, Rfl: 1      triamcinolone (KENALOG) 0.1 % external cream, Apply topically 2 times daily, Disp: 15 g, Rfl: 0     zolpidem (AMBIEN) 10 MG tablet, TAKE ONE-HALF TO ONE TABLET BY MOUTH EVERY NIGHT AT BEDTIME AS NEEDED FOR SLEEP, Disp: 30 tablet, Rfl: 0     valACYclovir (VALTREX) 1000 mg tablet, Take 1 tablet (1,000 mg) by mouth 2 times daily for 1 day As needed fo rbreak outs, Disp: 4 tablet, Rfl: 11    ALLERGIES:  Patient has no known allergies.    Social History     Socioeconomic History     Marital status:      Spouse name: None     Number of children: None     Years of education: None     Highest education level: None   Occupational History     None   Social Needs     Financial resource strain: None     Food insecurity     Worry: None     Inability: None     Transportation needs     Medical: None     Non-medical: None   Tobacco Use     Smoking status: Former Smoker     Types: Cigarettes     Last attempt to quit: 10/30/1962     Years since quittin.7     Smokeless tobacco: Never Used   Substance and Sexual Activity     Alcohol use: Yes     Alcohol/week: 4.0 standard drinks     Types: 4 Standard drinks or equivalent per week     Comment: occasional     Drug use: No     Comment: medical marijuana in the past     Sexual activity: Not Currently     Partners: Male     Birth control/protection: Surgical   Lifestyle     Physical activity     Days per week: None     Minutes per session: None     Stress: None   Relationships     Social connections     Talks on phone: None     Gets together: None     Attends [employer] service: None     Active member of club or organization: None     Attends meetings of clubs or organizations: None     Relationship status: None     Intimate partner violence     Fear of current or ex partner: None     Emotionally abused: None     Physically abused: None     Forced sexual activity: None   Other Topics Concern     Parent/sibling w/ CABG, MI or angioplasty before 65F 55M? No   Social History  "Narrative     None       Family History   Problem Relation Age of Onset     Hypertension Mother      Heart Disease Mother      Hypertension Father      Circulatory Father         PAD     Alzheimer Disease Maternal Grandmother         dementia     Cerebrovascular Disease Maternal Grandmother      Heart Disease Maternal Grandfather      Diabetes Maternal Grandfather        OBJECTIVE:  Vitals: /48 (BP Location: Right arm, Patient Position: Chair, Cuff Size: Adult Regular)   Pulse 71   Temp 96.8  F (36  C) (Tympanic)   Resp 18   Ht 1.626 m (5' 4\")   Wt 56.2 kg (124 lb)   BMI 21.28 kg/m   BMI= Body mass index is 21.28 kg/m .   No LMP recorded. Patient has had a hysterectomy.     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    Duration of visit:  30 minutes, >50% in discussion of current issues, treatment options and treatment planning.  SERVANDO Kent MD    "

## 2020-07-09 ENCOUNTER — MYC MEDICAL ADVICE (OUTPATIENT)
Dept: FAMILY MEDICINE | Facility: CLINIC | Age: 75
End: 2020-07-09

## 2020-07-09 DIAGNOSIS — F32.0 MILD SINGLE CURRENT EPISODE OF MAJOR DEPRESSIVE DISORDER (H): Primary | ICD-10-CM

## 2020-07-09 RX ORDER — PAROXETINE 20 MG/1
40 TABLET, FILM COATED ORAL EVERY MORNING
Qty: 180 TABLET | Refills: 3 | COMMUNITY
Start: 2020-07-09 | End: 2020-09-08

## 2020-07-09 NOTE — TELEPHONE ENCOUNTER
Talked to pt she is going to start therapy and she is taking paxil 40mg daily and tis is now on her med list

## 2020-07-09 NOTE — TELEPHONE ENCOUNTER
"Pt sends concerning MyChart message so RN placed call.  I am concerned about your well-being. Are you having thoughts of harming yourself? \"Well I do, but I'm not going to. I can't that to my .\"  Are you thinking about putting yourself in a dangerous situation? \"No. I just wish I had the guts but I won't. I just can't do that to my family.\"  She declined the crisis hotline number when offered.    She says she takes her paroxetine 2 tabs po at [facility].  The bottle she has on hand reads it was filled 1/9/2020.  Asked if she has missed doses: \"Not that I know of.\"    Routing to Dr. Solis. She is concerned with taking the amitriptyline with the paroxetine. She clearly needs a visit however appts are booked up.      Advise,     Katarina BARRETT RN, BSN      "

## 2020-07-15 ENCOUNTER — MYC MEDICAL ADVICE (OUTPATIENT)
Dept: FAMILY MEDICINE | Facility: CLINIC | Age: 75
End: 2020-07-15

## 2020-07-17 DIAGNOSIS — G89.4 CHRONIC PAIN SYNDROME: Chronic | ICD-10-CM

## 2020-07-17 DIAGNOSIS — F11.20 NARCOTIC DEPENDENCE (H): Chronic | ICD-10-CM

## 2020-07-18 ENCOUNTER — MYC MEDICAL ADVICE (OUTPATIENT)
Dept: FAMILY MEDICINE | Facility: CLINIC | Age: 75
End: 2020-07-18

## 2020-07-20 ENCOUNTER — MYC MEDICAL ADVICE (OUTPATIENT)
Dept: FAMILY MEDICINE | Facility: CLINIC | Age: 75
End: 2020-07-20

## 2020-07-20 RX ORDER — HYDROCODONE BITARTRATE AND ACETAMINOPHEN 10; 325 MG/1; MG/1
TABLET ORAL
Qty: 75 TABLET | Refills: 0 | Status: SHIPPED | OUTPATIENT
Start: 2020-07-20 | End: 2020-08-20

## 2020-07-20 NOTE — TELEPHONE ENCOUNTER
Routing refill request to provider for review/approval because:  Drug not on the [facility] refill protocol   6/16/2020 VV note: 'Message me in one month for refill of norco '    Katarina BARRETT RN, BSN

## 2020-07-24 ENCOUNTER — MYC MEDICAL ADVICE (OUTPATIENT)
Dept: FAMILY MEDICINE | Facility: CLINIC | Age: 75
End: 2020-07-24

## 2020-07-26 ENCOUNTER — MYC MEDICAL ADVICE (OUTPATIENT)
Dept: FAMILY MEDICINE | Facility: CLINIC | Age: 75
End: 2020-07-26

## 2020-07-26 DIAGNOSIS — K12.0 CANKER SORES ORAL: Primary | ICD-10-CM

## 2020-07-27 ENCOUNTER — MYC MEDICAL ADVICE (OUTPATIENT)
Dept: FAMILY MEDICINE | Facility: CLINIC | Age: 75
End: 2020-07-27

## 2020-07-27 RX ORDER — DIPHENHYDRAMINE HYDROCHLORIDE AND LIDOCAINE HYDROCHLORIDE AND ALUMINUM HYDROXIDE AND MAGNESIUM HYDRO
5-10 KIT EVERY 6 HOURS PRN
Qty: 119 ML | Refills: 1 | Status: SHIPPED | OUTPATIENT
Start: 2020-07-27 | End: 2021-03-03

## 2020-07-29 DIAGNOSIS — B02.9 HERPES ZOSTER WITHOUT COMPLICATION: ICD-10-CM

## 2020-07-30 RX ORDER — VALACYCLOVIR HYDROCHLORIDE 1 G/1
TABLET, FILM COATED ORAL
Qty: 4 TABLET | Refills: 0 | Status: SHIPPED | OUTPATIENT
Start: 2020-07-30 | End: 2021-11-11

## 2020-07-30 NOTE — TELEPHONE ENCOUNTER
Medication is being filled for 1 time refill only due to:  Needs labs and med review.      Recent Labs   Lab Test 06/10/19  1430   CR 0.79     Reminder placed on pharmacy notes.    GLENDY Marroquin

## 2020-08-01 DIAGNOSIS — E78.5 HYPERLIPIDEMIA LDL GOAL <130: ICD-10-CM

## 2020-08-01 DIAGNOSIS — R79.89 ELEVATED LFTS: ICD-10-CM

## 2020-08-03 RX ORDER — SIMVASTATIN 10 MG
TABLET ORAL
Qty: 90 TABLET | Refills: 0 | Status: SHIPPED | OUTPATIENT
Start: 2020-08-03 | End: 2020-11-05

## 2020-08-19 DIAGNOSIS — F11.20 NARCOTIC DEPENDENCE (H): Chronic | ICD-10-CM

## 2020-08-19 DIAGNOSIS — G89.4 CHRONIC PAIN SYNDROME: Chronic | ICD-10-CM

## 2020-08-20 RX ORDER — HYDROCODONE BITARTRATE AND ACETAMINOPHEN 10; 325 MG/1; MG/1
TABLET ORAL
Qty: 75 TABLET | Refills: 0 | Status: SHIPPED | OUTPATIENT
Start: 2020-08-20 | End: 2020-09-18

## 2020-08-20 NOTE — TELEPHONE ENCOUNTER
Routing refill request to provider for review/approval because:  Drug not on the [facility] refill protocol   Patient needs to be seen because:  6/16/2020 Virtual Visit note: Return in about 1 month (around 7/16/2020).     Katarina BARRETT RN, BSN

## 2020-08-20 NOTE — TELEPHONE ENCOUNTER
Call pt I did refill but she does need video visit in sept to get further refills ok'd   Please have her set that up now

## 2020-09-03 ENCOUNTER — MYC MEDICAL ADVICE (OUTPATIENT)
Dept: FAMILY MEDICINE | Facility: CLINIC | Age: 75
End: 2020-09-03

## 2020-09-08 ENCOUNTER — OFFICE VISIT (OUTPATIENT)
Dept: FAMILY MEDICINE | Facility: CLINIC | Age: 75
End: 2020-09-08
Payer: COMMERCIAL

## 2020-09-08 VITALS
HEART RATE: 80 BPM | RESPIRATION RATE: 16 BRPM | TEMPERATURE: 97.9 F | HEIGHT: 64 IN | DIASTOLIC BLOOD PRESSURE: 64 MMHG | SYSTOLIC BLOOD PRESSURE: 128 MMHG | BODY MASS INDEX: 21.28 KG/M2

## 2020-09-08 DIAGNOSIS — F32.0 MILD SINGLE CURRENT EPISODE OF MAJOR DEPRESSIVE DISORDER (H): Primary | ICD-10-CM

## 2020-09-08 DIAGNOSIS — F41.1 ANXIETY STATE: Chronic | ICD-10-CM

## 2020-09-08 DIAGNOSIS — Z00.00 MEDICARE ANNUAL WELLNESS VISIT, SUBSEQUENT: ICD-10-CM

## 2020-09-08 PROCEDURE — G0439 PPPS, SUBSEQ VISIT: HCPCS | Performed by: FAMILY MEDICINE

## 2020-09-08 PROCEDURE — 99214 OFFICE O/P EST MOD 30 MIN: CPT | Mod: 25 | Performed by: FAMILY MEDICINE

## 2020-09-08 ASSESSMENT — ANXIETY QUESTIONNAIRES
4. TROUBLE RELAXING: SEVERAL DAYS
5. BEING SO RESTLESS THAT IT IS HARD TO SIT STILL: SEVERAL DAYS
GAD7 TOTAL SCORE: 6
1. FEELING NERVOUS, ANXIOUS, OR ON EDGE: SEVERAL DAYS
6. BECOMING EASILY ANNOYED OR IRRITABLE: SEVERAL DAYS
GAD7 TOTAL SCORE: 6
2. NOT BEING ABLE TO STOP OR CONTROL WORRYING: SEVERAL DAYS
3. WORRYING TOO MUCH ABOUT DIFFERENT THINGS: SEVERAL DAYS
7. FEELING AFRAID AS IF SOMETHING AWFUL MIGHT HAPPEN: NOT AT ALL
7. FEELING AFRAID AS IF SOMETHING AWFUL MIGHT HAPPEN: NOT AT ALL
GAD7 TOTAL SCORE: 6

## 2020-09-08 ASSESSMENT — PATIENT HEALTH QUESTIONNAIRE - PHQ9
10. IF YOU CHECKED OFF ANY PROBLEMS, HOW DIFFICULT HAVE THESE PROBLEMS MADE IT FOR YOU TO DO YOUR WORK, TAKE CARE OF THINGS AT HOME, OR GET ALONG WITH OTHER PEOPLE: SOMEWHAT DIFFICULT
SUM OF ALL RESPONSES TO PHQ QUESTIONS 1-9: 8
SUM OF ALL RESPONSES TO PHQ QUESTIONS 1-9: 8

## 2020-09-08 NOTE — PROGRESS NOTES
Subjective     Tracy Galloway is a 75 year old female who presents to clinic today for the following health issues:    HPI   Depression and Anxiety Follow-Up    How are you doing with your depression since your last visit? Worsened due to confusion around meds     How are you doing with your anxiety since your last visit?  Worsened due to confusion around meds    She had been doing really well     Are you having other symptoms that might be associated with depression or anxiety? No    Have you had a significant life event? No     Do you have any concerns with your use of alcohol or other drugs? No    Social History     Tobacco Use     Smoking status: Former Smoker     Types: Cigarettes     Last attempt to quit: 10/30/1962     Years since quittin.8     Smokeless tobacco: Never Used   Substance Use Topics     Alcohol use: Yes     Alcohol/week: 4.0 standard drinks     Types: 4 Standard drinks or equivalent per week     Comment: occasional     Drug use: No     Comment: medical marijuana in the past     PHQ 2019   PHQ-9 Total Score 5 4 8   Q9: Thoughts of better off dead/self-harm past 2 weeks Several days Not at all Several days   F/U: Thoughts of suicide or self-harm No - Yes   F/U: Self harm-plan - - No   F/U: Self-harm action - - No   F/U: Safety concerns No - No     ROMAIN-7 SCORE 2018   Total Score - - -   Total Score - 1 (minimal anxiety) 6 (mild anxiety)   Total Score 13 1 6         C-SSRS ([place]) 9/10/2020   Within the last month, have you wished you were dead or wished you could go to sleep and not wake up? No   Within the last month, have you had any actual thoughts of killing yourself? No   Within the last month, have you ever done anything, started to do anything, or prepared to do anything to end your life? No     C-SSRS ([place]rief [place]) 9/10/2020   Within the last month, have you wished you were dead or wished you could go to sleep and not wake  "up? No   Within the last month, have you had any actual thoughts of killing yourself? No   Within the last month, have you ever done anything, started to do anything, or prepared to do anything to end your life? No       Follow Up Actions Taken  we will have close follow up and back to therapy    Discussed the following ways the patient can remain in a safe environment:  be around others  Suicide Assessment Five-step Evaluation and Treatment (SAFE-T)      Annual Wellness Visit    Are you in the first 12 months of your Medicare Part B coverage?  No    Physical Health:    In general, how would you rate your overall physical health? good    Outside of work, how many days during the week do you exercise?4-5 days/week    Outside of work, approximately how many minutes a day do you exercise?45-60 minutes    If you drink alcohol do you typically have >3 drinks per day or >7 drinks per week? No    Do you usually eat at least 4 servings of fruit and vegetables a day, include whole grains & fiber and avoid regularly eating high fat or \"junk\" foods? Yes    Do you have any problems taking medications regularly? No    Do you have any side effects from medications? none    Needs assistance for the following daily activities: no assistance needed    Which of the following safety concerns are present in your home?  none identified     Hearing impairment: No    In the past 6 months, have you been bothered by leaking of urine? no    Mental Health:    In general, how would you rate your overall mental or emotional health? good  PHQ-2 Score:      Do you feel safe in your environment? Yes    Have you ever done Advance Care Planning? (For example, a Health Directive, POLST, or a discussion with a medical provider or your loved ones about your wishes)? Yes, advance care planning is on file.    Fall risk:  Fallen 2 or more times in the past year?: No  Any fall with injury in the past year?: No    Cognitive Screenin) Repeat 3 items " (Leader, Season, Table)    2) Clock draw: not done   3) 3 item recall: Recalls 3 objects  Results: 3 items recalled: COGNITIVE IMPAIRMENT LESS LIKELY    Mini-CogTM Copyright S Lissette. Licensed by the author for use in [facility]; reprinted with permission ([email or web address]). All rights reserved.      Do you have sleep apnea, excessive snoring or daytime drowsiness?: yes    Current providers sharing in care for this patient include:   Patient Care Team:  Gwendolyn Solis MD as PCP - General  Gwendolyn Solis MD as Assigned PCP  João Thomas MD as MD (Gastroenterology)      Review of Systems   Constitutional, HEENT, cardiovascular, pulmonary, gi and gu systems are negative, except as otherwise noted.      Objective    There were no vitals taken for this visit.  There is no height or weight on file to calculate BMI.  Physical Exam   GENERAL APPEARANCE: healthy, alert and no distress  RESP: lungs clear to auscultation - no rales, rhonchi or wheezes  CV: regular rates and rhythm, normal S1 S2, no S3 or S4 and no murmur, click or rub  MS: extremities normal- no gross deformities noted  PSYCH: mentation appears normal and affect normal/bright            Assessment & Plan     Medicare annual wellness visit, subsequent  Risk due to meds     Mild single current episode of major depressive disorder (H)    Anxiety state  We reviewed all meds and she will not be on the lexapro or the paxil for now and see how things are going as she was doing really well off and then the confusion occurred and caused sig stress   We discussed this at length she is reassured by this plan     Pain has been ok and that is what typically will tip her into more issues with mood            Depression Screening Follow Up    PHQ 9/8/2020   PHQ-9 Total Score 8   Q9: Thoughts of better off dead/self-harm past 2 weeks Several days   F/U: Thoughts of suicide or self-harm Yes   F/U: Self harm-plan No   F/U: Self-harm action No    F/U: Safety concerns No           C-SSRS (Brief [place]) 9/10/2020   Within the last month, have you wished you were dead or wished you could go to sleep and not wake up? No   Within the last month, have you had any actual thoughts of killing yourself? No   Within the last month, have you ever done anything, started to do anything, or prepared to do anything to end your life? No       Follow Up    Follow Up Actions Taken      Discussed the following ways the patient can remain in a safe environment:  be around others      Patient Instructions   Med list updated     Let me know in 2 weeks how you are doing in terms of your mood       Return in about 2 weeks (around 9/22/2020) for mychart message to update me .    Gwendolyn Solis MD  [facility]

## 2020-09-09 ASSESSMENT — ANXIETY QUESTIONNAIRES: GAD7 TOTAL SCORE: 6

## 2020-09-10 ASSESSMENT — COLUMBIA-SUICIDE SEVERITY RATING SCALE - C-SSRS
1. WITHIN THE PAST MONTH, HAVE YOU WISHED YOU WERE DEAD OR WISHED YOU COULD GO TO SLEEP AND NOT WAKE UP?: NO
6. HAVE YOU EVER DONE ANYTHING, STARTED TO DO ANYTHING, OR PREPARED TO DO ANYTHING TO END YOUR LIFE?: NO
2. IN THE PAST MONTH, HAVE YOU ACTUALLY HAD ANY THOUGHTS OF KILLING YOURSELF?: NO

## 2020-09-18 ENCOUNTER — MYC REFILL (OUTPATIENT)
Dept: FAMILY MEDICINE | Facility: CLINIC | Age: 75
End: 2020-09-18

## 2020-09-18 ENCOUNTER — MYC MEDICAL ADVICE (OUTPATIENT)
Dept: FAMILY MEDICINE | Facility: CLINIC | Age: 75
End: 2020-09-18

## 2020-09-18 DIAGNOSIS — G89.4 CHRONIC PAIN SYNDROME: Chronic | ICD-10-CM

## 2020-09-18 DIAGNOSIS — F11.20 NARCOTIC DEPENDENCE (H): Chronic | ICD-10-CM

## 2020-09-18 RX ORDER — HYDROCODONE BITARTRATE AND ACETAMINOPHEN 10; 325 MG/1; MG/1
TABLET ORAL
Qty: 75 TABLET | Refills: 0 | Status: SHIPPED | OUTPATIENT
Start: 2020-09-18 | End: 2020-10-02

## 2020-09-18 NOTE — TELEPHONE ENCOUNTER
Controlled Substance Refill Request for norco  Problem List Complete:  Yes, chronic pain   checked in past 3 months?  Yes  checked 9/18/20.  Delia Roldan RN

## 2020-10-02 ENCOUNTER — OFFICE VISIT (OUTPATIENT)
Dept: FAMILY MEDICINE | Facility: CLINIC | Age: 75
End: 2020-10-02
Payer: COMMERCIAL

## 2020-10-02 VITALS
HEART RATE: 92 BPM | RESPIRATION RATE: 20 BRPM | WEIGHT: 124 LBS | HEIGHT: 64 IN | DIASTOLIC BLOOD PRESSURE: 62 MMHG | TEMPERATURE: 97.6 F | BODY MASS INDEX: 21.17 KG/M2 | SYSTOLIC BLOOD PRESSURE: 124 MMHG

## 2020-10-02 DIAGNOSIS — G89.4 CHRONIC PAIN SYNDROME: Chronic | ICD-10-CM

## 2020-10-02 DIAGNOSIS — F51.01 PRIMARY INSOMNIA: Primary | Chronic | ICD-10-CM

## 2020-10-02 DIAGNOSIS — F32.0 MILD SINGLE CURRENT EPISODE OF MAJOR DEPRESSIVE DISORDER (H): ICD-10-CM

## 2020-10-02 DIAGNOSIS — F11.20 NARCOTIC DEPENDENCE (H): Chronic | ICD-10-CM

## 2020-10-02 PROCEDURE — 99214 OFFICE O/P EST MOD 30 MIN: CPT | Performed by: FAMILY MEDICINE

## 2020-10-02 RX ORDER — HYDROCODONE BITARTRATE AND ACETAMINOPHEN 10; 325 MG/1; MG/1
TABLET ORAL
Qty: 75 TABLET | Refills: 0 | Status: SHIPPED | OUTPATIENT
Start: 2020-10-18 | End: 2020-11-16

## 2020-10-02 ASSESSMENT — MIFFLIN-ST. JEOR: SCORE: 1037.46

## 2020-10-02 NOTE — PATIENT INSTRUCTIONS
Decrease dose of amitriptyline to 2 tabs at bedtime      Message me in 2 weeks with and update

## 2020-10-02 NOTE — PROGRESS NOTES
Subjective     Tracy Galloway is a 75 year old female who presents to clinic today for the following health issues:    HPI         Depression and Anxiety Follow-Up    How are you doing with your depression since your last visit? Good and bad days.     How are you doing with your anxiety since your last visit?  As above    Are you having other symptoms that might be associated with depression or anxiety? No    Have you had a significant life event? No     Do you have any concerns with your use of alcohol or other drugs? No    Social History     Tobacco Use     Smoking status: Former Smoker     Types: Cigarettes     Quit date: 10/30/1962     Years since quittin.9     Smokeless tobacco: Never Used   Substance Use Topics     Alcohol use: Yes     Alcohol/week: 4.0 standard drinks     Types: 4 Standard drinks or equivalent per week     Comment: occasional     Drug use: No     Comment: medical marijuana in the past     PHQ 2019   PHQ-9 Total Score 5 4 8   Q9: Thoughts of better off dead/self-harm past 2 weeks Several days Not at all Several days   F/U: Thoughts of suicide or self-harm No - Yes   F/U: Self harm-plan - - No   F/U: Self-harm action - - No   F/U: Safety concerns No - No     ROMAIN-7 SCORE 2018   Total Score - - -   Total Score - 1 (minimal anxiety) 6 (mild anxiety)   Total Score 13 1 6         Suicide Assessment Five-step Evaluation and Treatment (SAFE-T)      How many servings of fruits and vegetables do you eat daily?  0-1    On average, how many sweetened beverages do you drink each day (Examples: soda, juice, sweet tea, etc.  Do NOT count diet or artificially sweetened beverages)?   0    How many days per week do you exercise enough to make your heart beat faster? 4    How many minutes a day do you exercise enough to make your heart beat faster? 10 - 19  How many days per week do you miss taking your medication? 3 times/month    What makes it hard for you  "to take your medications?  remembering to take    Chronic Pain Follow-Up    Where in your body do you have pain? Face   How has your pain affected your ability to work? Not applicable  Which of these pain treatments have you tried since your last clinic visit? Other: nothingnew   How well are you sleeping? Poor really sturuggling with sleep and feeling really groggy in the am and hard to get out of bed     How has your mood been since your last visit? About the same  Have you had a significant life event? No  Other aggravating factors: none  Taking medication as directed? Yes    PHQ-9 SCORE 7/12/2019 7/12/2019 9/8/2020   PHQ-9 Total Score - - -   PHQ-9 Total Score MyChart - 5 (Mild depression) 8 (Mild depression)   PHQ-9 Total Score 5 4 8     ROMAIN-7 SCORE 11/16/2018 7/12/2019 9/8/2020   Total Score - - -   Total Score - 1 (minimal anxiety) 6 (mild anxiety)   Total Score 13 1 6     No flowsheet data found.  Encounter-Level CSA - 08/20/2018:    Controlled Substance Agreement - Scan on 8/29/2018  3:15 PM: CONTROLLED SUBSTANCE AGREEMENT     Encounter-Level CSA - 07/07/2015:    Controlled Substance Agreement - Scan on 7/10/2015  1:04 PM: CONTROLLED SUBSTANCE AGREEMENT     Patient-Level CSA:    Controlled Substance Agreement - Opioid - Scan on 3/4/2020  1:30 PM               Review of Systems   Constitutional, HEENT, cardiovascular, pulmonary, gi and gu systems are negative, except as otherwise noted.      Objective    /62   Pulse 92   Temp 97.6  F (36.4  C) (Tympanic)   Resp 20   Ht 1.626 m (5' 4\")   Wt 56.2 kg (124 lb)   BMI 21.28 kg/m    Body mass index is 21.28 kg/m .  Physical Exam   GENERAL APPEARANCE: healthy, alert and no distress  RESP: lungs clear to auscultation - no rales, rhonchi or wheezes  CV: regular rates and rhythm, normal S1 S2, no S3 or S4 and no murmur, click or rub  MS: extremities normal- no gross deformities noted  PSYCH: mentation appears normal and affect normal/bright        "     Assessment & Plan     Primary insomnia  ?over medicated     Narcotic dependence (H)  Stable no change in treatment plan.   Timing her does right now which is working better   - HYDROcodone-acetaminophen (NORCO)  MG per tablet; TAKE ONE TABLET BY MOUTH TWICE A DAY (10 DAYS OUT OF THE MONTH MAY TAKE 3 TABLET DAILY IF NEEDED) THIS IS A 30 DAY SUPPLY    Chronic pain syndrome  As above   - HYDROcodone-acetaminophen (NORCO)  MG per tablet; TAKE ONE TABLET BY MOUTH TWICE A DAY (10 DAYS OUT OF THE MONTH MAY TAKE 3 TABLET DAILY IF NEEDED) THIS IS A 30 DAY SUPPLY    Mild single current episode of major depressive disorder (H)  ? Need to go back on meds will assess again in two weeks           Patient Instructions   Decrease dose of amitriptyline to 2 tabs at bedtime      Message me in 2 weeks with and update          Return in about 2 weeks (around 10/16/2020) for via [employer] update.    Gwendolyn Solis MD  [facility]

## 2020-10-16 DIAGNOSIS — I10 ESSENTIAL HYPERTENSION WITH GOAL BLOOD PRESSURE LESS THAN 140/90: ICD-10-CM

## 2020-10-19 ENCOUNTER — MYC MEDICAL ADVICE (OUTPATIENT)
Dept: FAMILY MEDICINE | Facility: CLINIC | Age: 75
End: 2020-10-19

## 2020-10-19 DIAGNOSIS — F51.01 PRIMARY INSOMNIA: Chronic | ICD-10-CM

## 2020-10-19 RX ORDER — LISINOPRIL 20 MG/1
TABLET ORAL
Qty: 90 TABLET | Refills: 1 | Status: SHIPPED | OUTPATIENT
Start: 2020-10-19 | End: 2021-05-05

## 2020-10-20 RX ORDER — ZOLPIDEM TARTRATE 10 MG/1
TABLET ORAL
Qty: 30 TABLET | Refills: 5 | Status: SHIPPED | OUTPATIENT
Start: 2020-10-20 | End: 2021-05-19

## 2020-10-20 NOTE — TELEPHONE ENCOUNTER
Requested Prescriptions   Pending Prescriptions Disp Refills     zolpidem (AMBIEN) 10 MG tablet [Pharmacy Med Name: ZOLPIDEM TARTRATE 10MG TABS] 30 tablet 5     Sig: TAKE 1/2 TO 1 TABLET BY MOUTH EVERY NIGHT AT BEDTIME AS NEEDED FOR SLEEP       There is no refill protocol information for this order        Last Written Prescription Date:  6/12/20  Last Fill Quantity: 30,  # refills: 0   Last office visit: 10/2/2020 with prescribing provider:     Future Office Visit:

## 2020-10-25 ENCOUNTER — MYC MEDICAL ADVICE (OUTPATIENT)
Dept: FAMILY MEDICINE | Facility: CLINIC | Age: 75
End: 2020-10-25

## 2020-10-30 ENCOUNTER — MYC MEDICAL ADVICE (OUTPATIENT)
Dept: FAMILY MEDICINE | Facility: CLINIC | Age: 75
End: 2020-10-30

## 2020-10-30 DIAGNOSIS — F32.0 MILD SINGLE CURRENT EPISODE OF MAJOR DEPRESSIVE DISORDER (H): Primary | ICD-10-CM

## 2020-11-01 ENCOUNTER — MYC MEDICAL ADVICE (OUTPATIENT)
Dept: FAMILY MEDICINE | Facility: CLINIC | Age: 75
End: 2020-11-01

## 2020-11-01 NOTE — TELEPHONE ENCOUNTER
Talked to patient via phone and she is doing ok   She will stop the elavil   She will let me know how she is doing and will set up therapy

## 2020-11-03 ENCOUNTER — MYC MEDICAL ADVICE (OUTPATIENT)
Dept: FAMILY MEDICINE | Facility: CLINIC | Age: 75
End: 2020-11-03

## 2020-11-03 DIAGNOSIS — F11.20 NARCOTIC DEPENDENCE (H): Primary | Chronic | ICD-10-CM

## 2020-11-03 DIAGNOSIS — G89.4 CHRONIC PAIN SYNDROME: Chronic | ICD-10-CM

## 2020-11-04 ENCOUNTER — TELEPHONE (OUTPATIENT)
Dept: ADDICTION MEDICINE | Facility: CLINIC | Age: 75
End: 2020-11-04

## 2020-11-04 DIAGNOSIS — E78.5 HYPERLIPIDEMIA LDL GOAL <130: ICD-10-CM

## 2020-11-04 DIAGNOSIS — R79.89 ELEVATED LFTS: ICD-10-CM

## 2020-11-04 NOTE — TELEPHONE ENCOUNTER
Spoke with Dr. Sloan - patient more appropriate for pain medicine consult for discussion of vicodin tapering.    Routing to referring provider and Dr. Sloan.    Addiction med staff - no need to call/schedule, please remove from referral pool.    Erlin Plaza MD

## 2020-11-05 RX ORDER — SIMVASTATIN 10 MG
TABLET ORAL
Qty: 90 TABLET | Refills: 0 | Status: SHIPPED | OUTPATIENT
Start: 2020-11-05 | End: 2021-02-08

## 2020-11-05 NOTE — TELEPHONE ENCOUNTER
"Requested Prescriptions   Pending Prescriptions Disp Refills     simvastatin (ZOCOR) 10 MG tablet [Pharmacy Med Name: SIMVASTATIN 10MG TABS] 90 tablet 0     Sig: TAKE ONE TABLET BY MOUTH EVERY NIGHT AT BEDTIME       Statins Protocol Failed - 11/4/2020 10:18 PM        Failed - LDL on file in past 12 months     Recent Labs   Lab Test 03/29/19  1007   *             Passed - No abnormal creatine kinase in past 12 months     No lab results found.             Passed - Recent (12 mo) or future (30 days) visit within the authorizing provider's specialty     Patient has had an office visit with the authorizing provider or a provider within the authorizing providers department within the previous 12 mos or has a future within next 30 days. See \"Patient Info\" tab in inbasket, or \"Choose Columns\" in Meds & Orders section of the refill encounter.              Passed - Medication is active on med list        Passed - Patient is age 18 or older        Passed - No active pregnancy on record        Passed - No positive pregnancy test in past 12 months             "

## 2020-11-06 ENCOUNTER — TELEPHONE (OUTPATIENT)
Dept: FAMILY MEDICINE | Facility: CLINIC | Age: 75
End: 2020-11-06

## 2020-11-06 DIAGNOSIS — G89.4 CHRONIC PAIN SYNDROME: Primary | Chronic | ICD-10-CM

## 2020-11-06 NOTE — TELEPHONE ENCOUNTER
"----- Message from Veronica Sloan MD sent at 11/6/2020  8:55 AM CST -----  Lit Snow is the MD who is triaging addiction orders.    When a referral comes in that isn't per say addiction, but more getting off of opioids, we sometimes work together to take these.    I have reviewed, and Padmini Zuleta in [place] is happy to work with Tracy.  We do need to link a referral for her, and so I\"m wondering if you could put in a pain order for Tracy so we can schedule her.    Let me know if you have concerns, and could you respond so I know you are doing this.    Thanks!  Jahaira"

## 2020-11-08 ENCOUNTER — MYC MEDICAL ADVICE (OUTPATIENT)
Dept: FAMILY MEDICINE | Facility: CLINIC | Age: 75
End: 2020-11-08

## 2020-11-11 ENCOUNTER — TELEPHONE (OUTPATIENT)
Dept: PALLIATIVE MEDICINE | Facility: CLINIC | Age: 75
End: 2020-11-11

## 2020-11-11 NOTE — TELEPHONE ENCOUNTER
Pain Management Center Referral      1. Confirmed address with patient? Yes  2. Confirmed phone number with patient? Yes  3. Confirmed referring provider? Yes  4. Is the PCP the same as the referring provider? Yes  5. Has the patient been to any previous pain clinics? Yes  (If yes, send JENA with welcome letter)  6. Which insurance are we to bill for this appointment?  [employer]/ Medicare    7. Informed pt of cancellation (48 hour) policy? Yes    REGARDING OPIOID MEDICATIONS: We will always address appropriateness of opioid pain medications, but we generally will not automatically take on a prescribing role. When we do take on prescribing of opioids for chronic pain, it is in collaboration with the referring physician for an intermediate period of time (months), with an expectation that the primary physician or provider will assume the prescribing role if medications are effective at stable doses with demonstrated compliance. Therefore, please do not assume that your prescribing responsibilities end on the day of pain clinic consultation.  8. Informed pt of prescribing policy? Yes    9.Please be aware that once you are established with a pain provider and location, you will need to continue have all future visits with that provider and location. It is best to determine what location is the most convenient for you and schedule with that one.    ** PATIENT INFORMED OF THIS POLICY Yes      9. Referring Provider: Gwendolyn Solis     10. Criteria for Triage Eval:   -Missed/Failed 1st appointment? N/A     -Medication Focused? N/A     -Mental Health Concerns? (e.g. Recent psych hospitalization/snap shot)? N/A     -Active substance abuse? N/A     -Patient behaviors (e.g. Offensive language/raised voice)? N/A    Tiffanie CONTE    [facility]

## 2020-11-25 ENCOUNTER — MYC MEDICAL ADVICE (OUTPATIENT)
Dept: FAMILY MEDICINE | Facility: CLINIC | Age: 75
End: 2020-11-25

## 2020-11-30 DIAGNOSIS — N94.819 VULVODYNIA: ICD-10-CM

## 2020-11-30 RX ORDER — AMITRIPTYLINE HYDROCHLORIDE 10 MG/1
10-50 TABLET ORAL AT BEDTIME
Qty: 100 TABLET | Refills: 1 | Status: SHIPPED | OUTPATIENT
Start: 2020-11-30 | End: 2021-03-03

## 2020-11-30 NOTE — TELEPHONE ENCOUNTER
Rx refill request for Amitriptyline, date last issued on 07-06-20 for a qty of 100 with 3 refills.  Date of last ov with ob/gyn 07-06-20.    Will need to reassess after 6 months on treatment if therapeutic or not.    Brunilda Browne  [facility] RN

## 2020-12-02 ENCOUNTER — VIRTUAL VISIT (OUTPATIENT)
Dept: PALLIATIVE MEDICINE | Facility: CLINIC | Age: 75
End: 2020-12-02
Attending: FAMILY MEDICINE
Payer: COMMERCIAL

## 2020-12-02 DIAGNOSIS — R51.9 CHRONIC FACIAL PAIN: Primary | ICD-10-CM

## 2020-12-02 DIAGNOSIS — T74.92XA CHILDHOOD ABUSE: ICD-10-CM

## 2020-12-02 DIAGNOSIS — G89.29 CHRONIC FACIAL PAIN: Primary | ICD-10-CM

## 2020-12-02 DIAGNOSIS — G89.4 CHRONIC PAIN SYNDROME: Chronic | ICD-10-CM

## 2020-12-02 PROCEDURE — 99213 OFFICE O/P EST LOW 20 MIN: CPT | Mod: 95 | Performed by: NURSE PRACTITIONER

## 2020-12-02 RX ORDER — BACLOFEN 10 MG/1
TABLET ORAL
Qty: 90 TABLET | Refills: 1 | COMMUNITY
Start: 2020-12-02 | End: 2021-02-10

## 2020-12-02 ASSESSMENT — PAIN SCALES - GENERAL: PAINLEVEL: SEVERE PAIN (6)

## 2020-12-02 NOTE — PROGRESS NOTES
"  [facility]    Tracy Galloway is a 75 year old female who is being evaluated via a billable video visit.      The patient has been notified of following:     \"This video visit will be conducted via a call between you and your physician/provider. We have found that certain health care needs can be provided without the need for an in-person physical exam.  This service lets us provide the care you need with a video conversation.  If a prescription is necessary we can send it directly to your pharmacy.  If lab work is needed we can place an order for that and you can then stop by our lab to have the test done at a later time.    Video visits are billed at different rates depending on your insurance coverage.  Please reach out to your insurance provider with any questions.    If during the course of the call the physician/provider feels a video visit is not appropriate, you will not be charged for this service.\"    Patient has given verbal consent for Video visit? Yes  How would you like to obtain your AVS? MyChart  If you are dropped from the video visit, the video invite should be resent to: text link [phone]  Will anyone else be joining your video visit? No      Avis Chow CMA   [facility]   Pain Management Center    Video-Visit Details    Type of service:  Video Visit  Video Start Time: 11:03 AM  Video End Time: 11:49 AM  Originating Location (pt. Location): Home  Distant Location (provider location):  [facility]   Platform used for Video Visit: [employer]    This patient is being seen in consultation at the request of her primary care provider Dr Solis,  for evaluation of her pain issues and recommendations for management, with specific emphasis on:. Evaluation for Comprehensive Services.  \"pt would like to get off chronic opioids\".     Primary Care Provider is Gwendolyn Solis    Current controlled substance medications are being prescribed by: " Gwendolyn Solis     CHIEF COMPLAINT:  Facial pain     HISTORY OF PRESENT ILLNESS:  Tracy Galloway is a 75 year old female with history of facial pain   Onset/Progression:  Started August 2009 after having a root canal. Tooth was broken in half and was had a caustic substance applied. Was told to have tooth pulled. Was given a lot of medications. (Will send list) Yoga and exercise helped. Unable to chew on the right. Pain is under right eye and in palate Will take a pain pill and then lay down. Pain gets worse throughout the day.   Pain quality: Burning, aching  Pain timing: Constant    Pain rating: intensity ranges from 4/10 to 8/10, and averages variable on a 0-10 scale.  Aggravating factors include: stress, using her mouth, gets worse through the day.   Relieving factors include: Eating, meds      Past Pain Treatments:    Pain Clinic:   Yes  [facility] with Иван Kemp for 3 years.   PT: No     Chiropractor:Yes, [facility]   Acupuncture: Yes [facility]   Health/Pain Psychologist: No    Pharmacotherapy:     Opioids: Yes      Non-opioids:  Yes    TENs Unit:No    Injections: No    Surgeries related to pain: Yes: 2010 Had palate opened.: [facility]    Current Pain Relevant Medications:   Amitriptyline 10 mg 1-5 tabs at HS  Baclofen 10 mg TID  Norco  mg BID PRN    Total opiate dose 20  MME/day  Ibuprofen 400-600 mg TID PRN  Ambien 10 mg 1/2-1 tab at HS PRN      Previous Pain Relevant Medications: (H--helped; HI--Helped initially; [facility]--Somewhat helpful; [facility]--No help; W--worse; SE--side effects; ?--Unsure if helpful)   NOTE: This medication information taken from patient's intake form, not medical records.    Opiates:    NSAIDS:    Anti-migraine medications:    Muscle Relaxants:    Neuropathics: Lyrica: Allergy, Gabapentin ?  Anti-depressants: Amitriptyline:[facility]    Anxiety medications:     Topicals:    Other medications not covered above: Ambien:[facility]  This will be added at a later date when new patient packet is available.        Illicit drug use history none   EtOH use: rare  Caffeine use: 1-2 day   Nicotine use: none    THE 4 As OF OPIOID MAINTENANCE ANALGESIA    Analgesia: Is pain relief clinically significant? YES   Activity: Is patient functional and able to perform Activities of Daily Living? YES   Adverse effects: Is patient free from adverse side effects from opiates? YES   Adherence to Rx protocol: Is patient adhering to Controlled Substance Agreement and taking medications ONLY as ordered? YES    Is Narcan prescribed for opiate use >50 MME daily or concurrent use of opiates and benzodiazepines? N/A    [place] Board of Pharmacy Data Base Reviewed:    YES; No concern for abuse or misuse of controlled medications based on this report.       PAST MEDICAL HISTORY:   Past Medical History:   Diagnosis Date     Depressive disorder 16 years ago    No longer an issue     Hyperlipidemia LDL goal <160      Hypertension goal BP (blood pressure) < 140/90      Mild persistent asthma 4/9/2014       CURRENT FAMILY/SOCIAL SITUATION:  Living situation: live with    Support system:    Safety concerns: none  Past/Present occupation: periodontist assistant  Current stressors: pain, COVID    REVIEW OF SYSTEMS:   Constitutional: Fatigue  Eyes/Head: Headache  Ears/Nose/Throat: Negative  Allergy/Immune: Negative  Skin:Negative  Hematologic/Lymphatic/Immunologic:Negative  Respiratory: Negative  Cardiovascular: High blood pressure  Gastrointestinal: Constipation  Endocrine: Negative  Musculoskeletal: Negative  Urinary:  Negative   Any bowel or bladder incontinence: Denies   Neurologic: Numbness/Tingling  Psychiatric: Anxiety, Depression and Stress    PHYSICAL EXAM: Exam is limited due to the nature of a virtual evaluation    Appearance:     A&O. Patient is appropriate.   Patient is in NAD.     DIRE Score for ongoing opioid management is calculated as follows:    Diagnosis = 2 pts (slowly progressive; moderate pain/objective findings)     Intractability = 3 pts (patient fully engaged but inadequate response to treatments)    Risk        Psych = 3 pts (no significant personality dysfunction/mental illness; good communication with clinic)         Chem Hlth = 3 pts (no history of chemical dependency; not drug-focused)       Reliability = 3 pts (highly reliable with meds, appointments, treatments)       Social = 2 pts (reduction in some relationships/life rolls)       (Psych + Chem hlth + Reliability + Social) = 16    Efficacy = 2 pts (moderate benefit/function; low med dose; too early/not tried meds)    DIRE Score = 18        7-13: likely NOT suitable candidate for long-term opioid analgesia       14-21: may be a suitable candidate for long-term opioid analgesia    Previous Diagnostic Tests:   N/A    ASSESSMENT:   1.  Chronic severe oral/facial pain     Tracy is a pleasant woman who presents with long standing oral and facial pain following a dental procedure in 2006. She has worked extensively with [facility] in the past. Unfortunately, I did not receive her records before the appt. I will do a complete records review and we can discuss treatment options at our next visit.       Plan:    Diagnosis reviewed, treatment option addressed, and risk/benifits discussed.  Self-care instructions given.  I am recommending a multidisciplinary treatment plan to help this patient better manage pain.      1. Schedule pain psychology assessment  2. Schedule follow-up with YANY Kidd NP-C in 4 weeks  1. Lyssa needs to review your records to determine treatment regimen.   3. Medication recommendations:  1. Consider changing opiates to combination of long and short acting for better relief.   2. Consider linzess for opiate induced constipation if MiraLax does not work.   3. Temporarily keep all meds through PCP until I have reviewed her records.     I have reviewed the note as documented above.  This accurately captures the substance of my conversation  with the patient.    I would like to thank Dr Solis for allowing me to participate in the management of this patient.     YANY Myers, NP-C  [facility]

## 2020-12-05 ENCOUNTER — MYC REFILL (OUTPATIENT)
Dept: FAMILY MEDICINE | Facility: CLINIC | Age: 75
End: 2020-12-05

## 2020-12-05 DIAGNOSIS — F11.20 NARCOTIC DEPENDENCE (H): Chronic | ICD-10-CM

## 2020-12-05 DIAGNOSIS — G89.4 CHRONIC PAIN SYNDROME: Chronic | ICD-10-CM

## 2020-12-08 RX ORDER — HYDROCODONE BITARTRATE AND ACETAMINOPHEN 10; 325 MG/1; MG/1
TABLET ORAL
Qty: 60 TABLET | Refills: 0 | Status: SHIPPED | OUTPATIENT
Start: 2020-12-14 | End: 2021-01-07

## 2020-12-08 NOTE — TELEPHONE ENCOUNTER
Requested Prescriptions   Pending Prescriptions Disp Refills     HYDROcodone-acetaminophen (NORCO)  MG per tablet 60 tablet 0     Sig: TAKE ONE TABLET BY MOUTH TWICE A DAY (10 DAYS OUT OF THE MONTH MAY TAKE 3 TABLET DAILY IF NEEDED) THIS IS A 30 DAY SUPPLY       There is no refill protocol information for this order        Katarina BARRETT RN, BSN

## 2020-12-09 ENCOUNTER — MYC MEDICAL ADVICE (OUTPATIENT)
Dept: FAMILY MEDICINE | Facility: CLINIC | Age: 75
End: 2020-12-09

## 2020-12-22 ENCOUNTER — MYC MEDICAL ADVICE (OUTPATIENT)
Dept: FAMILY MEDICINE | Facility: CLINIC | Age: 75
End: 2020-12-22

## 2020-12-30 ENCOUNTER — VIRTUAL VISIT (OUTPATIENT)
Dept: PALLIATIVE MEDICINE | Facility: CLINIC | Age: 75
End: 2020-12-30
Payer: COMMERCIAL

## 2020-12-30 DIAGNOSIS — G89.4 CHRONIC PAIN SYNDROME: Primary | ICD-10-CM

## 2020-12-30 DIAGNOSIS — R51.9 CHRONIC FACIAL PAIN: ICD-10-CM

## 2020-12-30 DIAGNOSIS — K59.03 DRUG INDUCED CONSTIPATION: ICD-10-CM

## 2020-12-30 DIAGNOSIS — G89.29 CHRONIC FACIAL PAIN: ICD-10-CM

## 2020-12-30 DIAGNOSIS — G51.9 FACIAL NEUROPATHY: ICD-10-CM

## 2020-12-30 PROCEDURE — 99214 OFFICE O/P EST MOD 30 MIN: CPT | Mod: 95 | Performed by: NURSE PRACTITIONER

## 2020-12-30 RX ORDER — MORPHINE SULFATE 15 MG/1
15 TABLET, FILM COATED, EXTENDED RELEASE ORAL EVERY 12 HOURS
Qty: 60 TABLET | Refills: 0 | Status: SHIPPED | OUTPATIENT
Start: 2020-12-30 | End: 2021-01-18

## 2020-12-30 ASSESSMENT — PAIN SCALES - GENERAL: PAINLEVEL: SEVERE PAIN (6)

## 2020-12-30 NOTE — PROGRESS NOTES
"  [facility]      Tracy Galloway is a 75 year old female who is being evaluated via a billable virtual visit.      TELEPHONE VISIT  The patient has been notified of following:   \"This telephone visit will be conducted via a call between you and your physician/provider. We have found that certain health care needs can be provided without the need for a physical exam.  This service lets us provide the care you need with a short phone conversation.  If a prescription is necessary we can send it directly to your pharmacy.  If lab work is needed we can place an order for that and you can then stop by our lab to have the test done at a later time.Telephone visits are billed at different rates depending on your insurance coverage. During this emergency period, for some insurers they may be billed the same as an in-person visit.  Please reach out to your insurance provider with any questions.If during the course of the call the physician/provider feels a telephone visit is not appropriate, you will not be charged for this service.\"  Patient has given verbal consent for Telephone visit?  Yes  What phone number would you like to be contacted at? home  How would you like to obtain your AVS? Mail a copy  Phone call contact time  Call Started at 11:02 AM  Call Ended at 11:42 AM   Phone call duration: 40 minutes      CHIEF COMPLAINT: Facial pain     INTERVAL HISTORY:  Last seen on 12/2/20.        Recommendations/plan at the last visit included:     1. Schedule pain psychology assessment  2. Schedule follow-up with YANY Kidd NP-C in 4 weeks  1. Lyssa needs to review your records to determine treatment regimen.   3. Medication recommendations:  1. Consider changing opiates to combination of long and short acting for better relief.   2. Consider linzess for opiate induced constipation if MiraLax does not work.   3. Temporarily keep all meds through PCP until I have reviewed her records. " "    Since last visit:   - PCP has prescribed Norco 10/325 #60 tabs for 30 days. Takes Norco at 10 am and 6 pm. Baseline pain is not well controlled at all. \"I don't ever want to get out of bed\".   -  Has chronic constipation. Has not has a BM for 5 days.     Pain Information: Patient was asked to rate her pain however she is too distraught to provide an answer.    Annual Requirements last collected:  N/A PCP is prescriber.     Current Pain Relevant Medications:   Amitriptyline 10 mg 1-5 tabs at HS  Baclofen 10 mg TID  Norco  mg BID PRN               Total opiate dose 20  MME/day  Ibuprofen 400-600 mg TID PRN  Ambien 10 mg 1/2-1 tab at HS PRN     Previous Pain Relevant Medications: (H--helped; HI--Helped initially; [facility]--Somewhat helpful; [facility]--No help; W--worse; SE--side effects; ?--Unsure if helpful)   NOTE: This medication information taken from patient's intake form, not medical records.               Opiates:               NSAIDS:               Anti-migraine medications:               Muscle Relaxants:               Neuropathics: Lyrica: Allergy, Gabapentin ?  Anti-depressants: Amitriptyline:[facility]               Anxiety medications:                 Topicals:               Other medications not covered above: Ambien:[facility]  This will be added at a later date when new patient packet is available.         Illicit drug use history none   EtOH use: rare  Caffeine use: 1-2 day   Nicotine use: none     THE 4 As OF OPIOID MAINTENANCE ANALGESIA    Analgesia: Is pain relief clinically significant? YES   Activity: Is patient functional and able to perform Activities of Daily Living? YES   Adverse effects: Is patient free from adverse side effects from opiates? YES   Adherence to Rx protocol: Is patient adhering to Controlled Substance Agreement and taking medications ONLY as ordered? YES     Is Narcan prescribed for opiate use >50 MME daily or concurrent use of opiates and benzodiazepines? N/A     [place] Board of Pharmacy " Data Base Reviewed:    YES; No concern for abuse or misuse of controlled medications based on this report.     _______________________________________________      Physical Exam unable to be completed due to virtual visit. There were no vitals taken for this visit or reported by patient.     Review of Systems: A 10-point review of systems was negative, with the exception of any concerns as noted and chronic pain issues.      General: Verbal, alert and no distress   Psychiatric:  affect and mood normal, mentation appears normal    DIRE Score for ongoing opioid management is calculated as follows:    Diagnosis = 2 pts (slowly progressive; moderate pain/objective findings)    Intractability = 3 pts (patient fully engaged but inadequate response to treatments)    Risk        Psych = 3 pts (no significant personality dysfunction/mental illness; good communication with clinic)         Chem Hlth = 3 pts (no history of chemical dependency; not drug-focused)       Reliability = 3 pts (highly reliable with meds, appointments, treatments)       Social = 2 pts (reduction in some relationships/life rolls)       (Psych + Chem hlth + Reliability + Social) = 16    Efficacy = 2 pts (moderate benefit/function; low med dose; too early/not tried meds)    DIRE Score = 18        7-13: likely NOT suitable candidate for long-term opioid analgesia       14-21: may be a suitable candidate for long-term opioid analgesia     Previous Diagnostic Tests:   N/A     ASSESSMENT:   1.  Chronic severe oral/facial pain     Tracy presents to discuss treatment options.  She is extremely distraught throughout this appointment.  She states that she is taking 2 tablets of Norco 5/325 mg.  When I asked her to provide a pill count it appears that she is taking between 4 and possibly as many as 6 tablets/day.  She became confused and tearful during this discussion.  I asked her  Logan, to get on the phone extension.  I am very concerned that the level  of her depression and emotional distress regarding her pain is creating a situation where she is not able to safely or accurately manage her medications.  She denies a plan but admits that she does think at times she would be better off dead given the level of pain that she has.      Both she and Logan agree that he will take complete control of her opiate pain medications.  She will not have access to any opiate pain medications and he will dispense all of them.  We will go to a combination of long-acting (MS Contin 15 mg) and short acting (Norco 10/325 mg) to provide a better baseline pain control.  I will meet with them in 1 week to discuss how this is going and discussed treatment options.    I told both Tracy and kathya that I am very worried for her mental stability and her safety regarding her medications.  We will prioritize her mental health and her treatment plan.  Forwarding this message to her primary care provider Dr. Solis so that she is aware of the situation.  May consider an [facility] pharmacy review as well.  Concern for polypharmacy including opiates and hypnotics (Ambien).    Plan:    Diagnosis reviewed, treatment option addressed, and risk/benifits discussed.  Self-care instructions given.  I am recommending a multidisciplinary treatment plan to help this patient better manage pain.        1. Video appointment with Lyssa Pathak on  at 11 AM  2. Medication recommendations:   1. MS Contin 15 m tablet every 12 hours.  2. Hydrocodone/APAP 5/325 m tablet 4 times daily until you receive the MS Contin.  Then reduce this medication to 2 tablets daily.  3. Tracy and her , Logan, have contracted that he will manage all of her medication and that she will not have access to any of her opiates.    I have reviewed the note as documented above.  This accurately captures the substance of my conversation with the patient.    Lyssa Pathak, YANY, NP-C  [facility]  Management Center

## 2021-01-03 NOTE — PATIENT INSTRUCTIONS
After Visit Instructions:     Thank you for coming to [facility] for your care. It is my goal to partner with you to help you reach your optimal state of health.     Continue daily self-care, identifying contributing factors, and monitoring variations in pain level. Continue to integrate self-care into your life.      1. Video appointment with Lyssa Pathak on  at 11 AM  2. Medication recommendations:   1. MS Contin 15 m tablet every 12 hours.  2. Hydrocodone/APAP 5/325 m tablet 4 times daily until you receive the MS Contin.  Then reduce this medication to 2 tablets daily.  3. Tracy and her , Logan, have contracted that he will manage all of her medication and that she will not have access to any of her opiates.    YANY Myers, NP-C  [facility]  [facility]    Clinic Number:  [phone]     Call with any questions about your care and for scheduling assistance.     Calls are returned Monday through Friday between 8 AM and 4:30 PM. We usually get back to you within 2 business days depending on the issue/request.    If we are prescribing your medications:    For opioid medication refills, call the clinic or send a [employer] message 7 days in advance.  Please include:    Name of requested medication    Name of the pharmacy.    For non-opioid medications, call your pharmacy directly to request a refill. Please allow 3-4 days to be processed.     Per [place] State Law:    All controlled substance prescriptions must be filled within 30 days of being written.      For those controlled substances allowing refills, pickup must occur within 30 days of last fill.      We believe regular attendance is key to your success in our program!      Any time you are unable to keep your appointment we ask that you call us at least 24 hours in advance to cancel.This will allow us to offer the appointment time to another patient.   Multiple  missed appointments may lead to dismissal from the clinic.

## 2021-01-07 ENCOUNTER — VIRTUAL VISIT (OUTPATIENT)
Dept: PALLIATIVE MEDICINE | Facility: CLINIC | Age: 76
End: 2021-01-07
Payer: COMMERCIAL

## 2021-01-07 DIAGNOSIS — F11.20 NARCOTIC DEPENDENCE (H): Chronic | ICD-10-CM

## 2021-01-07 DIAGNOSIS — G89.4 CHRONIC PAIN SYNDROME: ICD-10-CM

## 2021-01-07 DIAGNOSIS — R51.9 CHRONIC FACIAL PAIN: ICD-10-CM

## 2021-01-07 DIAGNOSIS — G51.9 FACIAL NEUROPATHY: ICD-10-CM

## 2021-01-07 DIAGNOSIS — G89.29 CHRONIC FACIAL PAIN: ICD-10-CM

## 2021-01-07 PROCEDURE — 99214 OFFICE O/P EST MOD 30 MIN: CPT | Mod: 95 | Performed by: NURSE PRACTITIONER

## 2021-01-07 RX ORDER — HYDROCODONE BITARTRATE AND ACETAMINOPHEN 10; 325 MG/1; MG/1
TABLET ORAL
Qty: 70 TABLET | Refills: 0 | Status: SHIPPED | OUTPATIENT
Start: 2021-01-07 | End: 2021-01-18

## 2021-01-07 ASSESSMENT — PAIN SCALES - GENERAL: PAINLEVEL: WORST PAIN (10)

## 2021-01-07 NOTE — PROGRESS NOTES
"  Tracy Galloway is a 75 year old female who is being evaluated via a billable video visit.      How would you like to obtain your AVS? Mail a copy  If the video visit is dropped, the invitation should be resent by: Text to cell phone: [phone]  Will anyone else be joining your video visit? No       Jonny Mcgregor MA    Video-Visit Details    Type of service:  Video Visit  Video Start Time: 10:57 AM  Video End Time: 11:28 AM  Originating Location (pt. Location): Home  Distant Location (provider location):  [facility]   Platform used for Video Visit: [employer]        CHIEF COMPLAINT: Facial, oral and jaw pain    INTERVAL HISTORY:  Last seen on 20.        Recommendations/plan at the last visit included:  1. Video appointment with Lyssa Pathak on  at 11 AM  2. Medication recommendations:             1. MS Contin 15 m tablet every 12 hours.  2. Hydrocodone/APAP 5/325 m tablet 4 times daily until you receive the MS Contin.  Then reduce this medication to 2 tablets daily.  3. Tracy and her , Logan, have contracted that he will manage all of her medication and that she will not have access to any of her opiates.    Since last visit:   - Says that her pharmacist said it was too early which makes no sense because it was a new prescription.   -Tracy does not appear to have understood how to take her medication as it was explained many times during our last phone call appointment    Pain Information:\"always bad\"     Annual Requirements last collected:  DUE NOW       Current Pain Relevant Medications:   Amitriptyline 10 mg 1-5 tabs at HS  Baclofen 10 mg TID  Norco  mg BID PRN               Total opiate dose 20  MME/day  Ibuprofen 400-600 mg TID PRN  Ambien 10 mg 1/2-1 tab at HS PRN     Previous Pain Relevant Medications: (H--helped; HI--Helped initially; [facility]--Somewhat helpful; NH--No help; W--worse; SE--side effects; ?--Unsure if helpful)   NOTE: This " medication information taken from patient's intake form, not medical records.               Opiates:               NSAIDS:               Anti-migraine medications:               Muscle Relaxants:               Neuropathics: Lyrica: Allergy, Gabapentin ?  Anti-depressants: Amitriptyline:WILLIAMS               Anxiety medications:                 Topicals:               Other medications not covered above: Ambien:WILLIAMS  This will be added at a later date when new patient packet is available.         Illicit drug use history none   EtOH use: rare  Caffeine use: 1-2 day   Nicotine use: none     THE 4 As OF OPIOID MAINTENANCE ANALGESIA    Analgesia: Is pain relief clinically significant? YES   Activity: Is patient functional and able to perform Activities of Daily Living? YES   Adverse effects: Is patient free from adverse side effects from opiates? YES   Adherence to Rx protocol: Is patient adhering to Controlled Substance Agreement and taking medications ONLY as ordered? YES     Is Narcan prescribed for opiate use >50 MME daily or concurrent use of opiates and benzodiazepines? N/A     [place] Board of Pharmacy Data Base Reviewed:    YES; No concern for abuse or misuse of controlled medications based on this report.     _______________________________________________      Physical Exam unable to be completed due to virtual visit. There were no vitals taken for this visit or reported by patient.     Review of Systems: A 10-point review of systems was negative, with the exception of any concerns as noted and chronic pain issues.      General: Verbal, alert and no distress   Psychiatric:  affect and mood normal, mentation appears normal    DIRE Score for ongoing opioid management is calculated as follows:    Diagnosis = 2 pts (slowly progressive; moderate pain/objective findings)    Intractability = 3 pts (patient fully engaged but inadequate response to treatments)    Risk        Psych = 3 pts (no significant personality  dysfunction/mental illness; good communication with clinic)         Chem Hlth = 3 pts (no history of chemical dependency; not drug-focused)       Reliability = 3 pts (highly reliable with meds, appointments, treatments)       Social = 2 pts (reduction in some relationships/life rolls)       (Psych + Chem hlth + Reliability + Social) = 16    Efficacy = 2 pts (moderate benefit/function; low med dose; too early/not tried meds)    DIRE Score = 18        7-13: likely NOT suitable candidate for long-term opioid analgesia       14-21: may be a suitable candidate for long-term opioid analgesia     Previous Diagnostic Tests:   N/A     ASSESSMENT:   1.  Chronic severe oral/facial pain     Tracy presents to review medications.  Despite agreeing to manage all of her medication her , Logan, admits that he has not been completely managing her medications.  Strongly worded discussion regarding my concern that neither of them are following instructions for safely taking her medications.  If this continues to be the case they have been made aware that I will discontinue all opiates.  Reviewed the next plan for medications which is as follows:     Take Norco 10/325 1 tablet 3 times daily until MS Contin is approved    Once MS Contin is available it should be taken 1 tablet 2 times daily approximately 12 hours apart.  Norco should be absolutely no more than 2 tablets/day but Tracy has been strongly encouraged to keep to 1 tablet a day if possible.      We will follow-up in 2 weeks to see how she is managing.      Plan:    Diagnosis reviewed, treatment option addressed, and risk/benifits discussed.  Self-care instructions given.  I am recommending a multidisciplinary treatment plan to help this patient better manage pain.      1. Schedule VIDEO follow-up with YANY Kidd NP-C on January 18.  We will call you to schedule  2. Medication recommendations:   1. MS Contin 15 mg: when you received this, take one tablet two  times daily.   2. Hydrocodone/APAP 10/325 mg: ok to take 3 tabs per day until you receive Ms Contin. Once you begin Ms Contin you must not take more than 2 tabs per day. Take the lowest effective dose.     I have reviewed the note as documented above.  This accurately captures the substance of my conversation with the patient.    YANY Myers, NP-C  [facility] [place]

## 2021-01-08 ENCOUNTER — TELEPHONE (OUTPATIENT)
Dept: PALLIATIVE MEDICINE | Facility: CLINIC | Age: 76
End: 2021-01-08

## 2021-01-08 NOTE — TELEPHONE ENCOUNTER
Prior Authorization Retail Medication Request    Medication/Dose: morphine er 15mg   ICD code (if different than what is on RX):    Previously Tried and Failed:    Rationale:  Prior auth required     Insurance Name: [employer] scripts [phone]  Insurance ID:  [id number]      Pharmacy Information (if different than what is on RX)  Name:    Phone:

## 2021-01-08 NOTE — TELEPHONE ENCOUNTER
Prior Authorization Approval    Authorization Effective Date: 12/9/2020  Authorization Expiration Date: 1/8/2022  Medication: morphine er 15mg -APPROVED  Approved Dose/Quantity:   Reference #:     Insurance Company: [employer] - Phone [phone] Fax [phone]  Expected CoPay:       CoPay Card Available:      Foundation Assistance Needed:    Which Pharmacy is filling the prescription (Not needed for infusion/clinic administered): [facility] [place] [id number] [place] [address]  Pharmacy Notified: Yes  Patient Notified: No    Pharmacy will notify patient when medication is ready.

## 2021-01-08 NOTE — TELEPHONE ENCOUNTER
Central Prior Authorization Team   Phone: [phone]      PA Initiation    Medication: morphine er 15mg -Initiated  Insurance Company: [employer] - Phone [phone] Fax [phone]  Pharmacy Filling the Rx: [place], [place] - [address] [place] [address]  Filling Pharmacy Phone: [phone]  Filling Pharmacy Fax:    Start Date: 1/8/2021

## 2021-01-10 NOTE — PATIENT INSTRUCTIONS
After Visit Instructions:     Thank you for coming to [facility] for your care. It is my goal to partner with you to help you reach your optimal state of health.     Continue daily self-care, identifying contributing factors, and monitoring variations in pain level. Continue to integrate self-care into your life.      1. Schedule VIDEO follow-up with YANY Kidd NP-C on January 18.  We will call you to schedule  2. Medication recommendations:   1. MS Contin 15 mg: when you received this, take one tablet two times daily.   2. Hydrocodone/APAP 10/325 mg: ok to take 3 tabs per day until you receive Ms Contin. Once you begin Ms Contin you must not take more than 2 tabs per day. Take the lowest effective dose.     YANY Myers NP-C  [facility]  [facility]    Clinic Number:  [phone]     Call with any questions about your care and for scheduling assistance.     Calls are returned Monday through Friday between 8 AM and 4:30 PM. We usually get back to you within 2 business days depending on the issue/request.    If we are prescribing your medications:    For opioid medication refills, call the clinic or send a [employer] message 7 days in advance.  Please include:    Name of requested medication    Name of the pharmacy.    For non-opioid medications, call your pharmacy directly to request a refill. Please allow 3-4 days to be processed.     Per [place] State Law:    All controlled substance prescriptions must be filled within 30 days of being written.      For those controlled substances allowing refills, pickup must occur within 30 days of last fill.      We believe regular attendance is key to your success in our program!      Any time you are unable to keep your appointment we ask that you call us at least 24 hours in advance to cancel.This will allow us to offer the appointment time to another patient.   Multiple missed appointments may lead to  dismissal from the clinic.
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
## 2021-01-14 ENCOUNTER — VIRTUAL VISIT (OUTPATIENT)
Dept: PALLIATIVE MEDICINE | Facility: CLINIC | Age: 76
End: 2021-01-14
Payer: COMMERCIAL

## 2021-01-14 DIAGNOSIS — G89.29 CHRONIC FACIAL PAIN: Primary | ICD-10-CM

## 2021-01-14 DIAGNOSIS — R51.9 CHRONIC FACIAL PAIN: Primary | ICD-10-CM

## 2021-01-14 DIAGNOSIS — G89.4 CHRONIC PAIN SYNDROME: ICD-10-CM

## 2021-01-14 PROCEDURE — 96156 HLTH BHV ASSMT/REASSESSMENT: CPT | Performed by: PSYCHOLOGIST

## 2021-01-16 NOTE — PROGRESS NOTES
"  [facility]      Tracy Galloway is a 75 year old female who is being evaluated via a billable virtual visit.      VIDEO VISIT   How would you like to obtain your AVS? Racemihart  If you are dropped from the video visit, the video invite should be resent to: Other e-mail: [email or web address]  Will anyone else be joining your video visit? Yes: Marco (Spouse). How would they like to receive their invitation? Other e-mail: [email or web address]  If patient encounters technical issues they should call [phone]      Video-Visit Details  Type of service:  Video Visit  Video Start Time: 3:00 PM  Video End Time: 3:10 PM  Originating Location (pt. Location): Home  Distant Location (provider location):  [facility]   Platform used for Video Visit: Alejandro    CHIEF COMPLAINT: facial/oral pain     INTERVAL HISTORY:  Last seen on 1/7/20.        Recommendations/plan at the last visit included:  1. Schedule VIDEO follow-up with YANY Kidd NP-C on January 18.  We will call you to schedule  2. Medication recommendations:             1. MS Contin 15 mg: when you received this, take one tablet two times daily.   2. Hydrocodone/APAP 10/325 mg: ok to take 3 tabs per day until you receive Ms Contin. Once you begin Ms Contin you must not take more than 2 tabs per day. Take the lowest effective dose.     Since last visit:   - Has been taking MS Contin BID and taking less Norco. Having less pain. Has times when she forgets about the pain. Does not think that she could stop Ambien.  Pain psychology evaluation \"went well\".    Pain Information:   Pain quality: Sharp and stings, electrical     Pain rating: intensity ranges from 0/10 to 8/10, and averages 5/10 on a 0-10 scale.    Pain Information:\"always bad\"  (last visit)      Annual Requirements last collected:  DUE NOW       Current Pain Relevant Medications:   Amitriptyline 10 mg 1-5 tabs at HS  Baclofen 10 mg TID  Norco  mg BID PRN "               Total opiate dose 20  MME/day  Ibuprofen 400-600 mg TID PRN  Ambien 10 mg 1/2-1 tab at HS PRN     Previous Pain Relevant Medications: (H--helped; HI--Helped initially; SWH--Somewhat helpful; NH--No help; W--worse; SE--side effects; ?--Unsure if helpful)   NOTE: This medication information taken from patient's intake form, not medical records.               Opiates: Norco: H, morphine: H, oxycodone: H              NSAIDS: Ibuprofen: [facility]              Anti-migraine medications: Fiorinal: H              Muscle Relaxants: Baclofen: H, cyclobenzaprine: [facility]              Neuropathics: Lyrica: Allergy, Gabapentin ?  Anti-depressants: Amitriptyline:[facility], duloxetine: NH, nortriptyline:               Anxiety medications:   Clonazepam: H              Topicals:               Other medications not covered above: Ambien:[facility], medical cannabis: [place] Ambien: H        Illicit drug use history none   EtOH use: rare  Caffeine use: 1-2 day   Nicotine use: none     THE 4 As OF OPIOID MAINTENANCE ANALGESIA    Analgesia: Is pain relief clinically significant? YES   Activity: Is patient functional and able to perform Activities of Daily Living? YES   Adverse effects: Is patient free from adverse side effects from opiates? YES   Adherence to Rx protocol: Is patient adhering to Controlled Substance Agreement and taking medications ONLY as ordered? YES     Is Narcan prescribed for opiate use >50 MME daily or concurrent use of opiates and benzodiazepines? N/A     [place] Board of Pharmacy Data Base Reviewed:    YES; No concern for abuse or misuse of controlled medications based on this report.      _______________________________________________      Physical Exam unable to be completed due to virtual visit. There were no vitals taken for this visit or reported by patient.     Review of Systems: A 10-point review of systems was negative, with the exception of any concerns as noted and chronic pain issues.      General: Verbal, alert  and no distress   Psychiatric:  affect and mood normal, mentation appears normal    DIRE Score for ongoing opioid management is calculated as follows:    Diagnosis = 2 pts (slowly progressive; moderate pain/objective findings)    Intractability = 3 pts (patient fully engaged but inadequate response to treatments)    Risk        Psych = 3 pts (no significant personality dysfunction/mental illness; good communication with clinic)         Chem Hlth = 3 pts (no history of chemical dependency; not drug-focused)       Reliability = 3 pts (highly reliable with meds, appointments, treatments)       Social = 2 pts (reduction in some relationships/life rolls)       (Psych + Chem hlth + Reliability + Social) = 16    Efficacy = 2 pts (moderate benefit/function; low med dose; too early/not tried meds)    DIRE Score = 18        7-13: likely NOT suitable candidate for long-term opioid analgesia       14-21: may be a suitable candidate for long-term opioid analgesia     Previous Diagnostic Tests:   N/A     ASSESSMENT:   1.  Chronic severe oral/facial pain  2.  Anxiety/depression    Tracy presents in the company of her  for a video appointment. Her pain is far better controlled with the new medication regimen of MS Contin with Norco twice daily for breakthrough pain.  Reviewed safe management of her medications and that she should only take her breakthrough medication when absolutely needed.  Reviewed continuing pain psychology to address the effect of her anxiety on her pain.       Plan:    Diagnosis reviewed, treatment option addressed, and risk/benifits discussed.  Self-care instructions given.  I am recommending a multidisciplinary treatment plan to help this patient better manage pain.      1. Schedule pain psychology visits as recommended by your health psychologist  2. VIDEO follow-up with YANY Kidd NP-C as scheduled for 3/8/21 @ 1PM  3. Medication recommendations:   1. We will send in refills of MS Contin  and [place] to be filled on 2/6/21.     I have reviewed the note as documented above.  This accurately captures the substance of my conversation with the patient.    BILLING TIME DOCUMENTATION:   The total TIME spent on this patient on the day of the appointment was 15 minutes.   TOTAL TIME includes:   Time spent preparing to see the patient: 2 minutes (reviewing records and tests)  Time spend face to face with the patient: 13 minutes  Time spent ordering tests, medications, procedures and referrals: 0 minutes  Time spent Referring and communicating with other healthcare professionals: 0 minutes  Documenting clinical information in Epic: 0 minutes    YANY Myers, NP-C  [facility] [place]

## 2021-01-18 ENCOUNTER — VIRTUAL VISIT (OUTPATIENT)
Dept: PALLIATIVE MEDICINE | Facility: CLINIC | Age: 76
End: 2021-01-18
Payer: COMMERCIAL

## 2021-01-18 DIAGNOSIS — G89.29 CHRONIC FACIAL PAIN: Primary | ICD-10-CM

## 2021-01-18 DIAGNOSIS — F41.9 ANXIETY: ICD-10-CM

## 2021-01-18 DIAGNOSIS — R51.9 CHRONIC FACIAL PAIN: Primary | ICD-10-CM

## 2021-01-18 DIAGNOSIS — G89.4 CHRONIC PAIN SYNDROME: ICD-10-CM

## 2021-01-18 DIAGNOSIS — G51.9 FACIAL NEUROPATHY: ICD-10-CM

## 2021-01-18 PROCEDURE — 99213 OFFICE O/P EST LOW 20 MIN: CPT | Mod: 95 | Performed by: NURSE PRACTITIONER

## 2021-01-18 RX ORDER — HYDROCODONE BITARTRATE AND ACETAMINOPHEN 10; 325 MG/1; MG/1
1 TABLET ORAL 2 TIMES DAILY PRN
Qty: 60 TABLET | Refills: 0 | Status: SHIPPED | OUTPATIENT
Start: 2021-01-18 | End: 2021-03-08

## 2021-01-18 RX ORDER — MORPHINE SULFATE 15 MG/1
15 TABLET, FILM COATED, EXTENDED RELEASE ORAL EVERY 12 HOURS
Qty: 60 TABLET | Refills: 0 | Status: SHIPPED | OUTPATIENT
Start: 2021-01-18 | End: 2021-03-22 | Stop reason: ALTCHOICE

## 2021-01-18 ASSESSMENT — PAIN SCALES - GENERAL: PAINLEVEL: MODERATE PAIN (5)

## 2021-01-18 NOTE — PATIENT INSTRUCTIONS
After Visit Instructions:     Thank you for coming to [facility] [place] for your care. It is my goal to partner with you to help you reach your optimal state of health.     Continue daily self-care, identifying contributing factors, and monitoring variations in pain level. Continue to integrate self-care into your life.      1. Schedule pain psychology visits as recommended by your health psychologist  2. VIDEO follow-up with YANY Kidd NP-C as scheduled for 3/8/21 @ 1PM  3. Medication recommendations:   1. We will send in refills of MS Contin and Norco to be filled on 2/6/21    YANY Myers NP-C  [facility]    Clinic Number:  [phone]     Call with any questions about your care and for scheduling assistance.     Calls are returned Monday through Friday between 8 AM and 4:30 PM. We usually get back to you within 2 business days depending on the issue/request.    If we are prescribing your medications:    For opioid medication refills, call the clinic or send a myZamana message 7 days in advance.  Please include:    Name of requested medication    Name of the pharmacy.    For non-opioid medications, call your pharmacy directly to request a refill. Please allow 3-4 days to be processed.     Per [place] State Law:    All controlled substance prescriptions must be filled within 30 days of being written.      For those controlled substances allowing refills, pickup must occur within 30 days of last fill.      We believe regular attendance is key to your success in our program!      Any time you are unable to keep your appointment we ask that you call us at least 24 hours in advance to cancel.This will allow us to offer the appointment time to another patient.   Multiple missed appointments may lead to dismissal from the clinic.

## 2021-01-27 ENCOUNTER — TELEPHONE (OUTPATIENT)
Dept: PALLIATIVE MEDICINE | Facility: CLINIC | Age: 76
End: 2021-01-27

## 2021-01-27 DIAGNOSIS — K59.03 DRUG INDUCED CONSTIPATION: Primary | ICD-10-CM

## 2021-01-28 NOTE — TELEPHONE ENCOUNTER
I have ordered Movantik 12.5 mg. Take one tablet before breakfast. If this is not covered, Tracy will need to call her insurance company to see what medications are covered for drug induced constipation. She should check the denial letter to see if any alternatives were recommended.     YANY Myers, NP-C  [facility] [place]

## 2021-01-28 NOTE — TELEPHONE ENCOUNTER
linaclotide (LINZESS) 72 MCG capsule is not helpful at all. She is having to add senna, colace and metamucil and is still having infrequent BM with constipation.     Is there an alternative medication to the Linzess? She uses [facility] pharmacy.    DI MoniqueN, RN  Care Coordinator  [facility] [place]

## 2021-01-29 NOTE — TELEPHONE ENCOUNTER
Patient notified.    DI MoniqueN, RN  Care Coordinator  [facility] [place]

## 2021-02-03 DIAGNOSIS — R79.89 ELEVATED LFTS: ICD-10-CM

## 2021-02-03 DIAGNOSIS — E78.5 HYPERLIPIDEMIA LDL GOAL <130: ICD-10-CM

## 2021-02-04 ENCOUNTER — MYC MEDICAL ADVICE (OUTPATIENT)
Dept: FAMILY MEDICINE | Facility: CLINIC | Age: 76
End: 2021-02-04

## 2021-02-05 ENCOUNTER — MYC MEDICAL ADVICE (OUTPATIENT)
Dept: FAMILY MEDICINE | Facility: CLINIC | Age: 76
End: 2021-02-05

## 2021-02-05 NOTE — TELEPHONE ENCOUNTER
Pt was called and notified; transferred to  to schedule an appt.  Information on COVID vaccine sent to [facility] as pt inquired on how she can get this.     Katarina BARRETT RN, BSN

## 2021-02-05 NOTE — TELEPHONE ENCOUNTER
"Requested Prescriptions   Pending Prescriptions Disp Refills     simvastatin (ZOCOR) 10 MG tablet [Pharmacy Med Name: SIMVASTATIN 10MG TABS] 90 tablet 0     Sig: TAKE ONE TABLET BY MOUTH EVERY NIGHT AT BEDTIME       Statins Protocol Failed - 2/3/2021 10:17 PM        Failed - LDL on file in past 12 months     Recent Labs   Lab Test 03/29/19  1007   *             Passed - No abnormal creatine kinase in past 12 months     No lab results found.             Passed - Recent (12 mo) or future (30 days) visit within the authorizing provider's specialty     Patient has had an office visit with the authorizing provider or a provider within the authorizing providers department within the previous 12 mos or has a future within next 30 days. See \"Patient Info\" tab in inbasket, or \"Choose Columns\" in Meds & Orders section of the refill encounter.              Passed - Medication is active on med list        Passed - Patient is age 18 or older        Passed - No active pregnancy on record        Passed - No positive pregnancy test in past 12 months           Katarina BARRETT RN, BSN      "

## 2021-02-05 NOTE — TELEPHONE ENCOUNTER
Call pt needs fasting labs chol and hepatic   After she sets up lab appt she can have this filled

## 2021-02-08 ENCOUNTER — MYC MEDICAL ADVICE (OUTPATIENT)
Dept: FAMILY MEDICINE | Facility: CLINIC | Age: 76
End: 2021-02-08

## 2021-02-08 ENCOUNTER — VIRTUAL VISIT (OUTPATIENT)
Dept: PALLIATIVE MEDICINE | Facility: CLINIC | Age: 76
End: 2021-02-08
Payer: COMMERCIAL

## 2021-02-08 DIAGNOSIS — R51.9 CHRONIC FACIAL PAIN: Primary | ICD-10-CM

## 2021-02-08 DIAGNOSIS — J06.9 VIRAL UPPER RESPIRATORY TRACT INFECTION: ICD-10-CM

## 2021-02-08 DIAGNOSIS — G51.9 FACIAL NEUROPATHY: ICD-10-CM

## 2021-02-08 DIAGNOSIS — G89.4 CHRONIC PAIN SYNDROME: ICD-10-CM

## 2021-02-08 DIAGNOSIS — G89.29 CHRONIC FACIAL PAIN: Primary | ICD-10-CM

## 2021-02-08 PROCEDURE — 96159 HLTH BHV IVNTJ INDIV EA ADDL: CPT | Mod: 95 | Performed by: PSYCHOLOGIST

## 2021-02-08 PROCEDURE — 96158 HLTH BHV IVNTJ INDIV 1ST 30: CPT | Mod: 95 | Performed by: PSYCHOLOGIST

## 2021-02-08 RX ORDER — FLUTICASONE PROPIONATE 50 MCG
1-2 SPRAY, SUSPENSION (ML) NASAL DAILY
Qty: 16 G | Refills: 1 | Status: SHIPPED | OUTPATIENT
Start: 2021-02-08 | End: 2021-07-21

## 2021-02-08 RX ORDER — SIMVASTATIN 10 MG
TABLET ORAL
Qty: 90 TABLET | Refills: 0 | Status: SHIPPED | OUTPATIENT
Start: 2021-02-08 | End: 2021-05-24

## 2021-02-08 NOTE — PROGRESS NOTES
"Tracy Galloway is a 75 year old female who is being evaluated via a billable video visit.      The patient has been notified of following:     \"This video visit will be conducted via a call between you and your physician/provider. We have found that certain health care needs can be provided without the need for an in-person physical exam.  This service lets us provide the care you need with a video conversation.     Video visits are billed at different rates depending on your insurance coverage.  Please reach out to your insurance provider with any questions.    If during the course of the call the physician/provider feels a video visit is not appropriate, you will not be charged for this service.\"    Patient has given verbal consent for Video visit? Yes    Patient would like the video invitation sent by: Send to e-mail at: [email or web address]    Video Start Time: Sent links at 2:00 and 2:04 AM, LM on patient's phone, connected at 2:10 PM    Additional provider notes:      Pain Diagnoses per pain provider:   Chronic facial pain     Chronic pain syndrome     Facial neuropathy      DATA: During today's visit you reported the following: Your pain is 'really bad' - wonder if weather is contributing. Your mood is mildly worse - depressed and anxious. Your activity level is the same - going to the gym daily for an hour. Your stress level is the same. Your sleep is quite poor due to pain. You reported engaging in self-care for your pain not hardly at all.    You identified that you would like to focus on the following or had questions regarding the following issues or concerns, and we discussed the following:   - sleep is primary issue  - tried an alcoholic drink after taking Ambien last night - strongly discouraged from doing this as it can be dangerous  - encouraged to discuss sleep with PCP  - strategies for sleep hygiene  - would like to get back into yoga - recognize it has a positive impact on pain and " mood  - encouraged to use heat - would like to get back to using bean bag  - would like to schedule massage  - psychoeducation on sympathetic nervous system response to pain    ASSESSMENT: Seems to recognize how pain and mood are both negatively impacted by chronic sleep issues.    PLAN:   Your next appointment is scheduled for 3/1 at 4:00 PM.  Assignment/Objectives /interventions for next session:   - try sleep hygiene tips as discussed  - reach out to PCP regarding different option to Ambien since it seems to have lost its effectiveness per self-report    Video-Visit Details    Type of service:  Video Visit    Video End Time (time video stopped): 2:53 PM    Originating Location (pt. Location): Home    Distant Location (provider location):  [facility]     Mode of Communication:  Video Conference via Francesco Cullen PsyD LP  Licensed Psychologist  Outpatient Clinic Therapist  [facility]

## 2021-02-09 ENCOUNTER — TELEPHONE (OUTPATIENT)
Dept: PALLIATIVE MEDICINE | Facility: CLINIC | Age: 76
End: 2021-02-09

## 2021-02-09 DIAGNOSIS — G89.4 CHRONIC PAIN SYNDROME: Chronic | ICD-10-CM

## 2021-02-09 NOTE — TELEPHONE ENCOUNTER
Looking into chart, patient has not picked up the current Ok'd Rx for MS Contin. P/u date was  2/7/21.   Called pharmacy,they will fill it. I will contact patient, she states understanding. Patient will call with any concerns/questions. Will not complete this refill request.   Jessica/MA  [facility] [place] [facility]

## 2021-02-09 NOTE — TELEPHONE ENCOUNTER
Reason for call:  Medication   If this is a refill request, has the caller requested the refill from the pharmacy already? No  Will the patient be using a [facility]? Yes  Name of the pharmacy and phone number for the current request: [facility], [place] - [address] [place] [address]    Name of the medication requested: morphine (MS CONTIN) 15 MG CR tablet    Other request:     Phone number to reach patient:  Cell number on file:    Telephone Information:   Mobile [phone]       Best Time:      Can we leave a detailed message on this number?  YES    Travel screening: Not Applicable

## 2021-02-10 RX ORDER — BACLOFEN 10 MG/1
TABLET ORAL
Qty: 90 TABLET | Refills: 1 | Status: SHIPPED | OUTPATIENT
Start: 2021-02-10 | End: 2021-11-11

## 2021-02-17 DIAGNOSIS — R79.89 ELEVATED LFTS: ICD-10-CM

## 2021-02-17 DIAGNOSIS — E78.5 HYPERLIPIDEMIA LDL GOAL <130: ICD-10-CM

## 2021-02-17 LAB
ALBUMIN SERPL-MCNC: 3.2 G/DL (ref 3.4–5)
ALP SERPL-CCNC: 67 U/L (ref 40–150)
ALT SERPL W P-5'-P-CCNC: 18 U/L (ref 0–50)
AST SERPL W P-5'-P-CCNC: 17 U/L (ref 0–45)
BILIRUB DIRECT SERPL-MCNC: <0.1 MG/DL (ref 0–0.2)
BILIRUB SERPL-MCNC: 0.2 MG/DL (ref 0.2–1.3)
CHOLEST SERPL-MCNC: 207 MG/DL
HDLC SERPL-MCNC: 67 MG/DL
LDLC SERPL CALC-MCNC: 116 MG/DL
NONHDLC SERPL-MCNC: 140 MG/DL
PROT SERPL-MCNC: 6.5 G/DL (ref 6.8–8.8)
TRIGL SERPL-MCNC: 122 MG/DL

## 2021-02-17 PROCEDURE — 80076 HEPATIC FUNCTION PANEL: CPT | Performed by: FAMILY MEDICINE

## 2021-02-17 PROCEDURE — 36415 COLL VENOUS BLD VENIPUNCTURE: CPT | Performed by: FAMILY MEDICINE

## 2021-02-17 PROCEDURE — 80061 LIPID PANEL: CPT | Performed by: FAMILY MEDICINE

## 2021-02-24 ENCOUNTER — TELEPHONE (OUTPATIENT)
Dept: PALLIATIVE MEDICINE | Facility: CLINIC | Age: 76
End: 2021-02-24

## 2021-02-24 NOTE — TELEPHONE ENCOUNTER
Reason for call:  Other   Patient called regarding (reason for call): call back  Additional comments: Pt calling requesting to speak to a nurse about medication. Pt states she was prescribed a medication that cost $160 so she never filled it. Pt is wanting to know the name of the medication so she can contact her insurance.    Phone number to reach patient:  Home number on file [phone] (home)    Best Time:  anytime    Can we leave a detailed message on this number?  YES    Travel screening: Not Applicable     Tiffanie CONTE    [facility]

## 2021-02-25 NOTE — TELEPHONE ENCOUNTER
Outreach X1. Left a  requesting call back. Provided call back number.    DI MoniqueN, RN  Care Coordinator  [facility] [place]

## 2021-02-26 ENCOUNTER — TELEPHONE (OUTPATIENT)
Dept: FAMILY MEDICINE | Facility: CLINIC | Age: 76
End: 2021-02-26

## 2021-02-26 NOTE — TELEPHONE ENCOUNTER
We never received a new script for Paroxetine. Patient is requesting.    Thank you,   Albertina Padilla

## 2021-02-26 NOTE — TELEPHONE ENCOUNTER
Outreach X2. Left a  requesting call back. Provided call back number.    DI MoniqueN, RN  Care Coordinator  [facility] [place]

## 2021-02-26 NOTE — TELEPHONE ENCOUNTER
Spoke with pt who is requesting to restart Paxil due to increased anxiety. States stopped last fall as didn't feel needed.   Advised VV- scheduled 03-03-21.  MAYCO Uriarte RN

## 2021-03-01 ENCOUNTER — VIRTUAL VISIT (OUTPATIENT)
Dept: PALLIATIVE MEDICINE | Facility: CLINIC | Age: 76
End: 2021-03-01
Payer: COMMERCIAL

## 2021-03-01 ENCOUNTER — MYC MEDICAL ADVICE (OUTPATIENT)
Dept: PALLIATIVE MEDICINE | Facility: CLINIC | Age: 76
End: 2021-03-01

## 2021-03-01 DIAGNOSIS — R51.9 CHRONIC FACIAL PAIN: Primary | ICD-10-CM

## 2021-03-01 DIAGNOSIS — K59.03 DRUG INDUCED CONSTIPATION: ICD-10-CM

## 2021-03-01 DIAGNOSIS — G51.9 FACIAL NEUROPATHY: ICD-10-CM

## 2021-03-01 DIAGNOSIS — G89.29 CHRONIC FACIAL PAIN: Primary | ICD-10-CM

## 2021-03-01 DIAGNOSIS — G89.4 CHRONIC PAIN SYNDROME: ICD-10-CM

## 2021-03-01 PROCEDURE — 96158 HLTH BHV IVNTJ INDIV 1ST 30: CPT | Mod: 95 | Performed by: PSYCHOLOGIST

## 2021-03-01 PROCEDURE — 96159 HLTH BHV IVNTJ INDIV EA ADDL: CPT | Mod: 95 | Performed by: PSYCHOLOGIST

## 2021-03-01 NOTE — TELEPHONE ENCOUNTER
Outreach X3. Left a  requesting call back. Provided call back number.  --------------    Adam Molina! We are playing phone tag so thought I would try [employer]. Were you trying to reach me?     ADRIÁN Monique, RN  Care Coordinator  [facility] [place]    This [employer] message has not been read.    ----------------    ADRIÁN Monique, RN  Care Coordinator  [facility]

## 2021-03-01 NOTE — PROGRESS NOTES
"Tracy Galloway is a 75 year old female who is being evaluated via a billable video visit.      The patient has been notified of following:     \"This video visit will be conducted via a call between you and your physician/provider. We have found that certain health care needs can be provided without the need for an in-person physical exam.  This service lets us provide the care you need with a video conversation.     Video visits are billed at different rates depending on your insurance coverage.  Please reach out to your insurance provider with any questions.    If during the course of the call the physician/provider feels a video visit is not appropriate, you will not be charged for this service.\"    Patient has given verbal consent for Video visit? Yes    Patient would like the video invitation sent by: Send to e-mail at: [email or web address]    Video Start Time: 4:00 PM    Additional provider notes:      Pain Diagnoses per pain provider:   Chronic facial pain      Chronic pain syndrome      Facial neuropathy        DATA: During today's visit you reported the following: Your pain is still not great. Your mood is about the same. Your activity level is about the same - going to gym about daily. Your stress level is the same - working out at gym. Your sleep is still disrupted - will be talking to MD about. You reported engaging in self-care for your pain 1-2 times per day.     You identified that you would like to focus on the following or had questions regarding the following issues or concerns, and we discussed the following:   - still struggling with sleep  - will be talking to PCP about antidepressant due to ongoing low mood  - keeping pain journal   - notes that mood tends to dip in winter - discussed inquiring about full spectrum lighting and prescription for a light  - feel have a lot to look forward to, feeling positive about the future     ASSESSMENT: Pain is still quite high, however report feeling " increased acceptance of how pain has disrupted routine with a desire to develop new routine for self. Also able to acknowledge how the pandemic has disrupted routine in winter months as it relates to family gatherings and travel.    PLAN:   Your next appointment is scheduled for N/A - patient reports she feels for now she is not in need of further follow-up - discussed she may contact our scheduling team at any time as needed for follow-up while working with our pain clinic.  Assignment/Objectives /interventions for next session:   - continue to focus on self-care daily    Video-Visit Details    Type of service:  Video Visit    Video End Time (time video stopped): 4:38 PM    Originating Location (pt. Location): Home    Distant Location (provider location):  [facility]     Mode of Communication:  Video Conference via Francesco Cullen PsyD LP  Licensed Psychologist  Outpatient Clinic Therapist  [facility]

## 2021-03-02 NOTE — TELEPHONE ENCOUNTER
"Taking movantik with diltiazem can make the movantik more effective, sometimes \"too\" effective. Would advise that she start with the lower dose. I can the refill when I have the pharmacy info. Thank you for suggesting that she try a smaller quantity, I added that in the notes on the prescription so the pharmacy is aware.     Please let Tracy know.     Thanks, DV  "

## 2021-03-03 ENCOUNTER — VIRTUAL VISIT (OUTPATIENT)
Dept: FAMILY MEDICINE | Facility: CLINIC | Age: 76
End: 2021-03-03
Payer: COMMERCIAL

## 2021-03-03 ENCOUNTER — TELEPHONE (OUTPATIENT)
Dept: PALLIATIVE MEDICINE | Facility: CLINIC | Age: 76
End: 2021-03-03

## 2021-03-03 DIAGNOSIS — F32.0 MILD SINGLE CURRENT EPISODE OF MAJOR DEPRESSIVE DISORDER (H): ICD-10-CM

## 2021-03-03 DIAGNOSIS — F41.9 ANXIETY: Primary | ICD-10-CM

## 2021-03-03 DIAGNOSIS — Z79.891 ENCOUNTER FOR LONG-TERM USE OF OPIATE ANALGESIC: Primary | ICD-10-CM

## 2021-03-03 PROCEDURE — 99443 PR PHYSICIAN TELEPHONE EVALUATION 21-30 MIN: CPT | Mod: 95 | Performed by: FAMILY MEDICINE

## 2021-03-03 ASSESSMENT — PATIENT HEALTH QUESTIONNAIRE - PHQ9
SUM OF ALL RESPONSES TO PHQ QUESTIONS 1-9: 3
5. POOR APPETITE OR OVEREATING: NOT AT ALL

## 2021-03-03 ASSESSMENT — ANXIETY QUESTIONNAIRES
2. NOT BEING ABLE TO STOP OR CONTROL WORRYING: SEVERAL DAYS
7. FEELING AFRAID AS IF SOMETHING AWFUL MIGHT HAPPEN: NOT AT ALL
GAD7 TOTAL SCORE: 3
6. BECOMING EASILY ANNOYED OR IRRITABLE: SEVERAL DAYS
1. FEELING NERVOUS, ANXIOUS, OR ON EDGE: NOT AT ALL
3. WORRYING TOO MUCH ABOUT DIFFERENT THINGS: SEVERAL DAYS
5. BEING SO RESTLESS THAT IT IS HARD TO SIT STILL: NOT AT ALL

## 2021-03-03 NOTE — PROGRESS NOTES
Tracy is a 75 year old who is being evaluated via a billable telephone visit.      What phone number would you like to be contacted at? [phone]  How would you like to obtain your AVS? MyChart    Assessment & Plan     Mild single current episode of major depressive disorder (H)  Currently she is feeling the best she has felt in years and does not want to be on a medication     Anxiety  As above   Reviewed Pain notes     Review of external notes as documented elsewhere in note         Depression Screening Follow Up    PHQ 3/3/2021   PHQ-9 Total Score 3   Q9: Thoughts of better off dead/self-harm past 2 weeks Not at all   F/U: Thoughts of suicide or self-harm -   F/U: Self harm-plan -   F/U: Self-harm action -   F/U: Safety concerns -                     Discussed the following ways the patient can remain in a safe environment:  be around others  Patient Instructions   Continue all your supports at home     Follow up with Pain clinic next week   Will discuss with provider Urine test and agreement signing           Return in about 3 months (around 6/3/2021) for Follow up, with me.    Gwendolyn Solis MD  [facility]   Tracy is a 75 year old who presents for the following health issues     HPI       Anxiety and depression Follow-Up    How are you doing with your anxiety since your last visit? Worsened Has been off since middle of last year    Are you having other symptoms that might be associated with anxiety? Yes:  not sleeping well    Have you had a significant life event? No     Are you feeling depressed? No  Seeing a Psychologist    Do you have any concerns with your use of alcohol or other drugs? No    Social History     Tobacco Use     Smoking status: Former Smoker     Types: Cigarettes     Quit date: 10/30/1962     Years since quittin.3     Smokeless tobacco: Never Used   Substance Use Topics     Alcohol use: Yes     Alcohol/week: 4.0 standard drinks      Types: 4 Standard drinks or equivalent per week     Comment: occasional     Drug use: No     Comment: medical marijuana in the past     ROMAIN-7 SCORE 7/12/2019 9/8/2020 3/3/2021   Total Score - - -   Total Score 1 (minimal anxiety) 6 (mild anxiety) -   Total Score 1 6 3     PHQ 9/8/2020 3/3/2021 3/3/2021   PHQ-9 Total Score 8 11 3   Q9: Thoughts of better off dead/self-harm past 2 weeks Several days Several days Not at all   F/U: Thoughts of suicide or self-harm Yes - -   F/U: Self harm-plan No - -   F/U: Self-harm action No - -   F/U: Safety concerns No - -                 Review of Systems   Constitutional, HEENT, cardiovascular, pulmonary, gi and gu systems are negative, except as otherwise noted.      Objective           Vitals:  No vitals were obtained today due to virtual visit.    Physical Exam   healthy, alert and no distress  PSYCH: Alert and oriented times 3; coherent speech, normal   rate and volume, able to articulate logical thoughts, able   to abstract reason, no tangential thoughts, no hallucinations   or delusions  Her affect is normal  RESP: No cough, no audible wheezing, able to talk in full sentences  Remainder of exam unable to be completed due to telephone visits                Phone call duration: 25 minutes

## 2021-03-03 NOTE — PATIENT INSTRUCTIONS
Continue all your supports at home     Follow up with Pain clinic next week   Will discuss with provider Urine test and agreement signing

## 2021-03-04 ENCOUNTER — TELEPHONE (OUTPATIENT)
Dept: PALLIATIVE MEDICINE | Facility: CLINIC | Age: 76
End: 2021-03-04

## 2021-03-04 ASSESSMENT — ANXIETY QUESTIONNAIRES: GAD7 TOTAL SCORE: 3

## 2021-03-04 NOTE — TELEPHONE ENCOUNTER
Please call Tracy to let her know that her PCP Dr Solis kindly reached out to me and offered to have Tracy complete an updated UDS and sign the CSA for me up at their clinic so she doesn't have to drive to [place] just for that.     Tracy will need to make a lab only and nurse only appt at her primary care clinic. The new lab order is placed and Dr Solis will have the CSA signed by Tracy and faxed to us for my signature.     Thanks, Lyssa

## 2021-03-07 NOTE — PROGRESS NOTES
"  [facility]      Tracy Galloway is a 75 year old female who is being evaluated via a billable virtual visit.      VIDEO VISIT   How would you like to obtain your AVS? MyChart  If you are dropped from the video visit, the video invite should be resent to: Other e-mail: [email or web address]  Will anyone else be joining your video visit? No  If patient encounters technical issues they should call [phone]    Gabriela Dowd MA  [facility]         Video-Visit Details  Type of service:  Video Visit  Video Start Time: 1:02 PM  Video End Time: 1:32 PM  Originating Location (pt. Location): Home  Distant Location (provider location):  [facility] [employer]   Platform used for Video Visit: Alejandro      CHIEF COMPLAINT: chronic facial and oral pain    INTERVAL HISTORY:  Last seen on 1/18/21.        Recommendations/plan at the last visit included:  1. Schedule pain psychology visits as recommended by your health psychologist  2. VIDEO follow-up with YANY Kidd NP-C as scheduled for 3/8/21 @ 1PM  3. Medication recommendations:             1. We will send in refills of MS Contin and Norco to be filled on 2/6/21    Since last visit:   - Completed pain psychology. Says her depression is better. Is crying through this appt.   - \"I'm very disappointed with my pain control\". States she didn't want to be a bother so didn't call. Started taking notes on 2/23 about her pain. She gets pain improvement if she exercises and prays. Pain is gradually getting worse. Has been taking MS Contin BID but does not feel like it is doing anything. Takes 9 am & 9 pm. Doesn't feel that she gets relief until she has taken Norco at 1-5 pm when the pain returns. Takes 1st [place] at 10 am. Has started taking Baclofen again for jaw tightness. Makes her very tired so she doesn't take during the day. Has been grinding and clenching at HS but feels like a zombie if she takes with sleeping pill. "     Pain Information: Unable to provide pain scores.     Annual Requirements last collected:  March 2021    Current Pain Relevant Medications:   Amitriptyline 10 mg 1-5 tabs at HS  Baclofen 10 mg TID  Ibuprofen 400-600 mg TID PRN  Ambien 10 mg 1/2-1 tab at HS PRN  Norco  mg 4-5 tabs/day PRN  = 40-50 MME              Total opiate dose: 40-50 MME/day     Previous Pain Relevant Medications: (H--helped; HI--Helped initially; SWH--Somewhat helpful; NH--No help; W--worse; SE--side effects; ?--Unsure if helpful)   NOTE: This medication information taken from patient's intake form, not medical records.               Opiates: Norco: H, morphine: H, oxycodone: H              NSAIDS: Ibuprofen: [facility]              Anti-migraine medications: Fiorinal: H              Muscle Relaxants: Baclofen: H, cyclobenzaprine: [facility]              Neuropathics: Lyrica: Allergy, Gabapentin ?  Anti-depressants: Amitriptyline:[facility], duloxetine: NH, nortriptyline:               Anxiety medications:   Clonazepam: H              Topicals:               Other medications not covered above: Ambien:[facility], medical cannabis: [place] Ambien: H     Illicit drug use history none   EtOH use: rare  Caffeine use: 1-2 day   Nicotine use: none     THE 4 As OF OPIOID MAINTENANCE ANALGESIA    Analgesia: Is pain relief clinically significant? YES   Activity: Is patient functional and able to perform Activities of Daily Living? YES   Adverse effects: Is patient free from adverse side effects from opiates? YES   Adherence to Rx protocol: Is patient adhering to Controlled Substance Agreement and taking medications ONLY as ordered? YES     Is Narcan prescribed for opiate use >50 MME daily or concurrent use of opiates and benzodiazepines? YES    [place] Board of Pharmacy Data Base Reviewed:    YES; As expected, no concern for misuse/abuse of controlled medications based on this report. Reviewed [facility] March 7, 2021- no concerning  fills.    _______________________________________________      Physical Exam unable to be completed due to virtual visit. There were no vitals taken for this visit or reported by patient.     Review of Systems: A 10-point review of systems was negative, with the exception of any concerns as noted and chronic pain issues.      General: Verbal, alert and no distress   Psychiatric:  affect and mood crying, distraught, mentation appears normal    DIRE Score for ongoing opioid management is calculated as follows:    Diagnosis = 2 pts (slowly progressive; moderate pain/objective findings)    Intractability = 3 pts (patient fully engaged but inadequate response to treatments)    Risk        Psych = 3 pts (no significant personality dysfunction/mental illness; good communication with clinic)         Chem Hlth = 3 pts (no history of chemical dependency; not drug-focused)       Reliability = 3 pts (highly reliable with meds, appointments, treatments)       Social = 2 pts (reduction in some relationships/life rolls)       (Psych + Chem hlth + Reliability + Social) = 16    Efficacy = 2 pts (moderate benefit/function; low med dose; too early/not tried meds)    DIRE Score = 18        7-13: likely NOT suitable candidate for long-term opioid analgesia       14-21: may be a suitable candidate for long-term opioid analgesia     Previous Diagnostic Tests:   N/A     ASSESSMENT:   1.  Chronic severe oral/facial pain  2.  Anxiety/depression    Tracy presents for medication management appointment.  Despite the recent report from her primary care provider that she was doing well on her current regimen.  Today she is crying and saying that she has very poor pain control and is not sleeping, etc.  I do believe that her anxiety plays a large role in her experience of pain.  She has completed pain psychology however I think that a more broad-based general psychotherapist is highly recommended.  She does not feel that MS Contin provides the  relief that she gets from [place].  Reviewed that the long-acting medication is intended to bring down her baseline pain but she does not want to take it anymore.  Given some of the confusion around her dosing, I agree that it is probably better to stick with one medication.  I will prescribe Norco 10/325 mg #130 tabs to last 30 days.  I was very clear with her and explained multiple times that she has 4 tabs every day and 10 additional tabs only for high pain days.  Reviewed multiple times that if she takes 5 tablets every day she will run out early and she will not get an early refill.      Plan:    Diagnosis reviewed, treatment option addressed, and risk/benefits discussed.  Self-care instructions given.  I am recommending a multidisciplinary treatment plan to help this patient better manage pain.      1. Follow-up at the [facility] pain management Center on April 2 at 10 AM.  2. Labs: Make a lab only appointment for an annual urine drug screen  3. Referrals: Make a nurse visit appt at Dr Solis's clinic to sign the controlled substance agreement.   4. Medication recommendations:   1. STOP MS Contin (Morphine)   2. START Norco 10/325 mg 1 tab 4 times daily. Ok for a 5th tablet if you have opiate withdrawal symptoms or if pain is very severe. #130 tablets must last 30 days.    I have reviewed the note as documented above.  This accurately captures the substance of my conversation with the patient.    BILLING TIME DOCUMENTATION:   TOTAL TIME includes:   Time spent preparing to see the patient: 5 minutes (reviewing records and tests)  Time spend face to face with the patient: 30 minutes  Time spent ordering tests, medications, procedures and referrals: 0 minutes  Time spent Referring and communicating with other healthcare professionals: 0 minutes  Documenting clinical information in Epic: 0 minutes    The total TIME spent on this patient on the day of the appointment was 35 minutes.     Lyssa  YANY Michelle, NP-C  [facility] [place]

## 2021-03-08 ENCOUNTER — VIRTUAL VISIT (OUTPATIENT)
Dept: PALLIATIVE MEDICINE | Facility: CLINIC | Age: 76
End: 2021-03-08
Payer: COMMERCIAL

## 2021-03-08 DIAGNOSIS — Z79.899 LONG-TERM USE OF HIGH-RISK MEDICATION: ICD-10-CM

## 2021-03-08 DIAGNOSIS — G89.4 CHRONIC PAIN SYNDROME: ICD-10-CM

## 2021-03-08 DIAGNOSIS — G89.29 CHRONIC FACIAL PAIN: Primary | ICD-10-CM

## 2021-03-08 DIAGNOSIS — G51.9 FACIAL NEUROPATHY: ICD-10-CM

## 2021-03-08 DIAGNOSIS — R51.9 CHRONIC FACIAL PAIN: Primary | ICD-10-CM

## 2021-03-08 PROCEDURE — 99214 OFFICE O/P EST MOD 30 MIN: CPT | Mod: 95 | Performed by: NURSE PRACTITIONER

## 2021-03-08 RX ORDER — HYDROCODONE BITARTRATE AND ACETAMINOPHEN 10; 325 MG/1; MG/1
1-2 TABLET ORAL EVERY 4 HOURS PRN
Qty: 130 TABLET | Refills: 0 | Status: SHIPPED | OUTPATIENT
Start: 2021-03-08 | End: 2021-04-15

## 2021-03-08 ASSESSMENT — PAIN SCALES - GENERAL: PAINLEVEL: EXTREME PAIN (8)

## 2021-03-16 NOTE — TELEPHONE ENCOUNTER
Outreach X1.     Kylah Kim, DIN, RN  Care Coordinator  [facility] [place]

## 2021-03-17 ENCOUNTER — ALLIED HEALTH/NURSE VISIT (OUTPATIENT)
Dept: FAMILY MEDICINE | Facility: CLINIC | Age: 76
End: 2021-03-17
Payer: COMMERCIAL

## 2021-03-17 DIAGNOSIS — Z79.891 ENCOUNTER FOR LONG-TERM USE OF OPIATE ANALGESIC: ICD-10-CM

## 2021-03-17 DIAGNOSIS — G89.4 CHRONIC PAIN SYNDROME: Primary | ICD-10-CM

## 2021-03-17 LAB
1OH-MIDAZOLAM UR QL SCN: NORMAL
6MAM UR CFM-MCNC: NORMAL NG/ML
6MAM/CREAT UR: NORMAL NG/MG{CREAT}
7AMINOCLONAZEPAM UR QL CFM: NORMAL
7AMINOFLUNITRAZEPAM UR QL: NORMAL
A-OH ALPRAZ UR QL CFM: NORMAL
A-OH-TRIAZOLAM UR QL SCN: NORMAL
ALPRAZ UR QL CFM: NORMAL
AMPHET UR CFM-MCNC: NORMAL NG/ML
AMPHET/CREAT UR: NORMAL NG/MG{CREAT}
BUPRENORPHINE UR CFM-MCNC: NORMAL NG/ML
BUPRENORPHINE/CREAT UR: NORMAL NG/MG{CREAT}
BZE UR CFM-MCNC: NORMAL NG/ML
BZE/CREAT UR: NORMAL NG/MG{CREAT}
CLONAZEPAM UR QL CFM: NORMAL
COCAETHYLENE UR CFM-MCNC: NORMAL NG/ML
COCAETHYLENE/CREAT UR CFM: NORMAL NG/MG{CREAT}
CODEINE UR CFM-MCNC: NORMAL NG/ML
CODEINE/CREAT UR: NORMAL NG/MG{CREAT}
CREAT UR-MCNC: NORMAL MG/DL
DHC UR CFM-MCNC: NORMAL NG/ML
DHC/CREAT UR: NORMAL NG/MG{CREAT}
DIAZEPAM UR QL CFM: NORMAL
EDDP CTO UR SCN-MCNC: NORMAL NG/ML
EDDP/CREAT UR: NORMAL NG/MG{CREAT}
FENTANYL UR CFM-MCNC: NORMAL NG/ML
FENTANYL/CREAT UR: NORMAL NG/MG{CREAT}
GABAPENTIN UR QL CFM: NORMAL
HYDROCODONE UR CFM-MCNC: NORMAL NG/ML
HYDROCODONE/CREAT UR: NORMAL NG/MG{CREAT}
HYDROMORPHONE UR CFM-MCNC: NORMAL NG/ML
HYDROMORPHONE/CREAT UR: NORMAL NG/MG{CREAT}
KETAMINE UR QL CFM: NORMAL
LORAZEPAM UR QL CFM: NORMAL
MDA UR CFM-MCNC: NORMAL NG/ML
MDA/CREAT UR: NORMAL NG/MG{CREAT}
MDEA UR CFM-MCNC: NORMAL NG/ML
MDEA/CREAT UR: NORMAL NG/MG{CREAT}
MDMA UR CFM-MCNC: NORMAL NG/ML
MDMA/CREAT UR: NORMAL NG/MG{CREAT}
ME-PHENIDATE UR CFM-MCNC: NORMAL NG/ML
ME-PHENIDATE UR CFM-MCNC: NORMAL NG/ML
ME-PHENIDATE/CREAT UR: NORMAL NG/MG{CREAT}
ME-PHENIDATE/CREAT UR: NORMAL NG/MG{CREAT}
MEPERIDINE UR CFM-MCNC: NORMAL NG/ML
MEPERIDINE/CREAT UR: NORMAL NG/MG{CREAT}
METHADONE UR CFM-MCNC: NORMAL NG/ML
METHADONE/CREAT UR: NORMAL NG/MG{CREAT}
METHAMPHET UR CFM-MCNC: NORMAL NG/ML
METHAMPHET/CREAT UR: NORMAL NG/MG{CREAT}
MORPHINE UR CFM-MCNC: NORMAL NG/ML
MORPHINE/CREAT UR: NORMAL NG/MG{CREAT}
N-NORTRAMADOL/CREAT UR CFM: NORMAL NG/MG{CREAT}
NALOXONE UR CFM-MCNC: NORMAL NG/ML
NALOXONE: NORMAL NG/MG{CREAT}
NALTREXONE UR CFM-MCNC: NORMAL NG/ML
NALTREXONE/CREAT UR: NORMAL NG/MG{CREAT}
NORBUPRENORPHINE UR CFM-MCNC: NORMAL NG/ML
NORBUPRENORPHINE/CREAT UR: NORMAL NG/MG{CREAT}
NORCODEINE UR-MCNC: NORMAL NG/ML
NORCODEINE/CREAT UR CFM: NORMAL NG/MG{CREAT}
NORDIAZEPAM UR QL CFM: NORMAL
NORFENTANYL UR CFM-MCNC: NORMAL NG/ML
NORFENTANYL/CREAT UR: NORMAL NG/MG{CREAT}
NORMEPERIDINE UR CFM-MCNC: NORMAL NG/ML
NORMEPERIDINE/CREAT UR: NORMAL NG/MG{CREAT}
NORTAPENTADOL UR QL SCN: NORMAL
O-NORTRAMADOL UR CFM-MCNC: NORMAL NG/ML
OXAZEPAM UR QL CFM: NORMAL
OXYCODONE UR CFM-MCNC: NORMAL NG/ML
OXYCODONE/CREAT UR: NORMAL NG/MG{CREAT}
OXYMORPHONE UR CFM-MCNC: NORMAL NG/ML
OXYMORPHONE/CREAT UR: NORMAL NG/MG{CREAT}
PCP UR QL CFM: NORMAL
PREGABALIN UR QL CFM: NORMAL
PROPOXYPH UR QL CFM: NORMAL
RPT COMMENT: NORMAL
SUFENTANIL UR-MCNC: NORMAL NG/ML
SUFENTANIL/CREAT UR CFM: NORMAL NG/MG{CREAT}
TAPENTADOL UR QL CFM: NORMAL
TEMAZEPAM UR QL CFM: NORMAL
THEBAINE SERPL-MCNC: NORMAL NG/ML
THEBAINE: NORMAL NG/MG{CREAT}
TRAMADOL CTO UR CFM-MCNC: NORMAL NG/ML
TRAMADOL/CREAT UR: NORMAL NG/MG{CREAT}

## 2021-03-17 PROCEDURE — 99207 PR NO CHARGE NURSE ONLY: CPT

## 2021-03-17 PROCEDURE — 80307 DRUG TEST PRSMV CHEM ANLYZR: CPT | Performed by: NURSE PRACTITIONER

## 2021-03-19 LAB
CREAT UR-MCNC: 43 MG/DL
DHC UR CFM-MCNC: 620 NG/ML
DHC/CREAT UR: 1442 NG/MG{CREAT}
HYDROCODONE UR CFM-MCNC: 2220 NG/ML
HYDROCODONE/CREAT UR: 5163 NG/MG{CREAT}
HYDROMORPHONE UR CFM-MCNC: 700 NG/ML
HYDROMORPHONE/CREAT UR: 1628 NG/MG{CREAT}
NALOXONE UR CFM-MCNC: 9 NG/ML
NALOXONE: 21 NG/MG{CREAT}
RPT COMMENT: ABNORMAL

## 2021-03-21 NOTE — PATIENT INSTRUCTIONS
After Visit Instructions:     Thank you for coming to [facility] [place] for your care. It is my goal to partner with you to help you reach your optimal state of health.     Continue daily self-care, identifying contributing factors, and monitoring variations in pain level. Continue to integrate self-care into your life.      1. Follow-up at the [facility] [place] on April 2 at 10 AM.  2. Labs: Make a lab only appointment for an annual urine drug screen  3. Referrals: Make a nurse visit appt at Dr Solis's clinic to sign the controlled substance agreement.   4. Medication recommendations:   1. STOP MS Contin (Morphine)   2. START Norco 10/325 mg 1 tab 4 times daily. Ok for a 5th tablet if you have opiate withdrawal symptoms or if pain is very severe. #130 tablets must last 30 days.      YANY Myers, NP-C  [facility]  [facility]    Clinic Number:  [phone]     Call with any questions about your care and for scheduling assistance.     Calls are returned Monday through Friday between 8 AM and 4:30 PM. We usually get back to you within 2 business days depending on the issue/request.    If we are prescribing your medications:    For opioid medication refills, call the clinic or send a Echo360 message 7 days in advance.  Please include:    Name of requested medication    Name of the pharmacy.    For non-opioid medications, call your pharmacy directly to request a refill. Please allow 3-4 days to be processed.     Per [place] State Law:    All controlled substance prescriptions must be filled within 30 days of being written.      For those controlled substances allowing refills, pickup must occur within 30 days of last fill.      We believe regular attendance is key to your success in our program!      Any time you are unable to keep your appointment we ask that you call us at least 24 hours in advance to cancel.This will  allow us to offer the appointment time to another patient.   Multiple missed appointments may lead to dismissal from the clinic.

## 2021-03-22 ENCOUNTER — TELEPHONE (OUTPATIENT)
Dept: PALLIATIVE MEDICINE | Facility: CLINIC | Age: 76
End: 2021-03-22

## 2021-04-15 DIAGNOSIS — G89.4 CHRONIC PAIN SYNDROME: ICD-10-CM

## 2021-04-15 RX ORDER — HYDROCODONE BITARTRATE AND ACETAMINOPHEN 10; 325 MG/1; MG/1
1-2 TABLET ORAL EVERY 4 HOURS PRN
Qty: 130 TABLET | Refills: 0 | Status: SHIPPED | OUTPATIENT
Start: 2021-04-15 | End: 2021-05-14

## 2021-04-15 NOTE — TELEPHONE ENCOUNTER
Spoke to patient, notified that prescription was sent to preferred pharmacy.    Able to fill  and start 04/15/21      Gabriela Dowd MA  [facility] [place]

## 2021-04-15 NOTE — TELEPHONE ENCOUNTER
Covering for provider who is out of the office.  Refill appears appropriate and was sent to requested pharmacy.    [place] Prescription Monitoring Program Checked 4/15/21    Diana Coley PA-C  [facility]

## 2021-04-15 NOTE — TELEPHONE ENCOUNTER
Medication refill information reviewed.     Due date for Norco is anytime after 4/14/21     Prescription prepped for review.     Will route to provider.     ID ColemanN, RN-BC  Patient Care Supervisor  [facility] [place]

## 2021-04-21 DIAGNOSIS — K21.9 GASTROESOPHAGEAL REFLUX DISEASE: ICD-10-CM

## 2021-05-05 DIAGNOSIS — I10 ESSENTIAL HYPERTENSION WITH GOAL BLOOD PRESSURE LESS THAN 140/90: ICD-10-CM

## 2021-05-05 RX ORDER — LISINOPRIL 20 MG/1
TABLET ORAL
Qty: 90 TABLET | Refills: 1 | Status: SHIPPED | OUTPATIENT
Start: 2021-05-05 | End: 2021-11-24

## 2021-05-05 NOTE — TELEPHONE ENCOUNTER
"Requested Prescriptions   Pending Prescriptions Disp Refills     lisinopril (ZESTRIL) 20 MG tablet [Pharmacy Med Name: LISINOPRIL 20MG TABS] 90 tablet 1     Sig: TAKE ONE TABLET BY MOUTH ONCE DAILY       ACE Inhibitors (Including Combos) Protocol Failed - 5/5/2021 12:33 PM        Failed - Normal serum creatinine on file in past 12 months     Recent Labs   Lab Test 06/10/19  1430   CR 0.79       Ok to refill medication if creatinine is low          Failed - Normal serum potassium on file in past 12 months     Recent Labs   Lab Test 06/10/19  1430   POTASSIUM 4.3             Passed - Blood pressure under 140/90 in past 12 months     BP Readings from Last 3 Encounters:   10/02/20 124/62   09/08/20 128/64   07/06/20 110/48                 Passed - Recent (12 mo) or future (30 days) visit within the authorizing provider's specialty     Patient has had an office visit with the authorizing provider or a provider within the authorizing providers department within the previous 12 mos or has a future within next 30 days. See \"Patient Info\" tab in inbasket, or \"Choose Columns\" in Meds & Orders section of the refill encounter.              Passed - Medication is active on med list        Passed - Patient is age 18 or older        Passed - No active pregnancy on record        Passed - No positive pregnancy test within past 12 months             "

## 2021-05-14 ENCOUNTER — TELEPHONE (OUTPATIENT)
Dept: PALLIATIVE MEDICINE | Facility: CLINIC | Age: 76
End: 2021-05-14

## 2021-05-14 DIAGNOSIS — G89.4 CHRONIC PAIN SYNDROME: ICD-10-CM

## 2021-05-14 RX ORDER — HYDROCODONE BITARTRATE AND ACETAMINOPHEN 10; 325 MG/1; MG/1
1-2 TABLET ORAL EVERY 4 HOURS PRN
Qty: 130 TABLET | Refills: 0 | Status: SHIPPED | OUTPATIENT
Start: 2021-05-14 | End: 2021-06-07

## 2021-05-14 NOTE — TELEPHONE ENCOUNTER
Pt wants to switch her pain provider from Lyssa Pathak  to Padmini Shannon in [place]. Pt was informed of the process. She is waiting for a call back to discuss why.      Clifton CHRISTIE    [facility] [place]

## 2021-05-14 NOTE — TELEPHONE ENCOUNTER
Covering for provider who is out of the office.  Refill appears appropriate and was sent to requested pharmacy.    [place] Check[place] 4/15/21    Diana Coley PA-C  [facility]

## 2021-05-14 NOTE — TELEPHONE ENCOUNTER
Will ask nursing to call and determine reason for changing.    Appears she lives in [place] so may be a distance issue    Jahaira Sloan MD  [facility]

## 2021-05-14 NOTE — TELEPHONE ENCOUNTER
Medication refill information reviewed. Other request: Per pt she showed up to her appt in [place] today with Lyssa, she did not get the message to r/s. She is out of her medication today and needs refill asap    Due date for HYDROcodone-acetaminophen (NORCO)  MG per tablet  is  5/15/21     Prescriptions prepped for review.     Will route to provider.

## 2021-05-14 NOTE — TELEPHONE ENCOUNTER
E-prescription confirmation received. Patient was informed. Patient expressed frustration, as her last 2 appointment with Lyssa were cancelled, the 2nd of which she said she wasn't informed about. Gave her scheduling phone number to see if she could get in with another/covering provider.

## 2021-05-14 NOTE — TELEPHONE ENCOUNTER
Reason for call:  Medication   If this is a refill request, has the caller requested the refill from the pharmacy already? No  Will the patient be using a [facility]? No  Name of the pharmacy and phone number for the current request: [facility], [place] - [address] [place] [address]    Name of the medication requested: HYDROcodone-acetaminophen (NORCO)  MG per tablet    Other request: Per pt she showed up to her appt in [place] today with Lyssa, she did not get the message to r/s. She is out of her medication today and needs refill asap    Phone number to reach patient:  Home number on file [phone] (home)    Can we leave a detailed message on this number?  YES    Travel screening: Not Applicable         Clifton CHRISTIE    [facility]

## 2021-05-14 NOTE — TELEPHONE ENCOUNTER
Received call from patient  requesting refill(s) for HYDROcodone-acetaminophen (NORCO)  MG per tablet     Last dispensed from pharmacy on 4/15/21    Patient's last office/virtual visit by prescribing provider on 3/8/21  Next office/virtual appointment scheduled for 5/28/21    Last urine drug screen date 3/17/21  Current opioid agreement on file? Yes Date of opioid agreement: 3/21/21    E-prescribe to:    [facility], [place] - [address] [place] [address]    Will route to [facility] for review and preparation of prescription(s).

## 2021-05-17 NOTE — TELEPHONE ENCOUNTER
Writer attempted to call pt, No answer.  LVM for Pt to call writer back at [phone].       Ruth Cid RN-BSN  [facility]

## 2021-05-18 ENCOUNTER — OFFICE VISIT (OUTPATIENT)
Dept: FAMILY MEDICINE | Facility: CLINIC | Age: 76
End: 2021-05-18
Payer: COMMERCIAL

## 2021-05-18 VITALS
BODY MASS INDEX: 18.71 KG/M2 | WEIGHT: 109 LBS | DIASTOLIC BLOOD PRESSURE: 60 MMHG | TEMPERATURE: 97.8 F | SYSTOLIC BLOOD PRESSURE: 136 MMHG

## 2021-05-18 DIAGNOSIS — F41.1 ANXIETY STATE: Primary | Chronic | ICD-10-CM

## 2021-05-18 PROCEDURE — 99214 OFFICE O/P EST MOD 30 MIN: CPT | Performed by: FAMILY MEDICINE

## 2021-05-18 NOTE — PROGRESS NOTES
Assessment & Plan     Anxiety state  Long conversation regarding her  and fears she has re diagnosis she may get     Reassurance that he would need and evaluation first and to then determine next steps     35 minutes spent on the date of the encounter doing chart review, patient visit and documentation            Return in about 1 week (around 5/25/2021), or if symptoms worsen or fail to improve.    Gwendolyn Solis MD  [facility]    Subjective   Tracy is a 76 year old who presents for the following health issues     HPI      is having memory loss - seeing Dr. Park  Daughter was upset with pt to have her memory tested also  Tracy is extremely agitated and angry today   Scared something is going on with her   That he has dementia  She feels she has caught him getting scammed by credit card    She feels he forgets where he is going often   She tearful and says she is scared about having to take care of everything         She states she was forced to come to the clinic today by her daughter to have her memory checked       Review of Systems   Constitutional, HEENT, cardiovascular, pulmonary, gi and gu systems are negative, except as otherwise noted.      Objective    /60   Temp 97.8  F (36.6  C) (Tympanic)   Wt 49.4 kg (109 lb)   BMI 18.71 kg/m    Body mass index is 18.71 kg/m .  Physical Exam   GENERAL APPEARANCE: healthy, alert and no distress  PSYCH: mentation appears normal and agitated angry and pressured in speech     Refused to complete the slums

## 2021-05-18 NOTE — NURSING NOTE
"Chief Complaint   Patient presents with     Memory Loss     /60   Temp 97.8  F (36.6  C) (Tympanic)   Wt 49.4 kg (109 lb)   BMI 18.71 kg/m   Estimated body mass index is 18.71 kg/m  as calculated from the following:    Height as of 10/2/20: 1.626 m (5' 4\").    Weight as of this encounter: 49.4 kg (109 lb).  Patient presents to the clinic using No DME      Health Maintenance that is potentially due pending provider review:    Health Maintenance Due   Topic Date Due     ANNUAL REVIEW OF HM ORDERS  Never done     HEPATITIS C SCREENING  Never done     HEPATITIS B IMMUNIZATION (1 of 3 - Risk 3-dose series) Never done        n/a        "

## 2021-05-19 DIAGNOSIS — F51.01 PRIMARY INSOMNIA: Chronic | ICD-10-CM

## 2021-05-19 RX ORDER — ZOLPIDEM TARTRATE 10 MG/1
TABLET ORAL
Qty: 30 TABLET | Refills: 5 | Status: SHIPPED | OUTPATIENT
Start: 2021-05-19 | End: 2021-12-15

## 2021-05-19 NOTE — TELEPHONE ENCOUNTER
Continue vitamin C supplementation, add MVI daily. Obtain daily weights to monitor trends. Requested Prescriptions   Pending Prescriptions Disp Refills     zolpidem (AMBIEN) 10 MG tablet [Pharmacy Med Name: ZOLPIDEM TARTRATE 10MG TABS] 30 tablet 5     Sig: TAKE 1/2 TO 1 TABLET BY MOUTH EVERY NIGHT AT BEDTIME AS NEEDED FOR SLEEP       There is no refill protocol information for this order        Last Written Prescription Date:  10/20/20  Last Fill Quantity: 30,  # refills: 5   Last office visit: 5/18/2021 with prescribing provider:     Future Office Visit:

## 2021-05-23 DIAGNOSIS — E78.5 HYPERLIPIDEMIA LDL GOAL <130: ICD-10-CM

## 2021-05-23 DIAGNOSIS — R79.89 ELEVATED LFTS: ICD-10-CM

## 2021-05-24 ENCOUNTER — RECORDS - HEALTHEAST (OUTPATIENT)
Dept: ADMINISTRATIVE | Facility: CLINIC | Age: 76
End: 2021-05-24

## 2021-05-24 RX ORDER — SIMVASTATIN 10 MG
TABLET ORAL
Qty: 90 TABLET | Refills: 3 | Status: SHIPPED | OUTPATIENT
Start: 2021-05-24 | End: 2022-03-21

## 2021-05-27 ENCOUNTER — TELEPHONE (OUTPATIENT)
Dept: FAMILY MEDICINE | Facility: CLINIC | Age: 76
End: 2021-05-27

## 2021-05-27 NOTE — TELEPHONE ENCOUNTER
Reason for Call:  Other check-in    Detailed comments: daughter Kylah called wondering if she could receive a call from the  re: mom. She said she is exhibiting signs of memory issues and she just wants to go over the latest office info with  (not finding c2c on chart but per Kylah, mom stated she was asked at last visit if the team could communicate with her children and she said yes)     Phone Number Patient can be reached at: Other phone number:  [phone]    Best Time: any    Can we leave a detailed message on this number? YES    Call taken on 5/27/2021 at 1:24 PM by Kylah Stone

## 2021-05-27 NOTE — TELEPHONE ENCOUNTER
Call placed to daughter. Consent to communicate is not active. Form mailed to patient for completion per request.

## 2021-05-28 ENCOUNTER — OFFICE VISIT (OUTPATIENT)
Dept: FAMILY MEDICINE | Facility: CLINIC | Age: 76
End: 2021-05-28
Payer: COMMERCIAL

## 2021-05-28 VITALS
SYSTOLIC BLOOD PRESSURE: 144 MMHG | WEIGHT: 109 LBS | BODY MASS INDEX: 20.58 KG/M2 | RESPIRATION RATE: 10 BRPM | DIASTOLIC BLOOD PRESSURE: 70 MMHG | HEIGHT: 61 IN | HEART RATE: 92 BPM

## 2021-05-28 DIAGNOSIS — R41.3 MEMORY LOSS: Primary | ICD-10-CM

## 2021-05-28 LAB
ALBUMIN SERPL-MCNC: 3.6 G/DL (ref 3.4–5)
ALP SERPL-CCNC: 56 U/L (ref 40–150)
ALT SERPL W P-5'-P-CCNC: 17 U/L (ref 0–50)
ANION GAP SERPL CALCULATED.3IONS-SCNC: 2 MMOL/L (ref 3–14)
AST SERPL W P-5'-P-CCNC: 13 U/L (ref 0–45)
BASOPHILS # BLD AUTO: 0.1 10E9/L (ref 0–0.2)
BASOPHILS NFR BLD AUTO: 1 %
BILIRUB SERPL-MCNC: 0.3 MG/DL (ref 0.2–1.3)
BUN SERPL-MCNC: 15 MG/DL (ref 7–30)
CALCIUM SERPL-MCNC: 8.9 MG/DL (ref 8.5–10.1)
CHLORIDE SERPL-SCNC: 110 MMOL/L (ref 94–109)
CO2 SERPL-SCNC: 29 MMOL/L (ref 20–32)
CREAT SERPL-MCNC: 0.87 MG/DL (ref 0.52–1.04)
DIFFERENTIAL METHOD BLD: ABNORMAL
EOSINOPHIL # BLD AUTO: 0.3 10E9/L (ref 0–0.7)
EOSINOPHIL NFR BLD AUTO: 5 %
ERYTHROCYTE [DISTWIDTH] IN BLOOD BY AUTOMATED COUNT: 14.6 % (ref 10–15)
GFR SERPL CREATININE-BSD FRML MDRD: 64 ML/MIN/{1.73_M2}
GLUCOSE SERPL-MCNC: 111 MG/DL (ref 70–99)
HCT VFR BLD AUTO: 34.4 % (ref 35–47)
HGB BLD-MCNC: 11.3 G/DL (ref 11.7–15.7)
LYMPHOCYTES # BLD AUTO: 1.3 10E9/L (ref 0.8–5.3)
LYMPHOCYTES NFR BLD AUTO: 24.9 %
MCH RBC QN AUTO: 28.6 PG (ref 26.5–33)
MCHC RBC AUTO-ENTMCNC: 32.8 G/DL (ref 31.5–36.5)
MCV RBC AUTO: 87 FL (ref 78–100)
MONOCYTES # BLD AUTO: 0.4 10E9/L (ref 0–1.3)
MONOCYTES NFR BLD AUTO: 6.9 %
NEUTROPHILS # BLD AUTO: 3.3 10E9/L (ref 1.6–8.3)
NEUTROPHILS NFR BLD AUTO: 62.2 %
PLATELET # BLD AUTO: 390 10E9/L (ref 150–450)
POTASSIUM SERPL-SCNC: 4.9 MMOL/L (ref 3.4–5.3)
PROT SERPL-MCNC: 7.1 G/DL (ref 6.8–8.8)
RBC # BLD AUTO: 3.95 10E12/L (ref 3.8–5.2)
SODIUM SERPL-SCNC: 141 MMOL/L (ref 133–144)
TSH SERPL DL<=0.005 MIU/L-ACNC: 0.66 MU/L (ref 0.4–4)
VIT B12 SERPL-MCNC: 287 PG/ML (ref 193–986)
WBC # BLD AUTO: 5.2 10E9/L (ref 4–11)

## 2021-05-28 PROCEDURE — 82607 VITAMIN B-12: CPT | Performed by: FAMILY MEDICINE

## 2021-05-28 PROCEDURE — 84443 ASSAY THYROID STIM HORMONE: CPT | Performed by: FAMILY MEDICINE

## 2021-05-28 PROCEDURE — 99215 OFFICE O/P EST HI 40 MIN: CPT | Performed by: FAMILY MEDICINE

## 2021-05-28 PROCEDURE — 85025 COMPLETE CBC W/AUTO DIFF WBC: CPT | Performed by: FAMILY MEDICINE

## 2021-05-28 PROCEDURE — 36415 COLL VENOUS BLD VENIPUNCTURE: CPT | Performed by: FAMILY MEDICINE

## 2021-05-28 PROCEDURE — 80053 COMPREHEN METABOLIC PANEL: CPT | Performed by: FAMILY MEDICINE

## 2021-05-28 ASSESSMENT — MIFFLIN-ST. JEOR: SCORE: 916.8

## 2021-05-28 NOTE — PROGRESS NOTES
Assessment & Plan     Memory loss  With erratic behavior   Discussed at length with pt my rationale regarding work up for organic cause   This may infact be all anxiety/stress but need to rule out organic cause   She was very angry with me     - TSH with free T4 reflex  - Comprehensive metabolic panel  - CBC with platelets differential  - Vitamin B12  - MR Brain w/o & w Contrast; Future      45 minutes spent on the date of the encounter doing chart review, patient visit, documentation and discussion with family     I spent 20 minutes on phone talking with daughter regarding pts behavior and she did agree with the plan and agreed that behavior had changed significantly      Patient Instructions   Labs today     Set up  [phone]    Call Lyssa Zo    Talk to Logan about going to his appt         Return in about 1 week (around 6/4/2021).    Gwendolyn Solis MD  [facility]    Ashok Molina is a 76 year old who presents for the following health issues     HPI     Memory Loss    Wt Readings from Last 4 Encounters:   05/28/21 49.4 kg (109 lb)   05/18/21 49.4 kg (109 lb)   10/02/20 56.2 kg (124 lb)   07/06/20 56.2 kg (124 lb)     Pt here to discuss issues with memory loss and recent very unusual behavior   Long discussion with her   She is very concerned about her husbands memory and convinced he has dementia  We reviewed all of her concerns regarding this   He has had an eval and found to really have no signs of this per PCP     Daughter concerned regarding her mom  Pt refuses to complete the SLUMS     She states with covid and world issues and several issues with  she is  Beyond concerned and fearful of her future   She is also very angry with her pain management     Opioids have been increased by pain clinic we discussed this at length as this may be affecting her thought processes     She is extremely confrontational with me today and angry   Accusatory   I have  "known her for years and not seen her like this in the past           Review of Systems   Constitutional, HEENT, cardiovascular, pulmonary, gi and gu systems are negative, except as otherwise noted.      Objective    BP (!) 144/70   Pulse 92   Resp 10   Ht 1.549 m (5' 1\")   Wt 49.4 kg (109 lb)   BMI 20.60 kg/m    Body mass index is 20.6 kg/m .  Physical Exam   GENERAL APPEARANCE: healthy and alert  PSYCH: angry erratic distracted moving form one topic to then next   Not logical  - unwilling to listen to reason   Accusatory                 "

## 2021-05-28 NOTE — NURSING NOTE
"Chief Complaint   Patient presents with     Memory Loss     BP (!) 144/70   Pulse 92   Resp 10   Ht 1.549 m (5' 1\")   Wt 49.4 kg (109 lb)   BMI 20.60 kg/m   Estimated body mass index is 20.6 kg/m  as calculated from the following:    Height as of this encounter: 1.549 m (5' 1\").    Weight as of this encounter: 49.4 kg (109 lb).  Patient presents to the clinic using No DME      Health Maintenance that is potentially due pending provider review:    Health Maintenance Due   Topic Date Due     ANNUAL REVIEW OF HM ORDERS  Never done     HEPATITIS C SCREENING  Never done     HEPATITIS B IMMUNIZATION (1 of 3 - Risk 3-dose series) Never done        n/a        "

## 2021-06-02 ENCOUNTER — TELEPHONE (OUTPATIENT)
Dept: PALLIATIVE MEDICINE | Facility: CLINIC | Age: 76
End: 2021-06-02

## 2021-06-02 NOTE — TELEPHONE ENCOUNTER
Pt returning phone call to nursing about a transfer of care to Dr. Shannon. Please call pt at [phone].    Tiffanie CONTE    [facility]

## 2021-06-02 NOTE — TELEPHONE ENCOUNTER
Pt calling requesting to speak with Lyssa Pathak, NP only. Pt does not want to talk to anyone else and declined to schedule an appointment, stated Lyssa said I can call her whenever I want.    Tiffanie CONTE    [facility]

## 2021-06-03 ENCOUNTER — MYC MEDICAL ADVICE (OUTPATIENT)
Dept: FAMILY MEDICINE | Facility: CLINIC | Age: 76
End: 2021-06-03

## 2021-06-03 ENCOUNTER — HOSPITAL ENCOUNTER (OUTPATIENT)
Dept: MRI IMAGING | Facility: CLINIC | Age: 76
Discharge: HOME | End: 2021-06-03
Attending: FAMILY MEDICINE | Admitting: FAMILY MEDICINE
Payer: COMMERCIAL

## 2021-06-03 DIAGNOSIS — R41.3 MEMORY LOSS: ICD-10-CM

## 2021-06-03 PROCEDURE — A9585 GADOBUTROL INJECTION: HCPCS | Performed by: FAMILY MEDICINE

## 2021-06-03 PROCEDURE — 70553 MRI BRAIN STEM W/O & W/DYE: CPT

## 2021-06-03 PROCEDURE — 255N000002 HC RX 255 OP 636: Performed by: FAMILY MEDICINE

## 2021-06-03 RX ORDER — GADOBUTROL 604.72 MG/ML
5 INJECTION INTRAVENOUS ONCE
Status: COMPLETED | OUTPATIENT
Start: 2021-06-03 | End: 2021-06-03

## 2021-06-03 RX ADMIN — GADOBUTROL 5 ML: 604.72 INJECTION INTRAVENOUS at 13:18

## 2021-06-04 ENCOUNTER — MYC MEDICAL ADVICE (OUTPATIENT)
Dept: PALLIATIVE MEDICINE | Facility: CLINIC | Age: 76
End: 2021-06-04

## 2021-06-04 ENCOUNTER — TELEPHONE (OUTPATIENT)
Dept: FAMILY MEDICINE | Facility: CLINIC | Age: 76
End: 2021-06-04

## 2021-06-04 DIAGNOSIS — G89.4 CHRONIC PAIN SYNDROME: ICD-10-CM

## 2021-06-04 NOTE — TELEPHONE ENCOUNTER
Called patient to review MRI results   She is very angry with me that I though her behavior was anything more than anxiety   We discussed the reasons at length as to why I thought further evaluation was indicated. She was very rude and condescending to me on the phone.   I offered to start a medication today for anxiety (based on the message I sent her yesterday, however she responded to Dr Wyman and no me) and she was upset that nothing had not sent anything in by now to the pharmacy as she had been there this am to check.  I offered to set her up with a partner of mine for another opinion and she said she needed to think about it and hung up the phone.

## 2021-06-04 NOTE — TELEPHONE ENCOUNTER
Pt requested refill for Norco when I had a conversation with her.  Sending to nursing and MA to review.  She knows this won't be managed until Monday.    Jahaira Sloan MD  [facility]

## 2021-06-04 NOTE — TELEPHONE ENCOUNTER
Called. No answer.  Left VM who I was, and that I would be trying to call her back next week, either Monday or Tuesday depending on availability.    Jahaira Sloan MD  [facility]     ---------------------  Called back to mobile number and reached her.    Listened to her concerns. Of note, she felt she should have received multiple phone calls. We discussed that the understanding was this was a non-urgent request and a message was left with her to call back. She felt there was a more significant issue, and that she should have gotten multiple calls.    I apologized that we didn't recognize her concerns and urgency. She expressed some concerns with various calls. I let her know I would followup on that.     Ultimately we ended the conversation with a plan.  She has been happy with care of Lyssa Pathak NP but wants to be seen closer to her home.  I approved transition to Ivon Shannon MD, but advised until her appt any medical questions/concerns need to go to Lyssa Pathak NP.      She states her issues/concerns raised in phone call this morning with GLENDY Yu are no longer a concern, and she is very happy with our conversation today. I also explained that Ivon Shannon MD hasn't been involved in this situation at all, so would not have been one to call her back earlier.  She understands.      Please schedule her for a 60 min appt with Ivon Shannon MD.   Will ask Lyssa Pathak NP to please speak with Ivon Shannon MD prior to appt.    Refill request for Norco being managed in separate encounter.

## 2021-06-04 NOTE — TELEPHONE ENCOUNTER
Outreach X1. Left a  requesting call back. Provided call back number.    DI MoniqueN, RN  Care Coordinator  [facility] [place]

## 2021-06-04 NOTE — TELEPHONE ENCOUNTER
Tracy reports she is suing the [facility], Lyssa Pathak and Dr Zuleta for not returning her calls. She reports she would like to speak to the clinic administrator today.     She reports she has had 2 appointments cancelled and one of them she came in for. ( There is documentation that she was called about the cancellation )   She reports she asked to switch providers and no one called her back. (There is documentation that she was called about this)    She reports she is at the pharmacy to  valium before she kills herself.     When asked if she had a plan to kill herself she reports no she does not. She would like to, but she cant because her  with dementia needs her. She reports she will not harm herself and has no plans for suicide.     When the medication was not available at the pharmacy she said now my primary care provider has also failed me.     She also reports her phone was broken but is now fixed.      Kylah Kim, DIN, RN  Care Coordinator  [facility]

## 2021-06-04 NOTE — TELEPHONE ENCOUNTER
Called pt. LM to call back and ask for nursing    Sarah SAMPSON, RN Care Coordinator  [facility]  Pain [facility]

## 2021-06-04 NOTE — TELEPHONE ENCOUNTER
"Called Tracy. It appears that the  staff interpreted her request to see some one else while I was on leave was that she was asking to switch provider permenantly because she states that she was told that she had to speak the medical director to do so. I made several attempts to explain the miscommunication.  Tracy continued to state that only only received one call about an appt and no one ever addressed her need to be seen. I tried to interrupt several times to explain this. Tracy then became upset and stated that I was interrupting her and not allowing her to speak. She again repeated that she thought her concerns were \"lawsuit worthy\". She then stated \"I wish I could say it was nice to talk to you but it wasn't\" and stated that she would wait for a call from the medical director. She stated that she was going to hang up and the call was disconnected.     Phyllis Garcia, the  for the Pain Center was informed of this issue previously today.     YANY Myers, NP-C  [facility]        "

## 2021-06-07 ENCOUNTER — OFFICE VISIT (OUTPATIENT)
Dept: FAMILY MEDICINE | Facility: CLINIC | Age: 76
End: 2021-06-07
Payer: COMMERCIAL

## 2021-06-07 VITALS
RESPIRATION RATE: 16 BRPM | HEIGHT: 61 IN | HEART RATE: 80 BPM | TEMPERATURE: 98.1 F | SYSTOLIC BLOOD PRESSURE: 130 MMHG | BODY MASS INDEX: 20.6 KG/M2 | DIASTOLIC BLOOD PRESSURE: 70 MMHG

## 2021-06-07 DIAGNOSIS — F43.9 STRESS: ICD-10-CM

## 2021-06-07 DIAGNOSIS — F41.9 ANXIETY: Primary | ICD-10-CM

## 2021-06-07 PROCEDURE — 99213 OFFICE O/P EST LOW 20 MIN: CPT | Performed by: FAMILY MEDICINE

## 2021-06-07 RX ORDER — HYDROCODONE BITARTRATE AND ACETAMINOPHEN 10; 325 MG/1; MG/1
1-2 TABLET ORAL EVERY 4 HOURS PRN
Qty: 130 TABLET | Refills: 0 | Status: SHIPPED | OUTPATIENT
Start: 2021-06-07 | End: 2021-07-16

## 2021-06-07 NOTE — TELEPHONE ENCOUNTER
DIMITRY to schedule follow up with Padmini. JASWINDER 60  Mins.        Routing as a MADY Tinoco    [place] [facility]

## 2021-06-07 NOTE — TELEPHONE ENCOUNTER
Patient requesting refill(s) of HYDROcodone-acetaminophen (NORCO)  MG per tablet    Last dispensed from pharmacy on 5/14/2021    Patient's last office/virtual visit by prescribing provider on 3/8/2021  Next office/virtual appointment scheduled for 6/25/2021    Last urine drug screen date 3/17/2021  Current opioid agreement on file (completed within the last year) Yes Date of opioid agreement: 3/17/2021    E-prescribe to [place], [place]     Will route to nursing [place] for review and preparation of prescription(s).

## 2021-06-07 NOTE — TELEPHONE ENCOUNTER
Script Eprescribed to pharmacy    Will send this to MA team to notify patient.    Signed Prescriptions:                        Disp   Refills    HYDROcodone-acetaminophen (NORCO)  M*130 ta*0        Sig: Take 1-2 tablets by mouth every 4 hours as needed for           severe pain (Max of 5 tabs per day) #130 tabs is           30 days supply. OK to fill 06/12/21 and start           on/after 6/14/21.  Authorizing Provider: GUERA SMART APRN, NP-C  [facility] [place]

## 2021-06-07 NOTE — TELEPHONE ENCOUNTER
Medication refill information reviewed. Please review. day supp Is #130 a 30ly? Sig says max 5/day     Due date for HYDROcodone-acetaminophen (NORCO)  MG per tablet is 06/14/21     Prescriptions prepped for review.     Will route to provider.

## 2021-06-07 NOTE — TELEPHONE ENCOUNTER
LVM, notified that prescription was sent to preferred pharmacy.    Able to fill 06/12/21 and start 06/14/21      Gabriela Dowd MA  [facility] [place]

## 2021-06-07 NOTE — PROGRESS NOTES
ASSESSMENT:     ICD-10-CM    1. Anxiety  F41.9    2. Stress  F43.9       After doing further testing with IGOR SULTANA for her  today, her concerns seem legitimate.  I have ordered further testing regarding his memory.  She will be seen later this week for assessment of her anxiety and stress.        Subjective   Tracy is a 76 year old who presents for the following health issues  Chief Complaint   Patient presents with     Consult        Memory Concerns Consult    Tracy was in today accompanied by her  Logan primarily to give me her side of the story regarding concerns about Logan's memory loss.  He had declined to have her present at the last visit he had regarding his memory.  His preliminary evaluation for dementia with mini cog and MMSE testing was unremarkable.     There were concerns about Tracy having some mental health issues as well as she had been angry and irritable with healthcare providers.  She has concerns about her pain clinic not listening to her when she reported wanting to switch providers.  She had the feeling she was on too many narcotics for chronic facial pain problem.  She feels irritable because her concerns are legitimate and not being taken seriously.  She reports being under a lot of stress and having a lot of anxiety because of her 's memory problems and financial problems related to con artists convincing her  to allow withdrawals of money from their account.  She reports she and her  spend 2 hours at the bank today rearranging bank accounts and stopping automatic payments.    She and her  have been  for 56 years.  She wants to do everything possible to find out what her 's memory problem is.    She did not really want to be treated herself today but was in mostly about her .  She has been seeing Dr. Solis but wanted to see another provider for her stress and anxiety.  She has an appointment set up later this week with   "Garo.    On medication a long time ago for mental health..   Taking hydrocodone/acetaminophen 5/325mg typically three per day.    Chronic pain. She reports tooth cracked accidentally from dental surgery resulting in chronic facial pain.       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 rate 16, height 1.549 m (5' 1\"), not currently breastfeeding. BMI=Body mass index is 20.6 kg/m .  GENERAL APPEARANCE ADULT: Alert, no acute distress, healthy appearance  PSYCH: mentation appears normal., affect and mood normal, anxious and stressed.      "

## 2021-06-08 ENCOUNTER — TELEPHONE (OUTPATIENT)
Dept: FAMILY MEDICINE | Facility: CLINIC | Age: 76
End: 2021-06-08

## 2021-06-08 NOTE — TELEPHONE ENCOUNTER
Reason for Call:  Other     Detailed comments: Pt's daughter says her mom was to have apt with Dr Solis this morning and wanted to follow up with Dr Solis about this.     Phone Number Patient can be reached at:  [phone] Kylah Bonner Time: anytime    Can we leave a detailed message on this number? YES    Call taken on 6/8/2021 at 11:30 AM by Rose Dominguez

## 2021-06-08 NOTE — TELEPHONE ENCOUNTER
"Spoke with pt who states has been having panic/ anxiety. States Dr. Park told her to call anytime.  Also states is est care with Dr. Wyman, rot tomorrow.  When RN reviewing Dr. Park's office visit note/ PLAN from yesterday but states \"ofh forget it\" and hung up.   RN called pt back, LM to call care team.  MAYCO Uriarte RN    "

## 2021-06-08 NOTE — TELEPHONE ENCOUNTER
Reason for call:  Patient reporting a symptom    Symptom or request: Pt is having a Panic Attack crying. Pt was into see Dr. Park yesterday.    Phone Number patient can be reached at:  Home number on file [phone] (home)    Best Time:  Any Time      Can we leave a detailed message on this number:  YES    Call taken on 6/8/2021 at 10:23 AM by Bette Farley

## 2021-06-08 NOTE — TELEPHONE ENCOUNTER
Managed, see 6/2/21 telephone encounter.    Jahaira Sloan MD  [facility]

## 2021-06-08 NOTE — TELEPHONE ENCOUNTER
Spoke with katia and there is consent to share information   Daughter is worried about both of her parents not sure how to help them   We discussed this at length.   She is very involved

## 2021-06-08 NOTE — TELEPHONE ENCOUNTER
Please call daughter and let her know I did not see james today as she is switching MDs  If she would like to talk to me still I am happy to do so.

## 2021-06-08 NOTE — TELEPHONE ENCOUNTER
DIMITRY with  to have patient call us, wondering if she would like to keep her appointment with Lyssa on the 25th. Let her know that Lyssa is fine either way        Lisa Tinoco    [place] [facility]

## 2021-06-09 ENCOUNTER — OFFICE VISIT (OUTPATIENT)
Dept: FAMILY MEDICINE | Facility: CLINIC | Age: 76
End: 2021-06-09
Payer: COMMERCIAL

## 2021-06-09 VITALS
HEIGHT: 61 IN | SYSTOLIC BLOOD PRESSURE: 120 MMHG | HEART RATE: 78 BPM | OXYGEN SATURATION: 99 % | WEIGHT: 109 LBS | BODY MASS INDEX: 20.58 KG/M2 | RESPIRATION RATE: 16 BRPM | DIASTOLIC BLOOD PRESSURE: 72 MMHG

## 2021-06-09 DIAGNOSIS — F11.20 NARCOTIC DEPENDENCE (H): Chronic | ICD-10-CM

## 2021-06-09 DIAGNOSIS — F41.9 ANXIETY: Primary | ICD-10-CM

## 2021-06-09 DIAGNOSIS — F51.01 PRIMARY INSOMNIA: Chronic | ICD-10-CM

## 2021-06-09 DIAGNOSIS — G89.4 CHRONIC PAIN SYNDROME: Chronic | ICD-10-CM

## 2021-06-09 PROCEDURE — 99215 OFFICE O/P EST HI 40 MIN: CPT | Performed by: FAMILY MEDICINE

## 2021-06-09 ASSESSMENT — MIFFLIN-ST. JEOR: SCORE: 916.8

## 2021-06-09 NOTE — PROGRESS NOTES
"A: anxiety, chronic, uncontrolled and worse       Chronic pain      Opioid dependency    P: long visit.  She needs chronic anxiety medication, standard would be ssri.  Agrees to try zoloft, start at 25mg/day, titrate to 50mg, back in a month, up to 100 unless she's doing really well on 50.      She doesn't have someone to talk to regarding her stress/anxiety/.  I think therapy would be very beneficial for her and she's willing to try that as well.  Number given.     On ambien for sleep.  I feel low dose risperdal would be possibly beneficial, but will hold off on that for now.  Something to really calm her mind, help with rumination might ultimately be useful.     I told her opioids chronically can cause anxiety, intermittent withdrawal during day as she craves next dose.  I think cutting down, ideally even seeing if she can taper off, would be a worthwhile plan.  She will try, but is nervous.   I think adding some gabapentin again or even something like tegretol and/or nortriptyline would be an option.      Bottom line, anxiety treatment with less opioids is the direction I'd like her to go, and she is willing to try.  Back in a month      40  min spent on date of encounter reviewing chart, history, physical, documenting and counseling as mentioned in this note       S :Tracy Galloway is a 76 year old female with anxiety.  Longstanding.  Multifactorial causes.   Chronic pain in jaw from dental issue 12 years ago,  with memory issues, overcharged his credit cards, not someone she can really talk too, difficulty sleeping.  Covid, feeling isolated and alone. .     Has been on selective serotonin reuptake inhibitors in past, doesn't recall details  \"I'm usually impatient on meds.\"     Tries to eat healthy, exercise regularly.  Feels better \"just to get out of the house and go to a movie or something.\"     Takes 30mg daily total vicodin dose.  Wonders if it's causing side effects.  Says gabapentin didn't " "work in past, lyrica made her faint.  Doesn't recall tegretol (but she says it's not trigeminal issue actually).     O:/72   Pulse 78   Resp 16   Ht 1.549 m (5' 1\")   Wt 49.4 kg (109 lb)   SpO2 99%   BMI 20.60 kg/m    Well groomed, dressed appropriately. Good eye contact.  No abnormal motor movements, oriented x3, speaks clearly with a normal rate and volume.  Thoughts are coherent and linear.  No tangential responses or loose associatons.  No obsessive behaviors discussed or observed, no suicidal thoughts.   Responds to questions appropriately, gives detailed answers.   Attention and concentration normal.  Affect WNL.  Insight and judgment appropriate.   Seems a bit scared, nervous, and cries once during the exam.            "

## 2021-06-11 ENCOUNTER — TELEPHONE (OUTPATIENT)
Dept: FAMILY MEDICINE | Facility: CLINIC | Age: 76
End: 2021-06-11

## 2021-06-11 NOTE — TELEPHONE ENCOUNTER
Reason for call:  Patient reporting a symptom    Symptom or request: Mother's personality has changed and she switched to Dr. MARIANA Wyman.  Pt has had seen  Dr. Solis in the past.  Daughter Kylah is wondering about how her appt went with Dr. Wyman.  Daughter is aware that Dr. Wyman is not in the office until 6/14/21    Phone Number patient can be reached at:  Home number on file [phone] (home) or Other phone number:  [phone] *    Best Time:  Any Time      Can we leave a detailed message on this number:  YES    Call taken on 6/11/2021 at 12:51 PM by Bette Farley

## 2021-06-11 NOTE — TELEPHONE ENCOUNTER
"Spoke with patient's daughter Kylah and reviewed Dr Wyman's office visit note. She was glad her mother's appearance and behavior was appropriate. She says her mother is \"out of sorts, mean and angry. Very focused on her .\" Prior to being super focused on her , \"it was the pandemic then the media and Lon Garcia\". Kylah would still like a call from Dr Wyman when he returns.   Kasandra BARRETT RN     "

## 2021-06-14 NOTE — TELEPHONE ENCOUNTER
Brief conversation with dtr.  Tracy has signed paperwork for authorization.  Just gave dtr general details on plan for meds/therapy for mom.

## 2021-06-21 ENCOUNTER — OFFICE VISIT (OUTPATIENT)
Dept: FAMILY MEDICINE | Facility: CLINIC | Age: 76
End: 2021-06-21
Payer: COMMERCIAL

## 2021-06-21 VITALS
RESPIRATION RATE: 16 BRPM | OXYGEN SATURATION: 98 % | WEIGHT: 107 LBS | HEIGHT: 61 IN | DIASTOLIC BLOOD PRESSURE: 60 MMHG | HEART RATE: 65 BPM | BODY MASS INDEX: 20.2 KG/M2 | SYSTOLIC BLOOD PRESSURE: 136 MMHG | TEMPERATURE: 97.3 F

## 2021-06-21 DIAGNOSIS — R35.0 URINARY FREQUENCY: ICD-10-CM

## 2021-06-21 DIAGNOSIS — N94.9 VAGINAL SYMPTOM: ICD-10-CM

## 2021-06-21 DIAGNOSIS — R63.0 LOSS OF APPETITE: Primary | ICD-10-CM

## 2021-06-21 DIAGNOSIS — K64.4 EXTERNAL HEMORRHOIDS: ICD-10-CM

## 2021-06-21 DIAGNOSIS — N89.8 VAGINAL DRYNESS: ICD-10-CM

## 2021-06-21 LAB

## 2021-06-21 PROCEDURE — 87529 HSV DNA AMP PROBE: CPT | Performed by: FAMILY MEDICINE

## 2021-06-21 PROCEDURE — 81001 URINALYSIS AUTO W/SCOPE: CPT | Performed by: FAMILY MEDICINE

## 2021-06-21 PROCEDURE — 87529 HSV DNA AMP PROBE: CPT | Mod: 59 | Performed by: FAMILY MEDICINE

## 2021-06-21 PROCEDURE — 99214 OFFICE O/P EST MOD 30 MIN: CPT | Performed by: FAMILY MEDICINE

## 2021-06-21 RX ORDER — LIDOCAINE 50 MG/G
OINTMENT TOPICAL PRN
Qty: 45 G | Refills: 3 | Status: SHIPPED | OUTPATIENT
Start: 2021-06-21 | End: 2021-07-21

## 2021-06-21 ASSESSMENT — ENCOUNTER SYMPTOMS
ROS GI COMMENTS: LOSS OF APPETITE
DIARRHEA: 0
HEMATOLOGIC/LYMPHATIC NEGATIVE: 1
NEUROLOGICAL NEGATIVE: 1
ACTIVITY CHANGE: 1
ENDOCRINE NEGATIVE: 1
CONSTIPATION: 0
EYES NEGATIVE: 1
HEARTBURN: 0
RECTAL PAIN: 1
RESPIRATORY NEGATIVE: 1
PSYCHIATRIC NEGATIVE: 1
HEMATOCHEZIA: 0
ALLERGIC/IMMUNOLOGIC NEGATIVE: 1

## 2021-06-21 ASSESSMENT — MIFFLIN-ST. JEOR: SCORE: 907.73

## 2021-06-21 NOTE — PATIENT INSTRUCTIONS
Thank you for choosing [facility]  You may be receiving an email and/or telephone survey request from [facility] Customer Experience regarding your visit today.  Please take a few minutes to respond to the survey to let us know how we are doing.      If you have questions or concerns, please contact us via [employer] or you can contact your care team at [phone] option 2.    Our Clinic hours are:  Monday - Thursday 7am-6pm  Friday 7am-5pm    The [place] outpatient lab hours are:  Monday - Friday 7am-4:30pm    Appointments are required, call [phone]    If you have clinical questions after hours or would like to schedule an appointment,  call the clinic at [phone].

## 2021-06-21 NOTE — PROGRESS NOTES
Assessment & Plan     Loss of appetite  76 yr old female here for loss of appetite. She has been going through some stress at home. Recommended Mirtazapine but patient will like to hold off for now and see if the Sertraline would help.     Urinary frequency   -UA is negative for infection. Symptoms likely due to vaginal dryness.   - UA reflex to Microscopic and Culture ([place] and [facility] (except [place] and [place])  - Urine Microscopic    Vaginal symptom  Patient prescribed some lidocaine gel. She was on HRT a few years ago, she asked if she could start using this again. Given her age and risk factors recommend that she consult with GYn.   - lidocaine (XYLOCAINE) 5 % external ointment; Apply topically as needed for moderate pain    Vaginal dryness  - lidocaine (XYLOCAINE) 5 % external ointment; Apply topically as needed for moderate pain    External hemorrhoids  Rectal examination showed no mass. There was an external hemorrhoid on the outside.Recommend daily fiber   - psyllium (METAMUCIL/KONSYL) 58.6 % powder; Take 18 g (1 Tablespoonful) by mouth daily  956}     FUTURE APPOINTMENTS:       - Follow-up visit in one month or sooner as needed.  Patient Instructions         Thank you for choosing [facility].  You may be receiving an email and/or telephone survey request from [facility] Customer Experience regarding your visit today.  Please take a few minutes to respond to the survey to let us know how we are doing.      If you have questions or concerns, please contact us via [employer] or you can contact your care team at [phone] option 2.    Our Clinic hours are:  Monday - Thursday 7am-6pm  Friday 7am-5pm    The [place] outpatient lab hours are:  Monday - Friday 7am-4:30pm    Appointments are required, call [phone]    If you have clinical questions after hours or would like to schedule an appointment,  call the clinic at [phone].        Return in about 4 weeks (around 7/19/2021)  for Follow up.    Anoop Olvera MD  [facility] KAUR Molina is a 76 year old who presents for the following health issues  accompanied by herself:    HPI     Patient is a 76-year-old female presenting to the clinic today with several issues.  She reports that she has a loss of her appetite and has no desire to eat and has noticed that she has been losing weight.  She did report that she has been undergoing  stress at house. Her  was recently diagnosed with memory loss and taking care of him as taken a toll on her health.  She was recently started on sertraline about 2 weeks ago but she has not noticed any difference yet.    Patient also has a history of hemorrhoids and for the last 2 to 3 weeks she has had burning in her rectal area.  She reports no blood in her stools.  She states she has used 2 tubes of Preparation H thus far without any relief.  She does not bear down to have bowel movements.  Last colonoscopy was in 2012 and at that time it was said to be normal.  No polyps were found.    Patient also complained of vaginal burning she said the last few weeks the labial has been very painful.  She reports she is no longer sexually active.  She reports no vaginal bleeding.  No odor or discharge.  No itching.    APPETITE:  She states she has lost her appetite.  Will eat some small amounts of food.  This started about 3 months ago.  She has noticed weight loss.    Hemorrhoids  Onset/Duration: 2-3 weeks  Description:   Arianna-anal lump: There might be.  Pain: no  Itching: no  Accompanying Signs & Symptoms:  Blood in stool: no  Changes in stool pattern: no  History:   Any previous GI studies done:Colonoscopy-2012 and Upper GI 2016.  Family History of colon cancer: no  Precipitating factors:   Sitting.  Alleviating factors:  None  Therapies tried and outcome: increased fluids, preparation H and sitz baths.  Therapy is not helping.      Genitourinary -  "Female  Onset/Duration: 2 weeks  Description:   Painful urination (Dysuria): States there is no burning with urination.  Just always has a discomfort/burning feeling in the vaginal area.           Frequency: YES  Blood in urine (Hematuria): no  Delay in urine (Hesitency): no, states though that she doesn't have any power.  The urine just flows out.  Intensity: moderate  Progression of Symptoms:  Worsening, on her mind constantly now.    Accompanying Signs & Symptoms:  Fever/chills: no  Flank pain: no  Nausea and vomiting: no  Vaginal symptoms: Just that burning-discomfort feeling.  No odor, discharge or itching.  Abdominal/Pelvic Pain: no  History:   History of frequent UTI s: no  History of kidney stones: no  Sexually Active: no  Possibility of pregnancy: No  Precipitating or alleviating factors: Worse with constriction.  Has been wearing dresses to help with that.  Therapies tried and outcome:   She takes Vicodin as directed.         Review of Systems   Constitutional: Positive for activity change.   HENT: Negative.    Eyes: Negative.    Respiratory: Negative.    Gastrointestinal: Positive for rectal pain. Negative for constipation, diarrhea, heartburn and hematochezia.        Loss of appetite   Endocrine: Negative.    Breasts:  negative.    Genitourinary: Positive for vaginal pain.   Skin: Negative.    Allergic/Immunologic: Negative.    Neurological: Negative.    Hematological: Negative.    Psychiatric/Behavioral: Negative.             Objective    /60   Pulse 65   Temp 97.3  F (36.3  C) (Tympanic)   Resp 16   Ht 1.549 m (5' 1\")   Wt 48.5 kg (107 lb)   SpO2 98%   BMI 20.22 kg/m    Body mass index is 20.22 kg/m .  Physical Exam  Constitutional:       Appearance: Normal appearance.   HENT:      Head: Normocephalic and atraumatic.      Right Ear: Tympanic membrane normal.      Left Ear: Tympanic membrane normal.      Nose: Nose normal.   Eyes:      Extraocular Movements: Extraocular movements intact.    "   Pupils: Pupils are equal, round, and reactive to light.   Neck:      Musculoskeletal: Normal range of motion.   Cardiovascular:      Rate and Rhythm: Normal rate and regular rhythm.      Pulses: Normal pulses.      Heart sounds: Normal heart sounds.   Pulmonary:      Effort: Pulmonary effort is normal.      Breath sounds: Normal breath sounds.   Abdominal:      General: Abdomen is flat. There is no distension.      Palpations: Abdomen is soft. There is no mass.      Tenderness: There is no abdominal tenderness. There is no right CVA tenderness, left CVA tenderness, guarding or rebound.      Hernia: No hernia is present.   Genitourinary:     Labia:         Right: Tenderness and lesion present.       Comments: Vaginal dryness   Skin:     General: Skin is warm and dry.   Neurological:      Mental Status: She is alert and oriented to person, place, and time.   Psychiatric:         Mood and Affect: Mood normal.         Behavior: Behavior normal.

## 2021-06-22 LAB
HSV1 DNA SPEC QL NAA+PROBE: NOT DETECTED
HSV2 DNA SPEC QL NAA+PROBE: NOT DETECTED
LABORATORY COMMENT REPORT: NORMAL
SPECIMEN SOURCE: NORMAL

## 2021-06-27 DIAGNOSIS — I10 ESSENTIAL HYPERTENSION WITH GOAL BLOOD PRESSURE LESS THAN 140/90: ICD-10-CM

## 2021-06-28 ENCOUNTER — OFFICE VISIT (OUTPATIENT)
Dept: PALLIATIVE MEDICINE | Facility: CLINIC | Age: 76
End: 2021-06-28
Payer: COMMERCIAL

## 2021-06-28 VITALS
HEART RATE: 79 BPM | SYSTOLIC BLOOD PRESSURE: 135 MMHG | WEIGHT: 107 LBS | DIASTOLIC BLOOD PRESSURE: 62 MMHG | HEIGHT: 61 IN | BODY MASS INDEX: 20.2 KG/M2

## 2021-06-28 DIAGNOSIS — F11.20 NARCOTIC DEPENDENCE (H): Primary | ICD-10-CM

## 2021-06-28 DIAGNOSIS — F41.1 ANXIETY STATE: ICD-10-CM

## 2021-06-28 DIAGNOSIS — S04.51XD INJURY OF RIGHT FACIAL NERVE, SUBSEQUENT ENCOUNTER: ICD-10-CM

## 2021-06-28 PROCEDURE — 99215 OFFICE O/P EST HI 40 MIN: CPT | Performed by: STUDENT IN AN ORGANIZED HEALTH CARE EDUCATION/TRAINING PROGRAM

## 2021-06-28 RX ORDER — DILTIAZEM HYDROCHLORIDE 180 MG/1
CAPSULE, EXTENDED RELEASE ORAL
Qty: 90 CAPSULE | Refills: 3 | Status: SHIPPED | OUTPATIENT
Start: 2021-06-28 | End: 2022-08-17

## 2021-06-28 ASSESSMENT — MIFFLIN-ST. JEOR: SCORE: 907.73

## 2021-06-28 NOTE — NURSING NOTE
"Chief Complaint   Patient presents with     Pain     pain in mouth       Initial /62   Pulse 79   Ht 1.549 m (5' 1\")   Wt 48.5 kg (107 lb)   BMI 20.22 kg/m   Estimated body mass index is 20.22 kg/m  as calculated from the following:    Height as of this encounter: 1.549 m (5' 1\").    Weight as of this encounter: 48.5 kg (107 lb).  Medications and allergies reviewed.      Brisa FISCHER MA    "

## 2021-06-28 NOTE — PROGRESS NOTES
"                          [facility]  Follow up clinic visit    Date of visit: 6/28/2021    Chief complaint:   Chief Complaint   Patient presents with     Pain     pain in mouth       History:  Tracy Galloway is a 76 year old female who follows with the pain clinic for:   Chronic facial pain  Last seen by Lyssa Pathak on 12/2/2020.      HPI from initial H&P 12/2/2020:  \"Tracy Galloway is a 75 year old female with history of facial pain   Onset/Progression:  Started August 2009 after having a root canal. Tooth was broken in half and was had a caustic substance applied. Was told to have tooth pulled. Was given a lot of medications. (Will send list) Yoga and exercise helped. Unable to chew on the right. Pain is under right eye and in palate Will take a pain pill and then lay down. Pain gets worse throughout the day.   Pain quality: Burning, aching  Pain timing: Constant    Pain rating: intensity ranges from 4/10 to 8/10, and averages variable on a 0-10 scale.  Aggravating factors include: stress, using her mouth, gets worse through the day.   Relieving factors include: Eating, meds\"      Recommendations/plan at the last visit included:  1. \"Schedule pain psychology assessment  2. Schedule follow-up with YANY Kidd, NP-C in 4 weeks  1. Lyssa needs to review your records to determine treatment regimen.   3. Medication recommendations:  1. Consider changing opiates to combination of long and short acting for better relief.   2. Consider linzess for opiate induced constipation if MiraLax does not work.   Temporarily keep all meds through PCP until I have reviewed her records. \"    Since her last visit, Tracy Galloway reports:  - Per chart review, seen by Dr. Wyman on 6/9/2021 with increase anxiety. Started on Zoloft. Recommended psychology. Discussed that chronic opioids can cause anxiety and are causing intermittent withdrawal during the day. Discussed tapering, ideally to off. Pt " agreed at the time. Also discussed adding additional pain medications such as gabapentin or tegretol      - Pain started 12 yrs ago. Dentist broke her tooth in half. Assistant poured a medication she believes was intended to ablate a nerve into her mouth. She was numb upon immediate departure from the clinic. She then had severe pain and she was prescribed tons of narcotics for the pain  - Then saw Victor M Kemp for 3 years. Wanted to stop seeing him since the only thing she was doing was getting opioids and she had to go down to the [facility] for these appointments. Her PCP therefore took over prescribing. Eventually was recommended to follow with the pain clinic. Now she is frustrated because she saw Lyssa Pathak but Lyssa needed to go on leave and canceled with her twice. She wanted to switch pain providers because she wanted to see someone and discuss her case with someone but Lyssa wasn't available. She was then recommended to discuss this request with the clinic director but she is frustrated - she doesn't want to go back to [place]. She is also frustrated because she felt Lyssa interrupted her frequently when she was speaking. She feels she is getting the run around   - The only thing that helps is exercise and yoga.   - She feels desperate due to pain. She has no plans for harming herself though because of her marriage     Pain is in upper right jaw. The bone is now gone since the tooth is gone. The pain is also under her eye. This is a sensation of ants crawling. Increased anxiety causes a sensation of hornets under the eye. This started at the same time as the jaw pain but exacerbated recently due to anxiety and upset.   The jaw pain is a constant ache in one spot back of R upper jaw - well circumscribed. It is constant - mild pain in the morning.  Talking and chewing are exacerbating as well as anxiety.      Taking #4 oxycodone a day, which she thinks is causing increase agitation. This is a sensation of little  needles in her head. Takes them at 8am, noon, 4pm and 8pm. But trying to get by with #3, but this depends on whether she is having a calm day. She discussed going back to her previous regimen with her  - this would be taking 1 tab at 8 am followed by 1 hr rest, then taking the remainder of the tabs    Her cognitive fogginess has worsened in the recent months due to stress/anxiety related to frustrations with care     Pain scores:  Pain intensity on average is 8 on a scale of 0-10.     Current pain treatments:   Baclofen 10 mg at bedtime - takes only once per day because she feels like a zombie  Norco  mg BID PRN               Total opiate dose 20  MME/day  Ibuprofen 400-600 mg TID PRN - takes rarely     Other relevant medications:  Ambien 10 mg 1/2-1 tab at HS PRN  Sertrtaline 50mg - taken for 10-12    Patient is using the medication as prescribed:  YES  Is your medication helpful? YES   Side Effects: Feeling foggy     Past pain treatments:   Pain Clinic:   Yes  [facility] with Иван Kemp for 3 years.              PT: No                Chiropractor:Yes, [facility]              Acupuncture: Yes [facility]              Health/Pain Psychologist: No               Pharmacotherapy:                           Opioids: Yes                            Non-opioids:    Yes               TENs Unit:No               Injections: No               Surgeries related to pain: Yes: 2010 Had palate opened.: [facility]    Previous Pain Relevant Medications:    Neuropathics: Lyrica: Allergy, Gabapentin ?; Lyrica - fainted after taking  Anti-depressants: Amitriptyline:[facility]               Anxiety medications:                  Other medications not covered above: Ambien:[facility]    Medications:  Current Outpatient Medications   Medication Sig Dispense Refill     aspirin (ASA) 81 MG tablet Take 1 tablet (81 mg) by mouth daily 90 tablet 1     baclofen (LIORESAL) 10 MG tablet TAKE ONE TABLET BY MOUTH THREE TIMES A DAY 90 tablet 1     DILT- MG 24 hr  capsule TAKE ONE CAPSULE BY MOUTH ONCE DAILY 90 capsule 3     DOCUSATE SODIUM PO Take 100 mg by mouth daily as needed for constipation        fluticasone (FLONASE) 50 MCG/ACT nasal spray Spray 1-2 sprays into both nostrils daily 16 g 1     HYDROcodone-acetaminophen (NORCO)  MG per tablet Take 1-2 tablets by mouth every 4 hours as needed for severe pain (Max of 5 tabs per day) #130 tabs is 30 days supply. OK to fill 06/12/21 and start on/after 6/14/21. 130 tablet 0     ibuprofen (ADVIL/MOTRIN) 200 MG tablet Take 400-600 mg by mouth every 8 hours as needed for mild pain       lidocaine (XYLOCAINE) 5 % external ointment Apply topically as needed for moderate pain 45 g 3     lisinopril (ZESTRIL) 20 MG tablet TAKE ONE TABLET BY MOUTH ONCE DAILY 90 tablet 1     naloxone (NARCAN) 4 MG/0.1ML nasal spray Spray 1 spray (4 mg) into one nostril alternating nostrils once as needed for opioid reversal every 2-3 minutes until assistance arrives 0.2 mL 0     omeprazole (PRILOSEC) 20 MG DR capsule TAKE ONE CAPSULE BY MOUTH ONCE DAILY 90 capsule 3     ondansetron (ZOFRAN-ODT) 4 MG ODT tab Take 1 tablet (4 mg) by mouth every 6 hours as needed for nausea or vomiting 10 tablet 1     psyllium (METAMUCIL/KONSYL) 58.6 % powder Take 18 g (1 Tablespoonful) by mouth daily 540 g 0     sertraline (ZOLOFT) 50 MG tablet Take 1 tablet (50 mg) by mouth daily 30 tablet 1     simvastatin (ZOCOR) 10 MG tablet TAKE ONE TABLET BY MOUTH EVERY NIGHT AT BEDTIME 90 tablet 3     valACYclovir (VALTREX) 1000 mg tablet TAKE 1 TABLET BY MOUTH TWICE DAILY FOR 1 DAY AS NEEDED FOR BREAKOUTS 4 tablet 0     zolpidem (AMBIEN) 10 MG tablet TAKE 1/2 TO 1 TABLET BY MOUTH EVERY NIGHT AT BEDTIME AS NEEDED FOR SLEEP 30 tablet 5       Medical History: any changes in medical history since they were last seen? As above, increased anxiety    Social History: Reviewed; unchanged from previous consultation.      Family history: Reviewed; unchanged from previous consultation.  "    Review of Systems:  The 14 system ROS is positive for: pain as above  Mood: very anxious and stressed    Physical Exam:  Blood pressure 135/62, pulse 79, height 1.549 m (5' 1\"), weight 48.5 kg (107 lb), not currently breastfeeding.  General: anxious  Gait: Normal  Psych: Mood is anxious. Affect is congruent. Thought content is logical.   Neuro: CNs II - XII grossly intact    Controlled Substance Agreement:   Encounter-Level CSA - 08/20/2018:    Controlled Substance Agreement - Scan on 8/29/2018  3:15 PM: CONTROLLED SUBSTANCE AGREEMENT     Encounter-Level CSA - 07/07/2015:    Controlled Substance Agreement - Scan on 7/10/2015  1:04 PM: CONTROLLED SUBSTANCE AGREEMENT     Patient-Level CSA:    Controlled Substance Agreement - Opioid - Scan on 3/30/2021 10:04 AM  Controlled Substance Agreement - Opioid - Scan on 3/18/2021  8:58 AM  Controlled Substance Agreement - Opioid - Scan on 3/4/2020  1:30 PM          UDS: 3/17/2021 - hydrocodone and metabolites      THE 4 As OF OPIOID MAINTENANCE ANALGESIA    Analgesia: Is pain relief clinically significant? YES   Activity: Is patient functional and able to perform Activities of Daily Living? YES   Adverse effects: Is patient free from adverse side effects from opiates? NO   Adherence to Rx protocol: Is patient adhering to Controlled Substance Agreement and taking medications ONLY as ordered? 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 Narcotic dependence (H)    2. Anxiety state    3. Injury of right facial nerve, subsequent encounter           Mental Health - the patient's mental health concerns, specifically anxiety, affect her experience of pain and contribute to her clinically significant distress.    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 On chronic narcotics since that time, now with increased " stress and anxiety related to transfers of care and opioid prescribing.   Tracy has been experiencing increased mental fogginess and anxiety and is concerned that her opioids are contributing to both symptoms and that her anxiety is exacerbating her pain and likely her feeling cognitive slowing.   She was quite distraught today due to the above. Discussed that the purpose of today's visit is for the two of us to get to know one another and take initial steps to stabilize her pain and mood. Discussed that we will address pain, anxiety and possible medication side effects in a step-wise manner.     Plan:   Referrals     - Psychology. States she did not click with previous psychologist. Would like my recommendation. I will touch base with her PCP about optimal recommendation  Therapies:     - Clinical Health Psychologist to address issues of relaxation, behavioral change, coping style, and other factors important to improvement: not at this time  Medication Management:     - Continue hydrocodone 10/325 #4 tabs per day. Discussed this should be done on a consistent basis to minimize her stress associated with plans on how to optimally administer. Instructions provided in AVS. Likely taper slightly at next visit.     - Continue other medications as currently prescribed.    - Recently started on Zoloft with Dr. Wyman. Will message him re transition to SNRI for possible dual pain and mood benefit. Consider Cymbalta (2 one-time prescriptions written in remote past, unclear why discontinued), venlafaxine and milnacipran    - Did not tolerate gabapentin or Lyrica in the past. Consider oxcarbazepine vs Topamax in the future   Procedures recommended:     - Consider maxillary nerve block in the remote future, not at this time  Recommendations for PCP     - As above, consider SNRI over SSRI    Follow up: 2-4 weeks    BILLING TIME DOCUMENTATION:   The total TIME spent on this patient on the date of the encounter/appointment was 60+  minutes.      TOTAL TIME includes:   Time spent preparing to see the patient (reviewing records and tests) - 6 min  Time spent face to face (or over the phone) with the patient - 46 min  Time spent ordering tests, medications, procedures and referrals - 0 min  Time spent Referring and communicating with other healthcare professionals - 0 min  Time spent documenting clinical information in Epic - 24 min    Ivon Shannon MD  [facility] [place]

## 2021-06-28 NOTE — PATIENT INSTRUCTIONS
1. For your opioids:  - Continue your Norco #4 tabs per day:   8am, then rest for 1 hr. Then do what you need to do  Noon  4pm   8pm    2. I will message Dr. Wyman to discuss changing your Zoloft to a medication that helps with both pain and anxiety. I will let you know when I hear back.     3. I will also discuss what would be the best psychology referral for you with Dr. Wyman. Again, I will let you know when I hear back.     Follow up: 2-4 weeks.   - Call me with any questions or concerns  - You can also [employer] message me if that works for you    Ivon Shannon MD  [facility]    ----------------------------------------------------------------  Clinic Number:  [phone]     Call with any questions about your care and for scheduling assistance.     Calls are returned Monday through Friday between 8 AM and 4:30 PM. We usually get back to you within 2 business days depending on the issue/request.    If we are prescribing your medications:    For opioid medication refills, call the clinic or send a [employer] message 7 days in advance.  Please include:    Name of requested medication    Name of the pharmacy.    For non-opioid medications, call your pharmacy directly to request a refill. Please allow 3-4 days to be processed.     Per [place] State Law:    All controlled substance prescriptions must be filled within 30 days of being written.      For those controlled substances allowing refills, pickup must occur within 30 days of last fill.      We believe regular attendance is key to your success in our program!      Any time you are unable to keep your appointment we ask that you call us at least 24 hours in advance to cancel.This will allow us to offer the appointment time to another patient.     Multiple missed appointments may lead to dismissal from the clinic.

## 2021-06-29 ENCOUNTER — MYC MEDICAL ADVICE (OUTPATIENT)
Dept: PALLIATIVE MEDICINE | Facility: CLINIC | Age: 76
End: 2021-06-29

## 2021-06-29 DIAGNOSIS — G89.4 CHRONIC PAIN SYNDROME: ICD-10-CM

## 2021-06-29 DIAGNOSIS — F32.9 MAJOR DEPRESSIVE DISORDER WITH CURRENT ACTIVE EPISODE, UNSPECIFIED DEPRESSION EPISODE SEVERITY, UNSPECIFIED WHETHER RECURRENT: Primary | ICD-10-CM

## 2021-06-30 NOTE — TELEPHONE ENCOUNTER
Per patient myChart message:  From: Tracy Galloway      Created: 6/29/2021 1:33 PM      I had asked Dr. Wyman for a phone number for a therapist at my last visit. He gave a number that was no longer in use and I was referred to another number and they gave me yet another number. I decided to wait for my next appt. and clear that problem up.. I will look forward to hearing from you, and what you heard from him.           Per Dr. Shannon's 6/28/21 AVS:    2. I will message Dr. Wyman to discuss changing your Zoloft to a medication that helps with both pain and anxiety. I will let you know when I hear back.    3. I will also discuss what would be the best psychology referral for you with Dr. Wyman. Again, I will let you know when I hear back.     Routed to provider.     Ruth, RN-BSN  [facility]

## 2021-07-01 NOTE — TELEPHONE ENCOUNTER
Andreia Adams to Houston Molina,      I heard back from Dr. Wyman. He gave me the following number for you to use to contact psychology: [phone]   Please let me know if that doesn't work for you and we will work on finding another phone number for you.      In regards to the Zoloft medication, he is in agreement about switching it to a medication such as duloxetine, venlafaxine or milnacipran. I typically start with duloxetine. It is FDA approved for both pain and mood. The only reason I would consider trying venlafaxine or milnacipran first for you is that you had been on duloxetine twice in the past (or at least had prescriptions written for it in 2011 and 2015). Both prescriptions were one-time prescriptions, and there is no clear indication as to why it was stopped.   Would you be OK with transitioning from the zoloft to venlafaxine?     Here is some information on it:  Common side effects include: Nausea, drowsiness, dry mouth, dizziness, constipation, decreased appetite and decreased libido.  Avoid activities requiring mental alertness or coordination until you know the drugs effects on you as there is a risk for dizziness and sleepiness.  There is an risk for worsening depression in teenagers and young adults with this medication. You would need to report worsening mood symptoms, suicidal ideation or changes in your behavior.  Immediately report symptoms of bleeding or bruising and try to avoid NSAIDs, aspirininitially.  It is a medication that would need to be weaned off to avoid withdrawal symptoms.  Avoid heavy alcohol intake with this medication as it may lead to liver injury.

## 2021-07-07 ENCOUNTER — OFFICE VISIT (OUTPATIENT)
Dept: FAMILY MEDICINE | Facility: CLINIC | Age: 76
End: 2021-07-07
Payer: COMMERCIAL

## 2021-07-07 VITALS
HEIGHT: 61 IN | HEART RATE: 68 BPM | OXYGEN SATURATION: 98 % | BODY MASS INDEX: 19.26 KG/M2 | WEIGHT: 102 LBS | SYSTOLIC BLOOD PRESSURE: 136 MMHG | DIASTOLIC BLOOD PRESSURE: 70 MMHG

## 2021-07-07 DIAGNOSIS — F11.20 NARCOTIC DEPENDENCE (H): Chronic | ICD-10-CM

## 2021-07-07 DIAGNOSIS — F41.1 ANXIETY STATE: Primary | Chronic | ICD-10-CM

## 2021-07-07 DIAGNOSIS — G89.4 CHRONIC PAIN SYNDROME: Chronic | ICD-10-CM

## 2021-07-07 PROCEDURE — 99215 OFFICE O/P EST HI 40 MIN: CPT | Performed by: FAMILY MEDICINE

## 2021-07-07 RX ORDER — MIRTAZAPINE 15 MG/1
15 TABLET, FILM COATED ORAL AT BEDTIME
Qty: 90 TABLET | Refills: 3 | Status: SHIPPED | OUTPATIENT
Start: 2021-07-07 | End: 2021-07-07

## 2021-07-07 RX ORDER — MIRTAZAPINE 15 MG/1
15 TABLET, FILM COATED ORAL AT BEDTIME
Qty: 90 TABLET | Refills: 3 | Status: SHIPPED | OUTPATIENT
Start: 2021-07-07 | End: 2021-08-04

## 2021-07-07 ASSESSMENT — MIFFLIN-ST. JEOR: SCORE: 885.05

## 2021-07-07 NOTE — PROGRESS NOTES
"A: anixety, severe       Chronic pain      Opioid use    P: taper off baclofen.  Taper given for 3 week taper off hydrocodone.  Stop zoloft.  remeron at night    I want to get her on scheduled benzos.  She needs aggressive anxiety control.  But I want her off opioids first.  She understands.  Back in a month.     45  min spent on date of encounter reviewing chart, history, physical, documenting and counseling as mentioned in this note               s :Tracy Galloway is a 76 year old female with anxiety.  Pretty severe.  Lots of stress with , money, some credit card debt/fraud.   Didn't think zoloft helped, stopped after a month      Sleep is 5 hours, used to be 7-8.  Focus is bad.    Not sure pain meds are helping.  40mg/day hydrocodone.  For facial pain.      Baclofen making her tired.      Feels overmedicated.    Not sure on if therapy is going to work or not.  Stressed talking to people.    O:/70   Pulse 68   Ht 1.549 m (5' 1\")   Wt 46.3 kg (102 lb)   SpO2 98%   BMI 19.27 kg/m    GEN: Alert and oriented, in no acute distress, anxious, but listens.  Flat affect.  Teary once, but brief  Gives a decent history      "

## 2021-07-16 NOTE — TELEPHONE ENCOUNTER
From: Tracy Galloway      Created: 7/16/2021 8:26 AM        *-*-*This message has not been handled.*-*-*    I would be fine with the change. my hydrocodone is due for refill but I have enough left for 4 days. Can you refill for the 20th? I have an appt. with a psychologist on Aug.2nd on zoom. Tracy        Will forward to MA and Nurse pool to process refills    Brant Meek RN  Patient Care Supervisor   [facility] [place]

## 2021-07-16 NOTE — TELEPHONE ENCOUNTER
Received message from patient requesting refill(s) of HYDROcodone-acetaminophen (NORCO)  MG per tablet     Last dispensed from pharmacy on 06/16/21    Patient's last office/virtual visit by prescribing provider on 06/28/21    Next office/virtual appointment scheduled for 07/2121    Last urine drug screen date 03/17/21    Current opioid agreement on file (completed within the last year) Yes Date of opioid agreement: 03/17/21    E-prescribe to   [facility] [address] [address]  Phone: [phone] Fax: [phone]    Will route to nursing [place] for review and preparation of prescription(s).     Latonya Combs [facility]

## 2021-07-19 ENCOUNTER — MYC MEDICAL ADVICE (OUTPATIENT)
Dept: PALLIATIVE MEDICINE | Facility: CLINIC | Age: 76
End: 2021-07-19

## 2021-07-19 NOTE — TELEPHONE ENCOUNTER
"Medication refill information reviewed.     Last due:  OK to fill 06/12/21 and start on/after 6/14/21    Due date:  anytime    Weaning instructions:  N/A    Prescriptions prepped for review.     Per below information Dr. Shannon is going to start pt on venlafaxine:  \"Would you be OK with transitioning from the zoloft to venlafaxine?\"    Ruth Cid RN-BSN  [facility]              "

## 2021-07-20 ENCOUNTER — TELEPHONE (OUTPATIENT)
Dept: PALLIATIVE MEDICINE | Facility: CLINIC | Age: 76
End: 2021-07-20

## 2021-07-20 RX ORDER — HYDROCODONE BITARTRATE AND ACETAMINOPHEN 10; 325 MG/1; MG/1
1-2 TABLET ORAL EVERY 4 HOURS PRN
Qty: 130 TABLET | Refills: 0 | Status: SHIPPED | OUTPATIENT
Start: 2021-07-20 | End: 2021-08-18

## 2021-07-20 RX ORDER — VENLAFAXINE 75 MG/1
TABLET ORAL
Qty: 53 TABLET | Refills: 3 | Status: SHIPPED | OUTPATIENT
Start: 2021-07-20 | End: 2021-11-20

## 2021-07-20 NOTE — TELEPHONE ENCOUNTER
This refill has been processed.    See the 6/29/21 encounter for more information.    Ruth Cid RN-BSN  [facility]

## 2021-07-20 NOTE — TELEPHONE ENCOUNTER
Received fax from pharmacy requesting refill(s) of HYDROcodone-acetaminophen (NORCO)  MG per tablet     Last dispensed from pharmacy on 06/17/21    Patient's last office/virtual visit by prescribing provider on 03/08/21  Next office/virtual appointment scheduled for None    Last urine drug screen date 03/17/21  Current opioid agreement on file (completed within the last year) Yes Date of opioid agreement: 03/17/21    E-prescribe to     [address] [address]  Phone: [phone] Fax: [phone]    Will route to nursing [place] for review and preparation of prescription(s).       Gabriela Dowd MA  [facility]

## 2021-07-20 NOTE — TELEPHONE ENCOUNTER
Writer attempted to call pt, No answer.  LVM for Pt to call writer back at [phone].    Left detailed VM, asked that Pt call back with any questions or concerns.      Brant Meek, RN  Patient Care Supervisor   [facility]

## 2021-07-20 NOTE — TELEPHONE ENCOUNTER
checked and appropriate     Script Eprescribed to pharmacy    Will send this to MA team to notify patient.    Signed Prescriptions:                        Disp   Refills    HYDROcodone-acetaminophen (NORCO)  M*130 ta*0        Sig: Take 1-2 tablets by mouth every 4 hours as needed for           severe pain (Max of 5 tabs per day) #130 tabs is           30 days supply. OK to fill 7/18/21 and start           on/after 7/20/21.  Authorizing Provider: STACEY BENNETTH      Also started on venlafaxine. It appears the zoloft had already been discontinued.   She is start with 37.5 mg BID for 7 days, then increase up to 75mg BID.  There is some very small chance of this causing serotonin syndrome when used together with mirtazepine. However, since she was previously on sertraline and mirtazipine together the chance is very small.   Here are the signs of serotonin syndrome. Very unlikely that she gets these Sx, but should know about it (and, as I mentioned, no increased risk of this medication over zoloft):    This medication has a small risk of causing too much serotonin in your body, which can cause the following symptoms: - agitation and restlessness, confusion, rapid heart rate, muscle twitching, heavy sweating, shivering, and diarrhea, goose bumps. Please call the clinic if you start to notice any of these new symptoms.     Ivon Bennett MD  [facility]

## 2021-07-21 ENCOUNTER — OFFICE VISIT (OUTPATIENT)
Dept: PALLIATIVE MEDICINE | Facility: CLINIC | Age: 76
End: 2021-07-21
Payer: COMMERCIAL

## 2021-07-21 VITALS
WEIGHT: 102 LBS | HEART RATE: 95 BPM | HEIGHT: 61 IN | BODY MASS INDEX: 19.26 KG/M2 | SYSTOLIC BLOOD PRESSURE: 117 MMHG | DIASTOLIC BLOOD PRESSURE: 65 MMHG

## 2021-07-21 DIAGNOSIS — G89.29 CHRONIC FACIAL PAIN: Primary | ICD-10-CM

## 2021-07-21 DIAGNOSIS — F41.9 ANXIETY: ICD-10-CM

## 2021-07-21 DIAGNOSIS — F32.9 MAJOR DEPRESSIVE DISORDER WITH CURRENT ACTIVE EPISODE, UNSPECIFIED DEPRESSION EPISODE SEVERITY, UNSPECIFIED WHETHER RECURRENT: ICD-10-CM

## 2021-07-21 DIAGNOSIS — R51.9 CHRONIC FACIAL PAIN: Primary | ICD-10-CM

## 2021-07-21 DIAGNOSIS — F43.23 ADJUSTMENT DISORDER WITH MIXED ANXIETY AND DEPRESSED MOOD: ICD-10-CM

## 2021-07-21 DIAGNOSIS — G51.9 FACIAL NEUROPATHY: ICD-10-CM

## 2021-07-21 DIAGNOSIS — F11.90 CHRONIC, CONTINUOUS USE OF OPIOIDS: ICD-10-CM

## 2021-07-21 DIAGNOSIS — G89.4 CHRONIC PAIN SYNDROME: ICD-10-CM

## 2021-07-21 PROCEDURE — 99214 OFFICE O/P EST MOD 30 MIN: CPT | Performed by: STUDENT IN AN ORGANIZED HEALTH CARE EDUCATION/TRAINING PROGRAM

## 2021-07-21 ASSESSMENT — PAIN SCALES - GENERAL: PAINLEVEL: SEVERE PAIN (6)

## 2021-07-21 ASSESSMENT — MIFFLIN-ST. JEOR: SCORE: 885.05

## 2021-07-21 NOTE — NURSING NOTE
"Chief Complaint   Patient presents with     Pain       Initial /65   Pulse 95   Ht 1.549 m (5' 1\")   Wt 46.3 kg (102 lb)   BMI 19.27 kg/m   Estimated body mass index is 19.27 kg/m  as calculated from the following:    Height as of this encounter: 1.549 m (5' 1\").    Weight as of this encounter: 46.3 kg (102 lb).  Medications and allergies reviewed.      Brisa FISCHER MA    "

## 2021-07-21 NOTE — PATIENT INSTRUCTIONS
"Continue doing what you are doing - you are doing an excellent job.     Message me if you have any trouble with the venlafaxine. If you are not noticing a benefit after 3 weeks, message me.     Information on support groups for caregivers for people with dementia:   https://www.dementiacarecentral.com/caregiverinfo/careforcaregivers/support/  Google \"support groups for dementia caregivers\" and see what you can find that might be useful.     Also check out: Dr. Mallory Ferrell [email or web address]      "

## 2021-07-21 NOTE — PROGRESS NOTES
"                          [facility]  Follow up clinic visit    Date of visit: 7/21/2021     Chief complaint:   Chief Complaint   Patient presents with     Pain       History:  Tracy Galloway is a 76 year old female who follows with the pain clinic for:   Right maxillary neuropathy  Chronic opioid use  Last seen by me on 6/28/2021.      HPI from initial H&P 12/2/2020:  \"\"Tracy Galloway is a 75 year old female with history of facial pain   Onset/Progression:  Started August 2009 after having a root canal. Tooth was broken in half and was had a caustic substance applied. Was told to have tooth pulled. Was given a lot of medications. (Will send list) Yoga and exercise helped. Unable to chew on the right. Pain is under right eye and in palate Will take a pain pill and then lay down. Pain gets worse throughout the day.   Pain quality: Burning, aching  Pain timing: Constant    Pain rating: intensity ranges from 4/10 to 8/10, and averages variable on a 0-10 scale.  Aggravating factors include: stress, using her mouth, gets worse through the day.   Relieving factors include: Eating, meds\"\"    Interval history 6/28/2021 (date)  \"- Per chart review, seen by Dr. Wyman on 6/9/2021 with increase anxiety. Started on Zoloft. Recommended psychology. Discussed that chronic opioids can cause anxiety and are causing intermittent withdrawal during the day. Discussed tapering, ideally to off. Pt agreed at the time. Also discussed adding additional pain medications such as gabapentin or tegretol    - Pain started 12 yrs ago. Dentist broke her tooth in half. Assistant poured a medication she believes was intended to ablate a nerve into her mouth. She was numb upon immediate departure from the clinic. She then had severe pain and she was prescribed tons of narcotics for the pain  - Then saw Victor M Kemp for 3 years. Wanted to stop seeing him since the only thing she was doing was getting opioids and she had to " "go down to the [facility] for these appointments. Her PCP therefore took over prescribing. Eventually was recommended to follow with the pain clinic. Now she is frustrated because she saw Lyssamagnus Pathak but Lyssa needed to go on leave and canceled with her twice. She wanted to switch pain providers because she wanted to see someone and discuss her case with someone but Lyssa wasn't available. She was then recommended to discuss this request with the clinic director but she is frustrated - she doesn't want to go back to [place]. She is also frustrated because she felt Lyssa interrupted her frequently when she was speaking. She feels she is getting the run around   - The only thing that helps is exercise and yoga.   - She feels desperate due to pain. She has no plans for harming herself though because of her marriage      Pain is in upper right jaw. The bone is now gone since the tooth is gone. The pain is also under her eye. This is a sensation of ants crawling. Increased anxiety causes a sensation of hornets under the eye. This started at the same time as the jaw pain but exacerbated recently due to anxiety and upset.   The jaw pain is a constant ache in one spot back of R upper jaw - well circumscribed. It is constant - mild pain in the morning.  Talking and chewing are exacerbating as well as anxiety.       Taking #4 oxycodone a day, which she thinks is causing increase agitation. This is a sensation of little needles in her head. Takes them at 8am, noon, 4pm and 8pm. But trying to get by with #3, but this depends on whether she is having a calm day. She discussed going back to her previous regimen with her  - this would be taking 1 tab at 8 am followed by 1 hr rest, then taking the remainder of the tabs     Her cognitive fogginess has worsened in the recent months due to stress/anxiety related to frustrations with care \"    Recommendations/plan at the last visit included:  \"Referrals     - Psychology. States she did " "not click with previous psychologist. Would like my recommendation. I will touch base with her PCP about optimal recommendation  Therapies:     - Clinical Health Psychologist to address issues of relaxation, behavioral change, coping style, and other factors important to improvement: not at this time  Medication Management:     - Continue hydrocodone 10/325 #4 tabs per day. Discussed this should be done on a consistent basis to minimize her stress associated with plans on how to optimally administer. Instructions provided in AVS. Likely taper slightly at next visit.     - Continue other medications as currently prescribed.    - Recently started on Zoloft with Dr. Wyman. Will message him re transition to SNRI for possible dual pain and mood benefit. Consider Cymbalta (2 one-time prescriptions written in remote past, unclear why discontinued), venlafaxine and milnacipran    - Did not tolerate gabapentin or Lyrica in the past. Consider oxcarbazepine vs Topamax in the future   Procedures recommended:     - Consider maxillary nerve block in the remote future, not at this time  Recommendations for PCP   - As above, consider SNRI over SSRI\"    Since her last visit, Tracy Galloway reports:  - she has a lot of reasons to have substantial stress - 's memory is getting worse  - she has canceled counselor appt because it was going to be a zoom call - which she cannot do with her  at home  - she also has many house-related stressors  - she will have to start taking more responsibility at home due to her . Initially was in denial, then anger, now more accepting.   - she worked out today and has no pain while she exercises  - started venlafaxine yesterday. No side effects yet  - seeing a specialist at the [facility] for  Her 's memory in late September. Feels she has some more resources and understanding to cope for the next 2 months    Pain scores:  Pain intensity on average is 6 on a scale of 0-10. "     Current pain treatments:   Baclofen 10 mg at bedtime - takes only once per day because she feels like a zombie  Norco  mg BID PRN               Total opiate dose 20  MME/day  Ibuprofen 400-600 mg TID PRN - takes rarely   Venlafaxine 75 mg     Other relevant medications:  Ambien 10 mg 1/2-1 tab at HS PRN      Patient is using the medication as prescribed:  YES  Is your medication helpful? YES   Side Effects: no side effect    Past pain treatments:   Pain Clinic:   Yes  [facility] with Иван Kemp for 3 years.              PT: No                Chiropractor:Yes, [facility]              Acupuncture: Yes [facility]              Health/Pain Psychologist: No               Pharmacotherapy:                           Opioids: Yes                            Non-opioids:    Yes               TENs Unit:No               Injections: No               Surgeries related to pain: Yes: 2010 Had palate opened.: [facility]     Previous Pain Relevant Medications:    Neuropathics: Lyrica: Allergy, Gabapentin ?; Lyrica - fainted after taking  Anti-depressants: Amitriptyline:[facility]               Anxiety medications:                  Other medications not covered above: Ambien:[facility]    Medications:  Current Outpatient Medications   Medication Sig Dispense Refill     aspirin (ASA) 81 MG tablet Take 1 tablet (81 mg) by mouth daily 90 tablet 1     baclofen (LIORESAL) 10 MG tablet TAKE ONE TABLET BY MOUTH THREE TIMES A DAY 90 tablet 1     DILT- MG 24 hr capsule TAKE ONE CAPSULE BY MOUTH ONCE DAILY 90 capsule 3     DOCUSATE SODIUM PO Take 100 mg by mouth daily as needed for constipation        fluticasone (FLONASE) 50 MCG/ACT nasal spray Spray 1-2 sprays into both nostrils daily (Patient not taking: Reported on 7/7/2021) 16 g 1     HYDROcodone-acetaminophen (NORCO)  MG per tablet Take 1-2 tablets by mouth every 4 hours as needed for severe pain (Max of 5 tabs per day) #130 tabs is 30 days supply. OK to fill 7/18/21 and start on/after 7/20/21. 130  tablet 0     ibuprofen (ADVIL/MOTRIN) 200 MG tablet Take 400-600 mg by mouth every 8 hours as needed for mild pain       lidocaine (XYLOCAINE) 5 % external ointment Apply topically as needed for moderate pain (Patient not taking: Reported on 7/7/2021) 45 g 3     lisinopril (ZESTRIL) 20 MG tablet TAKE ONE TABLET BY MOUTH ONCE DAILY 90 tablet 1     mirtazapine (REMERON) 15 MG tablet Take 1 tablet (15 mg) by mouth At Bedtime 90 tablet 3     naloxone (NARCAN) 4 MG/0.1ML nasal spray Spray 1 spray (4 mg) into one nostril alternating nostrils once as needed for opioid reversal every 2-3 minutes until assistance arrives (Patient not taking: Reported on 7/7/2021) 0.2 mL 0     omeprazole (PRILOSEC) 20 MG DR capsule TAKE ONE CAPSULE BY MOUTH ONCE DAILY 90 capsule 3     ondansetron (ZOFRAN-ODT) 4 MG ODT tab Take 1 tablet (4 mg) by mouth every 6 hours as needed for nausea or vomiting 10 tablet 1     psyllium (METAMUCIL/KONSYL) 58.6 % powder Take 18 g (1 Tablespoonful) by mouth daily 540 g 0     simvastatin (ZOCOR) 10 MG tablet TAKE ONE TABLET BY MOUTH EVERY NIGHT AT BEDTIME 90 tablet 3     valACYclovir (VALTREX) 1000 mg tablet TAKE 1 TABLET BY MOUTH TWICE DAILY FOR 1 DAY AS NEEDED FOR BREAKOUTS (Patient not taking: Reported on 7/7/2021) 4 tablet 0     venlafaxine (EFFEXOR) 75 MG tablet Take 0.5 tablets (37.5 mg) by mouth 2 times daily for 7 days, THEN 1 tablet (75 mg) 2 times daily for 23 days. 53 tablet 3     zolpidem (AMBIEN) 10 MG tablet TAKE 1/2 TO 1 TABLET BY MOUTH EVERY NIGHT AT BEDTIME AS NEEDED FOR SLEEP 30 tablet 5       Medical History: any changes in medical history since they were last seen? daniel'[email or web address] appointment for neurology for memory at the [facility] is at the end of September    Social History: Reviewed; unchanged from previous consultation.      Family history: Reviewed; unchanged from previous consultation.     Review of Systems:  The 14 system ROS is positive for: pain, stress, anxiety, depression  Mood: somewhat  "improved    Physical Exam:  Blood pressure 117/65, pulse 95, height 1.549 m (5' 1\"), weight 46.3 kg (102 lb), not currently breastfeeding.  General: NAD  Gait: Normal  Psych: Mood is ok. Affect is congruent. Thought content is logical.   Neuro: CNs II - XII grossly intact    Controlled Substance Agreement:   Encounter-Level CSA - 08/20/2018:    Controlled Substance Agreement - Scan on 8/29/2018  3:15 PM: CONTROLLED SUBSTANCE AGREEMENT     Encounter-Level CSA - 07/07/2015:    Controlled Substance Agreement - Scan on 7/10/2015  1:04 PM: CONTROLLED SUBSTANCE AGREEMENT     Patient-Level CSA:    Controlled Substance Agreement - Opioid - Scan on 3/30/2021 10:04 AM  Controlled Substance Agreement - Opioid - Scan on 3/18/2021  8:58 AM  Controlled Substance Agreement - Opioid - Scan on 3/4/2020  1:30 PM          UDS: 3/17/2021     THE 4 As OF OPIOID MAINTENANCE ANALGESIA    Analgesia: Is pain relief clinically significant? YES   Activity: Is patient functional and able to perform Activities of Daily Living? YES   Adverse effects: Is patient free from adverse side effects from opiates? YES   Adherence to Rx protocol: Is patient adhering to Controlled Substance Agreement and taking medications ONLY as ordered? YES        Assessment:   Tracy Galloway is a 76 year old female who is seen at the pain clinic for:    1. Chronic facial pain    2. Facial neuropathy    3. Chronic pain syndrome    4. Anxiety    5. Major depressive disorder with current active episode, unspecified depression episode severity, unspecified whether recurrent    6. Chronic, continuous use of opioids    7. Adjustment disorder with mixed anxiety and depressed mood           Mental Health - the patient's mental health concerns, specifically anxiety and depression, affect her experience of pain and contribute to her clinically significant distress.    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 On chronic narcotics " since that time, now with increased stress and anxiety related to primarily to 's dementia.   Tracy had been experiencing increased mental fogginess and anxiety and was concerned that her opioids were contributing to both symptoms and that her anxiety was exacerbating her pain and likely her feeling cognitive slowing.     She is now much less anxious given improved understanding of how her pain, anxiety, depression and stress are related and due to increased acceptance of her 's condition.     Started venlafaxine yesterday for dual pain and mood benefit. Will continue to monitor.   She would benefit greatly from neuroscience education/pain psychology. Given limited access, discussed resources she can explore on her own     Plan:  Referrals     - consider psychology referral in the future when avalable in person (not telehealth)  Therapies:     - Physical Therapy: consider in the future    - Clinical Health Psychologist to address issues of relaxation, behavioral change, coping style, and other factors important to improvement: not at this time, gave resources for Dr. Mallory Ferrell  Medication Management:     - continue venlafaxine titration with goal dose of 75mg BID    - continue Norco 10/325 max #5 tabs per day. Anticipate tapering in the future    - Did not tolerate gabapentin or Lyrica in the past. Consider oxcarbazepine vs Topamax in the future   Procedures recommended:     - consider maxillary nerve block in the future, not at this time    Follow up: 1-2 months     BILLING TIME DOCUMENTATION:   The total TIME spent on this patient on the date of the encounter/appointment was 38 minutes.      TOTAL TIME includes:   Time spent preparing to see the patient (reviewing records and tests) - 2 min  Time spent face to face (or over the phone) with the patient - 24 min  Time spent ordering tests, medications, procedures and referrals - 0 min  Time spent Referring and communicating with other healthcare  professionals - 0 min  Time spent documenting clinical information in Epic - 12 min    Ivon Shannon MD  [facility]

## 2021-08-04 ENCOUNTER — OFFICE VISIT (OUTPATIENT)
Dept: FAMILY MEDICINE | Facility: CLINIC | Age: 76
End: 2021-08-04
Payer: COMMERCIAL

## 2021-08-04 VITALS
SYSTOLIC BLOOD PRESSURE: 110 MMHG | BODY MASS INDEX: 19.45 KG/M2 | DIASTOLIC BLOOD PRESSURE: 70 MMHG | HEIGHT: 61 IN | WEIGHT: 103 LBS | HEART RATE: 74 BPM

## 2021-08-04 DIAGNOSIS — F41.1 ANXIETY STATE: Primary | Chronic | ICD-10-CM

## 2021-08-04 PROCEDURE — 99213 OFFICE O/P EST LOW 20 MIN: CPT | Performed by: FAMILY MEDICINE

## 2021-08-04 ASSESSMENT — ANXIETY QUESTIONNAIRES
GAD7 TOTAL SCORE: 0
6. BECOMING EASILY ANNOYED OR IRRITABLE: NOT AT ALL
1. FEELING NERVOUS, ANXIOUS, OR ON EDGE: NOT AT ALL
2. NOT BEING ABLE TO STOP OR CONTROL WORRYING: NOT AT ALL
7. FEELING AFRAID AS IF SOMETHING AWFUL MIGHT HAPPEN: NOT AT ALL
3. WORRYING TOO MUCH ABOUT DIFFERENT THINGS: NOT AT ALL
5. BEING SO RESTLESS THAT IT IS HARD TO SIT STILL: NOT AT ALL

## 2021-08-04 ASSESSMENT — MIFFLIN-ST. JEOR: SCORE: 889.58

## 2021-08-04 ASSESSMENT — PATIENT HEALTH QUESTIONNAIRE - PHQ9
SUM OF ALL RESPONSES TO PHQ QUESTIONS 1-9: 3
5. POOR APPETITE OR OVEREATING: NOT AT ALL

## 2021-08-04 NOTE — PROGRESS NOTES
"S: Tracy Galloway is a 76 year old female with anxiety.  Much improved, finally was able to ttake some steps to solve the financial issues/debt that was troubling her so much.      Paid off credit card debt, cashed in some insurance to do it.  Much calmer.     Off remeron.  On effexor now.  Still on opioids per pain clinic, baclofen and ambien at night.   The plan of tapering off opioids and baclofen and going to ativan scheduled has been scrapped, she wants to stay the course as she has been doing.     More calm.  Appetite returning.  Feeling good.     O:/70   Pulse 74   Ht 1.549 m (5' 1\")   Wt 46.7 kg (103 lb)   BMI 19.46 kg/m    GEN: Alert and oriented, in no acute distress, affect brighter, smiling, good eye contact    A: anxiety, improved    P: continue effexor, other meds as in med list.  F/u in 6 months or so, earlier with pain doctor.    "

## 2021-08-05 ASSESSMENT — ANXIETY QUESTIONNAIRES: GAD7 TOTAL SCORE: 0

## 2021-08-18 DIAGNOSIS — G89.4 CHRONIC PAIN SYNDROME: ICD-10-CM

## 2021-08-18 RX ORDER — HYDROCODONE BITARTRATE AND ACETAMINOPHEN 10; 325 MG/1; MG/1
1-2 TABLET ORAL EVERY 4 HOURS PRN
Qty: 130 TABLET | Refills: 0 | Status: SHIPPED | OUTPATIENT
Start: 2021-08-18 | End: 2021-09-20

## 2021-08-18 NOTE — TELEPHONE ENCOUNTER
Received fax from pharmacy requesting refill(s) of HYDROcodone-acetaminophen (NORCO)  MG per tablet     Last dispensed from pharmacy on 07/20/21    Patient's last office/virtual visit by prescribing provider on 07/21/21    Next office/virtual appointment scheduled for None    Last urine drug screen date 03/17/21    Current opioid agreement on file (completed within the last year) Yes Date of opioid agreement: 03/17/21    E-prescribe to   [facility] [place] [id number] [place] [id number]  Phone: [phone] Fax: [phone]    Will route to nursing [place] for review and preparation of prescription(s).     Latonya [facility]

## 2021-08-18 NOTE — TELEPHONE ENCOUNTER
Medication refill information reviewed.     Due date for HYDROcodone-acetaminophen (NORCO)  MG per tablet  is 8/19/21     Prescriptions prepped for review.     Will route to provider.     Brant Meek RN  Patient Care Supervisor   [facility] [place]

## 2021-08-18 NOTE — TELEPHONE ENCOUNTER
checked and appropriate     Script Eprescribed to pharmacy    Will send this to MA team to notify patient. Please have Pt schedule f/u with me in 2 wks - 1 mo for continued tapering plan    Signed Prescriptions:                        Disp   Refills    HYDROcodone-acetaminophen (NORCO)  M*130 ta*0        Sig: Take 1-2 tablets by mouth every 4 hours as needed for           severe pain (Max of 5 tabs per day) #130 tabs is           30 days supply. OK to fill 8/18/21 and start           on/after 8/19/21.  Authorizing Provider: MEG BENNETT MD  [facility]

## 2021-08-19 ENCOUNTER — MYC MEDICAL ADVICE (OUTPATIENT)
Dept: PALLIATIVE MEDICINE | Facility: CLINIC | Age: 76
End: 2021-08-19

## 2021-08-19 NOTE — TELEPHONE ENCOUNTER
[email or web address] message sent with Rx approval from provider.  Lm  Pain Clinic Management Team

## 2021-08-30 ENCOUNTER — OFFICE VISIT (OUTPATIENT)
Dept: PALLIATIVE MEDICINE | Facility: CLINIC | Age: 76
End: 2021-08-30
Payer: COMMERCIAL

## 2021-08-30 VITALS — SYSTOLIC BLOOD PRESSURE: 120 MMHG | HEART RATE: 82 BPM | DIASTOLIC BLOOD PRESSURE: 60 MMHG

## 2021-08-30 DIAGNOSIS — S04.51XD INJURY OF RIGHT FACIAL NERVE, SUBSEQUENT ENCOUNTER: ICD-10-CM

## 2021-08-30 DIAGNOSIS — F11.90 CHRONIC, CONTINUOUS USE OF OPIOIDS: ICD-10-CM

## 2021-08-30 DIAGNOSIS — G89.29 CHRONIC FACIAL PAIN: Primary | ICD-10-CM

## 2021-08-30 DIAGNOSIS — F32.9 MAJOR DEPRESSIVE DISORDER WITH CURRENT ACTIVE EPISODE, UNSPECIFIED DEPRESSION EPISODE SEVERITY, UNSPECIFIED WHETHER RECURRENT: ICD-10-CM

## 2021-08-30 DIAGNOSIS — R51.9 CHRONIC FACIAL PAIN: Primary | ICD-10-CM

## 2021-08-30 DIAGNOSIS — F41.9 ANXIETY: ICD-10-CM

## 2021-08-30 PROCEDURE — 99214 OFFICE O/P EST MOD 30 MIN: CPT | Performed by: STUDENT IN AN ORGANIZED HEALTH CARE EDUCATION/TRAINING PROGRAM

## 2021-08-30 ASSESSMENT — PAIN SCALES - GENERAL: PAINLEVEL: SEVERE PAIN (6)

## 2021-08-30 NOTE — PROGRESS NOTES
"                          [facility]  Follow up clinic visit    Date of visit: 7/21/2021     Chief complaint:   Chief Complaint   Patient presents with     Pain       History:  Tracy Galloway is a 76 year old female who follows with the pain clinic for:   Right maxillary neuropathy  Chronic opioid use  Last seen by me on 6/28/2021.      HPI from initial H&P 12/2/2020:  \"\"Tracy Galloway is a 75 year old female with history of facial pain   Onset/Progression:  Started August 2009 after having a root canal. Tooth was broken in half and was had a caustic substance applied. Was told to have tooth pulled. Was given a lot of medications. (Will send list) Yoga and exercise helped. Unable to chew on the right. Pain is under right eye and in palate Will take a pain pill and then lay down. Pain gets worse throughout the day.   Pain quality: Burning, aching  Pain timing: Constant    Pain rating: intensity ranges from 4/10 to 8/10, and averages variable on a 0-10 scale.  Aggravating factors include: stress, using her mouth, gets worse through the day.   Relieving factors include: Eating, meds\"\"    Interval history 6/28/2021 (date)  \"- Per chart review, seen by Dr. Wyman on 6/9/2021 with increase anxiety. Started on Zoloft. Recommended psychology. Discussed that chronic opioids can cause anxiety and are causing intermittent withdrawal during the day. Discussed tapering, ideally to off. Pt agreed at the time. Also discussed adding additional pain medications such as gabapentin or tegretol    - Pain started 12 yrs ago. Dentist broke her tooth in half. Assistant poured a medication she believes was intended to ablate a nerve into her mouth. She was numb upon immediate departure from the clinic. She then had severe pain and she was prescribed tons of narcotics for the pain  - Then saw Victor M Kemp for 3 years. Wanted to stop seeing him since the only thing she was doing was getting opioids and she had to " "go down to the [facility] for these appointments. Her PCP therefore took over prescribing. Eventually was recommended to follow with the pain clinic. Now she is frustrated because she saw Lyssa Pathak but Lyssa needed to go on leave and canceled with her twice. She wanted to switch pain providers because she wanted to see someone and discuss her case with someone but Lyssa wasn't available. She was then recommended to discuss this request with the clinic director but she is frustrated - she doesn't want to go back to [place]. She is also frustrated because she felt Lyssa interrupted her frequently when she was speaking. She feels she is getting the run around   - The only thing that helps is exercise and yoga.   - She feels desperate due to pain. She has no plans for harming herself though because of her marriage      Pain is in upper right jaw. The bone is now gone since the tooth is gone. The pain is also under her eye. This is a sensation of ants crawling. Increased anxiety causes a sensation of hornets under the eye. This started at the same time as the jaw pain but exacerbated recently due to anxiety and upset.   The jaw pain is a constant ache in one spot back of R upper jaw - well circumscribed. It is constant - mild pain in the morning.  Talking and chewing are exacerbating as well as anxiety.       Taking #4 oxycodone a day, which she thinks is causing increase agitation. This is a sensation of little needles in her head. Takes them at 8am, noon, 4pm and 8pm. But trying to get by with #3, but this depends on whether she is having a calm day. She discussed going back to her previous regimen with her  - this would be taking 1 tab at 8 am followed by 1 hr rest, then taking the remainder of the tabs     Her cognitive fogginess has worsened in the recent months due to stress/anxiety related to frustrations with care \"    Interval history 7/21/21:  - she has a lot of reasons to have substantial stress - 's " "memory is getting worse  - she has canceled counselor appt because it was going to be a zoom call - which she cannot do with her  at home  - she also has many house-related stressors  - she will have to start taking more responsibility at home due to her . Initially was in denial, then anger, now more accepting.   - she worked out today and has no pain while she exercises  - started venlafaxine yesterday. No side effects yet  - seeing a specialist at the [facility] for  Her 's memory in late September. Feels she has some more resources and understanding to cope for the next 2 months    Recommendations/plan at the last visit included:  Referrals     - consider psychology referral in the future when avalable in person (not telehealth)  Therapies:     - Physical Therapy: consider in the future    - Clinical Health Psychologist to address issues of relaxation, behavioral change, coping style, and other factors important to improvement: not at this time, gave resources for Dr. Mallory Ferrell  Medication Management:     - continue venlafaxine titration with goal dose of 75mg BID    - continue Norco 10/325 max #5 tabs per day. Anticipate tapering in the future    - Did not tolerate gabapentin or Lyrica in the past. Consider oxcarbazepine vs Topamax in the future   Procedures recommended:     - consider maxillary nerve block in the future, not at this time    Since her last visit, Tracy OLMAN Galloway reports:  She considers her pain to be \"managed,\" rating the pain at 6/10. She says the venlafaxine has made a difference. This has worked for both pain and anxiety so far. She is handling her anxiety well compared to prior. Previously she felt she did not want to live, which is resolved.    Norco continues to work for her.    She takes very seldom baclofen for the same primary pain - took it today.    She notices she has bilateral jaw pain occasionally - has been told she clenches her jaw. Does not use a mouth " guard.    She feels she is living the life she wants to live with her current regimen.    Pain scores:  Pain intensity on average is 6 on a scale of 0-10.     Current pain treatments:   Baclofen 10 mg is taken as needed, never more than once a day due to feeling in a fog.  Norco  mg 4x/day, but up to 5x/day              Total opiate dose 50 MME/day  Ibuprofen 400-600 mg TID PRN - takes rarely  Venlafaxine 75 mg BID.     Other relevant medications:  Ambien 10 mg 1/2-1 tab at HS PRN - she takes this every night    Patient is using the medication as prescribed:  YES  Is your medication helpful? YES   Side Effects: no side effect    Past pain treatments:   Pain Clinic:   Yes  [facility] with Иван Kemp for 3 years.              PT: No                Chiropractor:Yes, [facility]              Acupuncture: Yes [facility]              Health/Pain Psychologist: No               Pharmacotherapy:                           Opioids: Yes                            Non-opioids:    Yes               TENs Unit:No               Injections: No               Surgeries related to pain: Yes: 2010 Had palate opened.: [facility]     Previous Pain Relevant Medications:    Neuropathics: Lyrica: Allergy, Gabapentin ?; Lyrica - fainted after taking  Anti-depressants: Amitriptyline:[facility]               Anxiety medications:                  Other medications not covered above: Ambien:[facility]    Medications:  Current Outpatient Medications   Medication Sig Dispense Refill     aspirin (ASA) 81 MG tablet Take 1 tablet (81 mg) by mouth daily 90 tablet 1     DILT- MG 24 hr capsule TAKE ONE CAPSULE BY MOUTH ONCE DAILY 90 capsule 3     DOCUSATE SODIUM PO Take 100 mg by mouth daily as needed for constipation        HYDROcodone-acetaminophen (NORCO)  MG per tablet Take 1-2 tablets by mouth every 4 hours as needed for severe pain (Max of 5 tabs per day) #130 tabs is 30 days supply. OK to fill 8/18/21 and start on/after 8/19/21. 130 tablet 0     ibuprofen  (ADVIL/MOTRIN) 200 MG tablet Take 400-600 mg by mouth every 8 hours as needed for mild pain       lisinopril (ZESTRIL) 20 MG tablet TAKE ONE TABLET BY MOUTH ONCE DAILY 90 tablet 1     omeprazole (PRILOSEC) 20 MG DR capsule TAKE ONE CAPSULE BY MOUTH ONCE DAILY 90 capsule 3     ondansetron (ZOFRAN-ODT) 4 MG ODT tab Take 1 tablet (4 mg) by mouth every 6 hours as needed for nausea or vomiting 10 tablet 1     simvastatin (ZOCOR) 10 MG tablet TAKE ONE TABLET BY MOUTH EVERY NIGHT AT BEDTIME 90 tablet 3     valACYclovir (VALTREX) 1000 mg tablet TAKE 1 TABLET BY MOUTH TWICE DAILY FOR 1 DAY AS NEEDED FOR BREAKOUTS 4 tablet 0     zolpidem (AMBIEN) 10 MG tablet TAKE 1/2 TO 1 TABLET BY MOUTH EVERY NIGHT AT BEDTIME AS NEEDED FOR SLEEP 30 tablet 5     baclofen (LIORESAL) 10 MG tablet TAKE ONE TABLET BY MOUTH THREE TIMES A DAY (Patient not taking: Reported on 8/30/2021) 90 tablet 1     naloxone (NARCAN) 4 MG/0.1ML nasal spray Spray 1 spray (4 mg) into one nostril alternating nostrils once as needed for opioid reversal every 2-3 minutes until assistance arrives (Patient not taking: Reported on 7/7/2021) 0.2 mL 0     venlafaxine (EFFEXOR) 75 MG tablet Take 0.5 tablets (37.5 mg) by mouth 2 times daily for 7 days, THEN 1 tablet (75 mg) 2 times daily for 23 days. 53 tablet 3       Medical History: any changes in medical history since they were last seen?  appointment for neurology for memory at the [facility] is at the end of September,    Social History: Reviewed; unchanged from previous consultation.   with difficulty with managing finances, working through that right now    Family history: Reviewed; unchanged from previous consultation.     Review of Systems:  The 14 system ROS is positive for: pain, stress, anxiety, depression  Mood: somewhat improved    Physical Exam:  Blood pressure 120/60, pulse 82, not currently breastfeeding.  General: NAD  Gait: Normal  Psych: Mood is ok. Affect is congruent. Thought content is logical.    Neuro: CNs II - XII grossly intact    Controlled Substance Agreement:   Encounter-Level CSA - 08/20/2018:    Controlled Substance Agreement - Scan on 8/29/2018  3:15 PM: CONTROLLED SUBSTANCE AGREEMENT     Encounter-Level CSA - 07/07/2015:    Controlled Substance Agreement - Scan on 7/10/2015  1:04 PM: CONTROLLED SUBSTANCE AGREEMENT     Patient-Level CSA:    Controlled Substance Agreement - Opioid - Scan on 3/30/2021 10:04 AM  Controlled Substance Agreement - Opioid - Scan on 3/18/2021  8:58 AM  Controlled Substance Agreement - Opioid - Scan on 3/4/2020  1:30 PM          UDS: 3/17/2021     THE 4 As OF OPIOID MAINTENANCE ANALGESIA    Analgesia: Is pain relief clinically significant? YES   Activity: Is patient functional and able to perform Activities of Daily Living? YES   Adverse effects: Is patient free from adverse side effects from opiates? YES   Adherence to Rx protocol: Is patient adhering to Controlled Substance Agreement and taking medications ONLY as ordered? YES    Assessment:   Tracy Galloway is a 76 year old female who is seen at the pain clinic for:    1. Chronic facial pain    2. Injury of right facial nerve, subsequent encounter    3. Chronic, continuous use of opioids    4. Anxiety    5. Major depressive disorder with current active episode, unspecified depression episode severity, unspecified whether recurrent           Mental Health - the patient's mental health concerns, specifically anxiety and depression, affect her experience of pain and contribute to her clinically significant distress.    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 On chronic narcotics since that time, now with increased stress and anxiety related to primarily to 's dementia.   Tracy had been experiencing increased mental fogginess and anxiety and was concerned that her opioids were contributing to both symptoms and that her anxiety was exacerbating her pain and likely her  feeling cognitive slowing.     She is now much less anxious given improved understanding of how her pain, anxiety, depression and stress are related and due to increased acceptance of her 's condition.     Started venlafaxine for dual pain and mood benefit, feels this is helping manage stress and anxiety.   Previously given resources for neuroscience education/pain psychology.     Plan:  Referrals     - consider psychology referral in the future when avalable in person (not telehealth)  Therapies:     - Physical Therapy: consider in the future    - Clinical Health Psychologist to address issues of relaxation, behavioral change, coping style, and other factors important to improvement: previously given resources for Dr. Mallory Ferrell  Medication Management:     - continue venlafaxine 75mg BID    - taper Norco 10/325 to max #4 tabs per day (#130 tabs per 30 days to #120 tabs per 30 days). With decrease, next Rx will start 9/20. Consider further taper to #3.5 tabs per day at next refill.  UDS: 3/17/2021  CSA: 3/30/2021    - Did not tolerate gabapentin or Lyrica in the past. Consider oxcarbazepine vs Topamax in the future   Procedures recommended:     - consider maxillary nerve block in the future, not at this time    Follow up: 1-2 months     BILLING TIME DOCUMENTATION:   The total TIME spent on this patient on the date of the encounter/appointment was 38 minutes.      TOTAL TIME includes:   Time spent preparing to see the patient (reviewing records and tests) - 5 min  Time spent face to face (or over the phone) with the patient - 16 min  Time spent ordering tests, medications, procedures and referrals - 0 min  Time spent Referring and communicating with other healthcare professionals - 0 min  Time spent documenting clinical information in Epic - 5 min    Ivon Shannon MD  [facility]

## 2021-08-30 NOTE — PATIENT INSTRUCTIONS
1. Taper the Norco to #4 tabs per day. Start this today. This means we will refill your prescription 2 days later than previously planned. The next refill will start on 9/20. Then will will fill #120 tabs per 30 days. Or, if you are doing very well, we will consider going down to #3.5 tabs per day.    2. Continue Venlafaxine at current dose.     Follow up in 1-2 mo. Sooner if needed. With [employer] check-in in between.    Ivon Shannon MD  [facility]    ----------------------------------------------------------------  Clinic Number:  [phone]     Call with any questions about your care and for scheduling assistance.     Calls are returned Monday through Friday between 8 AM and 4:30 PM. We usually get back to you within 2 business days depending on the issue/request.    If we are prescribing your medications:    For opioid medication refills, call the clinic or send a [employer] message 7 days in advance.  Please include:    Name of requested medication    Name of the pharmacy.    For non-opioid medications, call your pharmacy directly to request a refill. Please allow 3-4 days to be processed.     Per [place] State Law:    All controlled substance prescriptions must be filled within 30 days of being written.      For those controlled substances allowing refills, pickup must occur within 30 days of last fill.      We believe regular attendance is key to your success in our program!      Any time you are unable to keep your appointment we ask that you call us at least 24 hours in advance to cancel.This will allow us to offer the appointment time to another patient.     Multiple missed appointments may lead to dismissal from the clinic.

## 2021-09-04 ENCOUNTER — OFFICE VISIT (OUTPATIENT)
Dept: URGENT CARE | Facility: URGENT CARE | Age: 76
End: 2021-09-04
Payer: COMMERCIAL

## 2021-09-04 VITALS
OXYGEN SATURATION: 99 % | WEIGHT: 106.6 LBS | TEMPERATURE: 96.6 F | HEIGHT: 61 IN | SYSTOLIC BLOOD PRESSURE: 122 MMHG | RESPIRATION RATE: 16 BRPM | BODY MASS INDEX: 20.12 KG/M2 | DIASTOLIC BLOOD PRESSURE: 60 MMHG | HEART RATE: 69 BPM

## 2021-09-04 DIAGNOSIS — R61 NIGHT SWEATS: ICD-10-CM

## 2021-09-04 DIAGNOSIS — R63.4 WEIGHT LOSS, UNINTENTIONAL: ICD-10-CM

## 2021-09-04 DIAGNOSIS — B37.2 YEAST DERMATITIS: Primary | ICD-10-CM

## 2021-09-04 PROCEDURE — 99213 OFFICE O/P EST LOW 20 MIN: CPT | Performed by: NURSE PRACTITIONER

## 2021-09-04 RX ORDER — NYSTATIN 100000 U/G
CREAM TOPICAL
Qty: 30 G | Refills: 1 | Status: SHIPPED | OUTPATIENT
Start: 2021-09-04 | End: 2021-11-11

## 2021-09-04 ASSESSMENT — MIFFLIN-ST. JEOR: SCORE: 905.91

## 2021-09-04 NOTE — PATIENT INSTRUCTIONS
Use cream as directed.    Set up appointment with Dr. Wyman for further evaluation of unexplained weight loss and new onset night sweats.    Follow-up with your primary care provider next week and as needed.    Indications for emergent return to emergency department discussed with patient, who verbalized good understanding and agreement.  Patient understands the limitations of today's evaluation.         Patient Education     Candida Skin Infection (Adult)   Candida is a type of yeast. It grows naturally on the skin and in the mouth. If it grows out of control, it can cause an infection. Candida can cause infections in the genital area, skin folds, in the mouth, and under the breasts. Anyone can get this infection. It is more common in a person with a weak immune system, such as from diabetes, HIV, or cancer. It s also more common in someone who has been on antibiotic therapy. And it s more common in people who are overweight or who have incontinence. Wearing tight-fitting clothing and taking part in activities with lots of skin-to-skin contact can also put you at risk.  Candida causes the skin to become bright red and inflamed. The border of the infected part of the skin is often raised. The infection causes pain and itching. Sometimes the skin peels and bleeds. In the mouth, candida is called thrush, and may cause white thickened areas.  A Candida rash is most often treated with an antifungal cream, gel, or powder. . The rash will clear a few days after starting the medicine. Infections that don t go away may need a prescription medicine. In rare cases, a bacterial infection can also occur.  Home care  Your healthcare provider will advise using an antifungal cream, powder, or gel for the rash. He or she may also prescribe a medicine for the itch. Follow all instructions for using these medicines.  General care    Keep your skin clean by washing the area twice a day.    Use the medicine as directed until your rash  is gone. Once the skin has healed, keep it dry to prevent another infection.     If you are overweight, talk with your healthcare provider about a plan to lose extra weight.    Don't wear tight-fitting clothes.    Follow-up care  Follow up with your healthcare provider, or as advised. Your rash will clear in 7 to 14 days. Call your provider if the rash is not gone after 14 days.  When to get medical advice  Call your healthcare provider right away if any of these occur:    Pain or redness that gets worse or spreads    Fluid coming from the skin    Yellow crusts on the skin    Fever of 100.4 F (38 C) or higher, or as directed by your provider  [employer] last reviewed this educational content on 10/1/2019    3928-2516 [employer]. All rights reserved. This information is not intended as a substitute for professional medical care. Always follow your healthcare professional's instructions.

## 2021-09-04 NOTE — PROGRESS NOTES
Assessment & Plan  Pt to follow up with new PCP, Dr. Wyman, for further evaluation of unintentional weight loss of 14 pounds and new onset night sweats.  Problem List Items Addressed This Visit     None      Visit Diagnoses     Yeast dermatitis    -  Primary    Relevant Medications    nystatin (MYCOSTATIN) 352546 UNIT/GM external cream    Weight loss, unintentional        Night sweats        Relevant Medications    nystatin (MYCOSTATIN) 553639 UNIT/GM external cream             15 minutes spent on the date of the encounter doing chart review, history and exam, documentation and further activities per the note       Patient Instructions   Use cream as directed.    Set up appointment with Dr. Wyman for further evaluation of unexplained weight loss and new onset night sweats.    Follow-up with your primary care provider next week and as needed.    Indications for emergent return to emergency department discussed with patient, who verbalized good understanding and agreement.  Patient understands the limitations of today's evaluation.         Patient Education     Candida Skin Infection (Adult)   Candida is a type of yeast. It grows naturally on the skin and in the mouth. If it grows out of control, it can cause an infection. Candida can cause infections in the genital area, skin folds, in the mouth, and under the breasts. Anyone can get this infection. It is more common in a person with a weak immune system, such as from diabetes, HIV, or cancer. It s also more common in someone who has been on antibiotic therapy. And it s more common in people who are overweight or who have incontinence. Wearing tight-fitting clothing and taking part in activities with lots of skin-to-skin contact can also put you at risk.  Candida causes the skin to become bright red and inflamed. The border of the infected part of the skin is often raised. The infection causes pain and itching. Sometimes the skin peels and bleeds. In the mouth,  susan is called thrush, and may cause white thickened areas.  A Candida rash is most often treated with an antifungal cream, gel, or powder. . The rash will clear a few days after starting the medicine. Infections that don t go away may need a prescription medicine. In rare cases, a bacterial infection can also occur.  Home care  Your healthcare provider will advise using an antifungal cream, powder, or gel for the rash. He or she may also prescribe a medicine for the itch. Follow all instructions for using these medicines.  General care    Keep your skin clean by washing the area twice a day.    Use the medicine as directed until your rash is gone. Once the skin has healed, keep it dry to prevent another infection.     If you are overweight, talk with your healthcare provider about a plan to lose extra weight.    Don't wear tight-fitting clothes.    Follow-up care  Follow up with your healthcare provider, or as advised. Your rash will clear in 7 to 14 days. Call your provider if the rash is not gone after 14 days.  When to get medical advice  Call your healthcare provider right away if any of these occur:    Pain or redness that gets worse or spreads    Fluid coming from the skin    Yellow crusts on the skin    Fever of 100.4 F (38 C) or higher, or as directed by your provider  [employer] last reviewed this educational content on 10/1/2019    9896-1276 [employer]. All rights reserved. This information is not intended as a substitute for professional medical care. Always follow your healthcare professional's instructions.               Return in about 3 days (around 9/7/2021) for Follow up with your primary care provider.    YANY Aden [facility]    Ashok Molina is a 76 year old who presents for the following health issues     HPI     Chief Complaint   Patient presents with     Derm Problem     7-10 days Patient is here with a rash around her  "anus, burning, painful.     She has lost weight unintentionally going from 118 to 103-104 pounds in the last 2 months. At this same time she started having night sweats.      Review of Systems   Constitutional, HEENT, cardiovascular, pulmonary, GI, , musculoskeletal, neuro, skin, endocrine and psych systems are negative, except as otherwise noted.      Objective    /60 (BP Location: Right arm, Patient Position: Sitting, Cuff Size: Adult Regular)   Pulse 69   Temp (!) 96.6  F (35.9  C) (Tympanic)   Resp 16   Ht 1.549 m (5' 1\")   Wt 48.4 kg (106 lb 9.6 oz)   SpO2 99%   BMI 20.14 kg/m    Body mass index is 20.14 kg/m .  Physical Exam   GENERAL: healthy, alert and no distress, nontoxic in appearance  EYES: Eyes grossly normal to inspection, PERRL and conjunctivae and sclerae normal  HENT: normocephalic  NECK:supple with full ROM  ABDOMEN: soft, nontender  MS: no gross musculoskeletal defects noted, no edema  About 6 cm diameter [place] of what appears to be yeast dermatitis surrounding anal opening. No secondary infection.     No results found for this or any previous visit (from the past 24 hour(s)).            "

## 2021-09-18 ENCOUNTER — HEALTH MAINTENANCE LETTER (OUTPATIENT)
Age: 76
End: 2021-09-18

## 2021-09-19 ENCOUNTER — MYC MEDICAL ADVICE (OUTPATIENT)
Dept: PALLIATIVE MEDICINE | Facility: CLINIC | Age: 76
End: 2021-09-19

## 2021-09-19 DIAGNOSIS — G89.4 CHRONIC PAIN SYNDROME: ICD-10-CM

## 2021-09-20 NOTE — TELEPHONE ENCOUNTER
Received call from patient requesting refill(s) of HYDROcodone-acetaminophen (NORCO)  MG per tablet     Last dispensed from pharmacy on 8/19/21    Patient's last office/virtual visit by prescribing provider on 8/30/21  Next office/virtual appointment scheduled for none    Last urine drug screen date 3/17/21  Current opioid agreement on file (completed within the last year) Yes Date of opioid agreement: 3/30/21    E-prescribe to [facility] [place], [place]  pharmacy    Will route to [facility] for review and preparation of prescription(s).

## 2021-09-20 NOTE — TELEPHONE ENCOUNTER
Routed to the nursing pool and to the MA pool to process refill(s) of norco.      Per patient myChart message:  From: Tracy Galloway      Created: 9/19/2021 7:35 AM      When should I see you again? You said to ask for a refill on the vicodin on 9-20 which is Monday.  Tracy      _________________________________    Mychart sent to pt:  From: Ruth Cid RN      Created: 9/20/2021 9:57 AM        Adam Molina,  What pharmacy do you want to use?     Per Dr. Shannon's 8/30/21 visit plan she had wanted to see you again 1/2 moths later. So October or November is okay.  You can call to schedule at [phone].     Ruth RN-BSN  [facility]

## 2021-09-20 NOTE — TELEPHONE ENCOUNTER
"Pt tapered to #4 tabs per day after previous visit. We had discussed possible continued taper to #3.5 tabs per day.       \"- taper Norco 10/325 to max #4 tabs per day (#130 tabs per 30 days to #120 tabs per 30 days). With decrease, next Rx will start 9/20. Consider further taper to #3.5 tabs per day at next refill.\"    How  Is she doing on #4 tabs per day? Does she feel ready to taper to #3.5 tabs per day?    Thank  You!    Ivon Shannon MD  [facility]  "

## 2021-09-21 NOTE — TELEPHONE ENCOUNTER
Nikolas sent to pt:  To: Tracy Galloway      From: Ruth Cid RN      Created: 9/21/2021 1:46 PM      Hi Dr. Shiva Molina wants to know how you have been doing on the #4 tabs/day?  Are you ready to taper to #3.5 tabs/day at this refill?     GLENDY Miranda-BSN  [facility]

## 2021-09-23 RX ORDER — HYDROCODONE BITARTRATE AND ACETAMINOPHEN 10; 325 MG/1; MG/1
TABLET ORAL
Qty: 105 TABLET | Refills: 0 | Status: SHIPPED | OUTPATIENT
Start: 2021-09-23 | End: 2021-10-14

## 2021-09-23 NOTE — TELEPHONE ENCOUNTER
checked and appropriate     Script Eprescribed to pharmacy    Will send this to MA team to notify patient. Note continued taper: #4 to #3.5 tabs per day.     Signed Prescriptions:                        Disp   Refills    HYDROcodone-acetaminophen (NORCO)  M*105 ta*0        Sig: Take 1-2 tabs every 6 hrs as needed for severe pain.           Max #3.5 tabs per day. 30 day supply. Fill/start           9/23  Authorizing Provider: MEG BENNETT MD  [facility]

## 2021-09-23 NOTE — TELEPHONE ENCOUNTER
Per patient myChart message:  From: Tracy Galloway      Created: 9/22/2021 5:13 PM      Not at this time, ask again next month, please. I will try my best. Tracy             Script prepped for a max of 4 tabs/day.    Ruth RN-BSN  [facility]

## 2021-09-23 NOTE — TELEPHONE ENCOUNTER
LVM, notified that prescription was sent to preferred pharmacy.    Able to fill  and start 09/23/21    ----------  LVM per provider,    Will send this to MA team to notify patient. Note continued taper: #4 to #3.5 tabs per day.       Gabriela Dowd MA  [facility] [place]

## 2021-09-30 ENCOUNTER — TELEPHONE (OUTPATIENT)
Dept: PALLIATIVE MEDICINE | Facility: CLINIC | Age: 76
End: 2021-09-30

## 2021-09-30 ENCOUNTER — MYC MEDICAL ADVICE (OUTPATIENT)
Dept: PALLIATIVE MEDICINE | Facility: CLINIC | Age: 76
End: 2021-09-30

## 2021-09-30 NOTE — TELEPHONE ENCOUNTER
Reason for call:  Other   Patient called regarding (reason for call): prescription and Medication  Additional comments: pt has medication questions for Dr Shannon. She tried to respond thru Predictryhart but was uncessesful    Phone number to reach patient:  Home number on file [phone] (home)    Can we leave a detailed message on this number?  YES    Travel screening: Not Applicable         Clifton CHRISTIE    [place] [facility] [place]

## 2021-10-01 NOTE — TELEPHONE ENCOUNTER
Elizabeth Elliott QSigned  1002 9/30/21         Reason for call:  Other   Patient called regarding (reason for call): prescription and Medication  Additional comments: pt has medication questions for Dr Shannon. She tried to respond thru Mychart but was uncessesful     Phone number to reach patient:  Home number on file [phone] (home)     Can we leave a detailed message on this number?  YES     Travel screening: Not Applicable          Elizabeth CHRISTIE    [facility] [place]

## 2021-10-01 NOTE — TELEPHONE ENCOUNTER
Per patient Vanderdroidhart message:  From: james Galloway      Created: 9/30/2021 5:24 PM      I would like to continue with the 4 doses a day.  Did not respond in time and my refill is for 105  Since we last spoke, much has changed. I had to lease a car and was afraid I wouldn't be able to do that because of the fraud. I found a car but had to drive to [place] to get it. My , daughter and I went to the  for testing . He has advanced dementia. My stress has increased, resulting rising the pain in my jaw. I thought we agreed to decrease the amount gradually. 130 to 120 the next month. Then 110 and finally 105. My RX was for 105 and filled on 9-23. Please reconsider, I have been taking 4 a day and will be concerned about running out.You told me I could make an appt. anytime I felt the need to talk, but I think I told you all I need to tell you. My  just came in here crying and now I am too. My pain is awful right now. Hear my plea. James Galloway        10/13/21: in-person visit with Dr. Shannon     See the 9/19/21 [employer] encounter. Pt says she was not ready to decrease from 4 tabs/day to 3.5 tabs/day.    Routed to provider.     Ruth RN-BSN  [facility]

## 2021-10-01 NOTE — TELEPHONE ENCOUNTER
Exploryst message sent to Pt    Hi Tracy,     It is OK to stay on #4 tabs per day for now. I will therefore refill your prescription early. If you have been taking #4 tabs per day, the most recent refill should last you 26 days. The next refill would therefore be due to start 10/19. Reach out to us ~7 days in advance and we will refill your Rx for a 10/19 start.       Ivon Shannon MD  [facility]

## 2021-10-04 ENCOUNTER — OFFICE VISIT (OUTPATIENT)
Dept: URGENT CARE | Facility: URGENT CARE | Age: 76
End: 2021-10-04
Payer: COMMERCIAL

## 2021-10-04 VITALS
DIASTOLIC BLOOD PRESSURE: 78 MMHG | HEART RATE: 78 BPM | SYSTOLIC BLOOD PRESSURE: 128 MMHG | OXYGEN SATURATION: 99 % | RESPIRATION RATE: 16 BRPM | WEIGHT: 106.44 LBS | TEMPERATURE: 97.2 F | BODY MASS INDEX: 20.11 KG/M2

## 2021-10-04 DIAGNOSIS — N94.89 VAGINAL BURNING: Primary | ICD-10-CM

## 2021-10-04 LAB
CLUE CELLS: ABNORMAL
TRICHOMONAS, WET PREP: ABNORMAL
WBC'S/HIGH POWER FIELD, WET PREP: ABNORMAL
YEAST, WET PREP: ABNORMAL

## 2021-10-04 PROCEDURE — 87210 SMEAR WET MOUNT SALINE/INK: CPT | Performed by: PHYSICIAN ASSISTANT

## 2021-10-04 PROCEDURE — 99213 OFFICE O/P EST LOW 20 MIN: CPT | Performed by: PHYSICIAN ASSISTANT

## 2021-10-04 RX ORDER — NYSTATIN 100000 U/G
CREAM TOPICAL
Qty: 30 G | Refills: 1 | Status: SHIPPED | OUTPATIENT
Start: 2021-10-04 | End: 2021-11-11

## 2021-10-04 NOTE — PROGRESS NOTES
Assessment & Plan     Vaginal burning  Refilled topical nystatin which worked in the past. Follow up as needed.     - Wet prep - Clinic Collect  - nystatin (MYCOSTATIN) 349376 UNIT/GM external cream; Apply to affected area 2-3 times daily for 7-14 days or as needed                 Return in about 1 week (around 10/11/2021), or if symptoms worsen or fail to improve.    BHUMI [facility]            Subjective   Chief Complaint   Patient presents with     Vaginal Problem     follow up since  9/4/21, external burning pressure around vaginal area         HPI     Vaginal problem     Onset of symptoms was 1 month(s) ago.  Course of illness is same.    Severity moderate  Current and Associated symptoms: vaginal burning    Treatment measures tried include nystatin.  Predisposing factors include none                Review of Systems   Constitutional, HEENT, cardiovascular, pulmonary, gi and gu systems are negative, except as otherwise noted.      Objective    /78 (BP Location: Right arm, Patient Position: Sitting, Cuff Size: Adult Regular)   Pulse 78   Temp 97.2  F (36.2  C) (Tympanic)   Resp 16   Wt 48.3 kg (106 lb 7 oz)   SpO2 99%   BMI 20.11 kg/m    Body mass index is 20.11 kg/m .  Physical Exam  Constitutional:       General: She is not in acute distress.     Appearance: She is well-developed.   Psychiatric:         Behavior: Behavior normal.

## 2021-10-12 ENCOUNTER — IMMUNIZATION (OUTPATIENT)
Dept: FAMILY MEDICINE | Facility: CLINIC | Age: 76
End: 2021-10-12
Payer: COMMERCIAL

## 2021-10-12 ENCOUNTER — TELEPHONE (OUTPATIENT)
Dept: FAMILY MEDICINE | Facility: CLINIC | Age: 76
End: 2021-10-12

## 2021-10-12 DIAGNOSIS — Z23 NEED FOR PROPHYLACTIC VACCINATION AND INOCULATION AGAINST INFLUENZA: Primary | ICD-10-CM

## 2021-10-12 PROCEDURE — 99207 PR NO CHARGE NURSE ONLY: CPT

## 2021-10-12 PROCEDURE — G0008 ADMIN INFLUENZA VIRUS VAC: HCPCS

## 2021-10-12 PROCEDURE — 90662 IIV NO PRSV INCREASED AG IM: CPT

## 2021-10-12 NOTE — TELEPHONE ENCOUNTER
Prior Authorization Retail Medication Request    Medication/Dose: lidocaine 5% oint   ICD code (if different than what is on RX):    Previously Tried and Failed:    Rationale:  IF LEVEL OF CARE CHANGE CALL HELP DESK  PLAN LIMIT EXCEEDED    Insurance Name: [facility] part d [phone]  Insurance ID: [id number]      Pharmacy Information (if different than what is on RX)  Name:    Phone:

## 2021-10-13 ENCOUNTER — MYC MEDICAL ADVICE (OUTPATIENT)
Dept: PALLIATIVE MEDICINE | Facility: CLINIC | Age: 76
End: 2021-10-13

## 2021-10-13 DIAGNOSIS — G89.4 CHRONIC PAIN SYNDROME: ICD-10-CM

## 2021-10-14 NOTE — TELEPHONE ENCOUNTER
Prior Authorization Approval    Authorization Effective Date: 9/14/2021  Authorization Expiration Date: 10/14/2022  Medication: lidocaine 5% oint   Approved Dose/Quantity:   Reference #:  [id number]   Insurance Company: [employer] - Phone [phone] Fax [phone]  Expected CoPay:       CoPay Card Available:      Foundation Assistance Needed:    Which Pharmacy is filling the prescription (Not needed for infusion/clinic administered): [facility] [address]  Pharmacy Notified: Yes  Patient Notified: Yes  **Instructed pharmacy to notify patient when script is ready to /ship.**

## 2021-10-14 NOTE — TELEPHONE ENCOUNTER
Routed to the nursing pool and to the MA pool to process refill(s).    Per patient MyRollhart message:  From: james Galloway      Created: 10/13/2021 2:23 PM      Am requesting an additional amt. of hydrocodone per conversation with Dr. Shannon. Thank you.        Per the 9/23/21 [place] script:    Per Dr. Shannon in the 9/30/21 [facility] [employer] encounter:  It is OK to stay on #4 tabs per day for now. I will therefore refill your prescription early. If you have been taking #4 tabs per day, the most recent refill should last you 26 days. The next refill would therefore be due to start 10/19. Reach out to us ~7 days in advance and we will refill your Rx for a 10/19 start.       Ruth RN-BSN  [facility]

## 2021-10-14 NOTE — TELEPHONE ENCOUNTER
Medication refill information reviewed.     Last due:  Fill/start 9/23 Max #3.5 tabs per day  Per Dr. Shannon in the 9/30/21 mychart encounter:  It is OK to stay on #4 tabs per day for now. I will therefore refill your prescription early. If you have been taking #4 tabs per day, the most recent refill should last you 26 days. The next refill would therefore be due to start 10/19. Reach out to us ~7 days in advance and we will refill your Rx for a 10/19 start.   Due date:  10/19/21 per Dr. Sahnnon       Prescriptions prepped for review.     Ruth RN-BSN  [place] [facility]

## 2021-10-14 NOTE — TELEPHONE ENCOUNTER
PA Initiation    Medication: lidocaine 5% oint   Insurance Company: [employer] - Phone [phone] Fax [phone]  Pharmacy Filling the Rx: [place], [place] - [address] [place] [address]  Filling Pharmacy Phone: [phone]  Filling Pharmacy Fax: [phone]  Start Date: 10/14/2021

## 2021-10-14 NOTE — TELEPHONE ENCOUNTER
Received call from patient requesting refill(s) of HYDROcodone-acetaminophen (NORCO)  MG per tablet  Per Dr. Shannon in the 9/30/21 mychart encounter:  It is OK to stay on #4 tabs per day for now. I will therefore refill your prescription early. If you have been taking #4 tabs per day, the most recent refill should last you 26 days. The next refill would therefore be due to start 10/19. Reach out to us ~7 days in advance and we will refill your Rx for a 10/19 start.     Last dispensed from pharmacy on 09/23/21    Patient's last office/virtual visit by prescribing provider on 08/30/21  Next office/virtual appointment scheduled for : none    Last urine drug screen date 03/17/21  Current opioid agreement on file (completed within the last year) Yes Date of opioid agreement: 03/17/21    E-prescribe to pharmacy-[facility], [place] - [address] [place] [address]     Will route to nursing [place] for review and preparation of prescription(s).

## 2021-10-15 ENCOUNTER — MYC MEDICAL ADVICE (OUTPATIENT)
Dept: FAMILY MEDICINE | Facility: CLINIC | Age: 76
End: 2021-10-15

## 2021-10-15 RX ORDER — HYDROCODONE BITARTRATE AND ACETAMINOPHEN 10; 325 MG/1; MG/1
TABLET ORAL
Qty: 120 TABLET | Refills: 0 | Status: SHIPPED | OUTPATIENT
Start: 2021-10-15 | End: 2021-11-17

## 2021-10-15 NOTE — TELEPHONE ENCOUNTER
checked and appropriate     Script Eprescribed to pharmacy    Will send this to MA team to notify patient.    Signed Prescriptions:                        Disp   Refills    HYDROcodone-acetaminophen (NORCO)  M*120 ta*0        Sig: Take 1-2 tabs every 6 hrs as needed for severe pain.           Max 4 tabs per day. 30 day supply. Fill 10/17/21.           Start 10/19/21  Authorizing Provider: MEG BENNETT MD  [facility]

## 2021-10-18 NOTE — TELEPHONE ENCOUNTER
Cyrilt sent to pt:  From: Ruth Cid RN      Created: 10/18/2021 9:02 AM      Dr. Shiva Grayson wanted me to contact you regarding your fill/due date for your norco.  The current refill is at your pharmacy. It can be picked up on 10/21/21 to start on 10/23/21.     Let us know if you have any questions.     GLENDY Miranda-BSN  [facility]

## 2021-10-18 NOTE — TELEPHONE ENCOUNTER
[10/15/2021 12:58 PM] Ivon Shannon, I just got a message from a pharmacist (Arthur jacobs at [facility]) about Tracy oglesby- she picked up her last Alvarez late (9/27), so her next refill will be adjusted 4 days ahead (fill 10/21).   Will one of you notify Tracy?  I don t have epic access right  now

## 2021-10-19 ENCOUNTER — MYC MEDICAL ADVICE (OUTPATIENT)
Dept: FAMILY MEDICINE | Facility: CLINIC | Age: 76
End: 2021-10-19

## 2021-11-11 ENCOUNTER — OFFICE VISIT (OUTPATIENT)
Dept: FAMILY MEDICINE | Facility: CLINIC | Age: 76
End: 2021-11-11
Payer: COMMERCIAL

## 2021-11-11 VITALS
BODY MASS INDEX: 20.6 KG/M2 | HEART RATE: 98 BPM | SYSTOLIC BLOOD PRESSURE: 128 MMHG | TEMPERATURE: 97.3 F | OXYGEN SATURATION: 98 % | DIASTOLIC BLOOD PRESSURE: 80 MMHG | RESPIRATION RATE: 16 BRPM | WEIGHT: 109 LBS

## 2021-11-11 DIAGNOSIS — N30.00 ACUTE CYSTITIS WITHOUT HEMATURIA: ICD-10-CM

## 2021-11-11 DIAGNOSIS — N95.2 ATROPHIC VAGINITIS: ICD-10-CM

## 2021-11-11 DIAGNOSIS — R35.0 URINE FREQUENCY: Primary | ICD-10-CM

## 2021-11-11 LAB
ALBUMIN UR-MCNC: NEGATIVE MG/DL
APPEARANCE UR: CLEAR
BACTERIA #/AREA URNS HPF: ABNORMAL /HPF
BILIRUB UR QL STRIP: NEGATIVE
COLOR UR AUTO: YELLOW
GLUCOSE UR STRIP-MCNC: NEGATIVE MG/DL
HGB UR QL STRIP: ABNORMAL
KETONES UR STRIP-MCNC: NEGATIVE MG/DL
LEUKOCYTE ESTERASE UR QL STRIP: ABNORMAL
NITRATE UR QL: NEGATIVE
PH UR STRIP: 7 [PH] (ref 5–7)
RBC #/AREA URNS AUTO: ABNORMAL /HPF
RENAL EPI CELLS #/AREA URNS HPF: ABNORMAL /HPF
SP GR UR STRIP: 1.02 (ref 1–1.03)
SQUAMOUS #/AREA URNS AUTO: ABNORMAL /LPF
UROBILINOGEN UR STRIP-ACNC: 0.2 E.U./DL
WBC #/AREA URNS AUTO: ABNORMAL /HPF

## 2021-11-11 PROCEDURE — 99214 OFFICE O/P EST MOD 30 MIN: CPT | Performed by: NURSE PRACTITIONER

## 2021-11-11 PROCEDURE — 81001 URINALYSIS AUTO W/SCOPE: CPT | Performed by: NURSE PRACTITIONER

## 2021-11-11 PROCEDURE — 87086 URINE CULTURE/COLONY COUNT: CPT | Performed by: NURSE PRACTITIONER

## 2021-11-11 RX ORDER — CIPROFLOXACIN 250 MG/1
250 TABLET, FILM COATED ORAL 2 TIMES DAILY
Qty: 14 TABLET | Refills: 0 | Status: SHIPPED | OUTPATIENT
Start: 2021-11-11 | End: 2021-11-18

## 2021-11-11 RX ORDER — LIDOCAINE HYDROCHLORIDE 20 MG/ML
SOLUTION OROPHARYNGEAL
Qty: 100 ML | Refills: 1 | Status: SHIPPED | OUTPATIENT
Start: 2021-11-11 | End: 2022-11-29

## 2021-11-11 ASSESSMENT — PAIN SCALES - GENERAL: PAINLEVEL: MILD PAIN (3)

## 2021-11-11 NOTE — PATIENT INSTRUCTIONS
Patient Education     Bladder Infection, Female (Adult)     Urine normally doesn't have any germs (bacteria) in it. But bacteria can get into the urinary tract from the skin around the rectum. Or they can travel in the blood from other parts of the body. Once they are in your urinary tract, they can cause infection in these areas:    The urethra (urethritis)    The bladder (cystitis)    The kidneys (pyelonephritis)  The most common place for an infection is in the bladder. This is called a bladder infection. This is one of the most common infections in women. Most bladder infections are easily treated. They are not serious unless the infection spreads to the kidney.  The terms bladder infection, UTI, and cystitis are often used to describe the same thing. But they are not always the same. Cystitis is an inflammation of the bladder. The most common cause of cystitis is an infection.  Symptoms  The infection causes inflammation in the urethra and bladder. This causes many of the symptoms. The most common symptoms of a bladder infection are:    Pain or burning when urinating    Having to urinate more often than normal    Urgent need to urinate    Only a small amount of urine comes out    Blood in urine    Belly (abdominal) discomfort. This is often in the lower belly above the pubic bone.    Cloudy urine    Strong- or bad-smelling urine    Unable to urinate (urinary retention)    Unable to hold urine in (urinary incontinence)    Fever    Loss of appetite    Confusion (in older adults)  Causes  Bladder infections are not contagious. You can't get one from someone else, from a toilet seat, or from sharing a bath.  The most common cause of bladder infections is bacteria from the bowels. The bacteria get onto the skin around the opening of the urethra. From there, they can get into the urine. Then they travel up to the bladder, causing inflammation and infection. This often happens because of:    Wiping incorrectly after  urinating. Always wipe from front to back.    Bowel incontinence    Pregnancy    Procedures such as having a catheter put in    Older age    Not emptying your bladder. This can give bacteria a chance to grow in your urine.    Fluid loss (dehydration)    Constipation    Having sex    Using a diaphragm for birth control   Treatment  Bladder infections are diagnosed by a urine test and urine culture. They are treated with antibiotics. They often clear up quickly without problems. Treatment helps prevent a more serious kidney infection.  Medicines  Medicines can help in the treatment of a bladder infection:    Take antibiotics until they are used up, even if you feel better. It's important to finish them to make sure the infection has cleared.    You can use acetaminophen or ibuprofen for pain, fever, or discomfort, unless another medicine was prescribed. If you have long-term (chronic) liver or kidney disease, talk with your healthcare provider before using these medicines. Also talk with your provider if you've ever had a stomach ulcer or GI (gastrointestinal) bleeding, or are taking blood-thinner medicines.    If you are given phenazopydridine to reduce burning with urination, it will make your urine a bright orange color. This can stain clothing.  Care and prevention  These self-care steps can help prevent future infections:    Drink plenty of fluids. This helps to prevent dehydration and flush out your bladder. Do this unless you must restrict fluids for other health reasons, or your healthcare provider told you not to.    Clean yourself correctly after going to the bathroom. Wipe from front to back after using the toilet. This helps prevent the spread of bacteria.    Urinate more often. Don't try to hold urine in for a long time.    Wear loose-fitting clothes and cotton underwear. Don't wear tight-fitting pants.    Improve your diet and prevent constipation. Eat more fresh fruits and vegetables, and fiber. Eat  less junk foods and fatty foods.    Don't have sex until your symptoms are gone.    Don't have caffeine, alcohol, and spicy foods. These can irritate your bladder.    Urinate right after you have sex to flush out your bladder.    If you use birth control pills and have frequent bladder infections, discuss it with your healthcare provider.  Follow-up care  Call your healthcare provider if all symptoms are not gone after 3 days of treatment. This is especially important if you have repeat infections.  If a culture was done, you will be told if your treatment needs to be changed. If directed, you can call to find out the results.  If X-rays were done, you will be told if the results will affect your treatment.  Call 911  Call 911 if any of the following occur:    Trouble breathing    Hard to wake up or confusion    Fainting (loss of consciousness)    Fast heart rate  When to get medical advice  Call your healthcare provider right away if any of these occur:    Fever of 100.4 F (38.0 C) or higher, or as directed by your healthcare provider    Symptoms are not better after 3 days of treatment    Back or belly pain that gets worse    Repeated vomiting, or unable to keep medicine down    Weakness or dizziness    Vaginal discharge    Pain, redness, or swelling in the outer vaginal area (labia)  [employer] last reviewed this educational content on 11/1/2019 2000-2021 [employer]. All rights reserved. This information is not intended as a substitute for professional medical care. Always follow your healthcare professional's instructions.           Patient Education     Atrophic Vaginitis    Atrophic vaginitis means the walls of your vagina have become thin. This happens when your body makes too little of the hormone estrogen. Menopause or surgical removal of the ovaries are the most common causes for a drop in estrogen. Breastfeeding can also cause the hormone level to drop.   Symptoms of atrophic vaginitis  include:    Dryness, soreness, burning, or itching in the vagina    Vaginal discharge  Sex can be uncomfortable, even painful. After sex, you may have bleeding from your vagina. You may also have burning or pain when you urinate.   Home care  Your healthcare provider may recommend one or more of these as treatment:     Vaginal creams, lotions, and lubricants. These products help relieve vaginal dryness. They don t need a prescription. They can be found in the personal care section of most pharmacies. Creams and lotions are used daily to help keep the vagina moist. Lubricants help reduce dryness and pain during sex. Choose water-based lubricants. Don t use petroleum jelly, mineral oil, or other oils. These increase the chance of infection.    Hormone therapy (HT). HT increases the amount of estrogen in your body. This can help manage or relieve symptoms. HT can be given in pill form. It may be given as a lotion, cream, ring put into the vagina, or a patch on the skin. The risks and benefits of HT vary for each woman. For instance, your risk may be higher if you have had breast cancer. Discuss this treatment with your healthcare provider. Not every woman can use HT.  You don t need to stop having sex. In fact, regular sex can help keep vaginal tissues healthy. Take steps to make sex more comfortable by using water-based lubricants.   Preventing infections  Atrophic vaginitis makes an infection of the vagina or the urinary tract more likely. To help reduce your risk:     Keep your genitals clean. When you bathe, wash the outside of your vagina with mild soap and water. Clean gently between the folds of your vagina.    Wipe from front to back after a bowel movement.    Don t douche unless your healthcare provider tells you to.    Don't use scented toilet paper, scented vaginal sprays, or scented tampons.    Don't wear clothes that are tight in the crotch. These include pantyhose, jeans, and leggings. Wear cotton  underwear. Change it every day.  Follow-up care  Follow up with your healthcare provider, or as advised.  When to seek medical advice  Call your health care provider right away if any of these occur:    Fever of 100.4 F (38 C) or higher, or as directed by your healthcare provider    Symptoms don t go away or get worse even with treatment    Vaginal area swells or becomes painful    Vaginal area bleeds, but not because of your period    Bad-smelling discharge from the vagina    Pain or burning when you urinate or you have trouble passing urine    Open sores develop around vagina   [employer] last reviewed this educational content on 10/1/2019    5441-0012 [employer]. All rights reserved. This information is not intended as a substitute for professional medical care. Always follow your healthcare professional's instructions.

## 2021-11-11 NOTE — PROGRESS NOTES
Assessment & Plan     Urine frequency  - UA macro with reflex to Microscopic and Culture - Clinc Collect  - Urine Microscopic Exam  - Urine Culture    Acute cystitis without hematuria  Begin   - ciprofloxacin (CIPRO) 250 MG tablet  Dispense: 14 tablet; Refill: 0    Atrophic vaginitis  Begin new meds for symptom relief.   - conjugated estrogens (PREMARIN) 0.625 MG/GM vaginal cream  Dispense: 30 g; Refill: 2  - lidocaine (XYLOCAINE) 2 % solution  Dispense: 100 mL; Refill: 1      Follow up with Primary Care Provider with ongoing symptoms  Call or return to the clinic with any worsening of symptoms or no resolution. Patient/Parent verbalized understanding and is in agreement. Medication side effects reviewed.   Current Outpatient Medications   Medication Sig Dispense Refill     aspirin (ASA) 81 MG tablet Take 1 tablet (81 mg) by mouth daily (Patient not taking: Reported on 11/29/2021) 90 tablet 1     conjugated estrogens (PREMARIN) 0.625 MG/GM vaginal cream Apply daily to affected area for 2 weeks then 3 times a week (Patient not taking: Reported on 11/29/2021) 30 g 2     lidocaine (XYLOCAINE) 2 % solution Apply to affected area every 4-6 hours as needed. Max 8 doses/24 hour period. (Patient not taking: Reported on 11/29/2021) 100 mL 1     omeprazole (PRILOSEC) 20 MG DR capsule TAKE ONE CAPSULE BY MOUTH ONCE DAILY 90 capsule 3     simvastatin (ZOCOR) 10 MG tablet TAKE ONE TABLET BY MOUTH EVERY NIGHT AT BEDTIME 90 tablet 3     zolpidem (AMBIEN) 10 MG tablet TAKE 1/2 TO 1 TABLET BY MOUTH EVERY NIGHT AT BEDTIME AS NEEDED FOR SLEEP 30 tablet 5     DILT- MG 24 hr capsule TAKE ONE CAPSULE BY MOUTH ONCE DAILY 90 capsule 3     DOCUSATE SODIUM PO Take 100 mg by mouth daily as needed for constipation  (Patient not taking: Reported on 10/4/2021)       HYDROcodone-acetaminophen (NORCO)  MG per tablet Take 1-2 tabs every 6 hrs as needed for severe pain. Max 4 tabs per day. 30 day supply. Fill 11/18/21. Start 11/20/21  120 tablet 0     ibuprofen (ADVIL/MOTRIN) 200 MG tablet Take 400-600 mg by mouth every 8 hours as needed for mild pain (Patient not taking: Reported on 9/4/2021)       lisinopril (ZESTRIL) 20 MG tablet Take 1 tablet (20 mg) by mouth daily 90 tablet 3     venlafaxine (EFFEXOR) 75 MG tablet Take 1 tablet (75 mg) by mouth 2 times daily 120 tablet 3     Chart documentation with Dragon Voice recognition Software. Although reviewed after completion, some words and grammatical errors may remain.    YANY Pagan CNP  [facility]    Ashok ramirez is a 76 year old who presents for the following health issues     HPI     Genitourinary - Female  Onset/Duration: 1 month   Description:   Painful urination (Dysuria): YES           Frequency: YES  Blood in urine (Hematuria): no  Delay in urine (Hesitency): no  Intensity: moderate  Progression of Symptoms:  worsening  Accompanying Signs & Symptoms:  Fever/chills: no  Flank pain: no  Nausea and vomiting: no  Vaginal symptoms: dyspareunia (pain in labia/pelvis  Abdominal/Pelvic Pain: no  History:   History of frequent UTI s: no  History of kidney stones: no  Precipitating or alleviating factors: NoneIncrease fluid intake   Therapies tried and outcome:     Chronic burning in her mouth after a root canal   Top right gum line- chronic facial pain  Hemorrhoids chronic opiate use.       Menopausal age 29  Hysterectomy due to endometriosis both ovaries removed at this time. Estrogen for awhile yet  Nystatin cream x 2 months.   Burning vaginal discomfort.         Review of Systems   Constitutional, HEENT, cardiovascular, pulmonary, GI, , musculoskeletal, neuro, skin, endocrine and psych systems are negative, except as otherwise noted.      Objective    /80   Pulse 98   Temp 97.3  F (36.3  C) (Tympanic)   Resp 16   Wt 49.4 kg (109 lb)   LMP  (LMP Unknown)   SpO2 98%   BMI 20.60 kg/m    Body mass index is 20.6 kg/m .  Physical Exam    GENERAL: healthy, alert and no distress  EYES: Eyes grossly normal to inspection, PERRL and conjunctivae and sclerae normal  HENT: ear canals and TM's normal, nose and mouth without ulcers or lesions  NECK: no adenopathy, no asymmetry, masses, or scars and thyroid normal to palpation  RESP: lungs clear to auscultation - no rales, rhonchi or wheezes  CV: regular rate and rhythm, normal S1 S2, no S3 or S4, no murmur, click or rub, no peripheral edema and peripheral pulses strong  ABDOMEN: soft, nontender, no hepatosplenomegaly, no masses and bowel sounds normal   (female): vaginal mucosal atrophy  MS: no gross musculoskeletal defects noted, no edema  SKIN: no suspicious lesions or rashes  NEURO: Normal strength and tone, mentation intact and speech normal  PSYCH: mentation appears normal, affect normal/bright    Office Visit on 10/04/2021   Component Date Value Ref Range Status     Trichomonas 10/04/2021 Absent  Absent Final     Yeast 10/04/2021 Absent  Absent Final     Clue Cells 10/04/2021 Absent  Absent Final     WBCs/high power field 10/04/2021 2+* None Final

## 2021-11-11 NOTE — NURSING NOTE
"Chief Complaint   Patient presents with     UTI       Initial There were no vitals taken for this visit. Estimated body mass index is 20.11 kg/m  as calculated from the following:    Height as of 9/4/21: 1.549 m (5' 1\").    Weight as of 10/4/21: 48.3 kg (106 lb 7 oz).    Patient presents to the clinic using No DME    Health Maintenance that is potentially due pending provider review:  NONE    n/a    Is there anyone who you would like to be able to receive your results? No  If yes have patient fill out JENA    "

## 2021-11-12 LAB — BACTERIA UR CULT: NORMAL

## 2021-11-13 ENCOUNTER — HEALTH MAINTENANCE LETTER (OUTPATIENT)
Age: 76
End: 2021-11-13

## 2021-11-15 ENCOUNTER — TELEPHONE (OUTPATIENT)
Dept: FAMILY MEDICINE | Facility: CLINIC | Age: 76
End: 2021-11-15
Payer: COMMERCIAL

## 2021-11-15 NOTE — TELEPHONE ENCOUNTER
Reason for Call:  Rash    Detailed comments:  Pt has Rash on the Torso. This started 11/12/21 and thinks it's from Antibiotic she is taking for a UTI. Started the Antibiotic 11/12/21   Margie Trejo RN notified will call pt back regarding Symptoms.   Please Advise.     Phone Number Patient can be reached at: Home number on file [phone] (home)    Best Time: Any Time      Can we leave a detailed message on this number? YES    Call taken on 11/15/2021 at 1:36 PM by Bette Farley

## 2021-11-15 NOTE — TELEPHONE ENCOUNTER
I talked with Dr Wyman about Tracy's message.  He said she should stop the Cipro, which I told her when I talked with her earlier.  Culture showed contamination.  She should push fluids and see how she feels in a few days.  left message for patient to return call twice after 1700  Delia Roldan RN

## 2021-11-15 NOTE — TELEPHONE ENCOUNTER
S-(situation):Tracy returned my calls stating no one has called me.  C/O red raised welts all over body.  Was on yoga retreat this past weekend and was fine.  Today has urinary frequency and dysuria.   Is crying while talking to me.  Reluctant to give me more information when asking her questions.  Has taken benadryl.    B-(background): seen 11/11/21.  On Cipro 250 BID for 7 days.  Culture probable contamination .  Note states to stop the Cipro.    A-(assessment): felt fine over weekend.  Culture contamination    R-(recommendations): advised to stop the Cipro          None

## 2021-11-15 NOTE — TELEPHONE ENCOUNTER
Tracy told the gals at the  at [facility] that she wanted a nurse to call her this afternoon.  Had med questions  left message for patient to return call.  Delia Roldan RN

## 2021-11-16 ENCOUNTER — MYC MEDICAL ADVICE (OUTPATIENT)
Dept: PALLIATIVE MEDICINE | Facility: CLINIC | Age: 76
End: 2021-11-16
Payer: COMMERCIAL

## 2021-11-16 ENCOUNTER — MYC MEDICAL ADVICE (OUTPATIENT)
Dept: FAMILY MEDICINE | Facility: CLINIC | Age: 76
End: 2021-11-16
Payer: COMMERCIAL

## 2021-11-16 DIAGNOSIS — G89.4 CHRONIC PAIN SYNDROME: ICD-10-CM

## 2021-11-16 NOTE — TELEPHONE ENCOUNTER
PROGRESS:  Irene Malik is doing okay now compared to what she was before she says pain is 6 but she says it is barely there so I do not know how she would levels at a 6 it is only at times when she has done extreme work otherwise it is tolerable with the low-dose buprenorphine patch so I will just keep her at that just give her 1 refill and then give her an appointment in January    Current Pain Assessment  Pain Assessment  Pain Assessment: 0-10  Pain Level: 6  Patient's Stated Pain Goal: 1  Pain Type: Chronic pain  Pain Location: Arm, Wrist  Pain Orientation: Left  Pain Descriptors: Aching, Burning, Throbbing  Pain Frequency: Intermittent  Pain Onset: On-going  Clinical Progression: Not changed  Functional Pain Assessment: Activities are not prevented       ADVERSE MEDICATION EFFECTS:   Nausea and vomiting: no   Constipation:   no                  Dizziness/drowsy/sleepy-no  Urinary Retention: no    ACTIVITY/SOCIAL/EMOTIONAL:  Sleep Pattern:6 hrs  Home Exercises: daily  Activity:increased  Emotional Issues: normal.     ABERRANT BEHAVIORS SINCE LAST VISIT  Lost rx/pills:------------------------------------------ no  Taking  medication as prescribed: -----------yes  Urine Drug Screen --------------------------------- yes  Recent ER visits: ------------------------------------no  Pill count is appropriate: ---------------------------yes   Refills for prescriptions appropriate:----------yes  Physical Exam  Ortho Exam    LABS:    Lab Results   Component Value Date/Time    AMPHSUR NEGATIVE 01/21/2020 10:30 AM    BARBSCNU NEGATIVE 01/21/2020 10:30 AM    LABBENZ NEGATIVE 01/21/2020 10:30 AM    LABBENZ NEGATIVE 02/07/2013 09:30 AM    COCAINEMETUR NEGATIVE 01/21/2020 10:30 AM    LABMETH NEGATIVE 01/21/2020 10:30 AM    OPIAU NEGATIVE 01/21/2020 10:30 AM    PHENCYCU NEGATIVE 01/21/2020 10:30 AM    PPXUR NEGATIVE 01/21/2020 10:30 AM    CANSU NEGATIVE 01/21/2020 10:30 AM    OXTCOSU NEGATIVE 01/21/2020 10:30 AM    METAMPU NEGATIVE Pharmacy is clarifying simvastatin script- patient is on diltiazem and concurrent use of the two medications together can increase serum levels of statins and increase risk of myopathy.  Please advise on if we should still fill the simvastatin and  and you will monitor or if we should disregard rx.    Thank you,  Reena Brady, Pharm.D.  [facility]     01/21/2020 10:30 AM    TRICYUR NEGATIVE 01/21/2020 10:30 AM    MDMA NOT REPORTED 01/21/2020 10:30 AM    BUPRENUR NEGATIVE 01/21/2020 10:30 AM    LABTEST NOT REPORTED 01/21/2020 10:30 AM       Lab Results   Component Value Date/Time    MG 1.8 10/14/2019 03:05 PM    VITD25 79.5 03/02/2021 10:31 AM    TSH 0.1380 01/15/2021 12:00 AM       No results found for: KATHY, MPO, PR3, C5, HEPCAB    IMAGING:    No results found. DIAGNOSIS:  Active Problems:    Chronic pain    Cervicalgia  Resolved Problems:    Type 2 diabetes mellitus (HCC)      TREATMENT   Meditation routine was discussed  Breathing exercises and how to do them was discussed  Counseling for chronic pain was done  Ill effects of being on chronic pain medications such as sleep disturbances, hormonal changes, withdrawal symptoms,  chronic opioid dependence and tolerance were discussed with patient. I had asked the patient to minimize medication use and utilize pain medications only for uncontrolled rest pain or pain with exertional activities. I advised patient not to self escalate pain medications without consulting with us. At each of patient's future visits we will try to taper pain medications, while adjusting the adjunct medications, and re-evaluating for Physical Therapy to improve spinal and joint strength. We will continue to have discussions to decrease pain medications as tolerated. I also discussed with the patient regarding the dangers of combining narcotic pain medication with tranquilizers, alcohol or illegal drugs or taking the medication any other than prescribed. The dangers including the respiratory depression and death. Patient was told to tell  to all  physicians regarding the medications he is getting from pain clinic. Patient is warned not to take any unprescribed medications over-the-counter medications that can depress breathing .  Patient is advised to talk to the pharmacist or physicians if planning to take any over-the-counter

## 2021-11-17 ENCOUNTER — MYC MEDICAL ADVICE (OUTPATIENT)
Dept: FAMILY MEDICINE | Facility: CLINIC | Age: 76
End: 2021-11-17
Payer: COMMERCIAL

## 2021-11-17 RX ORDER — HYDROCODONE BITARTRATE AND ACETAMINOPHEN 10; 325 MG/1; MG/1
TABLET ORAL
Qty: 120 TABLET | Refills: 0 | Status: SHIPPED | OUTPATIENT
Start: 2021-11-17 | End: 2021-12-19

## 2021-11-17 NOTE — TELEPHONE ENCOUNTER
To: PAIN NURSE      From: james Galloway      Created: 11/16/2021 10:31 PM        *-*-*This message has not been handled.*-*-*    I will not ask this again, but would you refill my [facility] for 120 this last time? I'm dealing with a uti and was given cipro. Had a reaction and ended up with welts all over my torso. I was out of town and used Benadryl and Cortaid. Now we are going to [facility] for a family reunion and we feel it will be a last one for us and Logan is getting more forgetful. Dec. We are flying to [place] to see my sister and to attend my nieces wedding. This also will be the last time my  and I will fly somewhere together. My stress level is high and had a breakdown 2 days ago. I am learning this new my chart system. A new internet conection and Logan did something to the t. v. and we can't get . Neither one of us is sleeping well, but I take naps.  If we can get through this year, I am convinced we both will be calming down. I don't know if I can take anymore changes right now. Do I need to see you?        Routing refill request for Shruthi Garcia RN, BSN, CMSRN  RN Care Coordinator  [facility]

## 2021-11-17 NOTE — TELEPHONE ENCOUNTER
[employer] message sent with Rx approval from provider.  Lm  Pain Clinic Management Team

## 2021-11-17 NOTE — TELEPHONE ENCOUNTER
Received call from patient requesting refill(s) of HYDROcodone-acetaminophen (NORCO)  MG per tablet     Last dispensed from pharmacy on 10/21/21    Patient's last office/virtual visit by prescribing provider on 08/30/21    Next office/virtual appointment scheduled for None    Last urine drug screen date 03/17/21    Current opioid agreement on file (completed within the last year) Yes Date of opioid agreement: 03/17/21    E-prescribe to   [facility]  [address] [place] [address]  Phone: [phone] Fax: [phone]    Will route to nursing [place] for review and preparation of prescription(s).     Latonya Combs [facility]

## 2021-11-17 NOTE — TELEPHONE ENCOUNTER
Medication refill information reviewed.     Due date for HYDROcodone-acetaminophen (NORCO)  MG per tablet      Last dispensed from pharmacy on 10/21/21 is 11/18/21     Prescriptions prepped for review.     Will route to provider.       Virgen Garcia RN, BSN, CMSRN  RN Care Coordinator  [facility]

## 2021-11-17 NOTE — TELEPHONE ENCOUNTER
checked and appropriate     Script Eprescribed to pharmacy    Will send this to MA team to notify patient. Note fill date 11/20 as last fill was 10/21. Also note last #120 tab fill. Next fill we will continue the taper    Signed Prescriptions:                        Disp   Refills    HYDROcodone-acetaminophen (NORCO)  M*120 ta*0        Sig: Take 1-2 tabs every 6 hrs as needed for severe pain.           Max 4 tabs per day. 30 day supply. Fill 11/18/21.           Start 11/20/21  Authorizing Provider: MEG BENNETT MD  [facility] Management

## 2021-11-18 ENCOUNTER — ALLIED HEALTH/NURSE VISIT (OUTPATIENT)
Dept: FAMILY MEDICINE | Facility: CLINIC | Age: 76
End: 2021-11-18
Payer: COMMERCIAL

## 2021-11-18 DIAGNOSIS — R35.0 URINE FREQUENCY: ICD-10-CM

## 2021-11-18 LAB
ALBUMIN UR-MCNC: NEGATIVE MG/DL
APPEARANCE UR: CLEAR
BACTERIA #/AREA URNS HPF: ABNORMAL /HPF
BILIRUB UR QL STRIP: NEGATIVE
COLOR UR AUTO: YELLOW
GLUCOSE UR STRIP-MCNC: NEGATIVE MG/DL
HGB UR QL STRIP: ABNORMAL
KETONES UR STRIP-MCNC: NEGATIVE MG/DL
LEUKOCYTE ESTERASE UR QL STRIP: NEGATIVE
NITRATE UR QL: NEGATIVE
PH UR STRIP: 5.5 [PH] (ref 5–7)
RBC #/AREA URNS AUTO: ABNORMAL /HPF
SP GR UR STRIP: 1.01 (ref 1–1.03)
SQUAMOUS #/AREA URNS AUTO: ABNORMAL /LPF
UROBILINOGEN UR STRIP-ACNC: 0.2 E.U./DL
WBC #/AREA URNS AUTO: ABNORMAL /HPF

## 2021-11-18 PROCEDURE — 99207 PR NO CHARGE NURSE ONLY: CPT

## 2021-11-18 PROCEDURE — 81001 URINALYSIS AUTO W/SCOPE: CPT

## 2021-11-19 DIAGNOSIS — F32.9 MAJOR DEPRESSIVE DISORDER WITH CURRENT ACTIVE EPISODE, UNSPECIFIED DEPRESSION EPISODE SEVERITY, UNSPECIFIED WHETHER RECURRENT: ICD-10-CM

## 2021-11-19 DIAGNOSIS — G89.4 CHRONIC PAIN SYNDROME: ICD-10-CM

## 2021-11-19 NOTE — TELEPHONE ENCOUNTER
Received fax request from [facility] requesting refill(s) for venlafaxine (EFFEXOR) 75 MG tablet    Last refilled on 10/17/21    Pt last seen on 08/30/21  Next appt scheduled for : none    Will facilitate refill.

## 2021-11-20 ENCOUNTER — MYC REFILL (OUTPATIENT)
Dept: PALLIATIVE MEDICINE | Facility: CLINIC | Age: 76
End: 2021-11-20
Payer: COMMERCIAL

## 2021-11-20 DIAGNOSIS — F32.9 MAJOR DEPRESSIVE DISORDER WITH CURRENT ACTIVE EPISODE, UNSPECIFIED DEPRESSION EPISODE SEVERITY, UNSPECIFIED WHETHER RECURRENT: ICD-10-CM

## 2021-11-20 DIAGNOSIS — G89.4 CHRONIC PAIN SYNDROME: ICD-10-CM

## 2021-11-21 ENCOUNTER — TELEPHONE (OUTPATIENT)
Dept: PALLIATIVE MEDICINE | Facility: CLINIC | Age: 76
End: 2021-11-21
Payer: COMMERCIAL

## 2021-11-21 NOTE — TELEPHONE ENCOUNTER
Tracy is requesting a refill on her venlafaxine, she Is leaving around 2am Tuesday morning for [place],      Thank You,  Margie Monge, [facility], [place]

## 2021-11-22 DIAGNOSIS — I10 ESSENTIAL HYPERTENSION WITH GOAL BLOOD PRESSURE LESS THAN 140/90: ICD-10-CM

## 2021-11-22 RX ORDER — VENLAFAXINE 75 MG/1
75 TABLET ORAL 2 TIMES DAILY
Qty: 120 TABLET | Refills: 3 | Status: SHIPPED | OUTPATIENT
Start: 2021-11-22 | End: 2022-03-08

## 2021-11-22 RX ORDER — VENLAFAXINE 75 MG/1
TABLET ORAL
Qty: 53 TABLET | Refills: 3 | OUTPATIENT
Start: 2021-11-22 | End: 2021-12-22

## 2021-11-22 NOTE — TELEPHONE ENCOUNTER
Just signed another order for this med    Ivon Shannon MD  [facility]

## 2021-11-22 NOTE — TELEPHONE ENCOUNTER
LM to call clinic/RN to clarify pt request for Venlafaxine.  This med was dc'd on 10/4/21 while in [place].  Need to clarify with pt.  Christiano

## 2021-11-24 RX ORDER — LISINOPRIL 20 MG/1
TABLET ORAL
Qty: 90 TABLET | Refills: 0 | Status: SHIPPED | OUTPATIENT
Start: 2021-11-24 | End: 2021-11-29

## 2021-11-26 DIAGNOSIS — G89.4 CHRONIC PAIN SYNDROME: ICD-10-CM

## 2021-11-26 DIAGNOSIS — F32.9 MAJOR DEPRESSIVE DISORDER WITH CURRENT ACTIVE EPISODE, UNSPECIFIED DEPRESSION EPISODE SEVERITY, UNSPECIFIED WHETHER RECURRENT: ICD-10-CM

## 2021-11-26 RX ORDER — VENLAFAXINE 75 MG/1
75 TABLET ORAL 2 TIMES DAILY
Qty: 120 TABLET | Refills: 3 | Status: CANCELLED | OUTPATIENT
Start: 2021-11-26

## 2021-11-29 ENCOUNTER — OFFICE VISIT (OUTPATIENT)
Dept: FAMILY MEDICINE | Facility: CLINIC | Age: 76
End: 2021-11-29
Payer: COMMERCIAL

## 2021-11-29 VITALS
SYSTOLIC BLOOD PRESSURE: 130 MMHG | OXYGEN SATURATION: 100 % | TEMPERATURE: 98.8 F | DIASTOLIC BLOOD PRESSURE: 64 MMHG | RESPIRATION RATE: 16 BRPM | WEIGHT: 111 LBS | HEIGHT: 61 IN | BODY MASS INDEX: 20.96 KG/M2 | HEART RATE: 78 BPM

## 2021-11-29 DIAGNOSIS — N95.2 ATROPHIC VAGINITIS: ICD-10-CM

## 2021-11-29 DIAGNOSIS — F41.1 ANXIETY STATE: Chronic | ICD-10-CM

## 2021-11-29 DIAGNOSIS — K21.00 GASTROESOPHAGEAL REFLUX DISEASE WITH ESOPHAGITIS, UNSPECIFIED WHETHER HEMORRHAGE: ICD-10-CM

## 2021-11-29 DIAGNOSIS — I10 ESSENTIAL HYPERTENSION WITH GOAL BLOOD PRESSURE LESS THAN 140/90: Primary | ICD-10-CM

## 2021-11-29 PROCEDURE — 99214 OFFICE O/P EST MOD 30 MIN: CPT | Performed by: FAMILY MEDICINE

## 2021-11-29 RX ORDER — LISINOPRIL 20 MG/1
20 TABLET ORAL DAILY
Qty: 90 TABLET | Refills: 3 | Status: SHIPPED | OUTPATIENT
Start: 2021-11-29 | End: 2022-02-21

## 2021-11-29 ASSESSMENT — MIFFLIN-ST. JEOR: SCORE: 925.87

## 2021-11-29 NOTE — PROGRESS NOTES
"A: htn, stable      Anxiety, a bit worse       Vaginitis, ongoing      Gerd, stable           P: will use the topical trx recommended recently, just wasn't sure she should do it.     Continue anxiety meds.  Talked about being mindful, stress reduction activities, self cares.      Try tapering off PPI, may not need it.  She agrees    Back in 4-6 months for recheck.              S :Tracy Galloway is a 76 year old female with several issues    Htn: needs fills on lisinopril.  Also wondered if she needed to stay on diltiazem     Anxiety: worse with covid, stress in holidays, society in general.  venlafaxinee helps.     Vaginitis: didn't take the estrogen and prn lidocaine recommended topically.  Wanted to talk about it.  UA neg for UTI.    Gerd: omeprazole, wondering if she can try tapering off.  \"not sure I need it anymore\"     Problem list, med list, additional histories reviewed and updated, as indicated.      O:/64   Pulse 78   Temp 98.8  F (37.1  C) (Tympanic)   Resp 16   Ht 1.549 m (5' 1\")   Wt 50.3 kg (111 lb)   LMP  (LMP Unknown)   SpO2 100%   BMI 20.97 kg/m    GEN: Alert and oriented, in no acute distress  Well groomed, dressed appropriately. Good eye contact.  No abnormal motor movements, oriented x3, speaks clearly with a normal rate and volume.  Thoughts are coherent and linear.  No tangential responses or loose associatons.  No obsessive behaviors discussed or observed, no suicidal thoughts.   Responds to questions appropriately, gives detailed answers.   Attention and concentration normal.  Affect WNL.  Insight and judgment appropriate.         "

## 2021-12-15 DIAGNOSIS — F51.01 PRIMARY INSOMNIA: Chronic | ICD-10-CM

## 2021-12-15 RX ORDER — ZOLPIDEM TARTRATE 10 MG/1
TABLET ORAL
Qty: 30 TABLET | Refills: 5 | Status: SHIPPED | OUTPATIENT
Start: 2021-12-15 | End: 2022-07-05

## 2021-12-15 NOTE — TELEPHONE ENCOUNTER
Requested Prescriptions   Pending Prescriptions Disp Refills     zolpidem (AMBIEN) 10 MG tablet [Pharmacy Med Name: ZOLPIDEM TARTRATE 10MG TABS] 30 tablet 5     Sig: TAKE ONE HALF TO ONE TABLET BY MOUTH EVERY NIGHT AT BEDTIME AS NEEDED FOR SLEEP       There is no refill protocol information for this order        Last Written Prescription Date:  5/19/21  Last Fill Quantity: 30,  # refills: 5   Last office visit: 11/29/2021 with prescribing provider:     Future Office Visit:

## 2021-12-19 ENCOUNTER — MYC REFILL (OUTPATIENT)
Dept: PALLIATIVE MEDICINE | Facility: CLINIC | Age: 76
End: 2021-12-19
Payer: COMMERCIAL

## 2021-12-19 DIAGNOSIS — G89.4 CHRONIC PAIN SYNDROME: ICD-10-CM

## 2021-12-20 RX ORDER — HYDROCODONE BITARTRATE AND ACETAMINOPHEN 10; 325 MG/1; MG/1
1 TABLET ORAL EVERY 6 HOURS PRN
Qty: 120 TABLET | Refills: 0 | Status: SHIPPED | OUTPATIENT
Start: 2021-12-20 | End: 2022-01-19

## 2021-12-20 NOTE — TELEPHONE ENCOUNTER
Refills have been requested for the following medications:         HYDROcodone-acetaminophen (NORCO)  MG per tablet [Ivon Shannon MD]     Preferred pharmacy: [facility] [address] [place] [address]

## 2021-12-20 NOTE — TELEPHONE ENCOUNTER
checked and appropriate     Script Eprescribed to pharmacy    Will send this to MA team to notify patient. NOTE Pt overdue for follow up visit, no further refills prior to next clinic visit    Signed Prescriptions:                        Disp   Refills    HYDROcodone-acetaminophen (NORCO)  M*120 ta*0        Sig: Take 1 tablet by mouth every 6 hours as needed for           severe pain Take 1-2 tabs every 6 hrs as needed           for severe pain. Max 4 tabs per day. 30 day           supply. Fill/Start 12/20/21  Authorizing Provider: MEG BENNETT MD  [facility]

## 2021-12-20 NOTE — TELEPHONE ENCOUNTER
Received call from patient requesting refill(s) of HYDROcodone-acetaminophen (NORCO)  MG per tablet     Last dispensed from pharmacy on 11/18/21    Patient's last office/virtual visit by prescribing provider on 8/30/21  Next office/virtual appointment scheduled for none    Last urine drug screen date 3/17/21  Current opioid agreement on file (completed within the last year) Yes Date of opioid agreement: 3/30/21    E-prescribe to [facility]  pharmacy    Will route to [facility] for review and preparation of prescription(s).

## 2021-12-21 NOTE — TELEPHONE ENCOUNTER
[employer] message sent with Rx approval from provider.  Lm  Pain Clinic Management Team

## 2022-01-07 ENCOUNTER — MYC MEDICAL ADVICE (OUTPATIENT)
Dept: PALLIATIVE MEDICINE | Facility: CLINIC | Age: 77
End: 2022-01-07
Payer: COMMERCIAL

## 2022-01-11 NOTE — TELEPHONE ENCOUNTER
There is a letter dated 11/1/2021 in her chart regarding an occlusive guard. Can you please work on routing it to [facility] and investigate whether anything else needs to happen for this?     Thanks so very much!    Ivon Shannon MD  [facility]

## 2022-01-11 NOTE — TELEPHONE ENCOUNTER
Letter mailed to the address given by brooke Miranda RN-BSN  [facility]

## 2022-01-19 DIAGNOSIS — G89.4 CHRONIC PAIN SYNDROME: ICD-10-CM

## 2022-01-19 RX ORDER — HYDROCODONE BITARTRATE AND ACETAMINOPHEN 10; 325 MG/1; MG/1
1 TABLET ORAL EVERY 6 HOURS PRN
Qty: 120 TABLET | Refills: 0 | Status: SHIPPED | OUTPATIENT
Start: 2022-01-19 | End: 2022-02-21

## 2022-01-19 NOTE — TELEPHONE ENCOUNTER
Medication refill information reviewed.     Due date for HYDROcodone-acetaminophen (NORCO)  MG per tablet      Last dispensed from pharmacy on 12/21/21 is 1/20/22     Prescriptions prepped for review.     Will route to provider.       Virgen Garcia RN, BSN, CMSRN  RN Care Coordinator  [facility]

## 2022-01-19 NOTE — TELEPHONE ENCOUNTER
Received call from patient requesting refill(s) of HYDROcodone-acetaminophen (NORCO)  MG per tablet     Last dispensed from pharmacy on 12/21/21    Patient's last office/virtual visit by prescribing provider on 08/30/21  Next office/virtual appointment scheduled for 01/24/22    Last urine drug screen date 03/17/21  Current opioid agreement on file (completed within the last year) Yes Date of opioid agreement: 03/17/21    E-prescribe to   [address] [address]  Phone: [phone] Fax: [phone]    Will route to nursing [place] for review and preparation of prescription(s).       Gabriela Dowd MA  [facility]

## 2022-01-19 NOTE — TELEPHONE ENCOUNTER
checked and appropriate     Script Eprescribed to pharmacy    Will send this to MA team to notify patient. Pt has not bee in to clinic since 8/20/2021, no further refills until seen for f/u visit.     Signed Prescriptions:                        Disp   Refills    HYDROcodone-acetaminophen (NORCO)  M*120 ta*0        Sig: Take 1 tablet by mouth every 6 hours as needed for           severe pain Take 1-2 tabs every 6 hrs as needed           for severe pain. Max 4 tabs per day. 30 day           supply. Fill 1/19/22 Start 1/20/22  Authorizing Provider: MEG BENNETT MD  [facility]

## 2022-01-24 ENCOUNTER — OFFICE VISIT (OUTPATIENT)
Dept: PALLIATIVE MEDICINE | Facility: CLINIC | Age: 77
End: 2022-01-24
Payer: COMMERCIAL

## 2022-01-24 ENCOUNTER — LAB (OUTPATIENT)
Dept: LAB | Facility: CLINIC | Age: 77
End: 2022-01-24
Payer: COMMERCIAL

## 2022-01-24 VITALS — HEART RATE: 76 BPM | SYSTOLIC BLOOD PRESSURE: 119 MMHG | DIASTOLIC BLOOD PRESSURE: 72 MMHG

## 2022-01-24 DIAGNOSIS — F11.90 CHRONIC, CONTINUOUS USE OF OPIOIDS: ICD-10-CM

## 2022-01-24 DIAGNOSIS — F32.9 MAJOR DEPRESSIVE DISORDER WITH CURRENT ACTIVE EPISODE, UNSPECIFIED DEPRESSION EPISODE SEVERITY, UNSPECIFIED WHETHER RECURRENT: ICD-10-CM

## 2022-01-24 DIAGNOSIS — Z51.81 ENCOUNTER FOR THERAPEUTIC DRUG MONITORING: ICD-10-CM

## 2022-01-24 DIAGNOSIS — G89.29 CHRONIC FACIAL PAIN: ICD-10-CM

## 2022-01-24 DIAGNOSIS — F41.9 ANXIETY: ICD-10-CM

## 2022-01-24 DIAGNOSIS — G51.9 FACIAL NEUROPATHY: Primary | ICD-10-CM

## 2022-01-24 DIAGNOSIS — G89.4 CHRONIC PAIN SYNDROME: ICD-10-CM

## 2022-01-24 DIAGNOSIS — R51.9 CHRONIC FACIAL PAIN: ICD-10-CM

## 2022-01-24 LAB — CREAT UR-MCNC: 145 MG/DL

## 2022-01-24 PROCEDURE — 99215 OFFICE O/P EST HI 40 MIN: CPT | Performed by: STUDENT IN AN ORGANIZED HEALTH CARE EDUCATION/TRAINING PROGRAM

## 2022-01-24 PROCEDURE — 80307 DRUG TEST PRSMV CHEM ANLYZR: CPT

## 2022-01-24 ASSESSMENT — PAIN SCALES - GENERAL: PAINLEVEL: MODERATE PAIN (5)

## 2022-01-24 NOTE — LETTER
Opioid / Opioid Plus Controlled Substance Agreement    This is an agreement between you and your provider about the safe and appropriate use of controlled substance/opioids prescribed by your care team. Controlled substances are medicines that can cause physical and mental dependence (abuse).    There are strict laws about having and using these medicines. We here at [facility] are committing to working with you in your efforts to get better. To support you in this work, we ll help you schedule regular office appointments for medicine refills. If we must cancel or change your appointment for any reason, we ll make sure you have enough medicine to last until your next appointment.     As a Provider, I will:    Listen carefully to your concerns and treat you with respect.     Recommend a treatment plan that I believe is in your best interest. This plan may involve therapies other than opioid pain medication.     Talk with you often about the possible benefits, and the risk of harm of any medicine that we prescribe for you.     Provide a plan on how to taper (discontinue or go off) using this medicine if the decision is made to stop its use.    As a Patient, I understand that opioid(s):     Are a controlled substance prescribed by my care team to help me function or work and manage my condition(s).     Are strong medicines and can cause serious side effects such as:    Drowsiness, which can seriously affect my driving ability    A lower breathing rate, enough to cause death    Harm to my thinking ability     Depression     Abuse of and addiction to this medicine    Need to be taken exactly as prescribed. Combining opioids with certain medicines or chemicals (such as illegal drugs, sedatives, sleeping pills, and benzodiazepines) can be dangerous or even fatal. If I stop opioids suddenly, I may have severe withdrawal symptoms.    Do not work for all types of pain nor for all patients. If they re not helpful, I may  be asked to stop them.        The risks, benefits and side effects of these medicine(s) were explained to me. I agree that:  1. I will take part in other treatments as advised by my care team. This may be psychiatry or counseling, physical therapy, behavioral therapy, group treatment or a referral to a specialist.     2. I will keep all my appointments. I understand that this is part of the monitoring of opioids. My care team may require an office visit for EVERY opioid/controlled substance refill. If I miss appointments or don t follow instructions, my care team may stop my medicine.    3. I will take my medicines as prescribed. I will not change the dose or schedule unless my care team tells me to. There will be no refills if I run out early.     4. I may be asked to come to the clinic and complete a urine drug test or complete a pill count at any time. If I don t give a urine sample or participate in a pill count, the care team may stop my medicine.    5. I will only receive prescriptions from this clinic for chronic pain. If I am treated by another provider for acute pain issues, I will tell them that I am taking opioid pain medication for chronic pain and that I have a treatment agreement with this provider. I will inform my [facility] care team within one business day if I am given a prescription for any pain medication by another healthcare provider. My [facility] care team can contact other providers and pharmacists about my use of any medicines.    6. It is up to me to make sure that I don t run out of my medicines on weekends or holidays. If my care team is willing to refill my opioid prescription without a visit, I must request refills only during office hours. Refills may take up to 3 business days to process. I will use one pharmacy to fill all my opioid and other controlled substance prescriptions. I will notify the clinic about any changes to my insurance or medication  availability.    7. I am responsible for my prescriptions. If the medicine/prescription is lost, stolen or destroyed, it will not be replaced. I also agree not to share controlled substance medicines with anyone.    8. I am aware I should not use any illegal or recreational drugs. I agree not to drink alcohol unless my care team says I can.       9. If I enroll in the [place] [facility] program, I will tell my care team prior to my next refill.     10. I will tell my care team right away if I become pregnant, have a new medical problem treated outside of my regular clinic, or have a change in my medications.    11. I understand that this medicine can affect my thinking, judgment and reaction time. Alcohol and drugs affect the brain and body, which can affect the safety of my driving. Being under the influence of alcohol or drugs can affect my decision-making, behaviors, personal safety, and the safety of others. Driving while impaired (DWI) can occur if a person is driving, operating, or in physical control of a car, motorcycle, boat, snowmobile, ATV, motorbike, off-road vehicle, or any other motor vehicle ([place] Statute 169A.20). I understand the risk if I choose to drive or operate any vehicle or machinery.    I understand that if I do not follow any of the conditions above, my prescriptions or treatment may be stopped or changed.          Opioids  What You Need to Know    What are opioids?   Opioids are pain medicines that must be prescribed by a doctor. They are also known as narcotics.     Examples are:   1. morphine (MS Contin, Ne)  2. oxycodone (Oxycontin)  3. oxycodone and acetaminophen (Percocet)  4. hydrocodone and acetaminophen (Vicodin, Norco)   5. fentanyl patch (Duragesic)   6. hydromorphone (Dilaudid)   7. methadone  8. codeine (Tylenol #3)     What do opioids do well?   Opioids are best for severe short-term pain such as after a surgery or injury. They may work well for cancer pain. They may  help some people with long-lasting (chronic) pain.     What do opioids NOT do well?   Opioids never get rid of pain entirely, and they don t work well for most patients with chronic pain. Opioids don t reduce swelling, one of the causes of pain.                                    Other ways to manage chronic pain and improve function include:       Treat the health problem that may be causing pain    Anti-inflammation medicines, which reduce swelling and tenderness, such as ibuprofen (Advil, Motrin) or naproxen (Aleve)    Acetaminophen (Tylenol)    Antidepressants and anti-seizure medicines, especially for nerve pain    Topical treatments such as patches or creams    Injections or nerve blocks    Chiropractic or osteopathic treatment    Acupuncture, massage, deep breathing, meditation, visual imagery, aromatherapy    Use heat or ice at the pain site    Physical therapy     Exercise    Stop smoking    Take part in therapy       Risks and side effects     Talk to your doctor before you start or decide to keep taking opioids. Possible side effects include:      Lowering your breathing rate enough to cause death    Overdose, including death, especially if taking higher than prescribed doses    Worse depression symptoms; less pleasure in things you usually enjoy    Feeling tired or sluggish    Slower thoughts or cloudy thinking    Being more sensitive to pain over time; pain is harder to control    Trouble sleeping or restless sleep    Changes in hormone levels (for example, less testosterone)    Changes in sex drive or ability to have sex    Constipation    Unsafe driving    Itching and sweating    Dizziness    Nausea, throwing up and dry mouth    What else should I know about opioids?    Opioids may lead to dependence, tolerance, or addiction.      Dependence means that if you stop or reduce the medicine too quickly, you will have withdrawal symptoms. These include loose poop (diarrhea), jitters, flu-like symptoms,  nervousness and tremors. Dependence is not the same as addiction.                       Tolerance means needing higher doses over time to get the same effect. This may increase the chance of serious side effects.      Addiction is when people improperly use a substance that harms their body, their mind or their relations with others. Use of opiates can cause a relapse of addiction if you have a history of drug or alcohol abuse.      People who have used opioids for a long time may have a lower quality of life, worse depression, higher levels of pain and more visits to doctors.    You can overdose on opioids. Take these steps to lower your risk of overdose:    1. Recognize the signs:  Signs of overdose include decrease or loss of consciousness (blackout), slowed breathing, trouble waking up and blue lips. If someone is worried about overdose, they should call 911.    2. Talk to your doctor about Narcan (naloxone).   If you are at risk for overdose, you may be given a prescription for Narcan. This medicine very quickly reverses the effects of opioids.   If you overdose, a friend or family member can give you Narcan while waiting for the ambulance. They need to know the signs of overdose and how to give Narcan.     3. Don't use alcohol or street drugs.   Taking them with opioids can cause death.    4. Do not take any of these medicines unless your doctor says it s OK. Taking these with opioids can cause death:    Benzodiazepines, such as lorazepam (Ativan), alprazolam (Xanax) or diazepam (Valium)    Muscle relaxers, such as cyclobenzaprine (Flexeril)    Sleeping pills like zolpidem (Ambien)     Other opioids      How to keep you and other people safe while taking opioids:    1. Never share your opioids with others.  Opioid medicines are regulated by the Drug Enforcement Agency ([employer]). Selling or sharing medications is a criminal act.    2. Be sure to store opioids in a secure place, locked up if possible. Young children  can easily swallow them and overdose.    3. When you are traveling with your medicines, keep them in the original bottles. If you use a pill box, be sure you also carry a copy of your medicine list from your clinic or pharmacy.    4. Safe disposal of opioids    Most pharmacies have places to get rid of medicine, called disposal kiosks. Medicine disposal options are also available in every [facility]. Search your county and  medication disposal  to find more options. You can find more details at:  [email or web address]     I agree that my provider, clinic care team, and pharmacy may work with any city, state or federal law enforcement agency that investigates the misuse, sale, or other diversion of my controlled medicine. I will allow my provider to discuss my care with, or share a copy of, this agreement with any other treating provider, pharmacy or emergency room where I receive care.    I have read this agreement and have asked questions about anything I did not understand.    _______________________________________________________  Patient Signature - Tracy Galloway _____________________                   Date     _______________________________________________________  Provider Signature - Ivon Shannon MD   _____________________                   Date     _______________________________________________________  Witness Signature (required if provider not present while patient signing)   _____________________                   Date

## 2022-01-24 NOTE — PATIENT INSTRUCTIONS
1. Continue venlafaxine at current dose. I think it is most likely you had a virus causing your recent night sweats and other symptoms.   Message me if the symptoms return.    2. Decrease hydrocodone to #3.5 tabs per day. This will mean you will need a refill to start on 2/23 (fill on 2/21).     3. Send a MyChart with the dental insurance information and we will have it routed to our finance people.     4. Go to the lab today for your urine drug screen.     5. Sign the Controlled Substance Agreement today    Follow up: 3 mo/12 weeks    Ivon Shannon MD  [facility]    Clinic Number:  [phone]     Call with any questions about your care and for scheduling assistance.     Calls are returned Monday through Friday between 8 AM and 4:30 PM. We usually get back to you within 2 business days depending on the issue/request.    If we are prescribing your medications:    For opioid medication refills, call the clinic or send a Trivie[employer] message 7 days in advance.  Please include:    Name of requested medication    Name of the pharmacy.    For non-opioid medications, call your pharmacy directly to request a refill. Please allow 3-4 days to be processed.     Per [place] State Law:    All controlled substance prescriptions must be filled within 30 days of being written.      For those controlled substances allowing refills, pickup must occur within 30 days of last fill.      We believe regular attendance is key to your success in our program!      Any time you are unable to keep your appointment we ask that you call us at least 24 hours in advance to cancel.This will allow us to offer the appointment time to another patient.     Multiple missed appointments may lead to dismissal from the clinic.    [facility] Pt's are always seen in [facility], near orthopedics.    Radiology injection Pt's are always seen in [facility], Emergency/Same Day Surgery entrance. Go through double doors  of Emergency entrance and the imaging suite is the very first door on the right. You will check in for radiology injections here.

## 2022-01-24 NOTE — PROGRESS NOTES
"                          [facility]  Follow up clinic visit    Date of visit: 1/24/22     Chief complaint:   Chief Complaint   Patient presents with     Pain       History:  Tracy Galloway is a 76 year old female who follows with the pain clinic for:   Right maxillary neuropathy  Chronic opioid use  Last seen by me on 7/21/2021       HPI from initial H&P 12/2/2020:  \"\"Tracy Galloway is a 75 year old female with history of facial pain   Onset/Progression:  Started August 2009 after having a root canal. Tooth was broken in half and was had a caustic substance applied. Was told to have tooth pulled. Was given a lot of medications. (Will send list) Yoga and exercise helped. Unable to chew on the right. Pain is under right eye and in palate Will take a pain pill and then lay down. Pain gets worse throughout the day.   Pain quality: Burning, aching  Pain timing: Constant    Pain rating: intensity ranges from 4/10 to 8/10, and averages variable on a 0-10 scale.  Aggravating factors include: stress, using her mouth, gets worse through the day.   Relieving factors include: Eating, meds\"\"    Interval history 6/28/2021 (date)  \"- Per chart review, seen by Dr. Wyman on 6/9/2021 with increase anxiety. Started on Zoloft. Recommended psychology. Discussed that chronic opioids can cause anxiety and are causing intermittent withdrawal during the day. Discussed tapering, ideally to off. Pt agreed at the time. Also discussed adding additional pain medications such as gabapentin or tegretol    - Pain started 12 yrs ago. Dentist broke her tooth in half. Assistant poured a medication she believes was intended to ablate a nerve into her mouth. She was numb upon immediate departure from the clinic. She then had severe pain and she was prescribed tons of narcotics for the pain  - Then saw Victor M Kemp for 3 years. Wanted to stop seeing him since the only thing she was doing was getting opioids and she had to go " "down to the [facility] for these appointments. Her PCP therefore took over prescribing. Eventually was recommended to follow with the pain clinic. Now she is frustrated because she saw Lyssa Pathak but Lyssa needed to go on leave and canceled with her twice. She wanted to switch pain providers because she wanted to see someone and discuss her case with someone but Lyssa wasn't available. She was then recommended to discuss this request with the clinic director but she is frustrated - she doesn't want to go back to [place]. She is also frustrated because she felt Lyssa interrupted her frequently when she was speaking. She feels she is getting the run around   - The only thing that helps is exercise and yoga.   - She feels desperate due to pain. She has no plans for harming herself though because of her marriage      Pain is in upper right jaw. The bone is now gone since the tooth is gone. The pain is also under her eye. This is a sensation of ants crawling. Increased anxiety causes a sensation of hornets under the eye. This started at the same time as the jaw pain but exacerbated recently due to anxiety and upset.   The jaw pain is a constant ache in one spot back of R upper jaw - well circumscribed. It is constant - mild pain in the morning.  Talking and chewing are exacerbating as well as anxiety.       Taking #4 oxycodone a day, which she thinks is causing increase agitation. This is a sensation of little needles in her head. Takes them at 8am, noon, 4pm and 8pm. But trying to get by with #3, but this depends on whether she is having a calm day. She discussed going back to her previous regimen with her  - this would be taking 1 tab at 8 am followed by 1 hr rest, then taking the remainder of the tabs     Her cognitive fogginess has worsened in the recent months due to stress/anxiety related to frustrations with care \"    Interval history 7/21/21:  - she has a lot of reasons to have substantial stress - 's " "memory is getting worse  - she has canceled counselor appt because it was going to be a zoom call - which she cannot do with her  at home  - she also has many house-related stressors  - she will have to start taking more responsibility at home due to her . Initially was in denial, then anger, now more accepting.   - she worked out today and has no pain while she exercises  - started venlafaxine yesterday. No side effects yet  - seeing a specialist at the [facility] for  Her 's memory in late September. Feels she has some more resources and understanding to cope for the next 2 months    Interval history   She considers her pain to be \"managed,\" rating the pain at 6/10. She says the venlafaxine has made a difference. This has worked for both pain and anxiety so far. She is handling her anxiety well compared to prior. Previously she felt she did not want to live, which is resolved.    Recommendations/plan at the last visit included:  Plan:  Referrals     - consider psychology referral in the future when avalable in person (not telehealth)  Therapies:     - Physical Therapy: consider in the future    - Clinical Health Psychologist to address issues of relaxation, behavioral change, coping style, and other factors important to improvement: previously given resources for Dr. Mallory Ferrell  Medication Management:     - continue venlafaxine 75mg BID    - taper Norco 10/325 to max #4 tabs per day (#130 tabs per 30 days to #120 tabs per 30 days). With decrease, next Rx will start 9/20. Consider further taper to #3.5 tabs per day at next refill.  UDS: 3/17/2021  CSA: 3/30/2021    - Did not tolerate gabapentin or Lyrica in the past. Consider oxcarbazepine vs Topamax in the future   Procedures recommended:     - consider maxillary nerve block in the future, not at this time    Follow up: 1-2 months    Since her last visit, Tracy Galloway reports:  - weight has dropped to 109, now up to 111  - has been sweaty. " Also shaking. Night sweats. nightmares  - stopped venlafaxine, attributing Sx to this med. Had been on it several months prior to onset of these Sx. But then read she shouldn't stop suddently, so now back on without reoccurrence of Sx   - otherwise doing well.      Pain scores:  Pain intensity on average is 5 on a scale of 0-10.     Current pain treatments:   Baclofen 10 mg is taken as needed, never more than once a day due to feeling in a fog.  Norco  mg 4x/day,  Ibuprofen 400-600 mg TID PRN - takes rarely  Venlafaxine 75 mg BID.     Other relevant medications:  Ambien 10 mg 1/2-1 tab at HS PRN - she takes this every night    Patient is using the medication as prescribed:  YES  Is your medication helpful? YES   Side Effects: no side effect    Past pain treatments:   Pain Clinic:   Yes  [facility] with Иван Kemp for 3 years.              PT: No                Chiropractor:Yes, [facility]              Acupuncture: Yes [facility]              Health/Pain Psychologist: No               Pharmacotherapy:                           Opioids: Yes                            Non-opioids:    Yes               TENs Unit:No               Injections: No               Surgeries related to pain: Yes: 2010 Had palate opened.: [facility]     Previous Pain Relevant Medications:    Neuropathics: Lyrica: Allergy, Gabapentin ?; Lyrica - fainted after taking  Anti-depressants: Amitriptyline:[facility]               Anxiety medications:                  Other medications not covered above: Ambien:[facility]    Medications:  Current Outpatient Medications   Medication Sig Dispense Refill     aspirin (ASA) 81 MG tablet Take 1 tablet (81 mg) by mouth daily (Patient not taking: Reported on 11/29/2021) 90 tablet 1     conjugated estrogens (PREMARIN) 0.625 MG/GM vaginal cream Apply daily to affected area for 2 weeks then 3 times a week (Patient not taking: Reported on 11/29/2021) 30 g 2     DILT- MG 24 hr capsule TAKE ONE CAPSULE BY MOUTH ONCE DAILY 90 capsule 3      DOCUSATE SODIUM PO Take 100 mg by mouth daily as needed for constipation  (Patient not taking: Reported on 10/4/2021)       HYDROcodone-acetaminophen (NORCO)  MG per tablet Take 1 tablet by mouth every 6 hours as needed for severe pain Take 1-2 tabs every 6 hrs as needed for severe pain. Max 4 tabs per day. 30 day supply. Fill 1/19/22 Start 1/20/22 120 tablet 0     ibuprofen (ADVIL/MOTRIN) 200 MG tablet Take 400-600 mg by mouth every 8 hours as needed for mild pain (Patient not taking: Reported on 9/4/2021)       lidocaine (XYLOCAINE) 2 % solution Apply to affected area every 4-6 hours as needed. Max 8 doses/24 hour period. (Patient not taking: Reported on 11/29/2021) 100 mL 1     lisinopril (ZESTRIL) 20 MG tablet Take 1 tablet (20 mg) by mouth daily 90 tablet 3     omeprazole (PRILOSEC) 20 MG DR capsule TAKE ONE CAPSULE BY MOUTH ONCE DAILY 90 capsule 3     simvastatin (ZOCOR) 10 MG tablet TAKE ONE TABLET BY MOUTH EVERY NIGHT AT BEDTIME 90 tablet 3     venlafaxine (EFFEXOR) 75 MG tablet Take 1 tablet (75 mg) by mouth 2 times daily 120 tablet 3     zolpidem (AMBIEN) 10 MG tablet TAKE ONE HALF TO ONE TABLET BY MOUTH EVERY NIGHT AT BEDTIME AS NEEDED FOR SLEEP 30 tablet 5       Medical History: any changes in medical history since they were last seen? as above    Social History: Reviewed; unchanged from previous consultation.   with difficulty with managing finances, working through that right now  Going to [place] with girlfriends early February    Physical Exam:  Blood pressure 119/72, pulse 76, not currently breastfeeding.  General: NAD  Gait: Normal  Psych: Mood is ok. Affect is congruent. Thought content is logical.   Neuro: CNs II - XII grossly intact    Controlled Substance Agreement:   CSA -- Encounter Level on 08/20/2018:    Controlled Substance Agreement - Scan on 8/29/2018  3:15 PM: CONTROLLED SUBSTANCE AGREEMENT     CSA -- Encounter Level on 07/07/2015:    Controlled Substance Agreement - Scan on  7/10/2015  1:04 PM: CONTROLLED SUBSTANCE AGREEMENT     CSA -- Patient Level:    Controlled Substance Agreement - Opioid - Scan on 3/30/2021 10:04 AM  Controlled Substance Agreement - Opioid - Scan on 3/18/2021  8:58 AM  Controlled Substance Agreement - Opioid - Scan on 3/4/2020  1:30 PM         UDS: 3/17/2021     THE 4 As OF OPIOID MAINTENANCE ANALGESIA    Analgesia: Is pain relief clinically significant? YES   Activity: Is patient functional and able to perform Activities of Daily Living? YES   Adverse effects: Is patient free from adverse side effects from opiates? YES   Adherence to Rx protocol: Is patient adhering to Controlled Substance Agreement and taking medications ONLY as ordered? YES    Assessment:   Tracy Galloway is a 76 year old female who is seen at the pain clinic for:    1. Facial neuropathy    2. Chronic facial pain    3. Chronic pain syndrome    4. Anxiety    5. Major depressive disorder with current active episode, unspecified depression episode severity, unspecified whether recurrent    6. Chronic, continuous use of opioids    7. Encounter for therapeutic drug monitoring           Mental Health - the patient's mental health concerns, specifically anxiety and depression, affect her experience of pain and contribute to her clinically significant distress.    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     #Chronic continuous use of opioids:   On chronic narcotics since initial facial nerve trauma. Tracy had been experiencing increased mental fogginess and anxiety and was concerned that her opioids were contributing to both symptoms and that her anxiety was exacerbating her pain and likely her feeling of cognitive slowing.  Working on tapering    #Anxiety  #Depression  stress and anxiety are increased due to to 's dementia.   She is now much less anxious given improved understanding of how her pain, anxiety, depression and  stress are related and due to increased acceptance of her 's condition.   Started venlafaxine for dual pain and mood benefit, previously felt this is helping manage stress and anxiety.  Today was concerned that episodes of night sweats were due to the venlafaxine.  However discussed that the onset of those symptoms started well after she started the venlafaxine and have subsided despite restarting venlafaxine.  She would like to continue the medication today  Previously given resources for neuroscience education/pain psychology.     Plan:  Referrals     - consider psychology referral in the future when avalable in person (not telehealth)  Therapies:     - Physical Therapy: consider in the future    - Clinical Health Psychologist to address issues of relaxation, behavioral change, coping style, and other factors important to improvement: previously given resources for Dr. Mallory Ferrell  Medication Management:     - continue venlafaxine 75mg BID    - taper Norco 10/325 to max #3.5 tabs per days. With decrease, next Rx will start 2/23. Consider further taper to #3 tabs per day at next refill.  UDS: today  CSA: today    - Did not tolerate gabapentin or Lyrica in the past. Consider oxcarbazepine vs Topamax in the future   Procedures recommended:     -Low likelihood consider maxillary nerve block in the future, not at this time    Follow up: 2-3 months     BILLING TIME DOCUMENTATION:   The total TIME spent on this patient on the date of the encounter/appointment was 40 minutes.      TOTAL TIME includes:   Time spent preparing to see the patient (reviewing records and tests) - 2 min  Time spent face to face (or over the phone) with the patient - 22 min  Time spent ordering tests, medications, procedures and referrals - 0 min  Time spent Referring and communicating with other healthcare professionals - 2 min  Time spent documenting clinical information in Epic - 14 min    Ivon Shannon MD  [facility]  [facility] Center

## 2022-01-24 NOTE — PROGRESS NOTES
01/24/22 1106   PEG: A Thee-Item Scale Assessing Pain Intensity and Interference        0 = No pain / No interference    10 = Pain as bad as you can imagine / Completely interferes   What number best describes your pain on average in the past week? 5   What number best describes how, during the past week, pain has interfered with your enjoyment of life? 5   What number best describes how, during the past week, pain has interfered with your general activity? 5   PEG Total Score 5

## 2022-01-26 LAB
DHC UR CFM-MCNC: 1220 NG/ML
DHC/CREAT UR: 841 NG/MG {CREAT}
GABAPENTIN UR QL CFM: PRESENT
HYDROCODONE UR CFM-MCNC: 3460 NG/ML
HYDROCODONE/CREAT UR: 2386 NG/MG {CREAT}
HYDROMORPHONE UR CFM-MCNC: 920 NG/ML
HYDROMORPHONE/CREAT UR: 634 NG/MG {CREAT}

## 2022-01-31 NOTE — TELEPHONE ENCOUNTER
Can you please send the previous letter to the most recent address sent by patient?    Thank you,   Ivon Shannon MD  [facility]

## 2022-01-31 NOTE — TELEPHONE ENCOUNTER
Per patient myChart message:  From: james Galloway      Created: 1/27/2022 4:07 PM      per your suggestion I am sending you my insurance information: [employer]  [id number] [facility] [id number]          Routed to Dr. Shannon to review      Ruth RN-BSN  [facility]

## 2022-02-02 NOTE — TELEPHONE ENCOUNTER
Letter reprinted and mailed to address provided by patient:     [employer] [place]  [id number] [facility] [phone]

## 2022-02-21 DIAGNOSIS — I10 ESSENTIAL HYPERTENSION WITH GOAL BLOOD PRESSURE LESS THAN 140/90: ICD-10-CM

## 2022-02-21 DIAGNOSIS — G89.4 CHRONIC PAIN SYNDROME: ICD-10-CM

## 2022-02-21 RX ORDER — HYDROCODONE BITARTRATE AND ACETAMINOPHEN 10; 325 MG/1; MG/1
1 TABLET ORAL EVERY 6 HOURS PRN
Qty: 120 TABLET | Refills: 0 | Status: SHIPPED | OUTPATIENT
Start: 2022-02-21 | End: 2022-04-21

## 2022-02-21 RX ORDER — LISINOPRIL 20 MG/1
TABLET ORAL
Qty: 90 TABLET | Refills: 0 | Status: SHIPPED | OUTPATIENT
Start: 2022-02-21 | End: 2022-06-18

## 2022-02-21 NOTE — TELEPHONE ENCOUNTER
Medication refill information reviewed.     Due date for HYDROcodone-acetaminophen (NORCO)  MG per tablet  is 2/21/22     Prescriptions prepped for review.     Will route to provider.     Brant Meek RN  Patient Care Supervisor   [facility] [place]

## 2022-02-21 NOTE — TELEPHONE ENCOUNTER
Received call from patient requesting refill(s) of HYDROcodone-acetaminophen (NORCO)  MG per tablet     Last dispensed from pharmacy on 01/19/22    Patient's last office/virtual visit by prescribing provider on 01/24/22    Next office/virtual appointment scheduled for 04/27/22    Last urine drug screen date 01/24/22  Current opioid agreement on file (completed within the last year) Yes Date of opioid agreement: 01/24/22    E-prescribe to   [facility]  [address] [place] [id number]  Phone: [phone] Fax: [phone]    Will route to nursing [place] for review and preparation of prescription(s).     Latonya Combs [facility]

## 2022-02-21 NOTE — TELEPHONE ENCOUNTER
checked and appropriate     Script Eprescribed to pharmacy    Will send this to MA team to notify patient.    Signed Prescriptions:                        Disp   Refills    HYDROcodone-acetaminophen (NORCO)  M*120 ta*0        Sig: Take 1 tablet by mouth every 6 hours as needed for           severe pain Take 1-2 tabs every 6 hrs as needed           for severe pain. Max 4 tabs per day. 30 day           supply. Fill 2/21/22/22 Start 2/21/22  Authorizing Provider: MEG BENNETT MD  [facility]

## 2022-02-25 ENCOUNTER — MYC MEDICAL ADVICE (OUTPATIENT)
Dept: PALLIATIVE MEDICINE | Facility: CLINIC | Age: 77
End: 2022-02-25
Payer: COMMERCIAL

## 2022-02-28 NOTE — TELEPHONE ENCOUNTER
This has been addressed.   See the 2/24/22 encounter.      Ruth RN-BSN  [facility]

## 2022-03-01 ENCOUNTER — TELEPHONE (OUTPATIENT)
Dept: FAMILY MEDICINE | Facility: CLINIC | Age: 77
End: 2022-03-01
Payer: COMMERCIAL

## 2022-03-01 DIAGNOSIS — F41.1 ANXIETY STATE: Primary | ICD-10-CM

## 2022-03-01 NOTE — TELEPHONE ENCOUNTER
Received call from patient. She is asking for referral for psychologist. She described her symptoms as having night sweats, nightmares and panic attacks. Sh has not spoken to her provider regarding these symptoms.

## 2022-03-02 NOTE — TELEPHONE ENCOUNTER
Left message on unidentified voicemail to return call to relay Dr Wyman's message.   Kasandra BARRETT RN

## 2022-03-04 NOTE — TELEPHONE ENCOUNTER
Left 2nd message- pt to call care team if does not hear from mental health by early next week for appt.  MAYCO Uriarte RN

## 2022-03-08 ENCOUNTER — TELEPHONE (OUTPATIENT)
Dept: FAMILY MEDICINE | Facility: CLINIC | Age: 77
End: 2022-03-08
Payer: COMMERCIAL

## 2022-03-08 ENCOUNTER — MYC MEDICAL ADVICE (OUTPATIENT)
Dept: FAMILY MEDICINE | Facility: CLINIC | Age: 77
End: 2022-03-08
Payer: COMMERCIAL

## 2022-03-08 NOTE — TELEPHONE ENCOUNTER
Reason for call:  Patient reporting a symptom    Symptom or request: Pt asking if she can get a change in her anti-anxiety. She says she tried to send a my chart message but it is not going through. Please call as she is asking to talk to a nurse for Dr Wyman.     Phone Number patient can be reached at:  Home number on file [phone] (home)    Best Time:  anytime    Can we leave a detailed message on this number:  YES    Call taken on 3/8/2022 at 11:22 AM by Rose Dominguez

## 2022-03-21 ENCOUNTER — OFFICE VISIT (OUTPATIENT)
Dept: FAMILY MEDICINE | Facility: CLINIC | Age: 77
End: 2022-03-21
Payer: COMMERCIAL

## 2022-03-21 VITALS
SYSTOLIC BLOOD PRESSURE: 136 MMHG | HEART RATE: 94 BPM | RESPIRATION RATE: 16 BRPM | HEIGHT: 61 IN | OXYGEN SATURATION: 98 % | DIASTOLIC BLOOD PRESSURE: 80 MMHG | TEMPERATURE: 97.6 F | BODY MASS INDEX: 20.97 KG/M2

## 2022-03-21 DIAGNOSIS — F41.1 ANXIETY STATE: ICD-10-CM

## 2022-03-21 DIAGNOSIS — Z11.59 NEED FOR HEPATITIS C SCREENING TEST: Primary | ICD-10-CM

## 2022-03-21 PROCEDURE — 99215 OFFICE O/P EST HI 40 MIN: CPT | Performed by: FAMILY MEDICINE

## 2022-03-21 RX ORDER — DULOXETIN HYDROCHLORIDE 30 MG/1
30 CAPSULE, DELAYED RELEASE ORAL 2 TIMES DAILY
Qty: 30 CAPSULE | Refills: 1 | Status: CANCELLED | OUTPATIENT
Start: 2022-03-21

## 2022-03-21 ASSESSMENT — ANXIETY QUESTIONNAIRES
5. BEING SO RESTLESS THAT IT IS HARD TO SIT STILL: NOT AT ALL
GAD7 TOTAL SCORE: 3
1. FEELING NERVOUS, ANXIOUS, OR ON EDGE: SEVERAL DAYS
7. FEELING AFRAID AS IF SOMETHING AWFUL MIGHT HAPPEN: NOT AT ALL
2. NOT BEING ABLE TO STOP OR CONTROL WORRYING: NOT AT ALL
3. WORRYING TOO MUCH ABOUT DIFFERENT THINGS: SEVERAL DAYS
6. BECOMING EASILY ANNOYED OR IRRITABLE: SEVERAL DAYS
GAD7 TOTAL SCORE: 3
4. TROUBLE RELAXING: NOT AT ALL
7. FEELING AFRAID AS IF SOMETHING AWFUL MIGHT HAPPEN: NOT AT ALL
GAD7 TOTAL SCORE: 3

## 2022-03-21 ASSESSMENT — PATIENT HEALTH QUESTIONNAIRE - PHQ9
10. IF YOU CHECKED OFF ANY PROBLEMS, HOW DIFFICULT HAVE THESE PROBLEMS MADE IT FOR YOU TO DO YOUR WORK, TAKE CARE OF THINGS AT HOME, OR GET ALONG WITH OTHER PEOPLE: NOT DIFFICULT AT ALL
SUM OF ALL RESPONSES TO PHQ QUESTIONS 1-9: 8
SUM OF ALL RESPONSES TO PHQ QUESTIONS 1-9: 8

## 2022-03-21 ASSESSMENT — PAIN SCALES - GENERAL: PAINLEVEL: NO PAIN (0)

## 2022-03-21 NOTE — PROGRESS NOTES
"S: Trayc Galloway is a 76 year old female with anxiety and chronic pain.    Complex orofacial chronic pain from dental procedure over 20 years ago.  Pain in maxillary area, palate.  On 40mg total opioid dose for this.  Is terrified at the prospect of being off the oopioids, even a taper to 35mg caused anxiety and side effects for her.    Pain clinic has wondered about opioid side effects, titrating off, trying other meds and modalities.  Tracy has liked this idea in theory, not in practice.      Anxiety: severe, longstanding.  Has been on many medications, stops them for various reasons.  Most recent was effexor, thought it wasn't helping anymore?  Some sweats on it?    Unclear what meds have worked, what haven't, and reasons for stopping have been varied.      She agrees talk therapy would be beneficial.  Currently not doing that    O:/80   Pulse 94   Temp 97.6  F (36.4  C) (Tympanic)   Resp 16   Ht 1.549 m (5' 1\")   LMP  (LMP Unknown)   SpO2 98%   BMI 20.97 kg/m    GEN: Alert and oriented, in no acute distress  Anxious, mild.  Gets teary when talking about her pain, opioid use, anxiety, and medication history.  \"it's overwhelming, I can't keep it all straight.\"     A: anxiety, not controlled      Chronic facial pain, ongoing       Opioid dependency    P: I told her I think the best option would be titration off meds, and start from square one.  It's been over 20 years on/off multiple medications, hard to know next best step.      Mental health referral, counseling more important right now than another ssri or snri.  Failed remeron as well for unclear reasons.      I think she could benefit from scheduled benzo, but would need her off of chrnic opioids before going that route.  Already on ambien anyway.     I will call pain doctor and chat as well, relay my thoughts over the phone    40  min spent on date of encounter reviewing chart, history, physical, documenting and counseling as mentioned in this " note

## 2022-03-22 ASSESSMENT — ANXIETY QUESTIONNAIRES: GAD7 TOTAL SCORE: 3

## 2022-04-04 ENCOUNTER — TELEPHONE (OUTPATIENT)
Dept: FAMILY MEDICINE | Facility: CLINIC | Age: 77
End: 2022-04-04
Payer: COMMERCIAL

## 2022-04-04 DIAGNOSIS — N95.1 MENOPAUSAL SYNDROME (HOT FLASHES): Primary | ICD-10-CM

## 2022-04-25 ENCOUNTER — TELEPHONE (OUTPATIENT)
Dept: FAMILY MEDICINE | Facility: CLINIC | Age: 77
End: 2022-04-25
Payer: COMMERCIAL

## 2022-04-25 NOTE — TELEPHONE ENCOUNTER
Patient Quality Outreach    Patient is due for the following:   Depression  -  PHQ-9 Needed    NEXT STEPS:   No follow up needed at this time.    Type of outreach:    Sent CS-Keyst message.      Questions for provider review:    None     Bailee Kahler

## 2022-04-27 ENCOUNTER — OFFICE VISIT (OUTPATIENT)
Dept: PALLIATIVE MEDICINE | Facility: CLINIC | Age: 77
End: 2022-04-27
Payer: COMMERCIAL

## 2022-04-27 VITALS — SYSTOLIC BLOOD PRESSURE: 138 MMHG | HEART RATE: 89 BPM | DIASTOLIC BLOOD PRESSURE: 70 MMHG

## 2022-04-27 DIAGNOSIS — F32.9 MAJOR DEPRESSIVE DISORDER WITH CURRENT ACTIVE EPISODE, UNSPECIFIED DEPRESSION EPISODE SEVERITY, UNSPECIFIED WHETHER RECURRENT: ICD-10-CM

## 2022-04-27 DIAGNOSIS — G89.4 CHRONIC PAIN SYNDROME: ICD-10-CM

## 2022-04-27 PROCEDURE — 99215 OFFICE O/P EST HI 40 MIN: CPT | Performed by: STUDENT IN AN ORGANIZED HEALTH CARE EDUCATION/TRAINING PROGRAM

## 2022-04-27 RX ORDER — HYDROCODONE BITARTRATE AND ACETAMINOPHEN 10; 325 MG/1; MG/1
1 TABLET ORAL EVERY 6 HOURS PRN
Qty: 90 TABLET | Refills: 0
Start: 2022-04-27 | End: 2022-05-17

## 2022-04-27 RX ORDER — VENLAFAXINE 75 MG/1
TABLET ORAL
Qty: 53 TABLET | Refills: 0 | Status: SHIPPED | OUTPATIENT
Start: 2022-04-27 | End: 2022-05-27

## 2022-04-27 ASSESSMENT — PAIN SCALES - GENERAL: PAINLEVEL: SEVERE PAIN (6)

## 2022-04-27 NOTE — PROGRESS NOTES
"                          [facility]  Follow up clinic visit    Date of visit: 4/27/22      Chief complaint:   Chief Complaint   Patient presents with     Pain       History:  Tracy Galloway is a 76 year old female who follows with the pain clinic for:   Right maxillary neuropathy  Chronic opioid use  Last seen by me on 1/24/22       HPI from initial H&P 12/2/2020:  \"\"Tracy Galloway is a 75 year old female with history of facial pain   Onset/Progression:  Started August 2009 after having a root canal. Tooth was broken in half and was had a caustic substance applied. Was told to have tooth pulled. Was given a lot of medications. (Will send list) Yoga and exercise helped. Unable to chew on the right. Pain is under right eye and in palate Will take a pain pill and then lay down. Pain gets worse throughout the day.   Pain quality: Burning, aching  Pain timing: Constant    Pain rating: intensity ranges from 4/10 to 8/10, and averages variable on a 0-10 scale.  Aggravating factors include: stress, using her mouth, gets worse through the day.   Relieving factors include: Eating, meds\"\"    Interval history 6/28/2021 (date)  \"- Per chart review, seen by Dr. Wyman on 6/9/2021 with increase anxiety. Started on Zoloft. Recommended psychology. Discussed that chronic opioids can cause anxiety and are causing intermittent withdrawal during the day. Discussed tapering, ideally to off. Pt agreed at the time. Also discussed adding additional pain medications such as gabapentin or tegretol    - Pain started 12 yrs ago. Dentist broke her tooth in half. Assistant poured a medication she believes was intended to ablate a nerve into her mouth. She was numb upon immediate departure from the clinic. She then had severe pain and she was prescribed tons of narcotics for the pain  - Then saw Victor M Kemp for 3 years. Wanted to stop seeing him since the only thing she was doing was getting opioids and she had to go " "down to the [facility] for these appointments. Her PCP therefore took over prescribing. Eventually was recommended to follow with the pain clinic. Now she is frustrated because she saw Lyssa Pathak but Lyssa needed to go on leave and canceled with her twice. She wanted to switch pain providers because she wanted to see someone and discuss her case with someone but Lyssa wasn't available. She was then recommended to discuss this request with the clinic director but she is frustrated - she doesn't want to go back to [place]. She is also frustrated because she felt Lyssa interrupted her frequently when she was speaking. She feels she is getting the run around   - The only thing that helps is exercise and yoga.   - She feels desperate due to pain. She has no plans for harming herself though because of her marriage      Pain is in upper right jaw. The bone is now gone since the tooth is gone. The pain is also under her eye. This is a sensation of ants crawling. Increased anxiety causes a sensation of hornets under the eye. This started at the same time as the jaw pain but exacerbated recently due to anxiety and upset.   The jaw pain is a constant ache in one spot back of R upper jaw - well circumscribed. It is constant - mild pain in the morning.  Talking and chewing are exacerbating as well as anxiety.       Taking #4 oxycodone a day, which she thinks is causing increase agitation. This is a sensation of little needles in her head. Takes them at 8am, noon, 4pm and 8pm. But trying to get by with #3, but this depends on whether she is having a calm day. She discussed going back to her previous regimen with her  - this would be taking 1 tab at 8 am followed by 1 hr rest, then taking the remainder of the tabs     Her cognitive fogginess has worsened in the recent months due to stress/anxiety related to frustrations with care \"    Interval history 7/21/21:  - she has a lot of reasons to have substantial stress - 's " "memory is getting worse  - she has canceled counselor appt because it was going to be a zoom call - which she cannot do with her  at home  - she also has many house-related stressors  - she will have to start taking more responsibility at home due to her . Initially was in denial, then anger, now more accepting.   - she worked out today and has no pain while she exercises  - started venlafaxine yesterday. No side effects yet  - seeing a specialist at the [facility] for  Her 's memory in late September. Feels she has some more resources and understanding to cope for the next 2 months    Interval history   She considers her pain to be \"managed,\" rating the pain at 6/10. She says the venlafaxine has made a difference. This has worked for both pain and anxiety so far. She is handling her anxiety well compared to prior. Previously she felt she did not want to live, which is resolved.    Interval history 1/24/22  - weight has dropped to 109, now up to 111  - has been sweaty. Also shaking. Night sweats. nightmares  - stopped venlafaxine, attributing Sx to this med. Had been on it several months prior to onset of these Sx. But then read she shouldn't stop suddently, so now back on without reoccurrence of Sx   - otherwise doing well.    Recommendations/plan at the last visit included:  Referrals     - consider psychology referral in the future when avalable in person (not telehealth)  Therapies:     - Physical Therapy: consider in the future    - Clinical Health Psychologist to address issues of relaxation, behavioral change, coping style, and other factors important to improvement: previously given resources for Dr. Mallory Ferrell  Medication Management:     - continue venlafaxine 75mg BID    - taper Norco 10/325 to max #3.5 tabs per days. With decrease, next Rx will start 2/23. Consider further taper to #3 tabs per day at next refill.  UDS: today  CSA: today    - Did not tolerate gabapentin or Lyrica in the " past. Consider oxcarbazepine vs Topamax in the future   Procedures recommended:     -Low likelihood consider maxillary nerve block in the future, not at this time  Follow up: 2-3 months    Since her last visit, Tracy Galloway reports:  - Lots happening in her life at this time - cries at the drop of a hat.  with dementia, 2 friends with husbands who passed away  - Thought the anti-anxiety pill was causing hot flashes. Realized she went to [place] while on medication and did not have any hot flashes while in [place]  - thinks she needs a therapist.      Pain scores:  Pain intensity on average is 6 on a scale of 0-10.     Current pain treatments:   Baclofen 10 mg is taken as needed, never more than once a day due to feeling in a fog.  Norco  mg - takes #1 tabs #4 times per day  Ibuprofen 400-600 mg TID PRN - takes rarely  Venlafaxine 75 mg BID.     Other relevant medications:  Ambien 10 mg 1/2-1 tab at HS PRN - she takes this every night    Patient is using the medication as prescribed:  YES  Is your medication helpful? YES   Side Effects: no side effect    Past pain treatments:   Pain Clinic:   Yes  [facility] with Иван Kemp for 3 years.              PT: No                Chiropractor:Yes, [place]              Acupuncture: Yes [place]              Health/Pain Psychologist: No               Pharmacotherapy:                           Opioids: Yes                            Non-opioids:    Yes               TENs Unit:No               Injections: No               Surgeries related to pain: Yes: 2010 Had palate opened.: [place]     Previous Pain Relevant Medications:    Neuropathics: Lyrica: Allergy, Gabapentin ?; Lyrica - fainted after taking  Anti-depressants: Amitriptyline:[facility]               Anxiety medications:                  Other medications not covered above: Ambien:[facility]    Medications:  Current Outpatient Medications   Medication Sig Dispense Refill     aspirin (ASA) 81 MG tablet Take 1 tablet (81 mg) by mouth  daily (Patient not taking: Reported on 3/21/2022) 90 tablet 1     conjugated estrogens (PREMARIN) 0.625 MG/GM vaginal cream Apply daily to affected area for 2 weeks then 3 times a week (Patient not taking: Reported on 11/29/2021) 30 g 2     DILT- MG 24 hr capsule TAKE ONE CAPSULE BY MOUTH ONCE DAILY 90 capsule 3     DOCUSATE SODIUM PO Take 100 mg by mouth daily as needed for constipation  (Patient not taking: Reported on 10/4/2021)       HYDROcodone-acetaminophen (NORCO)  MG per tablet Take 1 tablet by mouth every 6 hours as needed for severe pain Take 1-2 tabs every 6 hrs as needed for severe pain. Max 3 tabs per day. 30 day supply. Fill 4/22/22 Start 4/22/22 90 tablet 0     ibuprofen (ADVIL/MOTRIN) 200 MG tablet Take 400-600 mg by mouth every 8 hours as needed for mild pain        lidocaine (XYLOCAINE) 2 % solution Apply to affected area every 4-6 hours as needed. Max 8 doses/24 hour period. (Patient not taking: Reported on 11/29/2021) 100 mL 1     lisinopril (ZESTRIL) 20 MG tablet TAKE ONE TABLET BY MOUTH ONCE DAILY 90 tablet 0     omeprazole (PRILOSEC) 20 MG DR capsule TAKE ONE CAPSULE BY MOUTH ONCE DAILY (Patient not taking: Reported on 1/24/2022) 90 capsule 3     zolpidem (AMBIEN) 10 MG tablet TAKE ONE HALF TO ONE TABLET BY MOUTH EVERY NIGHT AT BEDTIME AS NEEDED FOR SLEEP 30 tablet 5       Medical History: any changes in medical history since they were last seen? as above    Social History: Reviewed; unchanged from previous consultation.   with difficulty with managing finances, working through that right now  Going to [employer] with girlfriends early February    Physical Exam:  Blood pressure 138/70, pulse 89, not currently breastfeeding.  General: NAD  Gait: Normal  Psych: Mood is ok. Affect is congruent. Thought content is logical.   Neuro: CNs II - XII grossly intact    Controlled Substance Agreement:   CSA -- Encounter Level on 08/20/2018:    Controlled Substance Agreement - Scan on  8/29/2018  3:15 PM: CONTROLLED SUBSTANCE AGREEMENT     CSA -- Encounter Level on 07/07/2015:    Controlled Substance Agreement - Scan on 7/10/2015  1:04 PM: CONTROLLED SUBSTANCE AGREEMENT     CSA -- Patient Level:    Controlled Substance Agreement - Opioid - Scan on 1/24/2022  3:56 PM  Controlled Substance Agreement - Opioid - Scan on 3/30/2021 10:04 AM  Controlled Substance Agreement - Opioid - Scan on 3/18/2021  8:58 AM  Controlled Substance Agreement - Opioid - Scan on 3/4/2020  1:30 PM         UDS: 3/17/2021     THE 4 As OF OPIOID MAINTENANCE ANALGESIA    Analgesia: Is pain relief clinically significant? YES   Activity: Is patient functional and able to perform Activities of Daily Living? YES   Adverse effects: Is patient free from adverse side effects from opiates? YES   Adherence to Rx protocol: Is patient adhering to Controlled Substance Agreement and taking medications ONLY as ordered? YES    Assessment:   Tracy Galloway is a 76 year old female who is seen at the pain clinic for:    1. Chronic pain syndrome    2. Major depressive disorder with current active episode, unspecified depression episode severity, unspecified whether recurrent           Mental Health - the patient's mental health concerns, specifically anxiety and depression, affect her experience of pain and contribute to her clinically significant distress.    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     #Chronic continuous use of opioids:   On chronic narcotics since initial facial nerve trauma. Tracy had been experiencing increased mental fogginess and anxiety and was concerned that her opioids were contributing to both symptoms and that her anxiety was exacerbating her pain and likely her feeling of cognitive slowing.  Working on tapering. Decreasing to #3.5 tabs per day. Just had a refill for #120 tabs, so new refill will be delayed (start  5/18)    #Anxiety  #Depression  stress and anxiety are increased due to to 's dementia.   She is now much less anxious given improved understanding of how her pain, anxiety, depression and stress are related and due to increased acceptance of her 's condition.   Started venlafaxine for dual pain and mood benefit, previously felt this is helping manage stress and anxiety.  Today was concerned that episodes of night sweats were due to the venlafaxine.  However discussed that the onset of those symptoms started well after she started the venlafaxine and have subsided despite restarting venlafaxine.  She would like to continue the medication today  Previously given resources for neuroscience education/pain psychology.  New psychology consult - She can [facility] refer to [facility]     Plan:  Referrals     - psychology referral - Will pursue at [facility].She can self-refer  Therapies:     - Physical Therapy: consider in the future    - Clinical Health Psychologist to address issues of relaxation, behavioral change, coping style, and other factors important to improvement: previously given resources for Dr. Mallory Ferrell  Medication Management:     - re-start venlafaxine with titration up to 75mg BID    - taper Norco 10/325 to max #3.5 tabs per days. With decrease, next Rx will start 5/18.   UDS: today  CSA: today    - Did not tolerate gabapentin or Lyrica in the past. Consider oxcarbazepine vs Topamax in the future   Procedures recommended:     -Low likelihood consider maxillary nerve block in the future, not at this time    Follow up: 2-3 months     BILLING TIME DOCUMENTATION:   The total TIME spent on this patient on the date of the encounter/appointment was 40 minutes.      TOTAL TIME includes:   Time spent preparing to see the patient (reviewing records and tests) - 2 min  Time spent face to face (or over the phone) with the patient - 36 min  Time spent ordering tests, medications,  procedures and referrals - 0 min  Time spent Referring and communicating with other healthcare professionals - 0 min  Time spent documenting clinical information in Epic - 2 min    Ivon Shannon MD  [facility] [place]

## 2022-04-27 NOTE — PROGRESS NOTES
04/27/22 1334   PEG: A Thee-Item Scale Assessing Pain Intensity and Interference        0 = No pain / No interference    10 = Pain as bad as you can imagine / Completely interferes   What number best describes your pain on average in the past week? 6   What number best describes how, during the past week, pain has interfered with your enjoyment of life? 6   What number best describes how, during the past week, pain has interfered with your general activity? 3   PEG Total Score 5

## 2022-04-27 NOTE — PATIENT INSTRUCTIONS
Look into finding a psychologist at [employer] [facility]. As we saw, there is a clinic in [place]:   [email or web address]  2. We are re-starting your venlafaxine at 37.5 mg twice daily for 7 days, then 75 mg twice daily.   3. We are going down on your Norco to #3.5 tabs per day. Your next refill will start 5/18.   Follow up: 2 mo    Ivon Shannon MD  [facility]       ----------------------------------------------------------------  Clinic Number:  [phone]   Call with any questions about your care and for scheduling assistance.   Calls are returned Monday through Friday between 8 AM and 4:30 PM. We usually get back to you within 2 business days depending on the issue/request.    If we are prescribing your medications:  For opioid medication refills, call the clinic or send a [employer] message 7 days in advance.  Please include:  Name of requested medication  Name of the pharmacy.  For non-opioid medications, call your pharmacy directly to request a refill. Please allow 3-4 days to be processed.   Per [place] State Law:  All controlled substance prescriptions must be filled within 30 days of being written.    For those controlled substances allowing refills, pickup must occur within 30 days of last fill.      We believe regular attendance is key to your success in our program!    Any time you are unable to keep your appointment we ask that you call us at least 24 hours in advance to cancel.This will allow us to offer the appointment time to another patient.   Multiple missed appointments may lead to dismissal from the clinic.

## 2022-05-17 ENCOUNTER — MYC REFILL (OUTPATIENT)
Dept: PALLIATIVE MEDICINE | Facility: CLINIC | Age: 77
End: 2022-05-17
Payer: COMMERCIAL

## 2022-05-17 DIAGNOSIS — G89.4 CHRONIC PAIN SYNDROME: ICD-10-CM

## 2022-05-18 RX ORDER — HYDROCODONE BITARTRATE AND ACETAMINOPHEN 10; 325 MG/1; MG/1
1 TABLET ORAL EVERY 6 HOURS PRN
Qty: 105 TABLET | Refills: 0 | Status: SHIPPED | OUTPATIENT
Start: 2022-05-18 | End: 2022-06-10

## 2022-05-18 NOTE — TELEPHONE ENCOUNTER
Medication refill information reviewed.     Last due:  26 day supply. Fill 4/22/22 Start 4/22/22     Per Dr. Shannon's 4/27/22 visit plan:     taper Norco 10/325 to max #3.5 tabs per days. With decrease, next Rx will start 5/18.     Due date:  5/18/22      Prescriptions prepped for review.     GLENDY Miranda-BSN  [facility]

## 2022-05-18 NOTE — TELEPHONE ENCOUNTER
patient calling wondering when someone will be calling her, she stated she is out         Lisa Tinoco    [facility]

## 2022-05-18 NOTE — TELEPHONE ENCOUNTER
Pt called just yesterday for this request.  She needs to call sooner in order for this to be processed timely for her.  _________________________________    Mychart sent to pt:  From: Ruth Cid RN      Created: 5/18/2022 4:06 PM      Adam Molina,  We need a sooner notice in order to process your refill on time, at least 5-7 business days.  It is still in process.  Will be sure Dr. Shannon knows you would like this refill asap and will let you know.        GLENDY Miranda-BSN  [facility]

## 2022-05-18 NOTE — TELEPHONE ENCOUNTER
Received call from patient requesting refill(s) of  HYDROcodone-acetaminophen (NORCO)  MG per tablet      Last dispensed from pharmacy on 04/22/22    Patient's last office/virtual visit by prescribing provider on 04/27/22  Next office/virtual appointment scheduled for 06/27/22    Last urine drug screen date 01/24/22  Current opioid agreement on file (completed within the last year) Yes Date of opioid agreement: 01/24/22    E-prescribe to   [address] [address]  Phone: [phone] Fax: [phone]    Will route to nursing [place] for review and preparation of prescription(s).       Gabriela Dowd MA  [facility]

## 2022-05-18 NOTE — TELEPHONE ENCOUNTER
Refills have been requested for the following medications:         HYDROcodone-acetaminophen (NORCO)  MG per tablet [Ivon Shannon]     Preferred pharmacy: [facility] [address] [place] [address]

## 2022-05-18 NOTE — TELEPHONE ENCOUNTER
Pt calling to speak with nursing regarding her refill, per pt she is out.      Clifton Elliott    [facility] [place]

## 2022-05-19 NOTE — TELEPHONE ENCOUNTER
"[employer] message sent to Pt  Tracy,     When you and I last spoke I said your next prescription could START on 5/18, not that you should REQUEST it on 5/18. On the paperwork I sent you home with you it describes the process for refilling opioids as follows:   \"For opioid medication refills, call the clinic or send a [employer] message 7 days in advance\"    I have been filling a controlled substance for you for close to a year now. On each prescription there has been a fill date and a start date. The fill date is typically 2 days in advance of the start date - this is a courtesy that gives you time to fill before your start date. We tell all our patients to submit a REQUEST for refills 7 days in advance to give time to have the refill processed appropriately. It is not fair to say you will need to lower your expectations. Our clinic works hard and I have worked very hard over the months I have known you to help you with your medications. You just need to be sure to make your request 7 days in advance.     Ha,   Ivon Shannon MD  [facility]  "

## 2022-05-27 DIAGNOSIS — G89.4 CHRONIC PAIN SYNDROME: ICD-10-CM

## 2022-05-27 DIAGNOSIS — F32.9 MAJOR DEPRESSIVE DISORDER WITH CURRENT ACTIVE EPISODE, UNSPECIFIED DEPRESSION EPISODE SEVERITY, UNSPECIFIED WHETHER RECURRENT: ICD-10-CM

## 2022-05-27 RX ORDER — VENLAFAXINE 75 MG/1
75 TABLET ORAL 2 TIMES DAILY
Qty: 120 TABLET | Refills: 1 | Status: SHIPPED | OUTPATIENT
Start: 2022-05-27 | End: 2022-06-30

## 2022-05-27 NOTE — TELEPHONE ENCOUNTER
Fax received from: [facility]  Refill authorization requested    Drug: venlafaxine (EFFEXOR) 75 MG tablet  Qty: 53  Last filled 04/27/22  Last seen: 04/27/22  Next appt 6/30/22    Jonny Mcgregor MA

## 2022-05-27 NOTE — TELEPHONE ENCOUNTER
Script Eprescribed to pharmacy        Signed Prescriptions:                        Disp   Refills    venlafaxine (EFFEXOR) 75 MG tablet         120 ta*1        Sig: Take 1 tablet (75 mg) by mouth 2 times daily  Authorizing Provider: MEG BENNETT MD  [facility]

## 2022-06-10 ENCOUNTER — MYC REFILL (OUTPATIENT)
Dept: PALLIATIVE MEDICINE | Facility: CLINIC | Age: 77
End: 2022-06-10
Payer: COMMERCIAL

## 2022-06-10 DIAGNOSIS — G89.4 CHRONIC PAIN SYNDROME: ICD-10-CM

## 2022-06-13 RX ORDER — HYDROCODONE BITARTRATE AND ACETAMINOPHEN 10; 325 MG/1; MG/1
1 TABLET ORAL EVERY 6 HOURS PRN
Qty: 105 TABLET | Refills: 0 | Status: SHIPPED | OUTPATIENT
Start: 2022-06-13 | End: 2022-06-30

## 2022-06-13 NOTE — TELEPHONE ENCOUNTER
Refills have been requested for the following medications:         HYDROcodone-acetaminophen (NORCO)  MG per tablet [Ivon Shannon]       Patient Comment: please confirm received renewal request     Preferred pharmacy: [place], [place] - [address] [place] [address]

## 2022-06-13 NOTE — TELEPHONE ENCOUNTER
Medication refill information reviewed.     Last due:  Fill now. Start 5/18/22-26 day supply . Script was for #105 tabs which is a 30 day supply  Due date:  6/17/22      Prescriptions prepped for review.     GLENDY Miranda-BSN  [facility]

## 2022-06-13 NOTE — TELEPHONE ENCOUNTER
Patient requesting refill(s) of HYDROcodone-acetaminophen (NORCO)  MG per tablet     Last dispensed from pharmacy on 5/18/22    Patient's last office/virtual visit by prescribing provider on 04/27/22  Next office/virtual appointment scheduled for 6/27/22    Last urine drug screen date 1/24/22  Current opioid agreement on file (completed within the last year) Yes Date of opioid agreement: 1/24/22    E-prescribe to  [facility] [place], [place] pharmacy    Will route to [facility] for review and preparation of prescription(s).

## 2022-06-13 NOTE — TELEPHONE ENCOUNTER
checked and appropriate     Script Eprescribed to pharmacy    Will send this to MA team to notify patient.    Signed Prescriptions:                        Disp   Refills    HYDROcodone-acetaminophen (NORCO)  M*105 ta*0        Sig: Take 1 tablet by mouth every 6 hours as needed for           severe pain Take 0.5-1 tabs every 6 hrs as needed           for severe pain. Max 3.5 tabs per day. Fill           6/15/22. Start 6/17/22  Authorizing Provider: MEG BENNETT MD  [facility]

## 2022-06-14 ENCOUNTER — TELEPHONE (OUTPATIENT)
Dept: PALLIATIVE MEDICINE | Facility: CLINIC | Age: 77
End: 2022-06-14
Payer: COMMERCIAL

## 2022-06-14 NOTE — TELEPHONE ENCOUNTER
This was already processed.      Ruth RN-BSN  [facility]

## 2022-06-14 NOTE — TELEPHONE ENCOUNTER
LVM, notified that prescription was sent to preferred pharmacy.    Able to fill 06/15/22 and start 06/17/22      Gabriela Dowd MA  [facility] [place]

## 2022-06-14 NOTE — TELEPHONE ENCOUNTER
Received call from patient requesting refill(s) of HYDROcodone-acetaminophen (NORCO)  MG per tablet    Last dispensed from pharmacy on 05/19/22    Patient's last office/virtual visit by prescribing provider on 4/27/22  Next office/virtual appointment scheduled for none     Last urine drug screen date 01/24/22  Current opioid agreement on file (completed within the last year) Yes Date of opioid agreement: 1/24/22    E-prescribe to [facility]     Will route to [facility] for review and preparation of prescription(s).

## 2022-06-18 ENCOUNTER — MYC REFILL (OUTPATIENT)
Dept: FAMILY MEDICINE | Facility: CLINIC | Age: 77
End: 2022-06-18

## 2022-06-18 DIAGNOSIS — I10 ESSENTIAL HYPERTENSION WITH GOAL BLOOD PRESSURE LESS THAN 140/90: ICD-10-CM

## 2022-06-21 NOTE — TELEPHONE ENCOUNTER
"Requested Prescriptions   Pending Prescriptions Disp Refills    lisinopril (ZESTRIL) 20 MG tablet [Pharmacy Med Name: LISINOPRIL 20MG TABS] 90 tablet 0     Sig: TAKE ONE TABLET BY MOUTH ONCE DAILY        ACE Inhibitors (Including Combos) Protocol Failed - 6/18/2022 10:28 PM        Failed - Normal serum creatinine on file in past 12 months     Recent Labs   Lab Test 05/28/21  1035   CR 0.87       Ok to refill medication if creatinine is low          Failed - Normal serum potassium on file in past 12 months     Recent Labs   Lab Test 05/28/21  1035   POTASSIUM 4.9               Passed - Blood pressure under 140/90 in past 12 months       BP Readings from Last 3 Encounters:   04/27/22 138/70   03/21/22 136/80   01/24/22 119/72                 Passed - Recent (12 mo) or future (30 days) visit within the authorizing provider's specialty     Patient has had an office visit with the authorizing provider or a provider within the authorizing providers department within the previous 12 mos or has a future within next 30 days. See \"Patient Info\" tab in inbasket, or \"Choose Columns\" in Meds & Orders section of the refill encounter.              Passed - Medication is active on med list        Passed - Patient is age 18 or older        Passed - No active pregnancy on record        Passed - No positive pregnancy test within past 12 months              "

## 2022-06-22 NOTE — TELEPHONE ENCOUNTER
"Requested Prescriptions   Pending Prescriptions Disp Refills     lisinopril (ZESTRIL) 20 MG tablet 90 tablet 0     Sig: Take 1 tablet (20 mg) by mouth daily       ACE Inhibitors (Including Combos) Protocol Failed - 6/18/2022 10:30 PM        Failed - Normal serum creatinine on file in past 12 months     Recent Labs   Lab Test 05/28/21  1035   CR 0.87       Ok to refill medication if creatinine is low          Failed - Normal serum potassium on file in past 12 months     Recent Labs   Lab Test 05/28/21  1035   POTASSIUM 4.9             Passed - Blood pressure under 140/90 in past 12 months     BP Readings from Last 3 Encounters:   04/27/22 138/70   03/21/22 136/80   01/24/22 119/72                 Passed - Recent (12 mo) or future (30 days) visit within the authorizing provider's specialty     Patient has had an office visit with the authorizing provider or a provider within the authorizing providers department within the previous 12 mos or has a future within next 30 days. See \"Patient Info\" tab in inbasket, or \"Choose Columns\" in Meds & Orders section of the refill encounter.              Passed - Medication is active on med list        Passed - Patient is age 18 or older        Passed - No active pregnancy on record        Passed - No positive pregnancy test within past 12 months             "

## 2022-06-23 RX ORDER — LISINOPRIL 20 MG/1
TABLET ORAL
Qty: 90 TABLET | Refills: 0 | OUTPATIENT
Start: 2022-06-23

## 2022-06-23 RX ORDER — LISINOPRIL 20 MG/1
20 TABLET ORAL DAILY
Qty: 90 TABLET | Refills: 1 | Status: SHIPPED | OUTPATIENT
Start: 2022-06-23 | End: 2022-08-17

## 2022-06-30 ENCOUNTER — OFFICE VISIT (OUTPATIENT)
Dept: PALLIATIVE MEDICINE | Facility: CLINIC | Age: 77
End: 2022-06-30
Payer: COMMERCIAL

## 2022-06-30 VITALS — DIASTOLIC BLOOD PRESSURE: 92 MMHG | HEART RATE: 96 BPM | SYSTOLIC BLOOD PRESSURE: 140 MMHG

## 2022-06-30 DIAGNOSIS — G89.4 CHRONIC PAIN SYNDROME: ICD-10-CM

## 2022-06-30 DIAGNOSIS — F32.9 MAJOR DEPRESSIVE DISORDER WITH CURRENT ACTIVE EPISODE, UNSPECIFIED DEPRESSION EPISODE SEVERITY, UNSPECIFIED WHETHER RECURRENT: ICD-10-CM

## 2022-06-30 PROCEDURE — 99214 OFFICE O/P EST MOD 30 MIN: CPT | Performed by: STUDENT IN AN ORGANIZED HEALTH CARE EDUCATION/TRAINING PROGRAM

## 2022-06-30 RX ORDER — VENLAFAXINE 75 MG/1
TABLET ORAL
Start: 2022-06-30 | End: 2022-08-17

## 2022-06-30 RX ORDER — HYDROCODONE BITARTRATE AND ACETAMINOPHEN 10; 325 MG/1; MG/1
1 TABLET ORAL EVERY 6 HOURS PRN
Qty: 90 TABLET | Refills: 0 | Status: SHIPPED | OUTPATIENT
Start: 2022-06-30 | End: 2022-08-12

## 2022-06-30 ASSESSMENT — PAIN SCALES - GENERAL: PAINLEVEL: MODERATE PAIN (5)

## 2022-06-30 NOTE — PATIENT INSTRUCTIONS
We will taper off the venlafaxine (effexor) to see if it is responsible for your weight gain. Take 75 mg nightly for 7 days, then stop.   We will decrease your Norco by 0.5 tab per day, to #3 tabs per day.   Follow up: 2 mo    Ivon Shannon MD  [facility]     ----------------------------------------------------------------  Clinic Number:  [phone]   Call with any questions about your care and for scheduling assistance.   Calls are returned Monday through Friday between 8 AM and 4:30 PM. We usually get back to you within 2 business days depending on the issue/request.    If we are prescribing your medications:  For opioid medication refills, call the clinic or send a [employer] message 7 days in advance.  Please include:  Name of requested medication  Name of the pharmacy.  For non-opioid medications, call your pharmacy directly to request a refill. Please allow 3-4 days to be processed.   Per [place] State Law:  All controlled substance prescriptions must be filled within 30 days of being written.    For those controlled substances allowing refills, pickup must occur within 30 days of last fill.      We believe regular attendance is key to your success in our program!    Any time you are unable to keep your appointment we ask that you call us at least 24 hours in advance to cancel.This will allow us to offer the appointment time to another patient.   Multiple missed appointments may lead to dismissal from the clinic.

## 2022-06-30 NOTE — PROGRESS NOTES
"                          [facility]  Follow up clinic visit    Date of visit: 6/30/2022     Chief complaint:   Chief Complaint   Patient presents with     Pain       History:  Tracy Galloway is a 76 year old female who follows with the pain clinic for:   Right maxillary neuropathy  Chronic opioid use  Last seen by me on 4/27/22         HPI from initial H&P 12/2/2020:  \"\"Tracy Galloway is a 75 year old female with history of facial pain   Onset/Progression:  Started August 2009 after having a root canal. Tooth was broken in half and was had a caustic substance applied. Was told to have tooth pulled. Was given a lot of medications. (Will send list) Yoga and exercise helped. Unable to chew on the right. Pain is under right eye and in palate Will take a pain pill and then lay down. Pain gets worse throughout the day.   Pain quality: Burning, aching  Pain timing: Constant    Pain rating: intensity ranges from 4/10 to 8/10, and averages variable on a 0-10 scale.  Aggravating factors include: stress, using her mouth, gets worse through the day.   Relieving factors include: Eating, meds\"\"    Interval history 6/28/2021 (date)  \"- Per chart review, seen by Dr. Wyman on 6/9/2021 with increase anxiety. Started on Zoloft. Recommended psychology. Discussed that chronic opioids can cause anxiety and are causing intermittent withdrawal during the day. Discussed tapering, ideally to off. Pt agreed at the time. Also discussed adding additional pain medications such as gabapentin or tegretol    - Pain started 12 yrs ago. Dentist broke her tooth in half. Assistant poured a medication she believes was intended to ablate a nerve into her mouth. She was numb upon immediate departure from the clinic. She then had severe pain and she was prescribed tons of narcotics for the pain  - Then saw Victor M Kemp for 3 years. Wanted to stop seeing him since the only thing she was doing was getting opioids and she had to " "go down to the [facility] for these appointments. Her PCP therefore took over prescribing. Eventually was recommended to follow with the pain clinic. Now she is frustrated because she saw [facility]ak but Lyssa needed to go on leave and canceled with her twice. She wanted to switch pain providers because she wanted to see someone and discuss her case with someone but Lyssa wasn't available. She was then recommended to discuss this request with the clinic director but she is frustrated - she doesn't want to go back to [place]. She is also frustrated because she felt Lyssa interrupted her frequently when she was speaking. She feels she is getting the run around   - The only thing that helps is exercise and yoga.   - She feels desperate due to pain. She has no plans for harming herself though because of her marriage      Pain is in upper right jaw. The bone is now gone since the tooth is gone. The pain is also under her eye. This is a sensation of ants crawling. Increased anxiety causes a sensation of hornets under the eye. This started at the same time as the jaw pain but exacerbated recently due to anxiety and upset.   The jaw pain is a constant ache in one spot back of R upper jaw - well circumscribed. It is constant - mild pain in the morning.  Talking and chewing are exacerbating as well as anxiety.       Taking #4 oxycodone a day, which she thinks is causing increase agitation. This is a sensation of little needles in her head. Takes them at 8am, noon, 4pm and 8pm. But trying to get by with #3, but this depends on whether she is having a calm day. She discussed going back to her previous regimen with her  - this would be taking 1 tab at 8 am followed by 1 hr rest, then taking the remainder of the tabs     Her cognitive fogginess has worsened in the recent months due to stress/anxiety related to frustrations with care \"    Interval history 7/21/21:  - she has a lot of reasons to have substantial stress - 's " "memory is getting worse  - she has canceled counselor appt because it was going to be a zoom call - which she cannot do with her  at home  - she also has many house-related stressors  - she will have to start taking more responsibility at home due to her . Initially was in denial, then anger, now more accepting.   - she worked out today and has no pain while she exercises  - started venlafaxine yesterday. No side effects yet  - seeing a specialist at the [facility] for  Her 's memory in late September. Feels she has some more resources and understanding to cope for the next 2 months    Interval history   She considers her pain to be \"managed,\" rating the pain at 6/10. She says the venlafaxine has made a difference. This has worked for both pain and anxiety so far. She is handling her anxiety well compared to prior. Previously she felt she did not want to live, which is resolved.    Interval history 1/24/22  - weight has dropped to 109, now up to 111  - has been sweaty. Also shaking. Night sweats. nightmares  - stopped venlafaxine, attributing Sx to this med. Had been on it several months prior to onset of these Sx. But then read she shouldn't stop suddently, so now back on without reoccurrence of Sx   - otherwise doing well.    Interval history 4/27/22    - Lots happening in her life at this time - cries at the drop of a hat.  with dementia, 2 friends with husbands who passed away  - Thought the anti-anxiety pill was causing hot flashes. Realized she went to [place] while on medication and did not have any hot flashes while in [place]  - thinks she needs a therapist.     Recommendations/plan at the last visit included:  Plan:  Referrals     - psychology referral - Will pursue at [facility].She can self-refer  Therapies:     - Physical Therapy: consider in the future    - Clinical Health Psychologist to address issues of relaxation, behavioral change, coping style, and other factors " important to improvement: previously given resources for Dr. Mallory Ferrell  Medication Management:     - re-start venlafaxine with titration up to 75mg BID    - taper Norco 10/325 to max #3.5 tabs per days. With decrease, next Rx will start 5/18.   UDS: today  CSA: today    - Did not tolerate gabapentin or Lyrica in the past. Consider oxcarbazepine vs Topamax in the future   Procedures recommended:     -Low likelihood consider maxillary nerve block in the future, not at this time  Follow up: 2-3 months     Since her last visit, Tracy Galloway reports:  - 1st pill of the day doesn't affect her  - trying to workout every day. Never hurts when she is working out. It does help   - when she is not busy she focuses more on her pain and she gets depressed  - she is worried that the venlafaxine is causing weight gain   - could not get in to [facility]. She needs to join a support group for people with dementia      Pain scores:  Pain intensity on average is 5 on a scale of 0-10.     Current pain treatments:   Baclofen 10 mg is taken as needed, never more than once a day due to feeling in a fog.  Norco  mg - takes #3.5 tabs per day  Ibuprofen 400-600 mg TID PRN - takes rarely  Venlafaxine 75 mg BID.     Other relevant medications:  Ambien 10 mg 1/2-1 tab at HS PRN - she takes this every night    Patient is using the medication as prescribed:  YES  Is your medication helpful? YES   Side Effects: no side effect    Past pain treatments:   Pain Clinic:   Yes  [facility] with Иван Kemp for 3 years.              PT: No                Chiropractor:Yes, [facility]              Acupuncture: Yes [facility]              Health/Pain Psychologist: No               Pharmacotherapy:                           Opioids: Yes                            Non-opioids:    Yes               TENs Unit:No               Injections: No               Surgeries related to pain: Yes: 2010 Had palate opened.: [facility]     Previous Pain Relevant  Medications:    Neuropathics: Lyrica: Allergy, Gabapentin ?; Lyrica - fainted after taking  Anti-depressants: Amitriptyline:[facility]               Anxiety medications:                  Other medications not covered above: Ambien:[facility]    Medications:  Current Outpatient Medications   Medication Sig Dispense Refill     aspirin (ASA) 81 MG tablet Take 1 tablet (81 mg) by mouth daily 90 tablet 1     conjugated estrogens (PREMARIN) 0.625 MG/GM vaginal cream Apply daily to affected area for 2 weeks then 3 times a week (Patient not taking: No sig reported) 30 g 2     DILT- MG 24 hr capsule TAKE ONE CAPSULE BY MOUTH ONCE DAILY 90 capsule 3     DOCUSATE SODIUM PO Take 100 mg by mouth daily as needed for constipation       HYDROcodone-acetaminophen (NORCO)  MG per tablet Take 1 tablet by mouth every 6 hours as needed for severe pain Take 0.5-1 tabs every 6 hrs as needed for severe pain. Max 3.5 tabs per day. Fill 6/15/22. Start 6/17/22 105 tablet 0     ibuprofen (ADVIL/MOTRIN) 200 MG tablet Take 400-600 mg by mouth every 8 hours as needed for mild pain        lidocaine (XYLOCAINE) 2 % solution Apply to affected area every 4-6 hours as needed. Max 8 doses/24 hour period. (Patient not taking: No sig reported) 100 mL 1     lisinopril (ZESTRIL) 20 MG tablet Take 1 tablet (20 mg) by mouth daily 90 tablet 1     omeprazole (PRILOSEC) 20 MG DR capsule TAKE ONE CAPSULE BY MOUTH ONCE DAILY (Patient not taking: Reported on 4/27/2022) 90 capsule 3     venlafaxine (EFFEXOR) 75 MG tablet Take 1 tablet (75 mg) by mouth 2 times daily 120 tablet 1     zolpidem (AMBIEN) 10 MG tablet TAKE ONE HALF TO ONE TABLET BY MOUTH EVERY NIGHT AT BEDTIME AS NEEDED FOR SLEEP 30 tablet 5       Medical History: any changes in medical history since they were last seen? as above    Social History: Reviewed; unchanged from previous consultation.     Physical Exam:  Blood pressure (!) 140/92, pulse 96, not currently breastfeeding.  General:  NAD  Gait: Normal  Psych: Mood is ok. Affect is congruent. Thought content is logical.   Neuro: CNs II - XII grossly intact    Controlled Substance Agreement: 1/24/2022  UDS: 1/24/2022     THE 4 As OF OPIOID MAINTENANCE ANALGESIA    Analgesia: Is pain relief clinically significant? YES   Activity: Is patient functional and able to perform Activities of Daily Living? YES   Adverse effects: Is patient free from adverse side effects from opiates? YES   Adherence to Rx protocol: Is patient adhering to Controlled Substance Agreement and taking medications ONLY as ordered? YES    Assessment:   Tracy Galloway is a 76 year old female who is seen at the pain clinic for:    1. Chronic pain syndrome    2. Major depressive disorder with current active episode, unspecified depression episode severity, unspecified whether recurrent           Mental Health - the patient's mental health concerns, specifically anxiety and depression, affect her experience of pain and contribute to her clinically significant distress.    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     #Chronic continuous use of opioids:   On chronic narcotics since initial facial nerve trauma. Tracy had been experiencing increased mental fogginess and anxiety and was concerned that her opioids were contributing to both symptoms and that her anxiety was exacerbating her pain and likely her feeling of cognitive slowing.  Working on tapering. Decreasing to #3 tabs per day.      #Anxiety  #Depression  stress and anxiety are increased due to to 's dementia.   less anxious given improved understanding of how her pain, anxiety, depression and stress are related and due to increased acceptance of her 's condition. Exercise is helping  Started venlafaxine for dual pain and mood benefit, previously felt this is helping manage stress and anxiety. Now concerned it has caused/contributed to weight  gain and would like to try to taper off. OK while exercising daily  Unable to schedule with [facility]     Plan:  Referrals     - psychology referral - previously referred to [facility]. Unable to schedule  Therapies:     - Physical Therapy: consider in the future    - Clinical Health Psychologist to address issues of relaxation, behavioral change, coping style, and other factors important to improvement: previously given resources for Dr. Mallory Ferrell  Medication Management:     - Taper off venlafaxine to see if weight gain reverses    - taper Norco 10/325 to max #3 tabs per days.    UDS: 1/24/2022  CSA: 1/24/2022    - consider transition to buprenorphine at next visit     - Did not tolerate gabapentin or Lyrica in the past. Consider oxcarbazepine vs Topamax in the future   Procedures recommended:     -Low likelihood consider maxillary nerve block in the future, not at this time  Follow up: 2 months     BILLING TIME DOCUMENTATION:   The total TIME spent on this patient on the date of the encounter/appointment was 32 minutes.      TOTAL TIME includes:   Time spent preparing to see the patient (reviewing records and tests) - 1 min  Time spent face to face (or over the phone) with the patient - 24 min  Time spent ordering tests, medications, procedures and referrals - 0 min  Time spent Referring and communicating with other healthcare professionals - 0 min  Time spent documenting clinical information in Epic - 7 min    Ivon Shannon MD  [facility] [place]

## 2022-07-02 ENCOUNTER — MYC MEDICAL ADVICE (OUTPATIENT)
Dept: FAMILY MEDICINE | Facility: CLINIC | Age: 77
End: 2022-07-02

## 2022-07-03 ENCOUNTER — MYC MEDICAL ADVICE (OUTPATIENT)
Dept: PALLIATIVE MEDICINE | Facility: CLINIC | Age: 77
End: 2022-07-03

## 2022-07-05 ENCOUNTER — MYC MEDICAL ADVICE (OUTPATIENT)
Dept: PALLIATIVE MEDICINE | Facility: CLINIC | Age: 77
End: 2022-07-05

## 2022-07-05 ENCOUNTER — TELEPHONE (OUTPATIENT)
Dept: FAMILY MEDICINE | Facility: CLINIC | Age: 77
End: 2022-07-05

## 2022-07-05 DIAGNOSIS — F51.01 PRIMARY INSOMNIA: Chronic | ICD-10-CM

## 2022-07-05 RX ORDER — ZOLPIDEM TARTRATE 10 MG/1
TABLET ORAL
Qty: 30 TABLET | Refills: 0 | Status: SHIPPED | OUTPATIENT
Start: 2022-07-05 | End: 2022-08-17

## 2022-07-05 NOTE — TELEPHONE ENCOUNTER
Routing refill request to covering provider for review/approval because:  Drug not on the [facility] refill protocol   MACYO Uriarte RN

## 2022-07-05 NOTE — TELEPHONE ENCOUNTER
Hi there,  Pharmacy received a prescription for zolpidem 10 mg tablets from Dr Wyman, qty y30, sig take one-half to one tablet by mouth every night at bedtime as needed for sleep. The prescription did not come over our system correctly. Patient does have a prescription sent in from Dr Johnson with same date of service. Does Dr Wyman still want to resend this prescription?    Thank You!    Parker Elliott [facility]

## 2022-07-06 NOTE — TELEPHONE ENCOUNTER
Called and talked to Pharmacy and let them know what Dr. Johnson sent was fine.     Elsie Fine RN     no

## 2022-07-15 ENCOUNTER — MYC MEDICAL ADVICE (OUTPATIENT)
Dept: FAMILY MEDICINE | Facility: CLINIC | Age: 77
End: 2022-07-15

## 2022-07-15 DIAGNOSIS — Z12.31 ENCOUNTER FOR SCREENING MAMMOGRAM FOR BREAST CANCER: Primary | ICD-10-CM

## 2022-08-11 ENCOUNTER — HOSPITAL ENCOUNTER (OUTPATIENT)
Dept: MAMMOGRAPHY | Facility: CLINIC | Age: 77
Discharge: HOME | End: 2022-08-11
Attending: FAMILY MEDICINE | Admitting: FAMILY MEDICINE
Payer: COMMERCIAL

## 2022-08-11 DIAGNOSIS — Z12.31 ENCOUNTER FOR SCREENING MAMMOGRAM FOR BREAST CANCER: ICD-10-CM

## 2022-08-11 PROCEDURE — 77067 SCR MAMMO BI INCL CAD: CPT

## 2022-08-12 DIAGNOSIS — G89.4 CHRONIC PAIN SYNDROME: ICD-10-CM

## 2022-08-12 RX ORDER — HYDROCODONE BITARTRATE AND ACETAMINOPHEN 10; 325 MG/1; MG/1
1 TABLET ORAL EVERY 6 HOURS PRN
Qty: 90 TABLET | Refills: 0 | Status: SHIPPED | OUTPATIENT
Start: 2022-08-12 | End: 2022-09-16

## 2022-08-12 NOTE — TELEPHONE ENCOUNTER
Medication refill information reviewed.     Due date for HYDROcodone-acetaminophen (NORCO)  MG per tablet is 8/16/22     Prescriptions prepped for review.     Will route to provider.     Brant Meek RN  Patient Care Supervisor   [facility] [place]

## 2022-08-12 NOTE — TELEPHONE ENCOUNTER
checked and appropriate     Script Eprescribed to pharmacy    Will send this to MA team to notify patient.    Signed Prescriptions:                        Disp   Refills    HYDROcodone-acetaminophen (NORCO)  M*90 tab*0        Sig: Take 1 tablet by mouth every 6 hours as needed for           severe pain Take 0.5-1 tabs every 6 hrs as needed           for severe pain. Max 3 tabs per day. Fill           8/14/22. Start 8/16/22  Authorizing Provider: MEG BENNETT MD  [facility]

## 2022-08-12 NOTE — TELEPHONE ENCOUNTER
Received call from patient requesting refill(s) of HYDROcodone-acetaminophen (NORCO)  MG per tablet    Last dispensed from pharmacy on 07/15/22    Patient's last office/virtual visit by prescribing provider on 06/30/22  Next office/virtual appointment scheduled for 09/07/22    Last urine drug screen date 01/24/22  Current opioid agreement on file (completed within the last year) Yes Date of opioid agreement: 01/24/22    E-prescribe to pharmacy-[facility] [address] [place] [address]     Will route to nursing [place] for review and preparation of prescription(s).

## 2022-08-17 ENCOUNTER — OFFICE VISIT (OUTPATIENT)
Dept: FAMILY MEDICINE | Facility: CLINIC | Age: 77
End: 2022-08-17
Payer: COMMERCIAL

## 2022-08-17 VITALS
BODY MASS INDEX: 22.26 KG/M2 | TEMPERATURE: 97.1 F | SYSTOLIC BLOOD PRESSURE: 146 MMHG | RESPIRATION RATE: 12 BRPM | WEIGHT: 117.8 LBS | DIASTOLIC BLOOD PRESSURE: 80 MMHG | HEART RATE: 71 BPM | OXYGEN SATURATION: 98 %

## 2022-08-17 DIAGNOSIS — I10 ESSENTIAL HYPERTENSION WITH GOAL BLOOD PRESSURE LESS THAN 140/90: Primary | ICD-10-CM

## 2022-08-17 DIAGNOSIS — G89.4 CHRONIC PAIN SYNDROME: Chronic | ICD-10-CM

## 2022-08-17 DIAGNOSIS — R23.3 BLEEDING SKIN MOLE: ICD-10-CM

## 2022-08-17 DIAGNOSIS — F41.1 ANXIETY STATE: ICD-10-CM

## 2022-08-17 DIAGNOSIS — F32.0 MILD SINGLE CURRENT EPISODE OF MAJOR DEPRESSIVE DISORDER (H): ICD-10-CM

## 2022-08-17 DIAGNOSIS — D22.9 BLEEDING SKIN MOLE: ICD-10-CM

## 2022-08-17 DIAGNOSIS — F51.01 PRIMARY INSOMNIA: Chronic | ICD-10-CM

## 2022-08-17 DIAGNOSIS — F11.20 NARCOTIC DEPENDENCE (H): ICD-10-CM

## 2022-08-17 PROCEDURE — 99214 OFFICE O/P EST MOD 30 MIN: CPT | Performed by: FAMILY MEDICINE

## 2022-08-17 RX ORDER — ZOLPIDEM TARTRATE 10 MG/1
TABLET ORAL
Qty: 30 TABLET | Refills: 5 | Status: SHIPPED | OUTPATIENT
Start: 2022-08-17 | End: 2023-02-10

## 2022-08-17 RX ORDER — DILTIAZEM HYDROCHLORIDE 180 MG/1
CAPSULE, EXTENDED RELEASE ORAL
Qty: 90 CAPSULE | Refills: 3 | Status: SHIPPED | OUTPATIENT
Start: 2022-08-17 | End: 2023-08-07

## 2022-08-17 RX ORDER — LISINOPRIL 20 MG/1
20 TABLET ORAL DAILY
Qty: 90 TABLET | Refills: 3 | Status: SHIPPED | OUTPATIENT
Start: 2022-08-17 | End: 2023-10-23

## 2022-08-17 RX ORDER — BUSPIRONE HYDROCHLORIDE 5 MG/1
5 TABLET ORAL 2 TIMES DAILY
Qty: 60 TABLET | Refills: 1 | Status: SHIPPED | OUTPATIENT
Start: 2022-08-17 | End: 2022-09-16

## 2022-08-17 ASSESSMENT — PATIENT HEALTH QUESTIONNAIRE - PHQ9
SUM OF ALL RESPONSES TO PHQ QUESTIONS 1-9: 7
SUM OF ALL RESPONSES TO PHQ QUESTIONS 1-9: 7
10. IF YOU CHECKED OFF ANY PROBLEMS, HOW DIFFICULT HAVE THESE PROBLEMS MADE IT FOR YOU TO DO YOUR WORK, TAKE CARE OF THINGS AT HOME, OR GET ALONG WITH OTHER PEOPLE: NOT DIFFICULT AT ALL

## 2022-08-17 ASSESSMENT — PAIN SCALES - GENERAL: PAINLEVEL: NO PAIN (0)

## 2022-08-17 NOTE — NURSING NOTE
"Chief Complaint   Patient presents with     Hypertension     Depression     BP (!) 146/80   Pulse 71   Temp 97.1  F (36.2  C) (Tympanic)   Resp 12   Wt 53.4 kg (117 lb 12.8 oz)   LMP  (LMP Unknown)   SpO2 98%   BMI 22.26 kg/m   Estimated body mass index is 22.26 kg/m  as calculated from the following:    Height as of 3/21/22: 1.549 m (5' 1\").    Weight as of this encounter: 53.4 kg (117 lb 12.8 oz).  Patient presents to the clinic using No DME      Health Maintenance that is potentially due pending provider review:    Health Maintenance Due   Topic Date Due     ANNUAL REVIEW OF HM ORDERS  Never done     HEPATITIS C SCREENING  Never done     HEPATITIS B IMMUNIZATION (1 of 3 - Risk 3-dose series) Never done     MEDICARE ANNUAL WELLNESS VISIT  09/08/2021     FALL RISK ASSESSMENT  09/08/2021        Pended.        "

## 2022-09-15 ENCOUNTER — MYC MEDICAL ADVICE (OUTPATIENT)
Dept: PALLIATIVE MEDICINE | Facility: CLINIC | Age: 77
End: 2022-09-15

## 2022-09-16 ENCOUNTER — MYC MEDICAL ADVICE (OUTPATIENT)
Dept: PALLIATIVE MEDICINE | Facility: CLINIC | Age: 77
End: 2022-09-16

## 2022-09-16 DIAGNOSIS — G89.4 CHRONIC PAIN SYNDROME: ICD-10-CM

## 2022-09-16 NOTE — TELEPHONE ENCOUNTER
See the 9/15/22 mychart encounter for more information.    Ruth Cid RN-BSN  [facility]

## 2022-09-16 NOTE — TELEPHONE ENCOUNTER
Received call from patient requesting refill(s) of norco     Last dispensed from pharmacy on 8/14/22 per epic  Was last due on Fill 8/14/22. Start 8/16/22  Due date anytime    Patient's last office/virtual visit by prescribing provider on 6/30/22  Next office/virtual appointment scheduled for 9/29/22    Last urine drug screen 1/2022  Current opioid agreement on file Yes Date: 1/2022    Processing (pick one):      E-prescribe to [facility] pharmacy    Ruth RN-BSN  [facility]

## 2022-09-16 NOTE — TELEPHONE ENCOUNTER
Per patient myChart message:  From: Tracy Galloway      Created: 9/15/2022 3:04 PM      Great! If I was rude I'm sorry. Was in the middle of a melt down      And (Brought over from the other 9/15/22 encounter):  From: Tracy Galloway      Created: 9/15/2022 3:28 PM      Should I keep the 19th appt. or the 29th?      _________________________________    Mychart sent to pt:    From: Ruth Cid RN      Created: 9/16/2022 8:44 AM        No you weren't rude Tracy.  All is good.     Looks like the appointment on the 19th was cancelled. Right now you are scheduled for 9/29 @ 130pm.  At this time, Dr. Shannon still have appointment availability on 9/19.  If you want to change it you can always call [phone].        Kind regards,     Ruth RN-BSN  [facility]

## 2022-09-19 RX ORDER — HYDROCODONE BITARTRATE AND ACETAMINOPHEN 10; 325 MG/1; MG/1
1 TABLET ORAL EVERY 6 HOURS PRN
Qty: 90 TABLET | Refills: 0 | Status: SHIPPED | OUTPATIENT
Start: 2022-09-19 | End: 2022-10-17

## 2022-09-19 NOTE — TELEPHONE ENCOUNTER
checked and appropriate     Script Eprescribed to pharmacy    Will send this to MA team to notify patient.    Signed Prescriptions:                        Disp   Refills    HYDROcodone-acetaminophen (NORCO)  M*90 tab*0        Sig: Take 1 tablet by mouth every 6 hours as needed for           severe pain Take 0.5-1 tabs every 6 hrs as needed           for severe pain. Max 3 tabs per day. Fill/start           9/16/22  Authorizing Provider: MEG BENNETT MD  [facility]

## 2022-09-28 ENCOUNTER — TELEPHONE (OUTPATIENT)
Dept: FAMILY MEDICINE | Facility: CLINIC | Age: 77
End: 2022-09-28

## 2022-09-28 ENCOUNTER — MYC MEDICAL ADVICE (OUTPATIENT)
Dept: FAMILY MEDICINE | Facility: CLINIC | Age: 77
End: 2022-09-28

## 2022-09-28 DIAGNOSIS — F41.1 ANXIETY STATE: ICD-10-CM

## 2022-09-28 RX ORDER — BUSPIRONE HYDROCHLORIDE 5 MG/1
10 TABLET ORAL 2 TIMES DAILY
Qty: 1 TABLET | Refills: 0 | Status: SHIPPED | OUTPATIENT
Start: 2022-09-28 | End: 2023-08-23

## 2022-09-28 NOTE — TELEPHONE ENCOUNTER
"Requested Prescriptions   Pending Prescriptions Disp Refills     busPIRone (BUSPAR) 5 MG tablet 1 tablet 0     Sig: Take 2 tablets (10 mg) by mouth 2 times daily       Atypical Antidepressants Protocol Passed - 9/28/2022 10:58 AM        Passed - Recent (12 mo) or future (30 days) visit within the authorizing provider's specialty     Patient has had an office visit with the authorizing provider or a provider within the authorizing providers department within the previous 12 mos or has a future within next 30 days. See \"Patient Info\" tab in inbasket, or \"Choose Columns\" in Meds & Orders section of the refill encounter.              Passed - Medication active on med list        Passed - Patient is age 18 or older        Passed - No active pregnancy on record        Passed - No positive pregnancy test in past 12 mos           Last Written Prescription Date:  *8/17/22  Last Fill Quantity: 60,  # refills: 1   Last office visit: 8/17/2022 with prescribing provider:     Future Office Visit:      Looks like Pt was supposed to send an update to you. Please see update.     Elsie Fine RN            "

## 2022-09-29 ENCOUNTER — VIRTUAL VISIT (OUTPATIENT)
Dept: PALLIATIVE MEDICINE | Facility: CLINIC | Age: 77
End: 2022-09-29
Payer: COMMERCIAL

## 2022-09-29 DIAGNOSIS — R51.9 CHRONIC FACIAL PAIN: Primary | ICD-10-CM

## 2022-09-29 DIAGNOSIS — G89.29 CHRONIC FACIAL PAIN: Primary | ICD-10-CM

## 2022-09-29 DIAGNOSIS — F11.90 CHRONIC, CONTINUOUS USE OF OPIOIDS: ICD-10-CM

## 2022-09-29 DIAGNOSIS — F41.9 ANXIETY: ICD-10-CM

## 2022-09-29 DIAGNOSIS — S04.51XD INJURY OF RIGHT FACIAL NERVE, SUBSEQUENT ENCOUNTER: ICD-10-CM

## 2022-09-29 PROCEDURE — 99214 OFFICE O/P EST MOD 30 MIN: CPT | Mod: 95 | Performed by: STUDENT IN AN ORGANIZED HEALTH CARE EDUCATION/TRAINING PROGRAM

## 2022-09-29 NOTE — Clinical Note
Hi,  Tracy is now stable  on her hydrocodone and free to transfer back to you. I sent a staff message but accidentally addressed  it to Dr. Wyman (sorry!). Tracy is doing very well and I would not consider  increasing  her hydrocodone in the future.   Thank you,   Take care,  Ivon Shannon MD [facility]

## 2022-09-29 NOTE — PROGRESS NOTES
"                          [facility]  Follow up clinic visit    Date of visit: 9/29/2022      Chief complaint:   No chief complaint on file.  Pain    History:  Tracy Galloway is a 76 year old female who follows with the pain clinic for:   Right maxillary neuropathy  Chronic opioid use  Last seen by me on 6/30/2022         HPI from initial H&P 12/2/2020:  \"\"Tracy Galloway is a 75 year old female with history of facial pain   Onset/Progression:  Started August 2009 after having a root canal. Tooth was broken in half and was had a caustic substance applied. Was told to have tooth pulled. Was given a lot of medications. (Will send list) Yoga and exercise helped. Unable to chew on the right. Pain is under right eye and in palate Will take a pain pill and then lay down. Pain gets worse throughout the day.   Pain quality: Burning, aching  Pain timing: Constant    Pain rating: intensity ranges from 4/10 to 8/10, and averages variable on a 0-10 scale.  Aggravating factors include: stress, using her mouth, gets worse through the day.   Relieving factors include: Eating, meds\"\"    Interval history 6/28/2021 (date)  \"- Per chart review, seen by Dr. Wyman on 6/9/2021 with increase anxiety. Started on Zoloft. Recommended psychology. Discussed that chronic opioids can cause anxiety and are causing intermittent withdrawal during the day. Discussed tapering, ideally to off. Pt agreed at the time. Also discussed adding additional pain medications such as gabapentin or tegretol    - Pain started 12 yrs ago. Dentist broke her tooth in half. Assistant poured a medication she believes was intended to ablate a nerve into her mouth. She was numb upon immediate departure from the clinic. She then had severe pain and she was prescribed tons of narcotics for the pain  - Then saw Victor M Kemp for 3 years. Wanted to stop seeing him since the only thing she was doing was getting opioids and she had to go down to the " "[facility] for these appointments. Her PCP therefore took over prescribing. Eventually was recommended to follow with the pain clinic. Now she is frustrated because she saw Lyssamagnus Pathak but Lyssa needed to go on leave and canceled with her twice. She wanted to switch pain providers because she wanted to see someone and discuss her case with someone but Lyssa wasn't available. She was then recommended to discuss this request with the clinic director but she is frustrated - she doesn't want to go back to [place]. She is also frustrated because she felt Lyssa interrupted her frequently when she was speaking. She feels she is getting the run around   - The only thing that helps is exercise and yoga.   - She feels desperate due to pain. She has no plans for harming herself though because of her marriage      Pain is in upper right jaw. The bone is now gone since the tooth is gone. The pain is also under her eye. This is a sensation of ants crawling. Increased anxiety causes a sensation of hornets under the eye. This started at the same time as the jaw pain but exacerbated recently due to anxiety and upset.   The jaw pain is a constant ache in one spot back of R upper jaw - well circumscribed. It is constant - mild pain in the morning.  Talking and chewing are exacerbating as well as anxiety.       Taking #4 oxycodone a day, which she thinks is causing increase agitation. This is a sensation of little needles in her head. Takes them at 8am, noon, 4pm and 8pm. But trying to get by with #3, but this depends on whether she is having a calm day. She discussed going back to her previous regimen with her  - this would be taking 1 tab at 8 am followed by 1 hr rest, then taking the remainder of the tabs     Her cognitive fogginess has worsened in the recent months due to stress/anxiety related to frustrations with care \"    Interval history 7/21/21:  - she has a lot of reasons to have substantial stress - 's memory is getting " "worse  - she has canceled counselor appt because it was going to be a zoom call - which she cannot do with her  at home  - she also has many house-related stressors  - she will have to start taking more responsibility at home due to her . Initially was in denial, then anger, now more accepting.   - she worked out today and has no pain while she exercises  - started venlafaxine yesterday. No side effects yet  - seeing a specialist at the [facility] for  Her 's memory in late September. Feels she has some more resources and understanding to cope for the next 2 months    Interval history   She considers her pain to be \"managed,\" rating the pain at 6/10. She says the venlafaxine has made a difference. This has worked for both pain and anxiety so far. She is handling her anxiety well compared to prior. Previously she felt she did not want to live, which is resolved.    Interval history 1/24/22  - weight has dropped to 109, now up to 111  - has been sweaty. Also shaking. Night sweats. nightmares  - stopped venlafaxine, attributing Sx to this med. Had been on it several months prior to onset of these Sx. But then read she shouldn't stop suddently, so now back on without reoccurrence of Sx   - otherwise doing well.    Interval history 4/27/22    - Lots happening in her life at this time - cries at the drop of a hat.  with dementia, 2 friends with husbands who passed away  - Thought the anti-anxiety pill was causing hot flashes. Realized she went to [place] while on medication and did not have any hot flashes while in [place]  - thinks she needs a therapist.     Interval history 6/30/2022  - 1st pill of the day doesn't affect her  - trying to workout every day. Never hurts when she is working out. It does help   - when she is not busy she focuses more on her pain and she gets depressed  - she is worried that the venlafaxine is causing weight gain   - could not get in to [facility]. She needs to " join a support group for people with dementia     Recommendations/plan at the last visit included:  Plan:  Referrals     - psychology referral - previously referred to [facility]. Unable to schedule  Therapies:     - Physical Therapy: consider in the future    - Clinical Health Psychologist to address issues of relaxation, behavioral change, coping style, and other factors important to improvement: previously given resources for Dr. Mallory Ferrell  Medication Management:     - Taper off venlafaxine to see if weight gain reverses    - taper Norco 10/325 to max #3 tabs per days.    UDS: 1/24/2022  CSA: 1/24/2022    - consider transition to buprenorphine at next visit     - Did not tolerate gabapentin or Lyrica in the past. Consider oxcarbazepine vs Topamax in the future   Procedures recommended:     -Low likelihood consider maxillary nerve block in the future, not at this time  Follow up: 2 months      Since her last visit, Tracy THURMAN Laverne reports:  - she is doing well. She waits until  10 am to take Norco. Also takes it  At 2pm and 6pm. She  Is  Pretty distracted during the day. Feels her  Pain more at night.  Sleeping  Well though because  She takes sleeping pills. When she feels  She  Needs more pain relief  Than #3 Norco gives  Her  She takes ibuprofen 600 mg + APAP  500 mg .   - Changed primary care provider. Started buspirone. Hasn't noticed reversal of  Weight gain since  Stopping venlafaxine. It did help with pain. Seeing Dr. Solis.   - Went  To the  Bank yesterday. She will be paying  All the bills. Her  Stress level is  Increasing. Trying to get her  Son to buy her house.   - Belong to a bible  Study, which helps   - Saw a therapist a few  Weeks ago. Went  Into [place] for it. She will do a video visit      Pain scores:  Pain intensity on average is 0 on a scale of 0-10.     Current pain treatments:   Norco  mg - takes #3 tabs per day. Been on it over a decade and functioning well on it.  Nervous about changing it.   Ibuprofen 600 mg qd   APAP 500 mg      Other relevant medications:  Ambien 10 mg 1/2-1 tab at HS PRN - she takes this every night  Buspirone 10 mg twice daily     Patient is using the medication as prescribed:  YES  Is your medication helpful? YES   Side Effects: no side effect    Past pain treatments:   Pain Clinic:   Yes  [facility] with Иван Kemp for 3 years.              PT: No                Chiropractor:Yes, [facility]              Acupuncture: Yes [facility]              Health/Pain Psychologist: No               Pharmacotherapy:                           Opioids: Yes                            Non-opioids:    Yes               TENs Unit:No               Injections: No               Surgeries related to pain: Yes: 2010 Had palate opened.: [facility]     Previous Pain Relevant Medications:    Neuropathics: Lyrica: Allergy, Gabapentin ?; Lyrica - fainted after taking  Baclofen 10 mg is taken as needed, never more than once a day due to feeling in a fog.  Venlafaxine 75 mg BID. - thought maybe weight gain  Anti-depressants: Amitriptyline:[facility]               Anxiety medications:                  Other medications not covered above: Ambien:[facility]    Medications:  Current Outpatient Medications   Medication Sig Dispense Refill     ASHWAGANDHA PO Take 800 mg by mouth       aspirin (ASA) 81 MG tablet Take 1 tablet (81 mg) by mouth daily 90 tablet 1     busPIRone (BUSPAR) 5 MG tablet Take 2 tablets (10 mg) by mouth 2 times daily 1 tablet 0     diltiazem ER (DILT-XR) 180 MG 24 hr capsule TAKE ONE CAPSULE BY MOUTH ONCE DAILY 90 capsule 3     DOCUSATE SODIUM PO Take 100 mg by mouth daily as needed for constipation       HYDROcodone-acetaminophen (NORCO)  MG per tablet Take 1 tablet by mouth every 6 hours as needed for severe pain Take 0.5-1 tabs every 6 hrs as needed for severe pain. Max 3 tabs per day. Fill/start 9/16/22 90 tablet 0     ibuprofen (ADVIL/MOTRIN) 200 MG tablet Take 400-600 mg by mouth every 8  hours as needed for mild pain        lidocaine (XYLOCAINE) 2 % solution Apply to affected area every 4-6 hours as needed. Max 8 doses/24 hour period. (Patient not taking: No sig reported) 100 mL 1     lisinopril (ZESTRIL) 20 MG tablet Take 1 tablet (20 mg) by mouth daily 90 tablet 3     NEW MED Dinndolylmethane   Detoxification support       omeprazole (PRILOSEC) 20 MG DR capsule TAKE ONE CAPSULE BY MOUTH ONCE DAILY (Patient not taking: No sig reported) 90 capsule 3     valACYclovir (VALTREX) 1000 mg tablet TAKE 1 TABLET BY MOUTH TWICE DAILY FOR 1 DAY AS NEEDED FOR BREAKOUTS 4 tablet 3     zolpidem (AMBIEN) 10 MG tablet TAKE ONE-HALF TO ONE TABLET BY MOUTH EVERY EVENING AT BEDTIME AS NEEDED FOR SLEEP 30 tablet 0     zolpidem (AMBIEN) 10 MG tablet TAKE ONE HALF TO ONE TABLET BY MOUTH EVERY NIGHT AT BEDTIME AS NEEDED FOR SLEEP 30 tablet 5       Medical History: any changes in medical history since they were last seen? as above    Social History: Reviewed; unchanged from previous consultation.     Physical Exam:  not currently breastfeeding.   VIDEO VISIT   General: NAD  Gait: Normal  Psych: Mood is ok. Affect is congruent. Thought content is logical.   Neuro: CNs II - XII grossly intact    Controlled Substance Agreement: 1/24/2022  UDS: 1/24/2022     THE 4 As OF OPIOID MAINTENANCE ANALGESIA    Analgesia: Is pain relief clinically significant? YES   Activity: Is patient functional and able to perform Activities of Daily Living? YES   Adverse effects: Is patient free from adverse side effects from opiates? YES   Adherence to Rx protocol: Is patient adhering to Controlled Substance Agreement and taking medications ONLY as ordered? YES    Assessment:   Tracy Galloway is a 76 year old female who is seen at the pain clinic for:    1. Chronic facial pain    2. Injury of right facial nerve, subsequent encounter    3. Chronic, continuous use of opioids    4. Anxiety           Mental Health - the patient's mental health  concerns, specifically anxiety and depression, affect her experience of pain and contribute to her clinically significant distress.    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     #Chronic continuous use of opioids:   On chronic narcotics since initial facial nerve trauma. Tracy had been experiencing increased mental fogginess and anxiety and was concerned that her opioids were contributing to both symptoms and that her anxiety was exacerbating her pain and likely her feeling of cognitive slowing.  Doing well on #3 tabs per day.      #Anxiety  #Depression  stress and anxiety are increased due to to 's dementia.   less anxious given improved understanding of how her pain, anxiety, depression and stress are related and due to increased acceptance of her 's condition. Exercise is helping  Started venlafaxine for dual pain and mood benefit, previously felt this is helping manage stress and anxiety. Now concerned it has caused/contributed to weight gain and would like to try to taper off. OK while exercising daily  Unable to schedule with [facility]     Plan:  Referrals     - psychology referral - now set up with psychologist in [place]  Therapies:     - Physical Therapy: consider in the future    - Clinical Health Psychologist to address issues of relaxation, behavioral change, coping style, and other factors important to improvement: previously given resources for Dr. Mallory Ferrell  Medication Management:     - Could consider re-starting venlafaxine in the future since Pt thinks it did help with pain and does not think it caused her weight gain     - would continue Norco 10/325 max #3 tabs per day. Would not increase further as she has  Improved substantially on this lower dose.    UDS: 1/24/2022  CSA: 1/24/2022    - could consider transition to buprenorphine in the future    - Did not tolerate gabapentin or Lyrica in the  past. Could consider oxcarbazepine vs Topamax in the future   Procedures recommended:     -Low likelihood consider maxillary nerve block in the future, not at this time  Follow up: with primary care provider     BILLING TIME DOCUMENTATION:   The total TIME spent on this patient on the date of the encounter/appointment was 26minutes.      TOTAL TIME includes:   Time spent preparing to see the patient (reviewing records and tests) - 1 min  Time spent face to face (or over the phone) with the patient - 24 min  Time spent ordering tests, medications, procedures and referrals - 0 min  Time spent Referring and communicating with other healthcare professionals - 0 min  Time spent documenting clinical information in Epic - 1 min    Ivon Shannon MD  [facility] [place]

## 2022-09-29 NOTE — PATIENT INSTRUCTIONS
----------------------------------------------------------------  [facility] Number:  [phone]   Call with any questions about your care and for scheduling assistance.   Calls are returned Monday through Friday between 8 AM and 4:30 PM. We usually get back to you within 2 business days depending on the issue/request.    If we are prescribing your medications:  For opioid medication refills, call the clinic or send a [employer] message 7 days in advance.  Please include:  Name of requested medication  Name of the pharmacy.  For non-opioid medications, call your pharmacy directly to request a refill. Please allow 3-4 days to be processed.   Per [place] State Law:  All controlled substance prescriptions must be filled within 30 days of being written.    For those controlled substances allowing refills, pickup must occur within 30 days of last fill.      We believe regular attendance is key to your success in our program!    Any time you are unable to keep your appointment we ask that you call us at least 24 hours in advance to cancel.This will allow us to offer the appointment time to another patient.   Multiple missed appointments may lead to dismissal from the clinic.

## 2022-10-15 ENCOUNTER — MYC MEDICAL ADVICE (OUTPATIENT)
Dept: FAMILY MEDICINE | Facility: CLINIC | Age: 77
End: 2022-10-15

## 2022-10-15 DIAGNOSIS — G89.4 CHRONIC PAIN SYNDROME: ICD-10-CM

## 2022-10-17 ENCOUNTER — IMMUNIZATION (OUTPATIENT)
Dept: FAMILY MEDICINE | Facility: CLINIC | Age: 77
End: 2022-10-17
Payer: COMMERCIAL

## 2022-10-17 DIAGNOSIS — Z23 NEED FOR PROPHYLACTIC VACCINATION AND INOCULATION AGAINST INFLUENZA: Primary | ICD-10-CM

## 2022-10-17 PROCEDURE — G0008 ADMIN INFLUENZA VIRUS VAC: HCPCS

## 2022-10-17 PROCEDURE — 99207 PR NO CHARGE NURSE ONLY: CPT

## 2022-10-17 PROCEDURE — 90662 IIV NO PRSV INCREASED AG IM: CPT

## 2022-10-17 RX ORDER — HYDROCODONE BITARTRATE AND ACETAMINOPHEN 10; 325 MG/1; MG/1
1 TABLET ORAL EVERY 6 HOURS PRN
Qty: 90 TABLET | Refills: 0 | Status: SHIPPED | OUTPATIENT
Start: 2022-10-17 | End: 2022-11-20

## 2022-11-10 ENCOUNTER — MYC MEDICAL ADVICE (OUTPATIENT)
Dept: FAMILY MEDICINE | Facility: CLINIC | Age: 77
End: 2022-11-10

## 2022-11-17 DIAGNOSIS — G89.4 CHRONIC PAIN SYNDROME: ICD-10-CM

## 2022-11-20 ENCOUNTER — MYC REFILL (OUTPATIENT)
Dept: FAMILY MEDICINE | Facility: CLINIC | Age: 77
End: 2022-11-20

## 2022-11-20 DIAGNOSIS — G89.4 CHRONIC PAIN SYNDROME: ICD-10-CM

## 2022-11-20 RX ORDER — HYDROCODONE BITARTRATE AND ACETAMINOPHEN 10; 325 MG/1; MG/1
1 TABLET ORAL EVERY 6 HOURS PRN
Qty: 90 TABLET | Refills: 0 | Status: CANCELLED | OUTPATIENT
Start: 2022-11-20

## 2022-11-21 RX ORDER — HYDROCODONE BITARTRATE AND ACETAMINOPHEN 10; 325 MG/1; MG/1
TABLET ORAL
Qty: 90 TABLET | Refills: 0 | OUTPATIENT
Start: 2022-11-21

## 2022-11-21 RX ORDER — HYDROCODONE BITARTRATE AND ACETAMINOPHEN 10; 325 MG/1; MG/1
1 TABLET ORAL EVERY 6 HOURS PRN
Qty: 90 TABLET | Refills: 0 | Status: SHIPPED | OUTPATIENT
Start: 2022-11-21 | End: 2022-11-29

## 2022-11-21 NOTE — TELEPHONE ENCOUNTER
Requested Prescriptions   Pending Prescriptions Disp Refills    HYDROcodone-acetaminophen (NORCO)  MG per tablet 90 tablet 0     Sig: Take 1 tablet by mouth every 6 hours as needed for severe pain (7-10) Max 3 tabs per day. Fill/start 10/19/22       There is no refill protocol information for this order

## 2022-11-29 ENCOUNTER — OFFICE VISIT (OUTPATIENT)
Dept: FAMILY MEDICINE | Facility: CLINIC | Age: 77
End: 2022-11-29
Payer: COMMERCIAL

## 2022-11-29 VITALS
OXYGEN SATURATION: 99 % | HEART RATE: 80 BPM | RESPIRATION RATE: 12 BRPM | SYSTOLIC BLOOD PRESSURE: 124 MMHG | DIASTOLIC BLOOD PRESSURE: 64 MMHG | TEMPERATURE: 97 F

## 2022-11-29 DIAGNOSIS — I10 ESSENTIAL HYPERTENSION WITH GOAL BLOOD PRESSURE LESS THAN 140/90: Chronic | ICD-10-CM

## 2022-11-29 DIAGNOSIS — Z00.00 MEDICARE ANNUAL WELLNESS VISIT, SUBSEQUENT: ICD-10-CM

## 2022-11-29 DIAGNOSIS — F11.90 CHRONIC, CONTINUOUS USE OF OPIOIDS: ICD-10-CM

## 2022-11-29 DIAGNOSIS — F32.0 MILD SINGLE CURRENT EPISODE OF MAJOR DEPRESSIVE DISORDER (H): ICD-10-CM

## 2022-11-29 DIAGNOSIS — F51.01 PRIMARY INSOMNIA: Chronic | ICD-10-CM

## 2022-11-29 DIAGNOSIS — G89.4 CHRONIC PAIN SYNDROME: Primary | ICD-10-CM

## 2022-11-29 LAB
ALBUMIN SERPL BCG-MCNC: 4.2 G/DL (ref 3.5–5.2)
ALP SERPL-CCNC: 44 U/L (ref 35–104)
ALT SERPL W P-5'-P-CCNC: 12 U/L (ref 10–35)
ANION GAP SERPL CALCULATED.3IONS-SCNC: 4 MMOL/L (ref 7–15)
AST SERPL W P-5'-P-CCNC: 19 U/L (ref 10–35)
BILIRUB SERPL-MCNC: 0.2 MG/DL
BUN SERPL-MCNC: 19.8 MG/DL (ref 8–23)
CALCIUM SERPL-MCNC: 9.4 MG/DL (ref 8.8–10.2)
CHLORIDE SERPL-SCNC: 105 MMOL/L (ref 98–107)
CHOLEST SERPL-MCNC: 207 MG/DL
CREAT SERPL-MCNC: 0.96 MG/DL (ref 0.51–0.95)
CREAT UR-MCNC: 36 MG/DL
DEPRECATED HCO3 PLAS-SCNC: 31 MMOL/L (ref 22–29)
ERYTHROCYTE [DISTWIDTH] IN BLOOD BY AUTOMATED COUNT: 13.1 % (ref 10–15)
GFR SERPL CREATININE-BSD FRML MDRD: 60 ML/MIN/1.73M2
GLUCOSE SERPL-MCNC: 94 MG/DL (ref 70–99)
HCT VFR BLD AUTO: 34 % (ref 35–47)
HDLC SERPL-MCNC: 67 MG/DL
HGB BLD-MCNC: 11.1 G/DL (ref 11.7–15.7)
LDLC SERPL CALC-MCNC: 127 MG/DL
MCH RBC QN AUTO: 30.6 PG (ref 26.5–33)
MCHC RBC AUTO-ENTMCNC: 32.6 G/DL (ref 31.5–36.5)
MCV RBC AUTO: 94 FL (ref 78–100)
NONHDLC SERPL-MCNC: 140 MG/DL
PLATELET # BLD AUTO: 272 10E3/UL (ref 150–450)
POTASSIUM SERPL-SCNC: 4.7 MMOL/L (ref 3.4–5.3)
PROT SERPL-MCNC: 6.7 G/DL (ref 6.4–8.3)
RBC # BLD AUTO: 3.63 10E6/UL (ref 3.8–5.2)
SODIUM SERPL-SCNC: 140 MMOL/L (ref 136–145)
TRIGL SERPL-MCNC: 66 MG/DL
WBC # BLD AUTO: 5.7 10E3/UL (ref 4–11)

## 2022-11-29 PROCEDURE — G0439 PPPS, SUBSEQ VISIT: HCPCS | Performed by: FAMILY MEDICINE

## 2022-11-29 PROCEDURE — 80061 LIPID PANEL: CPT | Performed by: FAMILY MEDICINE

## 2022-11-29 PROCEDURE — 36415 COLL VENOUS BLD VENIPUNCTURE: CPT | Performed by: FAMILY MEDICINE

## 2022-11-29 PROCEDURE — 80307 DRUG TEST PRSMV CHEM ANLYZR: CPT | Performed by: FAMILY MEDICINE

## 2022-11-29 PROCEDURE — 85027 COMPLETE CBC AUTOMATED: CPT | Performed by: FAMILY MEDICINE

## 2022-11-29 PROCEDURE — 80053 COMPREHEN METABOLIC PANEL: CPT | Performed by: FAMILY MEDICINE

## 2022-11-29 PROCEDURE — 99214 OFFICE O/P EST MOD 30 MIN: CPT | Mod: 25 | Performed by: FAMILY MEDICINE

## 2022-11-29 RX ORDER — HYDROCODONE BITARTRATE AND ACETAMINOPHEN 10; 325 MG/1; MG/1
1 TABLET ORAL EVERY 6 HOURS PRN
Qty: 90 TABLET | Refills: 0 | Status: SHIPPED | OUTPATIENT
Start: 2022-12-21 | End: 2023-02-28

## 2022-11-29 RX ORDER — HYDROCODONE BITARTRATE AND ACETAMINOPHEN 10; 325 MG/1; MG/1
1 TABLET ORAL EVERY 6 HOURS PRN
Qty: 90 TABLET | Refills: 0 | Status: SHIPPED | OUTPATIENT
Start: 2023-01-21 | End: 2023-02-28

## 2022-11-29 RX ORDER — HYDROCODONE BITARTRATE AND ACETAMINOPHEN 10; 325 MG/1; MG/1
1 TABLET ORAL EVERY 6 HOURS PRN
Qty: 90 TABLET | Refills: 0 | Status: SHIPPED | OUTPATIENT
Start: 2023-02-21 | End: 2023-01-20

## 2022-11-29 ASSESSMENT — PAIN SCALES - GENERAL: PAINLEVEL: NO PAIN (0)

## 2022-11-29 NOTE — NURSING NOTE
"Chief Complaint   Patient presents with     Pain     Chronic pain     /64   Pulse 80   Temp 97  F (36.1  C) (Tympanic)   Resp 12   LMP  (LMP Unknown)   SpO2 99%  Estimated body mass index is 22.26 kg/m  as calculated from the following:    Height as of 3/21/22: 1.549 m (5' 1\").    Weight as of 8/17/22: 53.4 kg (117 lb 12.8 oz).  Patient presents to the clinic using No DME      Health Maintenance that is potentially due pending provider review:    Health Maintenance Due   Topic Date Due     ANNUAL REVIEW OF HM ORDERS  Never done     HEPATITIS C SCREENING  Never done     MEDICARE ANNUAL WELLNESS VISIT  09/08/2021     FALL RISK ASSESSMENT  09/08/2021        n/a        "

## 2022-11-29 NOTE — PROGRESS NOTES
Assessment & Plan     Chronic pain syndrome  Stable no change in treatment plan.   - Drug Confirmation Panel Urine with Creat - lab collect; Future  - HYDROcodone-acetaminophen (NORCO)  MG per tablet; Take 1 tablet by mouth every 6 hours as needed for severe pain (7-10) Max 3 tabs per day. Fill/start 10/19/22  - HYDROcodone-acetaminophen (NORCO)  MG per tablet; Take 1 tablet by mouth every 6 hours as needed for severe pain (7-10) Max 3 tabs per day. Fill/start 10/19/22  - HYDROcodone-acetaminophen (NORCO)  MG per tablet; Take 1 tablet by mouth every 6 hours as needed for severe pain (7-10) Max 3 tabs per day. Fill/start 10/19/22  - Drug Confirmation Panel Urine with Creat - lab collect    Chronic, continuous use of opioids  Stable no change in treatment plan.     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   - CBC with platelets; Future  - Comprehensive metabolic panel; Future  - Lipid panel reflex to direct LDL Fasting; Future  - CBC with platelets  - Comprehensive metabolic panel  - Lipid panel reflex to direct LDL Fasting    Medicare wellness   Risks are meds            Patient Instructions   Rxs are at the pharmacy     See me back in February     Labs today         Return in about 3 months (around 2/28/2023) for using an in person visit, with me.    Gwendolyn Solis MD  [facility]    Ashok Molina is a 77 year old, presenting for the following health issues:  Pain (Chronic pain)      Pain       Pain History:  When did you first notice your pain? - Chronic Pain   Have you seen this provider for your pain in the past?   Yes   Where in your body do you have  pain? Upper jaw  Are you seeing anyone else for your pain? Yes - Pain management    PHQ-9 SCORE 3/21/2022 4/29/2022 8/17/2022   PHQ-9 Total Score - - -   PHQ-9 Total Score MyChart 8 (Mild depression) 18 (Moderately severe depression) 7 (Mild depression)   PHQ-9 Total Score 8 18 7       ROMAIN-7 SCORE 8/4/2021 3/21/2022 3/21/2022   Total Score - - -   Total Score - - 3 (minimal anxiety)   Total Score 0 3 3               Chronic Pain Follow Up:    Location of pain: upper jaw  Analgesia/pain control:    - Recent changes:  no    - Overall control: Tolerable with discomfort    - Current treatments: Norco   Adherence:     - Do you ever take more pain medicine than prescribed? No    - When did you take your last dose of pain medicine?  9am and just before leaving for appointment 1pm  Adverse effects: No   PDMP Review       Value Time User    State PDMP site checked  Yes 11/21/2022  9:26 AM Gwendolyn Solis MD        Last CSA Agreement:   CSA -- Patient Level:     [Media Unavailable] Controlled Substance Agreement - Opioid - Scan on 1/24/2022  3:56 PM   [Media Unavailable] Controlled Substance Agreement - Opioid - Scan on 3/30/2021 10:04 AM   [Media Unavailable] Controlled Substance Agreement - Opioid - Scan on 3/18/2021  8:58 AM   [Media Unavailable] Controlled Substance Agreement - Opioid - Scan on 3/4/2020  1:30 PM       Last UDS: 1/26/2022        Hypertension Follow-up      Do you check your blood pressure regularly outside of the clinic? No     Are you following a low salt diet? No    Are your blood pressures ever more than 140 on the top number (systolic) OR more   than 90 on the bottom number (diastolic), for example 140/90? unsure    Depression Followup    How are you doing with your depression since your last visit? No change    Are you having other symptoms that might be associated with depression? Yes:  pain and poor sleep     Have you had a significant life event?  Relationship Concerns and Health  "Concerns     Are you feeling anxious or having panic attacks?   Yes:  regarding pain and     Do you have any concerns with your use of alcohol or other drugs? No    Social History     Tobacco Use     Smoking status: Former     Types: Cigarettes     Quit date: 10/30/1962     Years since quittin.1     Smokeless tobacco: Never   Substance Use Topics     Alcohol use: Yes     Alcohol/week: 4.0 standard drinks     Types: 4 Standard drinks or equivalent per week     Comment: occasional     Drug use: No     Comment: medical marijuana in the past     PHQ 3/21/2022 2022 2022   PHQ-9 Total Score 8 18 7   Q9: Thoughts of better off dead/self-harm past 2 weeks Several days Nearly every day Several days   F/U: Thoughts of suicide or self-harm Yes No No   F/U: Self harm-plan No - -   F/U: Self-harm action No - -   F/U: Safety concerns No No No     ROMAIN-7 SCORE 2021 3/21/2022 3/21/2022   Total Score - - -   Total Score - - 3 (minimal anxiety)   Total Score 0 3 3         Suicide Assessment Five-step Evaluation and Treatment (SAFE-T)          Annual Wellness Visit    Patient has been advised of split billing requirements and indicates understanding: No     Are you in the first 12 months of your Medicare Part B coverage?  No    Physical Health:    In general, how would you rate your overall physical health? excellent    Outside of work, how many days during the week do you exercise?6-7 days/week    Outside of work, approximately how many minutes a day do you exercise?30-45 minutes    If you drink alcohol do you typically have >3 drinks per day or >7 drinks per week? No    Do you usually eat at least 4 servings of fruit and vegetables a day, include whole grains & fiber and avoid regularly eating high fat or \"junk\" foods? Yes    Do you have any problems taking medications regularly? No    Do you have any side effects from medications? none    Needs assistance for the following daily activities: no assistance " needed    Which of the following safety concerns are present in your home?  none identified     Hearing impairment: No    In the past 6 months, have you been bothered by leaking of urine? no    Mental Health:    In general, how would you rate your overall mental or emotional health? good  PHQ-2 Score:      Do you feel safe in your environment? Yes    Have you ever done Advance Care Planning? (For example, a Health Directive, POLST, or a discussion with a medical provider or your loved ones about your wishes)? No, advance care planning information given to patient to review.  Patient plans to discuss their wishes with loved ones or provider.      Fall risk:  Fallen 2 or more times in the past year?: No  Any fall with injury in the past year?: No    Cognitive Screening: unable to complete today will do this next visit     Do you have sleep apnea, excessive snoring or daytime drowsiness?: no    Current providers sharing in care for this patient include:   Patient Care Team:  Eric Wyman MD as PCP - General (Family Medicine)  João Thomas MD as MD (Gastroenterology)  Eric Wyman MD as Assigned PCP  Xi Hayes PA-C as Physician Assistant (Dermatology)  Diana Her PA-C as Physician Assistant (Dermatology)    Patient has been advised of split billing requirements and indicates understanding: Yes    Review of Systems   Constitutional, HEENT, cardiovascular, pulmonary, gi and gu systems are negative, except as otherwise noted.      Objective    /64   Pulse 80   Temp 97  F (36.1  C) (Tympanic)   Resp 12   LMP  (LMP Unknown)   SpO2 99%   There is no height or weight on file to calculate BMI.  Physical Exam   GENERAL APPEARANCE: healthy, alert and no distress  RESP: lungs clear to auscultation - no rales, rhonchi or wheezes  CV: regular rates and rhythm, normal S1 S2, no S3 or S4 and no murmur, click or rub  MS: extremities normal- no gross deformities noted  PSYCH: mentation  appears normal and affect normal/bright

## 2022-11-29 NOTE — LETTER
Opioid / Opioid Plus Controlled Substance Agreement    This is an agreement between you and your provider about the safe and appropriate use of controlled substance/opioids prescribed by your care team. Controlled substances are medicines that can cause physical and mental dependence (abuse).    There are strict laws about having and using these medicines. We here at [facility] are committing to working with you in your efforts to get better. To support you in this work, we ll help you schedule regular office appointments for medicine refills. If we must cancel or change your appointment for any reason, we ll make sure you have enough medicine to last until your next appointment.     As a Provider, I will:    Listen carefully to your concerns and treat you with respect.     Recommend a treatment plan that I believe is in your best interest. This plan may involve therapies other than opioid pain medication.     Talk with you often about the possible benefits, and the risk of harm of any medicine that we prescribe for you.     Provide a plan on how to taper (discontinue or go off) using this medicine if the decision is made to stop its use.    As a Patient, I understand that opioid(s):     Are a controlled substance prescribed by my care team to help me function or work and manage my condition(s).     Are strong medicines and can cause serious side effects such as:    Drowsiness, which can seriously affect my driving ability    A lower breathing rate, enough to cause death    Harm to my thinking ability     Depression     Abuse of and addiction to this medicine    Need to be taken exactly as prescribed. Combining opioids with certain medicines or chemicals (such as illegal drugs, sedatives, sleeping pills, and benzodiazepines) can be dangerous or even fatal. If I stop opioids suddenly, I may have severe withdrawal symptoms.    Do not work for all types of pain nor for all patients. If they re not helpful, I may  be asked to stop them.      The risks, benefits and side effects of these medicine(s) were explained to me. I agree that:  1. I will take part in other treatments as advised by my care team. This may be psychiatry or counseling, physical therapy, behavioral therapy, group treatment or a referral to a specialist.     2. I will keep all my appointments. I understand that this is part of the monitoring of opioids. My care team may require an office visit for EVERY opioid/controlled substance refill. If I miss appointments or don t follow instructions, my care team may stop my medicine.    3. I will take my medicines as prescribed. I will not change the dose or schedule unless my care team tells me to. There will be no refills if I run out early.     4. I may be asked to come to the clinic and complete a urine drug test or complete a pill count at any time. If I don t give a urine sample or participate in a pill count, the care team may stop my medicine.    5. I will only receive prescriptions from this clinic for chronic pain. If I am treated by another provider for acute pain issues, I will tell them that I am taking opioid pain medication for chronic pain and that I have a treatment agreement with this provider. I will inform my [facility] care team within one business day if I am given a prescription for any pain medication by another healthcare provider. My [facility] care team can contact other providers and pharmacists about my use of any medicines.    6. It is up to me to make sure that I don t run out of my medicines on weekends or holidays. If my care team is willing to refill my opioid prescription without a visit, I must request refills only during office hours. Refills may take up to 3 business days to process. I will use one pharmacy to fill all my opioid and other controlled substance prescriptions. I will notify the clinic about any changes to my insurance or medication  availability.    7. I am responsible for my prescriptions. If the medicine/prescription is lost, stolen or destroyed, it will not be replaced. I also agree not to share controlled substance medicines with anyone.    8. I am aware I should not use any illegal or recreational drugs. I agree not to drink alcohol unless my care team says I can.       9. If I enroll in the [place] Medical Cannabis program, I will tell my care team prior to my next refill.     10. I will tell my care team right away if I become pregnant, have a new medical problem treated outside of my regular clinic, or have a change in my medications.    11. I understand that this medicine can affect my thinking, judgment and reaction time. Alcohol and drugs affect the brain and body, which can affect the safety of my driving. Being under the influence of alcohol or drugs can affect my decision-making, behaviors, personal safety, and the safety of others. Driving while impaired (DWI) can occur if a person is driving, operating, or in physical control of a car, motorcycle, boat, snowmobile, ATV, motorbike, off-road vehicle, or any other motor vehicle ([place] Statute 169A.20). I understand the risk if I choose to drive or operate any vehicle or machinery.    I understand that if I do not follow any of the conditions above, my prescriptions or treatment may be stopped or changed.          Opioids  What You Need to Know    What are opioids?   Opioids are pain medicines that must be prescribed by a doctor. They are also known as narcotics.     Examples are:   1. morphine (MS Contin, Ne)  2. oxycodone (Oxycontin)  3. oxycodone and acetaminophen (Percocet)  4. hydrocodone and acetaminophen (Vicodin, Norco)   5. fentanyl patch (Duragesic)   6. hydromorphone (Dilaudid)   7. methadone  8. codeine (Tylenol #3)     What do opioids do well?   Opioids are best for severe short-term pain such as after a surgery or injury. They may work well for cancer pain. They may  help some people with long-lasting (chronic) pain.     What do opioids NOT do well?   Opioids never get rid of pain entirely, and they don t work well for most patients with chronic pain. Opioids don t reduce swelling, one of the causes of pain.                                    Other ways to manage chronic pain and improve function include:       Treat the health problem that may be causing pain    Anti-inflammation medicines, which reduce swelling and tenderness, such as ibuprofen (Advil, Motrin) or naproxen (Aleve)    Acetaminophen (Tylenol)    Antidepressants and anti-seizure medicines, especially for nerve pain    Topical treatments such as patches or creams    Injections or nerve blocks    Chiropractic or osteopathic treatment    Acupuncture, massage, deep breathing, meditation, visual imagery, aromatherapy    Use heat or ice at the pain site    Physical therapy     Exercise    Stop smoking    Take part in therapy       Risks and side effects     Talk to your doctor before you start or decide to keep taking opioids. Possible side effects include:      Lowering your breathing rate enough to cause death    Overdose, including death, especially if taking higher than prescribed doses    Worse depression symptoms; less pleasure in things you usually enjoy    Feeling tired or sluggish    Slower thoughts or cloudy thinking    Being more sensitive to pain over time; pain is harder to control    Trouble sleeping or restless sleep    Changes in hormone levels (for example, less testosterone)    Changes in sex drive or ability to have sex    Constipation    Unsafe driving    Itching and sweating    Dizziness    Nausea, throwing up and dry mouth    What else should I know about opioids?    Opioids may lead to dependence, tolerance, or addiction.      Dependence means that if you stop or reduce the medicine too quickly, you will have withdrawal symptoms. These include loose poop (diarrhea), jitters, flu-like symptoms,  nervousness and tremors. Dependence is not the same as addiction.                       Tolerance means needing higher doses over time to get the same effect. This may increase the chance of serious side effects.      Addiction is when people improperly use a substance that harms their body, their mind or their relations with others. Use of opiates can cause a relapse of addiction if you have a history of drug or alcohol abuse.      People who have used opioids for a long time may have a lower quality of life, worse depression, higher levels of pain and more visits to doctors.    You can overdose on opioids. Take these steps to lower your risk of overdose:    1. Recognize the signs:  Signs of overdose include decrease or loss of consciousness (blackout), slowed breathing, trouble waking up and blue lips. If someone is worried about overdose, they should call 911.    2. Talk to your doctor about Narcan (naloxone).   If you are at risk for overdose, you may be given a prescription for Narcan. This medicine very quickly reverses the effects of opioids.   If you overdose, a friend or family member can give you Narcan while waiting for the ambulance. They need to know the signs of overdose and how to give Narcan.     3. Don't use alcohol or street drugs.   Taking them with opioids can cause death.    4. Do not take any of these medicines unless your doctor says it s OK. Taking these with opioids can cause death:    Benzodiazepines, such as lorazepam (Ativan), alprazolam (Xanax) or diazepam (Valium)    Muscle relaxers, such as cyclobenzaprine (Flexeril)    Sleeping pills like zolpidem (Ambien)     Other opioids      How to keep you and other people safe while taking opioids:    1. Never share your opioids with others.  Opioid medicines are regulated by the Drug Enforcement Agency ([employer]). Selling or sharing medications is a criminal act.    2. Be sure to store opioids in a secure place, locked up if possible. Young children  can easily swallow them and overdose.    3. When you are traveling with your medicines, keep them in the original bottles. If you use a pill box, be sure you also carry a copy of your medicine list from your clinic or pharmacy.    4. Safe disposal of opioids    Most pharmacies have places to get rid of medicine, called disposal kiosks. Medicine disposal options are also available in every [facility]. Search your county and  medication disposal  to find more options. You can find more details at:  [email or web address]     I agree that my provider, clinic care team, and pharmacy may work with any city, state or federal law enforcement agency that investigates the misuse, sale, or other diversion of my controlled medicine. I will allow my provider to discuss my care with, or share a copy of, this agreement with any other treating provider, pharmacy or emergency room where I receive care.    I have read this agreement and have asked questions about anything I did not understand.    _______________________________________________________  Patient Signature - Tracy Galloway _____________________                   Date     _______________________________________________________  Provider Signature - Gwendolyn Solis MD   _____________________                   Date     _______________________________________________________  Witness Signature (required if provider not present while patient signing)   _____________________                   Date

## 2022-12-01 LAB
DHC UR CFM-MCNC: 328 NG/ML
DHC/CREAT UR: 911 NG/MG {CREAT}
HYDROCODONE UR CFM-MCNC: 680 NG/ML
HYDROCODONE/CREAT UR: 1889 NG/MG {CREAT}
HYDROMORPHONE UR CFM-MCNC: 320 NG/ML
HYDROMORPHONE/CREAT UR: 889 NG/MG {CREAT}

## 2022-12-19 ENCOUNTER — OFFICE VISIT (OUTPATIENT)
Dept: DERMATOLOGY | Facility: CLINIC | Age: 77
End: 2022-12-19
Attending: FAMILY MEDICINE
Payer: COMMERCIAL

## 2022-12-19 DIAGNOSIS — L57.0 ACTINIC KERATOSIS: ICD-10-CM

## 2022-12-19 DIAGNOSIS — L82.1 SEBORRHEIC KERATOSIS: Primary | ICD-10-CM

## 2022-12-19 PROCEDURE — 99212 OFFICE O/P EST SF 10 MIN: CPT | Mod: 25 | Performed by: PHYSICIAN ASSISTANT

## 2022-12-19 PROCEDURE — 17000 DESTRUCT PREMALG LESION: CPT | Performed by: PHYSICIAN ASSISTANT

## 2022-12-19 ASSESSMENT — PAIN SCALES - GENERAL: PAINLEVEL: NO PAIN (0)

## 2022-12-19 NOTE — PROGRESS NOTES
HPI:   Chief complaints: Tracy Galloway is a pleasant 77 year old female who presents for evaluation of two spots of concern on the face. She has a bumpy area on the right side of her mouth which has been present for awhile. It does not hurt or bleed. She also has a persistently scaly spot on the left ear. Neither are painful.       PHYSICAL EXAM:    LMP  (LMP Unknown)   Skin exam performed as follows: Type 2 skin. Mood appropriate  Alert and Oriented X 3. Well developed, well nourished in no distress.  General appearance: Normal  Head including face: Normal  Eyes: conjunctiva and lids: Normal  Mouth: Lips, teeth, gums: Normal  Neck: Normal  Cardiovascular: Exam of peripheral vascular system by observation for swelling, varicosities, edema: Normal  Right upper extremity: Normal  Left upper extremity: Normal  Right lower extremity: Normal  Left lower extremity: Normal  Skin: Scalp and body hair: See below    Keratotic papule on the right lower cheek  Pink gritty papule on the left helix x 1    ASSESSMENT/PLAN:     1. Seborrheic keratosis on the right lower cheek - advised benign no treatment needed  2. Actinic keratosis on the left helix x 1. As precancerous, cryosurgery performed. Advised on blistering and post-op care. Advised if not resolved in 1-2 months to return for evaluation            Follow-up: yearly/PRN sooner  CC:   Scribed By: Diana Her, MS, PA-C

## 2022-12-19 NOTE — LETTER
12/19/2022         RE: Tracy Galloway  [id number]  [place] [address]        Dear Colleague,    Thank you for referring your patient, Tracy Galloway, to the [facility]. Please see a copy of my visit note below.    HPI:   Chief complaints: Tracy Galloway is a pleasant 77 year old female who presents for evaluation of two spots of concern on the face. She has a bumpy area on the right side of her mouth which has been present for awhile. It does not hurt or bleed. She also has a persistently scaly spot on the left ear. Neither are painful.       PHYSICAL EXAM:    LMP  (LMP Unknown)   Skin exam performed as follows: Type 2 skin. Mood appropriate  Alert and Oriented X 3. Well developed, well nourished in no distress.  General appearance: Normal  Head including face: Normal  Eyes: conjunctiva and lids: Normal  Mouth: Lips, teeth, gums: Normal  Neck: Normal  Cardiovascular: Exam of peripheral vascular system by observation for swelling, varicosities, edema: Normal  Right upper extremity: Normal  Left upper extremity: Normal  Right lower extremity: Normal  Left lower extremity: Normal  Skin: Scalp and body hair: See below    Keratotic papule on the right lower cheek  Pink gritty papule on the left helix x 1    ASSESSMENT/PLAN:     1. Seborrheic keratosis on the right lower cheek - advised benign no treatment needed  2. Actinic keratosis on the left helix x 1. As precancerous, cryosurgery performed. Advised on blistering and post-op care. Advised if not resolved in 1-2 months to return for evaluation            Follow-up: yearly/PRN sooner  CC:   Scribed By: Diana Her, MS, PAOlamideC          Again, thank you for allowing me to participate in the care of your patient.        Sincerely,        Diana Her PA-C

## 2023-01-09 NOTE — TELEPHONE ENCOUNTER
History     Chief Complaint:  Shortness of Breath       HPI   Harrison Thomas is a 74 year old male with history of coronary artery disease, COPD, lung cancer, MI, and hypertension who presents from home via EMS with left sided chest pain, worse with breathing, and worsening shortness of breath following a fall onto a wooden chair 5 Days ago. Patient is typically on 3 liter of home oxygen at night only.  Since the fall he is required oxygen throughout the day to maintain normal saturations though and notes that his oxygen falls to the 80s when he ambulates while using oxygen. EMS increased oxygen to 5 liters on route.  He denies fever, abdominal pain, abdominal distension, weight gain, fluid retention, or recently illness. He endorses taking aspirin twice a day, occasional alcohol use, and history of tobacco use.     Independent Historian: patient         ROS:  Review of Systems   Constitutional: Negative for fever and unexpected weight change.   Respiratory: Positive for shortness of breath.    Cardiovascular: Positive for chest pain.   Gastrointestinal: Negative for abdominal distention and abdominal pain.   All other systems reviewed and are negative.    Allergies:  Levaquin  Plavix [Clopidogrel Bisulfate]  Atorvastatin Calcium  Cats  Dogs  Hctz [Hydrochlorothiazide]  Sulfasalazine     Medications:    Toprol   Amlodipine  Lasix  Cymbalta  Albuterol   Atarax  Synthroid   Deltasone  Nitrostat   Crestor     Past Medical History:    Hypertension   COPD  Syncope  Asthma   Monoclonal paraproteinemia   Transient cerebral ischemia   Hyperlipidemia    Occipital neuralgia   Bruit   Chronic opioid use   Atherosclerosis of native coronary artery   Thyrotoxicosis   CAD  MI  BRENNEN  BPH  RA   Metastases of thyroid cancer   Inguinal hernia    Lung cancer   Thyroid cancer    Past Surgical History:    Thyroidectomy  Cataract extraction   Inguinal hernia repair  Left hip replacement   Liver lobectomy   right meniscus surgery   Central Prior Authorization Team   Phone: [phone]    PA Initiation    Medication: zolpidem  Insurance Company: [employer] - Phone [phone] Fax [phone]  Pharmacy Filling the Rx: [facility] [id number] [address] [place]  Filling Pharmacy Phone: [phone]  Filling Pharmacy Fax: [phone]  Start Date: 2/7/2018           Family History:    Father: asthma  Mother: asthma, CAD, cerebrovascular disease, depression, diabetes, hyperlipidemia, hypertension, osteoporosis    Social History:  Patient presents alone  PCP: Yaneli Dozier     Physical Exam     Patient Vitals for the past 24 hrs:   BP Pulse Resp SpO2   01/09/23 1330 135/68 94 26 93 %   01/09/23 1201 132/64 95 -- 91 %        Physical Exam  General: Alert and cooperative with exam. Patient in mild distress. Normal mentation.  Head:  Scalp is NC/AT  Eyes:  No scleral icterus, PERRL  ENT:  The external nose and ears are normal. The oropharynx is normal and without erythema; mucus membranes are moist. Uvula midline, no evidence of deep space infection.  Neck:  Normal range of motion without rigidity.  CV:  Regular rate and rhythm    No pathologic murmur   Resp:  diminished lung sounds to the bases bilaterally. Nasal cannula in place. Non-labored, no retractions or accessory muscle use  GI:  Abdomen is soft, no distension, no tenderness. No peritoneal signs  MS:  No lower extremity edema. TTP of left anterior chest without overlying skin change. Multiple chronic bruising to forearms bilaterally   Skin:  Warm and dry, No rash or lesions noted.  Neuro: Oriented x 3. No gross motor deficits.    Emergency Department Course   ECG:  ECG results from 01/09/23   EKG 12 lead     Value    Systolic Blood Pressure     Diastolic Blood Pressure     Ventricular Rate 88    Atrial Rate 88    AZ Interval 142    QRS Duration 98        QTc 496    P Axis 32    R AXIS 58    T Axis -3    Interpretation ECG      Sinus rhythm  Possible Left atrial enlargement  Possible Inferior infarct , age undetermined  Abnormal ECG  When compared with ECG of 15-OCT-2022 03:35,  Borderline criteria for Inferior infarct are now Present  Non-specific change in ST segment in Inferior leads  Inverted T waves have replaced nonspecific T wave abnormality in Inferior leads  T wave inversion now evident in Anterior leads        *Note: Due to a large number of results and/or encounters for the requested time period, some results have not been displayed. A complete set of results can be found in Results Review.       Imaging:  Chest CT w/o contrast   Final Result   IMPRESSION:    1.  Acute, minimally displaced left eighth rib fracture. No underlying   pleural effusion or pneumothorax.   2.  Significantly increased consolidation in the right upper lobe in   area of previously described radiation fibrosis, could represent   pneumonia but recurrent disease is also possible. Recommend   pulmonology referral if not previously performed.   3.  Significant interval enlargement of mediastinal lymph nodes,   likely related to #2.   4.  Likely mild interstitial edema.      FERNANDEZ ARMANDO MD            SYSTEM ID:  [id number]         Report per radiology    Laboratory:  Labs Ordered and Resulted from Time of ED Arrival to Time of ED Departure   BASIC METABOLIC PANEL - Abnormal       Result Value    Sodium 136      Potassium 3.4      Chloride 99      Carbon Dioxide (CO2) 25      Anion Gap 12      Urea Nitrogen 15      Creatinine 0.80      Calcium 9.2      Glucose 110 (*)     GFR Estimate >90     BLOOD GAS VENOUS WITH OXYHEMOGLOBIN - Abnormal    pH Venous 7.33      pCO2 Venous 52 (*)     pO2 Venous 22 (*)     Bicarbonate Venous 27      FIO2 4      Oxyhemoglobin Venous 36 (*)     Base Excess/Deficit (+/-) 0.4     CBC WITH PLATELETS AND DIFFERENTIAL - Abnormal    WBC Count 8.9      RBC Count 4.78      Hemoglobin 13.8      Hematocrit 42.5      MCV 89      MCH 28.9      MCHC 32.5      RDW 16.4 (*)     Platelet Count 116 (*)     % Neutrophils 69      % Lymphocytes 5      % Monocytes 13      % Eosinophils 11      % Basophils 1      % Immature Granulocytes 1      NRBCs per 100 WBC 0      Absolute Neutrophils 6.3      Absolute Lymphocytes 0.4 (*)     Absolute Monocytes 1.1      Absolute Eosinophils 1.0 (*)     Absolute Basophils 0.1      Absolute Immature  Granulocytes 0.1      Absolute NRBCs 0.0     TROPONIN I - Normal    Troponin I High Sensitivity 16     NT PROBNP INPATIENT - Normal    N terminal Pro BNP Inpatient 820          Emergency Department Course & Assessments:  Interventions:  1344 Lidocaine, 2 patch, transdermal  1506 Oxycodone, 5 mg, oral     Medications   lidocaine patch in PLACE (has no administration in time range)   Lidocaine (LIDOCARE) 4 % Patch 2 patch (2 patches Transdermal Patch/Med Applied 1/9/23 1344)   oxyCODONE (ROXICODONE) tablet 5 mg (5 mg Oral Given 1/9/23 1506)        Consultations/Discussion of Management or Tests:  1429 I spoke with Golden Rao MD of the Hospitalist service from [facility] regarding patient's presentation, findings, and plan of care.      Disposition:  The patient was admitted to the hospital under the care of Golden Rao MD.     Impression & Plan    Medical Decision Making:  Patient is a 74-year-old male with oxygen dependent COPD who presents with increasing chest pain and shortness of breath after fall onto chair 5 days ago.  Patient's medical history and records reviewed. On evaluation patient is noted to have increased oxygen requirement and left-sided chest wall tenderness.  CT of the chest demonstrates minimally displaced left eighth rib fracture as noted above is well as significant increased consolidation in the right upper lobe area of previously described radiation fibrosis potentially representing recurrent disease; also noted to have significant interval enlargement of mediastinal lymph nodes.  No evidence of infectious process.  Patient was provided lidocaine patches and oxycodone for pain control.  Given increasing shortness of breath and acute on chronic respiratory failure and concern for potential malignancy; patiently admitted to observation with the hospitalist service for further evaluation and care.    Diagnosis:    ICD-10-CM    1. Closed fracture of one rib of left side, initial  encounter  S22.32XA       2. Acute on chronic respiratory failure with hypoxia (H)  J96.21             Scribe Disclosure:  I, Mukul Reza, am serving as a scribe at 11:58 AM on 1/9/2023 to document services personally performed by Don Melendrez DO based on my observations and the provider's statements to me.            Don Melendrez DO  01/09/23 2214

## 2023-01-16 ENCOUNTER — MYC REFILL (OUTPATIENT)
Dept: FAMILY MEDICINE | Facility: CLINIC | Age: 78
End: 2023-01-16

## 2023-01-16 DIAGNOSIS — G89.4 CHRONIC PAIN SYNDROME: ICD-10-CM

## 2023-01-16 RX ORDER — HYDROCODONE BITARTRATE AND ACETAMINOPHEN 10; 325 MG/1; MG/1
1 TABLET ORAL EVERY 6 HOURS PRN
Qty: 90 TABLET | Refills: 0 | Status: CANCELLED | OUTPATIENT
Start: 2023-01-16

## 2023-01-16 NOTE — TELEPHONE ENCOUNTER
She should have RXs at the pharmacy please check with pharmacy I sent three RX on 12/21   One for 12/21 one for jan 21 and one for feb 21

## 2023-01-17 ENCOUNTER — LAB (OUTPATIENT)
Dept: LAB | Facility: CLINIC | Age: 78
End: 2023-01-17
Payer: COMMERCIAL

## 2023-01-17 ENCOUNTER — MYC MEDICAL ADVICE (OUTPATIENT)
Dept: FAMILY MEDICINE | Facility: CLINIC | Age: 78
End: 2023-01-17

## 2023-01-17 DIAGNOSIS — R30.0 DYSURIA: ICD-10-CM

## 2023-01-17 DIAGNOSIS — R30.0 DYSURIA: Primary | ICD-10-CM

## 2023-01-17 LAB
ALBUMIN UR-MCNC: NEGATIVE MG/DL
APPEARANCE UR: CLEAR
BILIRUB UR QL STRIP: NEGATIVE
COLOR UR AUTO: YELLOW
GLUCOSE UR STRIP-MCNC: NEGATIVE MG/DL
HGB UR QL STRIP: ABNORMAL
KETONES UR STRIP-MCNC: NEGATIVE MG/DL
LEUKOCYTE ESTERASE UR QL STRIP: NEGATIVE
NITRATE UR QL: NEGATIVE
PH UR STRIP: 5.5 [PH] (ref 5–7)
RBC #/AREA URNS AUTO: ABNORMAL /HPF
SP GR UR STRIP: 1.02 (ref 1–1.03)
SQUAMOUS #/AREA URNS AUTO: ABNORMAL /LPF
UROBILINOGEN UR STRIP-ACNC: 0.2 E.U./DL
WBC #/AREA URNS AUTO: ABNORMAL /HPF

## 2023-01-17 PROCEDURE — 81001 URINALYSIS AUTO W/SCOPE: CPT

## 2023-01-19 ENCOUNTER — TELEPHONE (OUTPATIENT)
Dept: FAMILY MEDICINE | Facility: CLINIC | Age: 78
End: 2023-01-19
Payer: COMMERCIAL

## 2023-01-19 NOTE — TELEPHONE ENCOUNTER
Patient Returning Call    Reason for call:  Pt wants to talk to Dr Solis nurse about her upcoming appt    Information relayed to patient:  Can send message    Patient has additional questions:  No    What are your questions/concerns:  N/A    [facility] or would you prefer to receive a phone call?:   Patient would prefer a phone call   Okay to leave a detailed message?: Yes at Cell number on file:    Telephone Information:   Mobile [phone]

## 2023-01-20 ENCOUNTER — OFFICE VISIT (OUTPATIENT)
Dept: FAMILY MEDICINE | Facility: CLINIC | Age: 78
End: 2023-01-20
Payer: COMMERCIAL

## 2023-01-20 VITALS
DIASTOLIC BLOOD PRESSURE: 80 MMHG | HEART RATE: 78 BPM | OXYGEN SATURATION: 100 % | SYSTOLIC BLOOD PRESSURE: 136 MMHG | BODY MASS INDEX: 22.26 KG/M2 | TEMPERATURE: 98.5 F | HEIGHT: 61 IN | RESPIRATION RATE: 16 BRPM

## 2023-01-20 DIAGNOSIS — F32.0 MILD SINGLE CURRENT EPISODE OF MAJOR DEPRESSIVE DISORDER (H): ICD-10-CM

## 2023-01-20 DIAGNOSIS — F11.20 NARCOTIC DEPENDENCE (H): ICD-10-CM

## 2023-01-20 DIAGNOSIS — N94.89 VAGINAL BURNING: Primary | ICD-10-CM

## 2023-01-20 DIAGNOSIS — G89.4 CHRONIC PAIN SYNDROME: ICD-10-CM

## 2023-01-20 PROCEDURE — 87210 SMEAR WET MOUNT SALINE/INK: CPT | Performed by: FAMILY MEDICINE

## 2023-01-20 PROCEDURE — 99214 OFFICE O/P EST MOD 30 MIN: CPT | Performed by: FAMILY MEDICINE

## 2023-01-20 RX ORDER — HYDROCODONE BITARTRATE AND ACETAMINOPHEN 10; 325 MG/1; MG/1
1 TABLET ORAL EVERY 6 HOURS PRN
Qty: 90 TABLET | Refills: 0 | Status: SHIPPED | OUTPATIENT
Start: 2023-04-21 | End: 2024-07-24

## 2023-01-20 RX ORDER — HYDROCODONE BITARTRATE AND ACETAMINOPHEN 10; 325 MG/1; MG/1
1 TABLET ORAL EVERY 6 HOURS PRN
Qty: 90 TABLET | Refills: 0 | Status: SHIPPED | OUTPATIENT
Start: 2023-03-21 | End: 2023-02-28

## 2023-01-20 RX ORDER — HYDROCODONE BITARTRATE AND ACETAMINOPHEN 10; 325 MG/1; MG/1
1 TABLET ORAL EVERY 6 HOURS PRN
Qty: 90 TABLET | Refills: 0 | Status: SHIPPED | OUTPATIENT
Start: 2023-05-21 | End: 2023-02-28

## 2023-01-20 RX ORDER — ESTRADIOL 0.1 MG/G
1 CREAM VAGINAL
Qty: 42.5 G | Refills: 3 | Status: SHIPPED | OUTPATIENT
Start: 2023-01-23 | End: 2023-02-20

## 2023-01-20 ASSESSMENT — ANXIETY QUESTIONNAIRES
7. FEELING AFRAID AS IF SOMETHING AWFUL MIGHT HAPPEN: NOT AT ALL
2. NOT BEING ABLE TO STOP OR CONTROL WORRYING: NOT AT ALL
5. BEING SO RESTLESS THAT IT IS HARD TO SIT STILL: NOT AT ALL
3. WORRYING TOO MUCH ABOUT DIFFERENT THINGS: NOT AT ALL
GAD7 TOTAL SCORE: 0
6. BECOMING EASILY ANNOYED OR IRRITABLE: NOT AT ALL
4. TROUBLE RELAXING: NOT AT ALL
GAD7 TOTAL SCORE: 0
IF YOU CHECKED OFF ANY PROBLEMS ON THIS QUESTIONNAIRE, HOW DIFFICULT HAVE THESE PROBLEMS MADE IT FOR YOU TO DO YOUR WORK, TAKE CARE OF THINGS AT HOME, OR GET ALONG WITH OTHER PEOPLE: NOT DIFFICULT AT ALL
GAD7 TOTAL SCORE: 0
8. IF YOU CHECKED OFF ANY PROBLEMS, HOW DIFFICULT HAVE THESE MADE IT FOR YOU TO DO YOUR WORK, TAKE CARE OF THINGS AT HOME, OR GET ALONG WITH OTHER PEOPLE?: NOT DIFFICULT AT ALL
7. FEELING AFRAID AS IF SOMETHING AWFUL MIGHT HAPPEN: NOT AT ALL
1. FEELING NERVOUS, ANXIOUS, OR ON EDGE: NOT AT ALL

## 2023-01-20 ASSESSMENT — PAIN SCALES - GENERAL: PAINLEVEL: NO PAIN (0)

## 2023-01-20 ASSESSMENT — PATIENT HEALTH QUESTIONNAIRE - PHQ9
SUM OF ALL RESPONSES TO PHQ QUESTIONS 1-9: 2
10. IF YOU CHECKED OFF ANY PROBLEMS, HOW DIFFICULT HAVE THESE PROBLEMS MADE IT FOR YOU TO DO YOUR WORK, TAKE CARE OF THINGS AT HOME, OR GET ALONG WITH OTHER PEOPLE: NOT DIFFICULT AT ALL
SUM OF ALL RESPONSES TO PHQ QUESTIONS 1-9: 2

## 2023-01-20 NOTE — PROGRESS NOTES
Assessment & Plan     Vaginal burning  Due to atrophy   Wet prep neg   Will try topical estrogen   - Wet prep - Clinic Collect  - estradiol (ESTRACE) 0.1 MG/GM vaginal cream; Place 1 g vaginally twice a week    Narcotic dependence (H)  Stable no change in treatment plan.     Mild single current episode of major depressive disorder (H)  Stable no change in treatment plan.     Chronic pain syndrome  Stable no change in treatment plan.   - HYDROcodone-acetaminophen (NORCO)  MG per tablet; Take 1 tablet by mouth every 6 hours as needed for severe pain (7-10) Max 3 tabs per day. Fill/start 10/19/22  - HYDROcodone-acetaminophen (NORCO)  MG per tablet; Take 1 tablet by mouth every 6 hours as needed for severe pain (7-10) Max 3 tabs per day. Fill/start 10/19/22  - HYDROcodone-acetaminophen (NORCO)  MG per tablet; Take 1 tablet by mouth every 6 hours as needed for severe pain (7-10) Max 3 tabs per day. Fill/start 10/19/22             Patient Instructions   Use the estrogen crream twice a week    Norco is at the pharmacy through May     See me in June       Return in about 21 weeks (around 6/16/2023) for using an in person visit, with me.    Gwendolyn Solis MD  [facility]    Ashok Molina is a 77 year old, presenting for the following health issues:  Vaginal Problem      Vaginal Problem     History of Present Illness       Reason for visit:  Vaginal issues    She eats 0-1 servings of fruits and vegetables daily.She consumes 0 sweetened beverage(s) daily.She exercises with enough effort to increase her heart rate 30 to 60 minutes per day.  She exercises with enough effort to increase her heart rate 5 days per week. She is missing 1 dose(s) of medications per week.    Today's PHQ-9         PHQ-9 Total Score: 2    PHQ-9 Q9 Thoughts of better off dead/self-harm past 2 weeks :   Not at all    How difficult have these problems made it for you to do your work, take care of  things at home, or get along with other people: Not difficult at all  Today's ORMAIN-7 Score: 0     no urine problem. Having external burning. Not itching. Have experienced this before. Nothing seems unusual. Think I was given some kind of salve. Leaving a week from today. Tried over the counter aloe vera and found a lidocaine jelly in my medicine cabinet. Did not help.   Is on transdermal estrogen         Pain History:  When did you first notice your pain? - Chronic Pain   Have you seen this provider for your pain in the past?   Yes   Where in your body do you have pain? face  Are you seeing anyone else for your pain? No    PHQ-9 SCORE 4/29/2022 8/17/2022 1/20/2023   PHQ-9 Total Score - - -   PHQ-9 Total Score MyChart 18 (Moderately severe depression) 7 (Mild depression) 2 (Minimal depression)   PHQ-9 Total Score 18 7 2       ROMAIN-7 SCORE 3/21/2022 3/21/2022 1/20/2023   Total Score - - -   Total Score - 3 (minimal anxiety) 0 (minimal anxiety)   Total Score 3 3 0       PEG Score 1/24/2022 4/27/2022 1/20/2023   PEG Total Score 5 5 2.67           Chronic Pain Follow Up:    Location of pain: face  Analgesia/pain control:    - Recent changes:  none    - Overall control: Tolerable with discomfort    - Current treatments: norco and other    Adherence:     - Do you ever take more pain medicine than prescribed? No    - When did you take your last dose of pain medicine?  Yesterday    Adverse effects: No   PDMP Review       Value Time User    State PDMP site checked  Yes 1/20/2023 12:47 PM Gwendolyn Solis MD        Last CSA Agreement:   CSA -- Patient Level:     [Media Unavailable] Controlled Substance Agreement - Opioid - Scan on 11/29/2022  2:22 PM   [Media Unavailable] Controlled Substance Agreement - Opioid - Scan on 1/24/2022  3:56 PM   [Media Unavailable] Controlled Substance Agreement - Opioid - Scan on 3/30/2021 10:04 AM   [Media Unavailable] Controlled Substance Agreement - Opioid - Scan on 3/18/2021  8:58 AM    [Media Unavailable] Controlled Substance Agreement - Opioid - Scan on 3/4/2020  1:30 PM       Last UDS: 2022      Depression Followup    How are you doing with your depression since your last visit? Improved on meds     Are you having other symptoms that might be associated with depression? No    Have you had a significant life event?  Health Concerns     Are you feeling anxious or having panic attacks?   No    Do you have any concerns with your use of alcohol or other drugs? No    Social History     Tobacco Use     Smoking status: Former     Types: Cigarettes     Quit date: 10/30/1962     Years since quittin.2     Smokeless tobacco: Never   Vaping Use     Vaping Use: Never used   Substance Use Topics     Alcohol use: Yes     Alcohol/week: 4.0 standard drinks     Types: 4 Standard drinks or equivalent per week     Comment: occasional     Drug use: No     Comment: medical marijuana in the past     PHQ 2022   PHQ-9 Total Score 18 7 2   Q9: Thoughts of better off dead/self-harm past 2 weeks Nearly every day Several days Not at all   F/U: Thoughts of suicide or self-harm No No -   F/U: Self harm-plan - - -   F/U: Self-harm action - - -   F/U: Safety concerns No No -     ROMAIN-7 SCORE 3/21/2022 3/21/2022 2023   Total Score - - -   Total Score - 3 (minimal anxiety) 0 (minimal anxiety)   Total Score 3 3 0         Suicide Assessment Five-step Evaluation and Treatment (SAFE-T)         Review of Systems   Genitourinary: Positive for vaginal discharge.      Constitutional, HEENT, cardiovascular, pulmonary, gi and gu systems are negative, except as otherwise noted.      Objective    LMP  (LMP Unknown)   There is no height or weight on file to calculate BMI.  Physical Exam   GENERAL APPEARANCE: healthy, alert and no distress  PSYCH: mentation appears normal and affect normal/bright

## 2023-01-20 NOTE — PATIENT INSTRUCTIONS
Use the estrogen crream twice a week    Norco is at the pharmacy through May     See me in Jackie

## 2023-02-08 ENCOUNTER — OFFICE VISIT (OUTPATIENT)
Dept: URGENT CARE | Facility: URGENT CARE | Age: 78
End: 2023-02-08
Payer: COMMERCIAL

## 2023-02-08 VITALS
TEMPERATURE: 97.5 F | HEART RATE: 94 BPM | WEIGHT: 115 LBS | OXYGEN SATURATION: 98 % | BODY MASS INDEX: 21.73 KG/M2 | DIASTOLIC BLOOD PRESSURE: 80 MMHG | SYSTOLIC BLOOD PRESSURE: 151 MMHG

## 2023-02-08 DIAGNOSIS — R07.0 THROAT PAIN: Primary | ICD-10-CM

## 2023-02-08 DIAGNOSIS — J98.8 RESPIRATORY INFECTION: ICD-10-CM

## 2023-02-08 LAB
DEPRECATED S PYO AG THROAT QL EIA: NEGATIVE
GROUP A STREP BY PCR: NOT DETECTED

## 2023-02-08 PROCEDURE — 87651 STREP A DNA AMP PROBE: CPT | Performed by: FAMILY MEDICINE

## 2023-02-08 PROCEDURE — 99213 OFFICE O/P EST LOW 20 MIN: CPT | Performed by: FAMILY MEDICINE

## 2023-02-08 RX ORDER — AZITHROMYCIN 250 MG/1
TABLET, FILM COATED ORAL
Qty: 6 TABLET | Refills: 0 | Status: SHIPPED | OUTPATIENT
Start: 2023-02-08 | End: 2023-02-13

## 2023-02-08 NOTE — PROGRESS NOTES
(R07.0) Throat pain  (primary encounter diagnosis)  Comment:   Plan: Streptococcus A Rapid Screen w/Reflex to PCR -         Clinic Collect, Group A Streptococcus PCR         Throat Swab            (J98.8) Respiratory infection  Comment:   Plan: azithromycin (ZITHROMAX) 250 MG tablet          She will continue her typical symptomatic measures.          CHIEF COMPLAINT    ST and cough X 6 days. Fatigue.      HISTORY    Patient has had a sore throat.  She has had a cough occasionally productive of sputum.  Has not noticed fever.  Has not noticed short winded.    She did recently travel down to [place] and back.    No history of underlying respiratory illnesses.    Non-smoker.      REVIEW OF SYSTEMS    No fever.  No ear pain.  No chest pain or edema.  No nausea.        EXAM  BP (!) 151/80   Pulse 94   Temp 97.5  F (36.4  C) (Tympanic)   Wt 52.2 kg (115 lb)   LMP  (LMP Unknown)   SpO2 98%   BMI 21.73 kg/m      Patient appears well in general.  Pharynx not especially red.  Absent tonsils.  TMs not too well seen due to cerumen and narrow ear canals.  No significant cervical adenopathy or mass.  Lungs are totally clear.

## 2023-02-10 DIAGNOSIS — F51.01 PRIMARY INSOMNIA: Chronic | ICD-10-CM

## 2023-02-10 RX ORDER — ZOLPIDEM TARTRATE 10 MG/1
TABLET ORAL
Qty: 30 TABLET | Refills: 5 | Status: SHIPPED | OUTPATIENT
Start: 2023-02-10 | End: 2023-09-15

## 2023-02-16 DIAGNOSIS — N94.89 VAGINAL BURNING: ICD-10-CM

## 2023-02-20 RX ORDER — ESTRADIOL 0.1 MG/G
1 CREAM VAGINAL
Qty: 42.5 G | Refills: 3 | Status: SHIPPED | OUTPATIENT
Start: 2023-02-20 | End: 2023-06-07

## 2023-02-28 ENCOUNTER — OFFICE VISIT (OUTPATIENT)
Dept: FAMILY MEDICINE | Facility: CLINIC | Age: 78
End: 2023-02-28
Payer: COMMERCIAL

## 2023-02-28 VITALS
DIASTOLIC BLOOD PRESSURE: 60 MMHG | WEIGHT: 115 LBS | RESPIRATION RATE: 12 BRPM | HEIGHT: 61 IN | TEMPERATURE: 96.7 F | OXYGEN SATURATION: 98 % | HEART RATE: 70 BPM | BODY MASS INDEX: 21.71 KG/M2 | SYSTOLIC BLOOD PRESSURE: 136 MMHG

## 2023-02-28 DIAGNOSIS — F11.90 CHRONIC, CONTINUOUS USE OF OPIOIDS: ICD-10-CM

## 2023-02-28 DIAGNOSIS — G89.4 CHRONIC PAIN SYNDROME: Primary | Chronic | ICD-10-CM

## 2023-02-28 DIAGNOSIS — G50.0 TRIGEMINAL NEURALGIA: ICD-10-CM

## 2023-02-28 PROCEDURE — 99214 OFFICE O/P EST MOD 30 MIN: CPT | Performed by: FAMILY MEDICINE

## 2023-02-28 RX ORDER — HYDROCODONE BITARTRATE AND ACETAMINOPHEN 10; 325 MG/1; MG/1
1 TABLET ORAL EVERY 6 HOURS PRN
Qty: 90 TABLET | Refills: 0 | Status: SHIPPED | OUTPATIENT
Start: 2023-05-21 | End: 2023-06-07

## 2023-02-28 ASSESSMENT — PATIENT HEALTH QUESTIONNAIRE - PHQ9
SUM OF ALL RESPONSES TO PHQ QUESTIONS 1-9: 1
SUM OF ALL RESPONSES TO PHQ QUESTIONS 1-9: 1
10. IF YOU CHECKED OFF ANY PROBLEMS, HOW DIFFICULT HAVE THESE PROBLEMS MADE IT FOR YOU TO DO YOUR WORK, TAKE CARE OF THINGS AT HOME, OR GET ALONG WITH OTHER PEOPLE: NOT DIFFICULT AT ALL

## 2023-02-28 ASSESSMENT — ANXIETY QUESTIONNAIRES
IF YOU CHECKED OFF ANY PROBLEMS ON THIS QUESTIONNAIRE, HOW DIFFICULT HAVE THESE PROBLEMS MADE IT FOR YOU TO DO YOUR WORK, TAKE CARE OF THINGS AT HOME, OR GET ALONG WITH OTHER PEOPLE: NOT DIFFICULT AT ALL
7. FEELING AFRAID AS IF SOMETHING AWFUL MIGHT HAPPEN: NOT AT ALL
8. IF YOU CHECKED OFF ANY PROBLEMS, HOW DIFFICULT HAVE THESE MADE IT FOR YOU TO DO YOUR WORK, TAKE CARE OF THINGS AT HOME, OR GET ALONG WITH OTHER PEOPLE?: NOT DIFFICULT AT ALL
6. BECOMING EASILY ANNOYED OR IRRITABLE: NOT AT ALL
5. BEING SO RESTLESS THAT IT IS HARD TO SIT STILL: NOT AT ALL
4. TROUBLE RELAXING: NOT AT ALL
3. WORRYING TOO MUCH ABOUT DIFFERENT THINGS: NOT AT ALL
GAD7 TOTAL SCORE: 0
GAD7 TOTAL SCORE: 0
2. NOT BEING ABLE TO STOP OR CONTROL WORRYING: NOT AT ALL
GAD7 TOTAL SCORE: 0
7. FEELING AFRAID AS IF SOMETHING AWFUL MIGHT HAPPEN: NOT AT ALL
1. FEELING NERVOUS, ANXIOUS, OR ON EDGE: NOT AT ALL

## 2023-02-28 ASSESSMENT — PAIN SCALES - GENERAL: PAINLEVEL: MODERATE PAIN (4)

## 2023-02-28 NOTE — PROGRESS NOTES
Assessment & Plan     Chronic pain syndrome  Stable no change in treatment plan.   - HYDROcodone-acetaminophen (NORCO)  MG per tablet; Take 1 tablet by mouth every 6 hours as needed for severe pain (7-10) Max 3 tabs per day. Fill/start 10/19/22    Trigeminal neuralgia  Stable no change in treatment plan.   - HYDROcodone-acetaminophen (NORCO)  MG per tablet; Take 1 tablet by mouth every 6 hours as needed for severe pain (7-10) Max 3 tabs per day. Fill/start 10/19/22    Chronic, continuous use of opioids  Stable no change in treatment plan.   - HYDROcodone-acetaminophen (NORCO)  MG per tablet; Take 1 tablet by mouth every 6 hours as needed for severe pain (7-10) Max 3 tabs per day. Fill/start 10/19/22                 Return in about 3 months (around 5/28/2023) for using an in person visit, with me.    Gwendolyn Solis MD  [facility]    Ashok Molina is a 77 year old, presenting for the following health issues:  Pain      History of Present Illness       Reason for visit:  Opioid use    She eats 0-1 servings of fruits and vegetables daily.She consumes 0 sweetened beverage(s) daily.She exercises with enough effort to increase her heart rate 30 to 60 minutes per day.  She exercises with enough effort to increase her heart rate 5 days per week.   She is taking medications regularly.    Today's PHQ-9         PHQ-9 Total Score: 1    PHQ-9 Q9 Thoughts of better off dead/self-harm past 2 weeks :   Not at all    How difficult have these problems made it for you to do your work, take care of things at home, or get along with other people: Not difficult at all  Today's ROMAIN-7 Score: 0       Pain History:  When did you first notice your pain? - Chronic Pain   Have you seen this provider for your pain in the past?   Yes   Where in your body do you have pain? Upper jaw - right side  Are you seeing anyone else for your pain? No    PHQ-9 SCORE 8/17/2022 1/20/2023 2/28/2023  "  PHQ-9 Total Score - - -   PHQ-9 Total Score MyChart 7 (Mild depression) 2 (Minimal depression) 1 (Minimal depression)   PHQ-9 Total Score 7 2 1       ROMAIN-7 SCORE 3/21/2022 1/20/2023 2/28/2023   Total Score - - -   Total Score 3 (minimal anxiety) 0 (minimal anxiety) 0 (minimal anxiety)   Total Score 3 0 0               Chronic Pain Follow Up:    Location of pain: Right jaw - upper  Analgesia/pain control:    - Recent changes:  same    - Overall control: Tolerable with discomfort    - Current treatments: Norco   Adherence:     - Do you ever take more pain medicine than prescribed? No    - When did you take your last dose of pain medicine?  10am today   Adverse effects: No   PDMP Review       Value Time User    State PDMP site checked  Yes 1/20/2023 12:47 PM Gwendolyn Solis MD        Last CSA Agreement:   CSA -- Patient Level:     [Media Unavailable] Controlled Substance Agreement - Opioid - Scan on 11/29/2022  2:22 PM   [Media Unavailable] Controlled Substance Agreement - Opioid - Scan on 1/24/2022  3:56 PM   [Media Unavailable] Controlled Substance Agreement - Opioid - Scan on 3/30/2021 10:04 AM   [Media Unavailable] Controlled Substance Agreement - Opioid - Scan on 3/18/2021  8:58 AM   [Media Unavailable] Controlled Substance Agreement - Opioid - Scan on 3/4/2020  1:30 PM       Last UDS: 12/1/2022            Review of Systems   Constitutional, HEENT, cardiovascular, pulmonary, gi and gu systems are negative, except as otherwise noted.      Objective    /60   Pulse 70   Temp (!) 96.7  F (35.9  C) (Tympanic)   Resp 12   Ht 1.549 m (5' 1\")   Wt 52.2 kg (115 lb)   LMP  (LMP Unknown)   SpO2 98%   BMI 21.73 kg/m    Body mass index is 21.73 kg/m .  Physical Exam   GENERAL APPEARANCE: healthy, alert and no distress  PSYCH: mentation appears normal and affect normal/bright                    "

## 2023-02-28 NOTE — NURSING NOTE
"Chief Complaint   Patient presents with     Pain     /60   Pulse 70   Temp (!) 96.7  F (35.9  C) (Tympanic)   Resp 12   Ht 1.549 m (5' 1\")   Wt 52.2 kg (115 lb)   LMP  (LMP Unknown)   SpO2 98%   BMI 21.73 kg/m   Estimated body mass index is 21.73 kg/m  as calculated from the following:    Height as of this encounter: 1.549 m (5' 1\").    Weight as of this encounter: 52.2 kg (115 lb).  Patient presents to the clinic using No DME      Health Maintenance that is potentially due pending provider review:    Health Maintenance Due   Topic Date Due     HEPATITIS C SCREENING  Never done        n/a        "

## 2023-03-13 NOTE — TELEPHONE ENCOUNTER
Medication refill information reviewed.     Last due:  OK to fill 8/18/21 and start on/after 8/19/21  Due date:  anytime      Prescriptions prepped for review.     Ruth RN-BSN  [facility]               NULL

## 2023-04-24 ENCOUNTER — TELEPHONE (OUTPATIENT)
Dept: FAMILY MEDICINE | Facility: CLINIC | Age: 78
End: 2023-04-24
Payer: COMMERCIAL

## 2023-04-24 DIAGNOSIS — G89.4 CHRONIC PAIN SYNDROME: Primary | ICD-10-CM

## 2023-04-27 ENCOUNTER — HOSPITAL ENCOUNTER (EMERGENCY)
Facility: CLINIC | Age: 78
Discharge: HOME | End: 2023-04-27
Attending: PHYSICIAN ASSISTANT | Admitting: PHYSICIAN ASSISTANT
Payer: COMMERCIAL

## 2023-04-27 VITALS
SYSTOLIC BLOOD PRESSURE: 217 MMHG | DIASTOLIC BLOOD PRESSURE: 105 MMHG | TEMPERATURE: 96.2 F | OXYGEN SATURATION: 100 % | HEART RATE: 137 BPM | RESPIRATION RATE: 24 BRPM

## 2023-04-27 DIAGNOSIS — F11.90 CHRONIC, CONTINUOUS USE OF OPIOIDS: ICD-10-CM

## 2023-04-27 DIAGNOSIS — R68.84 CHRONIC JAW PAIN: ICD-10-CM

## 2023-04-27 DIAGNOSIS — G50.0 TRIGEMINAL NEURALGIA: ICD-10-CM

## 2023-04-27 DIAGNOSIS — G89.29 CHRONIC JAW PAIN: ICD-10-CM

## 2023-04-27 PROCEDURE — G0463 HOSPITAL OUTPT CLINIC VISIT: HCPCS | Performed by: PHYSICIAN ASSISTANT

## 2023-04-27 PROCEDURE — 99212 OFFICE O/P EST SF 10 MIN: CPT | Performed by: PHYSICIAN ASSISTANT

## 2023-04-27 ASSESSMENT — ENCOUNTER SYMPTOMS
FACIAL SWELLING: 0
FEVER: 0
CONSTITUTIONAL NEGATIVE: 1

## 2023-04-27 NOTE — ED PROVIDER NOTES
History     Chief Complaint   Patient presents with     Facial Pain     Chronic mouth pain for 20 years, unbearable over last couple days, wants referral to pain clinic     HPI  Tracy Galloway is a 78 year old female who presents requesting a pain clinic referral.  Patient states she has chronic mouth and jaw pain for the past 20 years.  She has seen a pain clinic in the past and they started to wean her off of her pain medications.  She continues to take Norco 10 mg daily.  She is requesting a new order to be seen in the pain clinic once again as her pain is not adequately controlled with her new pain regimen.  Denies any new symptoms today.  Denies fevers or chills.      Allergies:  No Known Allergies    Problem List:    Patient Active Problem List    Diagnosis Date Noted     Chronic, continuous use of opioids 11/29/2022     Priority: Medium     Trigeminal neuralgia 02/25/2020     Priority: Medium     Dilation of biliary tract 06/10/2019     Priority: Medium     ERCP with removal of sludge and sphincterotomy       Mild single current episode of major depressive disorder (H) 02/15/2019     Priority: Medium     Primary insomnia 02/23/2018     Priority: Medium     ambien dependent.  6 mo supply given 12/15/21       Advanced directives, counseling/discussion 07/18/2017     Priority: Medium     Advance Care Planning 8/1/2015: ACP Review of Chart / Resources Provided:  Reviewed chart for advance care plan.  Tracy Galloway has an up to date advance care plan on file.  Added by Nazia Jang       Mammogram declined 01/23/2017     Priority: Medium     TIA (transient ischemic attack) 10/30/2016     Priority: Medium     Narcotic dependence (H) 10/09/2016     Priority: Medium     Essential hypertension with goal blood pressure less than 140/90 08/22/2016     Priority: Medium     Health Care Home 12/27/2012     Priority: Medium     Kate Braun RN-PHN  [facility] / [facility]   [phone]    DX V65.8  REPLACED WITH [id number]ALTH CARE HOME (04/08/2013)       Chronic pain 11/02/2012     Priority: Medium     Patient is followed by Gwendolyn Solis MD for ongoing prescription of pain medication.  All refills should be approved by this provider only at face-to-face appointments - not by phone request.    Medication(s): norco .   Maximum quantity per month: 60  Clinic visit frequency required: Q 3 months     Controlled substance agreement:  Encounter-Level CSA - 7/7/15:               Controlled Substance Agreement - Scan on 7/10/2015  1:04 PM : CONTROLLED SUBSTANCE AGREEMENT (below)            Pain Clinic evaluation in the past: Yes       Date/Location:   2009 multiple facial pain evaluations neurology and pain clinic     DIRE Total Score(s):  No flowsheet data found.    Last [facility] website verification:  done   [email or web address]               Atrophic vaginitis 02/27/2011     Priority: Medium     CARDIOVASCULAR SCREENING; LDL GOAL LESS THAN 130 10/31/2010     Priority: Medium     Visual impairment 09/17/2007     Priority: Medium     Overview:   monovision in right eye       Anxiety state 05/03/2005     Priority: Medium     Plantar fasciitis 05/03/2005     Priority: Medium        Past Medical History:    Past Medical History:   Diagnosis Date     Anxiety w/panic attacks      Chest pain      Chronic LBP      Depressive disorder, not elsewhere classified      Facial pain 12/17/2009     HTN      Insomnia, unspecified      Migraine, unspecified, without mention of intractable migraine without mention of status migrainosus      Need for prophylactic hormone replacement therapy (postmenopausal)      Osteopenia      Plantar fasciitis      Prinzmetal angina (H) 09/17/2007     Vision loss        Past Surgical History:    Past Surgical History:   Procedure Laterality Date     CHOLECYSTECTOMY, LAPOROSCOPIC  09/01/1990    s/p Cholecystectomy, Laparoscopic     COLONOSCOPY  12/06/2012    Procedure: COLONOSCOPY;   Colonoscopy;  Surgeon: Tyrell Brown MD;  Location: [facility]     ENDOSCOPIC RETROGRADE CHOLANGIOPANCREATOGRAM N/A 2019    Procedure: ENDOSCOPIC RETROGRADE CHOLANGIOPANCREATOGRAPHY;  Surgeon: Mio Blunt MD;  Location:  OR     EYE SURGERY       HYSTERECTOMY, SUNDAR  1970    severe endometriosis     SURGICAL HISTORY OF -   1998    breast reduction     SURGICAL HISTORY OF -   2003    coronary angiogram     SURGICAL HISTORY OF -       Refractive surgery right     TONSILLECTOMY       ZZHC COLONOSCOPY THRU STOMA, DIAGNOSTIC  2002    at outside clinic.  Normal.       Family History:    Family History   Problem Relation Age of Onset     Hypertension Mother      Heart Disease Mother      Hypertension Father      Circulatory Father         PAD     Alzheimer Disease Maternal Grandmother         dementia     Cerebrovascular Disease Maternal Grandmother      Heart Disease Maternal Grandfather      Diabetes Maternal Grandfather        Social History:  Marital Status:   [2]  Social History     Tobacco Use     Smoking status: Former     Types: Cigarettes     Quit date: 10/30/1962     Years since quittin.5     Smokeless tobacco: Never   Vaping Use     Vaping status: Never Used   Substance Use Topics     Alcohol use: Yes     Alcohol/week: 4.0 standard drinks of alcohol     Types: 4 Standard drinks or equivalent per week     Comment: occasional     Drug use: No     Comment: medical marijuana in the past        Medications:    aspirin (ASA) 81 MG tablet  busPIRone (BUSPAR) 5 MG tablet  diltiazem ER (DILT-XR) 180 MG 24 hr capsule  DOCUSATE SODIUM PO  estradiol (ESTRACE) 0.1 MG/GM vaginal cream  [START ON 2023] HYDROcodone-acetaminophen (NORCO)  MG per tablet  HYDROcodone-acetaminophen (NORCO)  MG per tablet  ibuprofen (ADVIL/MOTRIN) 200 MG tablet  lisinopril (ZESTRIL) 20 MG tablet  valACYclovir (VALTREX) 1000 mg tablet  zolpidem (AMBIEN) 10 MG tablet          Review of  Systems   Constitutional: Negative.  Negative for fever.   HENT: Negative for facial swelling.         Jaw pain   All other systems reviewed and are negative.      Physical Exam   BP: (!) 217/105  Pulse: (!) 137  Temp: (!) 96.2  F (35.7  C)  Resp: 24  SpO2: 100 %      Physical Exam  Constitutional:       General: She is not in acute distress.     Appearance: Normal appearance. She is not ill-appearing, toxic-appearing or diaphoretic.   HENT:      Head: Normocephalic and atraumatic.      Jaw: No swelling.      Right Ear: External ear normal.      Left Ear: External ear normal.      Nose: Nose normal.      Mouth/Throat:      Lips: Pink.      Mouth: Mucous membranes are moist.   Eyes:      Conjunctiva/sclera: Conjunctivae normal.   Pulmonary:      Effort: Pulmonary effort is normal.   Musculoskeletal:         General: Normal range of motion.      Cervical back: Neck supple.   Skin:     General: Skin is warm and dry.   Neurological:      General: No focal deficit present.      Mental Status: She is alert.   Psychiatric:         Mood and Affect: Affect is tearful.         ED Course                 Procedures      No results found for this or any previous visit (from the past 24 hour(s)).    Medications - No data to display    Assessments & Plan (with Medical Decision Making)     Pt is a 78 year old female who presents requesting a pain clinic referral.  Patient states she has chronic mouth and jaw pain for the past 20 years.  She has seen a pain clinic in the past and they started to wean her off of her pain medications.  She continues to take Norco 10 mg daily.  She is requesting a new order to be seen in the pain clinic once again as her pain is not adequately controlled with her new pain regimen.  Denies any new symptoms today.    Pt is afebrile on arrival.  Exam as above.  Pain clinic referral placed.  Return precautions were reviewed.  Hand-outs were provided.    Patient was instructed to follow-up with PCP for  continued care and management.  She is to return to the ED for persistent and/or worsening symptoms.  Patient expressed understanding of the diagnosis and plan and was discharged home in good condition.    I have reviewed the nursing notes.    I have reviewed the findings, diagnosis, plan and need for follow up with the patient.      Discharge Medication List as of 4/27/2023 12:47 PM          Final diagnoses:   Chronic jaw pain   Chronic, continuous use of opioids   Trigeminal neuralgia       4/27/2023   [facility] EMERGENCY DEPT      Disclaimer:  This note consists of symbols derived from keyboarding, dictation and/or voice recognition software.  As a result, there may be errors in the script that have gone undetected.  Please consider this when interpreting information found in this chart.     Evon Walden PA-C  04/27/23 [id number]

## 2023-06-07 ENCOUNTER — OFFICE VISIT (OUTPATIENT)
Dept: FAMILY MEDICINE | Facility: CLINIC | Age: 78
End: 2023-06-07
Payer: COMMERCIAL

## 2023-06-07 DIAGNOSIS — G50.0 TRIGEMINAL NEURALGIA: ICD-10-CM

## 2023-06-07 DIAGNOSIS — F32.0 MILD SINGLE CURRENT EPISODE OF MAJOR DEPRESSIVE DISORDER (H): ICD-10-CM

## 2023-06-07 DIAGNOSIS — G89.4 CHRONIC PAIN SYNDROME: Chronic | ICD-10-CM

## 2023-06-07 DIAGNOSIS — F11.90 CHRONIC, CONTINUOUS USE OF OPIOIDS: Primary | ICD-10-CM

## 2023-06-07 PROCEDURE — 99214 OFFICE O/P EST MOD 30 MIN: CPT | Performed by: FAMILY MEDICINE

## 2023-06-07 RX ORDER — BUPROPION HYDROCHLORIDE 150 MG/1
150 TABLET ORAL EVERY MORNING
Qty: 90 TABLET | Refills: 1 | Status: SHIPPED | OUTPATIENT
Start: 2023-06-07 | End: 2023-08-23

## 2023-06-07 RX ORDER — HYDROCODONE BITARTRATE AND ACETAMINOPHEN 10; 325 MG/1; MG/1
1 TABLET ORAL EVERY 6 HOURS PRN
Qty: 90 TABLET | Refills: 0 | Status: SHIPPED | OUTPATIENT
Start: 2023-06-14 | End: 2023-06-30

## 2023-06-07 ASSESSMENT — ANXIETY QUESTIONNAIRES
3. WORRYING TOO MUCH ABOUT DIFFERENT THINGS: NEARLY EVERY DAY
1. FEELING NERVOUS, ANXIOUS, OR ON EDGE: NEARLY EVERY DAY
GAD7 TOTAL SCORE: 21
GAD7 TOTAL SCORE: 21
2. NOT BEING ABLE TO STOP OR CONTROL WORRYING: NEARLY EVERY DAY
6. BECOMING EASILY ANNOYED OR IRRITABLE: NEARLY EVERY DAY
8. IF YOU CHECKED OFF ANY PROBLEMS, HOW DIFFICULT HAVE THESE MADE IT FOR YOU TO DO YOUR WORK, TAKE CARE OF THINGS AT HOME, OR GET ALONG WITH OTHER PEOPLE?: EXTREMELY DIFFICULT
7. FEELING AFRAID AS IF SOMETHING AWFUL MIGHT HAPPEN: NEARLY EVERY DAY
4. TROUBLE RELAXING: NEARLY EVERY DAY
IF YOU CHECKED OFF ANY PROBLEMS ON THIS QUESTIONNAIRE, HOW DIFFICULT HAVE THESE PROBLEMS MADE IT FOR YOU TO DO YOUR WORK, TAKE CARE OF THINGS AT HOME, OR GET ALONG WITH OTHER PEOPLE: EXTREMELY DIFFICULT
5. BEING SO RESTLESS THAT IT IS HARD TO SIT STILL: NEARLY EVERY DAY
7. FEELING AFRAID AS IF SOMETHING AWFUL MIGHT HAPPEN: NEARLY EVERY DAY

## 2023-06-07 ASSESSMENT — PAIN SCALES - GENERAL: PAINLEVEL: WORST PAIN (10)

## 2023-06-07 NOTE — NURSING NOTE
"Chief Complaint   Patient presents with     Depression            Anxiety     BP (!) 144/60   Pulse 78   Temp 97.2  F (36.2  C) (Tympanic)   Resp 12   Ht 1.543 m (5' 0.75\")   Wt 51.7 kg (114 lb)   LMP  (LMP Unknown)   SpO2 98%   BMI 21.72 kg/m   Estimated body mass index is 21.72 kg/m  as calculated from the following:    Height as of this encounter: 1.543 m (5' 0.75\").    Weight as of this encounter: 51.7 kg (114 lb).  Patient presents to the clinic using No DME      Health Maintenance that is potentially due pending provider review:    Health Maintenance Due   Topic Date Due     COVID-19 Vaccine (6 - Moderna series) 01/21/2023        n/a        "

## 2023-06-07 NOTE — PATIENT INSTRUCTIONS
Set up new mental health appt with keke Maldonado wellbutrin     May take Norco 4 pills daily for one month     See me back in 4 weeks

## 2023-06-07 NOTE — PROGRESS NOTES
Assessment & Plan     Chronic, continuous use of opioids    - HYDROcodone-acetaminophen (NORCO)  MG per tablet; Take 1 tablet by mouth every 6 hours as needed for severe pain Max 4  tabs per day.    Trigeminal neuralgia  See below   - HYDROcodone-acetaminophen (NORCO)  MG per tablet; Take 1 tablet by mouth every 6 hours as needed for severe pain Max 4  tabs per day.    Chronic pain syndrome  Not well controlled   Will have her take 4 norco for one  Month then back to three  - HYDROcodone-acetaminophen (NORCO)  MG per tablet; Take 1 tablet by mouth every 6 hours as needed for severe pain Max 4  tabs per day.    Mild single current episode of major depressive disorder (H)  Not well controlled at all   She needs new therapist   Will try and get her in with naresh     - Adult Mental Health [facility] Referral; Future  - buPROPion (WELLBUTRIN XL) 150 MG 24 hr tablet; Take 1 tablet (150 mg) by mouth every morning             Patient Instructions   Set up new mental health appt with keke Maldonado wellbutrin     May take Norco 4 pills daily for one month     See me back in 4 weeks       MD SHAHNAZ [facility]    Ashok Molina is a 78 year old, presenting for the following health issues:  Depression (/) and Anxiety        6/7/2023     3:09 PM   Additional Questions   Roomed by Rosibel CHRISTIE   Accompanied by Self     History of Present Illness       Mental Health Follow-up:  Patient presents to follow-up on Depression & Anxiety.Patient's depression since last visit has been:  Worse  The patient is having other symptoms associated with depression.  Patient's anxiety since last visit has been:  Bad  The patient is having other symptoms associated with anxiety.  Any significant life events: other  Patient is feeling anxious or having panic attacks.  Patient has no concerns about alcohol or drug use.    Reason for visit:  Pain control    She eats 2-3 servings of fruits  and vegetables daily.She consumes 1 sweetened beverage(s) daily.She exercises with enough effort to increase her heart rate 9 or less minutes per day.  She exercises with enough effort to increase her heart rate 3 or less days per week. She is missing 1 dose(s) of medications per week.  Today's ROMAIN-7 Score: 21         Pain History:  When did you first notice your pain? Years ago   Have you seen this provider for your pain in the past?   Yes   Where in your body do you have pain? face  Are you seeing anyone else for your pain? No        8/17/2022    12:40 PM 1/20/2023     9:10 AM 2/28/2023    12:40 PM   PHQ-9 SCORE   PHQ-9 Total Score MyChart 7 (Mild depression) 2 (Minimal depression) 1 (Minimal depression)   PHQ-9 Total Score 7 2 1           1/20/2023     9:11 AM 2/28/2023    12:41 PM 6/7/2023     3:02 PM   ROMAIN-7 SCORE   Total Score 0 (minimal anxiety) 0 (minimal anxiety) 21 (severe anxiety)   Total Score 0 0 21               Chronic Pain Follow Up:    Location of pain: face  Analgesia/pain control:    - Recent changes:  Worse   But mental health is in crisis as well     - Overall control: Inadequate pain control    - Current treatments: norco   Adherence:     - Do you ever take more pain medicine than prescribed? No    - When did you take your last dose of pain medicine?  Today    Adverse effects: No   PDMP Review       Value Time User    State PDMP site checked  Yes 2/28/2023  1:09 PM Gwendolyn Solis MD        Last CSA Agreement:   CSA -- Patient Level:     [Media Unavailable] Controlled Substance Agreement - Opioid - Scan on 11/29/2022  2:22 PM   [Media Unavailable] Controlled Substance Agreement - Opioid - Scan on 1/24/2022  3:56 PM   [Media Unavailable] Controlled Substance Agreement - Opioid - Scan on 3/30/2021 10:04 AM   [Media Unavailable] Controlled Substance Agreement - Opioid - Scan on 3/18/2021  8:58 AM   [Media Unavailable] Controlled Substance Agreement - Opioid - Scan on 3/4/2020  1:30 PM    "    Last UDS: 12/1/2022                 Review of Systems   Constitutional, HEENT, cardiovascular, pulmonary, gi and gu systems are negative, except as otherwise noted.      Objective    /78   Pulse 78   Temp 97.2  F (36.2  C) (Tympanic)   Resp 12   Ht 1.543 m (5' 0.75\")   Wt 51.7 kg (114 lb)   LMP  (LMP Unknown)   SpO2 98%   BMI 21.72 kg/m    Body mass index is 21.72 kg/m .  Physical Exam   GENERAL APPEARANCE: healthy, alert and no distress  PSYCH: mentation appears normal, anxious and crying                    "

## 2023-06-08 VITALS
TEMPERATURE: 97.2 F | HEIGHT: 61 IN | SYSTOLIC BLOOD PRESSURE: 138 MMHG | HEART RATE: 78 BPM | OXYGEN SATURATION: 98 % | RESPIRATION RATE: 12 BRPM | DIASTOLIC BLOOD PRESSURE: 78 MMHG | WEIGHT: 114 LBS | BODY MASS INDEX: 21.52 KG/M2

## 2023-06-20 ENCOUNTER — OFFICE VISIT (OUTPATIENT)
Dept: BEHAVIORAL HEALTH | Facility: CLINIC | Age: 78
End: 2023-06-20
Attending: FAMILY MEDICINE
Payer: COMMERCIAL

## 2023-06-20 DIAGNOSIS — F32.0 MILD SINGLE CURRENT EPISODE OF MAJOR DEPRESSIVE DISORDER (H): ICD-10-CM

## 2023-06-20 PROCEDURE — 90834 PSYTX W PT 45 MINUTES: CPT

## 2023-06-20 ASSESSMENT — PATIENT HEALTH QUESTIONNAIRE - PHQ9
SUM OF ALL RESPONSES TO PHQ QUESTIONS 1-9: 23
SUM OF ALL RESPONSES TO PHQ QUESTIONS 1-9: 23
10. IF YOU CHECKED OFF ANY PROBLEMS, HOW DIFFICULT HAVE THESE PROBLEMS MADE IT FOR YOU TO DO YOUR WORK, TAKE CARE OF THINGS AT HOME, OR GET ALONG WITH OTHER PEOPLE: EXTREMELY DIFFICULT

## 2023-06-20 NOTE — PROGRESS NOTES
[facility] Primary Care: Integrated Behavioral Health  June 20, 2023    Behavioral Health Clinician Progress Note    Patient Name: Tracy Galloway       Service Type:  Individual      Service Location:   Face to Face in Clinic     Session Start Time: 1:30pm  Session End Time: 2:20pm      Session Length: 38 - 52      Attendees: Patient     Service Modality:  In-person    Visit Activities (Refresh list every visit): [facility] and [facility] Only    Diagnostic Assessment Date: Will complete in the next few sessions, not completed due to time constraints.   Treatment Plan Review Date: Will complete in the next few sessions, not completed due to time constraints.   See Flowsheets for today's PHQ-9 and ROMAIN-7 results  Previous PHQ-9:     1/20/2023     9:10 AM 2/28/2023    12:40 PM 6/20/2023     1:14 PM   PHQ-9 SCORE   PHQ-9 Total Score MyChart 2 (Minimal depression) 1 (Minimal depression) 23 (Severe depression)   PHQ-9 Total Score 2 1 23     Previous ROMAIN-7:       1/20/2023     9:11 AM 2/28/2023    12:41 PM 6/7/2023     3:02 PM   ROMAIN-7 SCORE   Total Score 0 (minimal anxiety) 0 (minimal anxiety) 21 (severe anxiety)   Total Score 0 0 21     JEANETTE LEVEL:      2/17/2011    10:00 AM   JEANETTE Score (Last Two)   JEANETTE Raw Score 42   Activation Score 66   JEANETTE Level 3     DATA  Extended Session (60+ minutes): No  Interactive Complexity: No  Crisis: No  [facility] Patient: No    Treatment Objective(s) Addressed in This Session:  Target Behavior(s): disease management/lifestyle changes pain management, depression and grief    Depressed Mood: Increase interest, engagement, and pleasure in doing things  Decrease frequency and intensity of feeling down, depressed, hopeless  Improve quantity and quality of night time sleep / decrease daytime naps  Feel less tired and more energy during the day   Improve diet, appetite, mindful eating, and / or meal planning  Identify negative self-talk and behaviors: challenge core beliefs, myths, and  actions  Improve concentration, focus, and mindfulness in daily activities   Feel less fidgety, restless or slow in daily activities / interpersonal interactions  Decrease thoughts that you'd be better off dead or of suicide / self-harm  Functional Impairment: will effectively address problems that interfere with adaptive functioning    Current Stressors / Issues:  Pt stated that she was struggling with depression and was feeling hopeless due to the extreme pain in her mouth.  She discussed how hard it was for her to focus and be active.  She discussed her frustrations with the changing of her pain medications and didn't know how to proceed or ask for more help.  Reviewed how to discuss with provider and staying open to various coping skills.  Prompt returning to pain management as needed.  Discuss self care and the care she was doing for her .  Reviewed coping skills including being outside, staying active, and yoga.      Progress on Treatment Objective(s) / Homework:  New Objective established this session - PREPARATION (Decided to change - considering how); Intervened by negotiating a change plan and determining options / strategies for behavior change, identifying triggers, exploring social supports, and working towards setting a date to begin behavior change    Motivational Interviewing    MI Intervention: Expressed Empathy/Understanding, Supported Autonomy, Collaboration, Evocation, Permission to raise concern or advise, Open-ended questions and Reflections: simple and complex     Change Talk Expressed by the Patient: Desire to change Ability to change    Provider Response to Change Talk: A - Affirmed patient's thoughts, decisions, or attempts at behavior change and R - Reflected patient's change talk    Also provided psychoeducation about behavioral health condition, symptoms, and treatment options    Care Plan review completed: Yes    Medication Review:  No current psychiatric medications  prescribed    Medication Compliance:  NA    Changes in Health Issues:   None reported    Chemical Use Review:   Substance Use: Chemical use reviewed, no active concerns identified      Tobacco Use: No current tobacco use.      Assessment: Current Emotional / Mental Status (status of significant symptoms):  Risk status (Self / Other harm or suicidal ideation)  Patient denies a history of suicidal ideation, suicide attempts, self-injurious behavior, homicidal ideation, homicidal behavior and and other safety concerns  Patient denies current fears or concerns for personal safety.  Patient reports the following current or recent suicidal ideation or behaviors: passive thoughts about not wanting to be here as she has to take care of her .  She stated that she would not act on the thoughts.  .  Patient denies current or recent homicidal ideation or behaviors.  Patient denies current or recent self injurious behavior or ideation.  Patient denies other safety concerns.  A safety and risk management plan has not been developed at this time, however patient was encouraged to call [place] [phone] should there be a change in any of these risk factors.    Appearance:   Appropriate   Eye Contact:   Good   Psychomotor Behavior: Normal   Attitude:   Cooperative  Guarded   Orientation:   All  Speech   Rate / Production: Emotional Normal    Volume:  Normal   Mood:    Anxious  Depressed  Normal  Affect:    Appropriate  Tearful Worrisome   Thought Content:  Clear   Thought Form:  Coherent  Logical   Insight:    Fair     Diagnoses:  1. Mild single current episode of major depressive disorder (H)      Collateral Reports Completed:  Not Applicable    Plan: (Homework, other):  Patient was given information about behavioral services and encouraged to schedule a follow up appointment with the clinic [facility] in 2 weeks.  She was also given information about mental health symptoms and treatment options .  CD Recommendations: No  indications of CD issues.     Dona Vega, [facility]  June 20, 2023                Answers for HPI/ROS submitted by the patient on 6/20/2023  If you checked off any problems, how difficult have these problems made it for you to do your work, take care of things at home, or get along with other people?: Extremely difficult  PHQ9 TOTAL SCORE: 23

## 2023-06-28 ASSESSMENT — ANXIETY QUESTIONNAIRES
2. NOT BEING ABLE TO STOP OR CONTROL WORRYING: SEVERAL DAYS
8. IF YOU CHECKED OFF ANY PROBLEMS, HOW DIFFICULT HAVE THESE MADE IT FOR YOU TO DO YOUR WORK, TAKE CARE OF THINGS AT HOME, OR GET ALONG WITH OTHER PEOPLE?: SOMEWHAT DIFFICULT
GAD7 TOTAL SCORE: 6
3. WORRYING TOO MUCH ABOUT DIFFERENT THINGS: SEVERAL DAYS
4. TROUBLE RELAXING: SEVERAL DAYS
7. FEELING AFRAID AS IF SOMETHING AWFUL MIGHT HAPPEN: NOT AT ALL
7. FEELING AFRAID AS IF SOMETHING AWFUL MIGHT HAPPEN: NOT AT ALL
1. FEELING NERVOUS, ANXIOUS, OR ON EDGE: SEVERAL DAYS
5. BEING SO RESTLESS THAT IT IS HARD TO SIT STILL: SEVERAL DAYS
6. BECOMING EASILY ANNOYED OR IRRITABLE: SEVERAL DAYS
IF YOU CHECKED OFF ANY PROBLEMS ON THIS QUESTIONNAIRE, HOW DIFFICULT HAVE THESE PROBLEMS MADE IT FOR YOU TO DO YOUR WORK, TAKE CARE OF THINGS AT HOME, OR GET ALONG WITH OTHER PEOPLE: SOMEWHAT DIFFICULT
GAD7 TOTAL SCORE: 6

## 2023-06-30 ENCOUNTER — OFFICE VISIT (OUTPATIENT)
Dept: FAMILY MEDICINE | Facility: CLINIC | Age: 78
End: 2023-06-30
Payer: COMMERCIAL

## 2023-06-30 VITALS
OXYGEN SATURATION: 99 % | DIASTOLIC BLOOD PRESSURE: 78 MMHG | TEMPERATURE: 97.6 F | HEART RATE: 84 BPM | BODY MASS INDEX: 21.52 KG/M2 | WEIGHT: 114 LBS | HEIGHT: 61 IN | SYSTOLIC BLOOD PRESSURE: 128 MMHG

## 2023-06-30 DIAGNOSIS — G50.0 TRIGEMINAL NEURALGIA: ICD-10-CM

## 2023-06-30 DIAGNOSIS — F32.0 MILD SINGLE CURRENT EPISODE OF MAJOR DEPRESSIVE DISORDER (H): Primary | ICD-10-CM

## 2023-06-30 DIAGNOSIS — F11.90 CHRONIC, CONTINUOUS USE OF OPIOIDS: ICD-10-CM

## 2023-06-30 DIAGNOSIS — G89.4 CHRONIC PAIN SYNDROME: Chronic | ICD-10-CM

## 2023-06-30 PROCEDURE — 99214 OFFICE O/P EST MOD 30 MIN: CPT | Performed by: FAMILY MEDICINE

## 2023-06-30 RX ORDER — HYDROCODONE BITARTRATE AND ACETAMINOPHEN 10; 325 MG/1; MG/1
1 TABLET ORAL EVERY 6 HOURS PRN
Qty: 100 TABLET | Refills: 0 | Status: SHIPPED | OUTPATIENT
Start: 2023-07-30 | End: 2023-08-23

## 2023-06-30 RX ORDER — HYDROCODONE BITARTRATE AND ACETAMINOPHEN 10; 325 MG/1; MG/1
1 TABLET ORAL EVERY 6 HOURS PRN
Qty: 100 TABLET | Refills: 0 | Status: SHIPPED | OUTPATIENT
Start: 2023-06-30 | End: 2023-11-01

## 2023-06-30 ASSESSMENT — PAIN SCALES - GENERAL: PAINLEVEL: EXTREME PAIN (8)

## 2023-06-30 ASSESSMENT — PATIENT HEALTH QUESTIONNAIRE - PHQ9
SUM OF ALL RESPONSES TO PHQ QUESTIONS 1-9: 8
10. IF YOU CHECKED OFF ANY PROBLEMS, HOW DIFFICULT HAVE THESE PROBLEMS MADE IT FOR YOU TO DO YOUR WORK, TAKE CARE OF THINGS AT HOME, OR GET ALONG WITH OTHER PEOPLE: NOT DIFFICULT AT ALL
SUM OF ALL RESPONSES TO PHQ QUESTIONS 1-9: 8

## 2023-06-30 ASSESSMENT — ANXIETY QUESTIONNAIRES: GAD7 TOTAL SCORE: 6

## 2023-06-30 NOTE — PROGRESS NOTES
Assessment & Plan     Chronic, continuous use of opioids  Stable no change in treatment plan.   - HYDROcodone-acetaminophen (NORCO)  MG per tablet; Take 1 tablet by mouth every 6 hours as needed for severe pain Max 4  tabs per day.  - HYDROcodone-acetaminophen (NORCO)  MG per tablet; Take 1 tablet by mouth every 6 hours as needed for severe pain Max 4  tabs per day.    Trigeminal neuralgia  Stable no change in treatment plan.   - HYDROcodone-acetaminophen (NORCO)  MG per tablet; Take 1 tablet by mouth every 6 hours as needed for severe pain Max 4  tabs per day.  - HYDROcodone-acetaminophen (NORCO)  MG per tablet; Take 1 tablet by mouth every 6 hours as needed for severe pain Max 4  tabs per day.    Chronic pain syndrome  Stable no change in treatment plan.   - HYDROcodone-acetaminophen (NORCO)  MG per tablet; Take 1 tablet by mouth every 6 hours as needed for severe pain Max 4  tabs per day.  - HYDROcodone-acetaminophen (NORCO)  MG per tablet; Take 1 tablet by mouth every 6 hours as needed for severe pain Max 4  tabs per day.    Mild single current episode of major depressive disorder (H)  Improved on meds  She will continue   She will continue with therapy                    Gwendolyn Solis MD  [facility]    Ashok Molina is a 78 year old, presenting for the following health issues:  Pain        6/30/2023     3:18 PM   Additional Questions   Roomed by Jessenia     Pain    History of Present Illness       Mental Health Follow-up:  Patient presents to follow-up on Depression & Anxiety.Patient's depression since last visit has been:  Bad  The patient is not having other symptoms associated with depression.  Patient's anxiety since last visit has been:  Bad  The patient is not having other symptoms associated with anxiety.  Any significant life events: other  Patient is not feeling anxious or having panic attacks.  Patient has no concerns about  "alcohol or drug use.    Headaches:   Since the patient's last clinic visit, headaches are: worsened  The patient is getting headaches:  Daily I have a stiff neck may result in a headache would you prescribe a muscle relaxer?  She is able to do normal daily activities when she has a migraine.  The patient is taking the following rescue/relief medications:  Ibuprofen (Advil, Motrin) and Naproxyn (Aleve)   Patient states \"I get only a small amount of relief\" from the rescue/relief medications.   The patient is taking the following medications to prevent migraines:  No medications to prevent migraines  In the past 4 weeks, the patient has gone to an Urgent Care or Emergency Room 1 time times due to headaches.She consumes 0 sweetened beverage(s) daily.She exercises with enough effort to increase her heart rate 9 or less minutes per day.  She exercises with enough effort to increase her heart rate 3 or less days per week.     Today's PHQ-9         PHQ-9 Total Score: 8    PHQ-9 Q9 Thoughts of better off dead/self-harm past 2 weeks :   Not at all    How difficult have these problems made it for you to do your work, take care of things at home, or get along with other people: Not difficult at all  Today's ROMAIN-7 Score: 6               Review of Systems   Constitutional, HEENT, cardiovascular, pulmonary, gi and gu systems are negative, except as otherwise noted.      Objective    /78   Pulse 84   Temp 97.6  F (36.4  C) (Tympanic)   LMP  (LMP Unknown)   SpO2 99%   There is no height or weight on file to calculate BMI.  Physical Exam   GENERAL APPEARANCE: healthy, alert and no distress  PSYCH: mentation appears normal and affect normal/bright                    "

## 2023-07-04 ENCOUNTER — MYC MEDICAL ADVICE (OUTPATIENT)
Dept: FAMILY MEDICINE | Facility: CLINIC | Age: 78
End: 2023-07-04
Payer: COMMERCIAL

## 2023-07-05 ENCOUNTER — OFFICE VISIT (OUTPATIENT)
Dept: BEHAVIORAL HEALTH | Facility: CLINIC | Age: 78
End: 2023-07-05
Payer: COMMERCIAL

## 2023-07-05 DIAGNOSIS — F32.0 MILD SINGLE CURRENT EPISODE OF MAJOR DEPRESSIVE DISORDER (H): Primary | ICD-10-CM

## 2023-07-05 PROCEDURE — 90837 PSYTX W PT 60 MINUTES: CPT

## 2023-07-05 NOTE — PROGRESS NOTES
[facility] Primary [facility]: Integrated Behavioral Health  July 5, 2023    Behavioral Health Clinician Progress Note    Patient Name: Tracy Galloway       Service Type:  Individual      Service Location:   Face to Face in Clinic     Session Start Time: 2:00pm  Session End Time: 2:55pm      Session Length: 53 - 60      Attendees: Patient     Service Modality:  In-person    Visit Activities (Refresh list every visit): [facility] Only    Diagnostic Assessment Date: Will complete in the next few sessions, not completed due to time constraints.   Treatment Plan Review Date: Will complete in the next few sessions, not completed due to time constraints.   See Flowsheets for today's PHQ-9 and ROMAIN-7 results  Previous PHQ-9:       2/28/2023    12:40 PM 6/20/2023     1:14 PM 6/30/2023     3:13 PM   PHQ-9 SCORE   PHQ-9 Total Score MyChart 1 (Minimal depression) 23 (Severe depression) 8 (Mild depression)   PHQ-9 Total Score 1 23 8     Previous ROMAIN-7:       2/28/2023    12:41 PM 6/7/2023     3:02 PM 6/28/2023     7:42 AM   ROMAIN-7 SCORE   Total Score 0 (minimal anxiety) 21 (severe anxiety) 6 (mild anxiety)   Total Score 0 21 6     JEANETTE LEVEL:      2/17/2011    10:00 AM   JEANETTE Score (Last Two)   JEANETTE Raw Score 42   Activation Score 66   JEANETTE Level 3     DATA  Extended Session (60+ minutes): No  Interactive Complexity: No  Crisis: No  Located within [facility] Patient: No    Treatment Objective(s) Addressed in This Session:  Target Behavior(s): disease management/lifestyle changes pain management, depression and grief    Depressed Mood: Increase interest, engagement, and pleasure in doing things  Decrease frequency and intensity of feeling down, depressed, hopeless  Improve quantity and quality of night time sleep / decrease daytime naps  Feel less tired and more energy during the day   Improve diet, appetite, mindful eating, and / or meal planning  Identify negative self-talk and behaviors: challenge core beliefs, myths, and  actions  Improve concentration, focus, and mindfulness in daily activities   Feel less fidgety, restless or slow in daily activities / interpersonal interactions  Decrease thoughts that you'd be better off dead or of suicide / self-harm  Functional Impairment: will effectively address problems that interfere with adaptive functioning    Current Stressors / Issues:  Pt stated that she was struggling with depression and was feeling hopeless due to the extreme pain in her mouth.  She discussed how hard it was for her to focus and be active.  She discussed her frustrations with the changing of her pain medications, it was increased like she wanted and it still was painful.   Reviewed how to discuss with provider and staying open to various coping skills.  Prompt returning to pain management as needed.  Discuss self care and the care she was doing for her .  Reviewed coping skills including being outside, staying active, and yoga.  Provided her insight in to support groups and CC will reach out.    Progress on Treatment Objective(s) / Homework:  New Objective established this session - PREPARATION (Decided to change - considering how); Intervened by negotiating a change plan and determining options / strategies for behavior change, identifying triggers, exploring social supports, and working towards setting a date to begin behavior change    Motivational Interviewing    MI Intervention: Expressed Empathy/Understanding, Supported Autonomy, Collaboration, Evocation, Permission to raise concern or advise, Open-ended questions and Reflections: simple and complex     Change Talk Expressed by the Patient: Desire to change Ability to change    Provider Response to Change Talk: A - Affirmed patient's thoughts, decisions, or attempts at behavior change and R - Reflected patient's change talk    Also provided psychoeducation about behavioral health condition, symptoms, and treatment options    Care Plan review completed:  Yes    Medication Review:  No current psychiatric medications prescribed    Medication Compliance:  NA    Changes in Health Issues:   None reported    Chemical Use Review:   Substance Use: Chemical use reviewed, no active concerns identified      Tobacco Use: No current tobacco use.      Assessment: Current Emotional / Mental Status (status of significant symptoms):  Risk status (Self / Other harm or suicidal ideation)  Patient denies a history of suicidal ideation, suicide attempts, self-injurious behavior, homicidal ideation, homicidal behavior and and other safety concerns  Patient denies current fears or concerns for personal safety.  Patient reports the following current or recent suicidal ideation or behaviors: passive thoughts about not wanting to be here as she has to take care of her .  She stated that she would not act on the thoughts.  .  Patient denies current or recent homicidal ideation or behaviors.  Patient denies current or recent self injurious behavior or ideation.  Patient denies other safety concerns.  A safety and risk management plan has not been developed at this time, however patient was encouraged to call [place] [phone] should there be a change in any of these risk factors.    Appearance:   Appropriate   Eye Contact:   Good   Psychomotor Behavior: Normal   Attitude:   Cooperative  Guarded   Orientation:   All  Speech   Rate / Production: Emotional Normal    Volume:  Normal   Mood:    Anxious  Depressed  Normal  Affect:    Appropriate  Tearful Worrisome   Thought Content:  Clear   Thought Form:  Coherent  Logical   Insight:    Fair     Diagnoses:  1. Mild single current episode of major depressive disorder (H)      Collateral Reports Completed:  Not Applicable    Plan: (Homework, other):  Patient was given information about behavioral services and encouraged to schedule a follow up appointment with the clinic [facility] in 2 weeks.  She was also given information about mental health  symptoms and treatment options .  CD Recommendations: No indications of CD issues.     Dona Vega, [facility]  July 5, 2023                Answers for HPI/ROS submitted by the patient on 6/20/2023  If you checked off any problems, how difficult have these problems made it for you to do your work, take care of things at home, or get along with other people?: Extremely difficult  PHQ9 TOTAL SCORE: 23

## 2023-08-05 DIAGNOSIS — I10 ESSENTIAL HYPERTENSION WITH GOAL BLOOD PRESSURE LESS THAN 140/90: ICD-10-CM

## 2023-08-07 RX ORDER — DILTIAZEM HYDROCHLORIDE 180 MG/1
CAPSULE, EXTENDED RELEASE ORAL
Qty: 90 CAPSULE | Refills: 3 | Status: SHIPPED | OUTPATIENT
Start: 2023-08-07 | End: 2023-08-23 | Stop reason: ALTCHOICE

## 2023-08-23 ENCOUNTER — OFFICE VISIT (OUTPATIENT)
Dept: FAMILY MEDICINE | Facility: CLINIC | Age: 78
End: 2023-08-23
Payer: COMMERCIAL

## 2023-08-23 VITALS
HEART RATE: 80 BPM | SYSTOLIC BLOOD PRESSURE: 154 MMHG | OXYGEN SATURATION: 98 % | RESPIRATION RATE: 16 BRPM | BODY MASS INDEX: 21.72 KG/M2 | TEMPERATURE: 97.4 F | WEIGHT: 114 LBS | DIASTOLIC BLOOD PRESSURE: 88 MMHG

## 2023-08-23 DIAGNOSIS — G50.0 TRIGEMINAL NEURALGIA: ICD-10-CM

## 2023-08-23 DIAGNOSIS — G44.209 TENSION HEADACHE: ICD-10-CM

## 2023-08-23 DIAGNOSIS — G89.4 CHRONIC PAIN SYNDROME: Chronic | ICD-10-CM

## 2023-08-23 DIAGNOSIS — F11.90 CHRONIC, CONTINUOUS USE OF OPIOIDS: ICD-10-CM

## 2023-08-23 DIAGNOSIS — I10 ESSENTIAL HYPERTENSION WITH GOAL BLOOD PRESSURE LESS THAN 140/90: Primary | Chronic | ICD-10-CM

## 2023-08-23 PROCEDURE — 99214 OFFICE O/P EST MOD 30 MIN: CPT | Performed by: FAMILY MEDICINE

## 2023-08-23 RX ORDER — HYDROCODONE BITARTRATE AND ACETAMINOPHEN 10; 325 MG/1; MG/1
1 TABLET ORAL EVERY 6 HOURS PRN
Qty: 100 TABLET | Refills: 0 | Status: SHIPPED | OUTPATIENT
Start: 2023-09-05 | End: 2023-10-05

## 2023-08-23 RX ORDER — DILTIAZEM HYDROCHLORIDE 240 MG/1
240 CAPSULE, EXTENDED RELEASE ORAL DAILY
Qty: 90 CAPSULE | Refills: 3 | Status: SHIPPED | OUTPATIENT
Start: 2023-08-23 | End: 2023-12-28 | Stop reason: DRUGHIGH

## 2023-08-23 ASSESSMENT — ANXIETY QUESTIONNAIRES
5. BEING SO RESTLESS THAT IT IS HARD TO SIT STILL: NOT AT ALL
7. FEELING AFRAID AS IF SOMETHING AWFUL MIGHT HAPPEN: SEVERAL DAYS
2. NOT BEING ABLE TO STOP OR CONTROL WORRYING: SEVERAL DAYS
1. FEELING NERVOUS, ANXIOUS, OR ON EDGE: SEVERAL DAYS
GAD7 TOTAL SCORE: 6
IF YOU CHECKED OFF ANY PROBLEMS ON THIS QUESTIONNAIRE, HOW DIFFICULT HAVE THESE PROBLEMS MADE IT FOR YOU TO DO YOUR WORK, TAKE CARE OF THINGS AT HOME, OR GET ALONG WITH OTHER PEOPLE: SOMEWHAT DIFFICULT
4. TROUBLE RELAXING: SEVERAL DAYS
6. BECOMING EASILY ANNOYED OR IRRITABLE: SEVERAL DAYS
GAD7 TOTAL SCORE: 6
3. WORRYING TOO MUCH ABOUT DIFFERENT THINGS: SEVERAL DAYS

## 2023-08-23 ASSESSMENT — PAIN SCALES - GENERAL: PAINLEVEL: WORST PAIN (10)

## 2023-08-23 NOTE — PROGRESS NOTES
"  Assessment & Plan     Essential hypertension with goal blood pressure less than 140/90  Not well controlled   - diltiazem ER (DILT-XR) 240 MG 24 hr ER beaded capsule; Take 1 capsule (240 mg) by mouth daily    Tension headache  Acutely worse   Trial of muscle relaxor   Hoping to try trigger point injections with pain clinic   - tiZANidine (ZANAFLEX) 4 MG tablet; Take 1 tablet (4 mg) by mouth 3 times daily  - Pain Management  Referral; Future    Chronic pain syndrome  Stable no change in treatment plan.   - HYDROcodone-acetaminophen (NORCO)  MG per tablet; Take 1 tablet by mouth every 6 hours as needed for severe pain Max 4  tabs per day. 30 day supply    Chronic, continuous use of opioids  Stable no change in treatment plan.   - HYDROcodone-acetaminophen (NORCO)  MG per tablet; Take 1 tablet by mouth every 6 hours as needed for severe pain Max 4  tabs per day. 30 day supply    Trigeminal neuralgia  Stable no change in treatment plan.   - HYDROcodone-acetaminophen (NORCO)  MG per tablet; Take 1 tablet by mouth every 6 hours as needed for severe pain Max 4  tabs per day. 30 day supply                 MD SHAHNAZ [facility]    Ashok Molina is a 78 year old, presenting for the following health issues:  Depression and Jaw Pain        8/23/2023     3:10 PM   Additional Questions   Roomed by Rosibel CHRISTIE   Accompanied by Self         8/23/2023     3:10 PM   Patient Reported Additional Medications   Patient reports taking the following new medications .       History of Present Illness       Headaches:   Since the patient's last clinic visit, headaches are: worsened  The patient is getting headaches:  Constantly  She is able to do normal daily activities when she has a migraine.  The patient is taking the following rescue/relief medications:  Ibuprofen (Advil, Motrin), Tylenol and other   Patient states \"I get no relief\" from the rescue/relief " medications.   The patient is taking the following medications to prevent migraines:  No medications to prevent migraines  In the past 4 weeks, the patient has gone to an Urgent Care or Emergency Room 0 times times due to headaches.    She eats 0-1 servings of fruits and vegetables daily.She consumes 0 sweetened beverage(s) daily.She exercises with enough effort to increase her heart rate 30 to 60 minutes per day.  She exercises with enough effort to increase her heart rate 4 days per week.   She is taking medications regularly.         Social History     Tobacco Use    Smoking status: Former     Types: Cigarettes     Quit date: 10/30/1962     Years since quittin.8    Smokeless tobacco: Never   Vaping Use    Vaping Use: Never used   Substance Use Topics    Alcohol use: Yes     Alcohol/week: 4.0 standard drinks of alcohol     Types: 4 Standard drinks or equivalent per week     Comment: occasional    Drug use: No     Comment: medical marijuana in the past         2023    12:40 PM 2023     1:14 PM 2023     3:13 PM   PHQ   PHQ-9 Total Score 1 23 8   Q9: Thoughts of better off dead/self-harm past 2 weeks Not at all Nearly every day Not at all   F/U: Thoughts of suicide or self-harm  Yes    F/U: Self harm-plan  No    F/U: Self-harm action  No    F/U: Safety concerns  No          2023     3:02 PM 2023     7:42 AM 2023     3:09 PM   ROMAIN-7 SCORE   Total Score 21 (severe anxiety) 6 (mild anxiety) 6 (mild anxiety)   Total Score 21 6 6         Suicide Assessment Five-step Evaluation and Treatment (SAFE-T)    Hypertension Follow-up    Do you check your blood pressure regularly outside of the clinic? No   Are you following a low salt diet? No  Are your blood pressures ever more than 140 on the top number (systolic) OR more   than 90 on the bottom number (diastolic), for example 140/90?       Review of Systems   Constitutional, HEENT, cardiovascular, pulmonary, gi and gu systems are negative,  except as otherwise noted.      Objective    BP (!) 180/70   Pulse 80   Temp 97.4  F (36.3  C) (Tympanic)   Resp 16   Wt 51.7 kg (114 lb)   LMP  (LMP Unknown)   SpO2 98%   BMI 21.72 kg/m    Body mass index is 21.72 kg/m .  Physical Exam   GENERAL APPEARANCE: healthy, alert, and no distress  HENT: ear canals and TM's normal and nose and mouth without ulcers or lesions  NECK: no adenopathy, no asymmetry, masses, or scars, thyroid normal to palpation, and tender occipital and paraspinous msucles   Full rom but limited due to pain   RESP: lungs clear to auscultation - no rales, rhonchi or wheezes  CV: regular rates and rhythm, normal S1 S2, no S3 or S4, and no murmur, click or rub  NEURO: Normal strength and tone, mentation intact, and speech normal  PSYCH: mentation appears normal and affect normal/bright

## 2023-08-23 NOTE — NURSING NOTE
"Chief Complaint   Patient presents with    Depression    Jaw Pain     BP (!) 180/70   Pulse 80   Temp 97.4  F (36.3  C) (Tympanic)   Resp 16   Wt 51.7 kg (114 lb)   LMP  (LMP Unknown)   SpO2 98%   BMI 21.72 kg/m   Estimated body mass index is 21.72 kg/m  as calculated from the following:    Height as of 6/30/23: 1.543 m (5' 0.75\").    Weight as of this encounter: 51.7 kg (114 lb).  Patient presents to the clinic using No DME      Health Maintenance that is potentially due pending provider review:    Health Maintenance Due   Topic Date Due    HEPATITIS A IMMUNIZATION (2 of 2 - Risk 2-dose series) 10/12/2017    COVID-19 Vaccine (6 - Moderna series) 01/21/2023        n/a          "

## 2023-08-24 ENCOUNTER — TELEPHONE (OUTPATIENT)
Dept: FAMILY MEDICINE | Facility: CLINIC | Age: 78
End: 2023-08-24
Payer: COMMERCIAL

## 2023-08-24 DIAGNOSIS — R51.9 INTRACTABLE EPISODIC HEADACHE, UNSPECIFIED HEADACHE TYPE: Primary | ICD-10-CM

## 2023-08-25 ENCOUNTER — ALLIED HEALTH/NURSE VISIT (OUTPATIENT)
Dept: FAMILY MEDICINE | Facility: CLINIC | Age: 78
End: 2023-08-25
Payer: COMMERCIAL

## 2023-08-25 VITALS — SYSTOLIC BLOOD PRESSURE: 102 MMHG | DIASTOLIC BLOOD PRESSURE: 42 MMHG

## 2023-08-25 DIAGNOSIS — Z01.30 BP CHECK: Primary | ICD-10-CM

## 2023-08-25 DIAGNOSIS — I10 ESSENTIAL HYPERTENSION WITH GOAL BLOOD PRESSURE LESS THAN 140/90: ICD-10-CM

## 2023-08-25 PROCEDURE — 99207 PR NO CHARGE NURSE ONLY: CPT | Performed by: FAMILY MEDICINE

## 2023-08-25 NOTE — PROGRESS NOTES
Tracy Galloway was evaluated at [facility] on August 25, 2023 at which time her blood pressure was:    BP Readings from Last 1 Encounters:   08/25/23 102/42     Patient presents today with dizziness after diltiazem dose was increased from 180mg to 240mg. Patient was concerned that her home blood pressure machine is not accurate as her recent readings in clinic have been elevated and her home readings have been low. Patient states her blood pressure was high in clinic due to anxiety. She states that since her diltiazem dose was increased, she has been feeling dizzy.     Today's blood pressure reading from Patient's machine was 110/46 at 2:05pm. Manual reading was 102/42 at 2:11pm. Per [facility] Protocol, refer patient to urgent care or immediate provider evaluation. After discussing with PCP, outcome was to decrease diltiazem dose back to 180mg and continue to monitor patient for worsening symptoms.     Symptoms: Dizziness    BP Goal:< 140/90 mmHg    BP Assessment:  BP too low (< 90/60 mmHg)    Potential Reasons for BP too high: NA - Not applicable    BP Follow-Up Plan: Other: [facility] discussed with PCP in clinic and resulted in decrease diltiazem dose to 180 mg    Recommendation to Provider: Please follow up regarding BP readings and symptoms of HOTN. Pt is aware she is to stop the 240mg and resume 180mg and to get medical help right away if symptoms worsen.     Thank you,    Karsten Desai, PharmD  [facility]- [address] [address]  Phone: [phone]  [email or web address]

## 2023-08-25 NOTE — Clinical Note
Routing message to PCP for review because BP checked at pharmacy is low and patient is experiencing symptoms of hypotension. Recommended patient to follow-up with PCP.  PCP please close this encounter.

## 2023-08-28 RX ORDER — DILTIAZEM HYDROCHLORIDE 180 MG/1
CAPSULE, EXTENDED RELEASE ORAL
Qty: 90 CAPSULE | Refills: 3 | OUTPATIENT
Start: 2023-08-28

## 2023-08-30 ENCOUNTER — MYC MEDICAL ADVICE (OUTPATIENT)
Dept: FAMILY MEDICINE | Facility: CLINIC | Age: 78
End: 2023-08-30
Payer: COMMERCIAL

## 2023-08-30 DIAGNOSIS — M54.2 NECK PAIN: Primary | ICD-10-CM

## 2023-08-30 NOTE — TELEPHONE ENCOUNTER
Pls see FRESS message below.     PT order pended and message routed to provider for consideration.     Julie Behrendt RN

## 2023-09-06 ENCOUNTER — HOSPITAL ENCOUNTER (OUTPATIENT)
Dept: MRI IMAGING | Facility: CLINIC | Age: 78
Discharge: HOME | End: 2023-09-06
Attending: FAMILY MEDICINE | Admitting: FAMILY MEDICINE
Payer: COMMERCIAL

## 2023-09-06 DIAGNOSIS — R51.9 INTRACTABLE EPISODIC HEADACHE, UNSPECIFIED HEADACHE TYPE: ICD-10-CM

## 2023-09-06 PROCEDURE — 70553 MRI BRAIN STEM W/O & W/DYE: CPT

## 2023-09-06 PROCEDURE — A9585 GADOBUTROL INJECTION: HCPCS | Performed by: FAMILY MEDICINE

## 2023-09-06 PROCEDURE — 255N000002 HC RX 255 OP 636: Performed by: FAMILY MEDICINE

## 2023-09-06 RX ORDER — GADOBUTROL 604.72 MG/ML
5 INJECTION INTRAVENOUS ONCE
Status: COMPLETED | OUTPATIENT
Start: 2023-09-06 | End: 2023-09-06

## 2023-09-06 RX ADMIN — GADOBUTROL 5 ML: 604.72 INJECTION INTRAVENOUS at 12:49

## 2023-09-13 ENCOUNTER — THERAPY VISIT (OUTPATIENT)
Dept: PHYSICAL THERAPY | Facility: CLINIC | Age: 78
End: 2023-09-13
Attending: FAMILY MEDICINE
Payer: COMMERCIAL

## 2023-09-13 DIAGNOSIS — M54.2 NECK PAIN: ICD-10-CM

## 2023-09-13 PROCEDURE — 97161 PT EVAL LOW COMPLEX 20 MIN: CPT | Mod: GP | Performed by: PHYSICAL THERAPIST

## 2023-09-13 PROCEDURE — 97110 THERAPEUTIC EXERCISES: CPT | Mod: GP | Performed by: PHYSICAL THERAPIST

## 2023-09-13 PROCEDURE — 97140 MANUAL THERAPY 1/> REGIONS: CPT | Mod: GP | Performed by: PHYSICAL THERAPIST

## 2023-09-13 NOTE — PROGRESS NOTES
PHYSICAL THERAPY EVALUATION  Type of Visit: Evaluation    See electronic medical record for Abuse and Falls Screening details.    Subjective       Presenting condition or subjective complaint:    Pt relates pain started approx 3 months ago. Pt relates her biggest c/o is neck and headache however she also c/o of chronic LBP and hip pain. Pt tried 3 chiropractic adjustments and 3 massage therapy visits however it did not help. Pt is c/o of neck pain/stiffness with sitting and home tasks. Pt denies UE or LE radiculopathies. Pt also having constant headaches unsure of triggers. Pt has a history of jaw pain however pt relates this is different. Pt relates also under a lot of stress at home ( with dementia and landscaping issues). Pt is motivated to get better and maintain active lifestyle. Pt does like to go to the gym, hike with friends, walking on TM in morning and tries to do yoga in the afternoon.       Date of onset: 06/13/23    Relevant medical history:   depression, high BP  Dates & types of surgery:  pt relates that she cannot remember    Prior diagnostic imaging/testing results:     brain MRI, no cervical imaging      Prior Level of Function  Transfers: Independent  Ambulation: Independent  ADL: Independent        Employment:    retired      Patient goals for therapy:  reduce pain     NDI: 34%     Objective   CERVICAL SPINE EVALUATION  PAIN: Pain Level at Rest: 3/10  Pain Level with Use: 9/10  Pain Location: cervical spine  Pain Quality: Burning, Dull, Shooting, and headaches  Pain Frequency: constant  Pain is Exacerbated By: sitting, home tasks  Pain is Relieved By: heat, otc medications, and walking  Pain Progression: Worsened    POSTURE: Sitting Posture: Rounded shoulders, Forward head, Thoracic kyphosis increased    ROM:   (Degrees) Left AROM Right AROM    Cervical Flexion 22 w pain    Cervical Extension 45    Cervical Side bend 20 10    Cervical Rotation 35 30    Cervical Protrusion     Cervical  Retraction     Thoracic Flexion 100%    Thoracic Extension 0 deg     Thoracic Rotation 45 deg  45 deg     Left AROM Left PROM Right AROM Right PROM   Shoulder Flexion       Shoulder Extension       Shoulder Abduction       Shoulder Adduction       Shoulder IR       Shoulder ER         Shoulder ROM: WNL      Lumbar ROM:  Flex 12 in from floor  Ext: 10 deg - makes her dizzy but no room spinning       MYOTOMES:    Left Right   C1-2 (Neck Flexion) 4/5 w pain  4/5 w pain   C3 (Neck Side Bend)  4/5  4/5   C4 (Shrug) 5/5 5/5   C5 (Deltoid) 5/5 5/5   C6 (Biceps) 4/5 w pain 4/5 w pain     C7 (Triceps) 5/5 5/5    C8 (Thumb Ext)     T1 (Intrinsics)       DERMATOMES: WNL      SPECIAL TESTS:    Left Right   Alar Ligament negative   Cervical Flexion-Rotation positive    Cervical Rot/Lateral Flex positive    Compression negative    Distraction negative    Spurling s negative    Thoracic Outlet Screen (Bhavana, Adson)     Transverse Ligament negative    Vertebral Artery negative    Cotton Roll Test     Craniocervical Flexor Endurance Test     Mannheimer Test            PALPATION: Pt is TTP mod tension in B upper trap.       Assessment & Plan   CLINICAL IMPRESSIONS  Medical Diagnosis: Neck pain    Treatment Diagnosis: neck pain, LBP, headaches   Impression/Assessment: Patient is a 78 year old female with complaints of neck pain/LBP due to postural dyfunction.  The following significant findings have been identified: Pain, Decreased ROM/flexibility, Decreased joint mobility, Decreased strength, Impaired balance, and Impaired posture. These impairments interfere with their ability to perform self care tasks, work tasks, recreational activities, household chores, and driving  as compared to previous level of function.     Clinical Decision Making (Complexity):  Clinical Presentation: Stable/Uncomplicated  Clinical Presentation Rationale: based on medical and personal factors listed in PT evaluation  Clinical Decision Making (Complexity):  Low complexity    PLAN OF CARE  Treatment Interventions:  Modalities: Cryotherapy, E-stim, Hot Pack  Interventions: Manual Therapy, Neuromuscular Re-education, Therapeutic Activity, Therapeutic Exercise, Self-Care/Home Management    Long Term Goals     PT Goal 1  Goal Identifier: cervical roatoin  Goal Description: Pt will demonstrate 55 deg cervical roation w less than 1/10 pain to safely drive car  Target Date: 10/04/23  PT Goal 2  Goal Identifier: sitting  Goal Description: Pt will be able to sit for 30+ min w/o pain to watch favorite show  Target Date: 10/04/23  PT Goal 3  Goal Identifier: vacuuming  Goal Description: Pt will be able to complete tasks around home (ie vacuuming) w less than 1/10 pain  Target Date: 10/25/23  PT Goal 4  Goal Identifier: HEP  Goal Description: Pt will be compliant and competent in HEP to allow them to achieve maximal strength and reduce pain mobility  Target Date: 10/25/23      Frequency of Treatment: 1x/week  Duration of Treatment: 6 weeks      Education Assessment:   Learner/Method: Patient    Risks and benefits of evaluation/treatment have been explained.   Patient/Family/caregiver agrees with Plan of Care.     Evaluation Time:     PT Eval, Low Complexity Minutes (77079): 20     Signing Clinician: Allie Uriarte, PT        [facility]                                                                                   OUTPATIENT PHYSICAL THERAPY      PLAN OF TREATMENT FOR OUTPATIENT REHABILITATION   Patient's Last Name, First Name, Tracy Mcclellan YOB: 1945   Provider's Name   [facility]   Medical Record No.  [id number]     Onset Date: 06/13/23  Start of Care Date: 09/13/23     Medical Diagnosis:  Neck pain      PT Treatment Diagnosis:  neck pain, LBP, headaches Plan of Treatment  Frequency/Duration: 1x/week/ 6 weeks    Certification date from 09/13/23 to 10/25/23         See note for plan of treatment  details and functional goals     Allie Uriarte, PT                         I CERTIFY THE NEED FOR THESE SERVICES FURNISHED UNDER        THIS PLAN OF TREATMENT AND WHILE UNDER MY CARE     (Physician attestation of this document indicates review and certification of the therapy plan).                Referring Provider:  Gwendolyn Solis      Initial Assessment  See Epic Evaluation- Start of Care Date: 09/13/23

## 2023-09-14 ENCOUNTER — MYC MEDICAL ADVICE (OUTPATIENT)
Dept: FAMILY MEDICINE | Facility: CLINIC | Age: 78
End: 2023-09-14

## 2023-09-14 DIAGNOSIS — F51.01 PRIMARY INSOMNIA: Chronic | ICD-10-CM

## 2023-09-15 RX ORDER — ZOLPIDEM TARTRATE 10 MG/1
TABLET ORAL
Qty: 30 TABLET | Refills: 5 | Status: SHIPPED | OUTPATIENT
Start: 2023-09-15 | End: 2024-04-22

## 2023-09-20 ENCOUNTER — THERAPY VISIT (OUTPATIENT)
Dept: PHYSICAL THERAPY | Facility: CLINIC | Age: 78
End: 2023-09-20
Attending: FAMILY MEDICINE
Payer: COMMERCIAL

## 2023-09-20 DIAGNOSIS — M54.2 NECK PAIN: Primary | ICD-10-CM

## 2023-09-20 PROCEDURE — 97140 MANUAL THERAPY 1/> REGIONS: CPT | Mod: GP | Performed by: PHYSICAL THERAPIST

## 2023-09-20 PROCEDURE — 97110 THERAPEUTIC EXERCISES: CPT | Mod: GP | Performed by: PHYSICAL THERAPIST

## 2023-09-21 NOTE — TELEPHONE ENCOUNTER
Reason for Call:  UTI     Detailed comments: Patient was seen yesterday with KRISTINA Bello in [place] for this.  She is very frustrated and and demanded to speak with Nazia and have Dr. Solis put in an order so she can leave a urine sample.  She was told that Dr. Solis is out today and that someone else would call her about this.  She just wanted to speak with Nazia.  She refused to give any more information.    Phone Number Patient can be reached at: Home number on file [phone] (home)    Best Time: Any    Can we leave a detailed message on this number? YES    Call taken on 1/31/2020 at 12:53 PM by Mis Morse       Duration Of Freeze Thaw-Cycle (Seconds): 0 Render Note In Bullet Format When Appropriate: No Consent: The patient's consent was obtained including but not limited to risks of crusting, scabbing, blistering, scarring, darker or lighter pigmentary change, recurrence, incomplete removal and infection. Post-Care Instructions: I reviewed with the patient in detail post-care instructions. Patient is to wear sunprotection, and avoid picking at any of the treated lesions. Pt may apply Vaseline to crusted or scabbing areas. Show Applicator Variable?: Yes Detail Level: Detailed

## 2023-09-27 ENCOUNTER — THERAPY VISIT (OUTPATIENT)
Dept: PHYSICAL THERAPY | Facility: CLINIC | Age: 78
End: 2023-09-27
Attending: FAMILY MEDICINE
Payer: COMMERCIAL

## 2023-09-27 DIAGNOSIS — M54.2 NECK PAIN: Primary | ICD-10-CM

## 2023-09-27 PROCEDURE — 97140 MANUAL THERAPY 1/> REGIONS: CPT | Mod: GP | Performed by: PHYSICAL THERAPIST

## 2023-09-27 PROCEDURE — 97110 THERAPEUTIC EXERCISES: CPT | Mod: GP | Performed by: PHYSICAL THERAPIST

## 2023-09-27 ASSESSMENT — ANXIETY QUESTIONNAIRES
1. FEELING NERVOUS, ANXIOUS, OR ON EDGE: SEVERAL DAYS
IF YOU CHECKED OFF ANY PROBLEMS ON THIS QUESTIONNAIRE, HOW DIFFICULT HAVE THESE PROBLEMS MADE IT FOR YOU TO DO YOUR WORK, TAKE CARE OF THINGS AT HOME, OR GET ALONG WITH OTHER PEOPLE: VERY DIFFICULT
3. WORRYING TOO MUCH ABOUT DIFFERENT THINGS: SEVERAL DAYS
GAD7 TOTAL SCORE: 6
5. BEING SO RESTLESS THAT IT IS HARD TO SIT STILL: NOT AT ALL
6. BECOMING EASILY ANNOYED OR IRRITABLE: MORE THAN HALF THE DAYS
7. FEELING AFRAID AS IF SOMETHING AWFUL MIGHT HAPPEN: NOT AT ALL
2. NOT BEING ABLE TO STOP OR CONTROL WORRYING: SEVERAL DAYS
4. TROUBLE RELAXING: SEVERAL DAYS

## 2023-09-27 ASSESSMENT — PAIN SCALES - PAIN ENJOYMENT GENERAL ACTIVITY SCALE (PEG)
INTERFERED_GENERAL_ACTIVITY: 8
AVG_PAIN_PASTWEEK: 10 - PAIN AS BAD AS YOU CAN IMAGINE
INTERFERED_ENJOYMENT_LIFE: 9
PEG_TOTALSCORE: 9

## 2023-09-28 ENCOUNTER — OFFICE VISIT (OUTPATIENT)
Dept: PALLIATIVE MEDICINE | Facility: OTHER | Age: 78
End: 2023-09-28
Attending: FAMILY MEDICINE
Payer: COMMERCIAL

## 2023-09-28 VITALS
OXYGEN SATURATION: 99 % | WEIGHT: 119 LBS | DIASTOLIC BLOOD PRESSURE: 59 MMHG | SYSTOLIC BLOOD PRESSURE: 128 MMHG | BODY MASS INDEX: 22.67 KG/M2 | HEART RATE: 77 BPM

## 2023-09-28 DIAGNOSIS — M79.18 MYOFASCIAL PAIN: Primary | ICD-10-CM

## 2023-09-28 DIAGNOSIS — G44.209 TENSION HEADACHE: ICD-10-CM

## 2023-09-28 PROCEDURE — 96372 THER/PROPH/DIAG INJ SC/IM: CPT | Performed by: STUDENT IN AN ORGANIZED HEALTH CARE EDUCATION/TRAINING PROGRAM

## 2023-09-28 PROCEDURE — G0463 HOSPITAL OUTPT CLINIC VISIT: HCPCS | Mod: 25 | Performed by: STUDENT IN AN ORGANIZED HEALTH CARE EDUCATION/TRAINING PROGRAM

## 2023-09-28 PROCEDURE — 250N000009 HC RX 250: Performed by: STUDENT IN AN ORGANIZED HEALTH CARE EDUCATION/TRAINING PROGRAM

## 2023-09-28 PROCEDURE — 250N000011 HC RX IP 250 OP 636: Performed by: STUDENT IN AN ORGANIZED HEALTH CARE EDUCATION/TRAINING PROGRAM

## 2023-09-28 PROCEDURE — 20553 NJX 1/MLT TRIGGER POINTS 3/>: CPT | Performed by: STUDENT IN AN ORGANIZED HEALTH CARE EDUCATION/TRAINING PROGRAM

## 2023-09-28 RX ORDER — BUPIVACAINE HYDROCHLORIDE 5 MG/ML
4.5 INJECTION, SOLUTION EPIDURAL; INTRACAUDAL ONCE
Status: COMPLETED | OUTPATIENT
Start: 2023-09-28 | End: 2023-09-28

## 2023-09-28 RX ORDER — LIDOCAINE HYDROCHLORIDE 10 MG/ML
4.5 INJECTION, SOLUTION EPIDURAL; INFILTRATION; INTRACAUDAL; PERINEURAL ONCE
Status: COMPLETED | OUTPATIENT
Start: 2023-09-28 | End: 2023-09-28

## 2023-09-28 RX ORDER — TRIAMCINOLONE ACETONIDE 40 MG/ML
40 INJECTION, SUSPENSION INTRA-ARTICULAR; INTRAMUSCULAR ONCE
Status: COMPLETED | OUTPATIENT
Start: 2023-09-28 | End: 2023-09-28

## 2023-09-28 RX ADMIN — BUPIVACAINE HYDROCHLORIDE 22.5 MG: 5 INJECTION, SOLUTION EPIDURAL; INTRACAUDAL; PERINEURAL at 15:47

## 2023-09-28 RX ADMIN — TRIAMCINOLONE ACETONIDE 40 MG: 40 INJECTION, SUSPENSION INTRA-ARTICULAR; INTRAMUSCULAR at 15:48

## 2023-09-28 RX ADMIN — LIDOCAINE HYDROCHLORIDE 4.5 ML: 10 SOLUTION INTRAVENOUS at 15:48

## 2023-09-28 ASSESSMENT — PAIN SCALES - GENERAL
PAINLEVEL: EXTREME PAIN (8)
PAINLEVEL: EXTREME PAIN (8)

## 2023-09-28 ASSESSMENT — ANXIETY QUESTIONNAIRES: GAD7 TOTAL SCORE: 6

## 2023-09-28 NOTE — PATIENT INSTRUCTIONS
[facility]  Post Procedure Instructions    Today you saw:  Dr. Hameed    Today you had:  trigger point injections                       Medications used:  lidocaine   bupivicaine   kenolog          Go to the emergency room if you develop any shortness of breath  Monitor the injection sites for signs and symptoms of infection-fever, chills, redness, swelling, warmth, or drainage to areas.  You may have soreness at injection sites for up to 24 hours.  It may take up to 14 days for the steroid medication to start working although you may feel the effect as early as a few days after the procedure.     You may apply ice to the painful areas to help minimize the discomfort of the needle pokes.  Do not apply heat to sites for at least 12 hours.  You may use anti-inflammatory medications or Tylenol for pain control if necessary    Pain Clinic phone number:  [phone]

## 2023-09-28 NOTE — PROGRESS NOTES
S: Patient endorses ongoing pain in bilateral shoulders, nonmidline upper mid back  O: /59   Pulse 77   Wt 54 kg (119 lb)   LMP  (LMP Unknown)   SpO2 99%   BMI 22.67 kg/m     MSK - +TTP at following:  Bilateral levator scapulae  Bilateral upper trapezius  Bilateral thoracic paraspinals  A/P:  Myofascial pain: proceed with trigger point injections today in clinic    Pre procedure Diagnosis: myofascial pain   Post procedure Diagnosis: Same  Procedure performed: trigger point injections, CPT code 88252, 97939  Anesthesia: none  Complications: none  Operators: Patrick Hameed MD    Indications:   Tracy Galloway is a 78 year old female with a history of myofascial pain.  Exam shows myofascial pain of the muscle groups listed below, and they have tried conservative treatment including medications.    Options/alternatives, benefits and risks were discussed with the patient including bleeding, infection, tissue trauma and pnuemothorax.  Questions were answered to her satisfaction and she agrees to proceed. Voluntary informed consent was obtained and signed.     Vitals were reviewed: Yes  Allergies were reviewed:  Yes   Medications were reviewed:  Yes   Pre-procedure pain score: 8/10    Procedure:  After getting informed consent, a Pause for the Cause was performed.    Trigger points were identified by patient, and marked when appropriate.  The area was prepped with Chloroprep.    Using clean technique, injections were completed using a 25G, 1.5 inch needle.  After negative aspiration, injection was completed.  A total of 10 locations were injected.  When possible, tissue was retracted from the chest wall to avoid lung injury.    Muscle groups injected:  Bilateral levator scapulae x1  Bilateral upper trapezius x2  Bilateral thoracic paraspinals x2    Injection solution contained:  4.5ml of 1% lidocaine, 4.5ml of 0.5% bupivacaine, and 40mg kenalog .    Hemostasis was achieved, the area was cleaned, and bandaids  were placed when appropriate.  The patient tolerated the procedure well.    Post-procedure pain score: 8/10  Follow-up includes:   -repeat lei Hameed MD  Interventional Pain Medicine  [facility]

## 2023-09-28 NOTE — PROGRESS NOTES
Patient presents to the clinic today for a visit  with Patrick Hameed MD            1/20/2023     9:23 AM 6/30/2023     3:20 PM 9/28/2023     3:23 PM   PEG Score   PEG Total Score 2.67 2.67 8       UDS/CSA-    Medications-hydrocodone     QUESTIONS:    Allie Steel MA  [facility] [place]

## 2023-09-29 ENCOUNTER — MYC MEDICAL ADVICE (OUTPATIENT)
Dept: FAMILY MEDICINE | Facility: CLINIC | Age: 78
End: 2023-09-29
Payer: COMMERCIAL

## 2023-10-04 ENCOUNTER — OFFICE VISIT (OUTPATIENT)
Dept: FAMILY MEDICINE | Facility: CLINIC | Age: 78
End: 2023-10-04
Payer: COMMERCIAL

## 2023-10-04 VITALS
TEMPERATURE: 97 F | SYSTOLIC BLOOD PRESSURE: 154 MMHG | WEIGHT: 109 LBS | OXYGEN SATURATION: 99 % | DIASTOLIC BLOOD PRESSURE: 70 MMHG | BODY MASS INDEX: 20.77 KG/M2 | HEART RATE: 90 BPM | RESPIRATION RATE: 12 BRPM

## 2023-10-04 DIAGNOSIS — M54.50 CHRONIC RIGHT-SIDED LOW BACK PAIN WITHOUT SCIATICA: ICD-10-CM

## 2023-10-04 DIAGNOSIS — G50.0 TRIGEMINAL NEURALGIA: ICD-10-CM

## 2023-10-04 DIAGNOSIS — F11.90 CHRONIC, CONTINUOUS USE OF OPIOIDS: ICD-10-CM

## 2023-10-04 DIAGNOSIS — F41.1 ANXIETY STATE: ICD-10-CM

## 2023-10-04 DIAGNOSIS — G89.29 CHRONIC RIGHT-SIDED LOW BACK PAIN WITHOUT SCIATICA: ICD-10-CM

## 2023-10-04 DIAGNOSIS — F32.0 MILD SINGLE CURRENT EPISODE OF MAJOR DEPRESSIVE DISORDER (H): ICD-10-CM

## 2023-10-04 DIAGNOSIS — G89.4 CHRONIC PAIN SYNDROME: Primary | Chronic | ICD-10-CM

## 2023-10-04 PROCEDURE — 99214 OFFICE O/P EST MOD 30 MIN: CPT | Performed by: FAMILY MEDICINE

## 2023-10-04 RX ORDER — BUPROPION HYDROCHLORIDE 150 MG/1
150 TABLET ORAL EVERY MORNING
Qty: 90 TABLET | Refills: 1 | Status: CANCELLED | OUTPATIENT
Start: 2023-10-04

## 2023-10-04 RX ORDER — BUSPIRONE HYDROCHLORIDE 5 MG/1
10 TABLET ORAL 2 TIMES DAILY
Refills: 0 | Status: CANCELLED | OUTPATIENT
Start: 2023-10-04

## 2023-10-04 ASSESSMENT — ENCOUNTER SYMPTOMS
BACK PAIN: 1
HEADACHES: 1

## 2023-10-04 ASSESSMENT — PATIENT HEALTH QUESTIONNAIRE - PHQ9
10. IF YOU CHECKED OFF ANY PROBLEMS, HOW DIFFICULT HAVE THESE PROBLEMS MADE IT FOR YOU TO DO YOUR WORK, TAKE CARE OF THINGS AT HOME, OR GET ALONG WITH OTHER PEOPLE: VERY DIFFICULT
SUM OF ALL RESPONSES TO PHQ QUESTIONS 1-9: 11
SUM OF ALL RESPONSES TO PHQ QUESTIONS 1-9: 11

## 2023-10-04 ASSESSMENT — PAIN SCALES - GENERAL: PAINLEVEL: SEVERE PAIN (7)

## 2023-10-04 NOTE — PROGRESS NOTES
Assessment & Plan     Mild single current episode of major depressive disorder (H24)  Will restart her medication   Well butrin     Anxiety state  Will restart buspar    Chronic pain syndrome  NOT well controlled   Pt extremely upset with me for not increasing opioids   She is very labile with pain management   Very difficult to know what she is needing   Would like pain consult for recommendations for her   - Pain Management  Referral; Future    Chronic, continuous use of opioids  - Pain Management  Referral; Future    Trigeminal neuralgia    - Pain Management  Referral; Future    Chronic right-sided low back pain without sciatica    - Physical Therapy Referral; Future             Depression Screening Follow Up        10/4/2023     3:14 PM   PHQ   PHQ-9 Total Score 11   Q9: Thoughts of better off dead/self-harm past 2 weeks Several days   F/U: Thoughts of suicide or self-harm No   F/U: Safety concerns No         MD SHAHNAZ [facility]    Ashok Molina is a 78 year old, presenting for the following health issues:  Headache, Depression, and Back Pain        10/4/2023     3:18 PM   Additional Questions   Roomed by Rosibel CHRISTIE   Accompanied by Self         10/4/2023     3:18 PM   Patient Reported Additional Medications   Patient reports taking the following new medications .       History of Present Illness       Back Pain:  She presents for follow up of back pain. Patient's back pain is a new problem.    Original cause of back pain: other  First noticed back pain: 1-4 weeks ago  Patient feels back pain: constantlyLocation of back pain:  Right lower back, right upper back, right side of neck, left side of neck, right shoulder, left shoulder, right buttock, left buttock, right hip and left hip  Description of back pain: burning, dull ache, sharp, shooting and stabbing  Back pain spreads: right buttocks and left buttocks    Since patient first noticed  "back pain, pain is: gradually worsening  Does back pain interfere with her job:  No  On a scale of 1-10 (10 being the worst), patient describes pain as:  8  What makes back pain worse: certain positions, lying down, sitting and stress   Acupuncture: not tried  Acetaminophen: not helpful  Activity or exercise: not tried  Chiropractor:  Not helpful  Cold: not helpful  Heat: helpful  Massage: helpful  Muscle relaxants: not helpful  NSAIDS: helpful  Opioids: helpful  Physical Therapy: helpful  Rest: helpful  Steroid Injection: helpful  Stretching: helpful  Surgery: not tried  TENS unit: not tried  Topical pain relievers: helpful  Other healthcare providers patient is seeing for back pain: Physical Therapist    Mental Health Follow-up:  Patient presents to follow-up on Depression & Anxiety.Patient's depression since last visit has been:  Worse  The patient is having other symptoms associated with depression.  Patient's anxiety since last visit has been:  Worse  The patient is having other symptoms associated with anxiety.  Any significant life events: relationship concerns  Patient is feeling anxious or having panic attacks.  Patient has no concerns about alcohol or drug use.    Headaches:   Since the patient's last clinic visit, headaches are: worsened  The patient is getting headaches:  Constantly  She is not able to do normal daily activities when she has a migraine.  The patient is taking the following rescue/relief medications:  Ibuprofen (Advil, Motrin), Tylenol and other   Patient states \"I get some relief\" from the rescue/relief medications.   The patient is taking the following medications to prevent migraines:  No medications to prevent migraines  In the past 4 weeks, the patient has gone to an Urgent Care or Emergency Room 0 times times due to headaches.    She eats 0-1 servings of fruits and vegetables daily.She consumes 0 sweetened beverage(s) daily.She exercises with enough effort to increase her heart rate " 9 or less minutes per day.  She exercises with enough effort to increase her heart rate 3 or less days per week.   She is taking medications regularly.         PT is NOT suicidal   She is frustrated with my management of her pain         Review of Systems   Musculoskeletal:  Positive for back pain.   Neurological:  Positive for headaches.            Objective    BP (!) 154/70   Pulse 90   Temp 97  F (36.1  C) (Tympanic)   Resp 12   Wt 49.4 kg (109 lb)   LMP  (LMP Unknown)   SpO2 99%   BMI 20.77 kg/m    Body mass index is 20.77 kg/m .  Physical Exam   GENERAL APPEARANCE: healthy, alert, and no distress  PSYCH: mentation appears normal and angry

## 2023-10-04 NOTE — NURSING NOTE
"Chief Complaint   Patient presents with    Headache    Depression    Back Pain     BP (!) 154/70   Pulse 90   Temp 97  F (36.1  C) (Tympanic)   Resp 12   Wt 49.4 kg (109 lb)   LMP  (LMP Unknown)   SpO2 99%   BMI 20.77 kg/m   Estimated body mass index is 20.77 kg/m  as calculated from the following:    Height as of 6/30/23: 1.543 m (5' 0.75\").    Weight as of this encounter: 49.4 kg (109 lb).  Patient presents to the clinic using No DME      Health Maintenance that is potentially due pending provider review:    Health Maintenance Due   Topic Date Due    HEPATITIS A IMMUNIZATION (2 of 2 - Risk 2-dose series) 10/12/2017    INFLUENZA VACCINE (1) 09/01/2023    COVID-19 Vaccine (6 - 2023-24 season) 09/01/2023        n/a          "

## 2023-10-05 ENCOUNTER — MYC MEDICAL ADVICE (OUTPATIENT)
Dept: FAMILY MEDICINE | Facility: CLINIC | Age: 78
End: 2023-10-05
Payer: COMMERCIAL

## 2023-10-05 DIAGNOSIS — G89.4 CHRONIC PAIN SYNDROME: Chronic | ICD-10-CM

## 2023-10-05 DIAGNOSIS — F11.90 CHRONIC, CONTINUOUS USE OF OPIOIDS: ICD-10-CM

## 2023-10-05 DIAGNOSIS — G50.0 TRIGEMINAL NEURALGIA: ICD-10-CM

## 2023-10-05 RX ORDER — HYDROCODONE BITARTRATE AND ACETAMINOPHEN 10; 325 MG/1; MG/1
TABLET ORAL
Qty: 100 TABLET | Refills: 0 | Status: SHIPPED | OUTPATIENT
Start: 2023-10-05 | End: 2024-07-24

## 2023-10-06 ENCOUNTER — THERAPY VISIT (OUTPATIENT)
Dept: PHYSICAL THERAPY | Facility: CLINIC | Age: 78
End: 2023-10-06
Attending: FAMILY MEDICINE
Payer: COMMERCIAL

## 2023-10-06 DIAGNOSIS — G89.29 CHRONIC RIGHT-SIDED LOW BACK PAIN WITHOUT SCIATICA: ICD-10-CM

## 2023-10-06 DIAGNOSIS — M54.50 CHRONIC RIGHT-SIDED LOW BACK PAIN WITHOUT SCIATICA: ICD-10-CM

## 2023-10-06 PROCEDURE — 97161 PT EVAL LOW COMPLEX 20 MIN: CPT | Mod: GP | Performed by: PHYSICAL THERAPIST

## 2023-10-06 PROCEDURE — 97110 THERAPEUTIC EXERCISES: CPT | Mod: GP | Performed by: PHYSICAL THERAPIST

## 2023-10-06 NOTE — PROGRESS NOTES
PHYSICAL THERAPY EVALUATION  Type of Visit: Evaluation    See electronic medical record for Abuse and Falls Screening details.    Subjective       Presenting condition or subjective complaint:  Pt reports that she has had daily right sided low back pain and buttock pain.  Reports it all started when she got an injection for her neck pain.  Reports that pain is a sharp pain in the middle of her R buttock.  Reports sleeping is ok, however sitting increases symptoms.  Date of onset: 10/04/23    Relevant medical history:     Past Medical History:   Diagnosis Date    Anxiety w/panic attacks     Chest pain     6/2016 Nuclear study - mild ischemia of apex and anteroapical wall    Chronic LBP     Depressive disorder, not elsewhere classified     Facial pain 12/17/2009    HTN     Insomnia, unspecified     Migraine, unspecified, without mention of intractable migraine without mention of status migrainosus     Need for prophylactic hormone replacement therapy (postmenopausal)     Osteopenia     Plantar fasciitis     Prinzmetal angina (H24) 09/17/2007    Vision loss     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 Chronic findings are stable.    Prior therapy history for the same diagnosis, illness or injury:    Currently being seen in PT for neck pain    Prior Level of Function  Transfers: Independent  Ambulation: Independent  ADL: Independent    Employment:    Retired    Patient goals for therapy:  Reduce pain, improve tolerance to sitting    Pain assessment: Pain present     Objective   LUMBAR SPINE EVALUATION  INTEGUMENTARY (edema, incisions): WNL  POSTURE: Sitting Posture: Rounded shoulders  GAIT:   Weightbearing Status: WBAT  Assistive Device(s): None  Gait Deviations: WNL  ROM:  Lumbar Flexion: 85* - pain  Lumbar Extension: 5* - pain and popping  sensation  Lumbar R side bend: 4 inches from knee joint line - minimal symptoms  Lumbar L side bend: 3 inches from knee joint line - slight pain  PELVIC/SI SCREEN: Supine to Sit: (+) R, Sacroiliac Provocation Test: (+) R  R PSIS elevated; R ASIS inferior  STRENGTH:   R Hip Flexion: 4/5 painful  R knee extension: 5/5  R knee flexion: 5/5  R hip abduction: 4-/5  R hip extension: 4-/5    L Hip Flexion: 4+/5  L knee extension: 5/5  L knee flexion: 5/5  L hip abduction: 4/5  L hip extension: 4/5    DERMATOMES:  WNL  NEURAL TENSION:  WNL  FLEXIBILITY:  Restricted B lumbar paraspinals, R piriformis, R > L hamstrings  PALPATION:  TTP B lumbar paraspinals L1-L5, R SIJ, R PSIS, R piriformis  SPINAL SEGMENTAL CONCLUSIONS:  Hypomobility L1-L5      Assessment & Plan   CLINICAL IMPRESSIONS  Medical Diagnosis: Chronic Right sided low back pain without sciatica    Treatment Diagnosis: decreased lumbar mobility and pain   Impression/Assessment: Patient is a 78 year old female with Right sided low back and buttock pain complaints.  The following significant findings have been identified: Pain, Decreased ROM/flexibility, Decreased joint mobility, and Decreased strength. These impairments interfere with their ability to perform self care tasks, recreational activities, household chores, driving , and household mobility as compared to previous level of function.     Clinical Decision Making (Complexity):  Clinical Presentation: Stable/Uncomplicated  Clinical Presentation Rationale: based on medical and personal factors listed in PT evaluation  Clinical Decision Making (Complexity): Low complexity    PLAN OF CARE  Treatment Interventions:  Modalities: Cryotherapy, E-stim, Hot Pack, Mechanical Traction, Ultrasound  Interventions: Manual Therapy, Neuromuscular Re-education, Therapeutic Activity, Therapeutic Exercise, Self-Care/Home Management    Long Term Goals     PT Goal 1  Goal Identifier: sitting  Goal Description: Pt will report that  she is able to sit back into a chair with no increase in symptoms.  Target Date: 11/03/23  PT Goal 2  Goal Identifier: Lumbar flexion  Goal Description: pt will demonstrate a 10* lumbar flexion improvement to 95* in order to have reduced symptoms with reaching to the floor.  Target Date: 12/01/23  PT Goal 3  Goal Identifier: Lumbar extension  Goal Description: pt will demonstrate a 10* improvement in lumbar extension to 15* in order to have improved tolerane to standing and daily mobility.  Target Date: 12/01/23      Frequency of Treatment: 1x/week  Duration of Treatment: 8 weeks    Recommended Referrals to Other Professionals:  none  Education Assessment:   Learner/Method: Patient  Education Comments: PT POC and diagnosis    Risks and benefits of evaluation/treatment have been explained.   Patient/Family/caregiver agrees with Plan of Care.     Evaluation Time:     PT Eval, Low Complexity Minutes (20950): 10       Signing Clinician: Shereen Nielsen, PT      [facility]                                                                                   OUTPATIENT PHYSICAL THERAPY      PLAN OF TREATMENT FOR OUTPATIENT REHABILITATION   Patient's Last Name, First Name, SHAHNAZPETER GallowayTracy  OLMAN YOB: 1945   Provider's Name   [facility]   Medical Record No.  [id number]     Onset Date: 10/04/23  Start of Care Date: 10/06/23     Medical Diagnosis:  Chronic Right sided low back pain without sciatica      PT Treatment Diagnosis:  decreased lumbar mobility and pain Plan of Treatment  Frequency/Duration: 1x/week/ 8 weeks    Certification date from 10/06/23 to 12/01/23         See note for plan of treatment details and functional goals     Shereen Nielsen, PT                         I CERTIFY THE NEED FOR THESE SERVICES FURNISHED UNDER        THIS PLAN OF TREATMENT AND WHILE UNDER MY CARE     (Physician attestation of this document indicates review and  certification of the therapy plan).                Referring Provider:  Gwendolyn Solis      Initial Assessment  See Epic Evaluation- Start of Care Date: 10/06/23

## 2023-10-10 ENCOUNTER — THERAPY VISIT (OUTPATIENT)
Dept: PHYSICAL THERAPY | Facility: CLINIC | Age: 78
End: 2023-10-10
Attending: FAMILY MEDICINE
Payer: COMMERCIAL

## 2023-10-10 DIAGNOSIS — M54.2 NECK PAIN: Primary | ICD-10-CM

## 2023-10-10 PROCEDURE — 97140 MANUAL THERAPY 1/> REGIONS: CPT | Mod: GP | Performed by: PHYSICAL THERAPIST

## 2023-10-20 ENCOUNTER — MYC MEDICAL ADVICE (OUTPATIENT)
Dept: FAMILY MEDICINE | Facility: CLINIC | Age: 78
End: 2023-10-20

## 2023-10-25 ENCOUNTER — THERAPY VISIT (OUTPATIENT)
Dept: PHYSICAL THERAPY | Facility: CLINIC | Age: 78
End: 2023-10-25
Attending: FAMILY MEDICINE
Payer: COMMERCIAL

## 2023-10-25 ENCOUNTER — IMMUNIZATION (OUTPATIENT)
Dept: FAMILY MEDICINE | Facility: CLINIC | Age: 78
End: 2023-10-25
Payer: COMMERCIAL

## 2023-10-25 DIAGNOSIS — Z23 NEED FOR PROPHYLACTIC VACCINATION AND INOCULATION AGAINST INFLUENZA: Primary | ICD-10-CM

## 2023-10-25 DIAGNOSIS — M54.2 NECK PAIN: Primary | ICD-10-CM

## 2023-10-25 PROCEDURE — G0008 ADMIN INFLUENZA VIRUS VAC: HCPCS

## 2023-10-25 PROCEDURE — 99207 PR NO CHARGE NURSE ONLY: CPT

## 2023-10-25 PROCEDURE — 97110 THERAPEUTIC EXERCISES: CPT | Mod: GP | Performed by: PHYSICAL THERAPIST

## 2023-10-25 PROCEDURE — 97140 MANUAL THERAPY 1/> REGIONS: CPT | Mod: GP | Performed by: PHYSICAL THERAPIST

## 2023-10-25 PROCEDURE — 90662 IIV NO PRSV INCREASED AG IM: CPT

## 2023-10-31 ENCOUNTER — THERAPY VISIT (OUTPATIENT)
Dept: PHYSICAL THERAPY | Facility: CLINIC | Age: 78
End: 2023-10-31
Attending: FAMILY MEDICINE
Payer: COMMERCIAL

## 2023-10-31 DIAGNOSIS — M54.2 NECK PAIN: Primary | ICD-10-CM

## 2023-10-31 PROCEDURE — 97110 THERAPEUTIC EXERCISES: CPT | Mod: GP | Performed by: PHYSICAL THERAPIST

## 2023-10-31 PROCEDURE — 97140 MANUAL THERAPY 1/> REGIONS: CPT | Mod: GP | Performed by: PHYSICAL THERAPIST

## 2023-10-31 NOTE — PROGRESS NOTES
Physical Therapy Progress Note    [facility]                                                                                   OUTPATIENT PHYSICAL THERAPY    PLAN OF TREATMENT FOR OUTPATIENT REHABILITATION   Patient's Last Name, First Name, Tracy Mcclellan YOB: 1945   Provider's Name   SHAHNAZ [facility]   Medical Record No.  [id number]     Onset Date: 06/13/23  Start of Care Date: 09/13/23     Medical Diagnosis:  Neck pain      PT Treatment Diagnosis:  neck pain, LBP, headaches Plan of Treatment  Frequency/Duration: 1x/week/ 6 weeks    Certification date from 10/25/23 to 12/06/23         See note for plan of treatment details and functional goals     Shereen Nielsen, PT                         I CERTIFY THE NEED FOR THESE SERVICES FURNISHED UNDER        THIS PLAN OF TREATMENT AND WHILE UNDER MY CARE     (Physician attestation of this document indicates review and certification of the therapy plan).                Referring Provider:  Gwendolyn Solis      Initial Assessment  See Epic Evaluation- Start of Care Date: 09/13/23    PLAN  Continue therapy per current plan of care.  Pt demonstrates improving neck ROM and would benefit from 1-2 more physical therapy sessions to progress scapular strengthening.    Beginning/End Dates of Progress Note Reporting Period:  9/13/2023 to 10/31/2023    Referring Provider:  Gwendolyn Solis     10/31/23 0500   Appointment Info   Signing clinician's name / credentials Shereen Nielsen PT DPT CLT   Visits Used 6   Medical Diagnosis Neck pain   PT Tx Diagnosis neck pain, LBP, headaches   Quick Adds Certification   Progress Note/Certification   Start of Care Date 09/13/23   Onset of illness/injury or Date of Surgery 06/13/23   Therapy Frequency 1x/week   Predicted Duration 6 weeks   Certification date from 10/25/2023   Certification date to 12/6/2023   Progress Note Due Date 12/6/2023   Progress Note  Completed Date 10/31/2023   GOALS   PT Goals 2;3;5;4   PT Goal 1   Goal Identifier cervical roatoin   Goal Description Pt will demonstrate 55 deg cervical roation w less than 1/10 pain to safely drive car   Target Date 10/31/2023   Date Met 10/31/23   PT Goal 2   Goal Identifier sitting   Goal Description Pt will be able to sit for 30+ min w/o pain to watch favorite show   Target Date 10/31/2023   Date Met 10/31/23   PT Goal 3   Goal Identifier vacuuming   Goal Description Pt will be able to complete tasks around home (ie vacuuming) w less than 1/10 pain   Target Date 12/6/2023   PT Goal 4   Goal Identifier HEP   Goal Description Pt will be compliant and competent in HEP to allow them to achieve maximal strength and reduce pain mobility   Target Date 12/6/2023   Subjective Report   Subjective Report neck and back doing well, slight tightness in neck.   Objective Measures   Objective Measures Objective Measure 1;Objective Measure 2   Objective Measure 1   Objective Measure Cervical ROM   Details R rot: 50*; L rot: 50*; (following session R rot: 60*; L rot: 60*)   Treatment Interventions (PT)   Interventions Manual Therapy;Therapeutic Procedure/Exercise   Therapeutic Procedure/Exercise   Therapeutic Procedures: strength, endurance, ROM, flexibillity minutes (82847) 10   Ther Proc 1 scapular retraction   Ther Proc 1 - Details x 10 reps   PTRx Ther Proc 1 Row R TB attempted G TB   PTRx Ther Proc 1 - Details x 10 reps - cues for decreased overactivation of UT   PTRx Ther Proc 2 chin tuck seated   PTRx Ther Proc 2 - Details x 10 reps   PTRx Ther Proc 3 LS seated stretch   PTRx Ther Proc 3 - Details 15 sec hold x 2 reps   PTRx Ther Proc 4 prone scapular retraction   PTRx Ther Proc 4 - Details x 10 reps   PTRx Ther Proc 5 seated scapular retraction shoulder ER   PTRx Ther Proc 5 - Details R TB x 10 reps   Skilled Intervention improve mobility and strength through HEP   Patient Response/Progress overactivation of UT with  rows   Manual Therapy   Manual Therapy: Mobilization, MFR, MLD, friction massage minutes (96054) 15   Manual Therapy Manual Therapy 2;Manual Therapy 3;Manual Therapy 4;Manual Therapy 5   Manual Therapy 1 suboccipital release   Manual Therapy 1 - Details x 3 minutes   Manual Therapy 2 STM   Manual Therapy 2 - Details LS, UT, Scalenes   Manual Therapy 3 md back traction with stability ball   Manual Therapy 3 - Details x 2 minutes   Manual Therapy 4 - Details MWM C5-T5 to improve   Skilled Intervention imporve mobility and reduce pain   Patient Response/Progress improving ROM   Education   Learner/Method Patient   Education Comments return to MD due to minimal change   Plan   Home program [id number]   Plan for next session global core stab needed - ie rows, lats, seated TA sets and marching, posture on ball,   Comments   Comments 1x/week   Total Session Time   Timed Code Treatment Minutes 25   Total Treatment Time (sum of timed and untimed services) 25

## 2023-11-01 ENCOUNTER — OFFICE VISIT (OUTPATIENT)
Dept: FAMILY MEDICINE | Facility: CLINIC | Age: 78
End: 2023-11-01
Payer: COMMERCIAL

## 2023-11-01 VITALS
SYSTOLIC BLOOD PRESSURE: 112 MMHG | TEMPERATURE: 96.6 F | HEART RATE: 90 BPM | OXYGEN SATURATION: 97 % | DIASTOLIC BLOOD PRESSURE: 64 MMHG | RESPIRATION RATE: 12 BRPM

## 2023-11-01 DIAGNOSIS — G50.0 TRIGEMINAL NEURALGIA: ICD-10-CM

## 2023-11-01 DIAGNOSIS — G89.4 CHRONIC PAIN SYNDROME: Chronic | ICD-10-CM

## 2023-11-01 DIAGNOSIS — F11.90 CHRONIC, CONTINUOUS USE OF OPIOIDS: Primary | ICD-10-CM

## 2023-11-01 DIAGNOSIS — Z00.00 MEDICARE ANNUAL WELLNESS VISIT, SUBSEQUENT: ICD-10-CM

## 2023-11-01 LAB — CREAT UR-MCNC: 104 MG/DL

## 2023-11-01 PROCEDURE — G0480 DRUG TEST DEF 1-7 CLASSES: HCPCS | Performed by: FAMILY MEDICINE

## 2023-11-01 PROCEDURE — 99214 OFFICE O/P EST MOD 30 MIN: CPT | Mod: 25 | Performed by: FAMILY MEDICINE

## 2023-11-01 PROCEDURE — G0439 PPPS, SUBSEQ VISIT: HCPCS | Performed by: FAMILY MEDICINE

## 2023-11-01 RX ORDER — HYDROCODONE BITARTRATE AND ACETAMINOPHEN 10; 325 MG/1; MG/1
1 TABLET ORAL EVERY 6 HOURS PRN
Qty: 105 TABLET | Refills: 0 | Status: SHIPPED | OUTPATIENT
Start: 2023-11-01 | End: 2024-04-24

## 2023-11-01 RX ORDER — HYDROCODONE BITARTRATE AND ACETAMINOPHEN 10; 325 MG/1; MG/1
1 TABLET ORAL EVERY 6 HOURS PRN
Qty: 105 TABLET | Refills: 0 | Status: SHIPPED | OUTPATIENT
Start: 2024-01-01 | End: 2024-01-31

## 2023-11-01 RX ORDER — HYDROCODONE BITARTRATE AND ACETAMINOPHEN 10; 325 MG/1; MG/1
1 TABLET ORAL EVERY 6 HOURS PRN
Qty: 105 TABLET | Refills: 0 | Status: SHIPPED | OUTPATIENT
Start: 2023-12-01 | End: 2024-07-24

## 2023-11-01 RX ORDER — RESPIRATORY SYNCYTIAL VIRUS VACCINE 120MCG/0.5
0.5 KIT INTRAMUSCULAR ONCE
Qty: 1 EACH | Refills: 0 | Status: CANCELLED | OUTPATIENT
Start: 2023-11-01 | End: 2023-11-01

## 2023-11-01 ASSESSMENT — PATIENT HEALTH QUESTIONNAIRE - PHQ9
5. POOR APPETITE OR OVEREATING: SEVERAL DAYS
SUM OF ALL RESPONSES TO PHQ QUESTIONS 1-9: 12

## 2023-11-01 ASSESSMENT — ANXIETY QUESTIONNAIRES
5. BEING SO RESTLESS THAT IT IS HARD TO SIT STILL: NOT AT ALL
GAD7 TOTAL SCORE: 8
7. FEELING AFRAID AS IF SOMETHING AWFUL MIGHT HAPPEN: NOT AT ALL
3. WORRYING TOO MUCH ABOUT DIFFERENT THINGS: MORE THAN HALF THE DAYS
GAD7 TOTAL SCORE: 8
6. BECOMING EASILY ANNOYED OR IRRITABLE: SEVERAL DAYS
1. FEELING NERVOUS, ANXIOUS, OR ON EDGE: SEVERAL DAYS
2. NOT BEING ABLE TO STOP OR CONTROL WORRYING: NEARLY EVERY DAY

## 2023-11-01 ASSESSMENT — PAIN SCALES - GENERAL: PAINLEVEL: EXTREME PAIN (8)

## 2023-11-01 NOTE — NURSING NOTE
"Chief Complaint   Patient presents with    Pain     Follow up     /64   Pulse 90   Temp (!) 96.6  F (35.9  C) (Tympanic)   Resp 12   LMP  (LMP Unknown)   SpO2 97%  Estimated body mass index is 20.77 kg/m  as calculated from the following:    Height as of 6/30/23: 1.543 m (5' 0.75\").    Weight as of 10/4/23: 49.4 kg (109 lb).  Patient presents to the clinic using No DME      Health Maintenance that is potentially due pending provider review:    Health Maintenance Due   Topic Date Due    RSV VACCINE 60+ (1 - 1-dose 60+ series) Never done    HEPATITIS A IMMUNIZATION (2 of 2 - Risk 2-dose series) 10/12/2017    MEDICARE ANNUAL WELLNESS VISIT  11/29/2023    URINE DRUG SCREEN  11/29/2023    FALL RISK ASSESSMENT  11/29/2023    CONTROLLED SUBSTANCE AGREEMENT FOR CHRONIC PAIN MANAGEMENT  11/29/2023        n/a          "

## 2023-11-01 NOTE — LETTER
Opioid / Opioid Plus Controlled Substance Agreement    This is an agreement between you and your provider about the safe and appropriate use of controlled substance/opioids prescribed by your care team. Controlled substances are medicines that can cause physical and mental dependence (abuse).    There are strict laws about having and using these medicines. We here at [facility] are committing to working with you in your efforts to get better. To support you in this work, we ll help you schedule regular office appointments for medicine refills. If we must cancel or change your appointment for any reason, we ll make sure you have enough medicine to last until your next appointment.     As a Provider, I will:  Listen carefully to your concerns and treat you with respect.   Recommend a treatment plan that I believe is in your best interest. This plan may involve therapies other than opioid pain medication.   Talk with you often about the possible benefits, and the risk of harm of any medicine that we prescribe for you.   Provide a plan on how to taper (discontinue or go off) using this medicine if the decision is made to stop its use.    As a Patient, I understand that opioid(s):   Are a controlled substance prescribed by my care team to help me function or work and manage my condition(s).   Are strong medicines and can cause serious side effects such as:  Drowsiness, which can seriously affect my driving ability  A lower breathing rate, enough to cause death  Harm to my thinking ability   Depression   Abuse of and addiction to this medicine  Need to be taken exactly as prescribed. Combining opioids with certain medicines or chemicals (such as illegal drugs, sedatives, sleeping pills, and benzodiazepines) can be dangerous or even fatal. If I stop opioids suddenly, I may have severe withdrawal symptoms.  Do not work for all types of pain nor for all patients. If they re not helpful, I may be asked to stop  them.        The risks, benefits and side effects of these medicine(s) were explained to me. I agree that:  I will take part in other treatments as advised by my care team. This may be psychiatry or counseling, physical therapy, behavioral therapy, group treatment or a referral to a specialist.     I will keep all my appointments. I understand that this is part of the monitoring of opioids. My care team may require an office visit for EVERY opioid/controlled substance refill. If I miss appointments or don t follow instructions, my care team may stop my medicine.    I will take my medicines as prescribed. I will not change the dose or schedule unless my care team tells me to. There will be no refills if I run out early.     I may be asked to come to the clinic and complete a urine drug test or complete a pill count at any time. If I don t give a urine sample or participate in a pill count, the care team may stop my medicine.    I will only receive prescriptions from this clinic for chronic pain. If I am treated by another provider for acute pain issues, I will tell them that I am taking opioid pain medication for chronic pain and that I have a treatment agreement with this provider. I will inform my [facility] care team within one business day if I am given a prescription for any pain medication by another healthcare provider. My [facility] care team can contact other providers and pharmacists about my use of any medicines.    It is up to me to make sure that I don t run out of my medicines on weekends or holidays. If my care team is willing to refill my opioid prescription without a visit, I must request refills only during office hours. Refills may take up to 3 business days to process. I will use one pharmacy to fill all my opioid and other controlled substance prescriptions. I will notify the clinic about any changes to my insurance or medication availability.    I am responsible for my  prescriptions. If the medicine/prescription is lost, stolen or destroyed, it will not be replaced. I also agree not to share controlled substance medicines with anyone.    I am aware I should not use any illegal or recreational drugs. I agree not to drink alcohol unless my care team says I can.       If I enroll in the [place] [facility] Cannabis program, I will tell my care team prior to my next refill.     I will tell my care team right away if I become pregnant, have a new medical problem treated outside of my regular clinic, or have a change in my medications.    I understand that this medicine can affect my thinking, judgment and reaction time. Alcohol and drugs affect the brain and body, which can affect the safety of my driving. Being under the influence of alcohol or drugs can affect my decision-making, behaviors, personal safety, and the safety of others. Driving while impaired (DWI) can occur if a person is driving, operating, or in physical control of a car, motorcycle, boat, snowmobile, ATV, motorbike, off-road vehicle, or any other motor vehicle ([place] Statute 169A.20). I understand the risk if I choose to drive or operate any vehicle or machinery.    I understand that if I do not follow any of the conditions above, my prescriptions or treatment may be stopped or changed.          Opioids  What You Need to Know    What are opioids?   Opioids are pain medicines that must be prescribed by a doctor. They are also known as narcotics.     Examples are:   morphine (MS Contin, Ne)  oxycodone (Oxycontin)  oxycodone and acetaminophen (Percocet)  hydrocodone and acetaminophen (Vicodin, Norco)   fentanyl patch (Duragesic)   hydromorphone (Dilaudid)   methadone  codeine (Tylenol #3)     What do opioids do well?   Opioids are best for severe short-term pain such as after a surgery or injury. They may work well for cancer pain. They may help some people with long-lasting (chronic) pain.     What do opioids NOT do  well?   Opioids never get rid of pain entirely, and they don t work well for most patients with chronic pain. Opioids don t reduce swelling, one of the causes of pain.                                    Other ways to manage chronic pain and improve function include:     Treat the health problem that may be causing pain  Anti-inflammation medicines, which reduce swelling and tenderness, such as ibuprofen (Advil, Motrin) or naproxen (Aleve)  Acetaminophen (Tylenol)  Antidepressants and anti-seizure medicines, especially for nerve pain  Topical treatments such as patches or creams  Injections or nerve blocks  Chiropractic or osteopathic treatment  Acupuncture, massage, deep breathing, meditation, visual imagery, aromatherapy  Use heat or ice at the pain site  Physical therapy   Exercise  Stop smoking  Take part in therapy       Risks and side effects     Talk to your doctor before you start or decide to keep taking opioids. Possible side effects include:    Lowering your breathing rate enough to cause death  Overdose, including death, especially if taking higher than prescribed doses  Worse depression symptoms; less pleasure in things you usually enjoy  Feeling tired or sluggish  Slower thoughts or cloudy thinking  Being more sensitive to pain over time; pain is harder to control  Trouble sleeping or restless sleep  Changes in hormone levels (for example, less testosterone)  Changes in sex drive or ability to have sex  Constipation  Unsafe driving  Itching and sweating  Dizziness  Nausea, throwing up and dry mouth    What else should I know about opioids?    Opioids may lead to dependence, tolerance, or addiction.    Dependence means that if you stop or reduce the medicine too quickly, you will have withdrawal symptoms. These include loose poop (diarrhea), jitters, flu-like symptoms, nervousness and tremors. Dependence is not the same as addiction.                     Tolerance means needing higher doses over time to  get the same effect. This may increase the chance of serious side effects.    Addiction is when people improperly use a substance that harms their body, their mind or their relations with others. Use of opiates can cause a relapse of addiction if you have a history of drug or alcohol abuse.    People who have used opioids for a long time may have a lower quality of life, worse depression, higher levels of pain and more visits to doctors.    You can overdose on opioids. Take these steps to lower your risk of overdose:    Recognize the signs:  Signs of overdose include decrease or loss of consciousness (blackout), slowed breathing, trouble waking up and blue lips. If someone is worried about overdose, they should call 911.    Talk to your doctor about Narcan (naloxone).   If you are at risk for overdose, you may be given a prescription for Narcan. This medicine very quickly reverses the effects of opioids.   If you overdose, a friend or family member can give you Narcan while waiting for the ambulance. They need to know the signs of overdose and how to give Narcan.     Don't use alcohol or street drugs.   Taking them with opioids can cause death.    Do not take any of these medicines unless your doctor says it s OK. Taking these with opioids can cause death:  Benzodiazepines, such as lorazepam (Ativan), alprazolam (Xanax) or diazepam (Valium)  Muscle relaxers, such as cyclobenzaprine (Flexeril)  Sleeping pills like zolpidem (Ambien)   Other opioids      How to keep you and other people safe while taking opioids:    Never share your opioids with others.  Opioid medicines are regulated by the Drug Enforcement Agency ([employer]). Selling or sharing medications is a criminal act.    2. Be sure to store opioids in a secure place, locked up if possible. Young children can easily swallow them and overdose.    3. When you are traveling with your medicines, keep them in the original bottles. If you use a pill box, be sure you also  carry a copy of your medicine list from your clinic or pharmacy.    4. Safe disposal of opioids    Most pharmacies have places to get rid of medicine, called disposal kiosks. Medicine disposal options are also available in every [facility]. Search your county and  medication disposal  to find more options. You can find more details at:  https://[email or web address].[facility].[place]./living-green/managing-unwanted-medications     I agree that my provider, clinic care team, and pharmacy may work with any city, state or federal law enforcement agency that investigates the misuse, sale, or other diversion of my controlled medicine. I will allow my provider to discuss my care with, or share a copy of, this agreement with any other treating provider, pharmacy or emergency room where I receive care.    I have read this agreement and have asked questions about anything I did not understand.    _______________________________________________________  Patient Signature - Tracy Galloway _____________________                   Date     _______________________________________________________  Provider Signature - Gwendolyn Solis MD   _____________________                   Date     _______________________________________________________  Witness Signature (required if provider not present while patient signing)   _____________________                   Date

## 2023-11-01 NOTE — PROGRESS NOTES
Assessment & Plan     Chronic, continuous use of opioids  Fair control of pain   Discussed options to split dose   Sig stress flaring pain   - [id number] - Urine Drug Confirmation Panel (Comprehensive); Future  - HYDROcodone-acetaminophen (NORCO)  MG per tablet; Take 1 tablet by mouth every 6 hours as needed for severe pain Max 4  tabs per day.  - HYDROcodone-acetaminophen (NORCO)  MG per tablet; Take 1 tablet by mouth every 6 hours as needed for severe pain Max 4  tabs per day.  - HYDROcodone-acetaminophen (NORCO)  MG per tablet; Take 1 tablet by mouth every 6 hours as needed for severe pain Max 4  tabs per day.  - [id number] - Urine Drug Confirmation Panel (Comprehensive)    Trigeminal neuralgia  As above   - HYDROcodone-acetaminophen (NORCO)  MG per tablet; Take 1 tablet by mouth every 6 hours as needed for severe pain Max 4  tabs per day.  - HYDROcodone-acetaminophen (NORCO)  MG per tablet; Take 1 tablet by mouth every 6 hours as needed for severe pain Max 4  tabs per day.  - HYDROcodone-acetaminophen (NORCO)  MG per tablet; Take 1 tablet by mouth every 6 hours as needed for severe pain Max 4  tabs per day.    Chronic pain syndrome  As above   - HYDROcodone-acetaminophen (NORCO)  MG per tablet; Take 1 tablet by mouth every 6 hours as needed for severe pain Max 4  tabs per day.  - HYDROcodone-acetaminophen (NORCO)  MG per tablet; Take 1 tablet by mouth every 6 hours as needed for severe pain Max 4  tabs per day.  - HYDROcodone-acetaminophen (NORCO)  MG per tablet; Take 1 tablet by mouth every 6 hours as needed for severe pain Max 4  tabs per day.      Medicare Wellness   Risk due to high risk medication            MD SHAHNAZ [facility]    Ashok Molina is a 78 year old, presenting for the following health issues:  Pain (Follow up)        11/1/2023     3:09 PM   Additional Questions   Roomed by Rosibel CHRISTIE   Accompanied by  "Self         2023     3:09 PM   Patient Reported Additional Medications   Patient reports taking the following new medications .   Annual Wellness Visit    Patient has been advised of split billing requirements and indicates understanding: Yes     Are you in the first 12 months of your Medicare Part B coverage?  No    Physical Health:  In general, how would you rate your overall physical health? good  Outside of work, how many days during the week do you exercise?6-7 days/week  Outside of work, approximately how many minutes a day do you exercise?15-30 minutes  If you drink alcohol do you typically have >3 drinks per day or >7 drinks per week? Yes - AUDIT SCORE:       Do you usually eat at least 4 servings of fruit and vegetables a day, include whole grains & fiber and avoid regularly eating high fat or \"junk\" foods? Yes  Do you have any problems taking medications regularly? No  Do you have any side effects from medications? not applicable  Needs assistance for the following daily activities: no assistance needed  Which of the following safety concerns are present in your home?  none identified   Hearing impairment: No  In the past 6 months, have you been bothered by leaking of urine? no    Mental Health:  In general, how would you rate your overall mental or emotional health? good    Today's PHQ-9 Score:       2023     3:31 PM   PHQ-9 SCORE   PHQ-9 Total Score 12         Do you feel safe in your environment? Yes    Have you ever done Advance Care Planning? (For example, a Health Directive, POLST, or a discussion with a medical provider or your loved ones about your wishes)? No, advance care planning information given to patient to review.  Patient plans to discuss their wishes with loved ones or provider.      Fall risk:  Fallen 2 or more times in the past year?: No  Any fall with injury in the past year?: No    Cognitive Screenin) Repeat 3 items (Leader, Season, Table)    2) Clock draw:   3) 3 " item recall: Recalls 3 objects  Results: 3 items recalled: COGNITIVE IMPAIRMENT LESS LIKELY    Mini-CogTM Copyright S Lissette. Licensed by the author for use in [facility]; reprinted with permission (molly@.[email or web address]). All rights reserved.      Do you have sleep apnea, excessive snoring or daytime drowsiness? : no    Social History     Tobacco Use    Smoking status: Former     Types: Cigarettes     Quit date: 10/30/1962     Years since quittin.0    Smokeless tobacco: Never   Substance Use Topics    Alcohol use: Yes     Alcohol/week: 4.0 standard drinks of alcohol     Types: 4 Standard drinks or equivalent per week     Comment: occasional              No data to display              Do you have a current opioid prescription? Yes   How severe is your pain on a scale from 1-10? 8/10         Do you use any other controlled substances or medications that are not prescribed by a provider? Prescription Drugs    Current providers sharing in care for this patient include:   Patient Care Team:  Gwendolyn Solis MD as PCP - General (Family Medicine)  João Thomas MD as MD (Gastroenterology)  Xi Hayes PA-C as Physician Assistant (Dermatology)  Diana Her PA-C as Physician Assistant (Dermatology)  Diana Her PA-C as Assigned Surgical Provider  Gwendolyn Solis MD as Assigned Pain Medication Provider  Gwendolyn Solis MD as Assigned PCP  Sulma Lockwood APRN CNP as Nurse Practitioner  Patrick Hameed MD as MD (Physical Medicine and Rehabilitation)    The following health maintenance items are reviewed in Epic and correct as of today:  Health Maintenance   Topic Date Due    RSV VACCINE 60+ (1 - 1-dose 60+ series) Never done    HEPATITIS A IMMUNIZATION (2 of 2 - Risk 2-dose series) 10/12/2017    MEDICARE ANNUAL WELLNESS VISIT  2023    URINE DRUG SCREEN  2023    FALL RISK ASSESSMENT  2023    CONTROLLED SUBSTANCE AGREEMENT FOR CHRONIC PAIN  MANAGEMENT  2023    ANNUAL REVIEW OF HM ORDERS  2024    ROMAIN ASSESSMENT  2024    PHQ-9  2024    DEXA  2025    DTAP/TDAP/TD IMMUNIZATION (2 - Td or Tdap) 2027    LIPID  2027    ADVANCE CARE PLANNING  2027    DEPRESSION ACTION PLAN  Completed    INFLUENZA VACCINE  Completed    Pneumococcal Vaccine: 65+ Years  Completed    ZOSTER IMMUNIZATION  Completed    COVID-19 Vaccine  Completed    IPV IMMUNIZATION  Aged Out    HPV IMMUNIZATION  Aged Out    MENINGITIS IMMUNIZATION  Aged Out    RSV MONOCLONAL ANTIBODY  Aged Out    HEPATITIS C SCREENING  Discontinued    MAMMO SCREENING  Discontinued    COLORECTAL CANCER SCREENING  Discontinued       Patient has been advised of split billing requirements and indicates understanding: Yes    Appropriate preventive services were discussed with this patient, including applicable screening as appropriate for fall prevention, nutrition, physical activity, Tobacco-use cessation, weight loss and cognition.  Checklist reviewing preventive services available has been given to the patient.       Musculoskeletal Problem       Depression Followup  How are you doing with your depression since your last visit? No change - didn't see improvement with Buspar so she stopped   Are you having other symptoms that might be associated with depression? No  Have you had a significant life event?  No   Are you feeling anxious or having panic attacks?   Yes:  sometimes  Do you have any concerns with your use of alcohol or other drugs? No    Social History     Tobacco Use    Smoking status: Former     Types: Cigarettes     Quit date: 10/30/1962     Years since quittin.0    Smokeless tobacco: Never   Vaping Use    Vaping Use: Never used   Substance Use Topics    Alcohol use: Yes     Alcohol/week: 4.0 standard drinks of alcohol     Types: 4 Standard drinks or equivalent per week     Comment: occasional    Drug use: No     Comment: medical marijuana in the past          6/20/2023     1:14 PM 6/30/2023     3:13 PM 10/4/2023     3:14 PM   PHQ   PHQ-9 Total Score 23 8 11   Q9: Thoughts of better off dead/self-harm past 2 weeks Nearly every day Not at all Several days   F/U: Thoughts of suicide or self-harm Yes  No   F/U: Self harm-plan No     F/U: Self-harm action No     F/U: Safety concerns No  No         6/28/2023     7:42 AM 8/23/2023     3:09 PM 9/27/2023    10:27 PM   ROMAIN-7 SCORE   Total Score 6 (mild anxiety) 6 (mild anxiety) 6 (mild anxiety)   Total Score 6 6 6         Suicide Assessment Five-step Evaluation and Treatment (SAFE-T)        Pain History:  When did you first notice your pain? Chronic   Have you seen this provider for your pain in the past? Yes   Where in your body do you have pain? Low back, neck, shoulder, buttocks and hips  Are you seeing anyone else for your pain? Yes - Physical Therapy, Pain management referral made         6/20/2023     1:14 PM 6/30/2023     3:13 PM 10/4/2023     3:14 PM   PHQ-9 SCORE   PHQ-9 Total Score MyChart 23 (Severe depression) 8 (Mild depression) 11 (Moderate depression)   PHQ-9 Total Score 23 8 11           6/28/2023     7:42 AM 8/23/2023     3:09 PM 9/27/2023    10:27 PM   ROMAIN-7 SCORE   Total Score 6 (mild anxiety) 6 (mild anxiety) 6 (mild anxiety)   Total Score 6 6 6               Chronic Pain Follow Up:    Location of pain: Low back, neck, shoulder, buttocks and hips and mouth  Analgesia/pain control:    - Recent changes:  mouth pain    - Overall control: Tolerable with discomfort    - Current treatments: Norco, ibuprofen, tizanidine   Adherence:     - Do you ever take more pain medicine than prescribed? No    - When did you take your last dose of pain medicine?  Noon today   Adverse effects: No   PDMP Review         Value Time User    State PDMP site checked  Yes 10/5/2023 10:12 AM Gwendolyn Solis MD          Last CSA Agreement:   CSA -- Patient Level:     [Media Unavailable] Controlled Substance Agreement - Opioid  - Scan on 11/29/2022  2:22 PM   [Media Unavailable] Controlled Substance Agreement - Opioid - Scan on 1/24/2022  3:56 PM   [Media Unavailable] Controlled Substance Agreement - Opioid - Scan on 3/30/2021 10:04 AM   [Media Unavailable] Controlled Substance Agreement - Opioid - Scan on 3/18/2021  8:58 AM   [Media Unavailable] Controlled Substance Agreement - Opioid - Scan on 3/4/2020  1:30 PM       Last UDS: 12/1/2022                Review of Systems   Constitutional, HEENT, cardiovascular, pulmonary, gi and gu systems are negative, except as otherwise noted.      Objective    /64   Pulse 90   Temp (!) 96.6  F (35.9  C) (Tympanic)   Resp 12   LMP  (LMP Unknown)   SpO2 97%   There is no height or weight on file to calculate BMI.  Physical Exam   GENERAL APPEARANCE: healthy, alert, and no distress  RESP: lungs clear to auscultation - no rales, rhonchi or wheezes  CV: regular rates and rhythm, normal S1 S2, no S3 or S4, and no murmur, click or rub  PSYCH: mentation appears normal and angry at times

## 2023-11-03 ENCOUNTER — PATIENT OUTREACH (OUTPATIENT)
Dept: CARE COORDINATION | Facility: CLINIC | Age: 78
End: 2023-11-03
Payer: COMMERCIAL

## 2023-11-05 ENCOUNTER — MYC MEDICAL ADVICE (OUTPATIENT)
Dept: FAMILY MEDICINE | Facility: CLINIC | Age: 78
End: 2023-11-05
Payer: COMMERCIAL

## 2023-11-06 ENCOUNTER — MYC MEDICAL ADVICE (OUTPATIENT)
Dept: FAMILY MEDICINE | Facility: CLINIC | Age: 78
End: 2023-11-06
Payer: COMMERCIAL

## 2023-11-07 LAB
DHC UR CFM-MCNC: 1840 NG/ML
DHC/CREAT UR: 1769 NG/MG {CREAT}
HYDROCODONE UR CFM-MCNC: 7820 NG/ML
HYDROCODONE/CREAT UR: 7519 NG/MG {CREAT}
HYDROMORPHONE UR CFM-MCNC: 1080 NG/ML
HYDROMORPHONE/CREAT UR: 1038 NG/MG {CREAT}

## 2023-11-08 ENCOUNTER — MYC MEDICAL ADVICE (OUTPATIENT)
Dept: FAMILY MEDICINE | Facility: CLINIC | Age: 78
End: 2023-11-08
Payer: COMMERCIAL

## 2023-11-10 ENCOUNTER — MYC MEDICAL ADVICE (OUTPATIENT)
Dept: FAMILY MEDICINE | Facility: CLINIC | Age: 78
End: 2023-11-10
Payer: COMMERCIAL

## 2023-11-13 ENCOUNTER — MYC MEDICAL ADVICE (OUTPATIENT)
Dept: FAMILY MEDICINE | Facility: CLINIC | Age: 78
End: 2023-11-13
Payer: COMMERCIAL

## 2023-11-16 ENCOUNTER — TELEPHONE (OUTPATIENT)
Dept: FAMILY MEDICINE | Facility: CLINIC | Age: 78
End: 2023-11-16

## 2023-11-16 DIAGNOSIS — H10.31 ACUTE BACTERIAL CONJUNCTIVITIS OF RIGHT EYE: Primary | ICD-10-CM

## 2023-11-16 RX ORDER — POLYMYXIN B SULFATE AND TRIMETHOPRIM 1; 10000 MG/ML; [USP'U]/ML
1-2 SOLUTION OPHTHALMIC EVERY 4 HOURS
Qty: 10 ML | Refills: 0 | Status: SHIPPED | OUTPATIENT
Start: 2023-11-16 | End: 2024-02-23

## 2023-11-17 ENCOUNTER — PATIENT OUTREACH (OUTPATIENT)
Dept: CARE COORDINATION | Facility: CLINIC | Age: 78
End: 2023-11-17
Payer: COMMERCIAL

## 2023-11-19 ENCOUNTER — MYC MEDICAL ADVICE (OUTPATIENT)
Dept: FAMILY MEDICINE | Facility: CLINIC | Age: 78
End: 2023-11-19
Payer: COMMERCIAL

## 2023-11-20 ENCOUNTER — HOSPITAL ENCOUNTER (EMERGENCY)
Facility: CLINIC | Age: 78
Discharge: HOME | End: 2023-11-20
Attending: FAMILY MEDICINE | Admitting: FAMILY MEDICINE
Payer: COMMERCIAL

## 2023-11-20 VITALS
TEMPERATURE: 98.5 F | HEIGHT: 61 IN | OXYGEN SATURATION: 98 % | HEART RATE: 87 BPM | WEIGHT: 108 LBS | SYSTOLIC BLOOD PRESSURE: 147 MMHG | BODY MASS INDEX: 20.39 KG/M2 | RESPIRATION RATE: 18 BRPM | DIASTOLIC BLOOD PRESSURE: 82 MMHG

## 2023-11-20 DIAGNOSIS — H10.31 ACUTE CONJUNCTIVITIS OF RIGHT EYE, UNSPECIFIED ACUTE CONJUNCTIVITIS TYPE: ICD-10-CM

## 2023-11-20 DIAGNOSIS — R45.851 SUICIDAL IDEATION: ICD-10-CM

## 2023-11-20 PROBLEM — F32.9 MAJOR DEPRESSION: Status: ACTIVE | Noted: 2023-11-20

## 2023-11-20 PROCEDURE — 99284 EMERGENCY DEPT VISIT MOD MDM: CPT | Performed by: FAMILY MEDICINE

## 2023-11-20 PROCEDURE — 99283 EMERGENCY DEPT VISIT LOW MDM: CPT | Performed by: FAMILY MEDICINE

## 2023-11-20 PROCEDURE — 250N000009 HC RX 250: Performed by: FAMILY MEDICINE

## 2023-11-20 RX ORDER — OFLOXACIN 3 MG/ML
1-2 SOLUTION/ DROPS OPHTHALMIC
Qty: 5 ML | Refills: 0 | Status: SHIPPED | OUTPATIENT
Start: 2023-11-20 | End: 2023-11-27

## 2023-11-20 RX ORDER — TETRACAINE HYDROCHLORIDE 5 MG/ML
1-2 SOLUTION OPHTHALMIC ONCE
Status: COMPLETED | OUTPATIENT
Start: 2023-11-20 | End: 2023-11-20

## 2023-11-20 RX ADMIN — TETRACAINE HYDROCHLORIDE 2 DROP: 5 SOLUTION OPHTHALMIC at 11:09

## 2023-11-20 ASSESSMENT — ACTIVITIES OF DAILY LIVING (ADL): ADLS_ACUITY_SCORE: 35

## 2023-11-20 ASSESSMENT — VISUAL ACUITY
OD: 20/200
OS: 20/30

## 2023-11-20 NOTE — ED TRIAGE NOTES
Pt presents with right eye pain and drainage for 3 weeks.      Triage Assessment (Adult)       Row Name 11/20/23 1000          Triage Assessment    Airway WDL WDL        Respiratory WDL    Respiratory WDL WDL        Skin Circulation/Temperature WDL    Skin Circulation/Temperature WDL WDL        Cardiac WDL    Cardiac WDL WDL        Peripheral/Neurovascular WDL    Peripheral Neurovascular WDL WDL        Cognitive/Neuro/Behavioral WDL    Cognitive/Neuro/Behavioral WDL WDL

## 2023-11-20 NOTE — DISCHARGE INSTRUCTIONS
ICD-10-CM    1. Acute conjunctivitis of right eye, unspecified acute conjunctivitis type  H10.31 Adult Eye  Referral    start ofloxacin and follow-up with eye clinic in the next few days - eval for irritis and oither causes of current symptoms. Your vision was only 20/200 today. I want you to be seen in eye clinic today to eval for iritis.  our  is working on this.      2. Suicidal ideation  R45.851     please see safety plan.  return here for planning or preparation.  please see your primary provider. back asap and please see your mental health provider back,,

## 2023-11-20 NOTE — CONSULTS
Diagnostic Evaluation Consultation  Crisis Assessment    Patient Name: Tracy Galloway  Age:  78 year old  Legal Sex: female  Gender Identity: female  Pronouns:   Race: White  Ethnicity: Not  or   Language: English    Patient was assessed: Virtual: [place] Crisis Assessment Start Time: 1135 Crisis Assessment Stop Time: 1205  Patient location: [facility] EMERGENCY DEPT                             ED12    Referral Data and Chief Complaint  Tracy Galloway presents to the ED by  self. Patient is presenting to the ED for the following concerns: Health stressors (Eye issues).   Factors that make the mental health crisis life threatening or complex are:  Pt comes to ED with chronic jaw pain, eye issues and is dealing with a hsband who has dementia. Pt is currently denying any SI/HI/NSSI. Pt states she doesn't know why she responded the way she did in triage and answered no to all questions.    Informed Consent and Assessment Methods  Explained the crisis assessment process, including applicable information disclosures and limits to confidentiality, assessed understanding of the process, and obtained consent to proceed with the assessment.  Assessment methods included conducting a formal interview with patient, review of medical records, collaboration with medical staff, and obtaining relevant collateral information from family and community providers when available.  : done     Patient response to interventions: eager to participate, verbalizes understanding  Coping skills were attempted to reduce the crisis:  Therapy, med management, exercise.     History of the Crisis   Pt reports a hx of depression and was prescribed medications in the past and stopped taking them 3 months ago because she didn't feel liek they were helping. Pt had therapy in the past, attended one session and has not been back. Pt states she plans to call her therapist today to resume therapy with them.    Brief Psychosocial  History  Family:  , Children yes (Pt has 2 children)  Support System:  , Sibling(s) (Daughter Kylah and son Rowdy)  Employment Status:  retired  Source of Income:  pension/longterm, social security  Financial Environmental Concerns:  none  Current Hobbies:  reading, exercise/fitness, interaction with pets, television/movies/videos  Barriers in Personal Life:   (Pt denied)    Significant Clinical History  Current Anxiety Symptoms:   (Pt denied)  Current Depression/Trauma:  crying or feels like crying, thoughts of death/suicide  Current Somatic Symptoms:   (Pt denied)  Current Psychosis/Thought Disturbance:   (Pt denied)  Current Eating Symptoms:   (Pt denied)  Chemical Use History:  Alcohol: None  Benzodiazepines: None  Opiates: None  Cocaine: None  Marijuana: None  Other Use: None  Withdrawal Symptoms:  (Pt denied)  Addictions:  (Pt denied)   Past diagnosis:  Depression  Family history:     Past treatment:  No known formal treatment attempts, Individual therapy, Psychiatric Medication Management, Primary Care  Details of most recent treatment:  Pt attended Individual Therapy for one session and stopped attending however plans to reschedule sessions today. Pt was on medications in the past and stopped taking them about 3 months ago because they were not working. Pt is not sure she will go back on medications or not at this time.     Collateral Information  Is there collateral information: No (Pt did not want writer to reach out to her children or .)       Risk Assessment  Benzie Suicide Severity Rating Scale Full Clinical Version:  Suicidal Ideation  Q1 Wish to be Dead (Lifetime): Yes  Q2 Non-Specific Active Suicidal Thoughts (Lifetime): No  3. Active Suicidal Ideation with any Methods (Not Plan) Without Intent to Act (Lifetime): No  Q4 Active Suicidal Ideation with Some Intent to Act, Without Specific Plan (Lifetime): No  Q5 Active Suicidal Ideation with Specific Plan and Intent  (Lifetime): No  Q6 Suicide Behavior (Lifetime): no     Suicidal Behavior (Lifetime)  Actual Attempt (Lifetime): No  Has subject engaged in non-suicidal self-injurious behavior? (Lifetime): No  Interrupted Attempts (Lifetime): No  Aborted or Self-Interrupted Attempt (Lifetime): No  Preparatory Acts or Behavior (Lifetime): No    Nicollet Suicide Severity Rating Scale Recent:   Suicidal Ideation (Recent)  Q1 Wished to be Dead (Past Month): no (Pt states she changed her mind)  Q2 Suicidal Thoughts (Past Month): no (Pt states she changed her mind)  Q3 Suicidal Thought Method: no (Pt states she changed her mind)  Q4 Suicidal Intent without Specific Plan: no (Pt states she changed her mind)  Q5 Suicide Intent with Specific Plan: no  Level of Risk per Screen: low risk  Intensity of Ideation (Recent)  Most Severe Ideation Rating (Past 1 Month):  (Pt denied any)  Frequency (Past 1 Month):  (Pt denied any)  Duration (Past 1 Month):  (Pt denied any)  Controllability (Past 1 Month):  (Pt denied any)  Deterrents (Past 1 Month):  (Pt denied any)  Reasons for Ideation (Past 1 Month): Does not apply  Suicidal Behavior (Recent)  Actual Attempt (Past 3 Months): No  Total Number of Actual Attempts (Past 3 Months): 0  Has subject engaged in non-suicidal self-injurious behavior? (Past 3 Months): No  Interrupted Attempts (Past 3 Months): No  Total Number of Interrupted Attempts (Past 3 Months): 0  Aborted or Self-Interrupted Attempt (Past 3 Months): No  Total Number of Aborted or Self-Interrupted Attempts (Past 3 Months): 0  Preparatory Acts or Behavior (Past 3 Months): No  Total Number of Preparatory Acts (Past 3 Months): 0    Environmental or Psychosocial Events: other life stressors, challenging interpersonal relationships  Protective Factors: Protective Factors: strong bond to family unit, community support, or employment, lives in a responsibly safe and stable environment, intact marriage or domestic partnership, responsibilities and  duties to others, including pets and children, sense of belonging, good problem-solving, coping, and conflict resolution skills, good impulse control, cultural, spiritual , or Hindu beliefs associated with meaning and value in life, optimistic outlook - identification of future goals, constructive use of leisure time, enjoyable activities, resilience    Does the patient have thoughts of harming others? Feels Like Hurting Others: no  Previous Attempt to Hurt Others: no  Current presentation:  (Pt denied)  Is the patient engaging in sexually inappropriate behavior?: no    Is the patient engaging in sexually inappropriate behavior?  no        Mental Status Exam   Affect: Appropriate, Flat  Appearance: Appropriate  Attention Span/Concentration: Attentive  Eye Contact: Other (please comment) (mostly closed and then place blanket over head)    Fund of Knowledge: Appropriate   Language /Speech Content: Fluent  Language /Speech Volume: Soft  Language /Speech Rate/Productions: Minimally Responsive  Recent Memory: Intact  Remote Memory: Intact  Mood: Irritable  Orientation to Person: Yes   Orientation to Place: Yes  Orientation to Time of Day: Yes  Orientation to Date: Yes     Situation (Do they understand why they are here?): Yes  Psychomotor Behavior: Normal  Thought Content: Clear  Thought Form: Intact      Medication  Psychotropic medications: none     Current Care Team  Patient Care Team:  Gwendolyn Solis MD as PCP - General (Family Medicine)  João Thomas MD as MD (Gastroenterology)  Xi Hayes PA-C as Physician Assistant (Dermatology)  Diana Her PA-C as Physician Assistant (Dermatology)  Diana Her PA-C as Assigned Surgical Provider  Gwendolyn Solis MD as Assigned Pain Medication Provider  Gwendolyn Solis MD as Assigned PCP  Sulma Lockwood APRN CNP as Nurse Practitioner  Patrick Hameed MD as MD (Physical Medicine and Rehabilitation)    Diagnosis  Patient  Active Problem List   Diagnosis Code    Anxiety state F41.1    Plantar fasciitis M72.2    CARDIOVASCULAR SCREENING; LDL GOAL LESS THAN 130 Z13.6    Atrophic vaginitis N95.2    Chronic pain G89.29    Health Care Home Z76.89    Essential hypertension with goal blood pressure less than 140/90 I10    Narcotic dependence (H) F11.20    Visual impairment H54.7    TIA (transient ischemic attack) G45.9    Mammogram declined Z53.20    Advanced directives, counseling/discussion Z71.89    Primary insomnia F51.01    Mild single current episode of major depressive disorder (H24) F32.0    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 It is the recommendation of this clinician that pt discharge with OP  support. Currently the pt is not presenting as an acute risk to self or others due to the following factors: Pt denied any SI/HI/NSSI. Pt states she feels she is safe and can be safe discharging home. Pt reports she has a therapist and med provider to follow up with should she choose to pertaining to her depression. Pt declined any other services being offered during the assessment.    Patient coping skills attempted to reduce the crisis:  Therapy, med management, exercise.    Disposition  Recommended disposition: Individual Therapy, Medication Management        Reviewed case and recommendations with attending provider. Attending Name: Dr Loja       Attending concurs with disposition: yes       Patient and/or validated legal guardian  concurs with disposition:   yes       Final disposition:  discharge    Legal status on admission: Voluntary/Patient has signed consent for treatment    Assessment Details   Total duration spent with the patient: 30 min     CPT code(s) utilized: 28053 - Psychotherapy for Crisis - 60 (30-74*) min    Jeny Dawson [facility], Psychotherapist  [facility] - Triage & Transition Services  Callback: [phone]

## 2023-11-20 NOTE — ED NOTES
Pt presents to ED with c/o drainage, swelling, and pain to right eye for 3 weeks. Pt states has been using hot compresses on eye without improvement.

## 2023-11-20 NOTE — ED PROVIDER NOTES
History     Chief Complaint   Patient presents with    Eye Problem     HPI  Tracy Galloway is a 78 year old female who presents with eye irritation that has been present for the last 2 to 3 weeks and has been tried on Polytrim.  She continues to get mattering in the morning and eye discomfort.  She tells me she has a permanent contact in place that is been sutured in place but I believe this is likely an intraocular lens and worries about possible fluorescein.  She has had no eye injury.  She uses no other contacts or topicals.  She has pain intermittently in this region and some swelling of the upper lid at times.  She has no obvious vision change.  Periodic sense of light sensitivity.  No iritis history.    She also describes suicidality on depression screening and feels that she would not take her own life but wishes she were not alive.  Has been significant stress to take care of her  with dementia and her chronic pain issues for 20 years.  She has chronic dental pain.  No planning or preparing for suicide.      Allergies:  No Known Allergies    Problem List:    Patient Active Problem List    Diagnosis Date Noted    Neck pain 09/13/2023     Priority: Medium    Chronic, continuous use of opioids 11/29/2022     Priority: Medium    Trigeminal neuralgia 02/25/2020     Priority: Medium    Dilation of biliary tract 06/10/2019     Priority: Medium     ERCP with removal of sludge and sphincterotomy      Mild single current episode of major depressive disorder (H24) 02/15/2019     Priority: Medium    Primary insomnia 02/23/2018     Priority: Medium     ambien dependent.  6 mo supply given 12/15/21      Advanced directives, counseling/discussion 07/18/2017     Priority: Medium     Advance Care Planning 8/1/2015: ACP Review of Chart / Resources Provided:  Reviewed chart for advance care plan.  Tracy Galloway has an up to date advance care plan on file.  Added by Nazia Jang      Mammogram declined 01/23/2017      Priority: Medium    TIA (transient ischemic attack) 10/30/2016     Priority: Medium    Narcotic dependence (H) 10/09/2016     Priority: Medium    Essential hypertension with goal blood pressure less than 140/90 08/22/2016     Priority: Medium    Health Care Home 12/27/2012     Priority: Medium     Kate Kade, RN-PHN  FPAFTAB / LIZETH [facility]   [phone]    DX V65.8 REPLACED WITH 89741 HEALTH CARE HOME (04/08/2013)      Chronic pain 11/02/2012     Priority: Medium     Patient is followed by Gwendolyn Solis MD for ongoing prescription of pain medication.  All refills should be approved by this provider only at face-to-face appointments - not by phone request.    Medication(s): norco .   Maximum quantity per month: 60  Clinic visit frequency required: Q 3 months     Controlled substance agreement:  Encounter-Level CSA - 7/7/15:               Controlled Substance Agreement - Scan on 7/10/2015  1:04 PM : CONTROLLED SUBSTANCE AGREEMENT (below)            Pain Clinic evaluation in the past: Yes       Date/Location:   2009 multiple facial pain evaluations neurology and pain clinic     DIRE Total Score(s):  No flowsheet data found.    Last [facility] website verification:  done   [email or web address]              Atrophic vaginitis 02/27/2011     Priority: Medium    CARDIOVASCULAR SCREENING; LDL GOAL LESS THAN 130 10/31/2010     Priority: Medium    Visual impairment 09/17/2007     Priority: Medium     Overview:   monovision in right eye      Anxiety state 05/03/2005     Priority: Medium    Plantar fasciitis 05/03/2005     Priority: Medium        Past Medical History:    Past Medical History:   Diagnosis Date    Anxiety w/panic attacks     Chest pain     Chronic LBP     Depressive disorder, not elsewhere classified     Facial pain 12/17/2009    HTN     Insomnia, unspecified     Migraine, unspecified, without mention of intractable migraine without mention of status migrainosus     Need for  prophylactic hormone replacement therapy (postmenopausal)     Osteopenia     Plantar fasciitis     Prinzmetal angina (H24) 2007    Vision loss        Past Surgical History:    Past Surgical History:   Procedure Laterality Date    CHOLECYSTECTOMY, LAPOROSCOPIC  1990    s/p Cholecystectomy, Laparoscopic    COLONOSCOPY  2012    Procedure: COLONOSCOPY;  Colonoscopy;  Surgeon: Tyrell Brown MD;  Location: [facility]    ENDOSCOPIC RETROGRADE CHOLANGIOPANCREATOGRAM N/A 2019    Procedure: ENDOSCOPIC RETROGRADE CHOLANGIOPANCREATOGRAPHY;  Surgeon: Mio Blunt MD;  Location:  OR    EYE SURGERY      HYSTERECTOMY, SUNDAR  1970    severe endometriosis    SURGICAL HISTORY OF -   1998    breast reduction    SURGICAL HISTORY OF -   2003    coronary angiogram    SURGICAL HISTORY OF -       Refractive surgery right    TONSILLECTOMY      ZZHC COLONOSCOPY THRU STOMA, DIAGNOSTIC  2002    at outside clinic.  Normal.       Family History:    Family History   Problem Relation Age of Onset    Hypertension Mother     Heart Disease Mother     Hypertension Father     Circulatory Father         PAD    Alzheimer Disease Maternal Grandmother         dementia    Cerebrovascular Disease Maternal Grandmother     Heart Disease Maternal Grandfather     Diabetes Maternal Grandfather        Social History:  Marital Status:   [2]  Social History     Tobacco Use    Smoking status: Former     Types: Cigarettes     Quit date: 10/30/1962     Years since quittin.0    Smokeless tobacco: Never   Vaping Use    Vaping Use: Never used   Substance Use Topics    Alcohol use: Yes     Alcohol/week: 4.0 standard drinks of alcohol     Types: 4 Standard drinks or equivalent per week     Comment: occasional    Drug use: No     Comment: medical marijuana in the past        Medications:    ofloxacin (OCUFLOX) 0.3 % ophthalmic solution  aspirin (ASA) 81 MG tablet  diltiazem ER (DILT-XR) 240 MG 24 hr ER beaded  "capsule  DOCUSATE SODIUM PO  HYDROcodone-acetaminophen (NORCO)  MG per tablet  [START ON 12/1/2023] HYDROcodone-acetaminophen (NORCO)  MG per tablet  [START ON 1/1/2024] HYDROcodone-acetaminophen (NORCO)  MG per tablet  HYDROcodone-acetaminophen (NORCO)  MG per tablet  HYDROcodone-acetaminophen (NORCO)  MG per tablet  ibuprofen (ADVIL/MOTRIN) 200 MG tablet  lisinopril (ZESTRIL) 20 MG tablet  polymixin b-trimethoprim (POLYTRIM) 32380-0.1 UNIT/ML-% ophthalmic solution  tiZANidine (ZANAFLEX) 4 MG tablet  valACYclovir (VALTREX) 1000 mg tablet  zolpidem (AMBIEN) 10 MG tablet          Review of Systems  ROS:  5 point ROS negative except as noted above in HPI, including Gen., Resp., CV, GI &  system review.      Physical Exam   BP: (!) 147/82  Pulse: 87  Temp: 98.5  F (36.9  C)  Resp: 18  Height: 154.9 cm (5' 1\")  Weight: 49 kg (108 lb)  SpO2: 98 %      Physical Exam  Constitutional:       General: She is in acute distress.      Appearance: She is not diaphoretic.   Eyes:      General: Lids are normal.         Right eye: Hordeolum (?small residual upper right lid) present. No foreign body or discharge.      Conjunctiva/sclera:      Right eye: Right conjunctiva is injected. No chemosis or exudate.     Slit lamp exam:     Right eye: No corneal flare, foreign body, hyphema or hypopyon.   Musculoskeletal:      Cervical back: Neck supple.   Neurological:      Mental Status: She is alert.   Psychiatric:         Attention and Perception: Attention normal.         Mood and Affect: Mood is depressed.         Thought Content: Thought content includes suicidal ideation. Thought content does not include suicidal plan.       I did not use fluorescein as patient was concerned about its use given her intraocular lens.  Suspect this would not of been an issue but her history of having had a permanent contact was unclear.    Her ocular pressure was 15 on the right eye  Vision was reduced to 20/200 in the " right eye      ED Course         Suicide assessment completed by mental health (D.E.C., LCSW, etc.)       Procedures              Critical Care time:  none               No results found. However, due to the size of the patient record, not all encounters were searched. Please check Results Review for a complete set of results.    Medications   tetracaine (PONTOCAINE) 0.5 % ophthalmic solution 1-2 drop (2 drops Right Eye $Given 11/20/23 1366)       Assessments & Plan (with Medical Decision Making)     MDM: Tracy Galloway is a 78 year old female presenting with conjunctival irritation for the last 2 to 3 weeks has been tried on Polytrim.  Findings on picture she takes demonstrate drainage each morning from the eye.  I do not see that currently there is mild conjunctival injection no ciliary flush or other changes and relatively normal slit-lamp exam although could not use fluorescein given patient's concerns.  Did recommend ofloxacin and then following up with eye clinic.  She still needs an eye vision check and I placed the order for that today.  She also describes depressed mood and suicidality we will have her meet with DEC.  Asked her permission for this.  Likely will require  safety plan and follow-up        I have reviewed the nursing notes.    I have reviewed the findings, diagnosis, plan and need for follow up with the patient.           Medical Decision Making  The patient's presentation was of moderate complexity (an undiagnosed new problem with uncertain diagnosis).    The patient's evaluation involved:  history and exam without other [facility] data elements    The patient's management necessitated moderate risk (prescription drug management including medications given in the ED).        New Prescriptions    OFLOXACIN (OCUFLOX) 0.3 % OPHTHALMIC SOLUTION    Place 1-2 drops into the right eye every 4 hours (while awake) for 7 days       Final diagnoses:   Acute conjunctivitis of right eye, unspecified acute  conjunctivitis type - start ofloxacin and follow-up with eye clinic in the next few days - eval for irritis and oither causes of current symptoms. Your vision was only 20/200 today. I want you to be seen in eye clinic today to eval for iritis.  our  is working on this.   Suicidal ideation - please see safety plan.  return here for planning or preparation.  please see your primary provider. back asap and please see your mental health provider back,,       11/20/2023   [facility] EMERGENCY DEPT       Brant Loja MD  11/20/23 8714

## 2023-11-20 NOTE — ED NOTES
"Pt states SI, has hx of depression with SI and has been seen in clinic for depression and has been prescribed medications. Pt states no intent behind SI but does state \"I wish I wasn't alive\". Pt does have a plan/means to complete plan. Expresses a lot of stress at home with caregiver stress. Pt also states she would never do it d/t Worship reasons.   "

## 2023-11-21 ENCOUNTER — PATIENT OUTREACH (OUTPATIENT)
Dept: FAMILY MEDICINE | Facility: CLINIC | Age: 78
End: 2023-11-21
Payer: COMMERCIAL

## 2023-11-21 NOTE — TELEPHONE ENCOUNTER
"ED/Discharge Protocol    \"Hi, my name is Kasandra Huerta RN, a registered nurse, and I am calling on behalf of Dr. Dr Solis's office at [place].  I am calling to follow up and see how things are going for you after your recent visit.\"    \"I see that you were in the (ER/UC/IP) on 11/20/23.    How are you doing now that you are home?\" \"I'm ok otherwise\".     Is patient experiencing symptoms that may require a hospital visit?  no    Discharge Instructions    \"Let's review your discharge instructions.  What is/are the follow-up recommendations?  Pt. Response: Tracy states she has an opthalmology appt tomorrow.     \"Were you instructed to make a follow-up appointment?\"  Pt. Response: Yes.  Has appointment been made?   Yes , with ophthalmology     \"When you see the provider, I would recommend that you bring your discharge instructions with you.    Medications    \"How many new medications are you on since your hospitalization/ED visit?\"    0-1  \"How many of your current medicines changed (dose, timing, name, etc.) while you were in the hospital/ED visit?\"   0-1  \"Do you have questions about your medications?\"   No  \"Were you newly diagnosed with heart failure, COPD, diabetes or did you have a heart attack?\"   No  For patients on insulin: \"Did you start on insulin in the hospital or did you have your insulin dose changed?\"   No  Post Discharge Medication Reconciliation Status: patient was not discharged from an inpatient facility or TCU.    Was MTM referral placed (*Make sure to put transitions as reason for referral)?   No    Call Summary    \"Do you have any questions or concerns about your condition or care plan at the moment?\"    Yes no  Triage nurse advice given: no    Patient was in ER 2 in the past year (assess appropriateness of ER visits.)      \"If you have questions or things don't continue to improve, we encourage you contact us through the main clinic number,  [phone].  Even if the clinic is not open, " "triage nurses are available 24/7 to help you.     We would like you to know that our clinic has extended hours (provide information).  We also have urgent care (provide details on closest location and hours/contact info)\"      \"Thank you for your time and take care!\"  Kasandra BARRETT RN        "

## 2023-12-22 ENCOUNTER — ALLIED HEALTH/NURSE VISIT (OUTPATIENT)
Dept: FAMILY MEDICINE | Facility: CLINIC | Age: 78
End: 2023-12-22
Payer: COMMERCIAL

## 2023-12-22 DIAGNOSIS — Z01.30 BP CHECK: Primary | ICD-10-CM

## 2023-12-22 PROCEDURE — 99207 PR NO CHARGE NURSE ONLY: CPT | Performed by: FAMILY MEDICINE

## 2023-12-22 NOTE — PROGRESS NOTES
Tracy Galloway was evaluated at [facility] on December 22, 2023 at which time her blood pressure was:    BP Readings from Last 1 Encounters:   11/20/23 (!) 147/82     Systolic (Patient Reported): (!) 146 (12/22/2023 12:50 PM)  Diastolic (Patient Reported): 70 (12/22/2023 12:50 PM)    Pt reported home BP reading at 146/70 with her BP machine measured in her Left arm at 12:28pm. Pt states she has been out of her diltiazem and is unsure if it was discontinued. If she is no longer on diltiazem, recommend a change in her BP therapy. She's on lisinopril 20mg which can be increased if provider deems appropriate.     Reviewed lifestyle modifications for blood pressure control and reduction: including making healthy food choices, managing weight, getting regular exercise, smoking cessation, reducing alcohol consumption, monitoring blood pressure regularly.     Symptoms: Other: anxiety    BP Goal:< 140/90 mmHg    BP Assessment:  BP too high    Potential Reasons for BP too high: Anxiety and Unknown/Other: pt states she is out of her anxiety pills and has been out of her diltiazem     BP Follow-Up Plan: Recheck BP in 30 days at pharmacy    Recommendation to Provider: Pt states she has been out of her diltiazem and is unsure if it was discontinued. If she is no longer on diltiazem, recommend a change in her BP therapy. She's on lisinopril 20mg which can be increased if provider deems appropriate.     Note completed by: Karsten Desai RPH

## 2023-12-27 ENCOUNTER — TELEPHONE (OUTPATIENT)
Dept: FAMILY MEDICINE | Facility: CLINIC | Age: 78
End: 2023-12-27
Payer: COMMERCIAL

## 2023-12-27 DIAGNOSIS — I10 ESSENTIAL HYPERTENSION WITH GOAL BLOOD PRESSURE LESS THAN 140/90: ICD-10-CM

## 2023-12-27 NOTE — TELEPHONE ENCOUNTER
"Tracy is requesting a refill on her Diltiazem ER 180mg, it was previously denied because \"refill not appropriate\" She was taking 240mg but was told to stop because it made her light headed and she doesn't know why she can't refill the lower dose      Thank You,  Margie Monge, [facility], [place]    "

## 2023-12-28 RX ORDER — DILTIAZEM HYDROCHLORIDE 180 MG/1
180 CAPSULE, EXTENDED RELEASE ORAL DAILY
Qty: 90 CAPSULE | Refills: 0 | Status: SHIPPED | OUTPATIENT
Start: 2023-12-28 | End: 2024-04-16

## 2023-12-28 NOTE — TELEPHONE ENCOUNTER
Please call pt and see what dose she has at home   We have listed she is taking 240mg but if she is taking 180 I am fine refilling that I just do not want her taking both   She has mixed up meds in the past and want to be sure

## 2023-12-28 NOTE — TELEPHONE ENCOUNTER
Patient returned call stating she was last taking Diltiazem  mg po daily, is all out and needing a refill today. Medication list updated.    RX pended, message routed to provider for consideration.     Julie Behrendt RN

## 2023-12-28 NOTE — TELEPHONE ENCOUNTER
Left message on identified VM requesting patient call careteam back re: see message below from Dr. Solis.     Julie Behrendt RN

## 2024-01-03 NOTE — PROGRESS NOTES
Physical Therapy Discharge      DISCHARGE  Reason for Discharge: Patient chooses to discontinue therapy.    Equipment Issued: none    Discharge Plan: Patient to continue home program.    Referring Provider:  Gwendolyn Solis       10/06/23 0500   Appointment Info   Signing clinician's name / credentials Shereen Simonsulton PT DPT CLT   Total/Authorized Visits 8   Visits Used 1 [facility]/   Medical Diagnosis Chronic Right sided low back pain without sciatica   PT Tx Diagnosis decreased lumbar mobility and pain   Quick Adds Certification   Progress Note/Certification   Start of Care Date 10/06/23   Onset of illness/injury or Date of Surgery 10/04/23   Therapy Frequency 1x/week   Predicted Duration 8 weeks   Certification date from 10/06/23   Certification date to 12/01/23   Progress Note Due Date 12/01/23   PT Goal 1   Goal Identifier sitting   Goal Description Pt will report that she is able to sit back into a chair with no increase in symptoms.   Target Date 11/03/23   PT Goal 2   Goal Identifier Lumbar flexion   Goal Description pt will demonstrate a 10* lumbar flexion improvement to 95* in order to have reduced symptoms with reaching to the floor.   Target Date 12/01/23   PT Goal 3   Goal Identifier Lumbar extension   Goal Description pt will demonstrate a 10* improvement in lumbar extension to 15* in order to have improved tolerane to standing and daily mobility.   Target Date 12/01/23   Therapeutic Procedure/Exercise   Therapeutic Procedures: strength, endurance, ROM, flexibillity minutes (88155) 10   PTRx Ther Proc 1 Supine Lumbar Hip Roll   PTRx Ther Proc 1 - Details x 10 reps   PTRx Ther Proc 2 Repeated Hip External Rotation with Overpressure in Sitting   PTRx Ther Proc 2 - Details 15 sec hold x 2 reps   PTRx Ther Proc 3 Seated Hamstring Stretch   PTRx Ther Proc 3 - Details 15 sec hold B LE   PTRx Ther Proc 4 Double Knee to Chest   PTRx Ther Proc 4 - Details x 30 sec hold   Manual Therapy   Manual Therapy:  Mobilization, MFR, MLD, friction massage minutes (60870) 10   Manual Therapy 1 MET for R anterior innominate rotation correction and shot gun technique   Manual Therapy 1 - Details 5 reps each   Eval/Assessments   PT Eval, Low Complexity Minutes (40621) 10   Education   Learner/Method Patient   Education Comments PT POC and diagnosis   Plan   Home program [id number]   Plan for next session further assess lumbar pain, ask about previous lumbar surgerys and attempt mild traction, STM to R pirifomris and MET if needed   Total Session Time   Timed Code Treatment Minutes 20   Total Treatment Time (sum of timed and untimed services) 30

## 2024-01-03 NOTE — PROGRESS NOTES
Physical Therapy Discharge      DISCHARGE  Reason for Discharge: Patient chooses to discontinue therapy.    Equipment Issued: none    Discharge Plan: Patient to continue home program.    Referring Provider:  Gwendolyn Solis     10/31/23 0500   Appointment Info   Signing clinician's name / credentials Shereen Nielsen PT DPT CLT   Visits Used 6   Medical Diagnosis Neck pain   PT Tx Diagnosis neck pain, LBP, headaches   Quick Adds Certification   Progress Note/Certification   Start of Care Date 09/13/23   Onset of illness/injury or Date of Surgery 06/13/23   Therapy Frequency 1x/week   Predicted Duration 6 weeks   Certification date from 10/25/23   Certification date to 12/06/23   Progress Note Due Date 10/25/23   Progress Note Completed Date 10/31/23   GOALS   PT Goals 2;3;5;4   PT Goal 1   Goal Identifier cervical roatoin   Goal Description Pt will demonstrate 55 deg cervical roation w less than 1/10 pain to safely drive car   Target Date 10/31/23   Date Met 10/31/23   PT Goal 2   Goal Identifier sitting   Goal Description Pt will be able to sit for 30+ min w/o pain to watch favorite show   Target Date 10/31/23   Date Met 10/31/23   PT Goal 3   Goal Identifier vacuuming   Goal Description Pt will be able to complete tasks around home (ie vacuuming) w less than 1/10 pain   Target Date 12/06/23   PT Goal 4   Goal Identifier HEP   Goal Description Pt will be compliant and competent in HEP to allow them to achieve maximal strength and reduce pain mobility   Target Date 12/06/23   Subjective Report   Subjective Report neck and back doing well, slight tightness in neck.   Objective Measures   Objective Measures Objective Measure 1;Objective Measure 2   Objective Measure 1   Objective Measure Cervical ROM   Details R rot: 50*; L rot: 50*; (following session R rot: 60*; L rot: 60*)   Treatment Interventions (PT)   Interventions Manual Therapy;Therapeutic Procedure/Exercise   Therapeutic Procedure/Exercise   Therapeutic  Procedures: strength, endurance, ROM, flexibillity minutes (46035) 10   Ther Proc 1 scapular retraction   Ther Proc 1 - Details x 10 reps   PTRx Ther Proc 1 Row R TB attempted G TB   PTRx Ther Proc 1 - Details x 10 reps - cues for decreased overactivation of UT   PTRx Ther Proc 2 chin tuck seated   PTRx Ther Proc 2 - Details x 10 reps   PTRx Ther Proc 3 LS seated stretch   PTRx Ther Proc 3 - Details 15 sec hold x 2 reps   PTRx Ther Proc 4 prone scapular retraction   PTRx Ther Proc 4 - Details x 10 reps   PTRx Ther Proc 5 seated scapular retraction shoulder ER   PTRx Ther Proc 5 - Details R TB x 10 reps   Skilled Intervention improve mobility and strength through HEP   Patient Response/Progress overactivation of UT with rows   Manual Therapy   Manual Therapy: Mobilization, MFR, MLD, friction massage minutes (42844) 15   Manual Therapy Manual Therapy 2;Manual Therapy 3;Manual Therapy 4;Manual Therapy 5   Manual Therapy 1 suboccipital release   Manual Therapy 1 - Details x 3 minutes   Manual Therapy 2 STM   Manual Therapy 2 - Details LS, UT, Scalenes   Manual Therapy 3 md back traction with stability ball   Manual Therapy 3 - Details x 2 minutes   Manual Therapy 4 - Details MWM C5-T5 to improve   Skilled Intervention imporve mobility and reduce pain   Patient Response/Progress improving ROM   Education   Learner/Method Patient   Education Comments return to MD due to minimal change   Plan   Home program [id number]   Plan for next session global core stab needed - ie rows, lats, seated TA sets and marching, posture on ball,   Comments   Comments 1x/week   Total Session Time   Timed Code Treatment Minutes 25   Total Treatment Time (sum of timed and untimed services) 25

## 2024-01-16 ENCOUNTER — ALLIED HEALTH/NURSE VISIT (OUTPATIENT)
Dept: FAMILY MEDICINE | Facility: CLINIC | Age: 79
End: 2024-01-16
Payer: COMMERCIAL

## 2024-01-16 VITALS — DIASTOLIC BLOOD PRESSURE: 58 MMHG | SYSTOLIC BLOOD PRESSURE: 138 MMHG

## 2024-01-16 DIAGNOSIS — I10 ESSENTIAL HYPERTENSION WITH GOAL BLOOD PRESSURE LESS THAN 140/90: Primary | ICD-10-CM

## 2024-01-16 PROCEDURE — 99207 PR NO CHARGE NURSE ONLY: CPT | Performed by: FAMILY MEDICINE

## 2024-01-16 NOTE — PROGRESS NOTES
Tracy Galloway was evaluated at [facility] on January 16, 2024 at which time her blood pressure was:    BP Readings from Last 3 Encounters:   01/16/24 138/58   11/20/23 (!) 147/82   11/01/23 112/64     Pulse Readings from Last 3 Encounters:   11/20/23 87   11/01/23 90   10/04/23 90       Reviewed lifestyle modifications for blood pressure control and reduction: including making healthy food choices, managing weight, getting regular exercise, smoking cessation, reducing alcohol consumption, monitoring blood pressure regularly.     Symptoms: None    BP Goal:< 140/90 mmHg    BP Assessment:  BP at goal    Potential Reasons for BP too high: NA - Not applicable    BP Follow-Up Plan: Recheck BP in 6 months at pharmacy    Recommendation to Provider: None at this time.    Note completed by:  Alejandra Garcia, PharmD  Staff Pharmacist  [facility]  [phone]

## 2024-01-30 DIAGNOSIS — G50.0 TRIGEMINAL NEURALGIA: ICD-10-CM

## 2024-01-30 DIAGNOSIS — F11.90 CHRONIC, CONTINUOUS USE OF OPIOIDS: ICD-10-CM

## 2024-01-30 DIAGNOSIS — G89.4 CHRONIC PAIN SYNDROME: Chronic | ICD-10-CM

## 2024-01-31 RX ORDER — HYDROCODONE BITARTRATE AND ACETAMINOPHEN 10; 325 MG/1; MG/1
1 TABLET ORAL EVERY 6 HOURS PRN
Qty: 105 TABLET | Refills: 0 | Status: SHIPPED | OUTPATIENT
Start: 2024-01-31 | End: 2024-02-06

## 2024-02-06 ENCOUNTER — TELEPHONE (OUTPATIENT)
Dept: FAMILY MEDICINE | Facility: CLINIC | Age: 79
End: 2024-02-06
Payer: COMMERCIAL

## 2024-02-06 DIAGNOSIS — N94.89 VAGINAL BURNING: ICD-10-CM

## 2024-02-06 DIAGNOSIS — G50.0 TRIGEMINAL NEURALGIA: ICD-10-CM

## 2024-02-06 DIAGNOSIS — G89.4 CHRONIC PAIN SYNDROME: Chronic | ICD-10-CM

## 2024-02-06 DIAGNOSIS — F11.90 CHRONIC, CONTINUOUS USE OF OPIOIDS: ICD-10-CM

## 2024-02-06 RX ORDER — HYDROCODONE BITARTRATE AND ACETAMINOPHEN 10; 325 MG/1; MG/1
1 TABLET ORAL EVERY 6 HOURS PRN
Qty: 100 TABLET | Refills: 0 | Status: SHIPPED | OUTPATIENT
Start: 2024-02-29 | End: 2024-04-01

## 2024-02-06 RX ORDER — ESTRADIOL 0.1 MG/G
1 CREAM VAGINAL
Qty: 42.5 G | Refills: 3 | Status: SHIPPED | OUTPATIENT
Start: 2024-02-08 | End: 2024-04-24

## 2024-02-22 ENCOUNTER — MYC MEDICAL ADVICE (OUTPATIENT)
Dept: FAMILY MEDICINE | Facility: CLINIC | Age: 79
End: 2024-02-22
Payer: COMMERCIAL

## 2024-02-23 ENCOUNTER — VIRTUAL VISIT (OUTPATIENT)
Dept: FAMILY MEDICINE | Facility: CLINIC | Age: 79
End: 2024-02-23
Payer: COMMERCIAL

## 2024-02-23 ENCOUNTER — ALLIED HEALTH/NURSE VISIT (OUTPATIENT)
Dept: FAMILY MEDICINE | Facility: CLINIC | Age: 79
End: 2024-02-23
Payer: COMMERCIAL

## 2024-02-23 DIAGNOSIS — R30.0 DYSURIA: ICD-10-CM

## 2024-02-23 DIAGNOSIS — N94.89 VAGINAL BURNING: ICD-10-CM

## 2024-02-23 DIAGNOSIS — N94.89 VAGINAL BURNING: Primary | ICD-10-CM

## 2024-02-23 DIAGNOSIS — N94.89 LABIAL PAIN: ICD-10-CM

## 2024-02-23 LAB
ALBUMIN UR-MCNC: NEGATIVE MG/DL
APPEARANCE UR: CLEAR
BACTERIA #/AREA URNS HPF: ABNORMAL /HPF
BILIRUB UR QL STRIP: NEGATIVE
CLUE CELLS: NORMAL
COLOR UR AUTO: YELLOW
GLUCOSE UR STRIP-MCNC: NEGATIVE MG/DL
HGB UR QL STRIP: ABNORMAL
KETONES UR STRIP-MCNC: ABNORMAL MG/DL
LEUKOCYTE ESTERASE UR QL STRIP: NEGATIVE
MUCOUS THREADS #/AREA URNS LPF: PRESENT /LPF
NITRATE UR QL: NEGATIVE
PH UR STRIP: 5.5 [PH] (ref 5–7)
RBC #/AREA URNS AUTO: ABNORMAL /HPF
SP GR UR STRIP: 1.02 (ref 1–1.03)
SQUAMOUS #/AREA URNS AUTO: ABNORMAL /LPF
TRICHOMONAS, WET PREP: NORMAL
UROBILINOGEN UR STRIP-ACNC: 0.2 E.U./DL
WBC #/AREA URNS AUTO: ABNORMAL /HPF
WBC'S/HIGH POWER FIELD, WET PREP: NORMAL
YEAST, WET PREP: NORMAL

## 2024-02-23 PROCEDURE — 99207 PR NO CHARGE NURSE ONLY: CPT

## 2024-02-23 PROCEDURE — 99213 OFFICE O/P EST LOW 20 MIN: CPT | Mod: 95 | Performed by: STUDENT IN AN ORGANIZED HEALTH CARE EDUCATION/TRAINING PROGRAM

## 2024-02-23 PROCEDURE — 87210 SMEAR WET MOUNT SALINE/INK: CPT

## 2024-02-23 PROCEDURE — 81001 URINALYSIS AUTO W/SCOPE: CPT

## 2024-02-23 RX ORDER — MICONAZOLE NITRATE 20 MG/G
CREAM TOPICAL 2 TIMES DAILY
Qty: 56 G | Refills: 0 | Status: SHIPPED | OUTPATIENT
Start: 2024-02-23 | End: 2024-03-04

## 2024-02-23 RX ORDER — ESTRADIOL 0.05 MG/D
1 PATCH, EXTENDED RELEASE TRANSDERMAL
COMMUNITY
Start: 2023-12-07 | End: 2024-09-17

## 2024-02-23 NOTE — PATIENT INSTRUCTIONS
If we do not find obvious sign of infection, and if the topical antifungal cream doesn't work, we could try an increase dosage of 0.5 g (a lower dose than you're currently doing) estrogen cream DAILY for 2 weeks, then drop back to 1 g twice weekly after that.

## 2024-02-23 NOTE — PROGRESS NOTES
Patient presents for wet prep and UA per Dr. Thurston.  Will receive results via [employer]. Bailee Kahler on 2/23/2024 at 2:47 PM    
12-Jun-2023 17:23

## 2024-02-23 NOTE — PROGRESS NOTES
Tracy is a 78 year old who is being evaluated via a billable video visit.      How would you like to obtain your AVS? MyChart  If the video visit is dropped, the invitation should be resent by: Text to cell phone: [phone]  Will anyone else be joining your video visit? No          Assessment & Plan     Patient is a very pleasant 78-year-old female who has a virtual visit today to discuss ongoing labial burning sensation.  Patient had a hysterectomy in her 30s.  She was seen last year by an outside women's health clinic, started on estrogen patches.  Earlier this year, noted to have ongoing vulvar burning sensation, started on topical estrogen cream twice weekly.  She does get very brief improvement in this burning sensation with the topical estrogen cream.  Has also tried an aloe vera gel that is also briefly helpful.  She has some burning of the area with urination, but no increased urinary frequency, suprapubic pain, confusion, or other signs of UTI.  She describes this discomfort more on the medial aspect of bilateral labia.  We discussed possible etiologies including vaginal atrophy, vulvovaginal candidiasis, and BV.  Because of the burning with urination, though I suspect that it is more due to topical irritation from the urine on the labia, we did obtain a UA today.  For the labial issues, we will obtain a wet prep.  If either of these return positive for obvious sign, will treat as appropriate.  If both of these returned negative for obvious signs of infection, we discussed using topical antifungal cream in the area of discomfort for presumed vulvar candidiasis.  We also discussed an increased dosage of estrogen cream for at least a short time prior to dropping back to the twice weekly dosing.  Ultimately, if symptoms or not improved within the next 1 to 2 weeks, we discussed presenting to clinic for in person evaluation and visualization of the vulva to evaluate for possible lichen.    Vaginal burning  -  Wet prep - lab collect    Dysuria  - UA Macroscopic with reflex to Microscopic and Culture - Lab Collect        No LOS data to display   Time spent by me doing chart review, history and exam, documentation and further activities per the note      Subjective   Tracy is a 78 year old, presenting for the following health issues:  Urinary Problem        2/23/2024     1:23 PM   Additional Questions   Roomed by Brisa FISCHER   Accompanied by Self     HPI     Genitourinary - Female  Onset/Duration: x couple weeks  Description:   Painful urination (Dysuria): YES           Frequency: YES- not unusually   Blood in urine (Hematuria): No  Delay in urine (Hesitency): No  Intensity: mild  Progression of Symptoms:  same  Accompanying Signs & Symptoms:  Fever/chills: No  Flank pain: No  Nausea and vomiting: No  Vaginal symptoms: burning in that area  Abdominal/Pelvic Pain: No  History:   History of frequent UTI s: YES  History of kidney stones: No  Sexually Active: No  Possibility of pregnancy: No  Precipitating or alleviating factors: None  Therapies tried and outcome: estradiol cream and patches, aloe vera gel- helped          Review of Systems  Constitutional, HEENT, cardiovascular, pulmonary, gi and gu systems are negative, except as otherwise noted.      Objective           Vitals:  No vitals were obtained today due to virtual visit.    Physical Exam   GENERAL: alert and no distress  EYES: Eyes grossly normal to inspection.  No discharge or erythema, or obvious scleral/conjunctival abnormalities.  RESP: No audible wheeze, cough, or visible cyanosis.    SKIN: Visible skin clear. No significant rash, abnormal pigmentation or lesions.  NEURO: Cranial nerves grossly intact.  Mentation and speech appropriate for age.  PSYCH: Appropriate affect, tone, and pace of words     Results from this visitNo results found for any visits on 02/23/24.          Video-Visit Details    Type of service:  Video Visit   Video Start Time:  1:32  pm  Video End Time:1:55 PM    Originating Location (pt. Location): Home    Distant Location (provider location):  Off-site  Platform used for Video Visit: Alejandro  Signed Electronically by: Kelly Thurston MD

## 2024-03-12 ENCOUNTER — THERAPY VISIT (OUTPATIENT)
Dept: PHYSICAL THERAPY | Facility: CLINIC | Age: 79
End: 2024-03-12
Attending: FAMILY MEDICINE
Payer: COMMERCIAL

## 2024-03-12 DIAGNOSIS — G89.29 CHRONIC RIGHT-SIDED LOW BACK PAIN WITHOUT SCIATICA: ICD-10-CM

## 2024-03-12 DIAGNOSIS — M54.50 CHRONIC RIGHT-SIDED LOW BACK PAIN WITHOUT SCIATICA: ICD-10-CM

## 2024-03-12 PROCEDURE — 97110 THERAPEUTIC EXERCISES: CPT | Mod: GP | Performed by: PHYSICAL THERAPIST

## 2024-03-12 PROCEDURE — 97162 PT EVAL MOD COMPLEX 30 MIN: CPT | Mod: GP | Performed by: PHYSICAL THERAPIST

## 2024-03-12 PROCEDURE — 97140 MANUAL THERAPY 1/> REGIONS: CPT | Mod: GP | Performed by: PHYSICAL THERAPIST

## 2024-03-12 NOTE — PROGRESS NOTES
PHYSICAL THERAPY EVALUATION  Type of Visit: Evaluation    See electronic medical record for Abuse and Falls Screening details.    Subjective       Presenting condition or subjective complaint: back pain.   Pt has been seeing chiro X 4 sessions w/ temp relief.   Pt presents B LBP but notes every morning she wakes up w/ sided symptoms.   Pain @ best 4/10,  @ worst 8/10. Pt notes 1 episode of burning in R toes w/ driving today.  No numbness/ tingling.  Neg cough/ sneeze.  Neg Bowel / bladder.  No LE weakness.  Meds: hydrocodone every 4 hours if not helping asprin  and tylenol for her chronic pain.   Pt takes sleeping pills  Worse:  sitting 20-30 min.  Wakes 1X/night due to LBP.   Walked 45 min w / nice weather but then sore.   Better: changing positions.  Meds  Date of onset: 02/03/24    Relevant medical history: Depression; High blood pressure , chronic pain--has gone thru a pain program  Dates & types of surgery: nothing recent    Prior diagnostic imaging/testing results:     No recent tests  Prior therapy history for the same diagnosis, illness or injury: No      Prior Level of Function  Transfers: Independent  Ambulation: Independent  ADL: Assistive person spouse carries laundry/ groceries  IADL:     Living Environment  Social support: With a significant other or spouse   Type of home: House   Stairs to enter the home: Yes 10 Is there a railing: Yes   Ramp: No   Stairs inside the home: Yes 10 Is there a railing: Yes   Help at home: None  Equipment owned:       Employment: No    Hobbies/Interests: reading yoga walking--in the past 3 weeks has not walked regularly or do the yoga    Patient goals for therapy: sit comfortably       Objective   LUMBAR SPINE EVALUATION  POSTURE: R PSIS/ crest slightly lower.  Decreased lumbar lordosis.  L LE longer supine  GAIT:   No limp but guarded.  Able to toe walk no complaints,  heel walk bothered her calves    ROM:   (Degrees) Left AROM Left PROM  Right AROM Right PROM   Hip  Flexion       Hip Extension       Hip Abduction       Hip Adduction       Hip Internal Rotation  33  35   Hip External Rotation  24  43   Knee Flexion       Knee Extension       Lumbar Side bend 80% 80%   Lumbar Flexion 80%   Lumbar Extension 10%   Pain:   End feel:   PELVIC/SI SCREEN: + R FB test  STRENGTH: B hip flex 4/5,  B quad/ HS/ ankle DF and R 1st toe ext 5/5,  L 1st toe ext 5-/5.  Hip ABD R 4/5,  L 5/5    NEURAL TENSION: slump neg just notes a good stretch.  FLEXIBILITY: R mild tightness, L mild to mod tightness,   edyta test slight tightness B  PALPATION: L > R > trochanter pain,  mild tenderness L ITB and B distal QL,  L psoas.  Severe tenderness R piriformis.  Moderate tenderness R psoas and B iliacus.  SPINAL SEGMENTAL CONCLUSIONS: ERSR L5.    Assessment & Plan   CLINICAL IMPRESSIONS  Medical Diagnosis: Chronic right-sided low back pain without sciatica    Treatment Diagnosis: LBP   Impression/Assessment: Patient is a 78 year old female with LBP complaints.  The following significant findings have been identified: Pain, Decreased ROM/flexibility, Decreased strength, Impaired muscle performance, and Decreased activity tolerance. These impairments interfere with their ability to perform self care tasks, recreational activities, household chores, and community mobility as compared to previous level of function.     Clinical Decision Making (Complexity):  Clinical Presentation: Evolving/Changing  Clinical Presentation Rationale: based on medical and personal factors listed in PT evaluation  Clinical Decision Making (Complexity): Moderate complexity    PLAN OF CARE  Treatment Interventions:  Modalities: E-stim--tens  Interventions: Manual Therapy, Neuromuscular Re-education, Therapeutic Activity, Therapeutic Exercise    Long Term Goals     PT Goal 1  Goal Identifier: 1.  Goal Description: Pt will be able to consistently sit 30 min w/ LBP no > 3/10  Target Date: 05/07/24  PT Goal 2  Goal Identifier: 2.  Goal  Description: Pt will be able to walk 30-40 min 4x/ week w/ pain no > 2/10  Target Date: 05/07/24  PT Goal 3  Goal Identifier: 3.  Goal Description: Pt will be independent and consistent w/HEP to manage symptoms  Target Date: 05/07/24      Frequency of Treatment: 1x/week  Duration of Treatment: 8 weeks    Recommended Referrals to Other Professionals:  none  Education Assessment:   Learner/Method: Patient;Listening;Reading;Demonstration;Pictures/Video;No Barriers to Learning    Risks and benefits of evaluation/treatment have been explained.   Patient/Family/caregiver agrees with Plan of Care.     Evaluation Time:     PT Eval, Moderate Complexity Minutes (76943): 25       Signing Clinician: Skylar Dewitt PT      [facility]                                                                                   OUTPATIENT PHYSICAL THERAPY      PLAN OF TREATMENT FOR OUTPATIENT REHABILITATION   Patient's Last Name, First Name, Tracy Mcclellan YOB: 1945   Provider's Name   [facility]   Medical Record No.  [id number]     Onset Date: 02/03/24  Start of Care Date: 03/12/24     Medical Diagnosis:  Chronic right-sided low back pain without sciatica      PT Treatment Diagnosis:  LBP Plan of Treatment  Frequency/Duration: 1x/week/ 8 weeks    Certification date from 03/12/24 to 05/07/24         See note for plan of treatment details and functional goals     Skylar Dewitt, PT                         I CERTIFY THE NEED FOR THESE SERVICES FURNISHED UNDER        THIS PLAN OF TREATMENT AND WHILE UNDER MY CARE     (Physician attestation of this document indicates review and certification of the therapy plan).              Referring Provider:  Gwendolyn Solis    Initial Assessment  See Epic Evaluation- Start of Care Date: 03/12/24

## 2024-03-18 ENCOUNTER — THERAPY VISIT (OUTPATIENT)
Dept: PHYSICAL THERAPY | Facility: CLINIC | Age: 79
End: 2024-03-18
Attending: FAMILY MEDICINE
Payer: COMMERCIAL

## 2024-03-18 DIAGNOSIS — M54.50 CHRONIC RIGHT-SIDED LOW BACK PAIN WITHOUT SCIATICA: Primary | ICD-10-CM

## 2024-03-18 DIAGNOSIS — G89.29 CHRONIC RIGHT-SIDED LOW BACK PAIN WITHOUT SCIATICA: Primary | ICD-10-CM

## 2024-03-18 PROCEDURE — 97110 THERAPEUTIC EXERCISES: CPT | Mod: GP | Performed by: PHYSICAL THERAPIST

## 2024-04-16 ENCOUNTER — THERAPY VISIT (OUTPATIENT)
Dept: PHYSICAL THERAPY | Facility: CLINIC | Age: 79
End: 2024-04-16
Attending: FAMILY MEDICINE
Payer: COMMERCIAL

## 2024-04-16 ENCOUNTER — MYC REFILL (OUTPATIENT)
Dept: FAMILY MEDICINE | Facility: CLINIC | Age: 79
End: 2024-04-16
Payer: COMMERCIAL

## 2024-04-16 DIAGNOSIS — M54.50 CHRONIC RIGHT-SIDED LOW BACK PAIN WITHOUT SCIATICA: Primary | ICD-10-CM

## 2024-04-16 DIAGNOSIS — G89.29 CHRONIC RIGHT-SIDED LOW BACK PAIN WITHOUT SCIATICA: Primary | ICD-10-CM

## 2024-04-16 DIAGNOSIS — I10 ESSENTIAL HYPERTENSION WITH GOAL BLOOD PRESSURE LESS THAN 140/90: ICD-10-CM

## 2024-04-16 PROCEDURE — 97110 THERAPEUTIC EXERCISES: CPT | Mod: GP | Performed by: PHYSICAL THERAPIST

## 2024-04-16 RX ORDER — DILTIAZEM HYDROCHLORIDE 180 MG/1
180 CAPSULE, EXTENDED RELEASE ORAL DAILY
Qty: 90 CAPSULE | Refills: 0 | Status: SHIPPED | OUTPATIENT
Start: 2024-04-16 | End: 2024-07-22

## 2024-04-16 NOTE — TELEPHONE ENCOUNTER
Requested Prescriptions   Pending Prescriptions Disp Refills    diltiazem ER (DILT-XR) 180 MG 24 hr capsule 90 capsule 0     Sig: Take 1 capsule (180 mg) by mouth daily       Calcium Channel Blockers Protocol  Failed - 4/16/2024  9:27 AM        Failed - Normal ALT in past 12 months     Recent Labs   Lab Test 11/29/22  1423   ALT 12             Failed - Normal serum creatinine on file in past 12 months     Recent Labs   Lab Test 11/29/22  1423   CR 0.96*                 Passed - Blood pressure under 140/90 in past 12 months     BP Readings from Last 3 Encounters:   01/16/24 138/58   11/20/23 (!) 147/82   11/01/23 112/64       Systolic (Patient Reported): (!) 146 (12/22/2023 12:50 PM)  Diastolic (Patient Reported): 70 (12/22/2023 12:50 PM)             Passed - Recent (12 mo) or future (30 days) visit within the authorizing provider's specialty  The patient must have completed an in-person or virtual visit within the past 12 months or has a future visit scheduled within the next 90 days with the authorizing provider s specialty.  Urgent care and e-visits do not quality as an office visit for this protocol.  Passed - Medication is active on med list  Passed - Patient is age 18 or older  Passed - No active pregnancy on record  Passed - No positive pregnancy test in past 12 months

## 2024-04-20 DIAGNOSIS — F51.01 PRIMARY INSOMNIA: Chronic | ICD-10-CM

## 2024-04-22 RX ORDER — ZOLPIDEM TARTRATE 10 MG/1
TABLET ORAL
Qty: 30 TABLET | Refills: 5 | Status: SHIPPED | OUTPATIENT
Start: 2024-04-22

## 2024-04-24 ENCOUNTER — OFFICE VISIT (OUTPATIENT)
Dept: FAMILY MEDICINE | Facility: CLINIC | Age: 79
End: 2024-04-24
Payer: COMMERCIAL

## 2024-04-24 VITALS
OXYGEN SATURATION: 98 % | DIASTOLIC BLOOD PRESSURE: 60 MMHG | RESPIRATION RATE: 14 BRPM | TEMPERATURE: 97 F | SYSTOLIC BLOOD PRESSURE: 110 MMHG | HEART RATE: 68 BPM

## 2024-04-24 DIAGNOSIS — F11.90 CHRONIC, CONTINUOUS USE OF OPIOIDS: ICD-10-CM

## 2024-04-24 DIAGNOSIS — F11.20 NARCOTIC DEPENDENCE (H): ICD-10-CM

## 2024-04-24 DIAGNOSIS — F32.0 MILD SINGLE CURRENT EPISODE OF MAJOR DEPRESSIVE DISORDER (H): ICD-10-CM

## 2024-04-24 DIAGNOSIS — G50.0 TRIGEMINAL NEURALGIA: ICD-10-CM

## 2024-04-24 DIAGNOSIS — G89.4 CHRONIC PAIN SYNDROME: Chronic | ICD-10-CM

## 2024-04-24 PROCEDURE — G2211 COMPLEX E/M VISIT ADD ON: HCPCS | Performed by: FAMILY MEDICINE

## 2024-04-24 PROCEDURE — 99214 OFFICE O/P EST MOD 30 MIN: CPT | Performed by: FAMILY MEDICINE

## 2024-04-24 RX ORDER — HYDROCODONE BITARTRATE AND ACETAMINOPHEN 10; 325 MG/1; MG/1
1 TABLET ORAL EVERY 6 HOURS PRN
Qty: 105 TABLET | Refills: 0 | Status: SHIPPED | OUTPATIENT
Start: 2024-04-30 | End: 2024-07-24

## 2024-04-24 RX ORDER — HYDROCODONE BITARTRATE AND ACETAMINOPHEN 10; 325 MG/1; MG/1
1 TABLET ORAL EVERY 6 HOURS PRN
Qty: 105 TABLET | Refills: 0 | Status: SHIPPED | OUTPATIENT
Start: 2024-06-30 | End: 2024-09-24

## 2024-04-24 RX ORDER — HYDROCODONE BITARTRATE AND ACETAMINOPHEN 10; 325 MG/1; MG/1
1 TABLET ORAL EVERY 6 HOURS PRN
Qty: 105 TABLET | Refills: 0 | Status: SHIPPED | OUTPATIENT
Start: 2024-05-30 | End: 2024-09-24

## 2024-04-24 ASSESSMENT — PAIN SCALES - GENERAL: PAINLEVEL: SEVERE PAIN (6)

## 2024-04-24 ASSESSMENT — PATIENT HEALTH QUESTIONNAIRE - PHQ9
10. IF YOU CHECKED OFF ANY PROBLEMS, HOW DIFFICULT HAVE THESE PROBLEMS MADE IT FOR YOU TO DO YOUR WORK, TAKE CARE OF THINGS AT HOME, OR GET ALONG WITH OTHER PEOPLE: NOT DIFFICULT AT ALL
SUM OF ALL RESPONSES TO PHQ QUESTIONS 1-9: 7
SUM OF ALL RESPONSES TO PHQ QUESTIONS 1-9: 7

## 2024-04-24 NOTE — NURSING NOTE
"Chief Complaint   Patient presents with    Pain     Chronic     /60   Pulse 68   Temp 97  F (36.1  C) (Tympanic)   Resp 14   LMP  (LMP Unknown)   SpO2 98%  Estimated body mass index is 20.41 kg/m  as calculated from the following:    Height as of 11/20/23: 1.549 m (5' 1\").    Weight as of 11/20/23: 49 kg (108 lb).  Patient presents to the clinic using No DME      Health Maintenance that is potentially due pending provider review:    Health Maintenance Due   Topic Date Due    RSV VACCINE (Pregnancy & 60+) (1 - 1-dose 60+ series) Never done    HEPATITIS A IMMUNIZATION (2 of 2 - Risk 2-dose series) 10/12/2017    ANNUAL REVIEW OF HM ORDERS  01/20/2024    COVID-19 Vaccine (7 - 2023-24 season) 02/14/2024                  " Final Anesthesia Post-op Assessment    Patient: Ashley Torre  Procedure(s) Performed: COLONOSCOPY [[id number]]  Anesthesia type: Monitor Anesthesia Care    Vital Last Value   Temperature 36.1 °C (96.9 °F) (01/03/18 0700)   Pulse 64 (01/03/18 0700)   Respiratory Rate 16 (01/03/18 0700)   Non-Invasive   Blood Pressure 125/55 (01/03/18 0700)   Arterial  Blood Pressure     Pulse Oximetry 98 % (01/03/18 0700)     Last 24 I/O:   Intake/Output Summary (Last 24 hours) at 01/03/18 1113  Last data filed at 01/03/18 1107   Gross per 24 hour   Intake              990 ml   Output             3000 ml   Net            -2010 ml       PATIENT LOCATION: Phase II  POST-OP VITAL SIGNS: stable  LEVEL OF CONSCIOUSNESS: participates in exam, awake, oriented, answers questions appropriately and alert  RESPIRATORY STATUS: spontaneous ventilation  CARDIOVASCULAR: blood pressure returned to baseline  HYDRATION: euvolemic    PAIN MANAGEMENT: well controlled  NAUSEA: None  AIRWAY PATENCY: patent  POST-OP ASSESSMENT: no complications, patient tolerated procedure well with no complications and sufficiently recovered from acute administration of anesthesia effects and able to participate in evaluation  COMPLICATIONS: none  Comments: Report given to RN. Vital signs noted. Patient stable. Care transferred to RN.

## 2024-04-24 NOTE — PROGRESS NOTES
Assessment & Plan     Mild single current episode of major depressive disorder (H24)   Stable no change in treatment plan.     Chronic, continuous use of opioids  Stable no change in treatment plan.     Trigeminal neuralgia  Stable no change in treatment plan.     Chronic pain syndrome  Stable no change in treatment plan.     Narcotic dependence (H)  Stable no change in treatment plan.             Depression Screening Follow Up                  Follow Up Actions Taken  Patient declined referral.    Discussed the following ways the patient can remain in a safe environment:  be around others        Ashok Molina is a 79 year old, presenting for the following health issues:  Pain (Chronic)      4/24/2024     1:09 PM   Additional Questions   Roomed by Rosibel CHRISTIE   Accompanied by self         4/24/2024     1:09 PM   Patient Reported Additional Medications   Patient reports taking the following new medications .     Pain    History of Present Illness       Reason for visit:  Continuation of hydrocodone    She eats 0-1 servings of fruits and vegetables daily.She consumes 0 sweetened beverage(s) daily.She exercises with enough effort to increase her heart rate 20 to 29 minutes per day.  She exercises with enough effort to increase her heart rate 3 or less days per week.   She is taking medications regularly.         Pain History:  When did you first notice your pain? Chronic mouth and back pain flares   Have you seen this provider for your pain in the past? Yes   Where in your body do you have pain? Low back right side and mouth pain  Are you seeing anyone else for your pain? Yes - PT and chiropractor        10/4/2023     3:14 PM 11/1/2023     3:31 PM 4/24/2024    12:34 PM   PHQ-9 SCORE   PHQ-9 Total Score MyChart 11 (Moderate depression)  7 (Mild depression)   PHQ-9 Total Score 11 12 7           8/23/2023     3:09 PM 9/27/2023    10:27 PM 11/1/2023     3:31 PM   ROMAIN-7 SCORE   Total Score 6 (mild anxiety) 6 (mild  anxiety)    Total Score 6 6 8               Chronic Pain Follow Up:    Location of pain: low back right side and mouth pain  Analgesia/pain control:    - Recent changes:  low back getting better with PT     - Overall control: Tolerable with discomfort    - Current treatments: Norco  Adherence:     - Do you ever take more pain medicine than prescribed? No    - When did you take your last dose of pain medicine?  730am today   Adverse effects: No   PDMP Review         Value Time User    State PDMP site checked  Yes 2024  7:55 AM Gwendolyn Solis MD          Last CSA Agreement:   CSA -- Patient Level:     [Media Unavailable] Controlled Substance Agreement - Opioid - Scan on 2023  4:02 PM   [Media Unavailable] Controlled Substance Agreement - Opioid - Scan on 2022  2:22 PM   [Media Unavailable] Controlled Substance Agreement - Opioid - Scan on 2022  3:56 PM   [Media Unavailable] Controlled Substance Agreement - Opioid - Scan on 3/30/2021 10:04 AM   [Media Unavailable] Controlled Substance Agreement - Opioid - Scan on 3/18/2021  8:58 AM   [Media Unavailable] Controlled Substance Agreement - Opioid - Scan on 3/4/2020  1:30 PM       Last UDS: 2023              Depression   How are you doing with your depression since your last visit? No change she has hopeless days but over all things are much better   She fills out the survey this way based on fleeting thoughts due to stress around her husbands med condition   Are you having other symptoms that might be associated with depression? No  Have you had a significant life event?  Health Concerns   Are you feeling anxious or having panic attacks?   No  Do you have any concerns with your use of alcohol or other drugs? No    Social History     Tobacco Use    Smoking status: Former     Current packs/day: 0.00     Types: Cigarettes     Quit date: 10/30/1962     Years since quittin.5    Smokeless tobacco: Never   Vaping Use    Vaping status: Never  Used   Substance Use Topics    Alcohol use: Yes     Alcohol/week: 4.0 standard drinks of alcohol     Types: 4 Standard drinks or equivalent per week     Comment: occasional    Drug use: No     Comment: medical marijuana in the past         10/4/2023     3:14 PM 11/1/2023     3:31 PM 4/24/2024    12:34 PM   PHQ   PHQ-9 Total Score 11 12 7   Q9: Thoughts of better off dead/self-harm past 2 weeks Several days Several days Several days   F/U: Thoughts of suicide or self-harm No  No   F/U: Safety concerns No  No         8/23/2023     3:09 PM 9/27/2023    10:27 PM 11/1/2023     3:31 PM   ROMAIN-7 SCORE   Total Score 6 (mild anxiety) 6 (mild anxiety)    Total Score 6 6 8           Objective    /60   Pulse 68   Temp 97  F (36.1  C) (Tympanic)   Resp 14   LMP  (LMP Unknown)   SpO2 98%   There is no height or weight on file to calculate BMI.  Physical Exam   GENERAL: alert and no distress  PSYCH: mentation appears normal, affect normal/bright            Signed Electronically by: Gwendolyn Solis MD

## 2024-06-04 NOTE — TELEPHONE ENCOUNTER
To: PAIN NURSE      From: james Galloway      Created: 1/7/2022 3:26 PM        *-*-*This message has not been handled.*-*-*    I was checking with [employer] about my claim for a guard. They have a different claims department. Your letter needs to go to [employer] [place]. [address] [place]. [address]  Sorry for the inconvenience. I have an appt. with you for Jan 24th.        Unable to locate related encounter requesting letter routing to provider to review & advise     Virgen Garcia RN, BSN, CMSRN  RN Care Coordinator  [facility]     Jevity 1.2 goal rate 35 mL/hr over 18 hr duration (total daily volume = 630 mL Jevity 1.2 daily) with 150 mL flushes q6hrs. Tube feed regimen at goal to provide 756 kcal, 35 g protein, 1103 mL free H2O. Tube feed regimen initiated 6/2 at initial rate 20 mL/hr; currently receiving goal rate 35 mL/hr.

## 2024-06-17 PROBLEM — Z71.89 ADVANCED DIRECTIVES, COUNSELING/DISCUSSION: Status: RESOLVED | Noted: 2017-07-18 | Resolved: 2024-06-17

## 2024-07-03 NOTE — NURSING NOTE
"Chief Complaint   Patient presents with     RECHECK       Initial /62   Pulse 92   Temp 97.6  F (36.4  C) (Tympanic)   Resp 20   Ht 1.626 m (5' 4\")   Wt 56.2 kg (124 lb)   BMI 21.28 kg/m   Estimated body mass index is 21.28 kg/m  as calculated from the following:    Height as of this encounter: 1.626 m (5' 4\").    Weight as of this encounter: 56.2 kg (124 lb).    Patient presents to the clinic using No DME    Health Maintenance that is potentially due pending provider review:  NONE    n/a    Is there anyone who you would like to be able to receive your results? No  If yes have patient fill out JENA      Kylah Magaña CMA([facility])    " Unable to answer due to medical condition/unresponsive/etc...

## 2024-07-19 DIAGNOSIS — I10 ESSENTIAL HYPERTENSION WITH GOAL BLOOD PRESSURE LESS THAN 140/90: ICD-10-CM

## 2024-07-22 RX ORDER — DILTIAZEM HYDROCHLORIDE 180 MG/1
180 CAPSULE, EXTENDED RELEASE ORAL DAILY
Qty: 90 CAPSULE | Refills: 3 | Status: SHIPPED | OUTPATIENT
Start: 2024-07-22

## 2024-07-22 NOTE — TELEPHONE ENCOUNTER
Has appointment scheduled for 7/24/24.      Requested Prescriptions   Pending Prescriptions Disp Refills    diltiazem ER (DILT-XR) 180 MG 24 hr capsule [Pharmacy Med Name: DILTIAZEM HCL ER 180MG CP24] 90 capsule 0     Sig: TAKE 1 CAPSULE (180 MG) BY MOUTH DAILY       Calcium Channel Blockers Protocol  Failed - 7/19/2024  5:20 PM        Failed - GFR is on file in the past 12 months and most recent GFR is normal        Passed - Blood pressure under 140/90 in past 12 months     BP Readings from Last 3 Encounters:   04/24/24 110/60   01/16/24 138/58   11/20/23 (!) 147/82       Systolic (Patient Reported): (!) 146 (12/22/2023 12:50 PM)  Diastolic (Patient Reported): 70 (12/22/2023 12:50 PM)             Passed - Medication is active on med list  Passed - Recent (12 mo) or future (90 days) visit within the authorizing provider's specialty  The patient must have completed an in-person or virtual visit within the past 12 months or has a future visit scheduled within the next 90 days with the authorizing provider s specialty.  Urgent care and e-visits do not quality as an office visit for this protocol.  Passed - Patient is age 18 or older  Passed - No active pregnancy on record  Passed - No positive pregnancy test in past 12 months

## 2024-07-24 ENCOUNTER — OFFICE VISIT (OUTPATIENT)
Dept: FAMILY MEDICINE | Facility: CLINIC | Age: 79
End: 2024-07-24
Payer: COMMERCIAL

## 2024-07-24 VITALS
DIASTOLIC BLOOD PRESSURE: 80 MMHG | HEART RATE: 70 BPM | TEMPERATURE: 96.9 F | OXYGEN SATURATION: 99 % | SYSTOLIC BLOOD PRESSURE: 138 MMHG | RESPIRATION RATE: 14 BRPM | WEIGHT: 116 LBS | BODY MASS INDEX: 21.92 KG/M2

## 2024-07-24 DIAGNOSIS — F11.90 CHRONIC, CONTINUOUS USE OF OPIOIDS: Primary | ICD-10-CM

## 2024-07-24 DIAGNOSIS — G89.4 CHRONIC PAIN SYNDROME: Chronic | ICD-10-CM

## 2024-07-24 DIAGNOSIS — G50.0 TRIGEMINAL NEURALGIA: ICD-10-CM

## 2024-07-24 PROCEDURE — G2211 COMPLEX E/M VISIT ADD ON: HCPCS | Performed by: FAMILY MEDICINE

## 2024-07-24 PROCEDURE — 99214 OFFICE O/P EST MOD 30 MIN: CPT | Performed by: FAMILY MEDICINE

## 2024-07-24 RX ORDER — HYDROCODONE BITARTRATE AND ACETAMINOPHEN 10; 325 MG/1; MG/1
1 TABLET ORAL EVERY 6 HOURS PRN
Qty: 105 TABLET | Refills: 0 | Status: SHIPPED | OUTPATIENT
Start: 2024-09-24

## 2024-07-24 RX ORDER — HYDROCODONE BITARTRATE AND ACETAMINOPHEN 10; 325 MG/1; MG/1
1 TABLET ORAL EVERY 6 HOURS PRN
Qty: 105 TABLET | Refills: 0 | Status: SHIPPED | OUTPATIENT
Start: 2024-08-24 | End: 2024-09-24

## 2024-07-24 RX ORDER — HYDROCODONE BITARTRATE AND ACETAMINOPHEN 10; 325 MG/1; MG/1
1 TABLET ORAL EVERY 6 HOURS PRN
Qty: 105 TABLET | Refills: 0 | Status: SHIPPED | OUTPATIENT
Start: 2024-07-24 | End: 2024-09-24

## 2024-07-24 ASSESSMENT — ANXIETY QUESTIONNAIRES
1. FEELING NERVOUS, ANXIOUS, OR ON EDGE: NOT AT ALL
5. BEING SO RESTLESS THAT IT IS HARD TO SIT STILL: NOT AT ALL
7. FEELING AFRAID AS IF SOMETHING AWFUL MIGHT HAPPEN: NOT AT ALL
7. FEELING AFRAID AS IF SOMETHING AWFUL MIGHT HAPPEN: NOT AT ALL
6. BECOMING EASILY ANNOYED OR IRRITABLE: NOT AT ALL
GAD7 TOTAL SCORE: 0
3. WORRYING TOO MUCH ABOUT DIFFERENT THINGS: NOT AT ALL
8. IF YOU CHECKED OFF ANY PROBLEMS, HOW DIFFICULT HAVE THESE MADE IT FOR YOU TO DO YOUR WORK, TAKE CARE OF THINGS AT HOME, OR GET ALONG WITH OTHER PEOPLE?: NOT DIFFICULT AT ALL
4. TROUBLE RELAXING: NOT AT ALL
IF YOU CHECKED OFF ANY PROBLEMS ON THIS QUESTIONNAIRE, HOW DIFFICULT HAVE THESE PROBLEMS MADE IT FOR YOU TO DO YOUR WORK, TAKE CARE OF THINGS AT HOME, OR GET ALONG WITH OTHER PEOPLE: NOT DIFFICULT AT ALL
GAD7 TOTAL SCORE: 0
GAD7 TOTAL SCORE: 0
2. NOT BEING ABLE TO STOP OR CONTROL WORRYING: NOT AT ALL

## 2024-07-24 ASSESSMENT — PATIENT HEALTH QUESTIONNAIRE - PHQ9
SUM OF ALL RESPONSES TO PHQ QUESTIONS 1-9: 16
10. IF YOU CHECKED OFF ANY PROBLEMS, HOW DIFFICULT HAVE THESE PROBLEMS MADE IT FOR YOU TO DO YOUR WORK, TAKE CARE OF THINGS AT HOME, OR GET ALONG WITH OTHER PEOPLE: EXTREMELY DIFFICULT
SUM OF ALL RESPONSES TO PHQ QUESTIONS 1-9: 16

## 2024-07-24 ASSESSMENT — PAIN SCALES - GENERAL: PAINLEVEL: WORST PAIN (10)

## 2024-07-24 NOTE — NURSING NOTE
"Chief Complaint   Patient presents with    Pain     BP (!) 150/70   Pulse 70   Temp 96.9  F (36.1  C) (Tympanic)   Resp 14   Wt 52.6 kg (116 lb)   LMP  (LMP Unknown)   SpO2 99%   BMI 21.92 kg/m   Estimated body mass index is 21.92 kg/m  as calculated from the following:    Height as of 11/20/23: 1.549 m (5' 1\").    Weight as of this encounter: 52.6 kg (116 lb).  Patient presents to the clinic using No DME      Health Maintenance that is potentially due pending provider review:    Health Maintenance Due   Topic Date Due    RSV VACCINE (Pregnancy & 60+) (1 - 1-dose 60+ series) Never done    HEPATITIS A IMMUNIZATION (2 of 2 - Risk 2-dose series) 10/12/2017    ANNUAL REVIEW OF HM ORDERS  01/20/2024    COVID-19 Vaccine (7 - 2023-24 season) 02/14/2024                  "

## 2024-07-24 NOTE — PROGRESS NOTES
Assessment & Plan     Chronic, continuous use of opioids    - Pain Management  Referral; Future  - HYDROcodone-acetaminophen (NORCO)  MG per tablet; Take 1 tablet by mouth every 6 hours as needed for severe pain Max 4  tabs per day.  - HYDROcodone-acetaminophen (NORCO)  MG per tablet; Take 1 tablet by mouth every 6 hours as needed for severe pain Max 4  tabs per day.  - HYDROcodone-acetaminophen (NORCO)  MG per tablet; Take 1 tablet by mouth every 6 hours as needed for severe pain Max 4  tabs per day.    Trigeminal neuralgia  NOT well controlled   Will get pain consult ?suboxone vs higher dose of opioids   Are there procedural options   She has tried and failed gabapentin duloxetine tegretol topomax [facility]   She also needs pain therapist and will consult for this   - Pain Management  Referral; Future  - HYDROcodone-acetaminophen (NORCO)  MG per tablet; Take 1 tablet by mouth every 6 hours as needed for severe pain Max 4  tabs per day.  - HYDROcodone-acetaminophen (NORCO)  MG per tablet; Take 1 tablet by mouth every 6 hours as needed for severe pain Max 4  tabs per day.  - HYDROcodone-acetaminophen (NORCO)  MG per tablet; Take 1 tablet by mouth every 6 hours as needed for severe pain Max 4  tabs per day.    Chronic pain syndrome  NOT well controlled see plan above   - Pain Management  Referral; Future  - HYDROcodone-acetaminophen (NORCO)  MG per tablet; Take 1 tablet by mouth every 6 hours as needed for severe pain Max 4  tabs per day.  - HYDROcodone-acetaminophen (NORCO)  MG per tablet; Take 1 tablet by mouth every 6 hours as needed for severe pain Max 4  tabs per day.  - HYDROcodone-acetaminophen (NORCO)  MG per tablet; Take 1 tablet by mouth every 6 hours as needed for severe pain Max 4  tabs per day.      The longitudinal plan of care for the diagnosis(es)/condition(s) as documented were addressed during this  "visit. Due to the added complexity in care, I will continue to support Tracy in the subsequent management and with ongoing continuity of care.      Depression Screening Follow Up                  Follow Up Actions Taken  Referred patient back to mental health provider    Discussed the following ways the patient can remain in a safe environment:  be around others        Subjective   Tracy is a 79 year old, presenting for the following health issues:  Pain        7/24/2024     1:34 PM   Additional Questions   Roomed by Rosibel CHRISTIE   Accompanied by Self         7/24/2024     1:34 PM   Patient Reported Additional Medications   Patient reports taking the following new medications .     History of Present Illness       Mental Health Follow-up:  Patient presents to follow-up on Depression.Patient's depression since last visit has been:  Worse  The patient is not having other symptoms associated with depression.      Any significant life events: No  Patient is not feeling anxious or having panic attacks.  Patient has no concerns about alcohol or drug use.    Headaches:   Since the patient's last clinic visit, headaches are: worsened  The patient is getting headaches:  Daily  She is not able to do normal daily activities when she has a migraine.  The patient is taking the following rescue/relief medications:  Ibuprofen (Advil, Motrin) and Tylenol   Patient states \"I get no relief\" from the rescue/relief medications.   The patient is taking the following medications to prevent migraines:  No medications to prevent migraines  In the past 4 weeks, the patient has gone to an Urgent Care or Emergency Room 0 times times due to headaches.    She eats 0-1 servings of fruits and vegetables daily.She consumes 0 sweetened beverage(s) daily.She exercises with enough effort to increase her heart rate 9 or less minutes per day.  She exercises with enough effort to increase her heart rate 3 or less days per week. She is missing 1 dose(s) of " medications per week.  She is not taking prescribed medications regularly due to remembering to take.         Pt is miserable   Pain is 10/10   Feels like the pain is worse than ever   Quality of life is not good   Nothing new happened  She is more stressed due to  health issues     She feels the fluoxetine made things worse and would like to stop it     She is crying and feels desparate to feel better             Review of Systems  Constitutional, HEENT, cardiovascular, pulmonary, gi and gu systems are negative, except as otherwise noted.      Objective    /80   Pulse 70   Temp 96.9  F (36.1  C) (Tympanic)   Resp 14   Wt 52.6 kg (116 lb)   LMP  (LMP Unknown)   SpO2 99%   BMI 21.92 kg/m    Body mass index is 21.92 kg/m .  Physical Exam   GENERAL: alert and no distress  PSYCH: mentation appears normal, tearful, anxious, and fatigued            Signed Electronically by: Gwendolyn Solis MD

## 2024-08-21 ASSESSMENT — ANXIETY QUESTIONNAIRES
7. FEELING AFRAID AS IF SOMETHING AWFUL MIGHT HAPPEN: NOT AT ALL
GAD7 TOTAL SCORE: 0
4. TROUBLE RELAXING: NOT AT ALL
2. NOT BEING ABLE TO STOP OR CONTROL WORRYING: NOT AT ALL
8. IF YOU CHECKED OFF ANY PROBLEMS, HOW DIFFICULT HAVE THESE MADE IT FOR YOU TO DO YOUR WORK, TAKE CARE OF THINGS AT HOME, OR GET ALONG WITH OTHER PEOPLE?: NOT DIFFICULT AT ALL
GAD7 TOTAL SCORE: 0
7. FEELING AFRAID AS IF SOMETHING AWFUL MIGHT HAPPEN: NOT AT ALL
IF YOU CHECKED OFF ANY PROBLEMS ON THIS QUESTIONNAIRE, HOW DIFFICULT HAVE THESE PROBLEMS MADE IT FOR YOU TO DO YOUR WORK, TAKE CARE OF THINGS AT HOME, OR GET ALONG WITH OTHER PEOPLE: NOT DIFFICULT AT ALL
1. FEELING NERVOUS, ANXIOUS, OR ON EDGE: NOT AT ALL
6. BECOMING EASILY ANNOYED OR IRRITABLE: NOT AT ALL
GAD7 TOTAL SCORE: 0
3. WORRYING TOO MUCH ABOUT DIFFERENT THINGS: NOT AT ALL
5. BEING SO RESTLESS THAT IT IS HARD TO SIT STILL: NOT AT ALL

## 2024-08-21 ASSESSMENT — PAIN SCALES - PAIN ENJOYMENT GENERAL ACTIVITY SCALE (PEG)
PEG_TOTALSCORE: 7
PEG_TOTALSCORE: 7
INTERFERED_GENERAL_ACTIVITY: 6
INTERFERED_ENJOYMENT_LIFE: 7
AVG_PAIN_PASTWEEK: 8
INTERFERED_ENJOYMENT_LIFE: 7
INTERFERED_GENERAL_ACTIVITY: 6
AVG_PAIN_PASTWEEK: 8

## 2024-08-22 ENCOUNTER — OFFICE VISIT (OUTPATIENT)
Dept: PALLIATIVE MEDICINE | Facility: CLINIC | Age: 79
End: 2024-08-22
Attending: FAMILY MEDICINE
Payer: COMMERCIAL

## 2024-08-22 VITALS — DIASTOLIC BLOOD PRESSURE: 66 MMHG | HEART RATE: 78 BPM | SYSTOLIC BLOOD PRESSURE: 183 MMHG

## 2024-08-22 DIAGNOSIS — Z51.81 ENCOUNTER FOR THERAPEUTIC DRUG MONITORING: ICD-10-CM

## 2024-08-22 DIAGNOSIS — F11.90 CHRONIC, CONTINUOUS USE OF OPIOIDS: ICD-10-CM

## 2024-08-22 DIAGNOSIS — G89.4 CHRONIC PAIN SYNDROME: Chronic | ICD-10-CM

## 2024-08-22 DIAGNOSIS — G50.0 TRIGEMINAL NEURALGIA: Primary | ICD-10-CM

## 2024-08-22 PROCEDURE — 99215 OFFICE O/P EST HI 40 MIN: CPT | Performed by: NURSE PRACTITIONER

## 2024-08-22 PROCEDURE — 99417 PROLNG OP E/M EACH 15 MIN: CPT | Performed by: NURSE PRACTITIONER

## 2024-08-22 PROCEDURE — G2211 COMPLEX E/M VISIT ADD ON: HCPCS | Performed by: NURSE PRACTITIONER

## 2024-08-22 RX ORDER — OXCARBAZEPINE 150 MG/1
TABLET, FILM COATED ORAL
Qty: 60 TABLET | Refills: 0 | Status: SHIPPED | OUTPATIENT
Start: 2024-08-22 | End: 2024-09-17

## 2024-08-22 ASSESSMENT — PAIN SCALES - GENERAL: PAINLEVEL: WORST PAIN (10)

## 2024-08-22 NOTE — PROGRESS NOTES
"[facility] Pain Management   Specialty Consultation    Date of Visit: 8/22/2024    CHIEF COMPLAINT:    Chief Complaint   Patient presents with    Pain          REASON FOR VISIT:    Tracy Galloway is a 79 year old female who is seen today at the request of Gwendolyn Solis MD (PCP) for evaluation of her pain issues and recommendations for management; with specific emphasis on:  Trigeminal neuralgia     Referring Provider Comments:   \"pt with long standing trigeminal neuralgia that had been stable for years on opioids (norco 3 tabs daily) advice on alternatives vs excalate dosing\"      Subjective    HISTORY OF PRESENT ILLNESS:  Onset/Progression:   Tracy Galloway is a 79 year old female with history of trigeminal neuralgia after dental injury 20 years ago.    Pain is constant and very stressful; neck and jaw muscle spasms trigger pain and headache  Has had a headache for the past month without relief.   States her  has recently been diagnosed with dementia and this adds to her stress and pain.   Over the past several years, she would experience some relief of pain intensity with dose of Norco 10/325mg.   Over past month, she has realized there is no longer improvement in pain intensity.      Pain score today: Worst Pain (10)   Pain Description and Location: right face/jaw       Quality: burning, numbness, dull, aching, pressure   Duration: Constant   Severity/Intensity (0 = No pain to 10 = Worst pain imaginable)   Now:  , Average: 8, Best: 8, Worst: 8  Aggravating factors include: nothing worsens the pain  Relieving factors include: walking, exercise, medication     ROMAIN score: Answers submitted by the patient for this visit:  Patient Health Questionnaire (G7) (Submitted on 8/21/2024)  ROMAIN 7 TOTAL SCORE: 0       Past Pain Treatments:   Pain Clinic:   Yes  - several years ago   Physical therapy: No    Psychologist: No   Chiropractor: Yes - not helpful   Acupuncture: Yes - not helpful "   Pharmacotherapy:    Opioids: Yes     Non-opioids:  Yes  TENs Unit/electric stim: No   Heat/Cold: No     Injections/clinic procedures: No     Surgeries related to pain: No     Current Pain Relevant Medications:    - Norco 10/325mg - 1 tab every 6 hrs; max 4 per day       Other Relevant Medications:   Zolpidem 10mg     Previous Pain Relevant Medications: (H--helped; HI--Helped initially; [facility]--Somewhat helpful; NH--No help; W--worse; SE--side effects; A--allergy; ?--Unsure if helpful)   Opioids: hydrocodone (HI)   NSAIDs: Ibuprofen (NH), Naproxen ([facility])  Muscle relaxants: cyclobenzaprine (NH), Clonazepam (? H)  Neuroleptics: Gabapentin (NH) and Pregabalin (SE)    Pain Antidepressants/ Anxiolytics: Bupropion (Wellbutrin), Duloxetine (Cymbalta), Fluoxetine (Prozac), Nortriptyline (Pamelor)  Sleep Aids: Clonazepam (Klonopin), Diazepam (Valium), Lorazepam (Ativan)  Migraine Medications: Aspirin-butalbital-caffeine (Fiorinal), Sumatriptan (Imitrex) tablets   Topical: lidocaine topical    Adjuvant medications: Acetaminophen         Review of [place] Prescription Monitoring Program ():  reviewed on 8/22/24. No concern for abuse or misuse of controlled medications based on this report. Controlled substances prescribed in the past 6 months include: (Zolpidem, Norco 10/325mg)   Current MME: 40 / day    Current controlled substance medications, if any, are being prescribed by: PCP    Primary Care Provider is Gwendolyn Solis           Past Medical History   She  has a past medical history of Anxiety w/panic attacks, Chest pain, Chronic LBP, Depressive disorder, not elsewhere classified, Facial pain (12/17/2009), HTN, Insomnia, unspecified, Migraine, unspecified, without mention of intractable migraine without mention of status migrainosus, Need for prophylactic hormone replacement therapy (postmenopausal), Osteopenia, Plantar fasciitis, Prinzmetal angina (H24) (09/17/2007), and Vision loss.   Past Surgical History    She  has a past surgical history that includes cholecystectomy, laporoscopic (1990); surgical history of -  (1998); COLONOSCOPY THRU STOMA, DIAGNOSTIC (2002); surgical history of -  (2003); hysterectomy, shaina (1970); tonsillectomy; surgical history of - ; Colonoscopy (2012); Eye surgery; and Endoscopic retrograde cholangiopancreatogram (N/A, 2019).   Social History   Social History     Tobacco Use    Smoking status: Former     Current packs/day: 0.00     Types: Cigarettes     Quit date: 10/30/1962     Years since quittin.8    Smokeless tobacco: Never   Vaping Use    Vaping status: Never Used   Substance Use Topics    Alcohol use: Yes     Alcohol/week: 4.0 standard drinks of alcohol     Types: 4 Standard drinks or equivalent per week     Comment: occasional    Drug use: No     Comment: medical marijuana in the past      Social History     Social History Narrative    Not on file        Current Outpatient Medications   Medication Sig Dispense Refill    aspirin (ASA) 81 MG tablet Take 1 tablet (81 mg) by mouth daily 90 tablet 1    diltiazem ER (DILT-XR) 180 MG 24 hr capsule TAKE 1 CAPSULE (180 MG) BY MOUTH DAILY 90 capsule 3    DOCUSATE SODIUM PO Take 100 mg by mouth daily as needed for constipation      estradiol (VIVELLE-DOT) 0.05 MG/24HR bi-weekly patch Place 1 patch onto the skin twice a week      HYDROcodone-acetaminophen (NORCO)  MG per tablet Take 1 tablet by mouth every 6 hours as needed for severe pain Max 4  tabs per day. 105 tablet 0    HYDROcodone-acetaminophen (NORCO)  MG per tablet Take 1 tablet by mouth every 6 hours as needed for severe pain Max 4  tabs per day. 105 tablet 0    ibuprofen (ADVIL/MOTRIN) 200 MG tablet Take 400-600 mg by mouth every 8 hours as needed for mild pain       lisinopril (ZESTRIL) 20 MG tablet Take 1 tablet (20 mg) by mouth daily 90 tablet 3    zolpidem (AMBIEN) 10 MG tablet TAKE ONE-HALF TO ONE TABLET BY MOUTH EVERY  NIGHT AT BEDTIME AS NEEDED FOR SLEEP 30 tablet 5    FLUoxetine (PROZAC) 20 MG capsule Take 1 capsule (20 mg) by mouth daily 90 capsule 1    HYDROcodone-acetaminophen (NORCO)  MG per tablet Take 1 tablet by mouth every 6 hours as needed for severe pain Max 4  tabs per day. 105 tablet 0    [START ON 8/24/2024] HYDROcodone-acetaminophen (NORCO)  MG per tablet Take 1 tablet by mouth every 6 hours as needed for severe pain Max 4  tabs per day. 105 tablet 0    [START ON 9/24/2024] HYDROcodone-acetaminophen (NORCO)  MG per tablet Take 1 tablet by mouth every 6 hours as needed for severe pain Max 4  tabs per day. 105 tablet 0    valACYclovir (VALTREX) 1000 mg tablet TAKE 1 TABLET BY MOUTH TWICE DAILY FOR 1 DAY AS NEEDED FOR BREAKOUTS (Patient not taking: Reported on 8/22/2024) 4 tablet 3     No current facility-administered medications for this visit.     No Known Allergies     Medications and Allergies reviewed. Yes        REVIEW OF SYSTEMS:   ROS: 10 point ROS neg other than the symptoms noted above in the HPI.     The patient otherwise denies red flag symptoms, such as: thunderclap headache, bowel or bladder incontinence, parasthesias, weakness, saddle anesthesia, unintentional weight loss, or fever/chills/sweats.     Objective   OBJECTIVE    Vitals:    08/22/24 1301   BP: (!) 183/66   Pulse: 78         Appearance:   A&O. Patient is appropriate. Patient is in NAD.      HHENT:  Normocephalic, atraumatic. Eyes without conjunctival injection or jaundice. Neck supple.     Pulmonary:  Normal effort; no cough or audible wheeze.       Skin: No obvious rash, lesions, or petechiae of exposed skin.    Psych:  Alert, without lethargy or stupor. Speech fluent. Appropriate affect. Mood normal. Able to follow commands without difficulty. Normal mood, judgement and behavior   Tearful or anxious affect: YES   Suicidal or homicidal ideation: NO        MSK:  Location: right face/jaw  Inspection:  mild swelling right jaw  below eye   ROM:  limited         Diagnostic Testing:  Labs:      Lab Results   Component Value Date    CR 0.96 (H) 11/29/2022    GFRESTIMATED 60 (L) 11/29/2022    ALKPHOS 44 11/29/2022    AST 19 11/29/2022    ALT 12 11/29/2022              Imaging:   None         Assessment & Plan      VISIT DIAGNOSIS:   1. Trigeminal neuralgia  - Pain Management  Referral  - OXcarbazepine (TRILEPTAL) 150 MG tablet; Take 1 tab (150mg) at bedtime for 7 days, then take 1 tab (150mg) in AM and 1 tab (150mg) at bedtime  Dispense: 60 tablet; Refill: 0  - Adult Pain Clinic Follow-Up Order; Future    2. Chronic pain syndrome  - Pain Management  Referral  - OXcarbazepine (TRILEPTAL) 150 MG tablet; Take 1 tab (150mg) at bedtime for 7 days, then take 1 tab (150mg) in AM and 1 tab (150mg) at bedtime  Dispense: 60 tablet; Refill: 0  - Adult Pain Clinic Follow-Up Order; Future    3. Chronic, continuous use of opioids  - Pain Management  Referral  - Adult Pain Clinic Follow-Up Order; Future    4. Encounter for therapeutic drug monitoring  - Urine Drug Screen Panel; Future  - Adult Pain Clinic Follow-Up Order; Future        ASSESSMENT:    Visit discussion: Tracy Galloway is a 79 year old female with a past medical history significant for trigeminal neuralgia secondary to dental injury 20 years ago who presents with complaints of intractable right-sided facial pain and headaches.  She is very emotional and tearful throughout the encounter regarding the intensity and severity of her neuralgia which has persisted for over 20 years.  She has attempted several medications including gabapentin and pregabalin; acupuncture and evaluation and treatment by head and neck specialist.  She was started on hydrocodone 5/325 mg nearly 20 years ago under a chronic pain program; which she participated in for 3-1/2 years.  Hydrocodone 5/325 mg dose became ineffective after she had been on it for a decade and dose was increased to 10/325  mg approximately 10 years ago.  At this point she is realizing no significant benefit from current dosing.  We discussed the centralization of chronic pain and benefits of neuroleptic medications for chronic pain management; especially with nerve pain.  She has not tried Trileptal or lamotrigine for trigeminal neuralgia.  It is reasonable to initiate Trileptal as indicated below.  I do not recommend increasing dose of Norco higher than currently prescribed.  Overall goal is that she begins to experience some relief in pain intensity as the Trileptal dosing is leveling in her system.  We discussed buprenorphine as an alternative to a full agonist opioid such as hydrocodone.  This is a safer alternative for long-term opioid control and does have some benefit on nervelike pain.  Patient advised on the process to establish care with the chronic pain program including collection of urine drug screen and return to clinic to sign controlled substance agreement.  She is advised to return in 2 to 3 weeks at which time we will initiate initial 7-day trial of buprenorphine.  She prefers to try the transdermal patch initially however insurance will likely dictate whether transdermal or buccal medication delivery is the preferred choice.  Okay to begin slow upward taper of buprenorphine at that time and slowly taper Norco as tolerated.  Patient verbalizes understanding and agreement with plan.         PLAN:    Medication Management:   - START taking Oxcarbazepine 150mg - take 150mg at bedtime for 7 days, then take 150mg in AM and 150mg at bedtime      Continue Current Treatment Plan with:   - Existing medications, as prescribed by Dr. Solis       New Orders:   - Urine drug screen to confirm status of controlled substance use.       Return to Clinic:   -  30-minute In-Person Appointment with Whitney Baird, STEVE, APRN, FNP-C in 2-3 weeks, or sooner if needed      Future Considerations:   We will consider a trial of buprenorphine  micro dosing for chronic pain     ___________________________________________________________________    BILLING TIME DOCUMENTATION:   TOTAL TIME includes:   Time spent preparing to see the patient: 5 minutes (reviewing records and tests)  Time spend face to face with the patient: 62 minutes  Time spent ordering tests, medications, procedures and referrals: 0 minutes  Time spent Referring and communicating with other healthcare professionals: 0 minutes  Documenting clinical information in Epic: 5 minutes    The total TIME spent on this patient on the day of the appointment was 72 minutes.   ___________________________________________________________________    Review of Electronic Chart, Relevant Records/History: Today I have also reviewed available medical information in the patient's medical record at [facility] and Care Everywhere (if available), including relevant provider notes, laboratory work, and imaging. Please see the [facility] health questionnaire which the patient completed and reviewed with me in detail.     Whitney Baird, YANY, DNP, FNP-C   [facility]

## 2024-08-22 NOTE — PATIENT INSTRUCTIONS
PATIENT INSTRUCTIONS:     I am recommending a multidisciplinary treatment plan to help this patient better manage her pain. The following recommendations were given to the patient. Diagnosis, treatment options, risks, benefits, and alternatives were discussed; all questions were answered. Self-care instructions were given. The patient expressed understanding of the plan for management.   Medication Management:   - START taking Oxcarbazepine 150mg - take 150mg at bedtime for 7 days, then take 150mg in AM and 150mg at bedtime      Continue Current Treatment Plan with:   - Existing medications, as prescribed by Dr. Solis    New Orders:   - Urine drug screen to confirm status of controlled substance use.     Return to Clinic:   -  30-minute In-Person Appointment with Whitney Baird, STEVE, YANY, JORGE in 2-3 weeks, or sooner if needed    Future Considerations:   We will consider a trial of buprenorphine micro dosing for chronic pain     ----------------------------------------------------------------  Clinic Number:  [phone]   Call with any questions about your care and for scheduling assistance.   Calls are returned Monday through Friday between 8 AM and 4:30 PM. We usually get back to you within 2 business days depending on the issue/request.    If we are prescribing your medications:  For opioid medication refills, call the clinic or send a Snapflow message 7 days in advance.  Please include:  Name of requested medication  Name of the pharmacy.  For non-opioid medications, call your pharmacy directly to request a refill. Please allow 3-4 days to be processed.   Per [place] State Law:  All controlled substance prescriptions must be filled within 30 days of being written.    For those controlled substances allowing refills, pickup must occur within 30 days of last fill.      We believe regular attendance is key to your success in our program!    Any time you are unable to keep your appointment we ask that you call us at  least 24 hours in advance to cancel.This will allow us to offer the appointment time to another patient.   Multiple missed appointments may lead to dismissal from the clinic.

## 2024-08-30 ENCOUNTER — LAB (OUTPATIENT)
Dept: LAB | Facility: CLINIC | Age: 79
End: 2024-08-30
Payer: COMMERCIAL

## 2024-08-30 DIAGNOSIS — Z51.81 ENCOUNTER FOR THERAPEUTIC DRUG MONITORING: ICD-10-CM

## 2024-08-30 LAB
AMPHETAMINES UR QL SCN: ABNORMAL
BARBITURATES UR QL SCN: ABNORMAL
BENZODIAZ UR QL SCN: ABNORMAL
BZE UR QL SCN: ABNORMAL
CANNABINOIDS UR QL SCN: ABNORMAL
FENTANYL UR QL: ABNORMAL
OPIATES UR QL SCN: ABNORMAL
PCP QUAL URINE (ROCHE): ABNORMAL

## 2024-08-30 PROCEDURE — 80307 DRUG TEST PRSMV CHEM ANLYZR: CPT

## 2024-09-06 NOTE — ED NOTES
DATE:  5/22/2019   TIME OF RECEIPT FROM LAB:  2252  LAB TEST:  lactic  LAB VALUE:  2.1  RESULTS GIVEN WITH READ-BACK TO (PROVIDER):  Sánchez  TIME LAB VALUE REPORTED TO PROVIDER:   6534   yes

## 2024-09-16 NOTE — PROGRESS NOTES
04/16/24 0500   Appointment Info   Signing clinician's name / credentials Allie Uriarte, PT, DPT   Visits Used 3   Medical Diagnosis Chronic right-sided low back pain without sciatica   PT Tx Diagnosis LBP   Quick Adds Certification   Progress Note/Certification   Start of Care Date 03/12/24   Onset of illness/injury or Date of Surgery 02/03/24   Therapy Frequency 1x/week   Predicted Duration 8 weeks   Certification date from 03/12/24   Certification date to 05/07/24   Progress Note Due Date 05/07/24   GOALS   PT Goals 3;2   PT Goal 1   Goal Identifier 1.   Goal Description Pt will be able to consistently sit 30 min w/ LBP no > 3/10   Target Date 05/07/24   PT Goal 2   Goal Identifier 2.   Goal Description Pt will be able to walk 30-40 min 4x/ week w/ pain no > 2/10   Target Date 05/07/24   PT Goal 3   Goal Identifier 3.   Goal Description Pt will be independent and consistent w/HEP to manage symptoms   Target Date 05/07/24   Subjective Report   Subjective Report Pt relates LBP is not doing well. Pt relates pain is in B SI.   Pt has massager at home.   Objective Measures   Objective Measures Objective Measure 1;Objective Measure 2;Objective Measure 3   Objective Measure 1   Objective Measure Lumbar ROM   Treatment Interventions (PT)   Interventions Manual Therapy;Therapeutic Procedure/Exercise   Therapeutic Procedure/Exercise   Therapeutic Procedures: strength, endurance, ROM, flexibility minutes (80431) 30   Skilled Intervention ex to improve flexibility/ strength to improve function   Patient Response/Progress modified hip flexor stretch as pt reported LBP w/ position.   Ther Proc 6 Abdominal Brace Transverse Abdominis   Ther Proc 6 - Details x 10   Therapeutic Procedures Ther Proc 2;Ther Proc 3;Ther Proc 4;Ther Proc 6;Ther Proc 5   PTRx Ther Proc 1 All 4s Stretch   PTRx Ther Proc 1 - Details 30 secs x 2   PTRx Ther Proc 2  Hip Flexor Stretch Angel Test Position   PTRx Ther Proc 2 - Details 30 secs x 2   PTRx  Ther Proc 3 Supine Lumbar Hip Roll   PTRx Ther Proc 3 - Details x 10   PTRx Ther Proc 4 Seated Piriformis Stretch   PTRx Ther Proc 4 - Details 30 secs x 2   PTRx Ther Proc 5 Piriformis Stretch Above 90 Degress Supine   PTRx Ther Proc 5 - Details 30 secs x 2   PTRx Ther Proc 6 Gluteal Myofascial Full Arc   PTRx Ther Proc 6 - Details x 10   PTRx Ther Proc 7 Gluteal Myofascial Upper Arc   PTRx Ther Proc 7 - Details x10   PTRx Ther Proc 8 Gluteal Myofascial Lower Arc   PTRx Ther Proc 8 - Details x10   Eval/Assessments   Assessments   (Discussed possible return to MD for further imagaing and possible injections as well as coninue stretching and strengthening. Pain seems most consistent with OA)   Education   Learner/Method Patient;Listening;Reading;Demonstration;Pictures/Video;No Barriers to Learning   Plan   Home program [id number] - wants paper   Plan for next session core stabiliy, hip abd, yoga stretching, side stepping   Comments   Comments 1x/week   Total Session Time   Timed Code Treatment Minutes 30   Total Treatment Time (sum of timed and untimed services) 30         DISCHARGE  Reason for Discharge: Patient has failed to schedule further appointments.    Equipment Issued: none    Discharge Plan: Patient to continue home program.    Referring Provider:  Gwendolyn Solis

## 2024-09-17 ENCOUNTER — MYC REFILL (OUTPATIENT)
Dept: PALLIATIVE MEDICINE | Facility: CLINIC | Age: 79
End: 2024-09-17
Payer: COMMERCIAL

## 2024-09-17 ENCOUNTER — MYC REFILL (OUTPATIENT)
Dept: FAMILY MEDICINE | Facility: CLINIC | Age: 79
End: 2024-09-17
Payer: COMMERCIAL

## 2024-09-17 DIAGNOSIS — G50.0 TRIGEMINAL NEURALGIA: ICD-10-CM

## 2024-09-17 DIAGNOSIS — G89.4 CHRONIC PAIN SYNDROME: Chronic | ICD-10-CM

## 2024-09-17 DIAGNOSIS — N95.2 ATROPHIC VAGINITIS: Primary | ICD-10-CM

## 2024-09-17 RX ORDER — ESTRADIOL 0.05 MG/D
1 PATCH, EXTENDED RELEASE TRANSDERMAL
Qty: 8 PATCH | Refills: 5 | Status: SHIPPED | OUTPATIENT
Start: 2024-09-19

## 2024-09-17 NOTE — TELEPHONE ENCOUNTER
Received fax request from pharmacy requesting refill(s) for OXcarbazepine (TRILEPTAL) 150 MG tablet    Last refilled on 8/22/2024.    Pt last seen on 8/22/2024.  Next appt scheduled for 9/24/2024.    Will facilitate refill.

## 2024-09-17 NOTE — TELEPHONE ENCOUNTER
Refills have been requested for the following medications:         OXcarbazepine (TRILEPTAL) 150 MG tablet [SHIRA AMAYA]     Preferred pharmacy: [facility] [address] [place] [address]

## 2024-09-18 RX ORDER — OXCARBAZEPINE 150 MG/1
150 TABLET, FILM COATED ORAL 2 TIMES DAILY
Qty: 60 TABLET | Refills: 0 | Status: SHIPPED | OUTPATIENT
Start: 2024-09-18

## 2024-09-18 NOTE — TELEPHONE ENCOUNTER
9/18/2024 12:45 PM     REFILL REQUEST:     This is a patient of mine. PDMP and Chart have been reviewed; refill request is appropriate.    Refilled for 30 day supply.  Script e-Prescribed to pharmacy    YANY Kinney, STEVE, FNP-C

## 2024-09-23 ENCOUNTER — OFFICE VISIT (OUTPATIENT)
Dept: DERMATOLOGY | Facility: CLINIC | Age: 79
End: 2024-09-23
Payer: COMMERCIAL

## 2024-09-23 DIAGNOSIS — L82.0 INFLAMED SEBORRHEIC KERATOSIS: Primary | ICD-10-CM

## 2024-09-23 DIAGNOSIS — L81.4 LENTIGO: ICD-10-CM

## 2024-09-23 DIAGNOSIS — D22.9 NEVUS: ICD-10-CM

## 2024-09-23 PROCEDURE — 99212 OFFICE O/P EST SF 10 MIN: CPT | Mod: 25 | Performed by: PHYSICIAN ASSISTANT

## 2024-09-23 PROCEDURE — 17110 DESTRUCTION B9 LES UP TO 14: CPT | Performed by: PHYSICIAN ASSISTANT

## 2024-09-23 ASSESSMENT — PAIN SCALES - GENERAL: PAINLEVEL: NO PAIN (0)

## 2024-09-23 NOTE — PROGRESS NOTES
[facility]         Date of Visit: 9/23/2024    CHIEF COMPLAINT:   Chief Complaint   Patient presents with    Pain       Subjective   SUBJECTIVE    INTERVAL HISTORY:   Tracy Galloway is a 79 year old female seen for INITIAL EVALUATION on 822/24.  They are returning today for Trigeminal neuralgia.         Recommendations/ Plan at the last visit included:  Visit discussion: Tracy Galloway is a 79 year old female with a past medical history significant for trigeminal neuralgia secondary to dental injury 20 years ago who presents with complaints of intractable right-sided facial pain and headaches.  She is very emotional and tearful throughout the encounter regarding the intensity and severity of her neuralgia which has persisted for over 20 years.  She has attempted several medications including gabapentin and pregabalin; acupuncture and evaluation and treatment by head and neck specialist.  She was started on hydrocodone 5/325 mg nearly 20 years ago under a chronic pain program; which she participated in for 3-1/2 years.  Hydrocodone 5/325 mg dose became ineffective after she had been on it for a decade and dose was increased to 10/325 mg approximately 10 years ago.  At this point she is realizing no significant benefit from current dosing.  We discussed the centralization of chronic pain and benefits of neuroleptic medications for chronic pain management; especially with nerve pain.  She has not tried Trileptal or lamotrigine for trigeminal neuralgia.  It is reasonable to initiate Trileptal as indicated below.  I do not recommend increasing dose of Norco higher than currently prescribed.  Overall goal is that she begins to experience some relief in pain intensity as the Trileptal dosing is leveling in her system.  We discussed buprenorphine as an alternative to a full agonist opioid such as hydrocodone.  This is a safer alternative for long-term opioid control and does have some benefit  on nervelike pain.  Patient advised on the process to establish care with the chronic pain program including collection of urine drug screen and return to clinic to sign controlled substance agreement.  She is advised to return in 2 to 3 weeks at which time we will initiate initial 7-day trial of buprenorphine.  She prefers to try the transdermal patch initially however insurance will likely dictate whether transdermal or buccal medication delivery is the preferred choice.  Okay to begin slow upward taper of buprenorphine at that time and slowly taper Norco as tolerated.  Patient verbalizes understanding and agreement with plan.     PLAN:    Medication Management:   - START taking Oxcarbazepine 150mg - take 150mg at bedtime for 7 days, then take 150mg in AM and 150mg at bedtime    Continue Current Treatment Plan with:   - Existing medications, as prescribed by Dr. Solis    New Orders:   - Urine drug screen to confirm status of controlled substance use.     Return to Clinic:   -  30-minute In-Person Appointment with Whitney Baird, STEVE, YANY, ANIYAC in 2-3 weeks, or sooner if needed    Future Considerations:   We will consider a trial of buprenorphine micro dosing for chronic pain       HPI from initial visit on 8/22/24:   Tracy Galloway is a 79 year old female with history of trigeminal neuralgia after dental injury 20 years ago.    Pain is constant and very stressful; neck and jaw muscle spasms trigger pain and headache  Has had a headache for the past month without relief.   States her  has recently been diagnosed with dementia and this adds to her stress and pain.   Over the past several years, she would experience some relief of pain intensity with dose of Norco 10/325mg.   Over past month, she has realized there is no longer improvement in pain intensity.  Pain score today: Worst Pain (10)   Pain Description and Location: right face/jaw        Quality: burning, numbness, dull, aching, pressure    Duration: Constant   Severity/Intensity (0 = No pain to 10 = Worst pain imaginable)   Now:  , Average: 8, Best: 8, Worst: 8  Aggravating factors include: nothing worsens the pain  Relieving factors include: walking, exercise, medication       Since the last visit, Tracy Galloway reports:   Pain score today: Extreme Pain (8)   Pain rating: intensity ranges from 8/10 to 10/10 on a 0-10 scale.   She reports tightness in jaw but feels like pain is slightly better.    Has noticed increased sharp pain in cheekbone; less pain in palate.  Has been tolerating Oxcarbazepine but does note she is sleeping longer; dosing at 10am and 10:30pm.       REVIEW OF SYSTEMS:   ROS: 10 point ROS neg other than the symptoms noted above in the HPI.     The patient otherwise denies red flag symptoms, such as: thunderclap headache, bowel or bladder incontinence, parasthesias, weakness, saddle anesthesia, unintentional weight loss, or fever/chills/sweats.     Medical History: Any changes in medical history since they were last seen? No    Medications and Allergies reviewed. Yes     Review of [place] Prescription Monitoring Program ():  reviewed on 9/24/24. No concern for abuse or misuse of controlled medications based on this report. Controlled substances prescribed in the past 6 months include: (Zolpidem 10mg, Norco 10/325mg)     Current MME: 40 / day    Current controlled substance medications, if any, are being prescribed by: Pain Mgmt    Primary Care Provider is Gwendolyn Solis       CSA due date: 9/24/25   UDS due date:  8/30/25     THE 4 As OF OPIOID MAINTENANCE ANALGESIA    Analgesia: Is pain relief clinically significant? Yes  Activity: Is patient functional and able to perform Activities of Daily Living? Yes  Adverse effects: Is patient free from adverse side effects from opiates? Yes  Adherence to Rx protocol: Is patient adhering to Controlled Substance Agreement and taking medications ONLY as ordered? Yes    Is  Narcan prescribed for opiate use >50 MME daily or concurrent use of opiates and benzodiazepines? Yes        Objective    OBJECTIVE:    Physical Exam  Vitals:    09/24/24 1502   BP: 121/71   Pulse: 64        Appearance:   A&O. Patient is appropriate.   Patient is in NAD.      HHENT:  Normocephalic, atraumatic. Eyes without conjunctival injection or jaundice. Neck supple. No obvious neck masses.     Pulmonary:  Normal effort; no cough or audible wheeze      Skin: No obvious rash, lesions, or petechiae of exposed skin.    Neuro: Cranial nerves grossly intact.  Mentation and speech appropriate for age.     Psych:  Alert, without lethargy or stupor. Speech fluent. Appropriate affect. Mood normal. Able to follow commands without difficulty. Normal mood, judgement and behavior.        Gait pattern:   Patient has an antalgic gait pattern:NO  Gait favors the neither side.  Mobility and/or assistive devices? NO            Diagnostic Tests/ Imaging/ Labs resulted since last visit:   8/30/24 - UDS - no concerns       Assessment & Plan      VISIT DIAGNOSIS:   1. Chronic pain syndrome  - Pain Management  Referral  - OXcarbazepine (TRILEPTAL) 300 MG tablet; Take 300mg at bedtime for 7 days, then take 300mg in AM and 300mg at bedtime  Dispense: 60 tablet; Refill: 0  - Adult Pain Clinic Follow-Up Order; Future    2. Chronic, continuous use of opioids  - Pain Management  Referral  - OXcarbazepine (TRILEPTAL) 300 MG tablet; Take 300mg at bedtime for 7 days, then take 300mg in AM and 300mg at bedtime  Dispense: 60 tablet; Refill: 0  - Adult Pain Clinic Follow-Up Order; Future    3. Trigeminal neuralgia  - Pain Management  Referral  - OXcarbazepine (TRILEPTAL) 300 MG tablet; Take 300mg at bedtime for 7 days, then take 300mg in AM and 300mg at bedtime  Dispense: 60 tablet; Refill: 0  - Adult Pain Clinic Follow-Up Order; Future      ASSESSMENT:   Visit discussion: Tracy Galloway is seen in follow up today at the  pain clinic for intractable facial nerve pain. She is tolerating oxcarbazepine but does note she is significantly more hungry than she was before starting.  She is willing to continue upward taper of oxcarbazepine as indicated below.  Recommending evening dose 1 to 2 hours before bed to account for increased length of sleep in the morning.  Okay to continue Norco prescription as written and filled by primary care provider which she picked up today.  We signed a controlled substance agreement today and she does understand that future Norco prescriptions should be requested through pain management.  We did discuss potential benefit of buprenorphine versus cycling to a different opioid versus long-acting extended release opioids.  At this time, I would like to maximize nonopioid options before adjusting her current opioid prescriptions. Return to clinic in 4 to 6 weeks to reassess pain control.  Patient verbalizes understanding and agreement with plan.     PLAN:    Medication Management:   - Please allow 5-7 days to process refills to avoid running out of your medication.   - Request refills of controlled substance medications through [facility] or call [phone].   - CONTINUE taking Norco 10/325mg - take 1 tab every 6 hours for pain; max 4 per day   - TAPER Oxcarbazepine as follows:  Take 150mg in AM in AM and 300mg in PM for 7 days, then take 300mg in AM and 300mg in PM.     Return to Clinic:   -  30-minute In-Person Appointment with Whitney Baird, STEVE, APRN, ANIYAC in 4-6 weeks, or sooner if needed      Future Considerations:   We will continue to adjust Oxcarbazepine dosing as needed.   Please contact the Pain Clinic for refills of Norco 10/325mg in the future.        ___________________________________________________________________    BILLING TIME DOCUMENTATION:   TOTAL TIME includes:   Time spent preparing to see the patient: 3 minutes (reviewing records and tests)  Time spend face to face with the patient: 29  minutes  Time spent ordering tests, medications, procedures and referrals: 0 minutes  Time spent Referring and communicating with other healthcare professionals: 0 minutes  Documenting clinical information in Epic: 3 minutes    The total TIME spent on this patient on the day of the appointment was 35 minutes.     ___________________________________________________________________    Review of Electronic Chart: Today I have also reviewed available medical information in the patient's medical record at [facility] ([facility]) and Care Everywhere (if available), including relevant provider notes, laboratory work, and imaging.     YANY Kinney, DNP, FNP-C   [facility]

## 2024-09-23 NOTE — PROGRESS NOTES
HPI:   Chief complaints: Tracy Galloway is a pleasant 79 year old female who presents for evaluation of a few spots of concern. She has a spot on the back and a spot on the knee she would like checked. She would also like her face checked. She declines a FSE today.       PHYSICAL EXAM:    LMP  (LMP Unknown)   Skin exam performed as follows: Type 2 skin. Mood appropriate  Alert and Oriented X 3. Well developed, well nourished in no distress.  General appearance: Normal  Head including face: Normal  Eyes: conjunctiva and lids: Normal  Mouth: Lips, teeth, gums: Normal  Neck: Normal  Skin: Scalp and body hair: See below    Inflamed keratotic papule on the left knee x 1, lower back x 3, left lateral brow x 1, left upper cheek x 1  Brown and tan macules and papules on the face and back    ASSESSMENT/PLAN:     Inflamed seborrheic keratosis on the left knee x 1, lower back x 3, left lateral brow x 1, left upper cheek x 1. As physically tender cryosurgery performed. Advised on post op care.   Nevi, lentigos on the face and back - all benign in appearance no treatment needed.           Follow-up: yearly/PRN sooner  CC:   Scribed By: Diana Her, MS, PA-C

## 2024-09-23 NOTE — LETTER
9/23/2024      Tracy Galloway  [address]  [place] [address]      Dear Colleague,    Thank you for referring your patient, Tracy Galloway, to the [facility] [place]. Please see a copy of my visit note below.    HPI:   Chief complaints: Tracy Galloway is a pleasant 79 year old female who presents for evaluation of a few spots of concern. She has a spot on the back and a spot on the knee she would like checked. She would also like her face checked. She declines a FSE today.       PHYSICAL EXAM:    LMP  (LMP Unknown)   Skin exam performed as follows: Type 2 skin. Mood appropriate  Alert and Oriented X 3. Well developed, well nourished in no distress.  General appearance: Normal  Head including face: Normal  Eyes: conjunctiva and lids: Normal  Mouth: Lips, teeth, gums: Normal  Neck: Normal  Skin: Scalp and body hair: See below    Inflamed keratotic papule on the left knee x 1, lower back x 3, left lateral brow x 1, left upper cheek x 1  Brown and tan macules and papules on the face and back    ASSESSMENT/PLAN:     Inflamed seborrheic keratosis on the left knee x 1, lower back x 3, left lateral brow x 1, left upper cheek x 1. As physically tender cryosurgery performed. Advised on post op care.   Nevi, lentigos on the face and back - all benign in appearance no treatment needed.           Follow-up: yearly/PRN sooner  CC:   Scribed By: Diana Her MS, PABAILEY      Again, thank you for allowing me to participate in the care of your patient.        Sincerely,        Diana Her PA-C

## 2024-09-24 ENCOUNTER — OFFICE VISIT (OUTPATIENT)
Dept: PALLIATIVE MEDICINE | Facility: CLINIC | Age: 79
End: 2024-09-24
Payer: COMMERCIAL

## 2024-09-24 VITALS — HEART RATE: 64 BPM | SYSTOLIC BLOOD PRESSURE: 121 MMHG | DIASTOLIC BLOOD PRESSURE: 71 MMHG

## 2024-09-24 DIAGNOSIS — F11.90 CHRONIC, CONTINUOUS USE OF OPIOIDS: ICD-10-CM

## 2024-09-24 DIAGNOSIS — G50.0 TRIGEMINAL NEURALGIA: ICD-10-CM

## 2024-09-24 DIAGNOSIS — G89.4 CHRONIC PAIN SYNDROME: Primary | Chronic | ICD-10-CM

## 2024-09-24 PROCEDURE — 99214 OFFICE O/P EST MOD 30 MIN: CPT | Performed by: NURSE PRACTITIONER

## 2024-09-24 PROCEDURE — G2211 COMPLEX E/M VISIT ADD ON: HCPCS | Performed by: NURSE PRACTITIONER

## 2024-09-24 RX ORDER — OXCARBAZEPINE 300 MG/1
TABLET, FILM COATED ORAL
Qty: 60 TABLET | Refills: 0 | Status: SHIPPED | OUTPATIENT
Start: 2024-09-24

## 2024-09-24 ASSESSMENT — ANXIETY QUESTIONNAIRES
GAD7 TOTAL SCORE: 3
8. IF YOU CHECKED OFF ANY PROBLEMS, HOW DIFFICULT HAVE THESE MADE IT FOR YOU TO DO YOUR WORK, TAKE CARE OF THINGS AT HOME, OR GET ALONG WITH OTHER PEOPLE?: SOMEWHAT DIFFICULT
7. FEELING AFRAID AS IF SOMETHING AWFUL MIGHT HAPPEN: NOT AT ALL

## 2024-09-24 ASSESSMENT — PATIENT HEALTH QUESTIONNAIRE - PHQ9
SUM OF ALL RESPONSES TO PHQ QUESTIONS 1-9: 5
SUM OF ALL RESPONSES TO PHQ QUESTIONS 1-9: 5
10. IF YOU CHECKED OFF ANY PROBLEMS, HOW DIFFICULT HAVE THESE PROBLEMS MADE IT FOR YOU TO DO YOUR WORK, TAKE CARE OF THINGS AT HOME, OR GET ALONG WITH OTHER PEOPLE: SOMEWHAT DIFFICULT

## 2024-09-24 ASSESSMENT — PAIN SCALES - GENERAL: PAINLEVEL: EXTREME PAIN (8)

## 2024-09-24 NOTE — LETTER
Opioid / Opioid Plus Controlled Substance Agreement    This is an agreement between you and your provider about the safe and appropriate use of controlled substance/opioids prescribed by your care team. Controlled substances are medicines that can cause physical and mental dependence (abuse).    There are strict laws about having and using these medicines. We here at [facility] are committing to working with you in your efforts to get better. To support you in this work, we ll help you schedule regular office appointments for medicine refills. If we must cancel or change your appointment for any reason, we ll make sure you have enough medicine to last until your next appointment.     As a Provider, I will:  Listen carefully to your concerns and treat you with respect.   Recommend a treatment plan that I believe is in your best interest. This plan may involve therapies other than opioid pain medication.   Talk with you often about the possible benefits, and the risk of harm of any medicine that we prescribe for you.   Provide a plan on how to taper (discontinue or go off) using this medicine if the decision is made to stop its use.    As a Patient, I understand that opioid(s):   Are a controlled substance prescribed by my care team to help me function or work and manage my condition(s).   Are strong medicines and can cause serious side effects such as:  Drowsiness, which can seriously affect my driving ability  A lower breathing rate, enough to cause death  Harm to my thinking ability   Depression   Abuse of and addiction to this medicine  Need to be taken exactly as prescribed. Combining opioids with certain medicines or chemicals (such as illegal drugs, sedatives, sleeping pills, and benzodiazepines) can be dangerous or even fatal. If I stop opioids suddenly, I may have severe withdrawal symptoms.  Do not work for all types of pain nor for all patients. If they re not helpful, I may be asked to stop  them.        The risks, benefits and side effects of these medicine(s) were explained to me. I agree that:  I will take part in other treatments as advised by my care team. This may be psychiatry or counseling, physical therapy, behavioral therapy, group treatment or a referral to a specialist.     I will keep all my appointments. I understand that this is part of the monitoring of opioids. My care team may require an office visit for EVERY opioid/controlled substance refill. If I miss appointments or don t follow instructions, my care team may stop my medicine.    I will take my medicines as prescribed. I will not change the dose or schedule unless my care team tells me to. There will be no refills if I run out early.     I may be asked to come to the clinic and complete a urine drug test or complete a pill count at any time. If I don t give a urine sample or participate in a pill count, the care team may stop my medicine.    I will only receive prescriptions from this clinic for chronic pain. If I am treated by another provider for acute pain issues, I will tell them that I am taking opioid pain medication for chronic pain and that I have a treatment agreement with this provider. I will inform my [facility] care team within one business day if I am given a prescription for any pain medication by another healthcare provider. My [facility] care team can contact other providers and pharmacists about my use of any medicines.    It is up to me to make sure that I don t run out of my medicines on weekends or holidays. If my care team is willing to refill my opioid prescription without a visit, I must request refills only during office hours. Refills may take up to 3 business days to process. I will use one pharmacy to fill all my opioid and other controlled substance prescriptions. I will notify the clinic about any changes to my insurance or medication availability.    I am responsible for my  prescriptions. If the medicine/prescription is lost, stolen or destroyed, it will not be replaced. I also agree not to share controlled substance medicines with anyone.    I am aware I should not use any illegal or recreational drugs. I agree not to drink alcohol unless my care team says I can.       If I enroll in the [place] [facility] program, I will tell my care team prior to my next refill.     I will tell my care team right away if I become pregnant, have a new medical problem treated outside of my regular clinic, or have a change in my medications.    I understand that this medicine can affect my thinking, judgment and reaction time. Alcohol and drugs affect the brain and body, which can affect the safety of my driving. Being under the influence of alcohol or drugs can affect my decision-making, behaviors, personal safety, and the safety of others. Driving while impaired (DWI) can occur if a person is driving, operating, or in physical control of a car, motorcycle, boat, snowmobile, ATV, motorbike, off-road vehicle, or any other motor vehicle ([place] Statute 169A.20). I understand the risk if I choose to drive or operate any vehicle or machinery.    I understand that if I do not follow any of the conditions above, my prescriptions or treatment may be stopped or changed.          Opioids  What You Need to Know    What are opioids?   Opioids are pain medicines that must be prescribed by a doctor. They are also known as narcotics.     Examples are:   morphine (MS Contin, Ne)  oxycodone (Oxycontin)  oxycodone and acetaminophen (Percocet)  hydrocodone and acetaminophen (Vicodin, Norco)   fentanyl patch (Duragesic)   hydromorphone (Dilaudid)   methadone  codeine (Tylenol #3)     What do opioids do well?   Opioids are best for severe short-term pain such as after a surgery or injury. They may work well for cancer pain. They may help some people with long-lasting (chronic) pain.     What do opioids NOT do  well?   Opioids never get rid of pain entirely, and they don t work well for most patients with chronic pain. Opioids don t reduce swelling, one of the causes of pain.                                    Other ways to manage chronic pain and improve function include:     Treat the health problem that may be causing pain  Anti-inflammation medicines, which reduce swelling and tenderness, such as ibuprofen (Advil, Motrin) or naproxen (Aleve)  Acetaminophen (Tylenol)  Antidepressants and anti-seizure medicines, especially for nerve pain  Topical treatments such as patches or creams  Injections or nerve blocks  Chiropractic or osteopathic treatment  Acupuncture, massage, deep breathing, meditation, visual imagery, aromatherapy  Use heat or ice at the pain site  Physical therapy   Exercise  Stop smoking  Take part in therapy       Risks and side effects     Talk to your doctor before you start or decide to keep taking opioids. Possible side effects include:    Lowering your breathing rate enough to cause death  Overdose, including death, especially if taking higher than prescribed doses  Worse depression symptoms; less pleasure in things you usually enjoy  Feeling tired or sluggish  Slower thoughts or cloudy thinking  Being more sensitive to pain over time; pain is harder to control  Trouble sleeping or restless sleep  Changes in hormone levels (for example, less testosterone)  Changes in sex drive or ability to have sex  Constipation  Unsafe driving  Itching and sweating  Dizziness  Nausea, throwing up and dry mouth    What else should I know about opioids?    Opioids may lead to dependence, tolerance, or addiction.    Dependence means that if you stop or reduce the medicine too quickly, you will have withdrawal symptoms. These include loose poop (diarrhea), jitters, flu-like symptoms, nervousness and tremors. Dependence is not the same as addiction.                     Tolerance means needing higher doses over time to  get the same effect. This may increase the chance of serious side effects.    Addiction is when people improperly use a substance that harms their body, their mind or their relations with others. Use of opiates can cause a relapse of addiction if you have a history of drug or alcohol abuse.    People who have used opioids for a long time may have a lower quality of life, worse depression, higher levels of pain and more visits to doctors.    You can overdose on opioids. Take these steps to lower your risk of overdose:    Recognize the signs:  Signs of overdose include decrease or loss of consciousness (blackout), slowed breathing, trouble waking up and blue lips. If someone is worried about overdose, they should call 911.    Talk to your doctor about Narcan (naloxone).   If you are at risk for overdose, you may be given a prescription for Narcan. This medicine very quickly reverses the effects of opioids.   If you overdose, a friend or family member can give you Narcan while waiting for the ambulance. They need to know the signs of overdose and how to give Narcan.     Don't use alcohol or street drugs.   Taking them with opioids can cause death.    Do not take any of these medicines unless your doctor says it s OK. Taking these with opioids can cause death:  Benzodiazepines, such as lorazepam (Ativan), alprazolam (Xanax) or diazepam (Valium)  Muscle relaxers, such as cyclobenzaprine (Flexeril)  Sleeping pills like zolpidem (Ambien)   Other opioids      How to keep you and other people safe while taking opioids:    Never share your opioids with others.  Opioid medicines are regulated by the Drug Enforcement Agency ([employer]). Selling or sharing medications is a criminal act.    2. Be sure to store opioids in a secure place, locked up if possible. Young children can easily swallow them and overdose.    3. When you are traveling with your medicines, keep them in the original bottles. If you use a pill box, be sure you also  carry a copy of your medicine list from your clinic or pharmacy.    4. Safe disposal of opioids    Most pharmacies have places to get rid of medicine, called disposal kiosks. Medicine disposal options are also available in every [facility]. Search your county and  medication disposal  to find more options. You can find more details at:  https://www.pca.[email or web address].[email or web address]./living-green/managing-unwanted-medications     I agree that my provider, clinic care team, and pharmacy may work with any city, state or federal law enforcement agency that investigates the misuse, sale, or other diversion of my controlled medicine. I will allow my provider to discuss my care with, or share a copy of, this agreement with any other treating provider, pharmacy or emergency room where I receive care.    I have read this agreement and have asked questions about anything I did not understand.    _______________________________________________________  Patient Signature - Tracy Mcfarlandoon _____________________                   Date     _______________________________________________________  Provider Signature - SHIRA AMAYA, DNP   _____________________                   Date     _______________________________________________________  Witness Signature (required if provider not present while patient signing)   _____________________                   Date

## 2024-09-24 NOTE — PATIENT INSTRUCTIONS
PATIENT INSTRUCTIONS:     I am recommending a multidisciplinary treatment plan to help this patient better manage her pain. The following recommendations were given to the patient. Diagnosis, treatment options, risks, benefits, and alternatives were discussed; all questions were answered. Self-care instructions were given. The patient expressed understanding of the plan for management.   Medication Management:   - Please allow 5-7 days to process refills to avoid running out of your medication.   - Request refills of controlled substance medications through [employer] or call [phone].   - CONTINUE taking Norco 10/325mg - take 1 tab every 6 hours for pain; max 4 per day   - TAPER Oxcarbazepine as follows:  Take 150mg in AM in AM and 300mg in PM for 7 days, then take 300mg in AM and 300mg in PM.     Return to Clinic:   -  30-minute In-Person Appointment with Whitney Baird DNP, YANY, JORGE in 4-6 weeks, or sooner if needed      Future Considerations:   We will continue to adjust Oxcarbazepine dosing as needed.   Please contact the Pain Clinic for refills of Norco 10/325mg in the future.      ----------------------------------------------------------------  Clinic Number:  [phone]   Call with any questions about your care and for scheduling assistance.   Calls are returned Monday through Friday between 8 AM and 4:30 PM. We usually get back to you within 2 business days depending on the issue/request.    If we are prescribing your medications:  For opioid medication refills, call the clinic or send a [employer] message 7 days in advance.  Please include:  Name of requested medication  Name of the pharmacy.  For non-opioid medications, call your pharmacy directly to request a refill. Please allow 3-4 days to be processed.   Per [place] State Law:  All controlled substance prescriptions must be filled within 30 days of being written.    For those controlled substances allowing refills, pickup must occur within 30 days of  last fill.      We believe regular attendance is key to your success in our program!    Any time you are unable to keep your appointment we ask that you call us at least 24 hours in advance to cancel.This will allow us to offer the appointment time to another patient.   Multiple missed appointments may lead to dismissal from the clinic.

## 2024-09-25 ENCOUNTER — OFFICE VISIT (OUTPATIENT)
Dept: BEHAVIORAL HEALTH | Facility: CLINIC | Age: 79
End: 2024-09-25
Payer: COMMERCIAL

## 2024-09-25 DIAGNOSIS — F32.0 MILD SINGLE CURRENT EPISODE OF MAJOR DEPRESSIVE DISORDER (H): Primary | ICD-10-CM

## 2024-09-25 PROCEDURE — 90832 PSYTX W PT 30 MINUTES: CPT

## 2024-09-26 NOTE — PROGRESS NOTES
[facility]  September 25, 2024    Behavioral Health Clinician Progress Note    Patient Name: Tracy Galloway       Service Type:  Individual      Service Location:   Face to Face in Clinic     Session Start Time: 12:30pm  Session End Time: 1:00pm      Session Length: 16 - 37      Attendees: Patient     Service Modality:  In-person    Visit Activities (Refresh list every visit): NEW and [facility] Only    Diagnostic Assessment Date:  Will complete in the next few sessions, not completed due to time constraints.    Treatment Plan Review Date: Will complete in the next few sessions, not completed due to time constraints.    See Flowsheets for today's PHQ-9 and ROMAIN-7 results  Previous PHQ-9:       4/24/2024    12:34 PM 7/24/2024    12:33 PM 9/24/2024     5:47 PM   PHQ-9 SCORE   PHQ-9 Total Score MyChart 7 (Mild depression) 16 (Moderately severe depression) 5 (Mild depression)   PHQ-9 Total Score 7 16 5     Previous ROMAIN-7:       7/24/2024    12:34 PM 8/21/2024     5:41 PM 9/24/2024     5:48 PM   ROMAIN-7 SCORE   Total Score 0 (minimal anxiety) 0 (minimal anxiety) 3 (minimal anxiety)   Total Score 0 0 3     DATA  Extended Session (60+ minutes): No  Interactive Complexity: No  Crisis: No  [facility] Patient: No    Treatment Objective(s) Addressed in This Session:  Target Behavior(s): disease management/lifestyle changes anxiety     Anxiety: will experience a reduction in anxiety, will develop more effective coping skills to manage anxiety symptoms, will develop healthy cognitive patterns and beliefs, and will increase ability to function adaptively  Adjustment Difficulties: will develop coping/problem-solving skills to facilitate more adaptive adjustment    Current Stressors / Issues:  Reviewed [facility] role and that it was short term services.  Pt stated she understood.  She stated that she was feeling better then the day she came to see her PCP.  She stated that she will hit a  breaking point, usually when something goes wrong or stressors and then she has to release it some how that is feeling her emotions and then she is better.  She reviewed coping skills with writer and planned to continue to use them as needed.  She stated that she would call in sooner if she needed to to see writer.      Progress on Treatment Objective(s) / Homework:  Stable - CONTEMPLATION (Considering change and yet undecided); Intervened by assessing the negative and positive thinking (ambivalence) about behavior change    Motivational Interviewing    MI Intervention: Expressed Empathy/Understanding, Supported Autonomy, Collaboration, Evocation, Open-ended questions, and Reflections: simple and complex     Change Talk Expressed by the Patient: Desire to change Ability to change    Provider Response to Change Talk: A - Affirmed patient's thoughts, decisions, or attempts at behavior change and R - Reflected patient's change talk    Also provided psychoeducation about behavioral health condition, symptoms, and treatment options    Assessments completed prior to visit:  The following assessments were completed by patient for this visit:  PHQ9:       6/20/2023     1:14 PM 6/30/2023     3:13 PM 10/4/2023     3:14 PM 11/1/2023     3:31 PM 4/24/2024    12:34 PM 7/24/2024    12:33 PM 9/24/2024     5:47 PM   PHQ-9 SCORE   PHQ-9 Total Score [facility] (Severe depression) 8 (Mild depression) 11 (Moderate depression)  7 (Mild depression) 16 (Moderately severe depression) 5 (Mild depression)   PHQ-9 Total Score 23 8 11 12 7 16 5     GAD7:       6/28/2023     7:42 AM 8/23/2023     3:09 PM 9/27/2023    10:27 PM 11/1/2023     3:31 PM 7/24/2024    12:34 PM 8/21/2024     5:41 PM 9/24/2024     5:48 PM   ROMAIN-7 SCORE   Total Score 6 (mild anxiety) 6 (mild anxiety) 6 (mild anxiety)  0 (minimal anxiety) 0 (minimal anxiety) 3 (minimal anxiety)   Total Score 6 6 6 8 0 0 3     PROMIS 10-Global Health (only subscores and total score):        7/5/2023     1:48 PM 9/24/2024     5:49 PM   PROMIS-10 Scores Only   Global Mental Health Score 11 11   Global Physical Health Score 10 12   PROMIS TOTAL - SUBSCORES 21 23     [place] Suicide Severity Rating Scale (Lifetime/Recent)      11/20/2023    10:00 AM 11/20/2023    11:34 AM   [place] Suicide Severity Rating (Lifetime/Recent)   Q1 Wish to be Dead (Lifetime)  Yes   Q2 Non-Specific Active Suicidal Thoughts (Lifetime)  No   Q1 Wished to be Dead (Past Month) yes no   Q2 Suicidal Thoughts (Past Month) yes no   Q3 Suicidal Thought Method yes no   Q4 Suicidal Intent without Specific Plan yes no   Q5 Suicide Intent with Specific Plan no no   Q6 Suicide Behavior (Lifetime)  no   Level of Risk per Screen high risk low risk   3. Active Suicidal Ideation with any Methods (Not Plan) Without Intent to Act (Lifetime)  N   Q4 Active Suicidal Ideation with Some Intent to Act, Without Specific Plan (Lifetime)  N   Q5 Active Suicidal Ideation with Specific Plan and Intent (Lifetime)  N   Reasons for Ideation (Past 1 Month)  0   Actual Attempt (Lifetime)  N   Actual Attempt (Past 3 Months)  N   Total Number of Actual Attempts (Past 3 Months)  0   Has subject engaged in non-suicidal self-injurious behavior? (Lifetime)  N   Has subject engaged in non-suicidal self-injurious behavior? (Past 3 Months)  N   Interrupted Attempts (Lifetime)  N   Interrupted Attempts (Past 3 Months)  N   Total Number of Interrupted Attempts (Past 3 Months)  0   Aborted or Self-Interrupted Attempt (Lifetime)  N   Aborted or Self-Interrupted Attempt (Past 3 Months)  N   Total Number of Aborted or Self-Interrupted Attempts (Past 3 Months)  0   Preparatory Acts or Behavior (Lifetime)  N   Preparatory Acts or Behavior (Past 3 Months)  N   Total Number of Preparatory Acts (Past 3 Months)  0   Calculated C-SSRS Risk Score (Lifetime/Recent)  No Risk Indicated     [place] Suicide Severity Rating Scale (Short Version)      5/22/2019    10:29 PM 5/23/2019     11:00 AM 4/27/2023    12:24 PM 11/20/2023    10:00 AM 11/20/2023    11:34 AM 9/29/2024     2:59 PM   [place] Suicide Severity Rating (Short Version)   Over the past 2 weeks have you felt down, depressed, or hopeless? yes no yes yes     Over the past 2 weeks have you had thoughts of killing yourself? no no  yes     Comments   pt reports some depression, states she just wants her pain to go away      Have you ever attempted to kill yourself? no no  no     Q1 Wished to be Dead (Past Month)    yes no    Comments     Pt states she changed her mind    Q2 Suicidal Thoughts (Past Month)    yes no    Comments     Pt states she changed her mind    Q3 Suicidal Thought Method    yes no    Comments     Pt states she changed her mind    Q4 Suicidal Intent without Specific Plan    yes no    Comments     Pt states she changed her mind    Q5 Suicide Intent with Specific Plan    no no    Q6 Suicide Behavior (Lifetime)     no    Level of Risk per Screen    high risk low risk    1. Wish to be Dead (Since Last Contact)      N   2. Non-Specific Active Suicidal Thoughts (Since Last Contact)      N   Calculated C-SSRS Risk Score (Since Last Contact)      No Risk Indicated       Care Plan review completed: Yes    Medication Review:  No changes to current psychiatric medication(s)    Medication Compliance:  Yes    Changes in Health Issues:   None reported    Chemical Use Review:   Substance Use: Chemical use reviewed, no active concerns identified      Tobacco Use: No current tobacco use.      Assessment: Current Emotional / Mental Status (status of significant symptoms):  Risk status (Self / Other harm or suicidal ideation)  Patient denies a history of suicidal ideation, suicide attempts, self-injurious behavior, homicidal ideation, homicidal behavior, and and other safety concerns  Patient denies current fears or concerns for personal safety.  Patient denies current or recent suicidal ideation or behaviors.  Patient denies current or  recent homicidal ideation or behaviors.  Patient denies current or recent self injurious behavior or ideation.  Patient denies other safety concerns.  A safety and risk management plan has not been developed at this time, however patient was encouraged to call [place] [phone] should there be a change in any of these risk factors.    Appearance:   Appropriate   Eye Contact:   Good   Psychomotor Behavior: Normal   Attitude:   Cooperative   Orientation:   All  Speech   Rate / Production: Normal    Volume:  Normal   Mood:    Normal  Affect:    Appropriate   Thought Content:  Clear   Thought Form:  Coherent  Logical   Insight:    Good     Diagnoses:  1. Mild single current episode of major depressive disorder (H24)      Collateral Reports Completed:  Not Applicable    Plan: (Homework, other):  Patient was given information about behavioral services and encouraged to schedule a follow up appointment with the clinic [facility] in 2 weeks.  She was also given information about mental health symptoms and treatment options .  CD Recommendations: No indications of CD issues.       SULMA Abbasi        Answers submitted by the patient for this visit:  Patient Health Questionnaire (Submitted on 9/24/2024)  If you checked off any problems, how difficult have these problems made it for you to do your work, take care of things at home, or get along with other people?: Somewhat difficult  PHQ9 TOTAL SCORE: 5  Patient Health Questionnaire (G7) (Submitted on 9/24/2024)  ROMAIN 7 TOTAL SCORE: 3

## 2024-09-29 ASSESSMENT — COLUMBIA-SUICIDE SEVERITY RATING SCALE - C-SSRS
1. SINCE LAST CONTACT, HAVE YOU WISHED YOU WERE DEAD OR WISHED YOU COULD GO TO SLEEP AND NOT WAKE UP?: NO
2. HAVE YOU ACTUALLY HAD ANY THOUGHTS OF KILLING YOURSELF?: NO

## 2024-10-02 ENCOUNTER — PATIENT OUTREACH (OUTPATIENT)
Dept: CARE COORDINATION | Facility: CLINIC | Age: 79
End: 2024-10-02
Payer: COMMERCIAL

## 2024-10-04 DIAGNOSIS — I10 ESSENTIAL HYPERTENSION WITH GOAL BLOOD PRESSURE LESS THAN 140/90: ICD-10-CM

## 2024-10-04 RX ORDER — LISINOPRIL 20 MG/1
20 TABLET ORAL DAILY
Qty: 90 TABLET | Refills: 3 | Status: SHIPPED | OUTPATIENT
Start: 2024-10-04

## 2024-10-04 NOTE — TELEPHONE ENCOUNTER
Pending Prescriptions:                       Disp   Refills    lisinopril (ZESTRIL) 20 MG tablet [Pharmac*90 tab*0        Sig: TAKE ONE TABLET BY MOUTH ONCE DAILY    Routing refill request to provider for review/approval because:  Labs not current:  GFR, potassium    Potassium   Date Value Ref Range Status   11/29/2022 4.7 3.4 - 5.3 mmol/L Final   05/28/2021 4.9 3.4 - 5.3 mmol/L Final     GFR Estimate   Date Value Ref Range Status   11/29/2022 60 (L) >60 mL/min/1.73m2 Final     Comment:     Effective December 21, 2021 eGFRcr in adults is calculated using the 2021 CKD-EPI creatinine equation which includes age and gender (Rochelle et al., NEJM, DOI: 10.1056/STQCfc8877291)   05/28/2021 64 >60 mL/min/[1.73_m2] Final     Comment:     Non  GFR Calc  Starting 12/18/2018, serum creatinine based estimated GFR (eGFR) will be   calculated using the Chronic Kidney Disease Epidemiology Collaboration   (CKD-EPI) equation.       Gi Molina RN  [facility]

## 2024-10-16 ENCOUNTER — PATIENT OUTREACH (OUTPATIENT)
Dept: CARE COORDINATION | Facility: CLINIC | Age: 79
End: 2024-10-16
Payer: COMMERCIAL

## 2024-10-21 DIAGNOSIS — F11.90 CHRONIC, CONTINUOUS USE OF OPIOIDS: ICD-10-CM

## 2024-10-21 DIAGNOSIS — G50.0 TRIGEMINAL NEURALGIA: ICD-10-CM

## 2024-10-21 DIAGNOSIS — G89.4 CHRONIC PAIN SYNDROME: Chronic | ICD-10-CM

## 2024-10-22 DIAGNOSIS — G89.4 CHRONIC PAIN SYNDROME: Chronic | ICD-10-CM

## 2024-10-22 DIAGNOSIS — G50.0 TRIGEMINAL NEURALGIA: ICD-10-CM

## 2024-10-22 DIAGNOSIS — F11.90 CHRONIC, CONTINUOUS USE OF OPIOIDS: ICD-10-CM

## 2024-10-22 RX ORDER — HYDROCODONE BITARTRATE AND ACETAMINOPHEN 10; 325 MG/1; MG/1
1 TABLET ORAL EVERY 6 HOURS PRN
Qty: 120 TABLET | Refills: 0 | Status: SHIPPED | OUTPATIENT
Start: 2024-10-22

## 2024-10-22 RX ORDER — HYDROCODONE BITARTRATE AND ACETAMINOPHEN 10; 325 MG/1; MG/1
TABLET ORAL
Qty: 105 TABLET | Refills: 0 | OUTPATIENT
Start: 2024-10-22

## 2024-10-22 NOTE — TELEPHONE ENCOUNTER
Received call from patient requesting refill(s) HYDROcodone-acetaminophen (NORCO)  MG per tablet    Last dispensed from pharmacy on 09/24/2024    Patient's last office/virtual visit by prescribing provider on 09/24/2024  Next office/virtual appointment scheduled for 11/04/2024    Last urine drug screen date 08/30/2024  Current opioid agreement on file (completed within the last year) Yes Date of opioid agreement: 09/24/2024    E-prescribe to   [facility] [address], pharmacy    Will route to nursing [place] for review and preparation of prescription(s).     Lucero Jean MA  [facility]

## 2024-10-22 NOTE — TELEPHONE ENCOUNTER
10/22/2024 4:28 PM     REFILL REQUEST:     This is a patient of mine. PDMP and Chart have been reviewed; refill request is appropriate.    Refilled for 30 day supply.  Script e-Prescribed to pharmacy    YANY Kinney, STEVE, FNP-C

## 2024-10-22 NOTE — TELEPHONE ENCOUNTER
Medication refill information reviewed.     Due date for HYDROcodone-acetaminophen (NORCO)  MG per tablet  is 10/24/2024 per PDMP review.      Prescriptions prepped for review.     Will route to provider.

## 2024-10-22 NOTE — TELEPHONE ENCOUNTER
Please call pt it looks like from the pain clinic notes this refill should be going to them  I will forward to them   I just want her aware of this

## 2024-10-29 ASSESSMENT — PATIENT HEALTH QUESTIONNAIRE - PHQ9: SUM OF ALL RESPONSES TO PHQ QUESTIONS 1-9: 4

## 2024-11-04 ENCOUNTER — OFFICE VISIT (OUTPATIENT)
Dept: PALLIATIVE MEDICINE | Facility: CLINIC | Age: 79
End: 2024-11-04
Attending: NURSE PRACTITIONER
Payer: COMMERCIAL

## 2024-11-04 VITALS — SYSTOLIC BLOOD PRESSURE: 141 MMHG | HEART RATE: 86 BPM | DIASTOLIC BLOOD PRESSURE: 77 MMHG

## 2024-11-04 DIAGNOSIS — F11.90 CHRONIC, CONTINUOUS USE OF OPIOIDS: ICD-10-CM

## 2024-11-04 DIAGNOSIS — G89.4 CHRONIC PAIN SYNDROME: ICD-10-CM

## 2024-11-04 DIAGNOSIS — G50.0 TRIGEMINAL NEURALGIA: Primary | Chronic | ICD-10-CM

## 2024-11-04 PROCEDURE — G2211 COMPLEX E/M VISIT ADD ON: HCPCS | Performed by: NURSE PRACTITIONER

## 2024-11-04 PROCEDURE — 99215 OFFICE O/P EST HI 40 MIN: CPT | Performed by: NURSE PRACTITIONER

## 2024-11-04 RX ORDER — OXCARBAZEPINE 300 MG/1
300 TABLET, FILM COATED ORAL 2 TIMES DAILY
Qty: 60 TABLET | Refills: 0 | Status: SHIPPED | OUTPATIENT
Start: 2024-11-04

## 2024-11-04 ASSESSMENT — PAIN SCALES - GENERAL: PAINLEVEL_OUTOF10: EXTREME PAIN (8)

## 2024-11-04 NOTE — PROGRESS NOTES
[facility]         Date of Visit: 11/4/2024    CHIEF COMPLAINT:   Chief Complaint   Patient presents with    Pain       Subjective   SUBJECTIVE    INTERVAL HISTORY:   Tracy Galloway is a 79 year old female seen for INITIAL EVALUATION on 8/22/24; last seen for FOLLOW UP on 9/24/24.  They are returning today for Trigeminal Neuralgia.         Recommendations/ Plan at the last visit included:  Visit discussion: Tracy Galloway is seen in follow up today at the pain clinic for intractable facial nerve pain. She is tolerating oxcarbazepine but does note she is significantly more hungry than she was before starting.  She is willing to continue upward taper of oxcarbazepine as indicated below.  Recommending evening dose 1 to 2 hours before bed to account for increased length of sleep in the morning.  Okay to continue Norco prescription as written and filled by primary care provider which she picked up today.  We signed a controlled substance agreement today and she does understand that future Norco prescriptions should be requested through pain management.  We did discuss potential benefit of buprenorphine versus cycling to a different opioid versus long-acting extended release opioids.  At this time, I would like to maximize nonopioid options before adjusting her current opioid prescriptions. Return to clinic in 4 to 6 weeks to reassess pain control.  Patient verbalizes understanding and agreement with plan.     PLAN:    Medication Management:   - Please allow 5-7 days to process refills to avoid running out of your medication.   - Request refills of controlled substance medications through [employer] or call [phone].   - CONTINUE taking Norco 10/325mg - take 1 tab every 6 hours for pain; max 4 per day   - TAPER Oxcarbazepine as follows:  Take 150mg in AM in AM and 300mg in PM for 7 days, then take 300mg in AM and 300mg in PM.   Return to Clinic:   -  30-minute In-Person Appointment with  Whitney Baird, DNP, APRN, FNP-C in 4-6 weeks, or sooner if needed    Future Considerations:   We will continue to adjust Oxcarbazepine dosing as needed.   Please contact the Pain Clinic for refills of Norco 10/325mg in the future.        Interval history from last visit on 9/24/24:   Pain score today: Extreme Pain (8)   Pain rating: intensity ranges from 8/10 to 10/10 on a 0-10 scale.   She reports tightness in jaw but feels like pain is slightly better.    Has noticed increased sharp pain in cheekbone; less pain in palate.  Has been tolerating Oxcarbazepine but does note she is sleeping longer; dosing at 10am and 10:30pm.           Since the last visit, Tracy Galloway reports:   Pain score today: Extreme Pain (8)   Pain rating: intensity ranges from 6/10 to 10/10, and averages 8/10 on a 0-10 scale.   She is taking Oxcarbazepine 300mg   Feels more pain since Saturday.   Norco 10/325mg dosing at 8am, 12pm, 4pm, 8pm; does not feel effect of opioid dosing.   Has more stress; her  has dementia.   Reports no relief of pain with Norco 10/325mg QID.       REVIEW OF SYSTEMS:   ROS: 10 point ROS neg other than the symptoms noted above in the HPI.     The patient otherwise denies red flag symptoms, such as: thunderclap headache, bowel or bladder incontinence, parasthesias, weakness, saddle anesthesia, unintentional weight loss, or fever/chills/sweats.     Medical History: Any changes in medical history since they were last seen? No    Medications and Allergies reviewed. Yes     Review of [place] Prescription Monitoring Program ():  reviewed on 11/04/24. No concern for abuse or misuse of controlled medications based on this report. Controlled substances prescribed in the past 6 months include: (Norco 10/325mg, Zolpidem 10mg)     Current MME: 40 / day    Current PDMP reportable controlled substance medications, if any, are being prescribed by: Pain Mgmt    Primary Care Provider is Gwendolyn Solis        CSA due date: 9/24/25   UDS due date:  8/30/25     THE 4 As OF OPIOID MAINTENANCE ANALGESIA    Analgesia: Is pain relief clinically significant? Yes  Activity: Is patient functional and able to perform Activities of Daily Living? Yes  Adverse effects: Is patient free from adverse side effects from opiates? Yes  Adherence to Rx protocol: Is patient adhering to Controlled Substance Agreement and taking medications ONLY as ordered? Yes    Is Narcan prescribed for opiate use >50 MME daily or concurrent use of opiates and benzodiazepines? Yes          Objective    OBJECTIVE:    Physical Exam  Vitals:    11/04/24 1549   BP: (!) 141/77   Pulse: 86        Appearance:   A&O. Patient is appropriate.   Patient is in NAD.      HHENT:  Normocephalic, atraumatic. Eyes without conjunctival injection or jaundice. Neck supple. No obvious neck masses.     Pulmonary:  Normal effort; no cough or audible wheeze      Skin: No obvious rash, lesions, or petechiae of exposed skin.    Neuro: Cranial nerves grossly intact.  Mentation and speech appropriate for age.     Psych:  Alert, without lethargy or stupor. Speech fluent. Appropriate affect. Mood normal. Able to follow commands without difficulty. Normal mood, judgement and behavior.        Gait pattern:   Patient has an antalgic gait pattern:NO  Gait favors the neither side.  Mobility and/or assistive devices? NO            Diagnostic Tests/ Imaging/ Labs resulted since last visit:   None       Assessment & Plan      VISIT DIAGNOSIS:   1. Trigeminal neuralgia (Primary)  - OXcarbazepine (TRILEPTAL) 300 MG tablet; Take 1 tablet (300 mg) by mouth 2 times daily.  Dispense: 60 tablet; Refill: 0  - Adult Pain Clinic Follow-Up Order; Future    2. Chronic pain syndrome  - OXcarbazepine (TRILEPTAL) 300 MG tablet; Take 1 tablet (300 mg) by mouth 2 times daily.  Dispense: 60 tablet; Refill: 0  - Adult Pain Clinic Follow-Up Order; Future    3. Chronic, continuous use of opioids  -  OXcarbazepine (TRILEPTAL) 300 MG tablet; Take 1 tablet (300 mg) by mouth 2 times daily.  Dispense: 60 tablet; Refill: 0  - Adult Pain Clinic Follow-Up Order; Future      ASSESSMENT:   Visit discussion: Tracy Galloway is seen in follow up today at the pain clinic for intractable trigeminal neuralgia pain.  She was questioning whether oxcarbazepine was increasing somnolence and started cutting the 300 mg tablets in half.  At this point, there is no change in neuropathic pain.  We discussed the importance of adjusting dose tolerance with goal of achieving a minimum dose of oxcarbazepine 300 mg twice daily.  Recommending she try a slow upward taper as indicated below.  Also recommending she take dose at dinnertime versus bedtime to avoid excessive somnolence with Ambien for sleep.  She may also consider lowering Ambien dose and taking a partial tablet to improve sleepiness.  We discussed the mechanism of hyperalgesia with chronic opioid use.  I think she would benefit from an opioid rotation or long-acting opioid medication.  We discussed either addition of MS Contin with fewer Norco doses for breakthrough pain or transition from [place] to Percocet.  Patient would like to transition to Percocet at next refill.  In approximately 2 weeks, she will request refill of her opioid and we will discontinue Norco 10/325 mg.  At that time, okay to start Percocet 7.5/325 mg every 4 hours; max 4/day.  Patient advised to follow-up in 4 to 6 weeks to reassess pain control.  She verbalizes understanding and agreement with plan.     PLAN:    Medication Management:   - CONTINUE taking Norco 10/325mg - take 1 tab every 4 hours as needed for pain; max 4 per day   - TAPER Oxcarbazepine 300mg  as follows:    Take 1 tab (300mg) in AM and 1/2 tab (150mg) at dinner for 7-14 days   Then, take 1 tab (300mg) two times per day     Return to Clinic:   -  30-minute In-Person Appointment with Whitney Baird, STEVE, APRN, FNP-C in 4-6 weeks, or sooner if  needed      Future Considerations:   When you are due to refill Norco 10/325mg, we will stop Norco and start Percocet 7.5/325mg - 1 tab every 4 hours; max 4 per day        ___________________________________________________________________    BILLING TIME DOCUMENTATION:   TOTAL TIME includes:   Time spent preparing to see the patient: 5 minutes (reviewing records and tests)  Time spend face to face with the patient: 33 minutes  Time spent ordering tests, medications, procedures and referrals: 0 minutes  Time spent Referring and communicating with other healthcare professionals: 0 minutes  Documenting clinical information in Epic: 3 minutes    The total TIME spent on this patient on the day of the appointment was 41 minutes.     ___________________________________________________________________    Review of Electronic Chart: Today I have also reviewed available medical information in the patient's medical record at [facility] ([facility]) and Care Everywhere (if available), including relevant provider notes, laboratory work, and imaging.     Whitney Baird, YANY, DNP, FNP-C   [facility]

## 2024-11-04 NOTE — PATIENT INSTRUCTIONS
PATIENT INSTRUCTIONS:     I am recommending a multidisciplinary treatment plan to help this patient better manage her pain. The following recommendations were given to the patient. Diagnosis, treatment options, risks, benefits, and alternatives were discussed; all questions were answered. Self-care instructions were given. The patient expressed understanding of the plan for management.   Medication Management:   - CONTINUE taking Norco 10/325mg - take 1 tab every 4 hours as needed for pain; max 4 per day   - TAPER Oxcarbazepine 300mg  as follows:    Take 1 tab (300mg) in AM and 1/2 tab (150mg) at dinner for 7-14 days   Then, take 1 tab (300mg) two times per day     Return to Clinic:   -  30-minute In-Person Appointment with Whitney Baird, STEVE, APRN, ANIYAC in 4-6 weeks, or sooner if needed      Future Considerations:   When you are due to refill Norco 10/325mg, we will stop Norco and start Percocet 7.5/325mg - 1 tab every 4 hours; max 4 per day        ----------------------------------------------------------------  Clinic Number:  [phone]   Call with any questions about your care and for scheduling assistance.   Calls are returned Monday through Friday between 8 AM and 4:30 PM. We usually get back to you within 2 business days depending on the issue/request.    If we are prescribing your medications:  For opioid medication refills, call the clinic or send a [employer] message 7 days in advance.  Please include:  Name of requested medication  Name of the pharmacy.  For non-opioid medications, call your pharmacy directly to request a refill. Please allow 3-4 days to be processed.   Per [place] State Law:  All controlled substance prescriptions must be filled within 30 days of being written.    For those controlled substances allowing refills, pickup must occur within 30 days of last fill.      We believe regular attendance is key to your success in our program!    Any time you are unable to keep your appointment we ask  that you call us at least 24 hours in advance to cancel.This will allow us to offer the appointment time to another patient.   Multiple missed appointments may lead to dismissal from the clinic.

## 2024-11-05 ENCOUNTER — IMMUNIZATION (OUTPATIENT)
Dept: FAMILY MEDICINE | Facility: CLINIC | Age: 79
End: 2024-11-05
Payer: COMMERCIAL

## 2024-11-05 PROCEDURE — 90662 IIV NO PRSV INCREASED AG IM: CPT

## 2024-11-05 PROCEDURE — G0008 ADMIN INFLUENZA VIRUS VAC: HCPCS

## 2024-11-12 DIAGNOSIS — N95.2 ATROPHIC VAGINITIS: Primary | ICD-10-CM

## 2024-11-12 RX ORDER — ESTRADIOL 0.1 MG/G
CREAM VAGINAL
Qty: 42.5 G | Refills: 2 | Status: SHIPPED | OUTPATIENT
Start: 2024-11-12

## 2024-11-12 NOTE — TELEPHONE ENCOUNTER
Requested Prescriptions   Pending Prescriptions Disp Refills    estradiol (ESTRACE) 0.1 MG/GM vaginal cream [Pharmacy Med Name: ESTRADIOL CRE 0.01%] 42.5 g 2     Sig: INSERT ONE GRAM VAGINALLY TWICE PER WEEK       Hormone Replacement Therapy Failed - 11/12/2024  1:50 PM        Failed - Most recent blood pressure under 140/90 in past 12 months     BP Readings from Last 3 Encounters:   11/04/24 (!) 141/77   09/24/24 121/71   08/22/24 (!) 183/66       Systolic (Patient Reported): (!) 146 (12/22/2023 12:50 PM)  Diastolic (Patient Reported): 70 (12/22/2023 12:50 PM)             Failed - Medication is active on med list  Failed - Medication indicated for associated diagnosis  The medication is prescribed for one or more of the following conditions:    Menopause   Vulva/Vaginal atrophy   Low Estrogen   Gender Dysphoria   Male to Female transgender  Passed - Recent (12 mo) or future (90 days) visit within the authorizing provider's specialty  The patient must have completed an in-person or virtual visit within the past 12 months or has a future visit scheduled within the next 90 days with the authorizing provider s specialty.  Urgent care and e-visits do not quality as an office visit for this protocol.  Passed - Patient is 18 years of age or older  Passed - No active pregnancy on record  Passed - No positive pregnancy test on record in past 12 months  Hormone Replacement Therapy (vaginal) Failed - 11/12/2024  1:50 PM  Failed - Medication is active on med list  Failed - Medication indicated for associated diagnosis  Medication is associated with one or more of the following diagnoses:     Menopause   Vulva/Vaginal atrophy   UTI prophylaxis  Passed - Recent (12 mo) or future (90 days) visit within the authorizing provider's specialty  The patient must have completed an in-person or virtual visit within the past 12 months or has a future visit scheduled within the next 90 days with the authorizing provider s specialty.   Urgent care and e-visits do not quality as an office visit for this protocol.  Passed - Patient is 18 years of age or older  Passed - No active pregnancy on record  Passed - No positive pregnancy test on record in past 12 months

## 2024-11-19 ENCOUNTER — ALLIED HEALTH/NURSE VISIT (OUTPATIENT)
Dept: FAMILY MEDICINE | Facility: CLINIC | Age: 79
End: 2024-11-19
Payer: COMMERCIAL

## 2024-11-19 DIAGNOSIS — Z23 HIGH PRIORITY FOR 2019-NCOV VACCINE: Primary | ICD-10-CM

## 2024-11-19 DIAGNOSIS — K59.03 OPIOID-INDUCED CONSTIPATION: Primary | ICD-10-CM

## 2024-11-19 DIAGNOSIS — T40.2X5A OPIOID-INDUCED CONSTIPATION: Primary | ICD-10-CM

## 2024-11-19 PROCEDURE — 99207 PR NO CHARGE LOS: CPT

## 2024-11-19 PROCEDURE — 90480 ADMN SARSCOV2 VAC 1/ONLY CMP: CPT

## 2024-11-19 PROCEDURE — 91320 SARSCV2 VAC 30MCG TRS-SUC IM: CPT

## 2024-11-19 RX ORDER — LUBIPROSTONE 8 UG/1
8-16 CAPSULE ORAL 2 TIMES DAILY
Qty: 60 CAPSULE | Refills: 1 | Status: SHIPPED | OUTPATIENT
Start: 2024-11-19

## 2024-11-19 NOTE — PROGRESS NOTES
Prescribing Lubiprostone 8mcg for opioid induced constipation; start taking 1 cap in AM and 1 cap in PM; if no improvement, increase to 2 caps in AM and 2 caps in PM.

## 2024-11-19 NOTE — PROGRESS NOTES
Prior to immunization administration, verified patients identity using patient s name and date of birth. Please see Immunization Activity for additional information.     Screening Questionnaire for Adult Immunization    Are you sick today?   No   Do you have allergies to medications, food, a vaccine component or latex?   No   Have you ever had a serious reaction after receiving a vaccination?   No   Do you have a long-term health problem with heart, lung, kidney, or metabolic disease (e.g., diabetes), asthma, a blood disorder, no spleen, complement component deficiency, a cochlear implant, or a spinal fluid leak?  Are you on long-term aspirin therapy?   No   Do you have cancer, leukemia, HIV/AIDS, or any other immune system problem?   No   Do you have a parent, brother, or sister with an immune system problem?   No   In the past 3 months, have you taken medications that affect  your immune system, such as prednisone, other steroids, or anticancer drugs; drugs for the treatment of rheumatoid arthritis, Crohn s disease, or psoriasis; or have you had radiation treatments?   No   Have you had a seizure, or a brain or other nervous system problem?   No   During the past year, have you received a transfusion of blood or blood    products, or been given immune (gamma) globulin or antiviral drug?   No   For women: Are you pregnant or is there a chance you could become       pregnant during the next month?   No   Have you received any vaccinations in the past 4 weeks?   No     Immunization questionnaire answers were all negative.    I have reviewed the following standing orders:   This patient is due and qualifies for the Covid-19 vaccine.     Click here for COVID-19 Standing Order    I have reviewed the vaccines inclusion and exclusion criteria; No concerns regarding eligibility.     Patient instructed to remain in clinic for 15 minutes afterwards, and to report any adverse reactions.     Screening performed by Mariama ACEVEDO  RUSSELL Caraballo on 11/19/2024 at 12:33 PM.

## 2024-12-01 DIAGNOSIS — F51.01 PRIMARY INSOMNIA: Chronic | ICD-10-CM

## 2024-12-02 RX ORDER — ZOLPIDEM TARTRATE 10 MG/1
TABLET ORAL
Qty: 30 TABLET | Refills: 0 | Status: SHIPPED | OUTPATIENT
Start: 2024-12-02

## 2024-12-02 NOTE — PROGRESS NOTES
[facility]         Date of Visit: 12/2/2024    CHIEF COMPLAINT:   Chief Complaint   Patient presents with    Pain       Subjective   SUBJECTIVE    INTERVAL HISTORY:   Tracy Galloway is a 79 year old female seen for INITIAL EVALUATION on 8/22/24; last seen for FOLLOW UP on 11/04/24.  They are returning today for trigeminal neuralgia.         Recommendations/ Plan at the last visit included:  Visit discussion: Tracy Galloway is seen in follow up today at the pain clinic for intractable trigeminal neuralgia pain.  She was questioning whether oxcarbazepine was increasing somnolence and started cutting the 300 mg tablets in half.  At this point, there is no change in neuropathic pain.  We discussed the importance of adjusting dose tolerance with goal of achieving a minimum dose of oxcarbazepine 300 mg twice daily.  Recommending she try a slow upward taper as indicated below.  Also recommending she take dose at dinnertime versus bedtime to avoid excessive somnolence with Ambien for sleep.  She may also consider lowering Ambien dose and taking a partial tablet to improve sleepiness.  We discussed the mechanism of hyperalgesia with chronic opioid use.  I think she would benefit from an opioid rotation or long-acting opioid medication.  We discussed either addition of MS Contin with fewer Norco doses for breakthrough pain or transition from [place] to Percocet.  Patient would like to transition to Percocet at next refill.  In approximately 2 weeks, she will request refill of her opioid and we will discontinue Norco 10/325 mg.  At that time, okay to start Percocet 7.5/325 mg every 4 hours; max 4/day.  Patient advised to follow-up in 4 to 6 weeks to reassess pain control.  She verbalizes understanding and agreement with plan.     PLAN:    Medication Management:   - CONTINUE taking Norco 10/325mg - take 1 tab every 4 hours as needed for pain; max 4 per day   - TAPER Oxcarbazepine 300mg  as follows:   "  Take 1 tab (300mg) in AM and 1/2 tab (150mg) at dinner for 7-14 days   Then, take 1 tab (300mg) two times per day   Return to Clinic:   -  30-minute In-Person Appointment with Whitney Baird, STEVE, APRN, FNP-C in 4-6 weeks, or sooner if needed    Future Considerations:   When you are due to refill Norco 10/325mg, we will stop Norco and start Percocet 7.5/325mg - 1 tab every 4 hours; max 4 per day        Interval history from last visit on 11/04/24:   Pain score today: Extreme Pain (8)   Pain rating: intensity ranges from 6/10 to 10/10, and averages 8/10 on a 0-10 scale.   She is taking Oxcarbazepine 300mg   Feels more pain since Saturday.   Norco 10/325mg dosing at 8am, 12pm, 4pm, 8pm; does not feel effect of opioid dosing.   Has more stress; her  has dementia.   Reports no relief of pain with Norco 10/325mg QID.           Since the last visit, Tracy THURMAN Laverne reports:   Pain score today: Extreme Pain (8)   Pain rating: intensity ranges from 8/10 to 10/10   on a 0-10 scale.   Reports no improvement in pain after change from Norco 10/325mg to Percocet 7.5/325mg.    Very frustrated today with ongoing pain but no relief.  Constipation has become severe; not improved with lubiprostone 8mcg - 3 tabs BID  Percocet 7.5/325mg is not making pain worse; but not improving pain intensity.   States \"my personality is changing because of the pain\".  Unable to complete desired activities due to pain levels.       REVIEW OF SYSTEMS:   ROS: 10 point ROS neg other than the symptoms noted above in the HPI.     The patient otherwise denies red flag symptoms, such as: thunderclap headache, bowel or bladder incontinence, parasthesias, weakness, saddle anesthesia, unintentional weight loss, or fever/chills/sweats.     Medical History: Any changes in medical history since they were last seen? No    Medications and Allergies reviewed. Yes     Review of [place] Prescription Monitoring Program ():  reviewed on 12/03/24. No " concern for abuse or misuse of controlled medications based on this report. Controlled substances prescribed in the past 6 months include: (Percocet 7.5/325mg, Norco 10/325mg, Zolpidem 10mg)     Current MME: 45 / day    Current PDMP reportable controlled substance medications, if any, are being prescribed by: Pain Mgmt    Primary Care Provider is Gwendolyn Solis       CSA due date: 9/24/25   UDS due date:  8/30/25     THE 4 As OF OPIOID MAINTENANCE ANALGESIA    Analgesia: Is pain relief clinically significant? No - not well controlled   Activity: Is patient functional and able to perform Activities of Daily Living? Yes  Adverse effects: Is patient free from adverse side effects from opiates? No - constipation   Adherence to Rx protocol: Is patient adhering to Controlled Substance Agreement and taking medications ONLY as ordered? Yes    Is Narcan prescribed for opiate use >50 MME daily or concurrent use of opiates and benzodiazepines? Yes          Objective    OBJECTIVE:    Physical Exam  Vitals:    12/03/24 1437   BP: 128/62   Pulse: 77   SpO2: 97%        Appearance:   A&O. Patient is appropriate.   Patient is in NAD.      HHENT:  Normocephalic, atraumatic. Eyes without conjunctival injection or jaundice. Neck supple. No obvious neck masses.     Pulmonary:  Normal effort; no cough or audible wheeze      Skin: No obvious rash, lesions, or petechiae of exposed skin.    Neuro: Cranial nerves grossly intact.  Mentation and speech appropriate for age.     Psych:  Alert, without lethargy or stupor. Speech fluent. Appropriate affect. Mood normal. Able to follow commands without difficulty. Normal mood, judgement and behavior.        Diagnostic Tests/ Imaging/ Labs resulted since last visit:   None       Assessment & Plan      VISIT DIAGNOSIS:   1. Trigeminal neuralgia (Primary)  - Adult Pain Clinic Follow-Up Order  - oxyCODONE-acetaminophen (PERCOCET)  MG per tablet; Take 1 tablet by mouth every 4 hours as  needed for severe pain (max 4 per day). Fill 12/03/24, start 12/04/24 (14 day supply) NOTE: dose change  Dispense: 60 tablet; Refill: 0  - Adult Pain Clinic Follow-Up Order; Future    2. Chronic pain syndrome  - Adult Pain Clinic Follow-Up Order  - oxyCODONE-acetaminophen (PERCOCET)  MG per tablet; Take 1 tablet by mouth every 4 hours as needed for severe pain (max 4 per day). Fill 12/03/24, start 12/04/24 (14 day supply) NOTE: dose change  Dispense: 60 tablet; Refill: 0  - Adult Pain Clinic Follow-Up Order; Future    3. Chronic, continuous use of opioids  - Adult Pain Clinic Follow-Up Order  - oxyCODONE-acetaminophen (PERCOCET)  MG per tablet; Take 1 tablet by mouth every 4 hours as needed for severe pain (max 4 per day). Fill 12/03/24, start 12/04/24 (14 day supply) NOTE: dose change  Dispense: 60 tablet; Refill: 0  - Adult Pain Clinic Follow-Up Order; Future    4. Opioid-induced constipation  - linaclotide (LINZESS) 145 MCG capsule; Take 1 capsule (145 mcg) by mouth every morning (before breakfast).  Dispense: 30 capsule; Refill: 0  - Adult Pain Clinic Follow-Up Order; Future      ASSESSMENT:   Visit discussion: Tracy Galloway is seen in follow up today at the pain clinic for intractable trigeminal neuralgia pain.  Patient's pain experience is severely limiting her quality of life and activities of daily living.  I would consider future referral for pain psychology; however, at this time her pain condition is too severe.  We discussed alternative medications for opioid-induced constipation.  Discontinue lubiprostone and start Linzess 145 mcg daily.  We discussed importance of optimizing opioid dosing and timing of doses.  Will increase Percocet to 10/325 mg 1 tab every 4 hours max of 4/day.  Patient was advised that it may take a few dose adjustments to achieve maximum pain control.  Continue with oxcarbazepine 150 mg twice a day.  I would like her to return by video appointment in 1 week to assess  change with constipation and pain relief based on her medication adjustments today.  We may consider using long-acting opioids in the future; once pain control is achieved.  Patient will be assisted to schedule in 1 week for video visit.  Patient verbalizes understanding and agreement with plan.     PLAN:    Medication Management:   - INCREASE dose of Percocet to 10/325mg - take 1 tab by mouth every 4 hours for severe pain; max 4 per day     Stop Lubiprostone  START taking Linzess 145mcg - 1 tab in AM daily     Return to Clinic:   -  30-minute Video Appointment with Whitney Baird DNP, APRN, FNP-C in 1 weeks, or sooner if needed          ___________________________________________________________________    BILLING TIME DOCUMENTATION:   TOTAL TIME includes:   Time spent preparing to see the patient: 2 minutes (reviewing records and tests)  Time spend face to face with the patient: 23 minutes  Time spent ordering tests, medications, procedures and referrals: 0 minutes  Time spent Referring and communicating with other healthcare professionals: 0 minutes  Documenting clinical information in Epic: 5 minutes    The total TIME spent on this patient on the day of the appointment was 30 minutes.     ___________________________________________________________________    Review of Electronic Chart: Today I have also reviewed available medical information in the patient's medical record at [facility] ([facility]) and Care Everywhere (if available), including relevant provider notes, laboratory work, and imaging.     YANY Kinney, STEVE, JORGE   [facility]

## 2024-12-03 ENCOUNTER — OFFICE VISIT (OUTPATIENT)
Dept: PALLIATIVE MEDICINE | Facility: CLINIC | Age: 79
End: 2024-12-03
Attending: NURSE PRACTITIONER
Payer: COMMERCIAL

## 2024-12-03 VITALS — HEART RATE: 77 BPM | SYSTOLIC BLOOD PRESSURE: 128 MMHG | OXYGEN SATURATION: 97 % | DIASTOLIC BLOOD PRESSURE: 62 MMHG

## 2024-12-03 DIAGNOSIS — F11.90 CHRONIC, CONTINUOUS USE OF OPIOIDS: ICD-10-CM

## 2024-12-03 DIAGNOSIS — T40.2X5A OPIOID-INDUCED CONSTIPATION: ICD-10-CM

## 2024-12-03 DIAGNOSIS — G50.0 TRIGEMINAL NEURALGIA: Primary | Chronic | ICD-10-CM

## 2024-12-03 DIAGNOSIS — K59.03 OPIOID-INDUCED CONSTIPATION: ICD-10-CM

## 2024-12-03 DIAGNOSIS — G89.4 CHRONIC PAIN SYNDROME: ICD-10-CM

## 2024-12-03 PROCEDURE — 99214 OFFICE O/P EST MOD 30 MIN: CPT | Performed by: NURSE PRACTITIONER

## 2024-12-03 RX ORDER — OXYCODONE AND ACETAMINOPHEN 10; 325 MG/1; MG/1
1 TABLET ORAL EVERY 4 HOURS PRN
Qty: 60 TABLET | Refills: 0 | Status: SHIPPED | OUTPATIENT
Start: 2024-12-03

## 2024-12-03 ASSESSMENT — PAIN SCALES - GENERAL: PAINLEVEL_OUTOF10: EXTREME PAIN (8)

## 2024-12-03 NOTE — PATIENT INSTRUCTIONS
PATIENT INSTRUCTIONS:     I am recommending a multidisciplinary treatment plan to help this patient better manage her pain. The following recommendations were given to the patient. Diagnosis, treatment options, risks, benefits, and alternatives were discussed; all questions were answered. Self-care instructions were given. The patient expressed understanding of the plan for management.   Medication Management:   - INCREASE dose of Percocet to 10/325mg - take 1 tab by mouth every 4 hours for severe pain; max 4 per day     Stop Lubiprostone  START taking Linzess 145mcg - 1 tab in AM daily     Return to Clinic:   -  30-minute Video Appointment with Whitney Baird, STEVE, YANY, JORGE in 1 weeks, or sooner if needed      ----------------------------------------------------------------  Clinic Number:  [phone]   Call with any questions about your care and for scheduling assistance.   Calls are returned Monday through Friday between 8 AM and 4:30 PM. We usually get back to you within 2 business days depending on the issue/request.    If we are prescribing your medications:  For opioid medication refills, call the clinic or send a [employer] message 7 days in advance.  Please include:  Name of requested medication  Name of the pharmacy.  For non-opioid medications, call your pharmacy directly to request a refill. Please allow 3-4 days to be processed.   Per [place] State Law:  All controlled substance prescriptions must be filled within 30 days of being written.    For those controlled substances allowing refills, pickup must occur within 30 days of last fill.      We believe regular attendance is key to your success in our program!    Any time you are unable to keep your appointment we ask that you call us at least 24 hours in advance to cancel.This will allow us to offer the appointment time to another patient.   Multiple missed appointments may lead to dismissal from the clinic.

## 2024-12-10 ASSESSMENT — PAIN SCALES - PAIN ENJOYMENT GENERAL ACTIVITY SCALE (PEG)
AVG_PAIN_PASTWEEK: 8
INTERFERED_GENERAL_ACTIVITY: 8
INTERFERED_ENJOYMENT_LIFE: 8
PEG_TOTALSCORE: 8

## 2024-12-10 NOTE — PROGRESS NOTES
[facility]     Virtual Encounter Details:   Type of service:  Video Visit     Originating Location (pt. Location): Home  Distant Location (provider location):  On-site  Platform used for Video Visit: Alejandro    Joined the call at 12/11/2024, 8:16:41 am.  Left the call at 12/11/2024, 8:36:53 am.  You were on the call for 20 minutes 11 seconds    ____________________________________________________________      Date of Visit: Dec 11, 2024     CHIEF COMPLAINT:   Chief Complaint   Patient presents with    Pain       Subjective   SUBJECTIVE    INTERVAL HISTORY:   Tracy Galloway is a 79 year old female seen for INITIAL EVALUATION on 8/22/24; last seen for FOLLOW UP on 12/03/24.  They are returning today for trigeminal neuralgia.       Recommendations/ Plan at the last visit included:  Visit discussion: Tracy Galloway is seen in follow up today at the pain clinic for intractable trigeminal neuralgia pain.  Patient's pain experience is severely limiting her quality of life and activities of daily living.  I would consider future referral for pain psychology; however, at this time her pain condition is too severe.  We discussed alternative medications for opioid-induced constipation.  Discontinue lubiprostone and start Linzess 145 mcg daily.  We discussed importance of optimizing opioid dosing and timing of doses.  Will increase Percocet to 10/325 mg 1 tab every 4 hours max of 4/day.  Patient was advised that it may take a few dose adjustments to achieve maximum pain control.  Continue with oxcarbazepine 150 mg twice a day.  I would like her to return by video appointment in 1 week to assess change with constipation and pain relief based on her medication adjustments today.  We may consider using long-acting opioids in the future; once pain control is achieved.  Patient will be assisted to schedule in 1 week for video visit.  Patient verbalizes understanding and agreement with plan.     PLAN:   "  Medication Management:   - INCREASE dose of Percocet to 10/325mg - take 1 tab by mouth every 4 hours for severe pain; max 4 per day     Stop Lubiprostone  START taking Linzess 145mcg - 1 tab in AM daily   Return to Clinic:   -  30-minute Video Appointment with Whitney Baird, STEVE, APRN, FNBOYD-C in 1 weeks, or sooner if needed           Interval history from last visit on 12/03/24:   Pain score today: Extreme Pain (8)   Pain rating: intensity ranges from 8/10 to 10/10   on a 0-10 scale.   Reports no improvement in pain after change from Norco 10/325mg to Percocet 7.5/325mg.    Very frustrated today with ongoing pain but no relief.  Constipation has become severe; not improved with lubiprostone 8mcg - 3 tabs BID  Percocet 7.5/325mg is not making pain worse; but not improving pain intensity.   States \"my personality is changing because of the pain\".  Unable to complete desired activities due to pain levels.         Since the last visit, Tracy Galloway reports:   Pain score today: 5/10    Pain rating: intensity ranges from 5/10 to 10/10 on a 0-10 scale.   She reports \"not doing good at all\" with dose adjustment of Percocet to 10/325mg   Feels pain is now traveling into right nasal region and palate.    Pain is throbbing and sharp.   She has no response to pain control until 3rd dose of opioid.   Feeling nauseated and \"spaced-out\" with Percocet; little relief compared to hydrocodone.   Continues with severe constipation; not feeling relief from Linzess 145mcg.         REVIEW OF SYSTEMS:   ROS: 10 point ROS neg other than the symptoms noted above in the HPI.     The patient otherwise denies red flag symptoms, such as: thunderclap headache, bowel or bladder incontinence, parasthesias, weakness, saddle anesthesia, unintentional weight loss, or fever/chills/sweats.     Medical History: Any changes in medical history since they were last seen? No    Medications and Allergies reviewed. Yes     Review of [place] Prescription " Monitoring Program ():  reviewed on 12/11/24. No concern for abuse or misuse of controlled medications based on this report. Controlled substances prescribed in the past 12 months include: (Percocet 10/325, 7.5/325mg, Norco 10/325mg, Gabapentin, Zolpidem)     Current MME: 60 / day    Current PDMP reportable controlled substance medications, if any, are being prescribed by: Pain Mgmt    Primary Care Provider is Gwendolyn Solis     CSA due date: 9/24/25   UDS due date:  8/30/25     THE 4 As OF OPIOID MAINTENANCE ANALGESIA    Analgesia: Is pain relief clinically significant? No - Percocet not effective   Activity: Is patient functional and able to perform Activities of Daily Living? Yes  Adverse effects: Is patient free from adverse side effects from opiates? No - constipation   Adherence to Rx protocol: Is patient adhering to Controlled Substance Agreement and taking medications ONLY as ordered? Yes    Is Narcan prescribed for opiate use >50 MME daily or concurrent use of opiates and benzodiazepines? Yes          Objective    OBJECTIVE:    Physical Exam - by Video Exam     Appearance:   A&O. Patient is appropriate.   Patient is in NAD.      HHENT:  Normocephalic, atraumatic.     Pulmonary:  Normal effort; no cough or audible wheeze by video     Skin: No obvious rash, lesions, or petechiae of exposed skin by video     Neuro: Cranial nerves grossly intact.  Mentation and speech appropriate for age.     Psych:  Alert, without lethargy or stupor. Speech fluent. Appropriate affect. Mood normal. Able to follow commands without difficulty. Normal mood, judgement and behavior.          Diagnostic Tests/ Imaging/ Labs resulted since last visit:   1. Chronic pain syndrome  - Adult Pain Clinic Follow-Up Order  - HYDROcodone-acetaminophen (NORCO)  MG per tablet; Take 1-2 tablets by mouth every 4 hours as needed for severe pain (max 6 per day). OK to take 2 tabs (20mg in AM), then 1-1.5 tabs as needed; Fill /  start 12/11/24 (15 day supply)  Dispense: 90 tablet; Refill: 0  - Adult Pain Clinic Follow-Up Order; Future    2. Chronic, continuous use of opioids  - Adult Pain Clinic Follow-Up Order  - Adult Pain Clinic Follow-Up Order; Future    3. Trigeminal neuralgia  - Adult Pain Clinic Follow-Up Order  - HYDROcodone-acetaminophen (NORCO)  MG per tablet; Take 1-2 tablets by mouth every 4 hours as needed for severe pain (max 6 per day). OK to take 2 tabs (20mg in AM), then 1-1.5 tabs as needed; Fill / start 12/11/24 (15 day supply)  Dispense: 90 tablet; Refill: 0  - Adult Pain Clinic Follow-Up Order; Future    4. Opioid-induced constipation  - Adult Pain Clinic Follow-Up Order  - linaclotide (LINZESS) 290 MCG capsule; Take 1 capsule (290 mcg) by mouth every morning (before breakfast).  Dispense: 30 capsule; Refill: 0  - Adult Pain Clinic Follow-Up Order; Future          Assessment & Plan      VISIT DIAGNOSIS:   1. Chronic pain syndrome (Primary)  - Adult Pain Clinic Follow-Up Order  - HYDROcodone-acetaminophen (NORCO)  MG per tablet; Take 1-2 tablets by mouth every 4 hours as needed for severe pain (max 6 per day). OK to take 2 tabs (20mg in AM), then 1-1.5 tabs as needed; Fill / start 12/11/24 (15 day supply)  Dispense: 90 tablet; Refill: 0  - Adult Pain Clinic Follow-Up Order; Future    2. Trigeminal neuralgia  - Adult Pain Clinic Follow-Up Order  - HYDROcodone-acetaminophen (NORCO)  MG per tablet; Take 1-2 tablets by mouth every 4 hours as needed for severe pain (max 6 per day). OK to take 2 tabs (20mg in AM), then 1-1.5 tabs as needed; Fill / start 12/11/24 (15 day supply)  Dispense: 90 tablet; Refill: 0  - Adult Pain Clinic Follow-Up Order; Future    3. Opioid-induced constipation  - Adult Pain Clinic Follow-Up Order  - linaclotide (LINZESS) 290 MCG capsule; Take 1 capsule (290 mcg) by mouth every morning (before breakfast).  Dispense: 30 capsule; Refill: 0  - Adult Pain Clinic Follow-Up Order;  Future    4. 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  Attempted trial of Percocet has not been effective for better management of her chronic intractable pain.  She currently has 7-day supply remaining of Percocet 10/325 mg.  At this time, I would advise that we transition back to Norco 10/325 mg which had been more effective.  Patient will return existing supply of Percocet 10/325 mg to the pharmacy today. At that time, she will  the new prescription for Norco 10/325 mg.  Pain is worse in the morning and takes the majority of the day to achieve adequate control pain.  We discussed potential benefit of taking a higher dose of hydrocodone in the morning with some flexibility in dosing for her additional 3 doses of the day.  Increasing Norco 10/325 mg to a maximum of 6 tabs per day.  Once pain levels have stabilized and are better controlled, I would consider transitioning her to MS Contin 15 mg extended release tabs.  At this time, goal is to achieve reasonable pain control for day-to-day function.  She continues with extreme constipation and has no benefit with linaclotide at 145 mcg dose.  We will double the dose to maximum recommended daily 290 mcg tablet.  She has been forgetting to take oxcarbazepine consistently.  We discussed the importance of establishing stable twice daily dosing to help with trigeminal nerve pain.  She has been taking 150 mg of oxcarbazepine once a day.  Recommending she increase to oxcarbazepine 150 mg twice daily until that prescription is gone, then start oxcarbazepine 300 mcg twice daily.  She will set an alarm and made personal notes during our encounter today to remember that this is a twice daily medication as part of her pain regimen.   Patient is advised to return for follow-up in 3 to 4 weeks.  She verbalizes understanding and agreement with plan.    PLAN:    Medication Management:   - CONTINUE taking Oxcarbazepine 150mg in AM and 150mg at bedtime until gone, then increase to 300mg in AM and 300mg at bedtime    - STOP taking Percocet 10/325mg and return to pharmacy today for disposal   - START taking Norco 10/325mg - take 2 tabs (20mg) in AM, 1-1.5 tabs at mid day, 1-1.5 tabs at dinner, 1-1.5 tabs at bedtime as needed for severe pain; max 6 tabs per day    - INCREASE dose of Linzess (linaclotide) to 290mcg by mouth every morning for constipation       Return to Clinic:   -  30-minute In-Person Appointment with Whitney Baird, STEVE, YANY, JORGE in 3-4 weeks, or sooner if needed      Future Considerations:   After pain control is stabilized, we will review options for using long-acting opioid medications for more consistent pain control through the day.     Set a timer on your phone to remember to take Oxcarbazepine two times per day.     ___________________________________________________________________    BILLING TIME DOCUMENTATION:   TOTAL TIME includes:   Time spent preparing to see the patient: 5 minutes (reviewing records and tests)  Time spend face to face with the patient: 21 minutes  Time spent ordering tests, medications, procedures and referrals: 0 minutes  Time spent Referring and communicating with other healthcare professionals: 0 minutes  Documenting clinical information in Epic: 5 minutes    The total TIME spent on this patient on the day of the appointment was 31 minutes.     ___________________________________________________________________    Review of Electronic Chart: Today I have also reviewed available medical information in the patient's medical record at [facility] ([facility]) and Care Everywhere (if available), including relevant provider notes, laboratory work, and imaging.     YANY Kinney, STEVE, FNP-C   [facility]

## 2024-12-11 ENCOUNTER — VIRTUAL VISIT (OUTPATIENT)
Dept: PALLIATIVE MEDICINE | Facility: CLINIC | Age: 79
End: 2024-12-11
Payer: COMMERCIAL

## 2024-12-11 ENCOUNTER — ALLIED HEALTH/NURSE VISIT (OUTPATIENT)
Dept: FAMILY MEDICINE | Facility: CLINIC | Age: 79
End: 2024-12-11

## 2024-12-11 VITALS — DIASTOLIC BLOOD PRESSURE: 54 MMHG | SYSTOLIC BLOOD PRESSURE: 130 MMHG

## 2024-12-11 DIAGNOSIS — Z01.30 BP CHECK: Primary | ICD-10-CM

## 2024-12-11 DIAGNOSIS — G50.0 TRIGEMINAL NEURALGIA: Chronic | ICD-10-CM

## 2024-12-11 DIAGNOSIS — K59.03 OPIOID-INDUCED CONSTIPATION: ICD-10-CM

## 2024-12-11 DIAGNOSIS — F11.90 CHRONIC, CONTINUOUS USE OF OPIOIDS: ICD-10-CM

## 2024-12-11 DIAGNOSIS — G89.4 CHRONIC PAIN SYNDROME: Primary | ICD-10-CM

## 2024-12-11 DIAGNOSIS — T40.2X5A OPIOID-INDUCED CONSTIPATION: ICD-10-CM

## 2024-12-11 PROCEDURE — 99214 OFFICE O/P EST MOD 30 MIN: CPT | Mod: 95 | Performed by: NURSE PRACTITIONER

## 2024-12-11 PROCEDURE — 99207 PR NO CHARGE NURSE ONLY: CPT | Performed by: FAMILY MEDICINE

## 2024-12-11 RX ORDER — HYDROCODONE BITARTRATE AND ACETAMINOPHEN 10; 325 MG/1; MG/1
1-2 TABLET ORAL EVERY 4 HOURS PRN
Qty: 90 TABLET | Refills: 0 | Status: SHIPPED | OUTPATIENT
Start: 2024-12-11

## 2024-12-11 NOTE — PATIENT INSTRUCTIONS
PATIENT INSTRUCTIONS:     I am recommending a multidisciplinary treatment plan to help this patient better manage her pain. The following recommendations were given to the patient. Diagnosis, treatment options, risks, benefits, and alternatives were discussed; all questions were answered. Self-care instructions were given. The patient expressed understanding of the plan for management.   Medication Management:   - CONTINUE taking Oxcarbazepine 150mg in AM and 150mg at bedtime until gone, then increase to 300mg in AM and 300mg at bedtime    - STOP taking Percocet 10/325mg and return to pharmacy today for disposal   - START taking Norco 10/325mg - take 2 tabs (20mg) in AM, 1-1.5 tabs at mid day, 1-1.5 tabs at dinner, 1-1.5 tabs at bedtime as needed for severe pain; max 6 tabs per day    - INCREASE dose of Linzess (linaclotide) to 290mcg by mouth every morning for constipation       Return to Clinic:   -  30-minute In-Person Appointment with Whitney Baird, STEVE, APRN, ANIYAC in 3-4 weeks, or sooner if needed      Future Considerations:   After pain control is stabilized, we will review options for using long-acting opioid medications for more consistent pain control through the day.   Set a timer on your phone to remember to take Oxcarbazepine two times per day.       ----------------------------------------------------------------  Clinic Number:  [phone]   Call with any questions about your care and for scheduling assistance.   Calls are returned Monday through Friday between 8 AM and 4:30 PM. We usually get back to you within 2 business days depending on the issue/request.    If we are prescribing your medications:  For opioid medication refills, call the clinic or send a Links Global message 7 days in advance.  Please include:  Name of requested medication  Name of the pharmacy.  For non-opioid medications, call your pharmacy directly to request a refill. Please allow 3-4 days to be processed.   Per [place] State  Law:  All controlled substance prescriptions must be filled within 30 days of being written.    For those controlled substances allowing refills, pickup must occur within 30 days of last fill.      We believe regular attendance is key to your success in our program!    Any time you are unable to keep your appointment we ask that you call us at least 24 hours in advance to cancel.This will allow us to offer the appointment time to another patient.   Multiple missed appointments may lead to dismissal from the clinic.

## 2024-12-11 NOTE — PROGRESS NOTES
Tracy Galloway was evaluated at [facility] on December 11, 2024 at which time her blood pressure was:    BP Readings from Last 1 Encounters:   12/11/24 130/54     No data recorded      Reviewed lifestyle modifications for blood pressure control and reduction: including making healthy food choices, managing weight, getting regular exercise, smoking cessation, reducing alcohol consumption, monitoring blood pressure regularly.     Symptoms: None    BP Goal:< 140/90 mmHg    BP Assessment:  BP at goal    Potential Reasons for BP too high: NA - Not applicable    BP Follow-Up Plan: Recheck BP in 6 months at pharmacy    Recommendation to Provider: No recommendations at this time. BP at goal will follow up with patient to recheck in pharmacy in 6 months.     Note completed by: Vidal Baez RPH

## 2024-12-11 NOTE — TELEPHONE ENCOUNTER
PA submitted for Vivotif capsules through Covermymeds.  Outcome: Med not covered by Med D benefit plan  No PA available.  
Him/He

## 2024-12-19 ENCOUNTER — MYC MEDICAL ADVICE (OUTPATIENT)
Dept: PALLIATIVE MEDICINE | Facility: CLINIC | Age: 79
End: 2024-12-19
Payer: COMMERCIAL

## 2024-12-19 NOTE — TELEPHONE ENCOUNTER
You  Tracy Aguilar now (4:07 PM)   Adam Molina,     Glad to hear that you are safe and have good family support!     I see that you were recently changed back to norco.  Is this how you are taking the norco?: Norco 10/325mg - take 2 tabs (20mg) in AM, 1-1.5 tabs at mid day, 1-1.5 tabs at dinner, 1-1.5 tabs at bedtime as needed for severe pain; max 6 tabs per day    The Oxcarbazepine 150mg. Are you still on 150mg twice a day or haave you done the increase to 300mg twice a day as advised once your 150mg were out?    Hava you increased the dose of Linzess (linaclotide) to 290mcg by mouth every morning for constipation          Will await your response back and then will have YANY Kinney CNP review.     Ruth RN-BSN  [facility]   [phone]

## 2024-12-19 NOTE — TELEPHONE ENCOUNTER
"YANY Kinney CNP is considering transitioning her to MS Contin 15mg    Per YANY Kinney CNP's 12/11/2024 visit plan:              Medication Management:   - CONTINUE taking Oxcarbazepine 150mg in AM and 150mg at bedtime until gone, then increase to 300mg in AM and 300mg at bedtime    - STOP taking Percocet 10/325mg and return to pharmacy today for disposal   - START taking Norco 10/325mg - take 2 tabs (20mg) in AM, 1-1.5 tabs at mid day, 1-1.5 tabs at dinner, 1-1.5 tabs at bedtime as needed for severe pain; max 6 tabs per day    -    Called pt.  And left message on her personal voice mail expressing extreme concern over her recent Kyron message asking for call back right away or to call 911 if she feels she will harm herself.  Asked if she has someone to talk to there and would try again to call back in 10 minutes.  _________________________________    Mychart sent to pt:  You  Tracy Aguilar now (3:29 PM)   Adam Molina,  I tried calling you and had to leave a voice mail.   Your message is concerning and we are worried for your safety.  Can you please respond back via Clinkhart and telephone? [phone]   We want to make sure you are ok.    _______________________________---  1529: called pt again and left voice mail.    There is a consent to communicate w/ daughter and she is pt's emergency contact.  1535: called pt's daughter and explained the situation.  She responded, \"not to make light of this at all, but this is what she does when she doesn't get her way.  I assume this is about her pain medications.\"    Plan: she will reach out to pt right now. Writer to call back in 10 minutes and if she isn't able to reach her then will call and do a welfare check.  _____________________________--  Per patient myChart message (writer unable to see pt messages in open encounter but when you go to chart review and review this mycahrt encounter it is there):  Tracy NIX Pain Nurse (supporting " Whitney Baird DNP)     12/19/24  3:36 PM  Thank you for your concern. I have calmed down. Last pill kicked in at 3:oo. Also pain is very mild. A 5?  And Tracy Galloway to P Pain Nurse (supporting Whitney Baird DNP)     12/19/24  3:38 PM  Drinking pepsi. Wasn't clear. Apologize  _______________    Called Kylah back.  She did reach her mother who apologizes for the siutation that she got way out of hand.  Her pain is manageable at this time and is afe.  Pt has a lot of support from her children and Kylah discussed w/ her the importance of communicating w/ family when she is done and to be aware that saying statements like this via Exanethart will always be taken seriously.

## 2024-12-24 NOTE — TELEPHONE ENCOUNTER
Addressed in another encounter note.     YANY Kinney, STEVE, FNP-C   [facility] - Pain Management

## 2024-12-30 ENCOUNTER — MYC MEDICAL ADVICE (OUTPATIENT)
Dept: PALLIATIVE MEDICINE | Facility: CLINIC | Age: 79
End: 2024-12-30
Payer: COMMERCIAL

## 2024-12-30 ENCOUNTER — MYC REFILL (OUTPATIENT)
Dept: FAMILY MEDICINE | Facility: CLINIC | Age: 79
End: 2024-12-30
Payer: COMMERCIAL

## 2024-12-30 DIAGNOSIS — B02.9 HERPES ZOSTER WITHOUT COMPLICATION: ICD-10-CM

## 2024-12-30 DIAGNOSIS — G50.0 TRIGEMINAL NEURALGIA: Chronic | ICD-10-CM

## 2024-12-30 DIAGNOSIS — G89.4 CHRONIC PAIN SYNDROME: ICD-10-CM

## 2024-12-30 RX ORDER — HYDROCODONE BITARTRATE AND ACETAMINOPHEN 10; 325 MG/1; MG/1
1-2 TABLET ORAL EVERY 4 HOURS PRN
Qty: 180 TABLET | Refills: 0 | Status: SHIPPED | OUTPATIENT
Start: 2024-12-30

## 2024-12-30 NOTE — TELEPHONE ENCOUNTER
Medication refill information reviewed.     norco last due:  Fill / start 12/11/24 (15 day supply   Due date:  anytime      Prescriptions prepped for review.     Ruth RN-BSN  [facility]

## 2024-12-30 NOTE — TELEPHONE ENCOUNTER
12/30/2024 5:48 PM     REFILL REQUEST:     This is a patient of mine. PDMP and Chart have been reviewed; refill request is appropriate.    Refilled for 30 day supply.  Script e-Prescribed to pharmacy    YANY Kinney, STEVE, FNP-C

## 2024-12-30 NOTE — TELEPHONE ENCOUNTER
Received request for a refill(s) of HYDROcodone-acetaminophen (NORCO)  MG per tablet      Last dispensed from pharmacy on 12/11/24    I am leaving for a weeks vacation on Jan 11th, that is when my hydrocodone needs to be refilled. Can I pick it up on the 10th?     Patient's last office/virtual visit by prescribing provider on 12/11/24  Next office/virtual appointment scheduled for 01/21/25    Last urine drug screen date 08/30/24  Current opioid agreement on file (completed within the last year) Yes Date of opioid agreement: 09/24/24    E-prescribe to pharmacy-[facility], [place] - [address] [place] [address]     Will route to nursing [place] for review and preparation of prescription(s).

## 2024-12-30 NOTE — TELEPHONE ENCOUNTER
Jeffy Ambrosio is a 41 year old male presenting for   Chief Complaint   Patient presents with    Follow-up     Hip surgery did not improve pain or symptoms.      Denies Latex allergy or sensitivity.    Medication verified and med list updated  Refills needed today: No       Health Maintenance       Diabetes Eye Exam (Yearly)  Overdue since 3/7/2023    Hepatitis B Vaccine (2 of 2 - CpG 2-dose series)  Overdue since 7/4/2023    COVID-19 Vaccine (3 - 2023-24 season)  Overdue since 9/1/2023           Following review of the above:  Patient wishes to discuss with clinician: Diabetes Eye Exam, COVID-19, and Hep A    Note: Refer to final orders and clinician documentation.                Last lab results:   Hemoglobin A1C (%)   Date Value   09/18/2023 7.9 (H)     Cholesterol (mg/dL)   Date Value   09/18/2023 130     HDL (mg/dL)   Date Value   09/18/2023 28 (L)     Triglycerides (mg/dL)   Date Value   09/18/2023 136     LDL (mg/dL)   Date Value   09/18/2023 75     MICROALBUMIN, UA (TTL) (mg/dL)   Date Value   11/22/2019 4.69     Creatinine, Urine (mg/dL)   Date Value   09/18/2023 69.90     Microalbumin/ Creatinine Ratio (mg/g)   Date Value   09/18/2023 34.5 (H)     No results found for: \"IFOB\"                 Depression Screening:  Review Flowsheet  More data exists         3/19/2024   PHQ 2/9 Score   Adult PHQ 2 Score 2   Adult PHQ 2 Interpretation No further screening needed   Little interest or pleasure in activity? Several days   Feeling down, depressed or hopeless? Several days   Adult PHQ 9 Score 10   Adult PHQ 9 Interpretation Moderate Depression   Trouble falling or staying asleep or sleeping all the time? Several days   Feeling tired or having little energy? Several days   Poor appetite or overeating? Several days   Feeling bad about yourself or that you are a failure or have let yourself or family down? Several days   Trouble concentrating on things such as reading the newspaper or watching TV? More than half  Tracy NIX Pain Nurse (supporting Whitney Baird DNP)1 hour ago (12:32 PM)     I am leaving for a weeks vacation on Jan 11th, that is when my hydrocodone needs to be refilled. Can I pick it up on the 10th?   the days   Moving or speaking slowly that other people have noticed or the opposite - being so fidgety or restless that you have been moving around a lot more than usual? More than half the days   Thoughts that you would be better off dead or of hurting yourself in some way? Not at all        Advance Directives:  not discussed     Additional Area 1 Volume In Cc: 0.2

## 2024-12-31 RX ORDER — VALACYCLOVIR HYDROCHLORIDE 1 G/1
TABLET, FILM COATED ORAL
Qty: 4 TABLET | Refills: 0 | Status: SHIPPED | OUTPATIENT
Start: 2024-12-31

## 2025-01-04 ENCOUNTER — HEALTH MAINTENANCE LETTER (OUTPATIENT)
Age: 80
End: 2025-01-04

## 2025-01-06 ENCOUNTER — MYC REFILL (OUTPATIENT)
Dept: PALLIATIVE MEDICINE | Facility: CLINIC | Age: 80
End: 2025-01-06
Payer: COMMERCIAL

## 2025-01-06 DIAGNOSIS — K59.03 OPIOID-INDUCED CONSTIPATION: ICD-10-CM

## 2025-01-06 DIAGNOSIS — T40.2X5A OPIOID-INDUCED CONSTIPATION: ICD-10-CM

## 2025-01-06 NOTE — TELEPHONE ENCOUNTER
Refills have been requested for the following medications:         linaclotide (LINZESS) 290 MCG capsule [SHIRA AMAYA]      Patient Comment: leaving for a vacation on the 11th     [facility] pharmacy: [facility] [address] [place] [address]

## 2025-01-20 NOTE — PROGRESS NOTES
[facility]         Date of Visit: Jan 21, 2025     CHIEF COMPLAINT:   Chief Complaint   Patient presents with    Pain       Subjective   SUBJECTIVE    INTERVAL HISTORY:   Tracy Galloway is a 79 year old female seen for INITIAL EVALUATION on 8/22/24; last seen for FOLLOW UP on 12/11/24.  They are returning today for trigeminal neuralgia.         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  Attempted trial of Percocet has not been effective for better management of her chronic intractable pain.  She currently has 7-day supply remaining of Percocet 10/325 mg.  At this time, I would advise that we transition back to Norco 10/325 mg which had been more effective.  Patient will return existing supply of Percocet 10/325 mg to the pharmacy today. At that time, she will  the new prescription for Norco 10/325 mg.  Pain is worse in the morning and takes the majority of the day to achieve adequate control pain.  We discussed potential benefit of taking a higher dose of hydrocodone in the morning with some flexibility in dosing for her additional 3 doses of the day.  Increasing Norco 10/325 mg to a maximum of 6 tabs per day.  Once pain levels have stabilized and are better controlled, I would consider transitioning her to MS Contin 15 mg extended release tabs.  At this time, goal is to achieve reasonable pain control for day-to-day function.  She continues with extreme constipation and has no benefit with linaclotide at 145 mcg dose.  We will double the dose to maximum recommended daily 290 mcg tablet.  She has been forgetting to take oxcarbazepine consistently.  We discussed the importance of establishing stable twice daily dosing to help with trigeminal nerve pain.  She has been taking 150 mg of oxcarbazepine once a day.  Recommending she  "increase to oxcarbazepine 150 mg twice daily until that prescription is gone, then start oxcarbazepine 300 mcg twice daily.  She will set an alarm and made personal notes during our encounter today to remember that this is a twice daily medication as part of her pain regimen.  Patient is advised to return for follow-up in 3 to 4 weeks.  She verbalizes understanding and agreement with plan.     PLAN:    Medication Management:   - CONTINUE taking Oxcarbazepine 150mg in AM and 150mg at bedtime until gone, then increase to 300mg in AM and 300mg at bedtime    - STOP taking Percocet 10/325mg and return to pharmacy today for disposal   - START taking Norco 10/325mg - take 2 tabs (20mg) in AM, 1-1.5 tabs at mid day, 1-1.5 tabs at dinner, 1-1.5 tabs at bedtime as needed for severe pain; max 6 tabs per day    - INCREASE dose of Linzess (linaclotide) to 290mcg by mouth every morning for constipation     Return to Clinic:   -  30-minute In-Person Appointment with Whitney Baird, STEVE, APRN, FNP-C in 3-4 weeks, or sooner if needed    Future Considerations:   After pain control is stabilized, we will review options for using long-acting opioid medications for more consistent pain control through the day.   Set a timer on your phone to remember to take Oxcarbazepine two times per day.       Interval history from last visit on 12/11/24:   Pain score today: 5/10    Pain rating: intensity ranges from 5/10 to 10/10 on a 0-10 scale.   She reports \"not doing good at all\" with dose adjustment of Percocet to 10/325mg   Feels pain is now traveling into right nasal region and palate.    Pain is throbbing and sharp.   She has no response to pain control until 3rd dose of opioid.   Feeling nauseated and \"spaced-out\" with Percocet; little relief compared to hydrocodone.   Continues with severe constipation; not feeling relief from Linzess 145mcg.           Since the last visit, Tracy OLMAN Galloway reports:   Pain score today: Worst Pain (10)   Pain " "rating: intensity ranges from 8/10 to 10/10 on a 0-10 scale.   She reports \"I can't think straight\"; more forgetful and irritable with friends.  Sharp pin pricks across right cheekbone gets worse as the day goes on.   Palate pain on right side .  Has developed headaches and left side jaw pain from tension.   Only relief noted is during swimming.   Oxcarbazepine is very sedating.    Norco 10/325mg - 2 tabs in AM, 1.5 tabs at 12pm, 1 tab at 3pm, 1.5 tabs at 7pm; feels benefit after 45 mins; improves pain from 10/10 to 8/10.   Higher stress levels make pain worse.       REVIEW OF SYSTEMS:   ROS: 10 point ROS neg other than the symptoms noted above in the HPI.     The patient otherwise denies red flag symptoms, such as: thunderclap headache, bowel or bladder incontinence, parasthesias, weakness, saddle anesthesia, unintentional weight loss, or fever/chills/sweats.     Medical History: Any changes in medical history since they were last seen? No    Medications and Allergies reviewed. Yes     Review of [place] Prescription Monitoring Program ():  reviewed on 1/21/25. No concern for abuse or misuse of controlled medications based on this report. Controlled substances prescribed in the past 6 months include: (Zolpidem 10mg, Norco 10/325mg, Percocet 10/325mg)     Current MME: 60 / day    Current PDMP reportable controlled substance medications, if any, are being prescribed by: Pain Mgmt    Primary Care Provider is Gwendolyn Solis       CSA due date: 9/24/25   UDS due date:  8/30/25     THE 4 As OF OPIOID MAINTENANCE ANALGESIA    Analgesia: Is pain relief clinically significant? Yes  Activity: Is patient functional and able to perform Activities of Daily Living? Yes  Adverse effects: Is patient free from adverse side effects from opiates? Yes  Adherence to Rx protocol: Is patient adhering to Controlled Substance Agreement and taking medications ONLY as ordered? Yes    Is Narcan prescribed for opiate use >50 MME " daily or concurrent use of opiates and benzodiazepines? Yes           Objective    OBJECTIVE:    Physical Exam  Vitals:    01/21/25 1422   BP: (!) 157/78   Pulse: 74        Appearance:   A&O. Patient is appropriate.   Patient is in NAD.      HHENT:  Normocephalic, atraumatic. Eyes without conjunctival injection or jaundice. Neck supple. No obvious neck masses.     Pulmonary:  Normal effort; no cough or audible wheeze      Skin: No obvious rash, lesions, or petechiae of exposed skin.    Neuro: Cranial nerves grossly intact.  Mentation and speech appropriate for age.     Psych:  Alert, without lethargy or stupor. Speech fluent. Appropriate affect. Mood normal. Able to follow commands without difficulty. Normal mood, judgement and behavior.          Diagnostic Tests/ Imaging/ Labs resulted since last visit:   None       Assessment & Plan      VISIT DIAGNOSIS:   1. Chronic pain syndrome  - Adult Pain Clinic Follow-Up Order  - Adult Pain Clinic Follow-Up Order; Future    2. Chronic, continuous use of opioids  - Adult Pain Clinic Follow-Up Order  - Adult Pain Clinic Follow-Up Order; Future    3. Trigeminal neuralgia  - Adult Pain Clinic Follow-Up Order  - lamoTRIgine (LAMICTAL) 25 MG tablet; Take 1 tablet (25 mg) by mouth at bedtime for 14 days, THEN 1 tablet (25 mg) 2 times daily for 14 days.  Dispense: 42 tablet; Refill: 0  - COMPOUNDED NON-CONTROLLED SUBSTANCE (CMPD RX) - PHARMACY TO MIX COMPOUNDED MEDICATION; Lidocaine 8% solution (9.6mg/spray) - Apply 3 sprays (0.36 mL) into right nostril three times per day as needed for trigeminal nerve pain. Do not exceed 9 sprays per 24 hours.  Dispense: 10.8 mL; Refill: 1  - Adult Pain Clinic Follow-Up Order; Future        ASSESSMENT:   Visit discussion: Tracy Galloway is seen in follow up today at the pain clinic for trigeminal nerve pain. Patient has stopped taking oxcarbazepine because it is causing excessive sedation.  She does get slight relief of pain intensity with Norco  10/325 mg but states it often takes multiple doses through the day before she notes any significant relief at the end of the day.  We discussed potential benefit of a compounded lidocaine nasal spray to interrupt pain intensity earlier in the morning.  Recommending we discontinue oxcarbazepine and try lamotrigine 25 mg at bedtime for 14 days then increase to 25 mg twice daily.  If tolerated without excessive somnolence then we will continue upward taper.  Review of location of patient's pain does correlate with the maxillary branch of the trigeminal nerve and greater Pallante nerve.  I will reach out to interventional anesthesiology to determine if there is a potential nerve block to diagnose specific nerve branch affected.  If we do get benefit from nerve block of the maxillary or other nerve, then can we move forward with a potential radiofrequency ablation to interrupt pain signal.  Alternatively, we may consider updating MRI imaging of the head to  rule out impingement of any facial nerves.  Patient is advised to return to clinic in 4 weeks to reassess pain control.  She verbalizes understanding and agreement with plan.     PLAN:    Medication Management:   - CONTINUE taking Norco 10/325mg - take 1-2 tabs every 4 hours as needed for severe pain; max 6 per day    - STOP taking Oxcarbazepine   - START taking Lamotrigine 25mg - take 1 cap (25mg) at bedtime for 14 days, then take 1 cap (25mg) by mouth two times per day    - START Lidocaine 8% nasal spray - instill 2-3 sprays into right nostril up to 3 times per day; do not exceed 9 sprays per 24 hours     Return to Clinic:   -  30-minute In-Person Appointment with Whitney Baird, STEVE, APRN, FNP-C in 4 weeks, or sooner if needed      Future Considerations:   We may consider nerve block of maxillary nerve to determine is this is the underlying trigger of nerve pain.  We may consider updating MRI of head to focus on facial nerves/trigeminal nerve entrapment.        ___________________________________________________________________    BILLING TIME DOCUMENTATION:   TOTAL TIME includes:   Time spent preparing to see the patient: 3 minutes (reviewing records and tests)  Time spend face to face with the patient: 40 minutes  Time spent ordering tests, medications, procedures and referrals: 0 minutes  Time spent Referring and communicating with other healthcare professionals: 0 minutes  Documenting clinical information in Epic: 3 minutes    The total TIME spent on this patient on the day of the appointment was 46 minutes.     ___________________________________________________________________    Review of Electronic Chart: Today I have also reviewed available medical information in the patient's medical record at [facility] ([facility]) and Care Everywhere (if available), including relevant provider notes, laboratory work, and imaging.     Whitney Baird, YANY, DNP, FNP-C   [facility] Pain Management

## 2025-01-21 ENCOUNTER — OFFICE VISIT (OUTPATIENT)
Dept: PALLIATIVE MEDICINE | Facility: CLINIC | Age: 80
End: 2025-01-21
Attending: NURSE PRACTITIONER
Payer: COMMERCIAL

## 2025-01-21 VITALS — SYSTOLIC BLOOD PRESSURE: 157 MMHG | DIASTOLIC BLOOD PRESSURE: 78 MMHG | HEART RATE: 74 BPM

## 2025-01-21 DIAGNOSIS — G50.0 TRIGEMINAL NEURALGIA: Chronic | ICD-10-CM

## 2025-01-21 DIAGNOSIS — G89.4 CHRONIC PAIN SYNDROME: ICD-10-CM

## 2025-01-21 DIAGNOSIS — F11.90 CHRONIC, CONTINUOUS USE OF OPIOIDS: ICD-10-CM

## 2025-01-21 PROCEDURE — 99215 OFFICE O/P EST HI 40 MIN: CPT | Performed by: NURSE PRACTITIONER

## 2025-01-21 PROCEDURE — G2211 COMPLEX E/M VISIT ADD ON: HCPCS | Performed by: NURSE PRACTITIONER

## 2025-01-21 RX ORDER — LAMOTRIGINE 25 MG/1
TABLET ORAL
Qty: 42 TABLET | Refills: 0 | Status: SHIPPED | OUTPATIENT
Start: 2025-01-21 | End: 2025-02-18

## 2025-01-21 ASSESSMENT — PAIN SCALES - GENERAL: PAINLEVEL_OUTOF10: SEVERE PAIN (10)

## 2025-01-21 NOTE — PATIENT INSTRUCTIONS
PATIENT INSTRUCTIONS:     I am recommending a multidisciplinary treatment plan to help this patient better manage her pain. The following recommendations were given to the patient. Diagnosis, treatment options, risks, benefits, and alternatives were discussed; all questions were answered. Self-care instructions were given. The patient expressed understanding of the plan for management.   Medication Management:   - CONTINUE taking Norco 10/325mg - take 1-2 tabs every 4 hours as needed for severe pain; max 6 per day    - STOP taking Oxcarbazepine   - START taking Lamotrigine 25mg - take 1 cap (25mg) at bedtime for 14 days, then take 1 cap (25mg) by mouth two times per day    - START Lidocaine 8% nasal spray - instill 2-3 sprays into right nostril up to 3 times per day; do not exceed 9 sprays per 24 hours     Return to Clinic:   -  30-minute In-Person Appointment with Whitney Baird, STEVE, APRN, FNBOYD-C in 4 weeks, or sooner if needed      Future Considerations:   We may consider nerve block of maxillary nerve to determine is this is the underlying trigger of nerve pain.  We may consider updating MRI of head to focus on facial nerves/trigeminal nerve entrapment.     ----------------------------------------------------------------  Clinic Number:  [phone]   Call with any questions about your care and for scheduling assistance.   Calls are returned Monday through Friday between 8 AM and 4:30 PM. We usually get back to you within 2 business days depending on the issue/request.    If we are prescribing your medications:  For opioid medication refills, call the clinic or send a [employer] message 7 days in advance.  Please include:  Name of requested medication  Name of the pharmacy.  For non-opioid medications, call your pharmacy directly to request a refill. Please allow 3-4 days to be processed.   Per [place] State Law:  All controlled substance prescriptions must be filled within 30 days of being written.    For those  controlled substances allowing refills, pickup must occur within 30 days of last fill.      We believe regular attendance is key to your success in our program!    Any time you are unable to keep your appointment we ask that you call us at least 24 hours in advance to cancel.This will allow us to offer the appointment time to another patient.   Multiple missed appointments may lead to dismissal from the clinic.

## 2025-01-31 DIAGNOSIS — G89.4 CHRONIC PAIN SYNDROME: ICD-10-CM

## 2025-01-31 DIAGNOSIS — G50.0 TRIGEMINAL NEURALGIA: Chronic | ICD-10-CM

## 2025-01-31 RX ORDER — HYDROCODONE BITARTRATE AND ACETAMINOPHEN 10; 325 MG/1; MG/1
1-2 TABLET ORAL EVERY 4 HOURS PRN
Qty: 180 TABLET | Refills: 0 | Status: CANCELLED | OUTPATIENT
Start: 2025-01-31

## 2025-02-03 NOTE — TELEPHONE ENCOUNTER
Pending Prescriptions:                       Disp   Refills    HYDROcodone-acetaminophen (NORCO)  M*180 ta*0        Sig: Take 1-2 tablets by mouth every 4 hours as needed for           severe pain (max 6 per day). OK to take 2 tabs           (20mg in AM), then 1-1.5 tabs as needed; Fill /           start 12/30/24 (30 day supply)    Routing refill request to provider for review/approval because:  Drug not on the [facility] refill protocol

## 2025-02-10 ENCOUNTER — TELEPHONE (OUTPATIENT)
Dept: FAMILY MEDICINE | Facility: CLINIC | Age: 80
End: 2025-02-10

## 2025-02-10 NOTE — TELEPHONE ENCOUNTER
Patient's call transferred to author    Patient calling to see if she can have an x-ray to determine if she developed a pneumonia as she has had 3 weeks of upper respiratory symptoms that have not improved     Patient had an appointment scheduled with PCP, but she accidentally cancelled the appointment via mychart  Patient wants to only see PCP for symptoms - offered appointments with other providers and patient declined    Forwarding to PCP to review and determine if patient can be worked in tomorrow 2/11/2025    Damián Abel, Clinic RN  .[facility]

## 2025-02-11 ENCOUNTER — ANCILLARY PROCEDURE (OUTPATIENT)
Dept: GENERAL RADIOLOGY | Facility: CLINIC | Age: 80
End: 2025-02-11
Attending: FAMILY MEDICINE
Payer: COMMERCIAL

## 2025-02-11 ENCOUNTER — OFFICE VISIT (OUTPATIENT)
Dept: FAMILY MEDICINE | Facility: CLINIC | Age: 80
End: 2025-02-11
Payer: COMMERCIAL

## 2025-02-11 VITALS
SYSTOLIC BLOOD PRESSURE: 130 MMHG | DIASTOLIC BLOOD PRESSURE: 70 MMHG | RESPIRATION RATE: 14 BRPM | HEART RATE: 100 BPM | OXYGEN SATURATION: 98 % | TEMPERATURE: 96.7 F

## 2025-02-11 DIAGNOSIS — R05.1 ACUTE COUGH: Primary | ICD-10-CM

## 2025-02-11 DIAGNOSIS — R05.1 ACUTE COUGH: ICD-10-CM

## 2025-02-11 DIAGNOSIS — F11.20 NARCOTIC DEPENDENCE (H): ICD-10-CM

## 2025-02-11 DIAGNOSIS — I10 ESSENTIAL HYPERTENSION WITH GOAL BLOOD PRESSURE LESS THAN 140/90: ICD-10-CM

## 2025-02-11 PROCEDURE — G2211 COMPLEX E/M VISIT ADD ON: HCPCS | Performed by: FAMILY MEDICINE

## 2025-02-11 PROCEDURE — 99214 OFFICE O/P EST MOD 30 MIN: CPT | Performed by: FAMILY MEDICINE

## 2025-02-11 PROCEDURE — 71046 X-RAY EXAM CHEST 2 VIEWS: CPT | Mod: TC | Performed by: RADIOLOGY

## 2025-02-11 RX ORDER — AZITHROMYCIN 250 MG/1
TABLET, FILM COATED ORAL
Qty: 6 TABLET | Refills: 0 | Status: SHIPPED | OUTPATIENT
Start: 2025-02-11 | End: 2025-02-16

## 2025-02-11 ASSESSMENT — PAIN SCALES - GENERAL: PAINLEVEL_OUTOF10: NO PAIN (0)

## 2025-02-11 ASSESSMENT — ENCOUNTER SYMPTOMS: COUGH: 1

## 2025-02-11 NOTE — NURSING NOTE
"Chief Complaint   Patient presents with    Cough     Chest pains, SOA - 3 weeks     BP (!) 150/60   Pulse 100   Temp (!) 96.7  F (35.9  C) (Tympanic)   Resp 14   LMP  (LMP Unknown)   SpO2 98%  Estimated body mass index is 21.92 kg/m  as calculated from the following:    Height as of 11/20/23: 1.549 m (5' 1\").    Weight as of 7/24/24: 52.6 kg (116 lb).  Patient presents to the clinic using No DME      Health Maintenance that is potentially due pending provider review:    Health Maintenance Due   Topic Date Due    HEPATITIS A IMMUNIZATION (2 of 2 - Risk 2-dose series) 10/12/2017    RSV VACCINE (1 - 1-dose 75+ series) Never done    BMP  11/29/2023    MEDICARE ANNUAL WELLNESS VISIT  11/01/2024    URINE DRUG SCREEN  11/01/2024    ANNUAL REVIEW OF HM ORDERS  07/24/2025                  "

## 2025-02-11 NOTE — PROGRESS NOTES
Assessment & Plan     Acute cough  Atypical pneumonia   Xray suspicious   - XR Chest 2 Views; Future  - azithromycin (ZITHROMAX) 250 MG tablet; Take 2 tablets (500 mg) by mouth daily for 1 day, THEN 1 tablet (250 mg) daily for 4 days.    Essential hypertension with goal blood pressure less than 140/90  Stable no change in treatment plan.     Narcotic dependence (H)  Following with pain clinic - stable       The longitudinal plan of care for the diagnosis(es)/condition(s) as documented were addressed during this visit. Due to the added complexity in care, I will continue to support Tracy in the subsequent management and with ongoing continuity of care.            Subjective   Tracy is a 79 year old, presenting for the following health issues:  Cough (Chest pains, SOA - 3 weeks)      2/11/2025     1:24 PM   Additional Questions   Roomed by Rosibel CHRISTIE   Accompanied by Self         2/11/2025     1:24 PM   Patient Reported Additional Medications   Patient reports taking the following new medications .     Cough    History of Present Illness       Reason for visit:  Virus  Symptom onset:  3-4 weeks ago  Symptoms include:  Chest pain sore throat  Symptom intensity:  Severe  Symptom progression:  Staying the same  Had these symptoms before:  No  What makes it worse:  No  What makes it better:  No   She is taking medications regularly.         Hypertension Follow-up    Do you check your blood pressure regularly outside of the clinic? No   Are you following a low salt diet? No  Are your blood pressures ever more than 140 on the top number (systolic) OR more   than 90 on the bottom number (diastolic), for example 140/90? N/A          Review of Systems  Constitutional, HEENT, cardiovascular, pulmonary, gi and gu systems are negative, except as otherwise noted.      Objective    BP (!) 150/60   Pulse 100   Temp (!) 96.7  F (35.9  C) (Tympanic)   Resp 14   LMP  (LMP Unknown)   SpO2 98%   There is no height or weight on file  to calculate BMI.  Physical Exam   GENERAL: alert and no distress  HENT: ear canals and TM's normal, nose and mouth without ulcers or lesions  NECK: no adenopathy, no asymmetry, masses, or scars  RESP: lungs clear to auscultation - no rales, rhonchi or wheezes  CV: regular rate and rhythm, normal S1 S2, no S3 or S4, no murmur, click or rub, no peripheral edema   PSYCH: mentation appears normal, affect normal/bright    Xray - Reviewed and interpreted by me. No acute infiltrate         Signed Electronically by: Gwendolyn Solis MD

## 2025-02-19 ASSESSMENT — PAIN SCALES - PAIN ENJOYMENT GENERAL ACTIVITY SCALE (PEG)
AVG_PAIN_PASTWEEK: 8
INTERFERED_GENERAL_ACTIVITY: 8
INTERFERED_ENJOYMENT_LIFE: 8
PEG_TOTALSCORE: 8

## 2025-02-20 ENCOUNTER — TELEPHONE (OUTPATIENT)
Dept: PALLIATIVE MEDICINE | Facility: CLINIC | Age: 80
End: 2025-02-20
Payer: COMMERCIAL

## 2025-02-20 NOTE — TELEPHONE ENCOUNTER
Tracy is requesting a refill on her Lamotrigine 25 mg tablets, she is completely out      Thank You,  Margie Monge, [facility], [place]

## 2025-02-21 NOTE — TELEPHONE ENCOUNTER
Dr. Solis does not fill the Lamatrogine, looks like pain clinic provider NP Nayana does. Left message for patient to call [facility] care team back. When patient calls back she will need to ask pharmacy to request refill from this provider as PCP does not manage.      GLENDY Marroquin

## 2025-02-23 NOTE — PROGRESS NOTES
[facility]         Date of Visit: Feb 24, 2025     CHIEF COMPLAINT:   Chief Complaint   Patient presents with    Pain       Subjective   SUBJECTIVE    INTERVAL HISTORY:   Tracy Galloway is a 79 year old female seen for INITIAL EVALUATION on 8/22/24; last seen for FOLLOW UP on 1/21/25.  They are returning today for trigeminal neuralgia.         Recommendations/ Plan at the last visit included:  Visit discussion: Tracy Galloway is seen in follow up today at the pain clinic for trigeminal nerve pain. Patient has stopped taking oxcarbazepine because it is causing excessive sedation.  She does get slight relief of pain intensity with Norco 10/325 mg but states it often takes multiple doses through the day before she notes any significant relief at the end of the day.  We discussed potential benefit of a compounded lidocaine nasal spray to interrupt pain intensity earlier in the morning.  Recommending we discontinue oxcarbazepine and try lamotrigine 25 mg at bedtime for 14 days then increase to 25 mg twice daily.  If tolerated without excessive somnolence then we will continue upward taper.  Review of location of patient's pain does correlate with the maxillary branch of the trigeminal nerve and greater Pallante nerve.  I will reach out to interventional anesthesiology to determine if there is a potential nerve block to diagnose specific nerve branch affected.  If we do get benefit from nerve block of the maxillary or other nerve, then can we move forward with a potential radiofrequency ablation to interrupt pain signal.  Alternatively, we may consider updating MRI imaging of the head to  rule out impingement of any facial nerves.  Patient is advised to return to clinic in 4 weeks to reassess pain control.  She verbalizes understanding and agreement with plan.     PLAN:    Medication Management:   - CONTINUE taking Norco 10/325mg - take 1-2 tabs every 4 hours as needed for severe pain; max 6  "per day    - STOP taking Oxcarbazepine   - START taking Lamotrigine 25mg - take 1 cap (25mg) at bedtime for 14 days, then take 1 cap (25mg) by mouth two times per day    - START Lidocaine 8% nasal spray - instill 2-3 sprays into right nostril up to 3 times per day; do not exceed 9 sprays per 24 hours   Return to Clinic:   -  30-minute In-Person Appointment with Whitney Baird, DNP, APRN, FNP-C in 4 weeks, or sooner if needed    Future Considerations:   We may consider nerve block of maxillary nerve to determine is this is the underlying trigger of nerve pain.  We may consider updating MRI of head to focus on facial nerves/trigeminal nerve entrapment.          Interval history from last visit on 1/21/25:   Pain score today: Worst Pain (10)   Pain rating: intensity ranges from 8/10 to 10/10 on a 0-10 scale.   She reports \"I can't think straight\"; more forgetful and irritable with friends.  Sharp pin pricks across right cheekbone gets worse as the day goes on.   Palate pain on right side .  Has developed headaches and left side jaw pain from tension.   Only relief noted is during swimming.   Oxcarbazepine is very sedating.    Norco 10/325mg - 2 tabs in AM, 1.5 tabs at 12pm, 1 tab at 3pm, 1.5 tabs at 7pm; feels benefit after 45 mins; improves pain from 10/10 to 8/10.   Higher stress levels make pain worse.         Since the last visit, Tracy Galloway reports:   Pain score today: Severe Pain (8)   Pain rating: intensity ranges from 8/10 to 9/10 on a 0-10 scale.   Reporting more pain in right eye; was evaluated by eye doctor.  Developed hives on left flank last night.   Started lidocaine nasal spray; spray will drip out of nose and run down back of throat.    Reports more nasal itching at night.   Has more frequent headaches.   Continues with opioid induced constipation.       REVIEW OF SYSTEMS:   ROS: 10 point ROS neg other than the symptoms noted above in the HPI.     The patient otherwise denies red flag symptoms, " such as: thunderclap headache, bowel or bladder incontinence, parasthesias, weakness, saddle anesthesia, unintentional weight loss, or fever/chills/sweats.     Medical History: Any changes in medical history since they were last seen? No     Medications and Allergies reviewed. Yes     Review of [place] Prescription Monitoring Program ():  reviewed on 2/24/25. No concern for abuse or misuse of controlled medications based on this report. Controlled substances prescribed in the past 6 months include: (Zolpidem 10mg, Norco 10/325mg, Percocet 10/325mg)     Current MME: 60 / day    Current PDMP reportable controlled substance medications, if any, are being prescribed by: Pain Mgmt    Primary Care Provider is Gwendolyn Solis       CSA due date: 9/24/25   UDS due date:  8/30/25     THE 4 As OF OPIOID MAINTENANCE ANALGESIA    Analgesia: Is pain relief clinically significant? Yes  Activity: Is patient functional and able to perform Activities of Daily Living? Yes  Adverse effects: Is patient free from adverse side effects from opiates? Yes  Adherence to Rx protocol: Is patient adhering to Controlled Substance Agreement and taking medications ONLY as ordered? Yes    Is Narcan prescribed for opiate use >50 MME daily or concurrent use of opiates and benzodiazepines? Yes           Objective    OBJECTIVE:    Physical Exam  Vitals:    02/24/25 1315   BP: 130/59   Pulse: 83        Appearance:   A&O. Patient is appropriate.   Patient is in NAD.      HHENT:  Normocephalic, atraumatic. Eyes without conjunctival injection or jaundice. Neck supple. No obvious neck masses.     Pulmonary:  Normal effort; no cough or audible wheeze      Skin: No obvious rash, lesions, or petechiae of exposed skin.    Neuro: Cranial nerves grossly intact.  Mentation and speech appropriate for age.     Psych:  Alert, without lethargy or stupor. Speech fluent. Appropriate affect. Mood normal. Able to follow commands without difficulty. Normal  mood, judgement and behavior.          Diagnostic Tests/ Imaging/ Labs resulted since last visit:   None       Assessment & Plan      VISIT DIAGNOSIS:   1. Trigeminal neuralgia (Primary)  - Adult Pain Clinic Follow-Up Order  - PAIN INJECTION EVAL/TREAT/FOLLOW UP; Future  - HYDROcodone-acetaminophen (NORCO)  MG per tablet; Take 1-2 tablets by mouth every 4 hours as needed for severe pain (max 6 per day). OK to take 2 tabs (20mg in AM), then 1-1.5 tabs as needed; Fill 3/04/25, start 3/06/25 (30 day supply)  Dispense: 165 tablet; Refill: 0  - lamoTRIgine (LAMICTAL) 25 MG tablet; Take 2 tablets (50 mg) by mouth 2 times daily. NOTE: dose change  Dispense: 120 tablet; Refill: 1  - Adult Pain Clinic Follow-Up Order; Future    2. Chronic pain syndrome  - Adult Pain Clinic Follow-Up Order  - HYDROcodone-acetaminophen (NORCO)  MG per tablet; Take 1-2 tablets by mouth every 4 hours as needed for severe pain (max 6 per day). OK to take 2 tabs (20mg in AM), then 1-1.5 tabs as needed; Fill 3/04/25, start 3/06/25 (30 day supply)  Dispense: 165 tablet; Refill: 0  - Adult Pain Clinic Follow-Up Order; Future    3. Chronic, continuous use of opioids  - Adult Pain Clinic Follow-Up Order  - Adult Pain Clinic Follow-Up Order; Future    4. Opioid-induced constipation  - linaclotide (LINZESS) 290 MCG capsule; Take 1 capsule (290 mcg) by mouth every morning (before breakfast).  Dispense: 30 capsule; Refill: 2  - Adult Pain Clinic Follow-Up Order; Future      ASSESSMENT:   Visit discussion: Tracy Galloway is seen in follow up today at the pain clinic for intractable right sided facial pain.  She has been tolerating lamotrigine 25 mg twice daily but does not feel significant improvement in nerve pain.  We discussed the importance of continuing the upward taper and will increase dose to 50 mg twice daily as indicated below.  Reviewed options for opioid-induced constipation.  She is at maximum dose of Linzess daily.  Recommending  addition of MiraLAX 1 capful in a.m. and 1 capful in p.m.  We discussed potential benefit of maxillary nerve block to isolate source of nerve pain.  She would like to proceed with right sided maxillary block.  If this is successful, then we will address potential need for referral to obtain RFA of maxillary nerve.  Patient is advised to follow-up in 4 to 6 weeks to reassess pain control plan.  She verbalizes understanding and agreement.      PLAN:    Medication Management:   - CONTINUE taking Norco 10/325mg - take 1-2 tabs by mouth every 4 hours as needed for pain; max 6 per day   - CONTINUE taking Linzess 290mcg by mouth every morning   - START taking Miralax - 1 capful in liquid 1-2 times per day   - INCREASE dose of Lamotrigine 25mg to 1 tab (25mg) in AM and 2 tabs (50mg) in PM for 7 days, then take 2 tabs (50mg) two times per day      New Orders:     Right Maxillary Nerve Block       Return to Clinic:   -  30-minute In-Person Appointment with Whitney Baird, STEVE, YANY, ANIYAC in 4-6 weeks, or sooner if needed      Future Considerations:   We may continue upward taper of Lamotrigine as tolerated.        ___________________________________________________________________    BILLING TIME DOCUMENTATION:   TOTAL TIME includes:   Time spent preparing to see the patient: 3 minutes (reviewing records and tests)  Time spend face to face with the patient: 38 minutes  Time spent ordering tests, medications, procedures and referrals: 0 minutes  Time spent Referring and communicating with other healthcare professionals: 0 minutes  Documenting clinical information in Epic: 2 minutes    The total TIME spent on this patient on the day of the appointment was 43 minutes.     ___________________________________________________________________    Review of Electronic Chart: Today I have also reviewed available medical information in the patient's medical record at [facility]) and Care Everywhere (if available),  including relevant provider notes, laboratory work, and imaging.     YANY Kinney, STEVE, FNP-C   [facility]

## 2025-02-24 ENCOUNTER — OFFICE VISIT (OUTPATIENT)
Dept: PALLIATIVE MEDICINE | Facility: CLINIC | Age: 80
End: 2025-02-24
Attending: NURSE PRACTITIONER
Payer: COMMERCIAL

## 2025-02-24 VITALS — HEART RATE: 83 BPM | DIASTOLIC BLOOD PRESSURE: 59 MMHG | SYSTOLIC BLOOD PRESSURE: 130 MMHG

## 2025-02-24 DIAGNOSIS — F11.90 CHRONIC, CONTINUOUS USE OF OPIOIDS: ICD-10-CM

## 2025-02-24 DIAGNOSIS — K59.03 OPIOID-INDUCED CONSTIPATION: ICD-10-CM

## 2025-02-24 DIAGNOSIS — G89.4 CHRONIC PAIN SYNDROME: ICD-10-CM

## 2025-02-24 DIAGNOSIS — G50.0 TRIGEMINAL NEURALGIA: Primary | Chronic | ICD-10-CM

## 2025-02-24 DIAGNOSIS — T40.2X5A OPIOID-INDUCED CONSTIPATION: ICD-10-CM

## 2025-02-24 PROCEDURE — G2211 COMPLEX E/M VISIT ADD ON: HCPCS | Performed by: NURSE PRACTITIONER

## 2025-02-24 PROCEDURE — 99215 OFFICE O/P EST HI 40 MIN: CPT | Performed by: NURSE PRACTITIONER

## 2025-02-24 RX ORDER — HYDROCODONE BITARTRATE AND ACETAMINOPHEN 10; 325 MG/1; MG/1
1-2 TABLET ORAL EVERY 4 HOURS PRN
Qty: 165 TABLET | Refills: 0 | Status: SHIPPED | OUTPATIENT
Start: 2025-02-24

## 2025-02-24 RX ORDER — LAMOTRIGINE 25 MG/1
50 TABLET ORAL 2 TIMES DAILY
Qty: 120 TABLET | Refills: 1 | Status: SHIPPED | OUTPATIENT
Start: 2025-02-24

## 2025-02-24 ASSESSMENT — PAIN SCALES - GENERAL: PAINLEVEL_OUTOF10: SEVERE PAIN (8)

## 2025-02-24 NOTE — PATIENT INSTRUCTIONS
PATIENT INSTRUCTIONS:     I am recommending a multidisciplinary treatment plan to help this patient better manage her pain. The following recommendations were given to the patient. Diagnosis, treatment options, risks, benefits, and alternatives were discussed; all questions were answered. Self-care instructions were given. The patient expressed understanding of the plan for management.   Medication Management:   - CONTINUE taking Norco 10/325mg - take 1-2 tabs by mouth every 4 hours as needed for pain; max 6 per day   - CONTINUE taking Linzess 290mcg by mouth every morning   - START taking Miralax - 1 capful in liquid 1-2 times per day   - INCREASE dose of Lamotrigine 25mg to 1 tab (25mg) in AM and 2 tabs (50mg) in PM for 7 days, then take 2 tabs (50mg) two times per day      New Orders:     Right Maxillary Nerve Block       Return to Clinic:   -  30-minute In-Person Appointment with Whitney Baird, STEVE, APRN, FNJACYC in 4-6 weeks, or sooner if needed      Future Considerations:   We may continue upward taper of Lamotrigine as tolerated.      ----------------------------------------------------------------  Clinic Number:  [phone]   Call with any questions about your care and for scheduling assistance.   Calls are returned Monday through Friday between 8 AM and 4:30 PM. We usually get back to you within 2 business days depending on the issue/request.    If we are prescribing your medications:  For opioid medication refills, call the clinic or send a [employer] message 7 days in advance.  Please include:  Name of requested medication  Name of the pharmacy.  For non-opioid medications, call your pharmacy directly to request a refill. Please allow 3-4 days to be processed.   Per [place] State Law:  All controlled substance prescriptions must be filled within 30 days of being written.    For those controlled substances allowing refills, pickup must occur within 30 days of last fill.      We believe regular attendance is key  to your success in our program!    Any time you are unable to keep your appointment we ask that you call us at least 24 hours in advance to cancel.This will allow us to offer the appointment time to another patient.   Multiple missed appointments may lead to dismissal from the clinic.

## 2025-03-02 ENCOUNTER — MYC REFILL (OUTPATIENT)
Dept: FAMILY MEDICINE | Facility: CLINIC | Age: 80
End: 2025-03-02
Payer: COMMERCIAL

## 2025-03-02 DIAGNOSIS — F51.01 PRIMARY INSOMNIA: Chronic | ICD-10-CM

## 2025-03-02 RX ORDER — ZOLPIDEM TARTRATE 10 MG/1
TABLET ORAL
Qty: 30 TABLET | Refills: 5 | Status: CANCELLED | OUTPATIENT
Start: 2025-03-02

## 2025-03-04 ENCOUNTER — TELEPHONE (OUTPATIENT)
Dept: PALLIATIVE MEDICINE | Facility: CLINIC | Age: 80
End: 2025-03-04
Payer: COMMERCIAL

## 2025-03-04 NOTE — TELEPHONE ENCOUNTER
Screening questions for MBB Injections:    Injection to be done at which interventional clinic site? [facility]    Procedure ordered by Dr. Baird    Procedure ordered? Right Maxillary Nerve Block      What insurance would patient like us to bill for this procedure? ucare    MEDICA: REQUIRES A PA FOR BOTH MBB   Worker's comp- Any injection DO NOT SCHEDULE and route to Clifton Elliott.    [employer] insurance - ANY INJECTION, DO NOT SCHEDULE and route to Lary Patterson.     MBBs must be scheduled with elapsed time interval of at least 2 weeks and not more than 6 months between the First MBB and the Second MBB for insurance purposes     [employer] - Any injection besides hip/shoulder/knee joint DO NOT SCHEDULE and route to Lary Patterson. She will obtain PA and call pt back to schedule procedure or notify pt of denial.     [employer]- PA required for all MBB's    **BCBS- ALL need to be routed to Lary for review if a PA is needed**  IF SCHEDULING IN [facility] PLEASE SCHEDULE AT LEAST 7-10         BUSINESS DAYS OUT SO A PA CAN BE OBTAINED    Genicular Nerve blocks- ALL insurances except for- [employer], Medicare (straight not supplement) and [employer]. Need to be reviewed by Lary before scheduling.       Is patient scheduled at [facility]? no   If YES, route every encounter to [facility] CARE NAVIGATION POOL [[phone]]    Any chance of pregnancy? NO   If YES, do NOT schedule and route to RN pool  - Dr. Ledesma route to Lucero Archer and PM&R Nurse  [[id number]]      Is an  needed? No     Patient has a drive home? (mandatory) Yes     Is patient taking any blood thinners (plavix, coumadin, jantoven, warfarin, heparin, pradaxa or dabigatran )? No    If hold needed, do NOT schedule, route to RN pool/ Dr. Ledesma's Team     Does the patient have a bleeding or clotting disorder? No  If YES, okay to schedule AND route to RN nurse pool/ Dr. Ledesma's  Team  **For any patients with platelet count <100, must be forwarded to provider**    Is patient diabetic? no If YES, have them bring their glucometer.    Does patient have an active infection or treated for one within the past week? No    Is patient currently taking any antibiotics?  No  For patients on chronic, preventative, or prophylactic antibiotics, procedures may be scheduled.   For patients on antibiotics for active or recent infection:antibiotic course must have been completed for 4 days    Is patient currently taking any steroid medications? (i.e. Prednisone, Medrol)  No   For patients on steroid medications, course must have been completed for 4 days    Is patient actively being treated for cancer or immunocompromised? No   If YES, do NOT schedule and route to RN/ Dr. Ledesma's Team    Are you able to get on and off an exam table with minimal or no assistance? Yes   If NO, do NOT schedule and route to RN/ Dr. Ledesma's Team    Are you able to roll over and lay on your stomach with minimal or no assistance? Yes   If NO, do NOT schedule and route to RN/ Dr. Sanchezs Team    Any allergies to contrast dye, iodine, shellfish, or numbing and steroid medications? No  (If so, inform nursing and note in scheduling comments.)    Allergies: Patient has no known allergies.     Does patient have an MRI/CT?  Not Applicable  Check Procedure Scheduling Grid to see if required.    Was the MRI done within the last 3 years?  NA  If yes, where was the MRI done i.e.[facility], [facility], [facility], [facility] etc?     If no, do not schedule and route to nursing/ Dr. Sanchezs Team  If MRI was not done at [facility], [facility] or [facility] do NOT schedule and route to nursing.    If pt has an imaging disc, the injection MAY be scheduled but pt has to bring disc to appt.   If they show up without the disc the injection cannot be done    Is patient able to transfer to a procedure table with minimal or no assistance? Yes  If  NO, do NOT schedule and route to RN/ Dr. Ledesma's Team    Medial Branch Block Pre-Procedure Instructions  It is okay to take long acting pain medications (if you are on them) the day of the procedure but try not to take any short acting medications unless absolutely necessary.    YES: ok   Long acting meds would include: Gabapentin (Neurontin), MS Contin, Oxycontin        Short acting meds would include:  Percocet, Oxycodone, Vicodin, Ibuprofen   The day of the procedure, you should try to do things that provoke your pain, since the injection is being done to see if it will relieve your pain . YES: ok   If your pain level is a 4 out of 10 or less on the day of the procedu re, please call [phone] to reschedule.  YES: ok     Reminders:    If you are started on any steroids or antibiotics between now and your appointment, you must contact us because it may affect our ability to perform your procedure ok    Instructed pt to arrive 30 minutes early for IV start if required. (Check Procedure Scheduling Grid) GERA     If this is for a cervical MBB aspirin needs to be held for 6 days.  YES: ok     Do not schedule procedures requiring IV placement in the first appointment of the day or first appointment after lunch. Do NOT schedule at 0745, 0815 or 1245.  ok    For patients 85 or older we recommend having an adult stay w/ them for the remainder of the day.      Does the patient have any questions? no

## 2025-03-12 ENCOUNTER — MYC MEDICAL ADVICE (OUTPATIENT)
Dept: FAMILY MEDICINE | Facility: CLINIC | Age: 80
End: 2025-03-12

## 2025-03-12 ENCOUNTER — RADIOLOGY INJECTION OFFICE VISIT (OUTPATIENT)
Dept: PALLIATIVE MEDICINE | Facility: CLINIC | Age: 80
End: 2025-03-12
Payer: COMMERCIAL

## 2025-03-12 VITALS — SYSTOLIC BLOOD PRESSURE: 184 MMHG | DIASTOLIC BLOOD PRESSURE: 78 MMHG | HEART RATE: 67 BPM | OXYGEN SATURATION: 98 %

## 2025-03-12 DIAGNOSIS — G50.0 TRIGEMINAL NEURALGIA: ICD-10-CM

## 2025-03-12 PROCEDURE — 64400 NJX AA&/STRD TRIGEMINAL NRV: CPT | Mod: RT | Performed by: PAIN MEDICINE

## 2025-03-12 PROCEDURE — 77002 NEEDLE LOCALIZATION BY XRAY: CPT | Performed by: PAIN MEDICINE

## 2025-03-12 RX ORDER — DEXAMETHASONE SODIUM PHOSPHATE 10 MG/ML
10 INJECTION, SOLUTION INTRAMUSCULAR; INTRAVENOUS ONCE
Status: COMPLETED | OUTPATIENT
Start: 2025-03-12 | End: 2025-03-12

## 2025-03-12 RX ADMIN — DEXAMETHASONE SODIUM PHOSPHATE 10 MG: 10 INJECTION, SOLUTION INTRAMUSCULAR; INTRAVENOUS at 14:16

## 2025-03-12 ASSESSMENT — PAIN SCALES - GENERAL
PAINLEVEL_OUTOF10: SEVERE PAIN (7)
PAINLEVEL_OUTOF10: MODERATE PAIN (6)

## 2025-03-12 NOTE — PROGRESS NOTES
Pre procedure Diagnosis: Trigeminal neuralgia/maxillary neuralgia  Post procedure Diagnosis: Same  Procedure performed: Right maxillary nerve block  Anesthesia: None  Complications: None  Operators: Jose F Chang MD     Indications:   Tracy Galloway is a 79 year old female   was sent  for maxillary nerve block.  They have a history of right facial pain in the V2/V3 distribution.  Exam shows tenderness to light palpation over zygoma and they have tried conservative treatment including medications.      Options/alternatives, benefits and risks were discussed with the patient including but not limited to bleeding, infection, nerve injury, reaction to medications, lack of significant pain relief with injection and the patient verbalized understanding and wished to proceed.  Voluntary signed informed consent was obtained.     Vitals were reviewed: Yes  Allergies were reviewed:  Yes   Medications were reviewed:  Yes   Pre-procedure pain score: 7/10    Procedure:  The patient's medical history, medications, and allergies were reviewed and reconciled.  After obtaining signed informed consent, the patient was brought into the procedure suite and was placed in the supine position on the procedure table.   A Pause for the Cause was performed.  Patient was prepped and draped in the usual sterile fashion.     After identifying the mandibular notch and the mandibular condyle, a total of 1ml of Lidocaine 1% was used to anesthetize the skin and subcutaneous tissue.  A 25 gauge 3.5 inch spinal needle was advanced under intermittent fluoroscopy until it was felt to contact the pterygoid plate.  Aspiration was negative.   A total of 1 ml of Omnipaque  was injected, showing appropriate needle location and adequate spread into the intraarticular space and no intravascular uptake noted.  Omnipaque wasted:  9 ml.  A mixture containing 1 ml of 0.25% bupivacaine with 10mg of dexamethasone was injected without resistance. The needle was  removed, the injection site cleaned and a sterile dressing was applied.  The patient tolerated the procedure well, and was taken to the recovery room.    Images were saved to PACS.    Post-procedure pain score:  6/10  Follow-up includes:     -f/u with provider    Jose F Chang MD  [facility]

## 2025-03-12 NOTE — NURSING NOTE
Discharge Information    IV Discontiued Time:  NA    Amount of Fluid Infused:  NA    Discharge Criteria = When patient returns to baseline or as per MD order    Consciousness:  Pt is fully awake    Circulation:  BP +/- 20% of pre-procedure level    Respiration:  Patient is able to breathe deeply    O2 Sat:  Patient is able to maintain O2 Sat >92% on room air    Activity:  Moves 4 extremities on command    Ambulation:  Patient is able to stand and walk or stand and pivot into wheelchair    Dressing:  Clean/dry or No Dressing    Notes:   Discharge instructions and AVS given to patient    Patient meets criteria for discharge?  YES    Admitted to PCU?  No    Responsible adult present to accompany patient home?  Yes    Signature/Title:    Keshia Ramos RN  RN Care Coordinator  [facility] [place]

## 2025-03-12 NOTE — PATIENT INSTRUCTIONS
[facility]   Procedure Discharge Instructions    Today you saw:    Dr. Jose F Chang,         You had a(n):   Right maxillary nerve block    You may have numbness/weakness to left side of face, this may last up to 4 hours.     You may resume your regular activities after 24 hours  Avoid strenuous activity for the first 24 hours  You may shower, however avoid swimming, tub baths or hot tubs for 24 hours following your procedure  You may have a mild to moderate increase in pain for several days following the injection.  It may take up to 14 days for the steroid medication to start working although you may feel the effect as early as a few days after the procedure.     You may use ice packs for 10-15 minutes, 3 to 4 times a day at the injection site for comfort  Do not use heat to painful areas for 6 to 8 hours. This will give the local anesthetic time to wear off and prevent you from accidentally burning your skin.   Unless you have been directed to avoid the use of anti-inflammatory medications (NSAIDS), you may use medications such as ibuprofen, Aleve or Tylenol for pain control if needed.   If you have diabetes, check your blood sugar more frequently than usual as your blood sugar may be higher than normal for 10-14 days following a steroid injection. Contact your doctor who manages your diabetes if your blood sugar is higher than usual  Possible side effects of steroids that you may experience include flushing, elevated blood pressure, increased appetite, mild headaches and restlessness.  All of these symptoms will get better with time.  If you experience any of the following, call the Pain Clinic during work hours (Mon-Friday 8-4:30 pm) at [phone] or the Provider Line after hours at [phone]:  -Fever over 100 degree F  -Swelling, bleeding, redness, drainage, warmth at the injection site  -Progressive weakness or numbness in your face  -Unusual headache not relieved by  Tylenol or other pain reliever  -Unusual new onset of pain that is not improving

## 2025-03-26 ENCOUNTER — ANCILLARY PROCEDURE (OUTPATIENT)
Dept: GENERAL RADIOLOGY | Facility: CLINIC | Age: 80
End: 2025-03-26
Attending: FAMILY MEDICINE
Payer: COMMERCIAL

## 2025-03-26 DIAGNOSIS — R05.1 ACUTE COUGH: ICD-10-CM

## 2025-03-26 PROCEDURE — 71046 X-RAY EXAM CHEST 2 VIEWS: CPT | Mod: TC | Performed by: RADIOLOGY

## 2025-03-26 ASSESSMENT — PAIN SCALES - PAIN ENJOYMENT GENERAL ACTIVITY SCALE (PEG)
PEG_TOTALSCORE: 6
AVG_PAIN_PASTWEEK: 7
INTERFERED_GENERAL_ACTIVITY: 5
INTERFERED_ENJOYMENT_LIFE: 6

## 2025-03-30 NOTE — PROGRESS NOTES
[facility]         Date of Visit: Mar 31, 2025     CHIEF COMPLAINT:   Chief Complaint   Patient presents with    Pain       Subjective   SUBJECTIVE    INTERVAL HISTORY:   Tracy Galloway is a 80 year old female seen for INITIAL EVALUATION on 8/22/24; last seen for FOLLOW UP on 2/24/25.  They are returning today for trigeminal neuralgia.         Recommendations/ Plan at the last visit included:  Visit discussion: Tracy Galloway is seen in follow up today at the pain clinic for intractable right sided facial pain.  She has been tolerating lamotrigine 25 mg twice daily but does not feel significant improvement in nerve pain.  We discussed the importance of continuing the upward taper and will increase dose to 50 mg twice daily as indicated below.  Reviewed options for opioid-induced constipation.  She is at maximum dose of Linzess daily.  Recommending addition of MiraLAX 1 capful in a.m. and 1 capful in p.m.  We discussed potential benefit of maxillary nerve block to isolate source of nerve pain.  She would like to proceed with right sided maxillary block.  If this is successful, then we will address potential need for referral to obtain RFA of maxillary nerve.  Patient is advised to follow-up in 4 to 6 weeks to reassess pain control plan.  She verbalizes understanding and agreement.      PLAN:    Medication Management:   - CONTINUE taking Norco 10/325mg - take 1-2 tabs by mouth every 4 hours as needed for pain; max 6 per day   - CONTINUE taking Linzess 290mcg by mouth every morning   - START taking Miralax - 1 capful in liquid 1-2 times per day   - INCREASE dose of Lamotrigine 25mg to 1 tab (25mg) in AM and 2 tabs (50mg) in PM for 7 days, then take 2 tabs (50mg) two times per day    New Orders:     Right Maxillary Nerve Block     Return to Clinic:   -  30-minute In-Person Appointment with Whitney Baird, STEVE, APRN, FNP-C in 4-6 weeks, or sooner if needed    Future Considerations:   We may  continue upward taper of Lamotrigine as tolerated.        Interval history from last visit on 2/24/25:   Pain score today: Severe Pain (8)   Pain rating: intensity ranges from 8/10 to 9/10 on a 0-10 scale.   Reporting more pain in right eye; was evaluated by eye doctor.  Developed hives on left flank last night.   Started lidocaine nasal spray; spray will drip out of nose and run down back of throat.    Reports more nasal itching at night.   Has more frequent headaches.   Continues with opioid induced constipation.         Since the last visit, Tracy Galloway reports:   Pain score today: Severe Pain (8)   Pain rating: intensity ranges from 4/10 to 8/10, and averages 8/10 on a 0-10 scale.   Tracy underwent right maxillary nerve block on 3/12/25 with Dr. Chang and experienced worsening of right facial pain after the injection.     Today, she reports pain from right maxillary nerve block was very severe and has not improved from baseline.   Has stopped taking Lamotrigine due to hives and lip swelling.   Experiencing itching sensation of nose and lip burning and sensitivity with lower lip swelling.   Has been using Anbesol lidocaine gel and feels this is somewhat helpful.   Has been taking less Norco 10/325mg due to more significant constipation.  Currently taking Norco 10/325mg - takes 1 tab at 8am, 1-1.5 tabs at 12pm, 1-1.5 tabs 4pm, 1 tab at 8pm; feels effect in 40-60 minutes; improves pain from 8/10 to 4/10; effect lasts 3 hours before pain increases.         REVIEW OF SYSTEMS:   ROS: 10 point ROS neg other than the symptoms noted above in the HPI.     The patient otherwise denies red flag symptoms, such as: thunderclap headache, bowel or bladder incontinence, parasthesias, weakness, saddle anesthesia, unintentional weight loss, or fever/chills/sweats.     Medical History: Any changes in medical history since they were last seen? No    Medications and Allergies reviewed. Yes     Review of [place] Prescription  Monitoring Program ():  reviewed on 3/31/25. No concern for abuse or misuse of controlled medications based on this report. Controlled substances prescribed in the past 6 months include: (Zolpidem 10mg, Norco 10/325mg, Percocet 7.5/325mg)     Current MME: 60 / day    Current PDMP reportable controlled substance medications, if any, are being prescribed by: Pain Mgmt    Primary Care Provider is Gwendolyn Solis       CSA due date: 9/24/25   UDS due date:  8/30/25     THE 4 As OF OPIOID MAINTENANCE ANALGESIA    Analgesia: Is pain relief clinically significant? Yes  Activity: Is patient functional and able to perform Activities of Daily Living? Yes  Adverse effects: Is patient free from adverse side effects from opiates? Yes  Adherence to Rx protocol: Is patient adhering to Controlled Substance Agreement and taking medications ONLY as ordered? Yes    Is Narcan prescribed for opiate use >50 MME daily or concurrent use of opiates and benzodiazepines? Yes           Objective    OBJECTIVE:    Physical Exam  Vitals:    03/31/25 1346   BP: (!) 142/71   Pulse: 70        Appearance:   A&O. Patient is appropriate.   Patient is in NAD.      HHENT:  Normocephalic, atraumatic. Eyes without conjunctival injection or jaundice. Neck supple. No obvious neck masses.     Pulmonary:  Normal effort; no cough or audible wheeze      Skin: No obvious rash, lesions, or petechiae of exposed skin.    Neuro: Cranial nerves grossly intact.  Mentation and speech appropriate for age.     Psych:  Alert, without lethargy or stupor. Speech fluent. Appropriate affect. Mood normal. Able to follow commands without difficulty. Normal mood, judgement and behavior.          Diagnostic Tests/ Imaging/ Labs resulted since last visit:   None       Assessment & Plan      VISIT DIAGNOSIS:   1. Chronic pain syndrome (Primary)  - Adult Pain Clinic Follow-Up Order  - HYDROcodone-acetaminophen (NORCO)  MG per tablet; Take 1-2 tablets by mouth every  4 hours as needed for severe pain (max 5.5 per day). Fill 4/03/25, start 4/05/25 (30 day supply)  Dispense: 165 tablet; Refill: 0  - Adult Pain Clinic Follow-Up Order; Future    2. Chronic, continuous use of opioids  - Adult Pain Clinic Follow-Up Order  - naloxone (NARCAN) 4 MG/0.1ML nasal spray; Spray 1 spray (4 mg) into one nostril alternating nostrils as needed for opioid reversal. every 2-3 minutes until assistance arrives  Dispense: 2 each; Refill: 2  - Adult Pain Clinic Follow-Up Order; Future    3. Trigeminal neuralgia  - Adult Pain Clinic Follow-Up Order  - HYDROcodone-acetaminophen (NORCO)  MG per tablet; Take 1-2 tablets by mouth every 4 hours as needed for severe pain (max 5.5 per day). Fill 4/03/25, start 4/05/25 (30 day supply)  Dispense: 165 tablet; Refill: 0  - Adult Pain Clinic Follow-Up Order; Future    4. Opioid-induced constipation  - Adult Pain Clinic Follow-Up Order  - Adult Pain Clinic Follow-Up Order; Future      ASSESSMENT:   Visit discussion: Tracy Galloway is seen in follow up today at the pain clinic for intractable right-sided facial neuralgia.  Patient has discontinued lamotrigine stating that she believes it is causing potential swelling of her lip and hives.  She has tried and failed several neuroleptic class medications, as well as TCAs and SNRIs.  Education was provided on the effects of ongoing opioid dosing to trigger hyperalgesia.  Patient does verbalize that her Norco dosing at 10/325 mg max 6/day is not effectively controlling her nerve pain.  I did reiterate that this is because opioids are not typically effective for ongoing nerve pain.  We discussed potential benefit of tramadol versus buprenorphine for pain relief as well as nerve pain control.  Patient is interested in trial of buprenorphine.  However, she does state that she gets impatient.  I did reiterate that it is very important that she slowly taper down hydrocodone from 60 MME per day down to 20-30 MME per day  before transitioning to buprenorphine.  I did offer patient the option of doing a buprenorphine induction at current dose of hydrocodone to start on Subutex or Suboxone.  Patient would prefer to downward taper and then initiate a slow upward taper of buprenorphine.  I do agree that this is the most viable option at this time.  Recommending a downward taper of Norco 10/325 mg by removing half a tab daily every 30 days.  Patient patient was advised this will take several months with a goal of initiating buprenorphine dosing when she is closer to 20 MME per day.  In the meantime, patient has discontinued lidocaine spray and is not interested in other nonopioid medications at this time.  She did not feel any significant benefit from the right maxillary nerve block and does indicate that it was more painful than she anticipated.  Recommending follow-up in 6 to 8 weeks to reassess pain control.  At that time, she should be transitioning to a maximum of 4.5 tabs of Norco 10/325 mg daily.  Patient verbalizes understanding and agreement with plan.     PLAN:    Medication Management:   - STOP taking Lamotrigine   - TAPER Norco 10/325mg as follows:  Take 1-1.5 tabs by mouth every 4 hours as needed for pain; max 5.5 tabs per day.   We will continue with downward taper of Norco 10/325mg by removing 0.5 tab per day every 30 days.     Return to Clinic:   -  30-minute In-Person Appointment with Whitney Baird, STEVE, APRN, FNBOYD-C in 6-8 weeks, or sooner if needed      Future Considerations:   We will continue downward taper of Norco to start Buprenorphine in 3-4 months.        ___________________________________________________________________    BILLING TIME DOCUMENTATION:   TOTAL TIME includes:   Time spent preparing to see the patient: 3 minutes (reviewing records and tests)  Time spend face to face with the patient: 50 minutes  Time spent ordering tests, medications, procedures and referrals: 0 minutes  Time spent Referring and  communicating with other healthcare professionals: 0 minutes  Documenting clinical information in Epic: 3 minutes    The total TIME spent on this patient on the day of the appointment was 56 minutes.     ___________________________________________________________________    Review of Electronic Chart: Today I have also reviewed available medical information in the patient's medical record at [facility] ([facility]) and Care Everywhere (if available), including relevant provider notes, laboratory work, and imaging.     Whitney Baird, YANY, DNP, FNP-C   [facility]

## 2025-03-31 ENCOUNTER — OFFICE VISIT (OUTPATIENT)
Dept: PALLIATIVE MEDICINE | Facility: CLINIC | Age: 80
End: 2025-03-31
Attending: NURSE PRACTITIONER
Payer: COMMERCIAL

## 2025-03-31 VITALS — SYSTOLIC BLOOD PRESSURE: 142 MMHG | DIASTOLIC BLOOD PRESSURE: 71 MMHG | HEART RATE: 70 BPM

## 2025-03-31 DIAGNOSIS — K59.03 OPIOID-INDUCED CONSTIPATION: ICD-10-CM

## 2025-03-31 DIAGNOSIS — G50.0 TRIGEMINAL NEURALGIA: Chronic | ICD-10-CM

## 2025-03-31 DIAGNOSIS — F11.90 CHRONIC, CONTINUOUS USE OF OPIOIDS: ICD-10-CM

## 2025-03-31 DIAGNOSIS — G89.4 CHRONIC PAIN SYNDROME: Primary | ICD-10-CM

## 2025-03-31 DIAGNOSIS — T40.2X5A OPIOID-INDUCED CONSTIPATION: ICD-10-CM

## 2025-03-31 PROCEDURE — 1125F AMNT PAIN NOTED PAIN PRSNT: CPT | Performed by: NURSE PRACTITIONER

## 2025-03-31 PROCEDURE — 99215 OFFICE O/P EST HI 40 MIN: CPT | Performed by: NURSE PRACTITIONER

## 2025-03-31 PROCEDURE — 99417 PROLNG OP E/M EACH 15 MIN: CPT | Performed by: NURSE PRACTITIONER

## 2025-03-31 PROCEDURE — 3077F SYST BP >= 140 MM HG: CPT | Performed by: NURSE PRACTITIONER

## 2025-03-31 PROCEDURE — 3078F DIAST BP <80 MM HG: CPT | Performed by: NURSE PRACTITIONER

## 2025-03-31 PROCEDURE — G2211 COMPLEX E/M VISIT ADD ON: HCPCS | Performed by: NURSE PRACTITIONER

## 2025-03-31 RX ORDER — HYDROCODONE BITARTRATE AND ACETAMINOPHEN 10; 325 MG/1; MG/1
1-2 TABLET ORAL EVERY 4 HOURS PRN
Qty: 165 TABLET | Refills: 0 | Status: SHIPPED | OUTPATIENT
Start: 2025-03-31

## 2025-03-31 ASSESSMENT — PAIN SCALES - GENERAL: PAINLEVEL_OUTOF10: SEVERE PAIN (8)

## 2025-03-31 NOTE — PATIENT INSTRUCTIONS
PATIENT INSTRUCTIONS:     I am recommending a multidisciplinary treatment plan to help this patient better manage her pain. The following recommendations were given to the patient. Diagnosis, treatment options, risks, benefits, and alternatives were discussed; all questions were answered. Self-care instructions were given. The patient expressed understanding of the plan for management.   Medication Management:   - STOP taking Lamotrigine   - TAPER Norco 10/325mg as follows:  Take 1-1.5 tabs by mouth every 4 hours as needed for pain; max 5.5 tabs per day.   We will continue with downward taper of Norco 10/325mg by removing 0.5 tab per day every 30 days.     Return to Clinic:   -  30-minute In-Person Appointment with Whitney Baird, STEVE, YANY, JORGE in 6-8 weeks, or sooner if needed      Future Considerations:   We will continue downward taper of Norco to start Buprenorphine in 3-4 months.      ----------------------------------------------------------------  Clinic Number:  [phone]   Call with any questions about your care and for scheduling assistance.   Calls are returned Monday through Friday between 8 AM and 4:30 PM. We usually get back to you within 2 business days depending on the issue/request.    If we are prescribing your medications:  For opioid medication refills, call the clinic or send a [facility] message 7 days in advance.  Please include:  Name of requested medication  Name of the pharmacy.  For non-opioid medications, call your pharmacy directly to request a refill. Please allow 3-4 days to be processed.   Per [place] State Law:  All controlled substance prescriptions must be filled within 30 days of being written.    For those controlled substances allowing refills, pickup must occur within 30 days of last fill.      We believe regular attendance is key to your success in our program!    Any time you are unable to keep your appointment we ask that you call us at least 24 hours in advance to  cancel.This will allow us to offer the appointment time to another patient.   Multiple missed appointments may lead to dismissal from the clinic.

## 2025-04-04 ENCOUNTER — MYC MEDICAL ADVICE (OUTPATIENT)
Dept: FAMILY MEDICINE | Facility: CLINIC | Age: 80
End: 2025-04-04

## 2025-04-14 ENCOUNTER — MYC MEDICAL ADVICE (OUTPATIENT)
Dept: FAMILY MEDICINE | Facility: CLINIC | Age: 80
End: 2025-04-14

## 2025-04-14 DIAGNOSIS — B02.9 HERPES ZOSTER WITHOUT COMPLICATION: ICD-10-CM

## 2025-04-14 RX ORDER — VALACYCLOVIR HYDROCHLORIDE 1 G/1
TABLET, FILM COATED ORAL
Qty: 4 TABLET | Refills: 0 | Status: SHIPPED | OUTPATIENT
Start: 2025-04-14

## 2025-04-15 ENCOUNTER — ALLIED HEALTH/NURSE VISIT (OUTPATIENT)
Dept: FAMILY MEDICINE | Facility: CLINIC | Age: 80
End: 2025-04-15
Payer: COMMERCIAL

## 2025-04-15 ENCOUNTER — HOSPITAL ENCOUNTER (EMERGENCY)
Facility: CLINIC | Age: 80
Discharge: HOME | End: 2025-04-15
Attending: FAMILY MEDICINE | Admitting: FAMILY MEDICINE
Payer: COMMERCIAL

## 2025-04-15 VITALS — DIASTOLIC BLOOD PRESSURE: 100 MMHG | HEART RATE: 100 BPM | OXYGEN SATURATION: 100 % | SYSTOLIC BLOOD PRESSURE: 240 MMHG

## 2025-04-15 VITALS
OXYGEN SATURATION: 98 % | HEART RATE: 73 BPM | BODY MASS INDEX: 21.92 KG/M2 | DIASTOLIC BLOOD PRESSURE: 75 MMHG | TEMPERATURE: 98.6 F | RESPIRATION RATE: 9 BRPM | WEIGHT: 116 LBS | SYSTOLIC BLOOD PRESSURE: 185 MMHG

## 2025-04-15 DIAGNOSIS — I10 HYPERTENSION, UNSPECIFIED TYPE: ICD-10-CM

## 2025-04-15 DIAGNOSIS — I10 ESSENTIAL HYPERTENSION WITH GOAL BLOOD PRESSURE LESS THAN 140/90: ICD-10-CM

## 2025-04-15 DIAGNOSIS — I10 ESSENTIAL HYPERTENSION WITH GOAL BLOOD PRESSURE LESS THAN 140/90: Primary | ICD-10-CM

## 2025-04-15 LAB
ANION GAP SERPL CALCULATED.3IONS-SCNC: 8 MMOL/L (ref 7–15)
BASOPHILS # BLD AUTO: 0.1 10E3/UL (ref 0–0.2)
BASOPHILS NFR BLD AUTO: 1 %
BUN SERPL-MCNC: 20.9 MG/DL (ref 8–23)
CALCIUM SERPL-MCNC: 9 MG/DL (ref 8.8–10.4)
CHLORIDE SERPL-SCNC: 107 MMOL/L (ref 98–107)
CREAT SERPL-MCNC: 0.73 MG/DL (ref 0.51–0.95)
EGFRCR SERPLBLD CKD-EPI 2021: 82 ML/MIN/1.73M2
EOSINOPHIL # BLD AUTO: 0.5 10E3/UL (ref 0–0.7)
EOSINOPHIL NFR BLD AUTO: 7 %
ERYTHROCYTE [DISTWIDTH] IN BLOOD BY AUTOMATED COUNT: 13.8 % (ref 10–15)
GLUCOSE SERPL-MCNC: 90 MG/DL (ref 70–99)
HCO3 SERPL-SCNC: 26 MMOL/L (ref 22–29)
HCT VFR BLD AUTO: 34.4 % (ref 35–47)
HGB BLD-MCNC: 11.1 G/DL (ref 11.7–15.7)
IMM GRANULOCYTES # BLD: 0 10E3/UL
IMM GRANULOCYTES NFR BLD: 0 %
LYMPHOCYTES # BLD AUTO: 1.8 10E3/UL (ref 0.8–5.3)
LYMPHOCYTES NFR BLD AUTO: 25 %
MCH RBC QN AUTO: 30.4 PG (ref 26.5–33)
MCHC RBC AUTO-ENTMCNC: 32.3 G/DL (ref 31.5–36.5)
MCV RBC AUTO: 94 FL (ref 78–100)
MONOCYTES # BLD AUTO: 0.5 10E3/UL (ref 0–1.3)
MONOCYTES NFR BLD AUTO: 7 %
NEUTROPHILS # BLD AUTO: 4.5 10E3/UL (ref 1.6–8.3)
NEUTROPHILS NFR BLD AUTO: 60 %
NRBC # BLD AUTO: 0 10E3/UL
NRBC BLD AUTO-RTO: 0 /100
PLATELET # BLD AUTO: 338 10E3/UL (ref 150–450)
POTASSIUM SERPL-SCNC: 4 MMOL/L (ref 3.4–5.3)
RBC # BLD AUTO: 3.65 10E6/UL (ref 3.8–5.2)
SODIUM SERPL-SCNC: 141 MMOL/L (ref 135–145)
TSH SERPL DL<=0.005 MIU/L-ACNC: 0.42 UIU/ML (ref 0.3–4.2)
WBC # BLD AUTO: 7.4 10E3/UL (ref 4–11)

## 2025-04-15 PROCEDURE — 99284 EMERGENCY DEPT VISIT MOD MDM: CPT | Performed by: FAMILY MEDICINE

## 2025-04-15 PROCEDURE — 99207 PR NO CHARGE NURSE ONLY: CPT | Performed by: FAMILY MEDICINE

## 2025-04-15 PROCEDURE — 82374 ASSAY BLOOD CARBON DIOXIDE: CPT | Performed by: FAMILY MEDICINE

## 2025-04-15 PROCEDURE — 93010 ELECTROCARDIOGRAM REPORT: CPT | Performed by: FAMILY MEDICINE

## 2025-04-15 PROCEDURE — 36415 COLL VENOUS BLD VENIPUNCTURE: CPT | Performed by: FAMILY MEDICINE

## 2025-04-15 PROCEDURE — 3077F SYST BP >= 140 MM HG: CPT | Performed by: FAMILY MEDICINE

## 2025-04-15 PROCEDURE — 84443 ASSAY THYROID STIM HORMONE: CPT | Performed by: FAMILY MEDICINE

## 2025-04-15 PROCEDURE — 80048 BASIC METABOLIC PNL TOTAL CA: CPT | Performed by: FAMILY MEDICINE

## 2025-04-15 PROCEDURE — 85004 AUTOMATED DIFF WBC COUNT: CPT | Performed by: FAMILY MEDICINE

## 2025-04-15 PROCEDURE — 93005 ELECTROCARDIOGRAM TRACING: CPT | Performed by: FAMILY MEDICINE

## 2025-04-15 PROCEDURE — 3078F DIAST BP <80 MM HG: CPT | Performed by: FAMILY MEDICINE

## 2025-04-15 RX ORDER — LISINOPRIL 20 MG/1
40 TABLET ORAL DAILY
Qty: 90 TABLET | Refills: 3 | Status: SHIPPED | OUTPATIENT
Start: 2025-04-15

## 2025-04-15 ASSESSMENT — ACTIVITIES OF DAILY LIVING (ADL)
ADLS_ACUITY_SCORE: 46

## 2025-04-15 ASSESSMENT — COLUMBIA-SUICIDE SEVERITY RATING SCALE - C-SSRS
6. HAVE YOU EVER DONE ANYTHING, STARTED TO DO ANYTHING, OR PREPARED TO DO ANYTHING TO END YOUR LIFE?: NO
2. HAVE YOU ACTUALLY HAD ANY THOUGHTS OF KILLING YOURSELF IN THE PAST MONTH?: NO
1. IN THE PAST MONTH, HAVE YOU WISHED YOU WERE DEAD OR WISHED YOU COULD GO TO SLEEP AND NOT WAKE UP?: NO

## 2025-04-15 NOTE — DISCHARGE INSTRUCTIONS
RETURN TO THE EMERGENCY ROOM FOR THE FOLLOWING:    Severely worsened headache, severe chest pain or trouble with breathing, no difficulties with vision, facial droop, trouble with speech, one-sided weakness or incoordination, or at anytime for any concern.    FOLLOW UP:    Primary care referral order placed at the time of discharge, expect a phone call within the next few days to help with scheduling.    TREATMENT RECOMMENDATIONS:    Change lisinopril from 20 mg daily to 40 mg daily, new prescription sent to pharmacy.    NURSE ADVICE LINE:  [phone] or [phone]

## 2025-04-15 NOTE — ED NOTES
Bed: ED25  Expected date: 4/15/25  Expected time: 2:33 PM  Means of arrival: Ambulance  Comments:  Hypertension

## 2025-04-15 NOTE — ED TRIAGE NOTES
Was at the clinic pharmacy, was not feeling right and has a conjunctival hemorrhage left eye so asked that they check her BP and they found it to be 220/100 and felt she should be seen, arrived via ambulance, under a lot of stress, is care giver for  with alzheimer, has had mouth canker sores with stinging cold sores at the edge of mouth, has chronic pain to palate due to a root canal that takes oxycodone regularly for     Triage Assessment (Adult)       Row Name 04/15/25 144          Triage Assessment    Airway WDL WDL        Respiratory WDL    Respiratory WDL WDL        Peripheral/Neurovascular WDL    Peripheral Neurovascular WDL WDL        Cognitive/Neuro/Behavioral WDL    Cognitive/Neuro/Behavioral WDL WDL

## 2025-04-15 NOTE — ED PROVIDER NOTES
HPI   Patient is an 80-year-old female presenting by EMS transport with concern for high blood pressure.  Per my chart review, the patient has a known history of hypertension and she is currently on lisinopril 20 mg a day along with diltiazem  mg daily.  She has been taking these medications for years, no recent changes.  She has depression and anxiety.  She has a known history of TIA.  She does not smoke.  She drinks alcohol rarely.    The patient was into the clinic today because she has recognized a small prominent vessel in the white part of her eye.  She had a blood pressure checked and it was high.  She also told staff that she had a headache.  She has clarified with me that her headache is chronic but will wax and wane for various reasons, including anxiety.  Her current headache is mild to moderate.  No associated nausea or vomiting.  She denies vision changes.  This was noted to be present on the earlier note from the clinic but she is clear with me that there is no double vision, blurred vision, field cuts, etc.  No trouble with speech.  No facial droop.  No one-sided weakness or incoordination.  No chest pain or shortness of breath.  No trauma or injury.  No abdominal pain.      Allergies:  No Known Allergies  Problem List:    Patient Active Problem List    Diagnosis Date Noted    Chronic right-sided low back pain without sciatica 03/12/2024     Priority: Medium    Major depression 11/20/2023     Priority: Medium    Neck pain 09/13/2023     Priority: Medium    Chronic, continuous use of opioids 11/29/2022     Priority: Medium    Trigeminal neuralgia 02/25/2020     Priority: Medium    Dilation of biliary tract 06/10/2019     Priority: Medium     ERCP with removal of sludge and sphincterotomy      Mild single current episode of major depressive disorder 02/15/2019     Priority: Medium    Primary insomnia 02/23/2018     Priority: Medium     ambien dependent.  6 mo supply given 12/15/21      Mammogram  declined 01/23/2017     Priority: Medium    TIA (transient ischemic attack) 10/30/2016     Priority: Medium    Narcotic dependence (H) 10/09/2016     Priority: Medium    Essential hypertension with goal blood pressure less than 140/90 08/22/2016     Priority: Medium    Chronic pain 11/02/2012     Priority: Medium     Patient is followed by Gwendolyn Solis MD for ongoing prescription of pain medication.  All refills should be approved by this provider only at face-to-face appointments - not by phone request.    Medication(s): norco .   Maximum quantity per month: 60  Clinic visit frequency required: Q 3 months     Controlled substance agreement:  Encounter-Level CSA - 7/7/15:               Controlled Substance Agreement - Scan on 7/10/2015  1:04 PM : CONTROLLED SUBSTANCE AGREEMENT (below)            Pain Clinic evaluation in the past: Yes       Date/Location:   2009 multiple facial pain evaluations neurology and pain clinic     DIRE Total Score(s):  No flowsheet data found.    Last [facility] website verification:  done   [email or web address]              Atrophic vaginitis 02/27/2011     Priority: Medium    CARDIOVASCULAR SCREENING; LDL GOAL LESS THAN 130 10/31/2010     Priority: Medium    Visual impairment 09/17/2007     Priority: Medium     Overview:   monovision in right eye      Anxiety state 05/03/2005     Priority: Medium      Past Medical History:    Past Medical History:   Diagnosis Date    Anxiety w/panic attacks     Chest pain     Chronic LBP     Depressive disorder, not elsewhere classified     Facial pain 12/17/2009    HTN     Insomnia, unspecified     Migraine, unspecified, without mention of intractable migraine without mention of status migrainosus     Need for prophylactic hormone replacement therapy (postmenopausal)     Osteopenia     Plantar fasciitis     Prinzmetal angina 09/17/2007    Vision loss      Past Surgical History:    Past Surgical History:   Procedure Laterality Date     CHOLECYSTECTOMY, LAPOROSCOPIC  1990    s/p Cholecystectomy, Laparoscopic    COLONOSCOPY  2012    Procedure: COLONOSCOPY;  Colonoscopy;  Surgeon: Tyrell Brown MD;  Location: [facility]    ENDOSCOPIC RETROGRADE CHOLANGIOPANCREATOGRAM N/A 2019    Procedure: ENDOSCOPIC RETROGRADE CHOLANGIOPANCREATOGRAPHY;  Surgeon: Mio Blunt MD;  Location: [facility]    EYE SURGERY      HYSTERECTOMY, SUNDAR  1970    severe endometriosis    SURGICAL HISTORY OF -   1998    breast reduction    SURGICAL HISTORY OF -   2003    coronary angiogram    SURGICAL HISTORY OF -       Refractive surgery right    TONSILLECTOMY      ZZHC COLONOSCOPY THRU STOMA, DIAGNOSTIC  2002    at outside clinic.  Normal.     Family History:    Family History   Problem Relation Age of Onset    Hypertension Mother     Heart Disease Mother     Hypertension Father     Circulatory Father         PAD    Alzheimer Disease Maternal Grandmother         dementia    Cerebrovascular Disease Maternal Grandmother     Heart Disease Maternal Grandfather     Diabetes Maternal Grandfather      Social History:  Marital Status:   [2]  Social History     Tobacco Use    Smoking status: Former     Current packs/day: 0.00     Types: Cigarettes     Quit date: 10/30/1962     Years since quittin.5    Smokeless tobacco: Never   Vaping Use    Vaping status: Never Used   Substance Use Topics    Alcohol use: Yes     Alcohol/week: 4.0 standard drinks of alcohol     Types: 4 Standard drinks or equivalent per week     Comment: occasional    Drug use: No     Comment: medical marijuana in the past      Medications:    lisinopril (ZESTRIL) 20 MG tablet  aspirin (ASA) 81 MG tablet  diltiazem ER (DILT-XR) 180 MG 24 hr capsule  DOCUSATE SODIUM PO  estradiol (VIVELLE-DOT) 0.05 MG/24HR bi-weekly patch  HYDROcodone-acetaminophen (NORCO)  MG per tablet  ibuprofen (ADVIL/MOTRIN) 200 MG tablet  linaclotide (LINZESS) 290 MCG capsule  naloxone (NARCAN)  4 MG/0.1ML nasal spray  valACYclovir (VALTREX) 1000 mg tablet  zolpidem (AMBIEN) 10 MG tablet      Review of Systems   All other systems reviewed and are negative.      PE   BP: (!) 209/86  Pulse: 79  Temp: 98.6  F (37  C)  Resp: 16  Weight: 52.6 kg (116 lb)  SpO2: 99 %  Physical Exam  Vitals reviewed.   Constitutional:       Appearance: She is well-developed.      Comments: Anxious.   HENT:      Head: Normocephalic and atraumatic.      Right Ear: External ear normal.      Left Ear: External ear normal.      Nose: Nose normal.      Mouth/Throat:      Mouth: Mucous membranes are moist.      Pharynx: Oropharynx is clear.   Eyes:      Extraocular Movements: Extraocular movements intact.      Conjunctiva/sclera: Conjunctivae normal.      Pupils: Pupils are equal, round, and reactive to light.      Comments: Conjunctival hemorrhage.   Cardiovascular:      Rate and Rhythm: Normal rate and regular rhythm.   Pulmonary:      Effort: Pulmonary effort is normal.   Abdominal:      Palpations: Abdomen is soft.      Tenderness: There is no abdominal tenderness.   Musculoskeletal:         General: Normal range of motion.      Cervical back: Normal range of motion.   Skin:     General: Skin is warm and dry.   Neurological:      Mental Status: She is alert and oriented to person, place, and time.         ED COURSE and MDM   1516.  Patient presents with high blood pressure.  Low concern for endorgan injury.  Headache is chronic but waxes and wanes at baseline.  She has a conjunctival hemorrhage but no vision changes.  No chest pain or symptoms related to CODY.  No abdominal pain or symptoms related to ischemic bowel.  Low concern for stroke.   lab values pending.  EKG pending.  We will continue to monitor blood pressure here in the ED.    1707.  Patient with persistent hypertension, last systolic value 185.  No evidence for endorgan injury.  No emergent need for hospitalization.  I will increase lisinopril from 20 mg daily to 40 mg  daily.  Change diltiazem to amlodipine?  Add hydrochlorothiazide?  Instead, referral order for follow-up is placed.    EKG  (1708)   Interpretation performed by me.  Rate: 66     Rhythm: ns with sinus arrhythmia.     Axis: nl  Intervals: AL (12-2) 162, QRS (<12) 90, QTc (>5) 415  P wave: nl     QRS complex: nl   ST segment / T-wave: nl  Conclusion: Normal sinus rhythm with sinus arrhythmia.  Otherwise unremarkable EKG.    Electronic medical chart reviewed, including medical problems, medications, medical allergies, social history.  Recent hospitalizations and surgical procedures reviewed.  Recent clinic visits and consultations reviewed.  Recent labs and test results reviewed.  Nursing notes reviewed.    The patient, their parent if applicable, and/or their medical decision maker(s) and I have reviewed all of the available historical information, applicable PMH, physical exam findings, and objective diagnostic data gathered during this ED visit.  We then discussed all work-up options and then together agreed upon the course taken during this visit.  The ultimate disposition and plan was a cooperative decision made between myself and the patient, their parent if applicable, and/or their legal decision maker(s).  The risks and benefits of all decisions made during this visit were discussed to the best of my abilities given the circumstances, and all parties are understanding of the pertinent ramifications of these decisions.      LABS  Labs Ordered and Resulted from Time of ED Arrival to Time of ED Departure   CBC WITH PLATELETS AND DIFFERENTIAL - Abnormal       Result Value    WBC Count 7.4      RBC Count 3.65 (*)     Hemoglobin 11.1 (*)     Hematocrit 34.4 (*)     MCV 94      MCH 30.4      MCHC 32.3      RDW 13.8      Platelet Count 338      % Neutrophils 60      % Lymphocytes 25      % Monocytes 7      % Eosinophils 7      % Basophils 1      % Immature Granulocytes 0      NRBCs per 100 WBC 0      Absolute  Neutrophils 4.5      Absolute Lymphocytes 1.8      Absolute Monocytes 0.5      Absolute Eosinophils 0.5      Absolute Basophils 0.1      Absolute Immature Granulocytes 0.0      Absolute NRBCs 0.0     BASIC METABOLIC PANEL - Normal    Sodium 141      Potassium 4.0      Chloride 107      Carbon Dioxide (CO2) 26      Anion Gap 8      Urea Nitrogen 20.9      Creatinine 0.73      GFR Estimate 82      Calcium 9.0      Glucose 90     TSH WITH FREE T4 REFLEX - Normal    TSH 0.42         IMAGING  Images reviewed by me.  Radiology report also reviewed.  No orders to display       Procedures    Medications - No data to display      IMPRESSION       ICD-10-CM    1. Hypertension, unspecified type  I10 ED To Primary Care Referral      2. Essential hypertension with goal blood pressure less than 140/90  I10 lisinopril (ZESTRIL) 20 MG tablet               Medication List        Modified      lisinopril 20 MG tablet  Commonly known as: ZESTRIL  40 mg, Oral, DAILY  What changed: how much to take                                  Rah Akins MD  04/15/25 3900

## 2025-04-15 NOTE — Clinical Note
Tracy Galloway was evaluated in a [facility] today at which time her blood pressure was noted to be critical. I brought her over to Kasandra who is taking over her care.

## 2025-04-15 NOTE — PROGRESS NOTES
"Patient went to [facility] pharmacy for BP check.   BP elevated. Patient brought to clinic exam room ambulatory accompanied by Alejandra WILLIS from pharmacy.  She reports having an ongoing headaches which are getting worse, vision changes. Currently her left eye is blood shot. Her right eye \"hurts all the time\". She says she saw an eye doctor last week secondary to changes in her vision, namely blurred vision. She states she was told her vision has not changed.  Denies chest pain but admits to having some jaw tightness. Hand grasps equal, lower extremity strength equal, is speaking clearly in full sentences.   /100 P 80 OXYGEN saturation 100% on room air.   [facility] Shamika called 911.   Family history of heart disease. Has history of trigeminal neuralgia, TIA  Took her blood pressure medication today: diltiazem and lisinopril.   Recently had a procedure to her mouth and is still having some pain.   Reports having chronic pain so always has pain, takes Norco for this. Did not rate her headache when asked. Is tearful about going to ER. She states she is under quite a bit of stress. She cares for her  who cannot drive secondary to mild dementia. She did contact him to notify him. He will contact their children.   Patient ambulated to the bathroom with stand by assist prior to EMS arriving.   Left via EMS at 2:18.   Tracy's daughter Kylah ([facility]) called. Her brother was notified by their father that Tracy is being transferred to ER. Updated regarding BP and symptoms. They will go to ER to be with Tracy.  Kasandra OROZCO RN      "

## 2025-04-15 NOTE — PROGRESS NOTES
Tracy Galloway was evaluated at [facility] on April 15, 2025 at which time her blood pressure was:    BP Readings from Last 3 Encounters:   04/15/25 (!) 212/72   03/31/25 (!) 142/71   03/12/25 (!) 184/78     Pulse Readings from Last 3 Encounters:   03/31/25 70   03/12/25 67   02/24/25 83       Reviewed lifestyle modifications for blood pressure control and reduction: including making healthy food choices, managing weight, getting regular exercise, smoking cessation, reducing alcohol consumption, monitoring blood pressure regularly.     Symptoms: Blurry Vision, headache, bloodshot eye    BP Goal:< 140/90 mmHg    BP Assessment:  BP critical (SBP > 180 mmHg or DBP > 120 mmHg)    Potential Reasons for BP too high: Unknown    BP Follow-Up Plan: Referral to Emergency Room    Recommendation to Provider: Spoke with PCP (Dr. Solis) and escorted patient over to clinic to a room with a nurse for assessment and to possibly call an ambulance for her to go to the emergency department.    Note completed by:   Thank you.    Alejandra Garcia, PharmD  Staff Pharmacist  [facility]  [phone]

## 2025-04-16 LAB
ATRIAL RATE - MUSE: 66 BPM
DIASTOLIC BLOOD PRESSURE - MUSE: NORMAL MMHG
INTERPRETATION ECG - MUSE: NORMAL
P AXIS - MUSE: 69 DEGREES
PR INTERVAL - MUSE: 162 MS
QRS DURATION - MUSE: 90 MS
QT - MUSE: 396 MS
QTC - MUSE: 415 MS
R AXIS - MUSE: 49 DEGREES
SYSTOLIC BLOOD PRESSURE - MUSE: NORMAL MMHG
T AXIS - MUSE: 46 DEGREES
VENTRICULAR RATE- MUSE: 66 BPM

## 2025-04-26 ENCOUNTER — MYC MEDICAL ADVICE (OUTPATIENT)
Dept: FAMILY MEDICINE | Facility: CLINIC | Age: 80
End: 2025-04-26

## 2025-04-29 ENCOUNTER — OFFICE VISIT (OUTPATIENT)
Dept: FAMILY MEDICINE | Facility: CLINIC | Age: 80
End: 2025-04-29
Payer: COMMERCIAL

## 2025-04-29 ENCOUNTER — MYC MEDICAL ADVICE (OUTPATIENT)
Dept: FAMILY MEDICINE | Facility: CLINIC | Age: 80
End: 2025-04-29

## 2025-04-29 VITALS
OXYGEN SATURATION: 98 % | SYSTOLIC BLOOD PRESSURE: 140 MMHG | HEART RATE: 79 BPM | TEMPERATURE: 97.5 F | RESPIRATION RATE: 16 BRPM | HEIGHT: 61 IN | WEIGHT: 118 LBS | DIASTOLIC BLOOD PRESSURE: 72 MMHG | BODY MASS INDEX: 22.28 KG/M2

## 2025-04-29 DIAGNOSIS — B00.1 COLD SORE: Primary | ICD-10-CM

## 2025-04-29 DIAGNOSIS — G89.4 CHRONIC PAIN SYNDROME: ICD-10-CM

## 2025-04-29 DIAGNOSIS — F41.1 ANXIETY STATE: ICD-10-CM

## 2025-04-29 PROCEDURE — 1125F AMNT PAIN NOTED PAIN PRSNT: CPT

## 2025-04-29 PROCEDURE — 99213 OFFICE O/P EST LOW 20 MIN: CPT

## 2025-04-29 PROCEDURE — 3077F SYST BP >= 140 MM HG: CPT

## 2025-04-29 PROCEDURE — 3078F DIAST BP <80 MM HG: CPT

## 2025-04-29 RX ORDER — LIDOCAINE HYDROCHLORIDE 20 MG/ML
15 SOLUTION OROPHARYNGEAL EVERY 6 HOURS PRN
Qty: 100 ML | Refills: 0 | Status: SHIPPED | OUTPATIENT
Start: 2025-04-29

## 2025-04-29 RX ORDER — VALACYCLOVIR HYDROCHLORIDE 1 G/1
TABLET, FILM COATED ORAL
Qty: 14 TABLET | Refills: 0 | Status: SHIPPED | OUTPATIENT
Start: 2025-04-29

## 2025-04-29 RX ORDER — VALACYCLOVIR HYDROCHLORIDE 1 G/1
TABLET, FILM COATED ORAL
Qty: 28 TABLET | Refills: 3 | Status: SHIPPED | OUTPATIENT
Start: 2025-04-29 | End: 2025-04-29

## 2025-04-29 RX ORDER — VALACYCLOVIR HYDROCHLORIDE 500 MG/1
500 TABLET, FILM COATED ORAL 2 TIMES DAILY
Qty: 6 TABLET | Refills: 0 | Status: SHIPPED | OUTPATIENT
Start: 2025-04-29 | End: 2025-05-02

## 2025-04-29 ASSESSMENT — PATIENT HEALTH QUESTIONNAIRE - PHQ9: SUM OF ALL RESPONSES TO PHQ QUESTIONS 1-9: 12

## 2025-04-29 ASSESSMENT — PAIN SCALES - GENERAL: PAINLEVEL_OUTOF10: SEVERE PAIN (8)

## 2025-04-29 ASSESSMENT — COLUMBIA-SUICIDE SEVERITY RATING SCALE - C-SSRS
6. HAVE YOU EVER DONE ANYTHING, STARTED TO DO ANYTHING, OR PREPARED TO DO ANYTHING TO END YOUR LIFE?: NO
2. IN THE PAST MONTH, HAVE YOU ACTUALLY HAD ANY THOUGHTS OF KILLING YOURSELF?: NO
1. WITHIN THE PAST MONTH, HAVE YOU WISHED YOU WERE DEAD OR WISHED YOU COULD GO TO SLEEP AND NOT WAKE UP?: YES

## 2025-04-29 ASSESSMENT — ENCOUNTER SYMPTOMS: HEADACHES: 1

## 2025-04-29 NOTE — PROGRESS NOTES
Assessment & Plan     Cold sore   Discussed herpes simplex virus and etiology of cold sores. Declines need for testing, reports she has been diagnosed with herpes in the past and has taken antiviral with relief.   She feels this outbreak is lasting longer due to not taking medication right away when symptoms started and also due to increase stress.   Recent procedure right maxillary nerve block has caused increased stress.   See AVS.   -She is asking for an as needed refill for valtrex if an outbreak starts to occur she can get the antiviral right away. Instructed patient to notify care team if this occurs. Will send as needed prescription and also notify her PCP.   - magic mouthwash suspension (diphenhydrAMINE, lidocaine, aluminum-magnesium & simethicone)  Dispense: 120 mL; Refill: 3  - lidocaine, viscous, (XYLOCAINE) 2 % solution  Dispense: 100 mL; Refill: 0  - valACYclovir (VALTREX) 1000 mg tablet  Dispense: 14 tablet; Refill: 0  - valACYclovir (VALTREX) 500 MG tablet  Dispense: 6 tablet; Refill: 0    Anxiety state  Chronic pain syndrome  Feels once cold sores improve and weather improves she will start to feel better. Reach out if would like any additional resources or referral to [facility].   Has appointment with pain team on 5/1/25    Patient verbalizes understanding and is in agreement with the plan of care. All questions and concerns addressed.     Follow up as needed if symptoms worsen or do not improve.   Appointment with Dr. Solis on 5/14/25               Ashok Molina is a 80 year old, presenting for the following health issues:  Mouth Lesions (Sold sore around the lower lip, lower lip is swollen, and canker sores inside the mouth.  Sores started after nerve block procedure on 03/12/2025.), Headache (Developed a headache after nerve block done procedure on 03/12/2025 by Dr. Chang), and Mouth/Lip Problem (Burning sensation in the lips since nerve block procedure done on 03/12/2025 by   Bernardo.)        4/29/2025     3:54 PM   Additional Questions   Roomed by Tiffanie MAJOR   Accompanied by None         4/29/2025   Declines Weight   Did patient decline having their weight taken? Yes         4/29/2025     3:54 PM   Patient Reported Additional Medications   Patient reports taking the following new medications None     Mouth Lesions  Associated symptoms include headaches.   Headache     Mouth/Lip Problem  Associated symptoms include headaches.           Depression Response  Patient completed the PHQ-9 assessment for depression and scored >9? Yes  Question 9 on the PHQ-9 was positive for suicidality? Yes  Does patient have current mental health provider? No    Is this a virtual visit? No    I personally notified the following: visit provider  Depression Screening Follow Up        4/29/2025     4:00 PM   PHQ   PHQ-9 Total Score 12   Q9: Thoughts of better off dead/self-harm past 2 weeks Nearly every day         4/29/2025     4:00 PM   Last PHQ-9   1.  Little interest or pleasure in doing things 1   2.  Feeling down, depressed, or hopeless 3   3.  Trouble falling or staying asleep, or sleeping too much 0   4.  Feeling tired or having little energy 3   5.  Poor appetite or overeating 1   6.  Feeling bad about yourself 0   7.  Trouble concentrating 1   8.  Moving slowly or restless 0   Q9: Thoughts of better off dead/self-harm past 2 weeks 3   PHQ-9 Total Score 12   Difficulty at work, home, or with people Very difficult           4/29/2025     4:15 PM   C-SSRS (Brief [place])   Within the last month, have you wished you were dead or wished you could go to sleep and not wake up? Yes   Within the last month, have you had any actual thoughts of killing yourself? No   Within the last month, have you ever done anything, started to do anything, or prepared to do anything to end your life? No     Follow Up Actions Taken  Patient declined referral.  Counseled patient, she does not have any intention or plan for  "suicide, Feeling hopeless due to pain and chronic conditions.   Her mother was in hospital when she was 80 years old prior to Easter and this year Tracy was also in ED around this time. Her mother passed away a week after this. Has been weighing on her mind.   Tracy is looking forward to warmer weather and being outside.   Reports having supportive family in the area.     Skin Lesion  Onset/Duration: 03/12/2025  Description  Location: lips, and inside of mouth  Color: pink  Border description: irregular border, raised, flat, red, inflamed  Character: painful, burning, red  Itching: no  Bleeding:  No  Intensity:  severe  Progression of Symptoms:  same  Accompanying signs and symptoms:   Bleeding: No  Scaling: No  Excessive sun exposure/tanning: No  Sunscreen used: No  History:           Any previous history of skin cancer: No  Any family history of melanoma: No  Previous episodes of similar lesion: No  Precipitating or alleviating factors: None  Therapies tried and outcome: Valtrex for 2 days; Charo Herzogx    Patient reports past history of cold sores and herpes virus leading to need for antiviral   She reports most recent outbreak was over the holidays and prior to that it was some years ago.     Denies any throat pain or difficulty swallowing.   No fevers, chills, body aches Denies any shortness of breath or chest pain.   Reports increased stress and feels this has contributed to herpes outbreak.     Review of Systems  Constitutional, HEENT, cardiovascular, pulmonary, gi and gu systems are negative, except as otherwise noted.      Objective    BP (!) 140/72   Pulse 79   Temp 97.5  F (36.4  C) (Tympanic)   Resp 16   Ht 1.549 m (5' 1\")   Wt 53.5 kg (118 lb)   LMP  (LMP Unknown)   SpO2 98%   BMI 22.30 kg/m    Body mass index is 22.3 kg/m .  Physical Exam   GENERAL: alert and no distress  EYES: Eyes grossly normal to inspection, PERRL and conjunctivae and sclerae normal  HENT: normal cephalic/atraumatic, " oropharynx clear, and blisters noted on the inner lower lip, small ulcer noted center of lower lip. No lesions on tongue or in oral cavity.   NECK: no adenopathy, no asymmetry, masses, or scars  RESP: lungs clear to auscultation - no rales, rhonchi or wheezes  CV: regular rate and rhythm, normal S1 S2, no S3 or S4, no murmur, click or rub, no peripheral edema   NEURO: Normal strength and tone, mentation intact and speech normal  PSYCH: mentation appears normal, affect normal/bright    Reviewed labs completed on 4/15/25           Signed Electronically by: Keshia Jasso DNP

## 2025-04-30 ENCOUNTER — PATIENT OUTREACH (OUTPATIENT)
Dept: CARE COORDINATION | Facility: CLINIC | Age: 80
End: 2025-04-30
Payer: COMMERCIAL

## 2025-04-30 NOTE — PROGRESS NOTES
[facility]         Date of Visit: May 1, 2025     CHIEF COMPLAINT:   Chief Complaint   Patient presents with    Pain       Subjective   SUBJECTIVE    INTERVAL HISTORY:   Tracy Galloway is a 80 year old female seen for INITIAL EVALUATION on 8/22/24; last seen for FOLLOW UP on 3/31/25.  They are returning today for trigeminal neuralgia.         Recommendations/ Plan at the last visit included:  Visit discussion: Tracy Galloway is seen in follow up today at the pain clinic for intractable right-sided facial neuralgia.  Patient has discontinued lamotrigine stating that she believes it is causing potential swelling of her lip and hives.  She has tried and failed several neuroleptic class medications, as well as TCAs and SNRIs.  Education was provided on the effects of ongoing opioid dosing to trigger hyperalgesia.  Patient does verbalize that her Norco dosing at 10/325 mg max 6/day is not effectively controlling her nerve pain.  I did reiterate that this is because opioids are not typically effective for ongoing nerve pain.  We discussed potential benefit of tramadol versus buprenorphine for pain relief as well as nerve pain control.  Patient is interested in trial of buprenorphine.  However, she does state that she gets impatient.  I did reiterate that it is very important that she slowly taper down hydrocodone from 60 MME per day down to 20-30 MME per day before transitioning to buprenorphine.  I did offer patient the option of doing a buprenorphine induction at current dose of hydrocodone to start on Subutex or Suboxone.  Patient would prefer to downward taper and then initiate a slow upward taper of buprenorphine.  I do agree that this is the most viable option at this time.  Recommending a downward taper of Norco 10/325 mg by removing half a tab daily every 30 days.  Patient patient was advised this will take several months with a goal of initiating buprenorphine dosing when she is  closer to 20 MME per day.  In the meantime, patient has discontinued lidocaine spray and is not interested in other nonopioid medications at this time.  She did not feel any significant benefit from the right maxillary nerve block and does indicate that it was more painful than she anticipated.  Recommending follow-up in 6 to 8 weeks to reassess pain control.  At that time, she should be transitioning to a maximum of 4.5 tabs of Norco 10/325 mg daily.  Patient verbalizes understanding and agreement with plan.     PLAN:    Medication Management:   - STOP taking Lamotrigine   - TAPER Norco 10/325mg as follows:  Take 1-1.5 tabs by mouth every 4 hours as needed for pain; max 5.5 tabs per day.   We will continue with downward taper of Norco 10/325mg by removing 0.5 tab per day every 30 days.   Return to Clinic:   -  30-minute In-Person Appointment with Whitney Baird, STEVE, APRN, GEOVANNY-C in 6-8 weeks, or sooner if needed    Future Considerations:   We will continue downward taper of Norco to start Buprenorphine in 3-4 months.       Interval history from last visit on 3/31/25:   Pain score today: Severe Pain (8)   Pain rating: intensity ranges from 4/10 to 8/10, and averages 8/10 on a 0-10 scale.   Tracy underwent right maxillary nerve block on 3/12/25 with Dr. Chang and experienced worsening of right facial pain after the injection.     Today, she reports pain from right maxillary nerve block was very severe and has not improved from baseline.   Has stopped taking Lamotrigine due to hives and lip swelling.   Experiencing itching sensation of nose and lip burning and sensitivity with lower lip swelling.   Has been using Anbesol lidocaine gel and feels this is somewhat helpful.   Has been taking less Norco 10/325mg due to more significant constipation.  Currently taking Norco 10/325mg - takes 1 tab at 8am, 1-1.5 tabs at 12pm, 1-1.5 tabs 4pm, 1 tab at 8pm; feels effect in 40-60 minutes; improves pain from 8/10 to 4/10;  "effect lasts 3 hours before pain increases.         Since the last visit, Tracy Galloway reports:   Pain score today: Severe Pain (8)   Pain rating: intensity ranges from 8/10 to 10/10 on a 0-10 scale.   She has flare of oral herpes simplex virus; canker sores and open lesions.  States these oral sores started after maxillary nerve block.    Very frustrated today with overall experience in pain management.   Oral sores started approximately 2 weeks ago; she states they have never been this bad.  Starting Valtrex as of yesterday but is confused about whether dosing should be 1000 mg or 5000 mg.  Very frustrated that we are talking about a downward taper of hydrocodone when her pain is not controlled at this time.  States \"I am at the end of my rope\".  States \"what are we going to do about my pain?\"      REVIEW OF SYSTEMS:   ROS: 10 point ROS neg other than the symptoms noted above in the HPI.     The patient otherwise denies red flag symptoms, such as: thunderclap headache, bowel or bladder incontinence, parasthesias, weakness, saddle anesthesia, unintentional weight loss, or fever/chills/sweats.     Medical History: Any changes in medical history since they were last seen? No    Medications and Allergies reviewed. Yes     Review of [place] Prescription Monitoring Program ():  reviewed on 5/01/25. No concern for abuse or misuse of controlled medications based on this report. Controlled substances prescribed in the past 6-12 months include: (Zolpidem 10mg, Norco 10/325mg, Percocet 10/325mg)     Current MME: 55 / day    Current PDMP reportable controlled substance medications, if any, are being prescribed by: Pain Mgmt   Primary Care Provider is Gwendolyn Solis       CSA due date: 9/24/25   UDS due date:  8/30/25     THE 4 As OF OPIOID MAINTENANCE ANALGESIA    Analgesia: Is pain relief clinically significant? No  Activity: Is patient functional and able to perform Activities of Daily Living? Yes  Adverse " effects: Is patient free from adverse side effects from opiates? Yes  Adherence to Rx protocol: Is patient adhering to Controlled Substance Agreement and taking medications ONLY as ordered? Yes    Is Narcan prescribed for opiate use >50 MME daily or concurrent use of opiates and benzodiazepines? Yes           Objective    OBJECTIVE:    Physical Exam  Vitals:    05/01/25 1352   BP: (!) 169/77   Pulse: 86        Appearance:   A&O. Patient is appropriate.   Patient is in NAD.      HHENT:  Normocephalic, atraumatic. Eyes without conjunctival injection or jaundice. Neck supple. No obvious neck masses.     Pulmonary:  Normal effort; no cough or audible wheeze      Skin: No obvious rash, lesions, or petechiae of exposed skin.    Neuro: Cranial nerves grossly intact.  Mentation and speech appropriate for age.     Psych:  Alert, without lethargy or stupor. Speech fluent. Appropriate affect. Mood normal. Able to follow commands without difficulty. Normal mood, judgement and behavior.          Diagnostic Tests/ Imaging/ Labs resulted since last visit:   None       Assessment & Plan      VISIT DIAGNOSIS:   1. Chronic pain syndrome  - Adult Pain Clinic Follow-Up Order  - Adult Pain Clinic Follow-Up Order; Future    2. Chronic, continuous use of opioids  - Adult Pain Clinic Follow-Up Order  - buprenorphine (SUBUTEX) 2 MG SUBL sublingual tablet; Place 0.5 tablets (1 mg) under the tongue every evening for 1 day, THEN 0.5 tablets (1 mg) every 12 hours for 3 days, THEN 0.5 tablets (1 mg) every 12 hours for 4 days. STOP taking Norco 10/325mg at end of day on Sunday 5/4/25. Start Buprenorphine at dinner time on Monday 5/05/25. Fill now, start 5/05/25.  Dispense: 8 tablet; Refill: 0  - Adult Pain Clinic Follow-Up Order; Future    3. Trigeminal neuralgia  - Adult Pain Clinic Follow-Up Order  - buprenorphine (SUBUTEX) 2 MG SUBL sublingual tablet; Place 0.5 tablets (1 mg) under the tongue every evening for 1 day, THEN 0.5 tablets (1 mg)  every 12 hours for 3 days, THEN 0.5 tablets (1 mg) every 12 hours for 4 days. STOP taking Norco 10/325mg at end of day on Sunday 5/4/25. Start Buprenorphine at dinner time on Monday 5/05/25. Fill now, start 5/05/25.  Dispense: 8 tablet; Refill: 0  - Adult Pain Clinic Follow-Up Order; Future    4. 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  Patient is very frustrated and tearful today with the condition of her oral canker sores and ongoing pain complex.  She is very frustrated with the results of the maxillary nerve block and is questioning Dr. Chang's process and procedures.  After carefully listening to her concerns, I attempted to validate her frustration.  I do think it is possible that patient had a trigger of a PTSD-like event after the maxillary nerve block when compared to the initial injury by the dental procedure several years ago.  Patient does state that she thinks this is a likely component of her reaction today.  We discussed reasoning for tapering Norco down in order to initiate buprenorphine micro dosing.  Alternatively, we may discontinue Norco and start induction of Subutex after she is starting to experience initial opioid withdrawal symptoms.  Significant portion of this visit was spent discussing and educating patient regarding the differences between full agonist and partial agonist opioids.  We also discussed the various safety factors and reasoning for using buprenorphine.  Patient was advised that transition from Norco to Subutex is going to be a process that will require some transition and is not going to be a perfect response to pain control as we make a switch from full agonist to partial agonist opioids.  Patient would like to completely stop Norco at the end  of the day on Sunday.  Review of PDMP does indicate that she should be completely out of Norco and needing a new prescription on Monday, 5/5/2025.  Patient will take her last dose of Norco at the end of the day on Sunday, 5/4/2025.  She will not take any opioid dosing at the onset of the day on Monday 5/05/25.  As initial opioid withdrawal symptoms are beginning, approximately mid to late afternoon, we will then initiate buprenorphine with a 1 mg dose Monday evening.  She will then take Subutex 1 mg every 12 hours for 3 days before increasing to Subutex 2 mg every 12 hours.  Patient was reminded that she has been dealing with this condition for very long time and we have attempted several options for pain management.  Patient was also educated on the process of hyperalgesia as triggered by full agonist opioids.  Today, she expresses understanding and agreement to move forward with the buprenorphine induction on Monday, 5/5/2025.  Patient is advised to follow-up on Thursday, 5/8/2025 in office to assess her response to initial induction.  Patient's questions were answered to her satisfaction.  She verbalizes understanding and agreement with plan.     PLAN:    Medication Management:   - CONTINUE taking Norco 10/325mg - 1-2 tabs by mouth every 4 hours as needed for severe pain; max 5.5 per day UNTIL end of the day on Sunday, 5/04/25. Then, STOP [place] 10/325 and do not take any further doses on Monday 5/05/25 or afterward.   - START taking Buprenorphine       Return to Clinic:   -  30-minute In-Person Appointment with Whitney Baird, STEVE, APRN, FNP-C in 1 weeks, or sooner if needed      Future Considerations:   You should use Valtrex 1000mg - 1 tab by mouth two times per day for 14 days (right now)   Save Valtrex 500mg prescription for future flare.        ___________________________________________________________________    BILLING TIME DOCUMENTATION:   TOTAL TIME includes:   Time spent preparing to see the patient:  3 minutes (reviewing records and tests)  Time spend face to face with the patient: 35 minutes  Time spent ordering tests, medications, procedures and referrals: 0 minutes  Time spent Referring and communicating with other healthcare professionals: 0 minutes  Documenting clinical information in Epic: 5 minutes    The total TIME spent on this patient on the day of the appointment was 43 minutes.     ___________________________________________________________________    Review of Electronic Chart: Today I have also reviewed available medical information in the patient's medical record at [facility] ([facility]) and Care Everywhere (if available), including relevant provider notes, laboratory work, and imaging.     YANY Kinney, DNP, FNP-C   [facility]

## 2025-05-01 ENCOUNTER — MYC MEDICAL ADVICE (OUTPATIENT)
Dept: FAMILY MEDICINE | Facility: CLINIC | Age: 80
End: 2025-05-01

## 2025-05-01 ENCOUNTER — OFFICE VISIT (OUTPATIENT)
Dept: PALLIATIVE MEDICINE | Facility: CLINIC | Age: 80
End: 2025-05-01
Attending: NURSE PRACTITIONER
Payer: COMMERCIAL

## 2025-05-01 VITALS — HEART RATE: 86 BPM | SYSTOLIC BLOOD PRESSURE: 169 MMHG | DIASTOLIC BLOOD PRESSURE: 77 MMHG

## 2025-05-01 DIAGNOSIS — G89.4 CHRONIC PAIN SYNDROME: ICD-10-CM

## 2025-05-01 DIAGNOSIS — T40.2X5A OPIOID-INDUCED CONSTIPATION: ICD-10-CM

## 2025-05-01 DIAGNOSIS — K59.03 OPIOID-INDUCED CONSTIPATION: ICD-10-CM

## 2025-05-01 DIAGNOSIS — G50.0 TRIGEMINAL NEURALGIA: Chronic | ICD-10-CM

## 2025-05-01 DIAGNOSIS — F11.90 CHRONIC, CONTINUOUS USE OF OPIOIDS: ICD-10-CM

## 2025-05-01 RX ORDER — BUPRENORPHINE 2 MG/1
TABLET SUBLINGUAL
Qty: 8 TABLET | Refills: 0 | Status: SHIPPED | OUTPATIENT
Start: 2025-05-01 | End: 2025-05-09

## 2025-05-01 ASSESSMENT — PAIN SCALES - GENERAL: PAINLEVEL_OUTOF10: SEVERE PAIN (8)

## 2025-05-01 NOTE — PATIENT INSTRUCTIONS
PATIENT INSTRUCTIONS:     I am recommending a multidisciplinary treatment plan to help this patient better manage her pain. The following recommendations were given to the patient. Diagnosis, treatment options, risks, benefits, and alternatives were discussed; all questions were answered. Self-care instructions were given. The patient expressed understanding of the plan for management.   Medication Management:   - CONTINUE taking Norco 10/325mg - 1-2 tabs by mouth every 4 hours as needed for severe pain; max 5.5 per day UNTIL end of the day on Sunday, 5/04/25. Then, STOP [place] 10/325 and do not take any further doses on Monday 5/05/25 or afterward.   - START taking Buprenorphine       Return to Clinic:   -  30-minute In-Person Appointment with Whitney Baird, STEVE, APRN, GEOVANNY-C in 1 weeks, or sooner if needed      Future Considerations:   You should use Valtrex 1000mg - 1 tab by mouth two times per day for 14 days (right now)   Save Valtrex 500mg prescription for future flare.    ----------------------------------------------------------------  Clinic Number:  [phone]   Call with any questions about your care and for scheduling assistance.   Calls are returned Monday through Friday between 8 AM and 4:30 PM. We usually get back to you within 2 business days depending on the issue/request.    If we are prescribing your medications:  For opioid medication refills, call the clinic or send a Intent message 7 days in advance.  Please include:  Name of requested medication  Name of the pharmacy.  For non-opioid medications, call your pharmacy directly to request a refill. Please allow 3-4 days to be processed.   Per [place] State Law:  All controlled substance prescriptions must be filled within 30 days of being written.    For those controlled substances allowing refills, pickup must occur within 30 days of last fill.      We believe regular attendance is key to your success in our program!    Any time you are unable to  keep your appointment we ask that you call us at least 24 hours in advance to cancel.This will allow us to offer the appointment time to another patient.   Multiple missed appointments may lead to dismissal from the clinic.

## 2025-05-13 ENCOUNTER — DOCUMENTATION ONLY (OUTPATIENT)
Dept: OTHER | Facility: CLINIC | Age: 80
End: 2025-05-13
Payer: COMMERCIAL

## 2025-05-14 ENCOUNTER — OFFICE VISIT (OUTPATIENT)
Dept: FAMILY MEDICINE | Facility: CLINIC | Age: 80
End: 2025-05-14
Payer: COMMERCIAL

## 2025-05-14 VITALS
TEMPERATURE: 97.1 F | DIASTOLIC BLOOD PRESSURE: 60 MMHG | HEART RATE: 74 BPM | WEIGHT: 114 LBS | RESPIRATION RATE: 14 BRPM | OXYGEN SATURATION: 98 % | BODY MASS INDEX: 21.54 KG/M2 | SYSTOLIC BLOOD PRESSURE: 126 MMHG

## 2025-05-14 DIAGNOSIS — F33.1 MODERATE RECURRENT MAJOR DEPRESSION (H): Primary | ICD-10-CM

## 2025-05-14 DIAGNOSIS — F41.1 ANXIETY STATE: Chronic | ICD-10-CM

## 2025-05-14 DIAGNOSIS — I10 ESSENTIAL HYPERTENSION WITH GOAL BLOOD PRESSURE LESS THAN 140/90: ICD-10-CM

## 2025-05-14 LAB
ANION GAP SERPL CALCULATED.3IONS-SCNC: 7 MMOL/L (ref 7–15)
BUN SERPL-MCNC: 21.8 MG/DL (ref 8–23)
CALCIUM SERPL-MCNC: 9.2 MG/DL (ref 8.8–10.4)
CHLORIDE SERPL-SCNC: 106 MMOL/L (ref 98–107)
CREAT SERPL-MCNC: 0.83 MG/DL (ref 0.51–0.95)
EGFRCR SERPLBLD CKD-EPI 2021: 70 ML/MIN/1.73M2
GLUCOSE SERPL-MCNC: 91 MG/DL (ref 70–99)
HCO3 SERPL-SCNC: 27 MMOL/L (ref 22–29)
POTASSIUM SERPL-SCNC: 4.9 MMOL/L (ref 3.4–5.3)
SODIUM SERPL-SCNC: 140 MMOL/L (ref 135–145)

## 2025-05-14 PROCEDURE — 80048 BASIC METABOLIC PNL TOTAL CA: CPT | Performed by: FAMILY MEDICINE

## 2025-05-14 PROCEDURE — 99214 OFFICE O/P EST MOD 30 MIN: CPT | Performed by: FAMILY MEDICINE

## 2025-05-14 PROCEDURE — G2211 COMPLEX E/M VISIT ADD ON: HCPCS | Performed by: FAMILY MEDICINE

## 2025-05-14 PROCEDURE — 3078F DIAST BP <80 MM HG: CPT | Performed by: FAMILY MEDICINE

## 2025-05-14 PROCEDURE — 1125F AMNT PAIN NOTED PAIN PRSNT: CPT | Performed by: FAMILY MEDICINE

## 2025-05-14 PROCEDURE — 3074F SYST BP LT 130 MM HG: CPT | Performed by: FAMILY MEDICINE

## 2025-05-14 PROCEDURE — 36415 COLL VENOUS BLD VENIPUNCTURE: CPT | Performed by: FAMILY MEDICINE

## 2025-05-14 RX ORDER — VALACYCLOVIR HYDROCHLORIDE 1 G/1
TABLET, FILM COATED ORAL
Qty: 14 TABLET | Refills: 3 | Status: SHIPPED | OUTPATIENT
Start: 2025-05-14

## 2025-05-14 RX ORDER — DULOXETIN HYDROCHLORIDE 20 MG/1
20 CAPSULE, DELAYED RELEASE ORAL 2 TIMES DAILY
Qty: 60 CAPSULE | Refills: 5 | Status: SHIPPED | OUTPATIENT
Start: 2025-05-14

## 2025-05-14 RX ORDER — LISINOPRIL 20 MG/1
40 TABLET ORAL DAILY
Qty: 180 TABLET | Refills: 3 | Status: SHIPPED | OUTPATIENT
Start: 2025-05-14

## 2025-05-14 ASSESSMENT — ENCOUNTER SYMPTOMS
NERVOUS/ANXIOUS: 1
HEADACHES: 1

## 2025-05-14 ASSESSMENT — PAIN SCALES - GENERAL: PAINLEVEL_OUTOF10: MODERATE PAIN (6)

## 2025-05-14 NOTE — PROGRESS NOTES
Assessment & Plan     Moderate recurrent major depression (H)  Trial of the below   - DULoxetine (CYMBALTA) 20 MG capsule; Take 1 capsule (20 mg) by mouth 2 times daily.    Anxiety state  Not well controlled   Trial of the below   - DULoxetine (CYMBALTA) 20 MG capsule; Take 1 capsule (20 mg) by mouth 2 times daily.    Essential hypertension with goal blood pressure less than 140/90  Stable no change in treatment plan.   - lisinopril (ZESTRIL) 20 MG tablet; Take 2 tablets (40 mg) by mouth daily.  - Basic metabolic panel; Future    The longitudinal plan of care for the diagnosis(es)/condition(s) as documented were addressed during this visit. Due to the added complexity in care, I will continue to support Tracy in the subsequent management and with ongoing continuity of care.        Depression Screening Follow Up        5/13/2025     1:26 PM   PHQ   PHQ-9 Total Score 7    Q9: Thoughts of better off dead/self-harm past 2 weeks Several days   F/U: Thoughts of suicide or self-harm No   F/U: Safety concerns No       Patient-reported                     Follow Up Actions Taken  Patient to follow up with PCP.  Clinic staff to schedule appointment if able.    Discussed the following ways the patient can remain in a safe environment:  med started         Subjective   Tracy is a 80 year old, presenting for the following health issues:  Depression, Anxiety, and Headache        5/14/2025    10:34 AM   Additional Questions   Roomed by Rosibel CHRISTIE   Accompanied by Self         5/14/2025    10:34 AM   Patient Reported Additional Medications   Patient reports taking the following new medications .     Anxiety  Associated symptoms include headaches.   Headache     Pain is chronic facial pain due to her [place]   Seeing pain clinic for this   No change in her sxs   Injection did not work     Hypertension Follow-up    Do you check your blood pressure regularly outside of the clinic? Yes   Are you following a low salt diet? No  Are your blood  pressures ever more than 140 on the top number (systolic) OR more   than 90 on the bottom number (diastolic), for example 140/90? Yes    BP Readings from Last 2 Encounters:   25 126/60   25 (!) 146/64     Depression and Anxiety   How are you doing with your depression since your last visit? Worsened   How are you doing with your anxiety since your last visit?  Worsened   Are you having other symptoms that might be associated with depression or anxiety? No  Have you had a significant life event? Health Concerns   Do you have any concerns with your use of alcohol or other drugs? No    Social History     Tobacco Use    Smoking status: Former     Current packs/day: 0.00     Types: Cigarettes     Quit date: 10/30/1962     Years since quittin.5     Passive exposure: Never    Smokeless tobacco: Never   Vaping Use    Vaping status: Never Used   Substance Use Topics    Alcohol use: Yes     Alcohol/week: 4.0 standard drinks of alcohol     Types: 4 Standard drinks or equivalent per week     Comment: occasional    Drug use: No     Comment: medical marijuana in the past         2024     5:47 PM 2025     4:00 PM 2025     1:26 PM   PHQ   PHQ-9 Total Score 5 12 7    Q9: Thoughts of better off dead/self-harm past 2 weeks Not at all Nearly every day Several days   F/U: Thoughts of suicide or self-harm   No   F/U: Safety concerns   No       Patient-reported         2024     5:41 PM 2024     5:48 PM 2025     9:36 PM   ROMAIN-7 SCORE   Total Score 0 (minimal anxiety) 3 (minimal anxiety) 9 (mild anxiety)   Total Score 0 3 9        Patient-reported               Follow Up Actions Taken  Patient declined referral.     Discussed the following ways the patient can remain in a safe environment:  be around others   Suicide Assessment Five-step Evaluation and Treatment (SAFE-T)            Review of Systems  Constitutional, HEENT, cardiovascular, pulmonary, gi and gu systems are negative, except as  otherwise noted.      Objective    /60   Pulse 74   Temp 97.1  F (36.2  C) (Tympanic)   Resp 14   Wt 51.7 kg (114 lb)   LMP  (LMP Unknown)   SpO2 98%   BMI 21.54 kg/m    Body mass index is 21.54 kg/m .  Physical Exam   GENERAL: alert and no distress  PSYCH: mentation appears normal, affect normal/bright            Signed Electronically by: Gwendolyn Solis MD

## 2025-05-14 NOTE — NURSING NOTE
"Chief Complaint   Patient presents with    Depression    Anxiety    Headache     /60   Pulse 74   Temp 97.1  F (36.2  C) (Tympanic)   Resp 14   Wt 51.7 kg (114 lb)   LMP  (LMP Unknown)   SpO2 98%   BMI 21.54 kg/m   Estimated body mass index is 21.54 kg/m  as calculated from the following:    Height as of 4/29/25: 1.549 m (5' 1\").    Weight as of this encounter: 51.7 kg (114 lb).  Patient presents to the clinic using No DME      Health Maintenance that is potentially due pending provider review:    Health Maintenance Due   Topic Date Due    HEPATITIS A IMMUNIZATION (2 of 2 - Risk 2-dose series) 10/12/2017    RSV VACCINE (1 - 1-dose 75+ series) Never done    MEDICARE ANNUAL WELLNESS VISIT  11/01/2024    URINE DRUG SCREEN  11/01/2024    DEXA  05/06/2025    COVID-19 Vaccine (8 - 2024-25 season) 05/19/2025    ANNUAL REVIEW OF HM ORDERS  07/24/2025                  "

## 2025-05-16 ENCOUNTER — RESULTS FOLLOW-UP (OUTPATIENT)
Dept: FAMILY MEDICINE | Facility: CLINIC | Age: 80
End: 2025-05-16

## 2025-05-28 ASSESSMENT — PAIN SCALES - PAIN ENJOYMENT GENERAL ACTIVITY SCALE (PEG)
INTERFERED_ENJOYMENT_LIFE: 5
AVG_PAIN_PASTWEEK: 5
PEG_TOTALSCORE: 5
INTERFERED_GENERAL_ACTIVITY: 5
PEG_TOTALSCORE: 5
AVG_PAIN_PASTWEEK: 5
INTERFERED_GENERAL_ACTIVITY: 5
INTERFERED_ENJOYMENT_LIFE: 5

## 2025-06-02 NOTE — PROGRESS NOTES
[facility]         Date of Visit: Dilshad 3, 2025     CHIEF COMPLAINT:   Chief Complaint   Patient presents with    Pain       Subjective   SUBJECTIVE    INTERVAL HISTORY:   Tracy Galloway is a 80 year old female seen for INITIAL EVALUATION on 8/22/24; last seen for FOLLOW UP on 5/08/25.  They are returning today for trigeminal neuralgia.         Recommendations/ Plan at the last visit included:  Visit discussion: Tracy Galloway is seen in follow up today at the pain clinic for intractable right-sided facial pain secondary to trigeminal neuralgia injury after a dental procedure several years ago.  Patient states that her perioral and cheek bone pain on the right side of her face have improved with induction of buprenorphine.  She reports a headache that started approximately 10 hours after her first dose of Subutex and has continued for the past 3 days.  Patient was advised that this is a common reaction when transitioning from higher doses of full agonist opioid such as Norco to the partial agonist buprenorphine.  She is currently taking Subutex 2 mg half tab twice daily and will continue this through Friday, 5/9/2025.  Patient was advised that it is okay to continue with a 0.5 tab dose twice daily if she does feel that a full 2 mg tablet is too strong.  At this time, I am advising her to follow-up every 2 weeks until she has stabilized on the buprenorphine.  Patient's questions were answered to her satisfaction and she verbalizes understanding and agreement with plan.      PLAN:    Medication Management:   - CONTINUE taking Subutex 2mg - dissolve 0.5-1 tab under tongue every 12 hours for pain; max 2 per day   - STOP taking Norco 10/325mg    Return to Clinic:   -  30-minute In-Person Appointment with Whitney Baird, STEVE, APRN, FNP-C in 2 weeks, or sooner if needed        Interval history from last visit on 5/08/25:   Pain score today: Moderate Pain (6)   Continues with open sores; taking  "valacylorvir 1000mg BID.  Stopped [place] on Sunday and started to feel withdrawal by Noon on Monday; took a sleeping pill to manage withdrawal symptoms; woke up and started Subutex 1mg that evening.   Developed a headache on Tuesday morning; continues today.   Feels improvement in right cheekbone and jaw region.   Continues with burning sensations on palate but not as severe.  Right            Since the last visit, Tracy Galloway reports:   Pain score today: Moderate Pain (4)   Pain rating: intensity ranges from 4/10 to 6/10 on a 0-10 scale.   Has questions today about \"what is this medication that I'm taking?\"; in reference to buprenorphine.  Still has complaints and \"I'm still really upset\" with Dr. Chang.  Feeling more fatigue and has more difficulty with driving.   Continues with tingling of right cheek below eye.  Burning pain of palate has improved; still present.   Taking Buprenorphine 2mg in AM and 2mg in PM; feels effect in 60 minutes         REVIEW OF SYSTEMS:   ROS: 10 point ROS neg other than the symptoms noted above in the HPI.     The patient otherwise denies red flag symptoms, such as: thunderclap headache, bowel or bladder incontinence, parasthesias, weakness, saddle anesthesia, unintentional weight loss, or fever/chills/sweats.     Medical History: Any changes in medical history since they were last seen? No    Medications and Allergies reviewed. Yes     Review of [place] Prescription Monitoring Program ():  reviewed on 6/03/25. No concern for abuse or misuse of controlled medications based on this report. Controlled substances prescribed in the past 6-12 months include: (Buprenorphine 2mg, Zolpidem 10mg, Norco 10/325mg, Percocet 10/325mg)     Current MME: 4mg buprenorphine / day    Current PDMP reportable controlled substance medications, if any, are being prescribed by: Pain Mgmt    Primary Care Provider is Gwendolyn Solis       CSA due date: 9/24/25   UDS due date:  8/30/25     THE " 4 As OF OPIOID MAINTENANCE ANALGESIA    Analgesia: Is pain relief clinically significant? Yes  Activity: Is patient functional and able to perform Activities of Daily Living? Yes  Adverse effects: Is patient free from adverse side effects from opiates? Yes  Adherence to Rx protocol: Is patient adhering to Controlled Substance Agreement and taking medications ONLY as ordered? Yes    Is Narcan prescribed for opiate use >50 MME daily or concurrent use of opiates and benzodiazepines? Yes           Objective    OBJECTIVE:    Physical Exam  Vitals:    06/03/25 1331   BP: 139/74   Pulse: 71        Appearance:   A&O. Patient is appropriate.   Patient is in NAD.      HHENT:  Normocephalic, atraumatic. Eyes without conjunctival injection or jaundice. Neck supple. No obvious neck masses.     Pulmonary:  Normal effort; no cough or audible wheeze      Skin: No obvious rash, lesions, or petechiae of exposed skin.    Neuro: Cranial nerves grossly intact.  Mentation and speech appropriate for age.     Psych:  Alert, without lethargy or stupor. Speech fluent. Appropriate affect. Mood normal. Able to follow commands without difficulty. Normal mood, judgement and behavior.          Diagnostic Tests/ Imaging/ Labs resulted since last visit:   None       Assessment & Plan      VISIT DIAGNOSIS:   1. Trigeminal neuralgia (Primary)  - Adult Pain Clinic Follow-Up Order  - buprenorphine HCl-naloxone HCl (SUBOXONE) 2-0.5 MG per film; Place 1 Film under the tongue every 8 hours. Max 3 per day; Fill 6/08/25, Start 6/10/25 (15 day supply) NOTE: dose change  Dispense: 45 Film; Refill: 0  - Adult Pain Clinic Follow-Up Order; Future    2. Chronic pain syndrome  - Adult Pain Clinic Follow-Up Order  - buprenorphine HCl-naloxone HCl (SUBOXONE) 2-0.5 MG per film; Place 1 Film under the tongue every 8 hours. Max 3 per day; Fill 6/08/25, Start 6/10/25 (15 day supply) NOTE: dose change  Dispense: 45 Film; Refill: 0  - Adult Pain Clinic Follow-Up Order;  Future    3. Chronic, continuous use of opioids  - Adult Pain Clinic Follow-Up Order  - buprenorphine HCl-naloxone HCl (SUBOXONE) 2-0.5 MG per film; Place 1 Film under the tongue every 8 hours. Max 3 per day; Fill 6/08/25, Start 6/10/25 (15 day supply) NOTE: dose change  Dispense: 45 Film; Refill: 0  - Adult Pain Clinic Follow-Up Order; Future    4. Opioid-induced constipation  - Adult Pain Clinic Follow-Up Order      ASSESSMENT:   Visit discussion: Tracy Galloway is seen in follow up today at the pain clinic for trigeminal neuralgia.  Patient notes very positive improvement in overall burning nerve pain with Subutex 2 mg every 12 hours.  Patient is experiencing breakthrough pain before second dose of the day.  We discussed potential benefit of increasing dosing to every 8 hours and she would like to trial this for the next 2 weeks.  Patient reports the taste of Subutex is very better and wonders about alternatives.  We discussed transition to Suboxone to utilize the buccal film.  Patient would like to try this over the next 2-week timeframe and determine if preference is given to sublingual tablet versus buccal film.  At next refill on 6/10/2025, patient will start Suboxone 2/0.5 mg films as indicated below.  This is an early refill based on dose adjustment of Subutex to 3 times daily dosing as of today.  Patient is advised to follow-up in 4 weeks to reassess pain control.  Patient's questions were answered to her satisfaction.  She verbalizes understanding and agreement with plan.      PLAN:    Medication Management:   - CONTINUE taking Subutex 2mg - dissolve 1 tab (2mg) under tongue every 8 hours for pain; max 3 per day. Use this until you run out on 6/9/025   - START taking Suboxone 2/0.5mg film - place 1 film along inside of cheek every 8 hours for pain; max 3 per day. Start this at next refill on 6/10/25.       Return to Clinic:   -  30-minute In-Person Appointment with Whitney Baird, STEVE, APRN, FNP-C in 4  weeks, or sooner if needed      Future Considerations:   At next refill, let us know if you prefer the buprenorphine tablet or buccal film.        ___________________________________________________________________    BILLING TIME DOCUMENTATION:   TOTAL TIME includes:   Time spent preparing to see the patient: 3 minutes (reviewing records and tests)  Time spend face to face with the patient: 33 minutes  Time spent ordering tests, medications, procedures and referrals: 0 minutes  Time spent Referring and communicating with other healthcare professionals: 0 minutes  Documenting clinical information in Epic: 5 minutes    The total TIME spent on this patient on the day of the appointment was 41 minutes.     ___________________________________________________________________    Review of Electronic Chart: Today I have also reviewed available medical information in the patient's medical record at [facility] and Care Everywhere (if available), including relevant provider notes, laboratory work, and imaging.     YANY Kinney, DNP, FNP-C   [facility]

## 2025-06-03 ENCOUNTER — OFFICE VISIT (OUTPATIENT)
Dept: PALLIATIVE MEDICINE | Facility: CLINIC | Age: 80
End: 2025-06-03
Attending: NURSE PRACTITIONER
Payer: COMMERCIAL

## 2025-06-03 VITALS — SYSTOLIC BLOOD PRESSURE: 139 MMHG | DIASTOLIC BLOOD PRESSURE: 74 MMHG | HEART RATE: 71 BPM

## 2025-06-03 DIAGNOSIS — K59.03 OPIOID-INDUCED CONSTIPATION: ICD-10-CM

## 2025-06-03 DIAGNOSIS — F11.90 CHRONIC, CONTINUOUS USE OF OPIOIDS: ICD-10-CM

## 2025-06-03 DIAGNOSIS — T40.2X5A OPIOID-INDUCED CONSTIPATION: ICD-10-CM

## 2025-06-03 DIAGNOSIS — G50.0 TRIGEMINAL NEURALGIA: Primary | Chronic | ICD-10-CM

## 2025-06-03 DIAGNOSIS — G89.4 CHRONIC PAIN SYNDROME: ICD-10-CM

## 2025-06-03 PROCEDURE — 1125F AMNT PAIN NOTED PAIN PRSNT: CPT | Performed by: NURSE PRACTITIONER

## 2025-06-03 PROCEDURE — G2211 COMPLEX E/M VISIT ADD ON: HCPCS | Performed by: NURSE PRACTITIONER

## 2025-06-03 PROCEDURE — 3078F DIAST BP <80 MM HG: CPT | Performed by: NURSE PRACTITIONER

## 2025-06-03 PROCEDURE — 3075F SYST BP GE 130 - 139MM HG: CPT | Performed by: NURSE PRACTITIONER

## 2025-06-03 PROCEDURE — 99215 OFFICE O/P EST HI 40 MIN: CPT | Performed by: NURSE PRACTITIONER

## 2025-06-03 RX ORDER — BUPRENORPHINE AND NALOXONE 2; .5 MG/1; MG/1
1 FILM, SOLUBLE BUCCAL; SUBLINGUAL EVERY 8 HOURS
Qty: 45 FILM | Refills: 0 | Status: SHIPPED | OUTPATIENT
Start: 2025-06-03

## 2025-06-03 ASSESSMENT — PAIN SCALES - GENERAL: PAINLEVEL_OUTOF10: MODERATE PAIN (4)

## 2025-06-03 NOTE — PATIENT INSTRUCTIONS
PATIENT INSTRUCTIONS:     I am recommending a multidisciplinary treatment plan to help this patient better manage her pain. The following recommendations were given to the patient. Diagnosis, treatment options, risks, benefits, and alternatives were discussed; all questions were answered. Self-care instructions were given. The patient expressed understanding of the plan for management.   Medication Management:   - CONTINUE taking Subutex 2mg - dissolve 1 tab (2mg) under tongue every 8 hours for pain; max 3 per day. Use this until you run out on 6/9/025   - START taking Suboxone 2/0.5mg film - place 1 film along inside of cheek every 8 hours for pain; max 3 per day. Start this at next refill on 6/10/25.       Return to Clinic:   -  30-minute In-Person Appointment with Whitney Baird DNP, YANY, JORGE in 4 weeks, or sooner if needed      Future Considerations:   At next refill, let us know if you prefer the buprenorphine tablet or buccal film.      ----------------------------------------------------------------  Clinic Number:  [phone]   Call with any questions about your care and for scheduling assistance.   Calls are returned Monday through Friday between 8 AM and 4:30 PM. We usually get back to you within 2 business days depending on the issue/request.    If we are prescribing your medications:  For opioid medication refills, call the clinic or send a Star[employer] 360 message 7 days in advance.  Please include:  Name of requested medication  Name of the pharmacy.  For non-opioid medications, call your pharmacy directly to request a refill. Please allow 3-4 days to be processed.   Per [place] State Law:  All controlled substance prescriptions must be filled within 30 days of being written.    For those controlled substances allowing refills, pickup must occur within 30 days of last fill.      We believe regular attendance is key to your success in our program!    Any time you are unable to keep your appointment we ask that  you call us at least 24 hours in advance to cancel.This will allow us to offer the appointment time to another patient.   Multiple missed appointments may lead to dismissal from the clinic.

## 2025-06-12 ENCOUNTER — MYC MEDICAL ADVICE (OUTPATIENT)
Dept: PALLIATIVE MEDICINE | Facility: CLINIC | Age: 80
End: 2025-06-12
Payer: COMMERCIAL

## 2025-06-12 ENCOUNTER — DOCUMENTATION ONLY (OUTPATIENT)
Dept: OTHER | Facility: CLINIC | Age: 80
End: 2025-06-12
Payer: COMMERCIAL

## 2025-06-13 NOTE — TELEPHONE ENCOUNTER
Routing to provider to review and advise for any concern or further recommendation    ----------------Mychart Below from pt----------------  Starting on Sat, I have been having diarrhea after my evening meal. Tonight I could not finish my meal as I had to run to the bathroom. I'm on my second box of immodium AD. My grandson is having his graduation party this Sat. What am I to do? I took 3 a day for 3 days and then stopped because of the diarrhea. Now I am taking them twice a day, 12 hrs apart. This drug is not good for me. What should I do? Can I stop taking it?       --------------Mychart below response to pt----------------  Maulik Molina,    Loose stool is generally not a side effect of buprenorphine and it looks like you were on a buprenorphine product for about a month and a half now. I do see you changed formulations of buprenorphine recently and I can have Whitney review for any recommendations or thoughts. You should also connect with your primary care as they may want to do rule anything GI/infectious out as well.     Sarah SAMPSON, RN Care Coordinator  [facility]  Pain Management    When responding to this message, please allow 24-48 business hours for a reply.  If you need sooner assistance please call: [facility] at [phone] between the hours of 8:00AM and 4:30PM Monday through Friday.    Note that [facility] is not intended for emergencies, detailed provider communication, or in lieu of an office visit. Medication is not typically adjusted over MyChart communication.

## 2025-06-22 ENCOUNTER — MYC MEDICAL ADVICE (OUTPATIENT)
Dept: FAMILY MEDICINE | Facility: CLINIC | Age: 80
End: 2025-06-22
Payer: COMMERCIAL

## 2025-06-23 NOTE — TELEPHONE ENCOUNTER
"6/20/25 [facility] Visit note: \"Plan:  We plan form discussed checking lab work, stool for infectious agents and C. difficile, and administering IV fluids.  However when staff went to begin these interventions the patient was not in the room.  We called her and left a message but did not hear back.  Patient will be considered to have left prior to completion of treatment\"      Attempted to call to discuss - but went straight to voicemail.  Deja Lomeli RN on 6/23/2025 at 9:55 AM    "

## 2025-06-24 NOTE — TELEPHONE ENCOUNTER
Pt called back. Declined to speak to triage. Stated she will continue to drink water. Pt was able to eat a banana and an english muffin. Pt stated she will call back next week if she feels she needs to be seen.

## 2025-06-30 NOTE — TELEPHONE ENCOUNTER
Please call and triage this patient   She needs to be seen to get stool culture etc   I can refer her to GI but it will be a wait for an appt and they will have wanted us to do some kind of work up first     I can work her in my schedule tomorrow am around 830 otherwise anyother provider today with an opening     She went to [facility] and left without completion of work up

## 2025-07-01 ENCOUNTER — RESULTS FOLLOW-UP (OUTPATIENT)
Dept: FAMILY MEDICINE | Facility: CLINIC | Age: 80
End: 2025-07-01

## 2025-07-01 ENCOUNTER — OFFICE VISIT (OUTPATIENT)
Dept: FAMILY MEDICINE | Facility: CLINIC | Age: 80
End: 2025-07-01
Payer: COMMERCIAL

## 2025-07-01 VITALS
SYSTOLIC BLOOD PRESSURE: 122 MMHG | TEMPERATURE: 96.9 F | BODY MASS INDEX: 21.8 KG/M2 | RESPIRATION RATE: 14 BRPM | HEART RATE: 93 BPM | OXYGEN SATURATION: 97 % | DIASTOLIC BLOOD PRESSURE: 64 MMHG | WEIGHT: 115.4 LBS

## 2025-07-01 DIAGNOSIS — R10.817 GENERALIZED ABDOMINAL TENDERNESS WITHOUT REBOUND TENDERNESS: ICD-10-CM

## 2025-07-01 DIAGNOSIS — R19.7 DIARRHEA, UNSPECIFIED TYPE: Primary | ICD-10-CM

## 2025-07-01 LAB
ALBUMIN SERPL BCG-MCNC: 3.9 G/DL (ref 3.5–5.2)
ALP SERPL-CCNC: 62 U/L (ref 40–150)
ALT SERPL W P-5'-P-CCNC: 16 U/L (ref 0–50)
ANION GAP SERPL CALCULATED.3IONS-SCNC: 10 MMOL/L (ref 7–15)
AST SERPL W P-5'-P-CCNC: 24 U/L (ref 0–45)
BILIRUB SERPL-MCNC: 0.3 MG/DL
BUN SERPL-MCNC: 12.5 MG/DL (ref 8–23)
CALCIUM SERPL-MCNC: 9.5 MG/DL (ref 8.8–10.4)
CHLORIDE SERPL-SCNC: 104 MMOL/L (ref 98–107)
CREAT SERPL-MCNC: 0.92 MG/DL (ref 0.51–0.95)
EGFRCR SERPLBLD CKD-EPI 2021: 62 ML/MIN/1.73M2
ERYTHROCYTE [DISTWIDTH] IN BLOOD BY AUTOMATED COUNT: 12.8 % (ref 10–15)
GLUCOSE SERPL-MCNC: 80 MG/DL (ref 70–99)
HCO3 SERPL-SCNC: 27 MMOL/L (ref 22–29)
HCT VFR BLD AUTO: 36.4 % (ref 35–47)
HGB BLD-MCNC: 11.7 G/DL (ref 11.7–15.7)
MCH RBC QN AUTO: 29.9 PG (ref 26.5–33)
MCHC RBC AUTO-ENTMCNC: 32.1 G/DL (ref 31.5–36.5)
MCV RBC AUTO: 93 FL (ref 78–100)
PLATELET # BLD AUTO: 309 10E3/UL (ref 150–450)
POTASSIUM SERPL-SCNC: 4.2 MMOL/L (ref 3.4–5.3)
PROT SERPL-MCNC: 6.7 G/DL (ref 6.4–8.3)
RBC # BLD AUTO: 3.91 10E6/UL (ref 3.8–5.2)
SODIUM SERPL-SCNC: 141 MMOL/L (ref 135–145)
TSH SERPL DL<=0.005 MIU/L-ACNC: 0.78 UIU/ML (ref 0.3–4.2)
WBC # BLD AUTO: 4.3 10E3/UL (ref 4–11)

## 2025-07-01 PROCEDURE — 87177 OVA AND PARASITES SMEARS: CPT | Performed by: FAMILY MEDICINE

## 2025-07-01 PROCEDURE — 1125F AMNT PAIN NOTED PAIN PRSNT: CPT | Performed by: FAMILY MEDICINE

## 2025-07-01 PROCEDURE — 80053 COMPREHEN METABOLIC PANEL: CPT | Performed by: FAMILY MEDICINE

## 2025-07-01 PROCEDURE — 87506 IADNA-DNA/RNA PROBE TQ 6-11: CPT | Mod: 59 | Performed by: FAMILY MEDICINE

## 2025-07-01 PROCEDURE — 3078F DIAST BP <80 MM HG: CPT | Performed by: FAMILY MEDICINE

## 2025-07-01 PROCEDURE — 99214 OFFICE O/P EST MOD 30 MIN: CPT | Performed by: FAMILY MEDICINE

## 2025-07-01 PROCEDURE — 87209 SMEAR COMPLEX STAIN: CPT | Performed by: FAMILY MEDICINE

## 2025-07-01 PROCEDURE — 85027 COMPLETE CBC AUTOMATED: CPT | Performed by: FAMILY MEDICINE

## 2025-07-01 PROCEDURE — 36415 COLL VENOUS BLD VENIPUNCTURE: CPT | Performed by: FAMILY MEDICINE

## 2025-07-01 PROCEDURE — 84443 ASSAY THYROID STIM HORMONE: CPT | Performed by: FAMILY MEDICINE

## 2025-07-01 PROCEDURE — 87493 C DIFF AMPLIFIED PROBE: CPT | Performed by: FAMILY MEDICINE

## 2025-07-01 PROCEDURE — 3074F SYST BP LT 130 MM HG: CPT | Performed by: FAMILY MEDICINE

## 2025-07-01 RX ORDER — LISINOPRIL 40 MG/1
40 TABLET ORAL DAILY
COMMUNITY

## 2025-07-01 ASSESSMENT — PATIENT HEALTH QUESTIONNAIRE - PHQ9
SUM OF ALL RESPONSES TO PHQ QUESTIONS 1-9: 1
10. IF YOU CHECKED OFF ANY PROBLEMS, HOW DIFFICULT HAVE THESE PROBLEMS MADE IT FOR YOU TO DO YOUR WORK, TAKE CARE OF THINGS AT HOME, OR GET ALONG WITH OTHER PEOPLE: NOT DIFFICULT AT ALL
SUM OF ALL RESPONSES TO PHQ QUESTIONS 1-9: 1

## 2025-07-01 ASSESSMENT — PAIN SCALES - GENERAL: PAINLEVEL_OUTOF10: MILD PAIN (3)

## 2025-07-01 ASSESSMENT — ENCOUNTER SYMPTOMS: DIARRHEA: 1

## 2025-07-01 NOTE — PROGRESS NOTES
Assessment & Plan     Diarrhea, unspecified type  Unclear etiol   Will get cultures   Will have her stop her cymbalta on the off chance this has caused diarrhea for her   Followup based on the above results.   - CBC with platelets; Future  - Comprehensive metabolic panel; Future  - TSH with free T4 reflex; Future  - C. difficile Toxin B PCR with reflex to C. difficile EIA; Future  - Ova and Parasite Exam Routine; Future  - Focused Enteric Pathogen Panel by PCR; Future  - Focused Enteric Pathogen Panel by PCR  - Ova and Parasite Exam Routine  - C. difficile Toxin B PCR with reflex to C. difficile EIA  - TSH with free T4 reflex  - Comprehensive metabolic panel  - CBC with platelets    Generalized abdominal tenderness without rebound tenderness    - Focused Enteric Pathogen Panel by PCR; Future  - Focused Enteric Pathogen Panel by PCR                Subjective   Tracy is a 80 year old, presenting for the following health issues:  Diarrhea        7/1/2025     8:19 AM   Additional Questions   Roomed by Rosibel CHRISTIE   Accompanied by Self         7/1/2025     8:19 AM   Patient Reported Additional Medications   Patient reports taking the following new medications .     Diarrhea    History of Present Illness       Reason for visit:  [place]  Symptom onset:  3-4 weeks ago  Symptoms include:  [place]  Symptom intensity:  Severe  Symptom progression:  Staying the same  Had these symptoms before:  No  What makes it worse:  No  What makes it better:  No   She is taking medications regularly.          Diarrhea  Onset/Duration: 3-4 weeks  Description:       Consistency of stool: watery and explosive       Blood in stool: No       Number of loose stools past 24 hours: 4  Progression of Symptoms: same  Accompanying signs and symptoms:       Fever: No       Nausea/Vomiting: No       Abdominal pain: YES- sometimes with the diarrhea       Weight loss: YES  Wt Readings from Last 4 Encounters:   07/01/25 52.3 kg (115 lb 6.4 oz)   06/20/25  52 kg (114 lb 9.6 oz)   05/14/25 51.7 kg (114 lb)   04/29/25 53.5 kg (118 lb)            Episodes of constipation: No  History   Ill contacts: No  Recent use of antibiotics: No  Recent travels: No  Recent medication-new or changes(Rx or OTC): cymbalta is new  Precipitating or alleviating factors: None  Therapies tried and outcome: Imodium right ear      This started about a week after starting duloxetine   She has had not weight loss   No blood in stool     Feels like she is dry or dehydrated     She has not had fever   This is watery explosive and foul odor           Review of Systems  Constitutional, HEENT, cardiovascular, pulmonary, gi and gu systems are negative, except as otherwise noted.      Objective    /64   Pulse 93   Temp 96.9  F (36.1  C) (Tympanic)   Resp 14   Wt 52.3 kg (115 lb 6.4 oz)   LMP  (LMP Unknown)   SpO2 97%   BMI 21.80 kg/m    Body mass index is 21.8 kg/m .  Physical Exam   GENERAL: alert and no distress  ABDOMEN: soft, mild diffuse tender, no hepatosplenomegaly, no masses and bowel sounds normal no rebound no guarding   PSYCH: mentation appears normal, affect normal/bright            Signed Electronically by: Gwendolyn Solis MD

## 2025-07-01 NOTE — NURSING NOTE
"Chief Complaint   Patient presents with    Diarrhea     BP (!) 150/80   Pulse 93   Temp 96.9  F (36.1  C) (Tympanic)   Resp 14   Wt 52.3 kg (115 lb 6.4 oz)   LMP  (LMP Unknown)   SpO2 97%   BMI 21.80 kg/m   Estimated body mass index is 21.8 kg/m  as calculated from the following:    Height as of 6/20/25: 1.549 m (5' 1\").    Weight as of this encounter: 52.3 kg (115 lb 6.4 oz).  Patient presents to the clinic using No DME      Health Maintenance that is potentially due pending provider review:    Health Maintenance Due   Topic Date Due    HEPATITIS A VACCINE (2 of 2 - Risk 2-dose series) 10/12/2017    RSV VACCINE (1 - 1-dose 75+ series) Never done    MEDICARE ANNUAL WELLNESS VISIT  11/01/2024    URINE DRUG SCREEN  11/01/2024    DEXA  05/06/2025    COVID-19 VACCINE (8 - 2024-25 season) 05/19/2025                  "

## 2025-07-02 LAB
C CAYETANENSIS DNA STL QL NAA+NON-PROBE: NEGATIVE
C DIFF TOX B STL QL: NEGATIVE
CAMPYLOBACTER DNA SPEC NAA+PROBE: NEGATIVE
CRYPTOSP DNA STL QL NAA+NON-PROBE: NEGATIVE
EC STX1+STX2 GENES STL QL NAA+NON-PROBE: NEGATIVE
G LAMBLIA DNA STL QL NAA+NON-PROBE: NEGATIVE
NOROVIRUS GI+II RNA STL QL NAA+NON-PROBE: NEGATIVE
SALMONELLA SP RPOD STL QL NAA+PROBE: NEGATIVE
SHIGELLA SP+EIEC IPAH ST NAA+NON-PROBE: NEGATIVE
VIBRIO DNA SPEC NAA+PROBE: NEGATIVE
Y ENTEROCOL DNA STL QL NAA+PROBE: NEGATIVE

## 2025-07-02 NOTE — PROGRESS NOTES
[facility]         Date of Visit: Jul 3, 2025     CHIEF COMPLAINT:   Chief Complaint   Patient presents with    Pain       Subjective   SUBJECTIVE    INTERVAL HISTORY:   Tracy Galloway is a 80 year old female seen for INITIAL EVALUATION on 8/22/24; last seen for FOLLOW UP on 6/03/25.  They are returning today for trigeminal neuralgia.         Recommendations/ Plan at the last visit included:  Visit discussion: Tracy Galloway is seen in follow up today at the pain clinic for trigeminal neuralgia.  Patient notes very positive improvement in overall burning nerve pain with Subutex 2 mg every 12 hours.  Patient is experiencing breakthrough pain before second dose of the day.  We discussed potential benefit of increasing dosing to every 8 hours and she would like to trial this for the next 2 weeks.  Patient reports the taste of Subutex is very better and wonders about alternatives.  We discussed transition to Suboxone to utilize the buccal film.  Patient would like to try this over the next 2-week timeframe and determine if preference is given to sublingual tablet versus buccal film.  At next refill on 6/10/2025, patient will start Suboxone 2/0.5 mg films as indicated below.  This is an early refill based on dose adjustment of Subutex to 3 times daily dosing as of today.  Patient is advised to follow-up in 4 weeks to reassess pain control.  Patient's questions were answered to her satisfaction.  She verbalizes understanding and agreement with plan.      PLAN:    Medication Management:   - CONTINUE taking Subutex 2mg - dissolve 1 tab (2mg) under tongue every 8 hours for pain; max 3 per day. Use this until you run out on 6/9/025   - START taking Suboxone 2/0.5mg film - place 1 film along inside of cheek every 8 hours for pain; max 3 per day. Start this at next refill on 6/10/25.     Return to Clinic:   -  30-minute In-Person Appointment with Whitney Baird, STEVE, APRN, FNP-C in 4 weeks, or  "sooner if needed    Future Considerations:   At next refill, let us know if you prefer the buprenorphine tablet or buccal film.        Interval history from last visit on 6/03/25:   Pain score today: Moderate Pain (4)   Pain rating: intensity ranges from 4/10 to 6/10 on a 0-10 scale.   Has questions today about \"what is this medication that I'm taking?\"; in reference to buprenorphine.  Still has complaints and \"I'm still really upset\" with Dr. Chang.  Feeling more fatigue and has more difficulty with driving.   Continues with tingling of right cheek below eye.  Burning pain of palate has improved; still present.   Taking Buprenorphine 2mg in AM and 2mg in PM; feels effect in 60 minutes           Since the last visit, Tracy Galloway reports:   Pain score today: Moderate Pain (6)   Pain rating: intensity ranges from 6/10 to 6/10 on a 0-10 scale.   Has developed diarrhea for past 3 weeks; saw PCP yesterday who stopped duloxetine to see if this improves.  Does not like Suboxone because buccal film is very distasteful; \"everything I eat or drink is bitter\"   Has not increased to three doses per day due to bitter taste of suboxone.   Wants to return to Subutex or try another option.        REVIEW OF SYSTEMS:   ROS: 10 point ROS neg other than the symptoms noted above in the HPI.     The patient otherwise denies red flag symptoms, such as: thunderclap headache, bowel or bladder incontinence, parasthesias, weakness, saddle anesthesia, unintentional weight loss, or fever/chills/sweats.     Medical History: Any changes in medical history since they were last seen? No    Medications and Allergies reviewed. Yes     Review of [place] Prescription Monitoring Program ():  reviewed on 7/03/25. No concern for abuse or misuse of controlled medications based on this report. Controlled substances prescribed in the past 6-12 months include: (Zolpidem 10mg, Suboxone 2-0.5mg film, Subutex 2mg, Norco 10/325mg, Percocet 10/325mg) "     Current MME: 4mg buprenorphine / day    Current PDMP reportable controlled substance medications, if any, are being prescribed by: Pain Mgmt   Primary Care Provider is Gwendolyn Solis       CSA due date: 9/24/25   UDS due date:  8/30/25     THE 4 As OF OPIOID MAINTENANCE ANALGESIA    Analgesia: Is pain relief clinically significant? Yes  Activity: Is patient functional and able to perform Activities of Daily Living? Yes  Adverse effects: Is patient free from adverse side effects from opiates? Yes  Adherence to Rx protocol: Is patient adhering to Controlled Substance Agreement and taking medications ONLY as ordered? Yes    Is Narcan prescribed for opiate use >50 MME daily or concurrent use of opiates and benzodiazepines? Yes           Objective    OBJECTIVE:    Physical Exam  Vitals:    07/03/25 1249   BP: 125/66   Pulse: 70        Appearance:   A&O. Patient is appropriate.   Patient is in NAD.      HHENT:  Normocephalic, atraumatic. Eyes without conjunctival injection or jaundice. Neck supple. No obvious neck masses.     Pulmonary:  Normal effort; no cough or audible wheeze      Skin: No obvious rash, lesions, or petechiae of exposed skin.    Neuro: Cranial nerves grossly intact.  Mentation and speech appropriate for age.     Psych:  Alert, without lethargy or stupor. Speech fluent. Appropriate affect. Mood normal. Able to follow commands without difficulty. Normal mood, judgement and behavior.            Diagnostic Tests/ Imaging/ Labs resulted since last visit:   None       Assessment & Plan      VISIT DIAGNOSIS:   1. Chronic pain syndrome (Primary)  - Adult Pain Clinic Follow-Up Order  - Adult Pain Clinic Follow-Up Order; Future    2. Trigeminal neuralgia  - Adult Pain Clinic Follow-Up Order  - buprenorphine (SUBUTEX) 2 MG SUBL sublingual tablet; Place 1 tablet (2 mg) under the tongue every 8 hours. Fill / start 7/03/25 (15 day supply)  Dispense: 45 tablet; Refill: 0  - Adult Pain Clinic Follow-Up  Order; Future    3. Chronic, continuous use of opioids  - Adult Pain Clinic Follow-Up Order  - buprenorphine (SUBUTEX) 2 MG SUBL sublingual tablet; Place 1 tablet (2 mg) under the tongue every 8 hours. Fill / start 7/03/25 (15 day supply)  Dispense: 45 tablet; Refill: 0  - Adult Pain Clinic Follow-Up Order; Future      ASSESSMENT:   Visit discussion: Tracy Galloway is seen in follow up today at the pain clinic for intractable trigeminal neuralgia.  Patient does not experience improvement in pain levels from previous visit because she was unable to advance to 3 times daily dosing of buprenorphine.  The past refill cycle she was trialing Suboxone 2-0.5 mg buccal films.  She states these are extremely better and unpalatable.  Return to Subutex 2 mg 3 times daily for next refill cycle.  In the meantime, we will attempt to switch to Zubsolv equivalent dosing as this is reported to be more palatable.  Patient is advised to follow-up in 4 weeks to reassess pain control and tolerance to buprenorphine.  Patient's questions were answered to her satisfaction.  She verbalizes understanding and agreement with plan.      PLAN:    Medication Management:   - STOP taking Suboxone 2-0.5mg film   - START taking Subutex 2mg - dissolve one tab under tongue every 8 hours       Return to Clinic:   -  30-minute In-Person Appointment with Whitney Baird, STEVE, APRN, FNP-C in 4 weeks, or sooner if needed      Future Considerations:   We will try to switch to Zubsolv for next refill of Buprenorphine.   At next refill, we will try Zubsolv 1.4/0.36mg - dissolve ONE tab under tongue every 8 hours. We will try this for 7 days to see if more palatable.        ___________________________________________________________________    BILLING TIME DOCUMENTATION:   TOTAL TIME includes:   Time spent preparing to see the patient: 4 minutes (reviewing records and tests)  Time spend face to face with the patient: 21 minutes  Time spent ordering tests,  medications, procedures and referrals: 0 minutes  Time spent Referring and communicating with other healthcare professionals: 0 minutes  Documenting clinical information in Epic: 5 minutes    The total TIME spent on this patient on the day of the appointment was 30 minutes.     ___________________________________________________________________    Review of Electronic Chart: Today I have also reviewed available medical information in the patient's medical record at [facility] ([facility]) and Care Everywhere (if available), including relevant provider notes, laboratory work, and imaging.     Whitney Baird, YANY, DNP, FNP-C   [facility]

## 2025-07-03 ENCOUNTER — OFFICE VISIT (OUTPATIENT)
Dept: PALLIATIVE MEDICINE | Facility: CLINIC | Age: 80
End: 2025-07-03
Attending: NURSE PRACTITIONER
Payer: COMMERCIAL

## 2025-07-03 VITALS — SYSTOLIC BLOOD PRESSURE: 125 MMHG | HEART RATE: 70 BPM | DIASTOLIC BLOOD PRESSURE: 66 MMHG

## 2025-07-03 DIAGNOSIS — G89.4 CHRONIC PAIN SYNDROME: Primary | ICD-10-CM

## 2025-07-03 DIAGNOSIS — F11.90 CHRONIC, CONTINUOUS USE OF OPIOIDS: ICD-10-CM

## 2025-07-03 DIAGNOSIS — G50.0 TRIGEMINAL NEURALGIA: Chronic | ICD-10-CM

## 2025-07-03 LAB
O+P STL MICRO: NEGATIVE
SPECIMEN TYPE: NORMAL

## 2025-07-03 RX ORDER — BUPRENORPHINE 2 MG/1
2 TABLET SUBLINGUAL EVERY 8 HOURS
Qty: 45 TABLET | Refills: 0 | Status: SHIPPED | OUTPATIENT
Start: 2025-07-03

## 2025-07-03 ASSESSMENT — PAIN SCALES - GENERAL: PAINLEVEL_OUTOF10: MODERATE PAIN (6)

## 2025-07-03 NOTE — PATIENT INSTRUCTIONS
PATIENT INSTRUCTIONS:     I am recommending a multidisciplinary treatment plan to help this patient better manage her pain. The following recommendations were given to the patient. Diagnosis, treatment options, risks, benefits, and alternatives were discussed; all questions were answered. Self-care instructions were given. The patient expressed understanding of the plan for management.   Medication Management:   - STOP taking Suboxone 2-0.5mg film   - START taking Subutex 2mg - dissolve one tab under tongue every 8 hours       Return to Clinic:   -  30-minute In-Person Appointment with Whitney Baird, STEVE, APRN, FNJACYC in 4 weeks, or sooner if needed      Future Considerations:   We will try to switch to Zubsolv for next refill of Buprenorphine.    At next refill, we will try Zubsolv 1.4/0.36mg - dissolve ONE tab under tongue every 8 hours. We will try this for 7 days to see if more palatable.      ----------------------------------------------------------------  Clinic Number:  [phone]   Call with any questions about your care and for scheduling assistance.   Calls are returned Monday through Friday between 8 AM and 4:30 PM. We usually get back to you within 2 business days depending on the issue/request.    If we are prescribing your medications:  For opioid medication refills, call the clinic or send a [employer] message 7 days in advance.  Please include:  Name of requested medication  Name of the pharmacy.  For non-opioid medications, call your pharmacy directly to request a refill. Please allow 3-4 days to be processed.   Per [place] State Law:  All controlled substance prescriptions must be filled within 30 days of being written.    For those controlled substances allowing refills, pickup must occur within 30 days of last fill.      We believe regular attendance is key to your success in our program!    Any time you are unable to keep your appointment we ask that you call us at least 24 hours in advance to  cancel.This will allow us to offer the appointment time to another patient.   Multiple missed appointments may lead to dismissal from the clinic.

## 2025-07-16 ENCOUNTER — OFFICE VISIT (OUTPATIENT)
Dept: FAMILY MEDICINE | Facility: CLINIC | Age: 80
End: 2025-07-16
Payer: COMMERCIAL

## 2025-07-16 VITALS
BODY MASS INDEX: 21.98 KG/M2 | HEART RATE: 74 BPM | DIASTOLIC BLOOD PRESSURE: 80 MMHG | RESPIRATION RATE: 20 BRPM | OXYGEN SATURATION: 97 % | TEMPERATURE: 96.9 F | HEIGHT: 61 IN | SYSTOLIC BLOOD PRESSURE: 130 MMHG

## 2025-07-16 DIAGNOSIS — F41.9 ANXIETY: ICD-10-CM

## 2025-07-16 DIAGNOSIS — F11.90 CHRONIC, CONTINUOUS USE OF OPIOIDS: Primary | ICD-10-CM

## 2025-07-16 DIAGNOSIS — Z00.00 ENCOUNTER FOR MEDICARE ANNUAL WELLNESS EXAM: ICD-10-CM

## 2025-07-16 DIAGNOSIS — I10 ESSENTIAL HYPERTENSION WITH GOAL BLOOD PRESSURE LESS THAN 140/90: ICD-10-CM

## 2025-07-16 DIAGNOSIS — Z78.0 ASYMPTOMATIC POSTMENOPAUSAL STATUS: ICD-10-CM

## 2025-07-16 DIAGNOSIS — F51.01 PRIMARY INSOMNIA: Chronic | ICD-10-CM

## 2025-07-16 PROCEDURE — 3079F DIAST BP 80-89 MM HG: CPT | Performed by: FAMILY MEDICINE

## 2025-07-16 PROCEDURE — G0439 PPPS, SUBSEQ VISIT: HCPCS | Performed by: FAMILY MEDICINE

## 2025-07-16 PROCEDURE — 99214 OFFICE O/P EST MOD 30 MIN: CPT | Mod: 25 | Performed by: FAMILY MEDICINE

## 2025-07-16 PROCEDURE — G2211 COMPLEX E/M VISIT ADD ON: HCPCS | Performed by: FAMILY MEDICINE

## 2025-07-16 PROCEDURE — 1125F AMNT PAIN NOTED PAIN PRSNT: CPT | Performed by: FAMILY MEDICINE

## 2025-07-16 PROCEDURE — 3075F SYST BP GE 130 - 139MM HG: CPT | Performed by: FAMILY MEDICINE

## 2025-07-16 RX ORDER — LISINOPRIL 40 MG/1
40 TABLET ORAL DAILY
Qty: 90 TABLET | Refills: 3 | Status: SHIPPED | OUTPATIENT
Start: 2025-07-16

## 2025-07-16 RX ORDER — DILTIAZEM HYDROCHLORIDE 180 MG/1
180 CAPSULE, EXTENDED RELEASE ORAL DAILY
Qty: 90 CAPSULE | Refills: 3 | Status: SHIPPED | OUTPATIENT
Start: 2025-07-16

## 2025-07-16 RX ORDER — DESVENLAFAXINE 50 MG/1
50 TABLET, FILM COATED, EXTENDED RELEASE ORAL DAILY
Qty: 90 TABLET | Refills: 3 | Status: SHIPPED | OUTPATIENT
Start: 2025-07-16

## 2025-07-16 RX ORDER — ZOLPIDEM TARTRATE 10 MG/1
TABLET ORAL
Qty: 30 TABLET | Refills: 5 | Status: SHIPPED | OUTPATIENT
Start: 2025-07-16

## 2025-07-16 SDOH — HEALTH STABILITY: PHYSICAL HEALTH: ON AVERAGE, HOW MANY DAYS PER WEEK DO YOU ENGAGE IN MODERATE TO STRENUOUS EXERCISE (LIKE A BRISK WALK)?: 0 DAYS

## 2025-07-16 ASSESSMENT — PAIN SCALES - GENERAL: PAINLEVEL_OUTOF10: SEVERE PAIN (7)

## 2025-07-16 ASSESSMENT — PATIENT HEALTH QUESTIONNAIRE - PHQ9
10. IF YOU CHECKED OFF ANY PROBLEMS, HOW DIFFICULT HAVE THESE PROBLEMS MADE IT FOR YOU TO DO YOUR WORK, TAKE CARE OF THINGS AT HOME, OR GET ALONG WITH OTHER PEOPLE: NOT DIFFICULT AT ALL
5. POOR APPETITE OR OVEREATING: NOT AT ALL
SUM OF ALL RESPONSES TO PHQ QUESTIONS 1-9: 2
SUM OF ALL RESPONSES TO PHQ QUESTIONS 1-9: 2

## 2025-07-16 ASSESSMENT — ANXIETY QUESTIONNAIRES
IF YOU CHECKED OFF ANY PROBLEMS ON THIS QUESTIONNAIRE, HOW DIFFICULT HAVE THESE PROBLEMS MADE IT FOR YOU TO DO YOUR WORK, TAKE CARE OF THINGS AT HOME, OR GET ALONG WITH OTHER PEOPLE: NOT DIFFICULT AT ALL
GAD7 TOTAL SCORE: 4
5. BEING SO RESTLESS THAT IT IS HARD TO SIT STILL: NOT AT ALL
1. FEELING NERVOUS, ANXIOUS, OR ON EDGE: SEVERAL DAYS
2. NOT BEING ABLE TO STOP OR CONTROL WORRYING: SEVERAL DAYS
GAD7 TOTAL SCORE: 4
7. FEELING AFRAID AS IF SOMETHING AWFUL MIGHT HAPPEN: NOT AT ALL
3. WORRYING TOO MUCH ABOUT DIFFERENT THINGS: SEVERAL DAYS
6. BECOMING EASILY ANNOYED OR IRRITABLE: SEVERAL DAYS

## 2025-07-16 ASSESSMENT — SOCIAL DETERMINANTS OF HEALTH (SDOH): HOW OFTEN DO YOU GET TOGETHER WITH FRIENDS OR RELATIVES?: ONCE A WEEK

## 2025-07-16 NOTE — PATIENT INSTRUCTIONS
Patient Education   Preventive Care Advice   This is general advice given by our system to help you stay healthy. However, your care team may have specific advice just for you. Please talk to your care team about your preventive care needs.  Nutrition  Eat 5 or more servings of fruits and vegetables each day.  Try wheat bread, brown rice and whole grain pasta (instead of white bread, rice, and pasta).  Get enough calcium and vitamin D. Check the label on foods and aim for 100% of the RDA (recommended daily allowance).  Lifestyle  Exercise at least 150 minutes each week  (30 minutes a day, 5 days a week).  Do muscle strengthening activities 2 days a week. These help control your weight and prevent disease.  No smoking.  Wear sunscreen to prevent skin cancer.  Have a dental exam and cleaning every 6 months.  Yearly exams  See your health care team every year to talk about:  Any changes in your health.  Any medicines your care team has prescribed.  Preventive care, family planning, and ways to prevent chronic diseases.  Shots (vaccines)   HPV shots (up to age 26), if you've never had them before.  Hepatitis B shots (up to age 59), if you've never had them before.  COVID-19 shot: Get this shot when it's due.  Flu shot: Get a flu shot every year.  Tetanus shot: Get a tetanus shot every 10 years.  Pneumococcal, hepatitis A, and RSV shots: Ask your care team if you need these based on your risk.  Shingles shot (for age 50 and up)  General health tests  Diabetes screening:  Starting at age 35, Get screened for diabetes at least every 3 years.  If you are younger than age 35, ask your care team if you should be screened for diabetes.  Cholesterol test: At age 39, start having a cholesterol test every 5 years, or more often if advised.  Bone density scan (DEXA): At age 50, ask your care team if you should have this scan for osteoporosis (brittle bones).  Hepatitis C: Get tested at least once in your life.  STIs (sexually  transmitted infections)  Before age 24: Ask your care team if you should be screened for STIs.  After age 24: Get screened for STIs if you're at risk. You are at risk for STIs (including HIV) if:  You are sexually active with more than one person.  You don't use condoms every time.  You or a partner was diagnosed with a sexually transmitted infection.  If you are at risk for HIV, ask about PrEP medicine to prevent HIV.  Get tested for HIV at least once in your life, whether you are at risk for HIV or not.  Cancer screening tests  Cervical cancer screening: If you have a cervix, begin getting regular cervical cancer screening tests starting at age 21.  Breast cancer scan (mammogram): If you've ever had breasts, begin having regular mammograms starting at age 40. This is a scan to check for breast cancer.  Colon cancer screening: It is important to start screening for colon cancer at age 45.  Have a colonoscopy test every 10 years (or more often if you're at risk) Or, ask your provider about stool tests like a FIT test every year or Cologuard test every 3 years.  To learn more about your testing options, visit:   .  For help making a decision, visit:   https://bit.ly/tv00843.  Prostate cancer screening test: If you have a prostate, ask your care team if a prostate cancer screening test (PSA) at age 55 is right for you.  Lung cancer screening: If you are a current or former smoker ages 50 to 80, ask your care team if ongoing lung cancer screenings are right for you.  For informational purposes only. Not to replace the advice of your health care provider. Copyright   2023 [facility]. All rights reserved. Clinically reviewed by the [facility] Transitions Program. [employer] 674956 - REV 01/24.  Chronic Pain: Care Instructions  Your Care Instructions     Chronic pain is pain that lasts a long time (months or even years) and may or may not have a clear cause. It is different from acute pain, which  usually does have a clear cause--like an injury or illness--and gets better over time. Chronic pain:  Lasts over time but may vary from day to day.  Does not go away despite efforts to end it.  May disrupt your sleep and lead to fatigue.  May cause depression or anxiety.  May make your muscles tense, causing more pain.  Can disrupt your work, hobbies, home life, and relationships with friends and family.  Chronic pain is a very real condition. It is not just in your head. Treatment can help and usually includes several methods used together, such as medicines, physical therapy, exercise, and other treatments. Learning how to relax and changing negative thought patterns can also help you cope.  Chronic pain is complex. Taking an active role in your treatment will help you better manage your pain. Tell your doctor if you have trouble dealing with your pain. You may have to try several things before you find what works best for you.  Follow-up care is a key part of your treatment and safety. Be sure to make and go to all appointments, and call your doctor if you are having problems. It's also a good idea to know your test results and keep a list of the medicines you take.  How can you care for yourself at home?  Pace yourself. Break up large jobs into smaller tasks. Save harder tasks for days when you have less pain, or go back and forth between hard tasks and easier ones. Take rest breaks.  Relax, and reduce stress. Relaxation techniques such as deep breathing or meditation can help.  Keep moving. Gentle, daily exercise can help reduce pain over the long run. Try low- or no-impact exercises such as walking, swimming, and stationary biking. Do stretches to stay flexible.  Try heat, cold packs, and massage.  Get enough sleep. Chronic pain can make you tired and drain your energy. Talk with your doctor if you have trouble sleeping because of pain.  Think positive. Your thoughts can affect your pain level. Do things that  you enjoy to distract yourself when you have pain instead of focusing on the pain. See a movie, read a book, listen to music, or spend time with a friend.  If you think you are depressed, talk to your doctor about treatment.  Keep a daily pain diary. Record how your moods, thoughts, sleep patterns, activities, and medicine affect your pain. You may find that your pain is worse during or after certain activities or when you are feeling a certain emotion. Having a record of your pain can help you and your doctor find the best ways to treat your pain.  Take pain medicines exactly as directed.  If the doctor gave you a prescription medicine for pain, take it as prescribed.  If you are not taking a prescription pain medicine, ask your doctor if you can take an over-the-counter medicine.  Reducing constipation caused by pain medicine  Talk to your doctor about a laxative. If a laxative doesn't work, your doctor may suggest a prescription medicine.  Include fruits, vegetables, beans, and whole grains in your diet each day. These foods are high in fiber.  If your doctor recommends it, get more exercise. Walking is a good choice. Bit by bit, increase the amount you walk every day. Try for at least 30 minutes on most days of the week.  Schedule time each day for a bowel movement. A daily routine may help. Take your time and do not strain when having a bowel movement.  When should you call for help?   Call your doctor now or seek immediate medical care if:    Your pain gets worse or is out of control.     You feel down or blue, or you do not enjoy things like you once did. You may be depressed, which is common in people with chronic pain. Depression can be treated.     You have vomiting or cramps for more than 2 hours.   Watch closely for changes in your health, and be sure to contact your doctor if:    You cannot sleep because of pain.     You are very worried or anxious about your pain.     You have trouble taking your pain  "medicine.     You have any concerns about your pain medicine.     You have trouble with bowel movements, such as:  No bowel movement in 3 days.  Blood in the anal area, in your stool, or on the toilet paper.  Diarrhea for more than 24 hours.   Where can you learn more?  Go to https://www.ShoutNow.net/patiented  Enter N004 in the search box to learn more about \"Chronic Pain: Care Instructions.\"  Current as of: July 31, 2024  Content Version: 14.5 2024-2025 [employer].   Care instructions adapted under license by your healthcare professional. If you have questions about a medical condition or this instruction, always ask your healthcare professional. [employer] disclaims any warranty or liability for your use of this information.       "

## 2025-07-16 NOTE — PROGRESS NOTES
Preventive Care Visit  [facility]  Gwendolyn Solis MD, Family Medicine  Jul 16, 2025      Assessment & Plan     Essential hypertension with goal blood pressure less than 140/90  Stable no change in treatment plan.   - diltiazem ER (DILT-XR) 180 MG 24 hr capsule; Take 1 capsule (180 mg) by mouth daily.  - lisinopril (ZESTRIL) 40 MG tablet; Take 1 tablet (40 mg) by mouth daily.  - Lipid panel reflex to direct LDL Fasting; Future    Primary insomnia  Stable no change in treatment plan.   - zolpidem (AMBIEN) 10 MG tablet; TAKE ONE-HALF TO ONE TABLET (5-10MG) BY MOUTH EVERY NIGHT AT BEDTIME AS NEEDED FOR SLEEP    Chronic, continuous use of opioids  Causing constipation   Working with pain clinic on this     Asymptomatic postmenopausal status    - DEXA HIP/PELVIS/SPINE - Future; Future    Anxiety  Side affects on cymbalta   Will try below  Care giver burnout discussed care coordinator referral   - desvenlafaxine (PRISTIQ) 50 MG 24 hr tablet; Take 1 tablet (50 mg) by mouth daily.    Encounter for Medicare annual wellness exam    Patient has been advised of split billing requirements and indicates understanding: Yes    Counseling  Appropriate preventive services were addressed with this patient via screening, questionnaire, or discussion as appropriate for fall prevention, nutrition, physical activity, Tobacco-use cessation, social engagement, weight loss and cognition.  Checklist reviewing preventive services available has been given to the patient.  Reviewed patient's diet, addressing concerns and/or questions.   I have reviewed Opioid Use Disorder and Substance Use Disorder risk factors and made any needed referrals.   Reviewed preventive health counseling, as reflected in patient instructions        Ashok Molina is a 80 year old, presenting for the following:  Physical        7/16/2025     1:20 PM   Additional Questions   Roomed by Jahaira OROZCO CMA   Accompanied by Self        HPI    Anxiety   How are you doing with your anxiety since your last visit? No change still anxious and irritable   Are you having other symptoms that might be associated with anxiety? No  Have you had a significant life event? No   Are you feeling depressed? Yes:  at times due to  illness   Do you have any concerns with your use of alcohol or other drugs? No    Social History     Tobacco Use    Smoking status: Former     Current packs/day: 0.00     Types: Cigarettes     Quit date: 10/30/1962     Years since quittin.7     Passive exposure: Never    Smokeless tobacco: Never   Vaping Use    Vaping status: Never Used   Substance Use Topics    Alcohol use: Yes     Alcohol/week: 4.0 standard drinks of alcohol     Types: 4 Standard drinks or equivalent per week     Comment: occasional    Drug use: No     Comment: medical marijuana in the past         2024     5:48 PM 2025     9:36 PM 2025     1:26 PM   ROMAIN-7 SCORE   Total Score 3 (minimal anxiety) 9 (mild anxiety)    Total Score 3 9  4       Patient-reported         2025     1:26 PM 2025     8:15 AM 2025     1:10 PM   PHQ   PHQ-9 Total Score 7  1  2    Q9: Thoughts of better off dead/self-harm past 2 weeks Several days Not at all Not at all   F/U: Thoughts of suicide or self-harm No     F/U: Safety concerns No         Patient-reported         2025     1:10 PM   Last PHQ-9   1.  Little interest or pleasure in doing things 0   2.  Feeling down, depressed, or hopeless 0   3.  Trouble falling or staying asleep, or sleeping too much 0   4.  Feeling tired or having little energy 1   5.  Poor appetite or overeating 1   6.  Feeling bad about yourself 0   7.  Trouble concentrating 0   8.  Moving slowly or restless 0   Q9: Thoughts of better off dead/self-harm past 2 weeks 0   PHQ-9 Total Score 2        Patient-reported         2025     1:26 PM   ROMAIN-7    1. Feeling nervous, anxious, or on edge 1   2. Not being able to stop or  control worrying 1   3. Worrying too much about different things 1   4. Trouble relaxing 0   5. Being so restless that it is hard to sit still 0   6. Becoming easily annoyed or irritable 1   7. Feeling afraid, as if something awful might happen 0   ROMAIN-7 Total Score 4   If you checked any problems, how difficult have they made it for you to do your work, take care of things at home, or get along with other people? Not difficult at all     Constipation  Says that since discontinuing Cymbalta diarrhea has improved.   However now she states that she has been struggling with constipation.   Has an appointment on 8/7/25 with pain management and plans to discuss with them as she thinks it is her buprenorphine causing the constipation.   Says that she has not tried any treatments as of yet as it just started as the diarrhea cleared      Hypertension Follow-up    Do you check your blood pressure regularly outside of the clinic? Yes   Are you following a low salt diet? Yes  Are your blood pressures ever more than 140 on the top number (systolic) OR more   than 90 on the bottom number (diastolic), for example 140/90? No    BP Readings from Last 2 Encounters:   07/16/25 130/80   07/03/25 125/66       Advance Care Planning    Did not discuss         7/16/2025   General Health   How would you rate your overall physical health? Good   Feel stress (tense, anxious, or unable to sleep) Not at all         7/16/2025   Nutrition   Diet: Regular (no restrictions)         7/16/2025   Exercise   Days per week of moderate/strenous exercise 0 days   (!) EXERCISE CONCERN      7/16/2025   Social Factors   Frequency of gathering with friends or relatives Once a week   Worry food won't last until get money to buy more No   Food not last or not have enough money for food? No   Do you have housing? (Housing is defined as stable permanent housing and does not include staying outside in a car, in a tent, in an abandoned building, in an overnight  shelter, or couch-surfing.) No   Are you worried about losing your housing? No   Lack of transportation? No   Unable to get utilities (heat,electricity)? No   Want help with housing or utility concern? No   (!) HOUSING CONCERN PRESENT      2025   Fall Risk   Fallen 2 or more times in the past year? No   Trouble with walking or balance? No          2025   Activities of Daily Living- Home Safety   Needs help with the following daily activites None of the above   Safety concerns in the home None of the above         2025   Dental   Dentist two times every year? Yes         2025   Hearing Screening   Hearing concerns? None of the above         2025   Driving Risk Screening   Patient/family members have concerns about driving No         2025   General Alertness/Fatigue Screening   Have you been more tired than usual lately? No         2025   Urinary Incontinence Screening   Bothered by leaking urine in past 6 months No       Today's PHQ-9 Score:       2025     1:10 PM   PHQ-9 SCORE   PHQ-9 Total Score MyChart 2 (Minimal depression)   PHQ-9 Total Score 2        Patient-reported         2025   Substance Use   Alcohol more than 3/day or more than 7/wk No   Do you have a current opioid prescription? (!) YES   How severe/bad is pain from 1 to 10? 7/10   Do you use any other substances recreationally? No       Social History     Tobacco Use    Smoking status: Former     Current packs/day: 0.00     Types: Cigarettes     Quit date: 10/30/1962     Years since quittin.7     Passive exposure: Never    Smokeless tobacco: Never   Vaping Use    Vaping status: Never Used   Substance Use Topics    Alcohol use: Yes     Alcohol/week: 4.0 standard drinks of alcohol     Types: 4 Standard drinks or equivalent per week     Comment: occasional    Drug use: No     Comment: medical marijuana in the past           2022   LAST FHS-7 RESULTS   1st degree relative breast or ovarian cancer No    Any relative bilateral breast cancer No   Any male have breast cancer No   Any ONE woman have BOTH breast AND ovarian cancer No   Any woman with breast cancer before 50yrs No   2 or more relatives with breast AND/OR ovarian cancer No   2 or more relatives with breast AND/OR bowel cancer No                      Reviewed and updated as needed this visit by Provider   Tobacco  Allergies  Meds  Problems  Med Hx  Surg Hx  Fam Hx              Current providers sharing in care for this patient include:  Patient Care Team:  Gwendolyn Solis MD as PCP - General (Family Medicine)  João Thomas MD as MD (Gastroenterology)  Xi Hayes PA-C as Physician Assistant (Dermatology)  Diana Her PA-C as Physician Assistant (Dermatology)  Gwendolyn Solis MD as Assigned PCP  Sulma Lockwood APRN CNP as Nurse Practitioner  Patrick Hameed MD as MD (Physical Medicine and Rehabilitation)  Dona Vega LICSW as Assigned Behavioral Health Provider  Whitney Baird DNP as Nurse Practitioner (Pain Clinic)  Whitney Baird DNP as Assigned Pain Medication Provider  Diana Her PA-C as Assigned Dermatology Provider    The following health maintenance items are reviewed in Epic and correct as of today:  Health Maintenance   Topic Date Due    HEPATITIS A VACCINE (2 of 2 - Risk 2-dose series) 10/12/2017    RSV VACCINE (1 - 1-dose 75+ series) Never done    URINE DRUG SCREEN  11/01/2024    DEXA  05/06/2025    COVID-19 VACCINE (8 - 2024-25 season) 05/19/2025    INFLUENZA VACCINE (1) 09/01/2025    CONTROLLED SUBSTANCE AGREEMENT FOR CHRONIC PAIN MANAGEMENT  09/24/2025    ANNUAL REVIEW OF HM ORDERS  05/14/2026    BMP  07/01/2026    MEDICARE ANNUAL WELLNESS VISIT  07/16/2026    ROMAIN ASSESSMENT  07/16/2026    FALL RISK ASSESSMENT  07/16/2026    PHQ-9  07/16/2026    DTAP/TDAP/TD VACCINE (2 - Td or Tdap) 04/12/2027    ADVANCE CARE PLANNING  06/12/2030    DEPRESSION ACTION PLAN  Completed  "   PNEUMOCOCCAL VACCINE 50+ YEARS  Completed    ZOSTER VACCINE  Completed    HPV VACCINE  Aged Out    MENINGITIS VACCINE  Aged Out    MAMMO SCREENING  Discontinued    COLORECTAL CANCER SCREENING  Discontinued         Review of Systems  Constitutional, HEENT, cardiovascular, pulmonary, gi and gu systems are negative, except as otherwise noted.     Objective    Exam  /80   Pulse 74   Temp 96.9  F (36.1  C) (Tympanic)   Resp 20   Ht 1.543 m (5' 0.75\")   LMP  (LMP Unknown)   SpO2 97%   BMI 21.98 kg/m     Estimated body mass index is 21.98 kg/m  as calculated from the following:    Height as of this encounter: 1.543 m (5' 0.75\").    Weight as of 7/1/25: 52.3 kg (115 lb 6.4 oz).    Physical Exam  GENERAL: alert and no distress  EYES: Eyes grossly normal to inspection, PERRL and conjunctivae and sclerae normal  HENT: ear canals and TM's normal, nose and mouth without ulcers or lesions  NECK: no adenopathy, no asymmetry, masses, or scars  RESP: lungs clear to auscultation - no rales, rhonchi or wheezes  CV: regular rate and rhythm, normal S1 S2, no S3 or S4, no murmur, click or rub, no peripheral edema  ABDOMEN: soft, nontender, no hepatosplenomegaly, no masses and bowel sounds normal  MS: no gross musculoskeletal defects noted, no edema  SKIN: no suspicious lesions or rashes  NEURO: Normal strength and tone, mentation intact and speech normal  PSYCH: mentation appears normal, affect normal/bright         7/16/2025   Mini Cog   Clock Draw Score 0 Abnormal   3 Item Recall 3 objects recalled   Mini Cog Total Score 3              Signed Electronically by: Gwendolyn Solis MD    "

## 2025-07-21 ENCOUNTER — MYC MEDICAL ADVICE (OUTPATIENT)
Dept: FAMILY MEDICINE | Facility: CLINIC | Age: 80
End: 2025-07-21
Payer: COMMERCIAL

## 2025-07-23 ENCOUNTER — TELEPHONE (OUTPATIENT)
Dept: FAMILY MEDICINE | Facility: CLINIC | Age: 80
End: 2025-07-23
Payer: COMMERCIAL

## 2025-07-23 NOTE — TELEPHONE ENCOUNTER
"Tracy called to tell Dr Solis she overslept missing her appt this morning. Her  has an appt scheduled for 09/10/25 and she will be accompanying him. She reports \"everything is going ok\". She does not need any med refills at this time.   Kasandra OROZCO RN    "

## 2025-07-24 ENCOUNTER — TELEPHONE (OUTPATIENT)
Dept: PALLIATIVE MEDICINE | Facility: CLINIC | Age: 80
End: 2025-07-24
Payer: COMMERCIAL

## 2025-07-24 DIAGNOSIS — G89.4 CHRONIC PAIN SYNDROME: ICD-10-CM

## 2025-07-24 DIAGNOSIS — F11.90 CHRONIC, CONTINUOUS USE OF OPIOIDS: ICD-10-CM

## 2025-07-24 DIAGNOSIS — G50.0 TRIGEMINAL NEURALGIA: Chronic | ICD-10-CM

## 2025-07-24 NOTE — PROGRESS NOTES
Updating Zubsolv qty to 30 tabs per request of pharmacy.    YANY Kinney, DNP, FNP-C   [facility] - Pain Management

## 2025-07-24 NOTE — TELEPHONE ENCOUNTER
Prior Authorization Retail Medication Request    Medication/Dose: Zubsolv 1.4-0.36 sl  Diagnosis and ICD code (if different than what is on RX):    New/renewal/insurance change PA/secondary ins. 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

## 2025-07-24 NOTE — TELEPHONE ENCOUNTER
Prior Authorization Approval    Medication: ZUBSOLV 1.4-0.36 MG SL SUBL  Authorization Effective Date: 7/24/2025  Authorization Expiration Date: 7/24/2026  Insurance Company: [employer] - Phone [phone] Fax [phone]  Which Pharmacy is filling the prescription: [facility] [place] [id number] [place] [address]  Pharmacy Notified: YES  Patient Notified: YES (called pharmacy to notify and notified patient via [employer] message)

## 2025-07-24 NOTE — TELEPHONE ENCOUNTER
Retail Pharmacy Prior Authorization Team   Phone: [phone]    PA Initiation    Medication: ZUBSOLV 1.4-0.36 MG SL SUBL  Insurance Company: [employer] - Phone [phone] Fax [phone]  Pharmacy Filling the Rx: [place], [place] - [address] [place] [address]  Filling Pharmacy Phone: [phone]  Filling Pharmacy Fax:    Start Date: 7/24/2025    SAMUEL PENG (Mchugh: [id number])

## 2025-08-07 ENCOUNTER — OFFICE VISIT (OUTPATIENT)
Dept: PALLIATIVE MEDICINE | Facility: CLINIC | Age: 80
End: 2025-08-07
Attending: NURSE PRACTITIONER
Payer: COMMERCIAL

## 2025-08-07 VITALS — HEART RATE: 62 BPM | DIASTOLIC BLOOD PRESSURE: 68 MMHG | SYSTOLIC BLOOD PRESSURE: 139 MMHG

## 2025-08-07 DIAGNOSIS — G50.0 TRIGEMINAL NEURALGIA: Primary | Chronic | ICD-10-CM

## 2025-08-07 DIAGNOSIS — G89.4 CHRONIC PAIN SYNDROME: ICD-10-CM

## 2025-08-07 DIAGNOSIS — F11.90 CHRONIC, CONTINUOUS USE OF OPIOIDS: ICD-10-CM

## 2025-08-07 RX ORDER — BUPRENORPHINE HYDROCHLORIDE AND NALOXONE HYDROCHLORIDE 2.9; .71 MG/1; MG/1
1 TABLET, ORALLY DISINTEGRATING SUBLINGUAL EVERY 12 HOURS
Qty: 30 TABLET | Refills: 0 | Status: SHIPPED | OUTPATIENT
Start: 2025-08-07

## 2025-08-07 ASSESSMENT — PAIN SCALES - GENERAL: PAINLEVEL_OUTOF10: MODERATE PAIN (6)

## 2025-08-16 ENCOUNTER — MYC MEDICAL ADVICE (OUTPATIENT)
Dept: FAMILY MEDICINE | Facility: CLINIC | Age: 80
End: 2025-08-16
Payer: COMMERCIAL

## 2025-08-20 ENCOUNTER — MYC MEDICAL ADVICE (OUTPATIENT)
Dept: FAMILY MEDICINE | Facility: CLINIC | Age: 80
End: 2025-08-20
Payer: COMMERCIAL

## 2025-08-27 ENCOUNTER — MYC REFILL (OUTPATIENT)
Dept: PALLIATIVE MEDICINE | Facility: CLINIC | Age: 80
End: 2025-08-27
Payer: COMMERCIAL

## 2025-08-27 DIAGNOSIS — F11.90 CHRONIC, CONTINUOUS USE OF OPIOIDS: ICD-10-CM

## 2025-08-27 DIAGNOSIS — G89.4 CHRONIC PAIN SYNDROME: ICD-10-CM

## 2025-08-27 DIAGNOSIS — G50.0 TRIGEMINAL NEURALGIA: Chronic | ICD-10-CM

## 2025-08-27 RX ORDER — BUPRENORPHINE HYDROCHLORIDE AND NALOXONE HYDROCHLORIDE 2.9; .71 MG/1; MG/1
1 TABLET, ORALLY DISINTEGRATING SUBLINGUAL EVERY 12 HOURS
Qty: 30 TABLET | Refills: 0 | Status: SHIPPED | OUTPATIENT
Start: 2025-08-27

## 2025-08-28 ENCOUNTER — MYC MEDICAL ADVICE (OUTPATIENT)
Dept: PALLIATIVE MEDICINE | Facility: CLINIC | Age: 80
End: 2025-08-28
Payer: COMMERCIAL

## (undated) DEVICE — SOL NACL 0.9% IRRIG 1000ML BOTTLE 2F7124

## (undated) DEVICE — DRSG GAUZE 4X4" 3033

## (undated) DEVICE — SOL WATER IRRIG 1000ML BOTTLE 2F7114

## (undated) DEVICE — NDL CANNULA INTERLINK BLUNT 18GA

## (undated) DEVICE — RAD RX ISOVUE 300 (50ML) 61% IOPAMIDOL CHARGE PER ML

## (undated) DEVICE — ESU GROUND PAD ADULT W/CORD E7507

## (undated) RX ORDER — PROPOFOL 10 MG/ML
INJECTION, EMULSION INTRAVENOUS
Status: DISPENSED
Start: 2019-05-24

## (undated) RX ORDER — FENTANYL CITRATE 50 UG/ML
INJECTION, SOLUTION INTRAMUSCULAR; INTRAVENOUS
Status: DISPENSED
Start: 2019-05-24